# Patient Record
Sex: MALE | Race: WHITE | NOT HISPANIC OR LATINO | Employment: OTHER | URBAN - NONMETROPOLITAN AREA
[De-identification: names, ages, dates, MRNs, and addresses within clinical notes are randomized per-mention and may not be internally consistent; named-entity substitution may affect disease eponyms.]

---

## 2017-01-06 RX ORDER — CLOPIDOGREL BISULFATE 75 MG/1
TABLET ORAL
Qty: 30 TABLET | Refills: 0 | Status: SHIPPED | OUTPATIENT
Start: 2017-01-06 | End: 2017-02-18 | Stop reason: SDUPTHER

## 2017-01-23 RX ORDER — DILTIAZEM HYDROCHLORIDE 300 MG/1
CAPSULE, COATED, EXTENDED RELEASE ORAL
Qty: 30 CAPSULE | Refills: 0 | Status: SHIPPED | OUTPATIENT
Start: 2017-01-23 | End: 2017-02-25 | Stop reason: SDUPTHER

## 2017-01-27 RX ORDER — LOSARTAN POTASSIUM 50 MG/1
TABLET ORAL
Qty: 30 TABLET | Refills: 0 | Status: SHIPPED | OUTPATIENT
Start: 2017-01-27 | End: 2017-02-25 | Stop reason: SDUPTHER

## 2017-02-20 RX ORDER — CLOPIDOGREL BISULFATE 75 MG/1
TABLET ORAL
Qty: 30 TABLET | Refills: 0 | Status: SHIPPED | OUTPATIENT
Start: 2017-02-20 | End: 2017-03-09 | Stop reason: SDUPTHER

## 2017-02-20 RX ORDER — HYDROCHLOROTHIAZIDE 25 MG/1
TABLET ORAL
Qty: 90 TABLET | Refills: 0 | Status: SHIPPED | OUTPATIENT
Start: 2017-02-20 | End: 2017-03-09 | Stop reason: SDUPTHER

## 2017-02-27 RX ORDER — DILTIAZEM HYDROCHLORIDE 300 MG/1
CAPSULE, EXTENDED RELEASE ORAL
Qty: 30 CAPSULE | Refills: 0 | Status: SHIPPED | OUTPATIENT
Start: 2017-02-27 | End: 2017-03-09 | Stop reason: SDUPTHER

## 2017-02-27 RX ORDER — LOSARTAN POTASSIUM 50 MG/1
TABLET ORAL
Qty: 30 TABLET | Refills: 0 | Status: SHIPPED | OUTPATIENT
Start: 2017-02-27 | End: 2017-03-09 | Stop reason: SINTOL

## 2017-03-02 ENCOUNTER — APPOINTMENT (OUTPATIENT)
Dept: LAB | Facility: HOSPITAL | Age: 72
End: 2017-03-02

## 2017-03-02 ENCOUNTER — TRANSCRIBE ORDERS (OUTPATIENT)
Dept: LAB | Facility: HOSPITAL | Age: 72
End: 2017-03-02

## 2017-03-02 DIAGNOSIS — C61 PROSTATE CANCER (HCC): Primary | ICD-10-CM

## 2017-03-02 LAB — PSA SERPL-MCNC: 9.27 NG/ML (ref 0–4)

## 2017-03-02 PROCEDURE — 84153 ASSAY OF PSA TOTAL: CPT | Performed by: UROLOGY

## 2017-03-02 PROCEDURE — 36415 COLL VENOUS BLD VENIPUNCTURE: CPT | Performed by: UROLOGY

## 2017-03-03 ENCOUNTER — TRANSCRIBE ORDERS (OUTPATIENT)
Dept: LAB | Facility: HOSPITAL | Age: 72
End: 2017-03-03

## 2017-03-03 DIAGNOSIS — C61 PROSTATE CANCER (HCC): Primary | ICD-10-CM

## 2017-03-09 ENCOUNTER — TRANSCRIBE ORDERS (OUTPATIENT)
Dept: CARDIOLOGY | Facility: CLINIC | Age: 72
End: 2017-03-09

## 2017-03-09 ENCOUNTER — OFFICE VISIT (OUTPATIENT)
Dept: FAMILY MEDICINE CLINIC | Facility: CLINIC | Age: 72
End: 2017-03-09

## 2017-03-09 ENCOUNTER — APPOINTMENT (OUTPATIENT)
Dept: LAB | Facility: HOSPITAL | Age: 72
End: 2017-03-09

## 2017-03-09 VITALS
BODY MASS INDEX: 24.73 KG/M2 | HEART RATE: 67 BPM | SYSTOLIC BLOOD PRESSURE: 122 MMHG | OXYGEN SATURATION: 96 % | HEIGHT: 69 IN | DIASTOLIC BLOOD PRESSURE: 82 MMHG | WEIGHT: 167 LBS

## 2017-03-09 DIAGNOSIS — E78.5 HYPERLIPIDEMIA, UNSPECIFIED HYPERLIPIDEMIA TYPE: ICD-10-CM

## 2017-03-09 DIAGNOSIS — R05.8 DRY COUGH: ICD-10-CM

## 2017-03-09 DIAGNOSIS — K64.9 UNSPECIFIED HEMORRHOIDS: ICD-10-CM

## 2017-03-09 DIAGNOSIS — R00.2 PALPITATIONS: Primary | ICD-10-CM

## 2017-03-09 DIAGNOSIS — I10 ESSENTIAL HYPERTENSION: Primary | ICD-10-CM

## 2017-03-09 DIAGNOSIS — R00.2 PALPITATIONS: ICD-10-CM

## 2017-03-09 DIAGNOSIS — R00.2 PALPITATION: ICD-10-CM

## 2017-03-09 LAB
ALBUMIN SERPL-MCNC: 4.8 G/DL (ref 3.4–4.8)
ALBUMIN/GLOB SERPL: 1.8 G/DL (ref 1.1–1.8)
ALP SERPL-CCNC: 47 U/L (ref 38–126)
ALT SERPL W P-5'-P-CCNC: 38 U/L (ref 21–72)
ANION GAP SERPL CALCULATED.3IONS-SCNC: 10 MMOL/L (ref 5–15)
ARTICHOKE IGE QN: 151 MG/DL (ref 1–129)
AST SERPL-CCNC: 79 U/L (ref 17–59)
BASOPHILS # BLD AUTO: 0.1 10*3/MM3 (ref 0–0.2)
BASOPHILS NFR BLD AUTO: 1.1 % (ref 0–2)
BILIRUB SERPL-MCNC: 0.6 MG/DL (ref 0.2–1.3)
BUN BLD-MCNC: 30 MG/DL (ref 7–21)
BUN/CREAT SERPL: 21.3 (ref 7–25)
CALCIUM SPEC-SCNC: 9.4 MG/DL (ref 8.4–10.2)
CHLORIDE SERPL-SCNC: 98 MMOL/L (ref 95–110)
CHOLEST SERPL-MCNC: 255 MG/DL (ref 0–199)
CO2 SERPL-SCNC: 30 MMOL/L (ref 22–31)
CREAT BLD-MCNC: 1.41 MG/DL (ref 0.7–1.3)
DEPRECATED RDW RBC AUTO: 42.6 FL (ref 35.1–43.9)
EOSINOPHIL # BLD AUTO: 0.09 10*3/MM3 (ref 0–0.7)
EOSINOPHIL NFR BLD AUTO: 1 % (ref 0–7)
ERYTHROCYTE [DISTWIDTH] IN BLOOD BY AUTOMATED COUNT: 12.7 % (ref 11.5–14.5)
GFR SERPL CREATININE-BSD FRML MDRD: 49 ML/MIN/1.73 (ref 42–98)
GLOBULIN UR ELPH-MCNC: 2.6 GM/DL (ref 2.3–3.5)
GLUCOSE BLD-MCNC: 110 MG/DL (ref 60–100)
HCT VFR BLD AUTO: 46.2 % (ref 39–49)
HDLC SERPL-MCNC: 49 MG/DL (ref 60–200)
HGB BLD-MCNC: 16 G/DL (ref 13.7–17.3)
IMM GRANULOCYTES # BLD: 0.03 10*3/MM3 (ref 0–0.02)
IMM GRANULOCYTES NFR BLD: 0.3 % (ref 0–0.5)
LDLC/HDLC SERPL: 3.37 {RATIO} (ref 0–3.55)
LYMPHOCYTES # BLD AUTO: 2.69 10*3/MM3 (ref 0.6–4.2)
LYMPHOCYTES NFR BLD AUTO: 30.3 % (ref 10–50)
MCH RBC QN AUTO: 31.7 PG (ref 26.5–34)
MCHC RBC AUTO-ENTMCNC: 34.6 G/DL (ref 31.5–36.3)
MCV RBC AUTO: 91.5 FL (ref 80–98)
MONOCYTES # BLD AUTO: 0.83 10*3/MM3 (ref 0–0.9)
MONOCYTES NFR BLD AUTO: 9.3 % (ref 0–12)
NEUTROPHILS # BLD AUTO: 5.14 10*3/MM3 (ref 2–8.6)
NEUTROPHILS NFR BLD AUTO: 58 % (ref 37–80)
PLATELET # BLD AUTO: 369 10*3/MM3 (ref 150–450)
PMV BLD AUTO: 10.9 FL (ref 8–12)
POTASSIUM BLD-SCNC: 4.4 MMOL/L (ref 3.5–5.1)
PROT SERPL-MCNC: 7.4 G/DL (ref 6.3–8.6)
RBC # BLD AUTO: 5.05 10*6/MM3 (ref 4.37–5.74)
SODIUM BLD-SCNC: 138 MMOL/L (ref 137–145)
TRIGL SERPL-MCNC: 204 MG/DL (ref 20–199)
WBC NRBC COR # BLD: 8.88 10*3/MM3 (ref 3.2–9.8)

## 2017-03-09 PROCEDURE — 99214 OFFICE O/P EST MOD 30 MIN: CPT | Performed by: FAMILY MEDICINE

## 2017-03-09 PROCEDURE — 80053 COMPREHEN METABOLIC PANEL: CPT | Performed by: FAMILY MEDICINE

## 2017-03-09 PROCEDURE — 36415 COLL VENOUS BLD VENIPUNCTURE: CPT | Performed by: FAMILY MEDICINE

## 2017-03-09 PROCEDURE — 80061 LIPID PANEL: CPT | Performed by: FAMILY MEDICINE

## 2017-03-09 PROCEDURE — 85025 COMPLETE CBC W/AUTO DIFF WBC: CPT | Performed by: FAMILY MEDICINE

## 2017-03-09 RX ORDER — CLOPIDOGREL BISULFATE 75 MG/1
75 TABLET ORAL DAILY
Qty: 30 TABLET | Refills: 11 | Status: SHIPPED | OUTPATIENT
Start: 2017-03-09 | End: 2018-03-21 | Stop reason: SDUPTHER

## 2017-03-09 RX ORDER — FINASTERIDE 1 MG/1
1 TABLET, FILM COATED ORAL DAILY
Qty: 30 TABLET | Refills: 11 | Status: SHIPPED | OUTPATIENT
Start: 2017-03-09 | End: 2018-04-13 | Stop reason: SDUPTHER

## 2017-03-09 RX ORDER — DILTIAZEM HYDROCHLORIDE 300 MG/1
300 CAPSULE, COATED, EXTENDED RELEASE ORAL DAILY
Qty: 30 CAPSULE | Refills: 11 | Status: SHIPPED | OUTPATIENT
Start: 2017-03-09 | End: 2018-03-21 | Stop reason: SDUPTHER

## 2017-03-09 RX ORDER — DUTASTERIDE 0.5 MG/1
0.5 CAPSULE, LIQUID FILLED ORAL NIGHTLY
COMMUNITY
End: 2018-04-13 | Stop reason: SDUPTHER

## 2017-03-09 RX ORDER — FENOFIBRATE 145 MG/1
145 TABLET, COATED ORAL DAILY
Qty: 30 TABLET | Refills: 11 | Status: SHIPPED | OUTPATIENT
Start: 2017-03-09 | End: 2017-08-01 | Stop reason: ALTCHOICE

## 2017-03-09 RX ORDER — HYDRALAZINE HYDROCHLORIDE 25 MG/1
25 TABLET, FILM COATED ORAL 2 TIMES DAILY
Qty: 60 TABLET | Refills: 3 | Status: SHIPPED | OUTPATIENT
Start: 2017-03-09 | End: 2017-06-12 | Stop reason: SDUPTHER

## 2017-03-09 RX ORDER — OMEPRAZOLE 40 MG/1
40 CAPSULE, DELAYED RELEASE ORAL DAILY
Qty: 30 CAPSULE | Refills: 11 | Status: SHIPPED | OUTPATIENT
Start: 2017-03-09 | End: 2018-03-10 | Stop reason: SDUPTHER

## 2017-03-09 RX ORDER — HYDROCHLOROTHIAZIDE 25 MG/1
25 TABLET ORAL DAILY
Qty: 30 TABLET | Refills: 11 | Status: SHIPPED | OUTPATIENT
Start: 2017-03-09 | End: 2017-07-18 | Stop reason: SDUPTHER

## 2017-03-09 RX ORDER — CETIRIZINE HYDROCHLORIDE 10 MG/1
10 TABLET ORAL DAILY
Qty: 30 TABLET | Refills: 11 | Status: SHIPPED | OUTPATIENT
Start: 2017-03-09 | End: 2017-09-13

## 2017-03-09 NOTE — PROGRESS NOTES
Subjective   Souleymane Stapleton is a 71 y.o. male.     History of Present Illness The patient presents for evaluation of his chronic medical issues.  He is having problems with a hemorrhoid that is tender and would like this evaluated by surgery.  He has been having a dry cough and is thinking that maybe his losartan it has been affecting this.  He would like to try a different agent.He has had some palpitations.    The following portions of the patient's history were reviewed and updated as appropriate: allergies, current medications, past family history, past medical history, past social history, past surgical history and problem list.    Review of Systems   Respiratory: Positive for cough.    Cardiovascular: Negative for chest pain, palpitations and leg swelling.       Objective   Physical Exam   Constitutional: He appears well-developed and well-nourished.   HENT:   Head: Normocephalic and atraumatic.   Right Ear: External ear normal.   Left Ear: External ear normal.   Nose: Nose normal.   Mouth/Throat: Oropharynx is clear and moist.   Eyes: Pupils are equal, round, and reactive to light.   Cardiovascular: Normal rate, regular rhythm and normal heart sounds.  Exam reveals no gallop and no friction rub.    No murmur heard.  Pulmonary/Chest: Effort normal. No respiratory distress. He has no wheezes. He has no rales.   Abdominal: Soft. Bowel sounds are normal. He exhibits no distension. There is no tenderness.   Genitourinary:   Genitourinary Comments: There is a hemorrhoid present that is internal and presents with bearing down.   Neurological: He is alert.   Skin: Skin is warm and dry.   Vitals reviewed.      Assessment/Plan   Souleymane was seen today for hypertension, med refill, anxiety and gi problem.    Diagnoses and all orders for this visit:    Essential hypertension  -     CBC & Differential  -     Comprehensive Metabolic Panel    Dry cough    Unspecified hemorrhoids  -     Ambulatory Referral to General  Surgery    Hyperlipidemia, unspecified hyperlipidemia type  -     Lipid Panel    Palpitations  -     Holter monitor - 48 hour; Future    Other orders  -     hydrALAZINE (APRESOLINE) 25 MG tablet; Take 1 tablet by mouth 2 (Two) Times a Day.  -     diltiaZEM CD (CARTIA XT) 300 MG 24 hr capsule; Take 1 capsule by mouth Daily.  -     cetirizine (zyrTEC) 10 MG tablet; Take 1 tablet by mouth Daily.  -     clopidogrel (PLAVIX) 75 MG tablet; Take 1 tablet by mouth Daily.  -     fenofibrate (TRICOR) 145 MG tablet; Take 1 tablet by mouth Daily.  -     finasteride (PROPECIA) 1 MG tablet; Take 1 tablet by mouth Daily.  -     hydrochlorothiazide (HYDRODIURIL) 25 MG tablet; Take 1 tablet by mouth Daily.  -     omeprazole (priLOSEC) 40 MG capsule; Take 1 capsule by mouth Daily.    Will call with labs.  Referral to Surgery.  Will contact with Holter.

## 2017-03-14 ENCOUNTER — OFFICE VISIT (OUTPATIENT)
Dept: SURGERY | Facility: CLINIC | Age: 72
End: 2017-03-14

## 2017-03-14 VITALS
SYSTOLIC BLOOD PRESSURE: 124 MMHG | WEIGHT: 174 LBS | DIASTOLIC BLOOD PRESSURE: 78 MMHG | HEIGHT: 69 IN | BODY MASS INDEX: 25.77 KG/M2

## 2017-03-14 DIAGNOSIS — K64.2 PROLAPSED INTERNAL HEMORRHOIDS, GRADE 3: Primary | ICD-10-CM

## 2017-03-14 PROCEDURE — 99212 OFFICE O/P EST SF 10 MIN: CPT | Performed by: SURGERY

## 2017-03-15 DIAGNOSIS — K64.2 PROLAPSED INTERNAL HEMORRHOIDS, GRADE 3: Primary | ICD-10-CM

## 2017-03-15 NOTE — PROGRESS NOTES
CHIEF COMPLAINT:    Prolapsing internal hemorrhoid    HISTORY OF PRESENT ILLNESS:    Souleymane Stapleton is a 71 y.o. male who has been seen previously.  He is here today due to concerns over a prolapsing internal hemorrhoid.  He says after each bowel movement he has hemorrhoidal protrusion requiring manual reduction.  This occurs daily with bowel movements.  He notes no bleeding or pain.  He has bowel movements without straining or difficulty.  He takes fiber supplements to aid in bowel movements but this has not changed the frequency of his hemorrhoidal protrusion.  He otherwise is doing well.      Has had occasional SOA and recently wore a Holter monitor.  He does exercise 4-5 days per week without difficulty.    Past Medical History   Diagnosis Date   • Abdominal pain    • Abnormal weight loss    • Acute gastritis    • Anxiety state    • History of cerebrovascular accident    • History of colon polyps    • Hypertension    • Nausea    • Nuclear senile cataract    • Presbyopia    • Tobacco use      Tobacco use and exposure    • Transient cerebral ischemia    • Vitreous detachment of left eye        Past Surgical History   Procedure Laterality Date   • Injection of medication  09/14/2010     B12   • Skin biopsy  08/19/2010   • Other surgical history  08/19/2010     Biopsy, Each Additional Lesion    • Colonoscopy  06/09/2015     Diverticulosis found in the sigmoid colon. Hemorrhoids found in the anus.   • Cryotherapy  08/14/2012     CRYOTHERAPY OF ACNE    • Endoscopy  09/01/2015     EGD w/ biopsy -Multiple polyps, one removed.   • Injection of medication  10/17/2011     Kenalog   • Lumbar laminectomy  09/29/2010         Current Outpatient Prescriptions:   •  ALPRAZolam (XANAX) 0.5 MG tablet, Take 1 tablet by mouth 3 (Three) Times a Day., Disp: 90 tablet, Rfl: 2  •  cetirizine (zyrTEC) 10 MG tablet, Take 1 tablet by mouth Daily., Disp: 30 tablet, Rfl: 11  •  clopidogrel (PLAVIX) 75 MG tablet, Take 1 tablet by mouth  Daily., Disp: 30 tablet, Rfl: 11  •  diltiaZEM CD (CARTIA XT) 300 MG 24 hr capsule, Take 1 capsule by mouth Daily., Disp: 30 capsule, Rfl: 11  •  dutasteride (AVODART) 0.5 MG capsule, Take 0.5 mg by mouth Daily., Disp: , Rfl:   •  fenofibrate (TRICOR) 145 MG tablet, Take 1 tablet by mouth Daily., Disp: 30 tablet, Rfl: 11  •  finasteride (PROPECIA) 1 MG tablet, Take 1 tablet by mouth Daily., Disp: 30 tablet, Rfl: 11  •  hydrALAZINE (APRESOLINE) 25 MG tablet, Take 1 tablet by mouth 2 (Two) Times a Day., Disp: 60 tablet, Rfl: 3  •  hydrochlorothiazide (HYDRODIURIL) 25 MG tablet, Take 1 tablet by mouth Daily., Disp: 30 tablet, Rfl: 11  •  omeprazole (priLOSEC) 40 MG capsule, Take 1 capsule by mouth Daily., Disp: 30 capsule, Rfl: 11    No Known Allergies    Family History   Problem Relation Age of Onset   • Dementia Other    • Hypertension Other    • Stroke Other        Social History     Social History   • Marital status:      Spouse name: N/A   • Number of children: N/A   • Years of education: N/A     Occupational History   • Not on file.     Social History Main Topics   • Smoking status: Former Smoker   • Smokeless tobacco: Never Used   • Alcohol use Yes      Comment: moderate   • Drug use: No   • Sexual activity: Not on file     Other Topics Concern   • Not on file     Social History Narrative       Review of Systems   Constitutional: Negative for activity change, appetite change, chills and fever.   HENT: Negative for hearing loss, nosebleeds and trouble swallowing.    Respiratory: Positive for shortness of breath.    Cardiovascular: Negative for chest pain, palpitations and leg swelling.   Gastrointestinal: Negative for abdominal distention, abdominal pain, anal bleeding, blood in stool, constipation, diarrhea, nausea, rectal pain and vomiting.        Hemorrhoid protrusion   Endocrine: Negative for cold intolerance, heat intolerance, polydipsia and polyuria.   Genitourinary: Negative for decreased urine  "volume, difficulty urinating, dysuria, enuresis, frequency, hematuria and urgency.   Musculoskeletal: Negative for arthralgias, back pain, gait problem, myalgias and neck pain.   Skin: Negative for pallor, rash and wound.   Allergic/Immunologic: Negative for immunocompromised state.   Neurological: Negative for dizziness, seizures, weakness, light-headedness, numbness and headaches.   Psychiatric/Behavioral: Negative for agitation and behavioral problems. The patient is not nervous/anxious.        Objective     Visit Vitals   • /78   • Ht 69\" (175.3 cm)   • Wt 174 lb (78.9 kg)   • BMI 25.7 kg/m2       Physical Exam   Constitutional: He is oriented to person, place, and time. He appears well-developed and well-nourished.   HENT:   Head: Normocephalic and atraumatic.   Nose: Nose normal.   Eyes: Conjunctivae and EOM are normal. Right eye exhibits no discharge. Left eye exhibits no discharge.   Neck: Trachea normal, normal range of motion and phonation normal. Neck supple. No JVD present. No tracheal deviation and no edema present. No thyromegaly present.   Cardiovascular: Normal rate, regular rhythm and normal heart sounds.  Exam reveals no gallop and no friction rub.    No murmur heard.  Pulmonary/Chest: Effort normal and breath sounds normal. No accessory muscle usage. No respiratory distress. He has no decreased breath sounds. He has no wheezes. He has no rales. He exhibits no tenderness.   Abdominal: Soft. He exhibits no distension, no fluid wave, no ascites, no pulsatile midline mass and no mass. There is no tenderness. There is no rebound and no guarding. No hernia.   Genitourinary:         Musculoskeletal: Normal range of motion. He exhibits no edema, tenderness or deformity.   Lymphadenopathy:     He has no cervical adenopathy.        Left: No supraclavicular adenopathy present.   Neurological: He is alert and oriented to person, place, and time. He has normal strength. No cranial nerve deficit.   Skin: " Skin is warm and dry. No rash noted. He is not diaphoretic. No erythema. No pallor.   Psychiatric: He has a normal mood and affect. His speech is normal and behavior is normal. Judgment and thought content normal. Cognition and memory are normal.   Vitals reviewed.      ASSESSMENT:    Grade 3 internal hemorrhoid    PLAN:    Risks and benefits of stapled hemorrhoidopexy were discussed with him. He will call to schedule surgery.  Will continue fiber.          This document has been electronically signed by Juan Diego Ken MD on March 15, 2017 9:26 AM

## 2017-03-16 RX ORDER — SODIUM CHLORIDE 9 MG/ML
100 INJECTION, SOLUTION INTRAVENOUS CONTINUOUS
Status: CANCELLED | OUTPATIENT
Start: 2017-03-16

## 2017-03-16 RX ORDER — SODIUM CHLORIDE 0.9 % (FLUSH) 0.9 %
1-10 SYRINGE (ML) INJECTION AS NEEDED
Status: CANCELLED | OUTPATIENT
Start: 2017-03-16

## 2017-03-17 ENCOUNTER — APPOINTMENT (OUTPATIENT)
Dept: PREADMISSION TESTING | Facility: HOSPITAL | Age: 72
End: 2017-03-17

## 2017-03-17 ENCOUNTER — ANESTHESIA EVENT (OUTPATIENT)
Dept: PERIOP | Facility: HOSPITAL | Age: 72
End: 2017-03-17

## 2017-03-17 VITALS
HEART RATE: 66 BPM | HEIGHT: 69 IN | BODY MASS INDEX: 25.18 KG/M2 | SYSTOLIC BLOOD PRESSURE: 110 MMHG | DIASTOLIC BLOOD PRESSURE: 70 MMHG | RESPIRATION RATE: 18 BRPM | OXYGEN SATURATION: 95 % | WEIGHT: 170 LBS

## 2017-03-17 DIAGNOSIS — K64.2 PROLAPSED INTERNAL HEMORRHOIDS, GRADE 3: ICD-10-CM

## 2017-03-17 LAB
ANION GAP SERPL CALCULATED.3IONS-SCNC: 12 MMOL/L (ref 5–15)
BUN BLD-MCNC: 23 MG/DL (ref 7–21)
BUN/CREAT SERPL: 17 (ref 7–25)
CALCIUM SPEC-SCNC: 9.2 MG/DL (ref 8.4–10.2)
CHLORIDE SERPL-SCNC: 99 MMOL/L (ref 95–110)
CO2 SERPL-SCNC: 26 MMOL/L (ref 22–31)
CREAT BLD-MCNC: 1.35 MG/DL (ref 0.7–1.3)
DEPRECATED RDW RBC AUTO: 41.7 FL (ref 35.1–43.9)
ERYTHROCYTE [DISTWIDTH] IN BLOOD BY AUTOMATED COUNT: 12.6 % (ref 11.5–14.5)
GFR SERPL CREATININE-BSD FRML MDRD: 52 ML/MIN/1.73 (ref 42–98)
GLUCOSE BLD-MCNC: 84 MG/DL (ref 60–100)
HCT VFR BLD AUTO: 43.2 % (ref 39–49)
HGB BLD-MCNC: 15.3 G/DL (ref 13.7–17.3)
MCH RBC QN AUTO: 31.9 PG (ref 26.5–34)
MCHC RBC AUTO-ENTMCNC: 35.4 G/DL (ref 31.5–36.3)
MCV RBC AUTO: 90.2 FL (ref 80–98)
PLATELET # BLD AUTO: 346 10*3/MM3 (ref 150–450)
PMV BLD AUTO: 10.6 FL (ref 8–12)
POTASSIUM BLD-SCNC: 4.4 MMOL/L (ref 3.5–5.1)
RBC # BLD AUTO: 4.79 10*6/MM3 (ref 4.37–5.74)
SODIUM BLD-SCNC: 137 MMOL/L (ref 137–145)
WBC NRBC COR # BLD: 8.5 10*3/MM3 (ref 3.2–9.8)

## 2017-03-17 PROCEDURE — 93010 ELECTROCARDIOGRAM REPORT: CPT | Performed by: INTERNAL MEDICINE

## 2017-03-17 PROCEDURE — 93005 ELECTROCARDIOGRAM TRACING: CPT

## 2017-03-17 PROCEDURE — 85027 COMPLETE CBC AUTOMATED: CPT | Performed by: SURGERY

## 2017-03-17 PROCEDURE — 80048 BASIC METABOLIC PNL TOTAL CA: CPT | Performed by: SURGERY

## 2017-03-17 PROCEDURE — 36415 COLL VENOUS BLD VENIPUNCTURE: CPT

## 2017-03-17 NOTE — DISCHARGE INSTRUCTIONS
[facility]  Pre-op Information and Guidelines    You will be called after 2 p.m. the day before your surgery (Friday for Monday surgery) and notified of your time for arrival and approximate surgery time.  If you have not received a call by 4P.M., please contact Same Day Surgery at [phone] of if outside [facility] call [phone].    Please Follow these Important Safety Guidelines:    • The morning of your procedure, take only the medications listed below with   A sip of water:_____________________________________________       ______________________________________________    • DO NOT eat or drink anything after 12:00 midnight the night before surgery  Specific instructions concerning drinking clear liquids will be discussed during  the pre-surgery instruction call the day before your surgery.    • If you take a blood thinner (ex. Plavix, Coumadin, aspirin), ask your doctor when to stop it before surgery  STOP DATE: _________________    • Only 2 visitors are allowed in patient rooms at a time  Your visitors will be asked to wait in the lobby until the admission process is complete with the exception of a parent with a child and patients in need of special assistance.    • YOU CANNOT DRIVE YOURSELF HOME  You must be accompanied by someone who will be responsible for driving you home after surgery and for your care at home.    • DO NOT chew gum, use breath mints, hard candy, or smoke the day of surgery  • DO NOT drink alcohol for at least 24 hours before your surgery  • DO NOT wear any jewelry and remove all body piercing before coming to the hospital  • DO NOT wear make-up to the hospital  • If you are having surgery on an extremity (arm/leg/foot) remove nail polish/artificial nails on the surgical side  • Clothing, glasses, contacts, dentures, and hairpieces must be removed before surgery  Bathe the night before or the morning of your surgery and do not use powders/lotions on  skin.

## 2017-03-20 ENCOUNTER — HOSPITAL ENCOUNTER (OUTPATIENT)
Facility: HOSPITAL | Age: 72
Setting detail: HOSPITAL OUTPATIENT SURGERY
Discharge: HOME | End: 2017-03-20
Attending: SURGERY | Admitting: SURGERY

## 2017-03-20 ENCOUNTER — ANESTHESIA (OUTPATIENT)
Dept: PERIOP | Facility: HOSPITAL | Age: 72
End: 2017-03-20

## 2017-03-20 VITALS
TEMPERATURE: 97.7 F | DIASTOLIC BLOOD PRESSURE: 78 MMHG | OXYGEN SATURATION: 95 % | HEIGHT: 69 IN | WEIGHT: 166.89 LBS | RESPIRATION RATE: 18 BRPM | BODY MASS INDEX: 24.72 KG/M2 | HEART RATE: 74 BPM | SYSTOLIC BLOOD PRESSURE: 160 MMHG

## 2017-03-20 DIAGNOSIS — K64.2 PROLAPSED INTERNAL HEMORRHOIDS, GRADE 3: ICD-10-CM

## 2017-03-20 PROCEDURE — 46220 EXCISE ANAL EXT TAG/PAPILLA: CPT | Performed by: SURGERY

## 2017-03-20 PROCEDURE — 25010000002 CEFOXITIN: Performed by: SURGERY

## 2017-03-20 PROCEDURE — 25010000002 NEOSTIGMINE PER 0.5 MG: Performed by: NURSE ANESTHETIST, CERTIFIED REGISTERED

## 2017-03-20 PROCEDURE — 25010000002 ONDANSETRON PER 1 MG: Performed by: NURSE ANESTHETIST, CERTIFIED REGISTERED

## 2017-03-20 PROCEDURE — 25010000002 DEXAMETHASONE PER 1 MG: Performed by: NURSE ANESTHETIST, CERTIFIED REGISTERED

## 2017-03-20 PROCEDURE — 88304 TISSUE EXAM BY PATHOLOGIST: CPT | Performed by: PATHOLOGY

## 2017-03-20 PROCEDURE — 88304 TISSUE EXAM BY PATHOLOGIST: CPT | Performed by: SURGERY

## 2017-03-20 PROCEDURE — 25010000002 MIDAZOLAM PER 1 MG: Performed by: NURSE ANESTHETIST, CERTIFIED REGISTERED

## 2017-03-20 PROCEDURE — 25010000002 FENTANYL CITRATE (PF) 100 MCG/2ML SOLUTION: Performed by: NURSE ANESTHETIST, CERTIFIED REGISTERED

## 2017-03-20 PROCEDURE — 25010000002 PROPOFOL 10 MG/ML EMULSION: Performed by: NURSE ANESTHETIST, CERTIFIED REGISTERED

## 2017-03-20 RX ORDER — PROPOFOL 10 MG/ML
VIAL (ML) INTRAVENOUS AS NEEDED
Status: DISCONTINUED | OUTPATIENT
Start: 2017-03-20 | End: 2017-03-20 | Stop reason: SURG

## 2017-03-20 RX ORDER — DIPHENHYDRAMINE HYDROCHLORIDE 50 MG/ML
12.5 INJECTION INTRAMUSCULAR; INTRAVENOUS
Status: DISCONTINUED | OUTPATIENT
Start: 2017-03-20 | End: 2017-03-20 | Stop reason: HOSPADM

## 2017-03-20 RX ORDER — ACETAMINOPHEN 325 MG/1
650 TABLET ORAL ONCE AS NEEDED
Status: DISCONTINUED | OUTPATIENT
Start: 2017-03-20 | End: 2017-03-20 | Stop reason: HOSPADM

## 2017-03-20 RX ORDER — ONDANSETRON 2 MG/ML
INJECTION INTRAMUSCULAR; INTRAVENOUS AS NEEDED
Status: DISCONTINUED | OUTPATIENT
Start: 2017-03-20 | End: 2017-03-20 | Stop reason: SURG

## 2017-03-20 RX ORDER — MIDAZOLAM HYDROCHLORIDE 1 MG/ML
INJECTION INTRAMUSCULAR; INTRAVENOUS AS NEEDED
Status: DISCONTINUED | OUTPATIENT
Start: 2017-03-20 | End: 2017-03-20 | Stop reason: SURG

## 2017-03-20 RX ORDER — LIDOCAINE HYDROCHLORIDE 20 MG/ML
INJECTION, SOLUTION INFILTRATION; PERINEURAL AS NEEDED
Status: DISCONTINUED | OUTPATIENT
Start: 2017-03-20 | End: 2017-03-20 | Stop reason: SURG

## 2017-03-20 RX ORDER — NALOXONE HCL 0.4 MG/ML
0.2 VIAL (ML) INJECTION AS NEEDED
Status: DISCONTINUED | OUTPATIENT
Start: 2017-03-20 | End: 2017-03-20 | Stop reason: HOSPADM

## 2017-03-20 RX ORDER — GLYCOPYRROLATE 0.2 MG/ML
INJECTION INTRAMUSCULAR; INTRAVENOUS AS NEEDED
Status: DISCONTINUED | OUTPATIENT
Start: 2017-03-20 | End: 2017-03-20 | Stop reason: SURG

## 2017-03-20 RX ORDER — HYDROCODONE BITARTRATE AND ACETAMINOPHEN 5; 325 MG/1; MG/1
1-2 TABLET ORAL EVERY 4 HOURS PRN
Qty: 20 TABLET | Refills: 0 | Status: SHIPPED | OUTPATIENT
Start: 2017-03-20 | End: 2017-07-18

## 2017-03-20 RX ORDER — FENTANYL CITRATE 50 UG/ML
INJECTION, SOLUTION INTRAMUSCULAR; INTRAVENOUS AS NEEDED
Status: DISCONTINUED | OUTPATIENT
Start: 2017-03-20 | End: 2017-03-20 | Stop reason: SURG

## 2017-03-20 RX ORDER — DEXAMETHASONE SODIUM PHOSPHATE 4 MG/ML
INJECTION, SOLUTION INTRA-ARTICULAR; INTRALESIONAL; INTRAMUSCULAR; INTRAVENOUS; SOFT TISSUE AS NEEDED
Status: DISCONTINUED | OUTPATIENT
Start: 2017-03-20 | End: 2017-03-20 | Stop reason: SURG

## 2017-03-20 RX ORDER — FLUMAZENIL 0.1 MG/ML
0.2 INJECTION INTRAVENOUS AS NEEDED
Status: DISCONTINUED | OUTPATIENT
Start: 2017-03-20 | End: 2017-03-20 | Stop reason: HOSPADM

## 2017-03-20 RX ORDER — ROCURONIUM BROMIDE 10 MG/ML
INJECTION, SOLUTION INTRAVENOUS AS NEEDED
Status: DISCONTINUED | OUTPATIENT
Start: 2017-03-20 | End: 2017-03-20 | Stop reason: SURG

## 2017-03-20 RX ORDER — SODIUM CHLORIDE 0.9 % (FLUSH) 0.9 %
1-10 SYRINGE (ML) INJECTION AS NEEDED
Status: DISCONTINUED | OUTPATIENT
Start: 2017-03-20 | End: 2017-03-20 | Stop reason: HOSPADM

## 2017-03-20 RX ORDER — SODIUM CHLORIDE 9 MG/ML
100 INJECTION, SOLUTION INTRAVENOUS CONTINUOUS
Status: DISCONTINUED | OUTPATIENT
Start: 2017-03-20 | End: 2017-03-20 | Stop reason: HOSPADM

## 2017-03-20 RX ORDER — ONDANSETRON 2 MG/ML
4 INJECTION INTRAMUSCULAR; INTRAVENOUS ONCE AS NEEDED
Status: DISCONTINUED | OUTPATIENT
Start: 2017-03-20 | End: 2017-03-20 | Stop reason: HOSPADM

## 2017-03-20 RX ORDER — LABETALOL HYDROCHLORIDE 5 MG/ML
5 INJECTION, SOLUTION INTRAVENOUS
Status: DISCONTINUED | OUTPATIENT
Start: 2017-03-20 | End: 2017-03-20 | Stop reason: HOSPADM

## 2017-03-20 RX ORDER — ACETAMINOPHEN 650 MG/1
650 SUPPOSITORY RECTAL ONCE AS NEEDED
Status: DISCONTINUED | OUTPATIENT
Start: 2017-03-20 | End: 2017-03-20 | Stop reason: HOSPADM

## 2017-03-20 RX ADMIN — LIDOCAINE HYDROCHLORIDE 60 MG: 20 INJECTION, SOLUTION INFILTRATION; PERINEURAL at 14:35

## 2017-03-20 RX ADMIN — ROCURONIUM BROMIDE 30 MG: 10 INJECTION INTRAVENOUS at 14:41

## 2017-03-20 RX ADMIN — GLYCOPYRROLATE 0.6 MG: 0.2 INJECTION, SOLUTION INTRAMUSCULAR; INTRAVENOUS at 15:18

## 2017-03-20 RX ADMIN — NEOSTIGMINE METHYLSULFATE 3 MG: 1 INJECTION, SOLUTION INTRAMUSCULAR; INTRAVENOUS; SUBCUTANEOUS at 15:18

## 2017-03-20 RX ADMIN — SODIUM CHLORIDE 100 ML/HR: 9 INJECTION, SOLUTION INTRAVENOUS at 13:00

## 2017-03-20 RX ADMIN — MIDAZOLAM 2 MG: 1 INJECTION INTRAMUSCULAR; INTRAVENOUS at 14:31

## 2017-03-20 RX ADMIN — CEFOXITIN 2 G: 2 INJECTION, POWDER, FOR SOLUTION INTRAVENOUS at 14:46

## 2017-03-20 RX ADMIN — PROPOFOL 170 MG: 10 INJECTION, EMULSION INTRAVENOUS at 14:35

## 2017-03-20 RX ADMIN — DEXAMETHASONE SODIUM PHOSPHATE 4 MG: 4 INJECTION, SOLUTION INTRAMUSCULAR; INTRAVENOUS at 14:40

## 2017-03-20 RX ADMIN — ONDANSETRON 4 MG: 2 INJECTION INTRAMUSCULAR; INTRAVENOUS at 15:05

## 2017-03-20 RX ADMIN — FENTANYL CITRATE 100 MCG: 50 INJECTION, SOLUTION INTRAMUSCULAR; INTRAVENOUS at 14:45

## 2017-03-20 NOTE — ANESTHESIA PREPROCEDURE EVALUATION
Anesthesia Evaluation     Patient summary reviewed and Nursing notes reviewed   NPO Status: > 8 hours   Airway   Mallampati: II  TM distance: >3 FB  Neck ROM: full  possible difficult intubation  Dental - normal exam     Pulmonary - normal exam   (+) a smoker ( quit smoking 20 years ago with a 20 year history.) Former,     ROS comment: He denies any problems breathing  Cardiovascular     (+) hypertension well controlled,     ROS comment: He affirms occasional palpitations which rarely cause any symptoms and he is otherwise free of any heart problems.  EKG on 3/15/17 was normal    Neuro/Psych  (+) TIA ( In 2011 he had a TIA with full resolution of his transient symptoms.  He relates that his carotid artery ultrasound was normal), psychiatric history Anxiety,    GI/Hepatic/Renal/Endo    (+)  GERD ( He took his omeprazole today and has no GERD symptoms today) well controlled,     Musculoskeletal     (+) neck pain ( has some intermittent neck pain with occasional right arm weakness),   Abdominal    Substance History      OB/GYN negative ob/gyn ROS         Other   (+) arthritis   history of cancer ( He has prostate cancer which is apparently non-aggressive and he is under surveilance) active                                Anesthesia Plan    ASA 3     general     intravenous induction   Anesthetic plan and risks discussed with patient.    Plan discussed with medical student.

## 2017-03-20 NOTE — INTERVAL H&P NOTE
H&P reviewed. The patient was examined and there are no changes to the H&P.           This document has been electronically signed by Juan Diego Ken MD on March 20, 2017 1:46 PM

## 2017-03-20 NOTE — DISCHARGE INSTRUCTIONS
Minor Surgery  Outpatient Instructions    General Information  You have had a minor surgical procedure and are not expected to require extensive treatment or a long recovery period.  However, the following information/instructions that are listed serve as guidelines to help you recover at home.    Activity:  Resume normal activity in 24 hours.    Hygiene:  May shower in 24 hours. No tub baths, hot tubs, or pools for 2 weeks.    Dressing/Wound Care:  May remove dressing in 24 hours.    Diet:  Resume regular diet    Important Points:  -Call your doctor to report any of the following:   -unusual or excessive bleeding/drainage   -pain not reduced/controlled by medication   -elevated temperature consistently greater that 100 degrees F   -increased swelling, redness, bruising, or tenderness in surgical area  -Take medications as prescribed by your physician  -If you have any questions, please contact your doctor or the Same Day Surgery Unit at   [phone]  -If a post-operative emergency occurs, report to your nearest ER

## 2017-03-20 NOTE — BRIEF OP NOTE
HEMORRHOIDECTOMY  Procedure Note    Souleymane Stapleton  3/20/2017    Pre-op Diagnosis:   Prolapsed internal hemorrhoids, grade 3 [K64.2]    Post-op Diagnosis:     Hypertrophied Anal Papillae    Procedure/CPT® Codes:      Procedure(s):  Excision of Hypertrophied Anal Papilla    Surgeon(s):  Juan Diego Ken MD    Anesthesia: General    Staff:   Circulator: Raul Manley RN  Scrub Person: Sarah Jo; Isabella Osman  Assistant: Dora Sanchez    Estimated Blood Loss: 5cc  Urine Voided: * No values recorded between 3/20/2017  2:33 PM and 3/20/2017  3:31 PM *    Specimens:                  ID Type Source Tests Collected by Time Destination   A : anal papilla Tissue Anus TISSUE EXAM Juan Diego Ken MD 3/20/2017 1511          Drains:           Findings: One large hypertrophic anal papilla    Complications: none          This document has been electronically signed by Juan Diego Ken MD on March 20, 2017 3:43 PM        Juan Diego Ken MD     Date: 3/20/2017  Time: 3:42 PM

## 2017-03-20 NOTE — PLAN OF CARE
Problem: Patient Care Overview (Adult)  Goal: Plan of Care Review  Outcome: Ongoing (interventions implemented as appropriate)    03/20/17 1600   Coping/Psychosocial Response Interventions   Plan Of Care Reviewed With patient   Patient Care Overview   Progress improving   Outcome Evaluation   Outcome Summary/Follow up Plan PACU DC criteria met. Pt. awake, alert. No complaints noted. Vital signs stable.

## 2017-03-20 NOTE — ANESTHESIA POSTPROCEDURE EVALUATION
Patient: Souleymane Stapleton    Procedure Summary     Date Anesthesia Start Anesthesia Stop Room / Location    03/20/17 1436 1534  [facility] OR 12 / [facility]       Procedure Diagnosis Surgeon Provider    Removal of Anal Papilla (N/A Anus) Prolapsed internal hemorrhoids, grade 3  (Prolapsed internal hemorrhoids, grade 3 [K64.2]) MD Malik Mosley MD          Anesthesia Type: general  Last vitals  BP      Temp      Pulse     Resp      SpO2        Post Anesthesia Care and Evaluation    Patient location during evaluation: bedside  Patient participation: complete - patient cannot participate  Level of consciousness: responsive to verbal stimuli  Pain management: adequate  Airway patency: patent  Anesthetic complications: No anesthetic complications    Cardiovascular status: acceptable  Respiratory status: acceptable  Hydration status: acceptable

## 2017-03-20 NOTE — OP NOTE
HEMORRHOIDECTOMY  Procedure Note    Souleymane Stapleton  3/20/2017    Pre-op Diagnosis:   Prolapsed internal hemorrhoids, grade 3 [K64.2]    Post-op Diagnosis:     Hypertrophic Anal Papilla    Procedure/CPT® Codes:      Procedure(s):  Removal of Anal Papilla    Surgeon(s):  Juan Diego Ken MD    Anesthesia: General    Staff:   Circulator: Raul Manley RN  Scrub Person: Sarah Jo; Isabella Osman  Assistant: Dora Sanchez    Estimated Blood Loss: 5cc  Specimens:                  ID Type Source Tests Collected by Time Destination   A : anal papilla Tissue Anus TISSUE EXAM Juan Diego Ken MD 3/20/2017 1511      Findings: Normal appearing internal and external hemorrhoids with large pedunculated anal papilla    Complications: none    Indication: See H and P    Operative Note:    Patient was seen and consented in the preop area. Following this he was taken to the OR and general anesthetic administered on the stretcher bed.  Orotracheally intubated without issue and rolled into prone position on the OR table.  Once appropriately padded and secured a briefing was performed.    The perianal region was prepped and draped and timeout performed.  Digital rectal exam was performed with one lubricated finger and then two fingers were used to gently dilate the opening.  On ANGELIC a pedunculated anal papilla was able to be palpated and prolapsed through the anal opening.    Lubricated anal retractor was then inserted and confirmed normal appearing internal hemorrhoids with a large pedunculated anal papilla adjacent to a smaller slightly hypertrophic papilla.  The larger papilla was grasped and excised using bovie, the site was then oversewn using chromic suture.  The small papilla was also excised and oversewn.  The are was checked for hemostasis which appeared to be good.  As he did not appear to have any enlargement of his internal hemorrhoids we did not performed a hemorrhoidopexy.  Exparel was injected  circumferentially in the perianal region.  A rolled Gel Foam with Lidocaine ointment was then placed in the anal canal and the procedure was terminated.  Sterile dressings were placed.          This document has been electronically signed by Juan Diego Ken MD on March 20, 2017 4:12 PM        Juan Diego Ken MD     Date: 3/20/2017  Time: 4:06 PM

## 2017-03-20 NOTE — ANESTHESIA PROCEDURE NOTES
Airway  Urgency: elective    Difficult airway    General Information and Staff    Patient location during procedure: OR  CRNA: ELSY FENTON    Indications and Patient Condition  Indications for airway management: airway protection    Preoxygenated: yes  MILS maintained throughout  Mask difficulty assessment: 1 - vent by mask    Final Airway Details  Final airway type: endotracheal airway      Successful airway: ETT  Cuffed: yes   Successful intubation technique: direct laryngoscopy  Facilitating devices/methods: intubating stylet  Endotracheal tube insertion site: oral  Blade: Judd  Blade size: #4  ETT size: 7.5 mm  Cormack-Lehane Classification: grade III - view of epiglottis only  Placement verified by: chest auscultation and capnometry   Measured from: teeth  ETT to teeth (cm): 22  Number of attempts at approach: 3 or more    Additional Comments  Converted to Alejandra #4

## 2017-03-20 NOTE — PLAN OF CARE
Problem: Patient Care Overview (Adult)  Goal: Plan of Care Review  Patient meets discharge criteria

## 2017-03-20 NOTE — PLAN OF CARE
Problem: Patient Care Overview (Adult)  Goal: Adult Individualization and Mutuality    03/20/17 1301   Individualization   Patient Specific Preferences pt goes by Ash

## 2017-03-22 LAB
LAB AP CASE REPORT: NORMAL
Lab: NORMAL
PATH REPORT.FINAL DX SPEC: NORMAL
PATH REPORT.GROSS SPEC: NORMAL

## 2017-03-30 ENCOUNTER — OFFICE VISIT (OUTPATIENT)
Dept: SURGERY | Facility: CLINIC | Age: 72
End: 2017-03-30

## 2017-03-30 VITALS
BODY MASS INDEX: 24.88 KG/M2 | WEIGHT: 168 LBS | DIASTOLIC BLOOD PRESSURE: 76 MMHG | SYSTOLIC BLOOD PRESSURE: 118 MMHG | HEIGHT: 69 IN

## 2017-03-30 DIAGNOSIS — Z09 FOLLOW UP: Primary | ICD-10-CM

## 2017-03-30 PROCEDURE — 99024 POSTOP FOLLOW-UP VISIT: CPT | Performed by: SURGERY

## 2017-03-30 NOTE — PROGRESS NOTES
CHIEF COMPLAINT:    Chief Complaint   Patient presents with   • Post-op     Excision of anal papilla on 3/20/17.       HISTORY OF PRESENT ILLNESS:    Souleymane Stapleton is a 71 y.o. male who underwent excision of hypertrophied anal papillae and hemorrhoidal tissue.  He is having regular bowel movements at home and has only minimal discomfort.  He notes no bleeding.  Pathology showed benign hemorrhoidal tissue.    EXAM:  Vitals:    03/30/17 1007   BP: 118/76         Small external hemorrhoid, internal exam deferred    ASSESSMENT:    S/p anal papillae excision    PLAN:    Overall doing well.  Continue fiber in diet.  May exercise.  See prn.          This document has been electronically signed by Juan Diego Ken MD on March 30, 2017 10:25 AM

## 2017-04-27 RX ORDER — ALPRAZOLAM 0.5 MG/1
TABLET ORAL
Qty: 90 TABLET | Refills: 0 | OUTPATIENT
Start: 2017-04-27

## 2017-05-16 ENCOUNTER — OFFICE VISIT (OUTPATIENT)
Dept: FAMILY MEDICINE CLINIC | Facility: CLINIC | Age: 72
End: 2017-05-16

## 2017-05-16 VITALS
HEART RATE: 86 BPM | HEIGHT: 69 IN | WEIGHT: 162 LBS | DIASTOLIC BLOOD PRESSURE: 80 MMHG | BODY MASS INDEX: 23.99 KG/M2 | OXYGEN SATURATION: 97 % | TEMPERATURE: 100.6 F | SYSTOLIC BLOOD PRESSURE: 130 MMHG

## 2017-05-16 DIAGNOSIS — J06.9 URI, ACUTE: Primary | ICD-10-CM

## 2017-05-16 DIAGNOSIS — M79.10 MYALGIA: ICD-10-CM

## 2017-05-16 DIAGNOSIS — B34.9 VIRAL SYNDROME: ICD-10-CM

## 2017-05-16 LAB
EXPIRATION DATE: NORMAL
FLUAV AG NPH QL: NORMAL
FLUBV AG NPH QL: NORMAL
INTERNAL CONTROL: NORMAL
Lab: NORMAL

## 2017-05-16 PROCEDURE — 87804 INFLUENZA ASSAY W/OPTIC: CPT | Performed by: FAMILY MEDICINE

## 2017-05-16 PROCEDURE — 99213 OFFICE O/P EST LOW 20 MIN: CPT | Performed by: FAMILY MEDICINE

## 2017-06-12 RX ORDER — HYDRALAZINE HYDROCHLORIDE 25 MG/1
TABLET, FILM COATED ORAL
Qty: 60 TABLET | Refills: 0 | Status: SHIPPED | OUTPATIENT
Start: 2017-06-12 | End: 2017-07-15 | Stop reason: SDUPTHER

## 2017-06-14 ENCOUNTER — LAB (OUTPATIENT)
Dept: LAB | Facility: HOSPITAL | Age: 72
End: 2017-06-14

## 2017-06-14 ENCOUNTER — TRANSCRIBE ORDERS (OUTPATIENT)
Dept: LAB | Facility: HOSPITAL | Age: 72
End: 2017-06-14

## 2017-06-14 DIAGNOSIS — C61 CANCER OF PROSTATE (HCC): Primary | ICD-10-CM

## 2017-06-14 DIAGNOSIS — C61 CANCER OF PROSTATE (HCC): ICD-10-CM

## 2017-06-14 LAB — PSA SERPL-MCNC: 8.66 NG/ML (ref 0–4)

## 2017-06-14 PROCEDURE — 84153 ASSAY OF PSA TOTAL: CPT | Performed by: UROLOGY

## 2017-06-14 PROCEDURE — 36415 COLL VENOUS BLD VENIPUNCTURE: CPT

## 2017-07-11 ENCOUNTER — TRANSCRIBE ORDERS (OUTPATIENT)
Dept: LAB | Facility: HOSPITAL | Age: 72
End: 2017-07-11

## 2017-07-11 DIAGNOSIS — C61 PROSTATE CANCER (HCC): Primary | ICD-10-CM

## 2017-07-13 ENCOUNTER — TELEPHONE (OUTPATIENT)
Dept: FAMILY MEDICINE CLINIC | Facility: CLINIC | Age: 72
End: 2017-07-13

## 2017-07-17 RX ORDER — HYDRALAZINE HYDROCHLORIDE 25 MG/1
TABLET, FILM COATED ORAL
Qty: 60 TABLET | Refills: 0 | Status: SHIPPED | OUTPATIENT
Start: 2017-07-17 | End: 2017-07-18 | Stop reason: SDUPTHER

## 2017-07-18 ENCOUNTER — OFFICE VISIT (OUTPATIENT)
Dept: FAMILY MEDICINE CLINIC | Facility: CLINIC | Age: 72
End: 2017-07-18

## 2017-07-18 VITALS
DIASTOLIC BLOOD PRESSURE: 82 MMHG | HEIGHT: 69 IN | OXYGEN SATURATION: 95 % | SYSTOLIC BLOOD PRESSURE: 120 MMHG | HEART RATE: 82 BPM

## 2017-07-18 DIAGNOSIS — G89.29 CHRONIC RIGHT SHOULDER PAIN: Primary | ICD-10-CM

## 2017-07-18 DIAGNOSIS — R20.0 NUMBNESS AND TINGLING OF RIGHT ARM: ICD-10-CM

## 2017-07-18 DIAGNOSIS — M54.2 NECK PAIN: ICD-10-CM

## 2017-07-18 DIAGNOSIS — R20.2 NUMBNESS AND TINGLING OF RIGHT ARM: ICD-10-CM

## 2017-07-18 DIAGNOSIS — J40 BRONCHITIS: ICD-10-CM

## 2017-07-18 DIAGNOSIS — G47.00 INSOMNIA, UNSPECIFIED TYPE: ICD-10-CM

## 2017-07-18 DIAGNOSIS — M25.511 CHRONIC RIGHT SHOULDER PAIN: Primary | ICD-10-CM

## 2017-07-18 PROCEDURE — 99214 OFFICE O/P EST MOD 30 MIN: CPT | Performed by: FAMILY MEDICINE

## 2017-07-18 RX ORDER — ALBUTEROL SULFATE 90 UG/1
AEROSOL, METERED RESPIRATORY (INHALATION)
Qty: 1 INHALER | Refills: 1 | Status: SHIPPED | OUTPATIENT
Start: 2017-07-18 | End: 2017-08-30

## 2017-07-18 RX ORDER — HYDRALAZINE HYDROCHLORIDE 25 MG/1
25 TABLET, FILM COATED ORAL 2 TIMES DAILY
Qty: 60 TABLET | Refills: 5 | Status: SHIPPED | OUTPATIENT
Start: 2017-07-18 | End: 2017-08-30

## 2017-07-18 RX ORDER — HYDROCHLOROTHIAZIDE 25 MG/1
25 TABLET ORAL DAILY
Qty: 30 TABLET | Refills: 5 | Status: SHIPPED | OUTPATIENT
Start: 2017-07-18 | End: 2017-08-30

## 2017-07-18 RX ORDER — AMOXICILLIN 500 MG/1
500 CAPSULE ORAL 3 TIMES DAILY
Qty: 30 CAPSULE | Refills: 0 | Status: SHIPPED | OUTPATIENT
Start: 2017-07-18 | End: 2017-08-30

## 2017-07-18 RX ORDER — TRAZODONE HYDROCHLORIDE 50 MG/1
50 TABLET ORAL NIGHTLY
Qty: 90 TABLET | Refills: 1 | Status: SHIPPED | OUTPATIENT
Start: 2017-07-18 | End: 2017-08-30

## 2017-07-18 NOTE — PROGRESS NOTES
Subjective   Souleymane Stapleton is a 71 y.o. male.     History of Present Illness   Comes in for check of right shoulder pain and neck pain. He denies injury.The neck pain is associated with numbness at time.He also has difficulty sleeping.He is also having cough and congestion. He is also having difficulty sleeping.  The following portions of the patient's history were reviewed and updated as appropriate: allergies, current medications, past family history, past medical history, past social history, past surgical history and problem list.    Review of Systems   Constitutional: Negative for fatigue and fever.   Respiratory: Negative for cough, chest tightness, shortness of breath and wheezing.    Cardiovascular: Negative for chest pain, palpitations and leg swelling.   Musculoskeletal: Positive for arthralgias and neck pain.       Objective   Physical Exam   Constitutional: He appears well-developed and well-nourished.   HENT:   Head: Normocephalic and atraumatic.   Right Ear: External ear normal.   Left Ear: External ear normal.   Nose: Nose normal.   Mouth/Throat: Oropharynx is clear and moist.   Eyes: EOM are normal. Pupils are equal, round, and reactive to light.   Neck: Normal range of motion.   Cardiovascular: Normal rate, regular rhythm and normal heart sounds.  Exam reveals no gallop and no friction rub.    No murmur heard.  Pulmonary/Chest: Effort normal and breath sounds normal. No respiratory distress. He has no wheezes. He has no rales.   Abdominal: Soft. Bowel sounds are normal. He exhibits no distension. There is no tenderness.   Musculoskeletal:   The neck is tender on palpation. The right shoulder is tender. ROM is decreased on the right.   Vitals reviewed.      Assessment/Plan   Souleymane was seen today for hypertension, med refill and shoulder pain.    Diagnoses and all orders for this visit:    Chronic right shoulder pain  -     XR Shoulder 2+ View Right; Future    Neck pain  -     XR spine cervical  complete 4 or 5 vw; Future    Bronchitis    Insomnia, unspecified type    Other orders  -     hydrochlorothiazide (HYDRODIURIL) 25 MG tablet; Take 1 tablet by mouth Daily.  -     hydrALAZINE (APRESOLINE) 25 MG tablet; Take 1 tablet by mouth 2 (Two) Times a Day.  -     traZODone (DESYREL) 50 MG tablet; Take 1 tablet by mouth Every Night.  -     albuterol (PROVENTIL HFA;VENTOLIN HFA) 108 (90 BASE) MCG/ACT inhaler; Morning ,Noon 4 PM and bedtime  -     amoxicillin (AMOXIL) 500 MG capsule; Take 1 capsule by mouth 3 (Three) Times a Day.    His chronic medications are refilled.  Return in 3 months.  I have started trazodone for sleep.  Amoxicillin and Proventil HFA are prescribed for bronchitis.  Will contact with x-ray reports.

## 2017-08-01 DIAGNOSIS — G89.29 CHRONIC RIGHT SHOULDER PAIN: Primary | ICD-10-CM

## 2017-08-01 DIAGNOSIS — M25.511 CHRONIC RIGHT SHOULDER PAIN: Primary | ICD-10-CM

## 2017-08-01 RX ORDER — GEMFIBROZIL 600 MG/1
600 TABLET, FILM COATED ORAL
Qty: 60 TABLET | Refills: 5 | Status: SHIPPED | OUTPATIENT
Start: 2017-08-01 | End: 2017-08-30

## 2017-08-01 RX ORDER — METOCLOPRAMIDE 5 MG/1
5 TABLET ORAL
Qty: 56 TABLET | Refills: 1 | Status: SHIPPED | OUTPATIENT
Start: 2017-08-01 | End: 2018-01-03

## 2017-08-02 DIAGNOSIS — R20.0 NUMBNESS OF RIGHT HAND: ICD-10-CM

## 2017-08-02 DIAGNOSIS — M75.101 TEAR OF RIGHT ROTATOR CUFF, UNSPECIFIED TEAR EXTENT: Primary | ICD-10-CM

## 2017-08-02 DIAGNOSIS — M79.601 PAIN OF RIGHT UPPER EXTREMITY: ICD-10-CM

## 2017-08-14 DIAGNOSIS — R42 VERTIGO: ICD-10-CM

## 2017-08-14 DIAGNOSIS — R10.84 GENERALIZED ABDOMINAL PAIN: Primary | ICD-10-CM

## 2017-08-14 DIAGNOSIS — R07.9 CHEST PAIN, UNSPECIFIED TYPE: ICD-10-CM

## 2017-08-16 ENCOUNTER — DOCUMENTATION (OUTPATIENT)
Dept: FAMILY MEDICINE CLINIC | Facility: CLINIC | Age: 72
End: 2017-08-16

## 2017-08-16 NOTE — PROGRESS NOTES
I spoke with the patient and he has some concerns with his shoulder pain. He worried that this could be something with his cardiovascular system. He requests a referral to his Cardiologist,Dr. Mcconnell.Referral will be made.      This document has been electronically signed by Souleymane Murrieta MD on August 16, 2017 11:37 AM

## 2017-08-30 ENCOUNTER — OFFICE VISIT (OUTPATIENT)
Dept: CARDIOLOGY | Facility: CLINIC | Age: 72
End: 2017-08-30

## 2017-08-30 VITALS
DIASTOLIC BLOOD PRESSURE: 74 MMHG | BODY MASS INDEX: 24.59 KG/M2 | HEIGHT: 69 IN | SYSTOLIC BLOOD PRESSURE: 134 MMHG | WEIGHT: 166 LBS

## 2017-08-30 DIAGNOSIS — C61 PROSTATE CANCER (HCC): ICD-10-CM

## 2017-08-30 DIAGNOSIS — E78.00 PURE HYPERCHOLESTEROLEMIA: ICD-10-CM

## 2017-08-30 DIAGNOSIS — R07.2 PRECORDIAL PAIN: ICD-10-CM

## 2017-08-30 DIAGNOSIS — R06.02 SOB (SHORTNESS OF BREATH): Primary | ICD-10-CM

## 2017-08-30 DIAGNOSIS — I10 ESSENTIAL HYPERTENSION: ICD-10-CM

## 2017-08-30 PROCEDURE — 93000 ELECTROCARDIOGRAM COMPLETE: CPT | Performed by: INTERNAL MEDICINE

## 2017-08-30 PROCEDURE — 99203 OFFICE O/P NEW LOW 30 MIN: CPT | Performed by: INTERNAL MEDICINE

## 2017-08-30 RX ORDER — IRBESARTAN AND HYDROCHLOROTHIAZIDE 300; 12.5 MG/1; MG/1
1 TABLET, FILM COATED ORAL DAILY
Qty: 30 TABLET | Refills: 12 | Status: SHIPPED | OUTPATIENT
Start: 2017-08-30 | End: 2018-03-20 | Stop reason: SDUPTHER

## 2017-08-30 RX ORDER — EZETIMIBE 10 MG/1
10 TABLET ORAL DAILY
Qty: 30 TABLET | Refills: 11 | Status: SHIPPED | OUTPATIENT
Start: 2017-08-30 | End: 2018-04-13 | Stop reason: SDUPTHER

## 2017-08-30 NOTE — PROGRESS NOTES
[facility] Cardiology  OFFICE NOTE    Souleymane Stapleton  71 y.o. male    08/30/2017  1. SOB (shortness of breath)    2. Essential hypertension    3. Pure hypercholesterolemia    4. Prostate cancer    5. 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  He is going to have an MRI to rule out any rotator cuff injury.  He has history of hyperlipidemia could not tolerate statins or fenofibrate.  He takes blood pressure medicines and his blood pressure has been labile and sometimes it's high.  I changed his hydralazine and HCTZ to Avalide in the morning and to continue the Cartia XT in the evening.  His EKG is normal.  He denies any GI symptoms or CNS symptoms.  I started him on Zetia as he could not tolerate statins for his cholesterol              Allergies   Allergen Reactions   • Statins Myalgia   • Tricor [Fenofibrate] Myalgia         Past Medical History:   Diagnosis Date   • Abdominal pain    • Abnormal weight loss    • Acid reflux    • Acute gastritis    • Anxiety state    • Arthritis    • Cancer     prostate   • History of cerebrovascular accident    • History of colon polyps    • Hypertension    • Nausea    • Nuclear senile cataract    • Presbyopia    • Tobacco use     Tobacco use and exposure    • Transient cerebral ischemia    • Vitreous detachment of left eye          Past Surgical History:   Procedure Laterality Date   • COLONOSCOPY  06/09/2015    Diverticulosis found in the sigmoid colon. Hemorrhoids found in the anus.   • CRYOTHERAPY  08/14/2012    CRYOTHERAPY OF ACNE    • ENDOSCOPY  09/01/2015    EGD w/ biopsy -Multiple polyps, one removed.   • HEMORRHOIDECTOMY N/A 3/20/2017    Procedure: Removal of Anal Papilla;   Surgeon: Juan Diego Ken MD;  Location: [facility];  Service:    • INJECTION OF MEDICATION  09/14/2010    B12   • INJECTION OF MEDICATION  10/17/2011    Kenalog   • LUMBAR LAMINECTOMY  09/29/2010   • OTHER SURGICAL HISTORY  08/19/2010    Biopsy, Each Additional Lesion    • SKIN BIOPSY  08/19/2010         Family History   Problem Relation Age of Onset   • Dementia Other    • Hypertension Other    • Stroke Other          Social History     Social History   • Marital status:      Spouse name: N/A   • Number of children: N/A   • Years of education: N/A     Occupational History   • Not on file.     Social History Main Topics   • Smoking status: Former Smoker     Quit date: 1997   • Smokeless tobacco: Never Used   • Alcohol use Yes      Comment: socially   • Drug use: No   • Sexual activity: Not on file     Other Topics Concern   • Not on file     Social History Narrative         Current Outpatient Prescriptions   Medication Sig Dispense Refill   • ALPRAZolam (XANAX) 0.5 MG tablet Take 1 tablet by mouth 3 (Three) Times a Day. (Patient taking differently: Take 0.5 mg by mouth 3 (Three) Times a Day As Needed.) 90 tablet 2   • cetirizine (zyrTEC) 10 MG tablet Take 1 tablet by mouth Daily. (Patient taking differently: Take 10 mg by mouth Daily As Needed.) 30 tablet 11   • clopidogrel (PLAVIX) 75 MG tablet Take 1 tablet by mouth Daily. 30 tablet 11   • diltiaZEM CD (CARTIA XT) 300 MG 24 hr capsule Take 1 capsule by mouth Daily. (Patient taking differently: Take 300 mg by mouth Every Night.) 30 capsule 11   • dutasteride (AVODART) 0.5 MG capsule Take 0.5 mg by mouth Every Night.     • finasteride (PROPECIA) 1 MG tablet Take 1 tablet by mouth Daily. 30 tablet 11   • metoclopramide (REGLAN) 5 MG tablet Take 1 tablet by mouth 4 (Four) Times a Day Before Meals & at Bedtime. 56 tablet 1   • omeprazole (priLOSEC) 40 MG capsule Take 1 capsule by mouth Daily. 30 capsule 11   • ezetimibe (ZETIA) 10 MG tablet Take 1 tablet  "by mouth Daily. 30 tablet 11   • irbesartan-hydrochlorothiazide (AVALIDE) 300-12.5 MG tablet Take 1 tablet by mouth Daily. 30 tablet 12     No current facility-administered medications for this visit.          Review of Systems     Constitution: Denies any fatigue, fever or chills    HENT: Denies any headache, hearing impairment,     Eyes: Denies any blurring of vision, or photophobia     Cardivascular - As per history of present illness     Respiratory system-denies any COPD, shortness of breath,   sleep apnea.     Endocrine:   history of hyperlipidemia       Musculoskeletal:  Questionable rotator cuff injury to the right shoulder    Gastrointestinal: No nausea, vomiting, or melena    Genitourinary: No dysuria or hematuria    Neurological:   No history of seizure disorder, stroke, memory problems    Psychiatric/Behavioral:        No history of depression,  No history of bipolar disorder or schizophrenia     Hematological- no history of easy bruising or any bleeding diathesis            OBJECTIVE    /74  Ht 68.5\" (174 cm)  Wt 166 lb (75.3 kg)  BMI 24.87 kg/m2      Physical Exam     Constitutional: is oriented to person, place, and time.     Skin-warm and dry    Well developed and nourished in no acute distress      Head: Normocephalic and atraumatic.     Eyes: Pupils are equal, round, and reactive to light.     Neck: Neck supple. No bruit in the carotids, no elevation of JVD    Cardiovascular: [place] in the fifth intercostal space   Regular rate, and  Rhythm,    S1 greater than S2, no S3 or S4, no gallop     Pulmonary/Chest:   Air  Entry is equal on both sides  No wheezing or crackles,      Abdominal: Soft.  No hepatosplenomegaly, bowel sounds are present    Musculoskeletal: No kyphoscoliosis    Neurological: is alert and oriented to person, place, and time.    cranial nerve are intact .   No motor or sensory deficit    Extremities-no edema, no radial femoral delay      Psychiatric: He has a normal mood and " affect.                  His behavior is normal.             ECG 12 Lead  Date/Time: 8/30/2017 1:21 PM  Performed by: MARC MCCORMACK  Authorized by: MARC MCCORMACK   Comparison: not compared with previous ECG   Rhythm: sinus rhythm  Clinical impression: normal ECG              Lab Results   Component Value Date    WBC 8.50 03/17/2017    HGB 15.3 03/17/2017    HCT 43.2 03/17/2017    MCV 90.2 03/17/2017     03/17/2017     Lab Results   Component Value Date    GLUCOSE 84 03/17/2017    BUN 23 (H) 03/17/2017    CREATININE 1.35 (H) 03/17/2017    EGFRIFNONA 52 03/17/2017    BCR 17.0 03/17/2017    CO2 26.0 03/17/2017    CALCIUM 9.2 03/17/2017    ALBUMIN 4.80 03/09/2017    LABIL2 1.8 03/09/2017    AST 79 (H) 03/09/2017    ALT 38 03/09/2017     Lab Results   Component Value Date    CHOL 255 (H) 03/09/2017     Lab Results   Component Value Date    TRIG 204 (H) 03/09/2017    TRIG 75 10/07/2016    TRIG 95 12/29/2015     Lab Results   Component Value Date    HDL 49 (L) 03/09/2017    HDL 52 (L) 10/07/2016    HDL 49 (L) 12/29/2015     Lab Results   Component Value Date    LDLCALC 143 (H) 10/07/2016    LDLCALC 158 (H) 12/29/2015    LDLCALC 174 (H) 08/07/2015     No results found for: LDL  No results found for: HDLLDLRATIO  No components found for: CHOLHDL  No results found for: HGBA1C  Lab Results   Component Value Date    TSH 0.78 02/11/2015                  A/P    Right shoulder pain with questionable atypical chest pain and shortness of breath with exertion, EKG is normal will do exercise treadmill to assess functional capacity and rule out ischemia.  Will get an echo to assess his LV function and valvular function.    Hypertension change the hydralazine and HCTZ to Avalide and continue the Cartia XT.    Hyperlipidemia cannot tolerate statins or fenofibrate started on Zetia 10 mg daily.    Follow-up after the treadmill and echo in 3 weeks              This document has been electronically signed by Marc BERRIOS  MD [place] on August 30, 2017 1:19 PM       EMR Dragon/Transcription disclaimer:   Some of this note may be an electronic transcription/translation of spoken language to printed text. The electronic translation of spoken language may permit erroneous, or at times, nonsensical words or phrases to be inadvertently transcribed; Although I have reviewed the note for such errors, some may still exist.

## 2017-09-05 DIAGNOSIS — C61 MALIGNANT NEOPLASM OF PROSTATE (HCC): Primary | ICD-10-CM

## 2017-09-08 ENCOUNTER — HOSPITAL ENCOUNTER (OUTPATIENT)
Dept: MRI IMAGING | Facility: HOSPITAL | Age: 72
Discharge: HOME | End: 2017-09-08
Attending: FAMILY MEDICINE | Admitting: FAMILY MEDICINE

## 2017-09-08 ENCOUNTER — LAB (OUTPATIENT)
Dept: LAB | Facility: HOSPITAL | Age: 72
End: 2017-09-08

## 2017-09-08 ENCOUNTER — HOSPITAL ENCOUNTER (OUTPATIENT)
Dept: MRI IMAGING | Facility: HOSPITAL | Age: 72
Discharge: HOME | End: 2017-09-08
Attending: FAMILY MEDICINE

## 2017-09-08 DIAGNOSIS — R20.0 NUMBNESS AND TINGLING OF RIGHT ARM: ICD-10-CM

## 2017-09-08 DIAGNOSIS — M25.511 CHRONIC RIGHT SHOULDER PAIN: ICD-10-CM

## 2017-09-08 DIAGNOSIS — M54.2 NECK PAIN: ICD-10-CM

## 2017-09-08 DIAGNOSIS — G89.29 CHRONIC RIGHT SHOULDER PAIN: ICD-10-CM

## 2017-09-08 DIAGNOSIS — C61 MALIGNANT NEOPLASM OF PROSTATE (HCC): ICD-10-CM

## 2017-09-08 DIAGNOSIS — R20.2 NUMBNESS AND TINGLING OF RIGHT ARM: ICD-10-CM

## 2017-09-08 LAB — PSA SERPL-MCNC: 9.75 NG/ML (ref 0–4)

## 2017-09-08 PROCEDURE — 72141 MRI NECK SPINE W/O DYE: CPT

## 2017-09-08 PROCEDURE — 36415 COLL VENOUS BLD VENIPUNCTURE: CPT | Performed by: UROLOGY

## 2017-09-08 PROCEDURE — 84153 ASSAY OF PSA TOTAL: CPT | Performed by: UROLOGY

## 2017-09-08 PROCEDURE — 73221 MRI JOINT UPR EXTREM W/O DYE: CPT

## 2017-09-12 ENCOUNTER — OFFICE VISIT (OUTPATIENT)
Dept: ORTHOPEDIC SURGERY | Facility: CLINIC | Age: 72
End: 2017-09-12

## 2017-09-12 VITALS — BODY MASS INDEX: 25.03 KG/M2 | WEIGHT: 169 LBS | HEIGHT: 69 IN

## 2017-09-12 DIAGNOSIS — M25.512 CHRONIC LEFT SHOULDER PAIN: ICD-10-CM

## 2017-09-12 DIAGNOSIS — M25.511 CHRONIC RIGHT SHOULDER PAIN: ICD-10-CM

## 2017-09-12 DIAGNOSIS — G89.29 CHRONIC RIGHT SHOULDER PAIN: ICD-10-CM

## 2017-09-12 DIAGNOSIS — G89.29 CHRONIC LEFT SHOULDER PAIN: ICD-10-CM

## 2017-09-12 DIAGNOSIS — M75.102 ROTATOR CUFF SYNDROME, LEFT: Primary | ICD-10-CM

## 2017-09-12 DIAGNOSIS — M50.320 OTHER CERVICAL DISC DEGENERATION, MID-CERVICAL REGION, UNSPECIFIED LEVEL: ICD-10-CM

## 2017-09-12 PROCEDURE — 99203 OFFICE O/P NEW LOW 30 MIN: CPT | Performed by: ORTHOPAEDIC SURGERY

## 2017-09-12 RX ORDER — MELOXICAM 15 MG/1
15 TABLET ORAL DAILY
Qty: 30 TABLET | Refills: 1 | Status: SHIPPED | OUTPATIENT
Start: 2017-09-12 | End: 2017-10-05 | Stop reason: SDUPTHER

## 2017-09-12 NOTE — PROGRESS NOTES
Souleymane Stapleton is a 71 y.o. male   Primary provider:  Souleymane Murrieta MD       Chief Complaint   Patient presents with   • Right Shoulder - Pain       HISTORY OF PRESENT ILLNESS:    HPI Comments: Patient seen in the  Past for right shoulder pain around 2007. No recent treatment for the pain.     Pain   This is a chronic problem. The current episode started more than 1 year ago. The problem occurs constantly. The problem has been gradually worsening. Associated symptoms include abdominal pain and numbness. Associated symptoms comments: Sharp, dull ache, burning, tingling, grinding, popping. . Exacerbated by: raising arms, sleeping on arm.  He has tried rest and position changes for the symptoms.   Patient injured his right shoulder approximately 8-10 years ago.  He had a steroid injection at time which seemed to help.  He states that he works for his pain even though he was having some limitations.  He has had chronic pain that has been low-grade but has worsened over the past 2 years.  No new injury reported.  Patient also has some pain and difficulty using the left arm at and above shoulder level.  No specific injury reported on that side.  He also reports periodic numbness and tingling in the forearm and hand on the right.  It seems to be improved with positional changes.     CONCURRENT MEDICAL HISTORY:    Past Medical History:   Diagnosis Date   • Abdominal pain    • Abnormal weight loss    • Acid reflux    • Acute gastritis    • Anxiety state    • Arthritis    • Cancer     prostate   • History of cerebrovascular accident    • History of colon polyps    • Hypertension    • Nausea    • Nuclear senile cataract    • Presbyopia    • Tobacco use     Tobacco use and exposure    • Transient cerebral ischemia    • Vitreous detachment of left eye        Allergies   Allergen Reactions   • Statins Myalgia   • Tricor [Fenofibrate] Myalgia         Current Outpatient Prescriptions:   •  ALPRAZolam (XANAX) 0.5 MG tablet,  Take 1 tablet by mouth 3 (Three) Times a Day. (Patient taking differently: Take 0.5 mg by mouth 3 (Three) Times a Day As Needed.), Disp: 90 tablet, Rfl: 2  •  clopidogrel (PLAVIX) 75 MG tablet, Take 1 tablet by mouth Daily., Disp: 30 tablet, Rfl: 11  •  diltiaZEM CD (CARTIA XT) 300 MG 24 hr capsule, Take 1 capsule by mouth Daily. (Patient taking differently: Take 300 mg by mouth Every Night.), Disp: 30 capsule, Rfl: 11  •  dutasteride (AVODART) 0.5 MG capsule, Take 0.5 mg by mouth Every Night., Disp: , Rfl:   •  ezetimibe (ZETIA) 10 MG tablet, Take 1 tablet by mouth Daily., Disp: 30 tablet, Rfl: 11  •  finasteride (PROPECIA) 1 MG tablet, Take 1 tablet by mouth Daily., Disp: 30 tablet, Rfl: 11  •  irbesartan-hydrochlorothiazide (AVALIDE) 300-12.5 MG tablet, Take 1 tablet by mouth Daily., Disp: 30 tablet, Rfl: 12  •  metoclopramide (REGLAN) 5 MG tablet, Take 1 tablet by mouth 4 (Four) Times a Day Before Meals & at Bedtime., Disp: 56 tablet, Rfl: 1  •  omeprazole (priLOSEC) 40 MG capsule, Take 1 capsule by mouth Daily., Disp: 30 capsule, Rfl: 11  •  Loratadine (CLARITIN PO), Take  by mouth., Disp: , Rfl:   •  meloxicam (MOBIC) 15 MG tablet, Take 1 tablet by mouth Daily., Disp: 30 tablet, Rfl: 1    Past Surgical History:   Procedure Laterality Date   • BACK SURGERY     • COLONOSCOPY  06/09/2015    Diverticulosis found in the sigmoid colon. Hemorrhoids found in the anus.   • CRYOTHERAPY  08/14/2012    CRYOTHERAPY OF ACNE    • ENDOSCOPY  09/01/2015    EGD w/ biopsy -Multiple polyps, one removed.   • HEMORRHOIDECTOMY N/A 3/20/2017    Procedure: Removal of Anal Papilla;  Surgeon: Juan Diego Ken MD;  Location: [facility];  Service:    • INJECTION OF MEDICATION  09/14/2010    B12   • INJECTION OF MEDICATION  10/17/2011    Kenalog   • LUMBAR LAMINECTOMY  09/29/2010   • OTHER SURGICAL HISTORY  08/19/2010    Biopsy, Each Additional Lesion    • SKIN BIOPSY  08/19/2010       Family History   Problem Relation Age of Onset  "  • Dementia Other    • Hypertension Other    • Stroke Other    • Hypertension Mother    • Hypertension Father         Social History     Social History   • Marital status:      Spouse name: N/A   • Number of children: N/A   • Years of education: N/A     Occupational History   • Not on file.     Social History Main Topics   • Smoking status: Former Smoker     Quit date: 1997   • Smokeless tobacco: Never Used   • Alcohol use Yes      Comment: socially   • Drug use: No   • Sexual activity: Defer     Other Topics Concern   • Not on file     Social History Narrative        Review of Systems   Respiratory: Positive for shortness of breath.    Gastrointestinal: Positive for abdominal pain.   Musculoskeletal: Positive for back pain.        Stiffness in joints.    Neurological: Positive for numbness.        Tingling.    Psychiatric/Behavioral: Positive for sleep disturbance. The patient is nervous/anxious.    All other systems reviewed and are negative.      PHYSICAL EXAMINATION:       Ht 68.5\" (174 cm)  Wt 169 lb (76.7 kg)  BMI 25.32 kg/m2    Physical Exam   Constitutional: He is oriented to person, place, and time. He appears well-developed and well-nourished.   Neurological: He is alert and oriented to person, place, and time.   Psychiatric: He has a normal mood and affect. His behavior is normal. Judgment and thought content normal.       GAIT:     [x]  Normal  []  Antalgic    Assistive device: [x]  None  []  Walker     []  Crutches  []  Cane     []  Wheelchair  []  Stretcher    Right Shoulder Exam     Tenderness   The patient is experiencing tenderness in the acromioclavicular joint.    Range of Motion   Active Abduction: 120   Forward Flexion: 120     Muscle Strength   Abduction: 4/5   Supraspinatus: 4/5     Tests   Cross Arm: positive  Hawkin's test: positive  Impingement: positive    Other   Erythema: absent  Sensation: normal  Pulse: present      Left Shoulder Exam     Tenderness   The patient is " experiencing tenderness in the acromioclavicular joint.    Range of Motion   Active Abduction: 160   Forward Flexion: 160     Muscle Strength   Abduction: 4/5   Supraspinatus: 4/5     Tests   Cross Arm: positive  Hawkin's test: positive  Impingement: positive    Other   Erythema: absent  Sensation: normal  Pulse: present               Xr Shoulder 2+ View Left    Result Date: 9/12/2017  Narrative: 3 views left shoulder show acceptable position and alignment with no evidence of acute bony abnormality.  No fracture or dislocation is noted.  No significant arthritic changes are noted. 09/12/17 at 10:42 AM by Juan Diego Mendoza MD     Mri Cervical Spine Without Contrast    Result Date: 9/8/2017  Narrative: Neck pain and right shoulder numbness. MR, cervical spine without intravenous contrast. Comparison is made with x-ray dated July 18, 2017. MRI scan 1.5 Supriya. There is normal alignment of the cervical spine. No subluxation is seen. No fracture is identified. There is disc space narrowing at C5-C6, C6-C7 and C7-T1. No bone marrow replacement is identified. The predental space is unremarkable. The craniocervical junction is normal in appearance. At C2-C3 there is facet arthropathy on the right. At C3-C4 there is very minimal disc bulge/osteophyte complex without cord effacement. There are mild bilateral uncovertebral osteophytes and facet arthropathy on the right. There is mild neural foraminal stenosis bilaterally. At C4-C5 there is bilateral facet arthropathy. At C5-C6 there is disc bulge/osteophyte complex which touches the cord. There are bilateral uncovertebral osteophytes and bilateral facet arthropathy producing mild bilateral neural foraminal stenosis. At C6-C7 there is disc bulge/osteophyte complex without cord effacement. There are bilateral uncovertebral osteophytes producing mild bilateral neural foraminal stenosis. At C7-T1 there is disc bulge/osteophyte complex without cord effacement. No abnormal  signal is seen within the spinal cord.     Impression: CONCLUSION: Degenerative changes as described above. Electronically signed by:  Delroy Jones MD  9/8/2017 12:00 PM CDT Workstation: [id number]    Mri Shoulder Right Without Contrast    Result Date: 9/8/2017  Narrative: MRI right shoulder. HISTORY: Right shoulder pain. Prior exam: Right shoulder July 18, 2017. TECHNIQUE: Multiplanar multisequence noncontrast images right shoulder. Complete full-thickness rotator cuff tear involving the entire supraspinatous and the anterior fibers of the infraspinatus tendon. Size of the gap in AP projection is 3.6 cm. The size of the gap in lateral medial projection is 4.35 cm. There is significant muscular tendinous retraction and there is an element of both supraspinatus and infraspinatus muscle atrophy. There is extensive acromioclavicular joint arthrosis. A large fluid collection beneath the distal clavicle and acromion communicates with the AC joint. This indicates focal subacromial bursitis  and therefore indicating disruption of the inferior acromioclavicular ligament. Small glenohumeral joint effusion. Normal-appearing glenoid mika.     Impression: CONCLUSION: Large full-thickness tear involving the entire supraspinatous and a large portion of the intraspinous tendons with muscular tendinous retraction and both supraspinatus and infraspinatus atrophy. Extensively acromioclavicular joint  arthrosis. Fluid within the AC joint communicating with a subacromial bursa beneath the distal clavicle and acromion, bursitis. This therefore indicates disruption of the  inferior acromioclavicular ligament. Electronically signed by:  Ricky Christopher MD  9/8/2017 7:47 PM CDT Workstation: [id number]    Three view right shoulder     HISTORY: Chronic right shoulder pain     AP films with the humerus in internal and external rotation and  scapular Y view were obtained.     COMPARISON: None     Mild hypertrophic change acromioclavicular  joint.  No fracture or dislocation.  No other osseous or articular abnormality.     IMPRESSION:  CONCLUSION:  Mild hypertrophic change acromioclavicular joint.     [id number]     Electronically signed by:  Raudel Champagne MD  7/18/2017 4:50 PM CDT  Workstation: [email or web address]      ASSESSMENT:    Diagnoses and all orders for this visit:    Rotator cuff syndrome, left  -     XR Shoulder 2+ View Left  -     Ambulatory Referral to Physical Therapy Evaluate and treat  -     MRI shoulder left wo contrast; Future    Chronic right shoulder pain  -     Ambulatory Referral to Physical Therapy Evaluate and treat    Chronic left shoulder pain  -     XR Shoulder 2+ View Left  -     Ambulatory Referral to Physical Therapy Evaluate and treat  -     MRI shoulder left wo contrast; Future    Other cervical disc degeneration, mid-cervical region, unspecified level  -     Ambulatory Referral to Physical Therapy Evaluate and treat    Other orders  -     meloxicam (MOBIC) 15 MG tablet; Take 1 tablet by mouth Daily.          PLAN    Right shoulder has a chronic rotator cuff tear with rotator cuff arthropathy.  We discussed that at some point a reverse total shoulder arthroplasty would be warranted.    The left shoulder shows signs of rotator cuff impingement versus partial tear.  He has tried activity modification and has managed pain for approximately 2 years.  He needs an MRI on the left shoulder to assess for the extent of the rotator cuff injury and to direct further treatment options.    In regards to the numbness and tingling he has significant cervical degenerative changes.  We discussed beginning meloxicam and trying physical therapy for cervical traction and general postural exercises.  We'll follow-up with him after the MRI to discuss changes and to assess the left shoulder.    Return in about 6 weeks (around 10/24/2017) for recheck.    Juan Diego Mendoza MD

## 2017-09-13 ENCOUNTER — OFFICE VISIT (OUTPATIENT)
Dept: OTOLARYNGOLOGY | Facility: CLINIC | Age: 72
End: 2017-09-13

## 2017-09-13 ENCOUNTER — CLINICAL SUPPORT (OUTPATIENT)
Dept: AUDIOLOGY | Facility: CLINIC | Age: 72
End: 2017-09-13

## 2017-09-13 VITALS
DIASTOLIC BLOOD PRESSURE: 74 MMHG | SYSTOLIC BLOOD PRESSURE: 120 MMHG | HEIGHT: 69 IN | BODY MASS INDEX: 25.33 KG/M2 | WEIGHT: 171 LBS

## 2017-09-13 DIAGNOSIS — R42 DIZZINESS: Primary | ICD-10-CM

## 2017-09-13 DIAGNOSIS — H90.3 SENSORINEURAL HEARING LOSS OF BOTH EARS: ICD-10-CM

## 2017-09-13 DIAGNOSIS — H90.3 SENSORINEURAL HEARING LOSS, BILATERAL: Primary | ICD-10-CM

## 2017-09-13 PROCEDURE — 92567 TYMPANOMETRY: CPT | Performed by: AUDIOLOGIST

## 2017-09-13 PROCEDURE — 99203 OFFICE O/P NEW LOW 30 MIN: CPT | Performed by: OTOLARYNGOLOGY

## 2017-09-13 PROCEDURE — 92557 COMPREHENSIVE HEARING TEST: CPT | Performed by: AUDIOLOGIST

## 2017-09-13 NOTE — PROGRESS NOTES
STANDARD AUDIOMETRIC EVALUATION      Name:  Souleymane Stapleton  :  1945  Age:  71 y.o.  Date of Evaluation:  2017      HISTORY    Reason for visit:  Souleymane Stapleton is seen today for a hearing evaluation at the request of Dr. Denis Milton.  Patient reports he has a history of hearing loss and he thinks his hearing is getting worse.      EVALUATION    See Audiogram    RESULTS        Otoscopy and Tympanometry 226 Hz :  Right Ear:  Otoscopy:  Testing completed after ears were examined by the ENT physician          Tympanometry:  Middle ear function within normal limits    Left Ear:   Otoscopy:  Testing completed after ears were examined by the ENT physician        Tympanometry:  Middle ear function within normal limits    Test technique:  Standard Audiometry     Pure Tone Audiometry:   Patient responded to pure tones at 5-70 dB for 250-8000 Hz in right ear, and at 5-95 dB for 250-8000 Hz in left ear.       Speech Audiometry:        Right Ear:  Speech Reception Threshold (SRT) was obtained at 30 dBHL                 Speech Discrimination scores were 52% in quiet when words were presented at 70 dBHL       Left Ear:  Speech Reception Threshold (SRT) was obtained at 30 dBHL                 Speech Discrimination scores were 60% in quiet when words were presented at 70 dBHL    Reliability:   good    IMPRESSIONS:  1.  Tympanometry results are consistent with Middle ear function within normal limits in both ears.  2.  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       RECOMMENDATIONS:  Patient is seeing the Ear Nose and Throat physician immediately following this examination.  It was a pleasure seeing Souleymane Stapleton in Audiology today.  We would be happy to do further testing or discuss these test as necessary.          This document has been electronically signed by Olivia Bell MS CCC-VANIA on 2017 2:22 PM        Olivia Bell MS [facility]  Licensed Audiologist

## 2017-09-17 NOTE — PROGRESS NOTES
Subjective   Souleymane Stapleton is a 72 y.o. male.     History of Present Illness     Patient presents for evaluation of dizziness.  I have not not seen him since 2009.  Has a history of slightly asymmetric sensorineural hearing loss with a negative ABR and ECOG.  Reports that lately he's had trouble with dizziness when he extends his neck and looks up or when his nose and handed are congested.  This is typically a lightheaded feeling and not necessarily a spinning sensation.  He thinks his hearing is getting worse.  He is somewhat off balance when he walks.  No otorrhea.  The dizziness is usually brief.    The following portions of the patient's history were reviewed and updated as appropriate: allergies, current medications, past family history, past medical history, past social history, past surgical history and problem list.      Souleymane Stapleton reports that he quit smoking about 20 years ago. He has never used smokeless tobacco. He reports that he drinks alcohol. He reports that he does not use illicit drugs.  Patient is not a tobacco user and has not been counseled for use of tobacco products    Family History   Problem Relation Age of Onset   • Dementia Other    • Hypertension Other    • Stroke Other    • Hypertension Mother    • Hypertension Father        Allergies   Allergen Reactions   • Statins Myalgia   • Tricor [Fenofibrate] Myalgia         Current Outpatient Prescriptions:   •  ALPRAZolam (XANAX) 0.5 MG tablet, Take 1 tablet by mouth 3 (Three) Times a Day. (Patient taking differently: Take 0.5 mg by mouth 3 (Three) Times a Day As Needed.), Disp: 90 tablet, Rfl: 2  •  clopidogrel (PLAVIX) 75 MG tablet, Take 1 tablet by mouth Daily., Disp: 30 tablet, Rfl: 11  •  diltiaZEM CD (CARTIA XT) 300 MG 24 hr capsule, Take 1 capsule by mouth Daily. (Patient taking differently: Take 300 mg by mouth Every Night.), Disp: 30 capsule, Rfl: 11  •  dutasteride (AVODART) 0.5 MG capsule, Take 0.5 mg by mouth Every  Night., Disp: , Rfl:   •  ezetimibe (ZETIA) 10 MG tablet, Take 1 tablet by mouth Daily., Disp: 30 tablet, Rfl: 11  •  finasteride (PROPECIA) 1 MG tablet, Take 1 tablet by mouth Daily., Disp: 30 tablet, Rfl: 11  •  irbesartan-hydrochlorothiazide (AVALIDE) 300-12.5 MG tablet, Take 1 tablet by mouth Daily., Disp: 30 tablet, Rfl: 12  •  Loratadine (CLARITIN PO), Take  by mouth., Disp: , Rfl:   •  meloxicam (MOBIC) 15 MG tablet, Take 1 tablet by mouth Daily., Disp: 30 tablet, Rfl: 1  •  omeprazole (priLOSEC) 40 MG capsule, Take 1 capsule by mouth Daily., Disp: 30 capsule, Rfl: 11  •  metoclopramide (REGLAN) 5 MG tablet, Take 1 tablet by mouth 4 (Four) Times a Day Before Meals & at Bedtime., Disp: 56 tablet, Rfl: 1    Past Medical History:   Diagnosis Date   • Abdominal pain    • Abnormal weight loss    • Acid reflux    • Acute gastritis    • Anxiety state    • Arthritis    • Cancer     prostate   • History of cerebrovascular accident    • History of colon polyps    • Hypertension    • Nausea    • Nuclear senile cataract    • Presbyopia    • Tobacco use     Tobacco use and exposure    • Transient cerebral ischemia    • Vitreous detachment of left eye          Review of Systems   HENT: Positive for hearing loss.    Respiratory: Positive for shortness of breath.    Genitourinary:        Nocturia   Neurological: Positive for weakness and numbness.   All other systems reviewed and are negative.          Objective   Physical Exam  General: Well-developed well-nourished male in no acute distress.  Alert and oriented ×3. Head: Normocephalic. Face: Symmetrical strength and appearance. PERRL. EOMI. Voice:Strong. Speech:Fluent  Ears: External ears no deformity, canals no discharge, tympanic membranes intact clear and mobile bilaterally.  Nose: Nares show no discharge mass polyp or purulence.  Boggy mucosa is present.  No gross external deformity.  Septum: Midline  Oral cavity: Lips and gums without lesions.  Tongue and floor of  mouth without lesions.  Parotid and submandibular ducts unobstructed.  No mucosal lesions on the buccal mucosa or vestibule of the mouth.  Pharynx: No erythema exudate mass or ulcer  Neck: No lymphadenopathy.  No thyromegaly.  Trachea and larynx midline.  No masses in the parotid or submandibular glands.    [place]-Hallpike maneuvers produced no vertigo and no nystagmus.  Does have some mild symptoms upon arising from sitting or supine position.  Audiogram is obtained and now shows symmetrical bilateral sensorineural hearing loss with type A tympanograms and discrimination of 60% on the left and 52% on the right.  This does represent a decrease in hearing particularly on the right compared to his previous hearing test        Assessment/Plan   Souleymane was seen today for dizziness, hearing loss and tinnitus.    Diagnoses and all orders for this visit:    Dizziness    Sensorineural hearing loss of both ears      Plan: Told the patient I suspect his dizziness is circulatory rather than otologic in nature.  I've advised taking care when arising from sitting or supine position and use of a walking stick when ambulating.  He should avoid unnecessary exposure loud noise and use ear protection when unavoidably around loud noise.  He may continue amplification at his discretion.  See me again in 1 year or as needed for further problems

## 2017-09-18 ENCOUNTER — HOSPITAL ENCOUNTER (OUTPATIENT)
Dept: PHYSICAL THERAPY | Facility: HOSPITAL | Age: 72
Setting detail: THERAPIES SERIES
Discharge: HOME | End: 2017-09-18
Attending: ORTHOPAEDIC SURGERY

## 2017-09-18 DIAGNOSIS — M50.320 OTHER CERVICAL DISC DEGENERATION, MID-CERVICAL REGION, UNSPECIFIED LEVEL: Primary | ICD-10-CM

## 2017-09-18 DIAGNOSIS — G89.29 CHRONIC LEFT SHOULDER PAIN: ICD-10-CM

## 2017-09-18 DIAGNOSIS — G89.29 CHRONIC RIGHT SHOULDER PAIN: ICD-10-CM

## 2017-09-18 DIAGNOSIS — M25.511 CHRONIC RIGHT SHOULDER PAIN: ICD-10-CM

## 2017-09-18 DIAGNOSIS — M25.512 CHRONIC LEFT SHOULDER PAIN: ICD-10-CM

## 2017-09-18 DIAGNOSIS — M75.102 ROTATOR CUFF SYNDROME, LEFT: ICD-10-CM

## 2017-09-18 PROCEDURE — G8984 CARRY CURRENT STATUS: HCPCS | Performed by: PHYSICAL THERAPIST

## 2017-09-18 PROCEDURE — 97162 PT EVAL MOD COMPLEX 30 MIN: CPT | Performed by: PHYSICAL THERAPIST

## 2017-09-18 PROCEDURE — G8985 CARRY GOAL STATUS: HCPCS | Performed by: PHYSICAL THERAPIST

## 2017-09-19 NOTE — THERAPY EVALUATION
Outpatient Physical Therapy Ortho Initial Evaluation  [facility]     Patient Name: Souleymane Stapleton  : 1945  MRN: [id number]  Today's Date: 2017      Visit Date: 2017  Attendance:  (Medicare)  Subjective Improvement: n/a  Next MD Appt: 10/5/17  Recert Date: 10/9/17    Therapy Diagnosis: 1) cervical radiculopathy; 2)B RTC syndrome    Patient Active Problem List   Diagnosis   • Astigmatism   • Anxiety state   • History of cerebrovascular accident   • Nuclear senile cataract   • Hypertension   • Unspecified hemorrhoids   • Palpitations   • Pure hypercholesterolemia   • Prostate cancer   • Chronic right shoulder pain        Past Medical History:   Diagnosis Date   • Abdominal pain    • Abnormal weight loss    • Acid reflux    • Acute gastritis    • Anxiety state    • Arthritis    • Cancer     prostate   • History of cerebrovascular accident    • History of colon polyps    • Hypertension    • Nausea    • Nuclear senile cataract    • Presbyopia    • Tobacco use     Tobacco use and exposure    • Transient cerebral ischemia    • Vitreous detachment of left eye         Past Surgical History:   Procedure Laterality Date   • BACK SURGERY     • COLONOSCOPY  2015    Diverticulosis found in the sigmoid colon. Hemorrhoids found in the anus.   • CRYOTHERAPY  2012    CRYOTHERAPY OF ACNE    • ENDOSCOPY  2015    EGD w/ biopsy -Multiple polyps, one removed.   • HEMORRHOIDECTOMY N/A 3/20/2017    Procedure: Removal of Anal Papilla;  Surgeon: Juan Diego Ken MD;  Location: [facility];  Service:    • INJECTION OF MEDICATION  2010    B12   • INJECTION OF MEDICATION  10/17/2011    Kenalog   • LUMBAR LAMINECTOMY  2010   • OTHER SURGICAL HISTORY  2010    Biopsy, Each Additional Lesion    • SKIN BIOPSY  2010       Visit Dx:     ICD-10-CM ICD-9-CM   1. Other cervical disc degeneration, mid-cervical region, unspecified level M50.320 722.4   2. Rotator cuff  "syndrome, left M75.102 726.10   3. Chronic right shoulder pain M25.511 719.41    G89.29 338.29   4. Chronic left shoulder pain M25.512 719.41    G89.29 338.29             Patient History       09/18/17 1300          History    Chief Complaint Pain  -SS      Type of Pain Shoulder pain;Neck pain   bilateral shoulders  -      Date Current Problem(s) Began --   12-15 months  -      Brief Description of Current Complaint Patient has history of R RTC injury 10+ years ago. Difficulty with activities above shoulder height. Has also injured his left shoulder. Dr. Mendoza has ordered an MRI of the left shoulder. He has transient N/T of R UE in forearm and hand that he can relieve by placing his head in the \"11 o'clock\" position. Hand occasionally wakes him up at night. He changes his head position and the symptoms improve. No N/T at present. N/T will start if he has his cervical spine extended for much length of time.  male with children.  -      Patient/Caregiver Goals --   \"I'd like to have a neck that doesn't make my hand light up.  -      Current Tobacco Use no  -SS      Smoking Status former  -      Patient's Rating of General Health Very good  -      Hand Dominance right-handed  -      Occupation/sports/leisure activities Retired. Hobbies: hunt, [employer], read  -      What clinical tests have you had for this problem? X-ray;MRI  -      Results of Clinical Tests see [facility]  -      Pain     Pain Location Neck;Shoulder  -      Pain at Present 0  -      Pain at Best 0  -      Pain at Worst 7;8   left shoulder; worst over past 1 month  -      Pain Frequency Intermittent   several times daily  -      Pain Description --   \"irritating\"  -      What Performance Factors Make the Current Problem(s) WORSE? bilateral shoulders: reach up over shoulder height, lower weight, keeping arm in 1 position for too long; neck - looking up, turn or lean to right  -      What Performance Factors Make " the Current Problem(s) BETTER? changes shoulder position, place head in 11 o'clock position  -SS      Is your sleep disturbed? Yes  -SS      Difficulties at work? n/a  -SS      Difficulties with ADL's? lifting, reaching up over shoulder height,   -SS      Difficulties with recreational activities? lifting, reaching  -SS      Fall Risk Assessment    Any falls in the past year: No  -SS      Does patient have a fear of falling No  -SS      Daily Activities    Primary Language English  -SS      Safety    Are you being hurt, hit, or frightened by anyone at home or in your life? No  -SS      Are you being neglected by a caregiver No  -SS        User Key  (r) = Recorded By, (t) = Taken By, (c) = Cosigned By    Initials Name Provider Type    SS Raudel Bell, PT DPT Physical Therapist                PT Ortho       09/18/17 1300    Subjective Comments    Subjective Comments see Therapy Patient History  -SS    Precautions and Contraindications    Precautions/Limitations no known precautions/limitations  -SS    Precautions complete tear R SS and large portion of IS; cervical neural foraminal encroachment  -SS    Subjective Pain    Able to rate subjective pain? yes  -SS    Pre-Treatment Pain Level 0  -SS    Post-Treatment Pain Level 0  -SS    Posture/Observations    Alignment Options Forward head  -SS    Posture/Observations Comments R shoulder elevated. Right IS and SS atrophy  -SS    Cervical Palpation    Cervical Palpation- Location? Occiput;Suboccipital;AC joint;Cervical facets;Scalenes;Spinous process;Levator scapula;Upper traps  -SS    Occiput --   non-tender  -SS    Suboccipital --   non-tender  -SS    AC Joint --   non-tender  -SS    Cervical Facets --   non-tender  -SS    Scalenes --   non-tender  -SS    Spinous Process --   non-tender  -SS    Levator Scapula --   non-tender  -SS    Upper Traps --   non-tender  -SS    Cervical/Thoracic Special Tests    Spurling's (Foraminal Compression) Positive   right  "posterior quadrant  -SS    Cervical Compression (Foraminal Compression vs. Facet Pain) Negative  -SS    Cervical Distraction (Foraminal Compression vs. Facet Pain) Positive  -SS    Upper Level Neural Tension Tests    Median Neural Tension Test Right:;Positive;Left:;Negative  -SS    Radial Neural Tension Test Bilateral:;Negative  -SS    Ulnar Neural Tension Test Bilateral:;Negative  -SS    Neck    Flexion AROM Deficit 57; pain approx T2  -SS    Extension AROM Deficit 40  -SS    Lt Lat Flexion AROM Deficit 45  -SS    Rt Lateral Flexion AROM Deficit 29   cervical pain; causes radiculopathy R forearm/hand  -SS    Lt Rotation AROM Deficit 70  -SS    Rt Rotation AROM Deficit 68   \"I feel that on the back of my neck.\"  -SS    Left Shoulder    Flexion AROM Deficit 157; pain at end range  -SS    Extension AROM Deficit 60  -SS    ABduction AROM Deficit 155  -SS    External Rotation AROM Deficit 80  -SS    Internal Rotation AROM Deficit 60   uncomfortable lateral L shoulder  -SS    Right Shoulder    Flexion AROM Deficit 140 audible pop with flexion and eccentric lowering; pain during eccentric lowering  -SS    Extension AROM Deficit 60  -SS    ABduction AROM Deficit 113   pain with elevation > lowering  -SS    External Rotation AROM Deficit 77  -SS    Internal Rotation AROM Deficit 47  -SS    Left Shoulder    Flexion Gross Movement (4-/5) good minus  -SS    Extension Gross Movement (5/5) normal  -SS    ABduction Gross Movement (5/5) normal   short lever arm  -SS    Int Rotation Gross Movement (5/5) normal  -SS    Ext Rotation Gross Movement (4+/5) good plus  -SS    Right Shoulder    Flexion Gross Movement (3/5) fair   pain  -SS    Extension Gross Movement (5/5) normal  -SS    ABduction Gross Movement (4+/5) good plus   short lever arm  -SS    Int Rotation Gross Movement (5/5) normal  -SS    Ext Rotation Gross Movement (3+/5) fair plus  -SS    Left Elbow/Forearm    Elbow Flexion Gross Movement (5/5) normal  -SS    Elbow " Extension Gross Movement (5/5) normal  -SS    Forearm Supination Gross Movement (5/5) normal  -SS    Forearm Pronation Gross Movement (5/5) normal  -SS    Right Elbow/Forearm    Elbow Flexion Gross Movement (5/5) normal  -SS    Elbow Extension Gross Movement (4/5) good;(4+/5) good plus  -SS    Forearm Supination Gross Movement (5/5) normal  -SS    Forearm Pronation Gross Movement (5/5) normal  -SS    Left Wrist    Wrist Flexion Gross Movement (5/5) normal  -SS    Wrist Extension Gross Movement (4+/5) good plus  -SS      User Key  (r) = Recorded By, (t) = Taken By, (c) = Cosigned By    Initials Name Provider Type    LADONNA Bell, PT DPT Physical Therapist                            Therapy Education       09/18/17 1300          Therapy Education    Given Other (comment)   proposed therapy POC  -SS      How Provided Verbal  -SS      Provided to Patient  -SS      Level of Understanding Verbalized  -SS        User Key  (r) = Recorded By, (t) = Taken By, (c) = Cosigned By    Initials Name Provider Type     Raudel Bell, PT DPT Physical Therapist                PT OP Goals       09/18/17 1300       PT Short Term Goals    STG Date to Achieve 10/09/17  -SS     STG 1 Note a >/= 25% subjective improvement  -     STG 2 Increase B shoulder flexion MMT by >/= 1/2 grade  -     STG 3 Increase R cervical lateral flexion by >/= 6 degrees  -     Long Term Goals    LTG Date to Achieve 10/30/17  -SS     LTG 1 Independent with HEP  -SS     LTG 2 Note a >/= 50% subjective improvement  -     LTG 3 NDI score to be </= 6/50  -     LTG 4 R elbow and wrist extensor strength 5/5 each  -     LTG 5 Note a >/= 50% improvement in instances of R UE radiculopathy  -     Time Calculation    PT Goal Re-Cert Due Date 10/09/17  -       User Key  (r) = Recorded By, (t) = Taken By, (c) = Cosigned By    Initials Name Provider Type    LADONNA Bell, PT DPT Physical Therapist                PT Assessment/Plan        09/18/17 1300       PT Assessment    Functional Limitations Performance in leisure activities;Limitation in home management  -SS     Impairments Muscle strength;Pain;Other (comment)   cervical radiculopathy  -     Assessment Comments Difficulty reaching and lifting above shoulder height due to B RTC. Degenerative changes in cervical spine with radiculopathy. Occasional radiculopathy in R UE that he can alleviate with head positioning.  -SS     Rehab Potential Good   barrier: B RTC syndrome, severe degen changes c-sp  -SS     Patient/caregiver participated in establishment of treatment plan and goals Yes  -SS     Patient would benefit from skilled therapy intervention Yes  -SS     PT Plan    PT Frequency 2x/week  -SS     Predicted Duration of Therapy Intervention (days/wks) 3-6 weeks  -     Planned CPT's? PT EVAL MOD COMPLEXITY: 37241;PT THER PROC EA 15 MIN: 65100;PT MANUAL THERAPY EA 15 MIN: 67692;PT HOT OR COLD PACK TREAT MCARE;PT ELECTRICAL STIM UNATTEND: ;PT TRACTION CERVICAL: 56109;PT THER SUPP EA 15 MIN  -     Physical Therapy Interventions (Optional Details) home exercise program;joint mobilization;manual therapy techniques;modalities;patient/family education;ROM (Range of Motion);strengthening;stretching   mechanical cervical traction  -     PT Plan Comments mechanical cervical traction, cervical postural muscle stretching and strengthening, PROM B shoulders, shoulder isometrics, scapular stabilization, heat with or without IFC estim as needed for pain  -SS       User Key  (r) = Recorded By, (t) = Taken By, (c) = Cosigned By    Initials Name Provider Type     Raudel Bell, PT DPT Physical Therapist                                    Outcome Measures       09/18/17 1300          Neck Disability Index    Section 1 - Pain Intensity 1  -SS      Section 2 - Personal Care 0  -SS      Section 3 - Lifting 3  -SS      Section 4 - Work 1  -SS      Section 5 - Headaches 0  -SS      Section  6 - Concentration 0  -SS      Section 7 - Sleeping 2  -SS      Section 8 - Driving 1  -SS      Section 9 - Reading 1  -SS      Section 10 - Recreation 2  -SS      Neck Disability Index Score 11  -SS      Functional Assessment    Outcome Measure Options Neck Disability Index (NDI)  -SS        User Key  (r) = Recorded By, (t) = Taken By, (c) = Cosigned By    Initials Name Provider Type    SS Raudel Bell, PT DPT Physical Therapist            Time Calculation:   Start Time: 1300  Stop Time: 1347  Time Calculation (min): 47 min     Therapy Charges for Today     Code Description Service Date Service Provider Modifiers Qty    77099367025 HC PT CARRY MOV HAND OBJ CURRENT 9/18/2017 Raudel Bell PT DPT GP, CJ 1    94867033705 HC PT CARRY MOV HAND OBJ PROJECTED 9/18/2017 Raudel Bell, PT DPT GP, CI 1    [id number] HC PT EVAL MOD COMPLEXITY 3 9/18/2017 Raudel Bell PT DPT GP 1          PT G-Codes  Outcome Measure Options: Neck Disability Index (NDI)  Score: 11/50  Functional Limitation: Carrying, moving and handling objects  Carrying, Moving and Handling Objects Current Status (): At least 20 percent but less than 40 percent impaired, limited or restricted  Carrying, Moving and Handling Objects Goal Status (): At least 1 percent but less than 20 percent impaired, limited or restricted         Raudel Bell, PT, DPT, CHT  9/18/2017

## 2017-09-20 ENCOUNTER — HOSPITAL ENCOUNTER (OUTPATIENT)
Dept: PHYSICAL THERAPY | Facility: HOSPITAL | Age: 72
Setting detail: THERAPIES SERIES
Discharge: HOME | End: 2017-09-20
Attending: ORTHOPAEDIC SURGERY

## 2017-09-20 DIAGNOSIS — M50.320 OTHER CERVICAL DISC DEGENERATION, MID-CERVICAL REGION, UNSPECIFIED LEVEL: Primary | ICD-10-CM

## 2017-09-20 PROCEDURE — 97140 MANUAL THERAPY 1/> REGIONS: CPT | Performed by: PHYSICAL THERAPIST

## 2017-09-20 PROCEDURE — 97110 THERAPEUTIC EXERCISES: CPT | Performed by: PHYSICAL THERAPIST

## 2017-09-20 NOTE — THERAPY TREATMENT NOTE
Outpatient Physical Therapy Ortho Treatment Note  [facility]     Patient Name: Souleymane Stapleton  : 1945  MRN: [id number]  Today's Date: 2017      Visit Date: 2017    Visit 2/2 Medicare  Recert date 10/9/17  MD visit 10/5/17  NA improvement      1) cervical radiculopathy, 2) bilat RTC syndrome          Visit Dx:    ICD-10-CM ICD-9-CM   1. Other cervical disc degeneration, mid-cervical region, unspecified level M50.320 722.4       Patient Active Problem List   Diagnosis   • Astigmatism   • Anxiety state   • History of cerebrovascular accident   • Nuclear senile cataract   • Hypertension   • Unspecified hemorrhoids   • Palpitations   • Pure hypercholesterolemia   • Prostate cancer   • Chronic right shoulder pain        Past Medical History:   Diagnosis Date   • Abdominal pain    • Abnormal weight loss    • Acid reflux    • Acute gastritis    • Anxiety state    • Arthritis    • Cancer     prostate   • History of cerebrovascular accident    • History of colon polyps    • Hypertension    • Nausea    • Nuclear senile cataract    • Presbyopia    • Tobacco use     Tobacco use and exposure    • Transient cerebral ischemia    • Vitreous detachment of left eye         Past Surgical History:   Procedure Laterality Date   • BACK SURGERY     • COLONOSCOPY  2015    Diverticulosis found in the sigmoid colon. Hemorrhoids found in the anus.   • CRYOTHERAPY  2012    CRYOTHERAPY OF ACNE    • ENDOSCOPY  2015    EGD w/ biopsy -Multiple polyps, one removed.   • HEMORRHOIDECTOMY N/A 3/20/2017    Procedure: Removal of Anal Papilla;  Surgeon: Juan Diego Ken MD;  Location: [facility];  Service:    • INJECTION OF MEDICATION  2010    B12   • INJECTION OF MEDICATION  10/17/2011    Kenalog   • LUMBAR LAMINECTOMY  2010   • OTHER SURGICAL HISTORY  2010    Biopsy, Each Additional Lesion    • SKIN BIOPSY  2010             PT Ortho       17 1300    Subjective  "Comments    Subjective Comments see Therapy Patient History  -SS    Precautions and Contraindications    Precautions/Limitations no known precautions/limitations  -SS    Precautions complete tear R SS and large portion of IS; cervical neural foraminal encroachment  -SS    Subjective Pain    Able to rate subjective pain? yes  -SS    Pre-Treatment Pain Level 0  -SS    Post-Treatment Pain Level 0  -SS    Posture/Observations    Alignment Options Forward head  -SS    Posture/Observations Comments R shoulder elevated. Right IS and SS atrophy  -SS    Cervical Palpation    Cervical Palpation- Location? Occiput;Suboccipital;AC joint;Cervical facets;Scalenes;Spinous process;Levator scapula;Upper traps  -SS    Occiput --   non-tender  -SS    Suboccipital --   non-tender  -SS    AC Joint --   non-tender  -SS    Cervical Facets --   non-tender  -SS    Scalenes --   non-tender  -SS    Spinous Process --   non-tender  -SS    Levator Scapula --   non-tender  -SS    Upper Traps --   non-tender  -SS    Cervical/Thoracic Special Tests    Spurlings (Foraminal Compression) Positive   right posterior quadrant  -SS    Cervical Compression (Forarminal Compression vs. Facet Pain) Negative  -SS    Cervical Distraction (Foraminal Compression vs. Facet Pain) Positive  -SS    Upper Level Neural Tension Tests    Median Neural Tension Test Right:;Positive;Left:;Negative  -SS    Radial Neural Tension Test Bilateral:;Negative  -SS    Ulnar Neural Tension Test Bilateral:;Negative  -SS    Neck    Flexion AROM Deficit 57; pain approx T2  -SS    Extension AROM Deficit 40  -SS    Lt Lat Flexion AROM Deficit 45  -SS    Rt Lateral Flexion AROM Deficit 29   cervical pain; causes radiculopathy R forearm/hand  -SS    Lt Rotation AROM Deficit 70  -SS    Rt Rotation AROM Deficit 68   \"I feel that on the back of my neck.\"  -SS    Left Shoulder    Flexion AROM Deficit 157; pain at end range  -SS    Extension AROM Deficit 60  -SS    ABduction AROM Deficit 155  " -SS    External Rotation AROM Deficit 80  -SS    Internal Rotation AROM Deficit 60   uncomfortable lateral L shoulder  -SS    Right Shoulder    Flexion AROM Deficit 140 audible pop with flexion and eccentric lowering; pain during eccentric lowering  -SS    Extension AROM Deficit 60  -SS    ABduction AROM Deficit 113   pain with elevation > lowering  -SS    External Rotation AROM Deficit 77  -SS    Internal Rotation AROM Deficit 47  -SS    Left Shoulder    Flexion Gross Movement (4-/5) good minus  -SS    Extension Gross Movement (5/5) normal  -SS    ABduction Gross Movement (5/5) normal   short lever arm  -SS    Int Rotation Gross Movement (5/5) normal  -SS    Ext Rotation Gross Movement (4+/5) good plus  -SS    Right Shoulder    Flexion Gross Movement (3/5) fair   pain  -SS    Extension Gross Movement (5/5) normal  -SS    ABduction Gross Movement (4+/5) good plus   short lever arm  -SS    Int Rotation Gross Movement (5/5) normal  -SS    Ext Rotation Gross Movement (3+/5) fair plus  -SS    Left Elbow/Forearm    Elbow Flexion Gross Movement (5/5) normal  -SS    Elbow Extension Gross Movement (5/5) normal  -SS    Forearm Supination Gross Movement (5/5) normal  -SS    Forearm Pronation Gross Movement (5/5) normal  -SS    Right Elbow/Forearm    Elbow Flexion Gross Movement (5/5) normal  -SS    Elbow Extension Gross Movement (4/5) good;(4+/5) good plus  -SS    Forearm Supination Gross Movement (5/5) normal  -SS    Forearm Pronation Gross Movement (5/5) normal  -SS    Left Wrist    Wrist Flexion Gross Movement (5/5) normal  -SS    Wrist Extension Gross Movement (4+/5) good plus  -SS      User Key  (r) = Recorded By, (t) = Taken By, (c) = Cosigned By    Initials Name Provider Type    SS Raudel Bell, PT DPT Physical Therapist                            PT Assessment/Plan       09/20/17 3189       PT Assessment    Assessment Comments c- spine with raduculopathy, difficulty reaching and lifting above shoulder height  due to bilat RTC.  -KW     PT Plan    PT Plan Comments Cont POC c- spine and bilat shoulders, c-spine postural muscle strengthening, bilat shoulder isometrics, IFC with heat if needed.  -KW       User Key  (r) = Recorded By, (t) = Taken By, (c) = Cosigned By    Initials Name Provider Type    LAVELLE Loaiza, PURNIMA Physical Therapist                    Exercises       09/20/17 1700          Subjective Comments    Subjective Comments reports no pain today.  -KW      Subjective Pain    Able to rate subjective pain? yes  -KW      Pre-Treatment Pain Level 0  -KW      Post-Treatment Pain Level 0  -KW      Exercise 1    Exercise Name 1 pro II level 1  -KW      Time (Minutes) 1 10   -KW      Additional Comments for / back  -KW      Exercise 2    Exercise Name 2 chin tucks  -KW      Sets 2 3  -KW      Reps 2 10  -KW      Exercise 3    Exercise Name 3 scapular squeezes  -KW      Sets 3 3  -KW      Reps 3 10  -KW        User Key  (r) = Recorded By, (t) = Taken By, (c) = Cosigned By    Initials Name Provider Type    LAVELLE Loaiza PT Physical Therapist                        Manual Rx (last 36 hours)      Manual Treatments       09/20/17 1700          Manual Rx 1    Manual Rx 1 Location prone STM to neck and shoulders  -KW      Manual Rx 1 Duration 10  -KW      Manual Rx 2    Manual Rx 2 Location supine occipital release  -KW      Manual Rx 2 Duration 13  -KW        User Key  (r) = Recorded By, (t) = Taken By, (c) = Cosigned By    Initials Name Provider Type    LAVELLE Loaiza PT Physical Therapist                PT OP Goals       09/20/17 1800       PT Short Term Goals    STG Date to Achieve 10/09/17  -KW     STG 1 Note a >/= 25% subjective improvement  -KW     STG 1 Progress Progressing  -KW     STG 2 Increase B shoulder flexion MMT by >/= 1/2 grade  -KW     STG 2 Progress Progressing  -KW     STG 3 Increase R cervical lateral flexion by >/= 6 degrees  -KW     STG 3 Progress Progressing  -KW     Long Term Goals     LTG Date to Achieve 10/30/17  -KW     LTG 1 Independent with HEP  -KW     LTG 1 Progress Ongoing  -KW     LTG 2 Note a >/= 50% subjective improvement  -KW     LTG 2 Progress Ongoing  -KW     LTG 3 NDI score to be </= 6/50  -KW     LTG 3 Progress Not Met  -KW     LTG 4 R elbow and wrist extensor strength 5/5 each  -KW     LTG 4 Progress Not Met  -KW     LTG 5 Note a >/= 50% improvement in instances of R UE radiculopathy  -KW     LTG 5 Progress Not Met  -KW     Time Calculation    PT Goal Re-Cert Due Date 10/09/17  -KW       User Key  (r) = Recorded By, (t) = Taken By, (c) = Cosigned By    Initials Name Provider Type    LAVELLE Loaiza, PT Physical Therapist                    Outcome Measures       09/18/17 1300          Neck Disability Index    Section 1 - Pain Intensity 1  -SS      Section 2 - Personal Care 0  -SS      Section 3 - Lifting 3  -SS      Section 4 - Work 1  -SS      Section 5 - Headaches 0  -SS      Section 6 - Concentration 0  -SS      Section 7 - Sleeping 2  -SS      Section 8 - Driving 1  -SS      Section 9 - Reading 1  -SS      Section 10 - Recreation 2  -SS      Neck Disability Index Score 11  -SS      Functional Assessment    Outcome Measure Options Neck Disability Index (NDI)  -SS        User Key  (r) = Recorded By, (t) = Taken By, (c) = Cosigned By    Initials Name Provider Type     Raudel Bell, PT DPT Physical Therapist            Time Calculation:   Start Time: 1600  Stop Time: 1646  Time Calculation (min): 46 min  Total Timed Code Minutes- PT: 46 minute(s)    Therapy Charges for Today     Code Description Service Date Service Provider Modifiers Qty    34355525348 HC PT THER PROC EA 15 MIN 9/20/2017 Tiffanie Loaiza, PT GP 1    84840281831 HC PT MANUAL THERAPY EA 15 MIN 9/20/2017 Tiffanie Loaiza, PT GP 2                    Tiffanie Loaiza, PT  9/20/2017

## 2017-09-22 LAB
BH CV ECHO MEAS - ACS: 2 CM
BH CV ECHO MEAS - AI DEC SLOPE: 150 CM/SEC^2
BH CV ECHO MEAS - AI MAX PG: 40.2 MMHG
BH CV ECHO MEAS - AI MAX VEL: 317 CM/SEC
BH CV ECHO MEAS - AI P1/2T: 619 MSEC
BH CV ECHO MEAS - AO MAX PG (FULL): 1.4 MMHG
BH CV ECHO MEAS - AO MAX PG: 5.4 MMHG
BH CV ECHO MEAS - AO MEAN PG (FULL): 1 MMHG
BH CV ECHO MEAS - AO MEAN PG: 3 MMHG
BH CV ECHO MEAS - AO ROOT AREA (BSA CORRECTED): 1.8
BH CV ECHO MEAS - AO ROOT AREA: 9.1 CM^2
BH CV ECHO MEAS - AO ROOT DIAM: 3.4 CM
BH CV ECHO MEAS - AO V2 MAX: 116 CM/SEC
BH CV ECHO MEAS - AO V2 MEAN: 80.4 CM/SEC
BH CV ECHO MEAS - AO V2 VTI: 26 CM
BH CV ECHO MEAS - BSA(HAYCOCK): 1.9 M^2
BH CV ECHO MEAS - BSA: 1.9 M^2
BH CV ECHO MEAS - BZI_BMI: 26 KILOGRAMS/M^2
BH CV ECHO MEAS - BZI_METRIC_HEIGHT: 172.7 CM
BH CV ECHO MEAS - BZI_METRIC_WEIGHT: 77.6 KG
BH CV ECHO MEAS - EDV(CUBED): 262.1 ML
BH CV ECHO MEAS - EDV(TEICH): 208.5 ML
BH CV ECHO MEAS - EF(CUBED): 62.9 %
BH CV ECHO MEAS - EF(TEICH): 53.3 %
BH CV ECHO MEAS - EPSS: 0.3 CM
BH CV ECHO MEAS - ESV(CUBED): 97.3 ML
BH CV ECHO MEAS - ESV(TEICH): 97.3 ML
BH CV ECHO MEAS - FS: 28.1 %
BH CV ECHO MEAS - IVS/LVPW: 0.71
BH CV ECHO MEAS - IVSD: 1 CM
BH CV ECHO MEAS - LA DIMENSION: 4.7 CM
BH CV ECHO MEAS - LA/AO: 1.4
BH CV ECHO MEAS - LV MASS(C)D: 349.5 GRAMS
BH CV ECHO MEAS - LV MASS(C)DI: 182.8 GRAMS/M^2
BH CV ECHO MEAS - LV MAX PG: 4 MMHG
BH CV ECHO MEAS - LV MEAN PG: 2 MMHG
BH CV ECHO MEAS - LV V1 MAX: 100 CM/SEC
BH CV ECHO MEAS - LV V1 MEAN: 60.1 CM/SEC
BH CV ECHO MEAS - LV V1 VTI: 23 CM
BH CV ECHO MEAS - LVIDD: 6.4 CM
BH CV ECHO MEAS - LVIDS: 4.6 CM
BH CV ECHO MEAS - LVPWD: 1.4 CM
BH CV ECHO MEAS - MR MAX PG: 81.7 MMHG
BH CV ECHO MEAS - MR MAX VEL: 451.5 CM/SEC
BH CV ECHO MEAS - MV A MAX VEL: 78.5 CM/SEC
BH CV ECHO MEAS - MV E MAX VEL: 74 CM/SEC
BH CV ECHO MEAS - MV E/A: 0.94
BH CV ECHO MEAS - PA MAX PG: 4.2 MMHG
BH CV ECHO MEAS - PA MEAN PG: 3 MMHG
BH CV ECHO MEAS - PA V2 MAX: 102 CM/SEC
BH CV ECHO MEAS - PA V2 MEAN: 76.1 CM/SEC
BH CV ECHO MEAS - PA V2 VTI: 24.8 CM
BH CV ECHO MEAS - PI END-D VEL: 122 CM/SEC
BH CV ECHO MEAS - RAP SYSTOLE: 10 MMHG
BH CV ECHO MEAS - RVDD: 1.8 CM
BH CV ECHO MEAS - RVSP: 34.6 MMHG
BH CV ECHO MEAS - SI(AO): 123.4 ML/M^2
BH CV ECHO MEAS - SI(CUBED): 86.2 ML/M^2
BH CV ECHO MEAS - SI(TEICH): 58.1 ML/M^2
BH CV ECHO MEAS - SV(AO): 236.1 ML
BH CV ECHO MEAS - SV(CUBED): 164.8 ML
BH CV ECHO MEAS - SV(TEICH): 111.2 ML
BH CV ECHO MEAS - TR MAX VEL: 248 CM/SEC
LV EF 2D ECHO EST: 65 %
MAXIMAL PREDICTED HEART RATE: 146 BPM
STRESS POST ESTIMATED WORKLOAD: 13.4 METS
STRESS POST EXERCISE DUR MIN: 10 MIN
STRESS POST EXERCISE DUR SEC: 30 SEC
STRESS TARGET HR: 124 BPM

## 2017-09-25 ENCOUNTER — DOCUMENTATION (OUTPATIENT)
Dept: CARDIOLOGY | Facility: CLINIC | Age: 72
End: 2017-09-25

## 2017-09-26 ENCOUNTER — HOSPITAL ENCOUNTER (OUTPATIENT)
Dept: MRI IMAGING | Facility: HOSPITAL | Age: 72
Discharge: HOME | End: 2017-09-26
Admitting: ORTHOPAEDIC SURGERY

## 2017-09-26 ENCOUNTER — HOSPITAL ENCOUNTER (OUTPATIENT)
Dept: PHYSICAL THERAPY | Facility: HOSPITAL | Age: 72
Setting detail: THERAPIES SERIES
Discharge: HOME | End: 2017-09-26
Attending: ORTHOPAEDIC SURGERY

## 2017-09-26 DIAGNOSIS — M25.512 CHRONIC LEFT SHOULDER PAIN: ICD-10-CM

## 2017-09-26 DIAGNOSIS — M75.102 ROTATOR CUFF SYNDROME, LEFT: ICD-10-CM

## 2017-09-26 DIAGNOSIS — G89.29 CHRONIC LEFT SHOULDER PAIN: ICD-10-CM

## 2017-09-26 DIAGNOSIS — M50.320 OTHER CERVICAL DISC DEGENERATION, MID-CERVICAL REGION, UNSPECIFIED LEVEL: Primary | ICD-10-CM

## 2017-09-26 PROCEDURE — 73221 MRI JOINT UPR EXTREM W/O DYE: CPT

## 2017-09-26 PROCEDURE — 97110 THERAPEUTIC EXERCISES: CPT

## 2017-09-26 PROCEDURE — G0283 ELEC STIM OTHER THAN WOUND: HCPCS

## 2017-09-26 PROCEDURE — 97012 MECHANICAL TRACTION THERAPY: CPT

## 2017-09-26 NOTE — THERAPY TREATMENT NOTE
Outpatient Physical Therapy Ortho Treatment Note  [facility]     Patient Name: Souleymane Stapleton  : 1945  MRN: [id number]  Today's Date: 2017      Visit Date: 2017    Subjective Improvement 0  Visits 3/3  RTMD 10-  Recert Date 10-    Cervical radiculopathey, bilateral RTC syndrome    Visit Dx:    ICD-10-CM ICD-9-CM   1. Other cervical disc degeneration, mid-cervical region, unspecified level M50.320 722.4       Patient Active Problem List   Diagnosis   • Astigmatism   • Anxiety state   • History of cerebrovascular accident   • Nuclear senile cataract   • Hypertension   • Unspecified hemorrhoids   • Palpitations   • Pure hypercholesterolemia   • Prostate cancer   • Chronic right shoulder pain        Past Medical History:   Diagnosis Date   • Abdominal pain    • Abnormal weight loss    • Acid reflux    • Acute gastritis    • Anxiety state    • Arthritis    • Cancer     prostate   • History of cerebrovascular accident    • History of colon polyps    • Hypertension    • Nausea    • Nuclear senile cataract    • Presbyopia    • Tobacco use     Tobacco use and exposure    • Transient cerebral ischemia    • Vitreous detachment of left eye         Past Surgical History:   Procedure Laterality Date   • BACK SURGERY     • COLONOSCOPY  2015    Diverticulosis found in the sigmoid colon. Hemorrhoids found in the anus.   • CRYOTHERAPY  2012    CRYOTHERAPY OF ACNE    • ENDOSCOPY  2015    EGD w/ biopsy -Multiple polyps, one removed.   • HEMORRHOIDECTOMY N/A 3/20/2017    Procedure: Removal of Anal Papilla;  Surgeon: Juan Diego Ken MD;  Location: [facility];  Service:    • INJECTION OF MEDICATION  2010    B12   • INJECTION OF MEDICATION  10/17/2011    Kenalog   • LUMBAR LAMINECTOMY  2010   • OTHER SURGICAL HISTORY  2010    Biopsy, Each Additional Lesion    • SKIN BIOPSY  2010                             PT Assessment/Plan       17 1549  09/26/17 0128    PT Assessment    Assessment Comments Patient tolerated RX well without increase pain  -CP     PT Plan    PT Frequency 2x/week  -CP     Predicted Duration of Therapy Intervention (days/wks) 4 weeks  -CP     PT Plan Comments cont with POC. monitor response to traction.  Check on MRI results  -CP monitor response to mechanical cervical traction,  shoulder isometrics next  -CP      User Key  (r) = Recorded By, (t) = Taken By, (c) = Cosigned By    Initials Name Provider Type    CP Nila Sharif PTA Physical Therapy Assistant                Modalities       09/26/17 1400          Ice    Ice Applied Yes  -CP      Location cspine  -CP      Rx Minutes --   20 minutes  -CP      Ice S/P Rx Yes  -CP      ELECTRICAL STIMULATION    Attended/Unattended Unattended  -CP      Stimulation Type IFC  -CP      Location/Electrode Placement/Other cpsine paraspinals  -CP      Rx Minutes 20 mins  -CP      Traction 31195    Traction Type Cervical  -CP      Rx Minutes 15  -CP      Duration Intermittent  -CP      Position Supine  -CP      Weight --   14/9  -CP      Hold 60  -CP      Relax 20  -CP        User Key  (r) = Recorded By, (t) = Taken By, (c) = Cosigned By    Initials Name Provider Type    CP Nila Sharif PTA Physical Therapy Assistant                Exercises       09/26/17 1400          Subjective Comments    Subjective Comments States that his nexk may be a little better.  He will have his MRI on Right shoulder today  -CP      Subjective Pain    Able to rate subjective pain? yes  -CP      Pre-Treatment Pain Level 0  -CP      Post-Treatment Pain Level 0  -CP      Exercise 1    Exercise Name 1 Pro II level 2  -CP      Time (Minutes) 1 10     -CP      Additional Comments FW/BW  -CP      Exercise 2    Exercise Name 2 Pulleys fl and ab  -CP      Reps 2 30  -CP      Exercise 3    Exercise Name 3 Scap rows  -CP      Sets 3 3  -CP      Reps 3 10  -CP      Time (Minutes) 3 blue  -CP      Time (Seconds) 3 mid and  high  -CP      Exercise 4    Exercise Name 4 Shoudler ext  -CP      Sets 4 3  -CP      Reps 4 10  -CP      Time (Minutes) 4 blue theraband  -CP        User Key  (r) = Recorded By, (t) = Taken By, (c) = Cosigned By    Initials Name Provider Type    CP Nila Sharif PTA Physical Therapy Assistant                               PT OP Goals       09/26/17 1500       PT Short Term Goals    STG Date to Achieve 10/09/17  -CP     STG 1 Note a >/= 25% subjective improvement  -CP     STG 1 Progress Progressing  -CP     STG 2 Increase B shoulder flexion MMT by >/= 1/2 grade  -CP     STG 2 Progress Progressing  -CP     STG 3 Increase R cervical lateral flexion by >/= 6 degrees  -CP     STG 3 Progress Progressing  -CP     Long Term Goals    LTG Date to Achieve 10/30/17  -CP     LTG 1 Independent with HEP  -CP     LTG 1 Progress Ongoing  -CP     LTG 2 Note a >/= 50% subjective improvement  -CP     LTG 2 Progress Ongoing  -CP     LTG 3 NDI score to be </= 6/50  -CP     LTG 3 Progress Not Met  -CP     LTG 4 R elbow and wrist extensor strength 5/5 each  -CP     LTG 4 Progress Not Met  -CP     LTG 5 Note a >/= 50% improvement in instances of R UE radiculopathy  -CP     LTG 5 Progress Not Met  -CP     Time Calculation    PT Goal Re-Cert Due Date 10/09/17  -CP       User Key  (r) = Recorded By, (t) = Taken By, (c) = Cosigned By    Initials Name Provider Type    CP Nila Sharif PTA Physical Therapy Assistant                Therapy Education       09/26/17 1547          Therapy Education    Education Details Scap rows mid and high, shoulder ext with theraband  -CP      Given HEP  -CP      Program New  -CP      How Provided Verbal;Demonstration;Written  -CP      Provided to Patient  -CP      Level of Understanding Verbalized;Demonstrated  -CP        User Key  (r) = Recorded By, (t) = Taken By, (c) = Cosigned By    Initials Name Provider Type    CP Nila Sharif PTA Physical Therapy Assistant                Time Calculation:    Start Time: 1435  Stop Time: 1548  Time Calculation (min): 73 min  Total Timed Code Minutes- PT: 67 minute(s)    Therapy Charges for Today     Code Description Service Date Service Provider Modifiers Qty    88948115908 HC PT THER PROC EA 15 MIN 9/26/2017 Nila Sharif PTA GP 2    06391822356 HC PT TRACTION CERVICAL 9/26/2017 Nila Sharif PTA GP 1    00348852139 HC PT ELECTRICAL STIM UNATTENDED 9/26/2017 Nila Sharif PTA  1    33165840111 HC PT THER SUPP EA 15 MIN 9/26/2017 Nila Sharif PTA GP 1                    Nila Sharif PTA  9/26/2017

## 2017-09-27 ENCOUNTER — HOSPITAL ENCOUNTER (OUTPATIENT)
Dept: PHYSICAL THERAPY | Facility: HOSPITAL | Age: 72
Setting detail: THERAPIES SERIES
Discharge: HOME | End: 2017-09-27
Attending: ORTHOPAEDIC SURGERY

## 2017-09-27 DIAGNOSIS — G89.29 CHRONIC RIGHT SHOULDER PAIN: ICD-10-CM

## 2017-09-27 DIAGNOSIS — G89.29 CHRONIC LEFT SHOULDER PAIN: ICD-10-CM

## 2017-09-27 DIAGNOSIS — M25.512 CHRONIC LEFT SHOULDER PAIN: ICD-10-CM

## 2017-09-27 DIAGNOSIS — M50.320 OTHER CERVICAL DISC DEGENERATION, MID-CERVICAL REGION, UNSPECIFIED LEVEL: Primary | ICD-10-CM

## 2017-09-27 DIAGNOSIS — M25.511 CHRONIC RIGHT SHOULDER PAIN: ICD-10-CM

## 2017-09-27 DIAGNOSIS — M75.102 ROTATOR CUFF SYNDROME, LEFT: ICD-10-CM

## 2017-09-27 PROCEDURE — 97110 THERAPEUTIC EXERCISES: CPT

## 2017-09-27 PROCEDURE — G0283 ELEC STIM OTHER THAN WOUND: HCPCS

## 2017-09-27 PROCEDURE — 97012 MECHANICAL TRACTION THERAPY: CPT

## 2017-09-27 NOTE — THERAPY TREATMENT NOTE
"    Outpatient Physical Therapy Ortho Treatment Note  [facility]     Patient Name: Souleymane Stapleton  : 1945  MRN: [id number]  Today's Date: 2017      Visit Date: 2017    Subjective Improvement \"numbness and tingling is better.\"  Visits 4/4  Visits approved medicare cap  RTMD 10-  recert Date 10-9-2017    Cervical radiculopathy, bilateral RTC syndrome    Visit Dx:    ICD-10-CM ICD-9-CM   1. Other cervical disc degeneration, mid-cervical region, unspecified level M50.320 722.4   2. Rotator cuff syndrome, left M75.102 726.10   3. Chronic right shoulder pain M25.511 719.41    G89.29 338.29   4. Chronic left shoulder pain M25.512 719.41    G89.29 338.29       Patient Active Problem List   Diagnosis   • Astigmatism   • Anxiety state   • History of cerebrovascular accident   • Nuclear senile cataract   • Hypertension   • Unspecified hemorrhoids   • Palpitations   • Pure hypercholesterolemia   • Prostate cancer   • Chronic right shoulder pain        Past Medical History:   Diagnosis Date   • Abdominal pain    • Abnormal weight loss    • Acid reflux    • Acute gastritis    • Anxiety state    • Arthritis    • Cancer     prostate   • History of cerebrovascular accident    • History of colon polyps    • Hypertension    • Nausea    • Nuclear senile cataract    • Presbyopia    • Tobacco use     Tobacco use and exposure    • Transient cerebral ischemia    • Vitreous detachment of left eye         Past Surgical History:   Procedure Laterality Date   • BACK SURGERY     • COLONOSCOPY  2015    Diverticulosis found in the sigmoid colon. Hemorrhoids found in the anus.   • CRYOTHERAPY  2012    CRYOTHERAPY OF ACNE    • ENDOSCOPY  2015    EGD w/ biopsy -Multiple polyps, one removed.   • HEMORRHOIDECTOMY N/A 3/20/2017    Procedure: Removal of Anal Papilla;  Surgeon: Juan Diego Ken MD;  Location: [facility] OR;  Service:    • INJECTION OF MEDICATION  2010    B12   • INJECTION OF " MEDICATION  10/17/2011    Kenalog   • LUMBAR LAMINECTOMY  09/29/2010   • OTHER SURGICAL HISTORY  08/19/2010    Biopsy, Each Additional Lesion    • SKIN BIOPSY  08/19/2010                             PT Assessment/Plan       09/26/17 1549       PT Assessment    Assessment Comments Patient tolerated RX well without increase pain  -CP     PT Plan    PT Frequency 2x/week  -CP     Predicted Duration of Therapy Intervention (days/wks) 4 weeks  -CP     PT Plan Comments cont with POC. monitor response to traction.  Check on MRI results  -CP       User Key  (r) = Recorded By, (t) = Taken By, (c) = Cosigned By    Initials Name Provider Type    CP Nila Sharif PTA Physical Therapy Assistant                Modalities       09/27/17 1300          Ice    Ice Applied Yes  -CP      Location cspine  -CP      Rx Minutes --   20 minutes with ifc  -CP      Ice S/P Rx Yes  -CP      ELECTRICAL STIMULATION    Attended/Unattended Unattended  -CP      Stimulation Type IFC  -CP      Location/Electrode Placement/Other scpine   -CP      Traction 47366    Traction Type Cervical  -CP      Rx Minutes 15  -CP      Duration Intermittent  -CP      Position Supine  -CP      Weight --   15/9  -CP      Hold 60  -CP      Relax 20  -CP        User Key  (r) = Recorded By, (t) = Taken By, (c) = Cosigned By    Initials Name Provider Type    CP Nila Sharif PTA Physical Therapy Assistant                Exercises       09/27/17 1300          Subjective Comments    Subjective Comments Starts that the numbnes in right hand maybe a little less.  Felt that the traaction seemed to help  -CP      Subjective Pain    Able to rate subjective pain? yes  -CP      Pre-Treatment Pain Level 7  -CP      Post-Treatment Pain Level 5  -CP      Exercise 1    Exercise Name 1 Pro II level 2  -CP      Time (Minutes) 1 10  -CP      Additional Comments FW/BW  -CP      Exercise 2    Exercise Name 2 Prom Bilateral shoulders  -CP      Time (Minutes) 2 8  -CP      Exercise 3     Exercise Name 3 Isometrics bilateral shoulder  -CP      Reps 3 5  -CP      Time (Seconds) 3 5  -CP      Exercise 4    Exercise Name 4 Scap rows on cc  -CP      Sets 4 2  -CP      Reps 4 10  -CP      Time (Minutes) 4 5 plates  -CP        User Key  (r) = Recorded By, (t) = Taken By, (c) = Cosigned By    Initials Name Provider Type    CP Nila Sharif, PTA Physical Therapy Assistant                               PT OP Goals       09/27/17 1300 09/26/17 1500    PT Short Term Goals    STG Date to Achieve 10/09/17  -CP 10/09/17  -CP    STG 1 Note a >/= 25% subjective improvement  -CP Note a >/= 25% subjective improvement  -CP    STG 1 Progress Progressing  -CP Progressing  -CP    STG 2 Increase B shoulder flexion MMT by >/= 1/2 grade  -CP Increase B shoulder flexion MMT by >/= 1/2 grade  -CP    STG 2 Progress Progressing  -CP Progressing  -CP    STG 3 Increase R cervical lateral flexion by >/= 6 degrees  -CP Increase R cervical lateral flexion by >/= 6 degrees  -CP    STG 3 Progress Progressing  -CP Progressing  -CP    Long Term Goals    LTG Date to Achieve 10/30/17  -CP 10/30/17  -CP    LTG 1 Independent with HEP  -CP Independent with HEP  -CP    LTG 1 Progress Ongoing  -CP Ongoing  -CP    LTG 2 Note a >/= 50% subjective improvement  -CP Note a >/= 50% subjective improvement  -CP    LTG 2 Progress Ongoing  -CP Ongoing  -CP    LTG 3 NDI score to be </= 6/50  -CP NDI score to be </= 6/50  -CP    LTG 3 Progress Not Met  -CP Not Met  -CP    LTG 4 R elbow and wrist extensor strength 5/5 each  -CP R elbow and wrist extensor strength 5/5 each  -CP    LTG 4 Progress Not Met  -CP Not Met  -CP    LTG 5 Note a >/= 50% improvement in instances of R UE radiculopathy  -CP Note a >/= 50% improvement in instances of R UE radiculopathy  -CP    LTG 5 Progress Not Met  -CP Not Met  -CP    Time Calculation    PT Goal Re-Cert Due Date 10/09/17  -CP 10/09/17  -CP      User Key  (r) = Recorded By, (t) = Taken By, (c) = Cosigned By     Initials Name Provider Type    CP Nila Sharif PTA Physical Therapy Assistant                Therapy Education       09/27/17 1327 09/26/17 1547       Therapy Education    Education Details Isometrics bilateral shoulders  -CP Scap rows mid and high, shoulder ext with theraband  -CP     Given HEP  -CP HEP  -CP     Program New  -CP New  -CP     How Provided Verbal;Demonstration;Written  -CP Verbal;Demonstration;Written  -CP     Provided to Patient  -CP Patient  -CP     Level of Understanding Verbalized;Demonstrated  -CP Verbalized;Demonstrated  -CP       User Key  (r) = Recorded By, (t) = Taken By, (c) = Cosigned By    Initials Name Provider Type    CP Nila Sharif PTA Physical Therapy Assistant                Time Calculation:   Start Time: 1303  Stop Time: 1415  Time Calculation (min): 72 min  Total Timed Code Minutes- PT: 70 minute(s)    Therapy Charges for Today     Code Description Service Date Service Provider Modifiers Qty    84237646200 HC PT THER PROC EA 15 MIN 9/27/2017 Nila Sharif PTA GP 3    62737117952 HC PT TRACTION CERVICAL 9/27/2017 Nila Sharif PTA GP 1    41706429076 HC PT ELECTRICAL STIM UNATTENDED 9/27/2017 Nila Sharif PTA  1    49820639439 HC PT THER SUPP EA 15 MIN 9/27/2017 Nila Sharif PTA GP 1                    Nila Sharif PTA  9/27/2017

## 2017-10-02 ENCOUNTER — HOSPITAL ENCOUNTER (OUTPATIENT)
Dept: PHYSICAL THERAPY | Facility: HOSPITAL | Age: 72
Setting detail: THERAPIES SERIES
Discharge: HOME | End: 2017-10-02
Attending: ORTHOPAEDIC SURGERY

## 2017-10-02 DIAGNOSIS — M50.320 OTHER CERVICAL DISC DEGENERATION, MID-CERVICAL REGION, UNSPECIFIED LEVEL: Primary | ICD-10-CM

## 2017-10-02 DIAGNOSIS — M75.102 ROTATOR CUFF SYNDROME, LEFT: ICD-10-CM

## 2017-10-02 DIAGNOSIS — M25.511 CHRONIC RIGHT SHOULDER PAIN: ICD-10-CM

## 2017-10-02 DIAGNOSIS — G89.29 CHRONIC LEFT SHOULDER PAIN: ICD-10-CM

## 2017-10-02 DIAGNOSIS — G89.29 CHRONIC RIGHT SHOULDER PAIN: ICD-10-CM

## 2017-10-02 DIAGNOSIS — M25.512 CHRONIC LEFT SHOULDER PAIN: ICD-10-CM

## 2017-10-02 PROCEDURE — G0283 ELEC STIM OTHER THAN WOUND: HCPCS

## 2017-10-02 PROCEDURE — 97110 THERAPEUTIC EXERCISES: CPT

## 2017-10-02 PROCEDURE — 97012 MECHANICAL TRACTION THERAPY: CPT

## 2017-10-02 NOTE — THERAPY TREATMENT NOTE
"    Outpatient Physical Therapy Ortho Treatment Note  [facility]     Patient Name: Souleymane Stapleton  : 1945  MRN: [id number]  Today's Date: 10/2/2017      Visit Date: 10/02/2017     Subjective Improvement \"not sure'  Visits 5/5  Visits approved medicare cap  RTMD  10-  Recert Date 10-    Cervical Radiculopathy, bilateral RTC syndrome    Visit Dx:    ICD-10-CM ICD-9-CM   1. Other cervical disc degeneration, mid-cervical region, unspecified level M50.320 722.4   2. Rotator cuff syndrome, left M75.102 726.10   3. Chronic right shoulder pain M25.511 719.41    G89.29 338.29   4. Chronic left shoulder pain M25.512 719.41    G89.29 338.29       Patient Active Problem List   Diagnosis   • Astigmatism   • Anxiety state   • History of cerebrovascular accident   • Nuclear senile cataract   • Hypertension   • Unspecified hemorrhoids   • Palpitations   • Pure hypercholesterolemia   • Prostate cancer   • Chronic right shoulder pain        Past Medical History:   Diagnosis Date   • Abdominal pain    • Abnormal weight loss    • Acid reflux    • Acute gastritis    • Anxiety state    • Arthritis    • Cancer     prostate   • History of cerebrovascular accident    • History of colon polyps    • Hypertension    • Nausea    • Nuclear senile cataract    • Presbyopia    • Tobacco use     Tobacco use and exposure    • Transient cerebral ischemia    • Vitreous detachment of left eye         Past Surgical History:   Procedure Laterality Date   • BACK SURGERY     • COLONOSCOPY  2015    Diverticulosis found in the sigmoid colon. Hemorrhoids found in the anus.   • CRYOTHERAPY  2012    CRYOTHERAPY OF ACNE    • ENDOSCOPY  2015    EGD w/ biopsy -Multiple polyps, one removed.   • HEMORRHOIDECTOMY N/A 3/20/2017    Procedure: Removal of Anal Papilla;  Surgeon: Juan Diego Ken MD;  Location: [facility];  Service:    • INJECTION OF MEDICATION  2010    B12   • INJECTION OF MEDICATION  " 10/17/2011    Kenalog   • LUMBAR LAMINECTOMY  09/29/2010   • OTHER SURGICAL HISTORY  08/19/2010    Biopsy, Each Additional Lesion    • SKIN BIOPSY  08/19/2010                             PT Assessment/Plan       10/02/17 1629       PT Assessment    Assessment Comments Patient tolerated Traction well.    -CP     PT Plan    PT Frequency 2x/week  -CP     Predicted Duration of Therapy Intervention (days/wks) 4 weeks  -CP     PT Plan Comments Chin tucks - supine  -CP       User Key  (r) = Recorded By, (t) = Taken By, (c) = Cosigned By    Initials Name Provider Type    CP Nila Sharif PTA Physical Therapy Assistant                Modalities       10/02/17 1400          Ice    Ice Applied Yes  -CP      Location cspine  -CP      Rx Minutes --   20 with iFC  -CP      Ice S/P Rx Yes  -CP      ELECTRICAL STIMULATION    Attended/Unattended Unattended  -CP      Stimulation Type IFC  -CP      Location/Electrode Placement/Other cspine  -CP      Rx Minutes 20 mins  -CP      Traction 34921    Traction Type Cervical  -CP      Rx Minutes 15  -CP      Duration Intermittent  -CP      Position Supine  -CP      Weight --   15/10  -CP      Hold 60  -CP      Relax 20  -CP        User Key  (r) = Recorded By, (t) = Taken By, (c) = Cosigned By    Initials Name Provider Type    CP Nila Sharif PTA Physical Therapy Assistant                Exercises       10/02/17 1400          Subjective Comments    Subjective Comments Patient states that the ex are helping his shoulder, however, does report pain in neck  -CP      Subjective Pain    Able to rate subjective pain? yes  -CP      Exercise 1    Exercise Name 1 Pro II leel 2  -CP      Time (Minutes) 1 10  -CP      Additional Comments FW/BW  -CP      Exercise 2    Exercise Name 2 Doorway Stretch  -CP      Sets 2 3  -CP      Time (Seconds) 2 30  -CP      Exercise 3    Exercise Name 3 UT stretch  -CP      Sets 3 3  -CP      Time (Seconds) 3 30  -CP      Additional Comments bilateral  -CP       Exercise 4    Exercise Name 4 Levator stretch  -CP      Sets 4 3  -CP      Time (Seconds) 4 30  -CP      Additional Comments bilateral  -CP        User Key  (r) = Recorded By, (t) = Taken By, (c) = Cosigned By    Initials Name Provider Type    CP Nila Sharif PTA Physical Therapy Assistant                                   Therapy Education       10/02/17 1518          Therapy Education    Education Details UT and Levator stretch  -CP      Given HEP  -CP      Program New  -CP      How Provided Verbal;Demonstration;Written  -CP      Provided to Patient  -CP      Level of Understanding Verbalized;Demonstrated  -CP        User Key  (r) = Recorded By, (t) = Taken By, (c) = Cosigned By    Initials Name Provider Type    CP Nila Sharif PTA Physical Therapy Assistant                Time Calculation:   Start Time: 1430  Stop Time: 1535  Time Calculation (min): 65 min  Total Timed Code Minutes- PT: 65 minute(s)    Therapy Charges for Today     Code Description Service Date Service Provider Modifiers Qty    53327229582 HC PT THER PROC EA 15 MIN 10/2/2017 Nila Sharif PTA GP 2    88426184199 HC PT TRACTION CERVICAL 10/2/2017 Nila Sharif PTA GP 1    73661044091 HC PT ELECTRICAL STIM UNATTENDED 10/2/2017 Nila Sharif PTA  1    16426174571 HC PT THER SUPP EA 15 MIN 10/2/2017 Nila Sharif PTA GP 1                    Nila Sharif PTA  10/2/2017

## 2017-10-04 ENCOUNTER — HOSPITAL ENCOUNTER (OUTPATIENT)
Dept: PHYSICAL THERAPY | Facility: HOSPITAL | Age: 72
Setting detail: THERAPIES SERIES
Discharge: HOME | End: 2017-10-04
Attending: ORTHOPAEDIC SURGERY

## 2017-10-04 DIAGNOSIS — M50.320 OTHER CERVICAL DISC DEGENERATION, MID-CERVICAL REGION, UNSPECIFIED LEVEL: Primary | ICD-10-CM

## 2017-10-04 DIAGNOSIS — M75.102 ROTATOR CUFF SYNDROME, LEFT: ICD-10-CM

## 2017-10-04 DIAGNOSIS — G89.29 CHRONIC LEFT SHOULDER PAIN: ICD-10-CM

## 2017-10-04 DIAGNOSIS — M25.511 CHRONIC RIGHT SHOULDER PAIN: ICD-10-CM

## 2017-10-04 DIAGNOSIS — G89.29 CHRONIC RIGHT SHOULDER PAIN: ICD-10-CM

## 2017-10-04 DIAGNOSIS — M25.512 CHRONIC LEFT SHOULDER PAIN: ICD-10-CM

## 2017-10-04 PROCEDURE — 97012 MECHANICAL TRACTION THERAPY: CPT | Performed by: PHYSICAL THERAPIST

## 2017-10-04 PROCEDURE — 97110 THERAPEUTIC EXERCISES: CPT | Performed by: PHYSICAL THERAPIST

## 2017-10-04 NOTE — THERAPY TREATMENT NOTE
Outpatient Physical Therapy Ortho Treatment Note  [facility]     Patient Name: Souleymane Stapleton  : 1945  MRN: [id number]  Today's Date: 10/4/2017      Visit Date: 10/04/2017  Attendance:  (Medicare)  Subjective Improvement: 50%  Next MD Appt: 10/5/17  Recert Date: 10/9/17    Therapy Diagnosis: 1) cervical radiculopathy; 2) B RTC syndrome    Visit Dx:    ICD-10-CM ICD-9-CM   1. Other cervical disc degeneration, mid-cervical region, unspecified level M50.320 722.4   2. Rotator cuff syndrome, left M75.102 726.10   3. Chronic right shoulder pain M25.511 719.41    G89.29 338.29   4. Chronic left shoulder pain M25.512 719.41    G89.29 338.29       Patient Active Problem List   Diagnosis   • Astigmatism   • Anxiety state   • History of cerebrovascular accident   • Nuclear senile cataract   • Hypertension   • Unspecified hemorrhoids   • Palpitations   • Pure hypercholesterolemia   • Prostate cancer   • Chronic right shoulder pain        Past Medical History:   Diagnosis Date   • Abdominal pain    • Abnormal weight loss    • Acid reflux    • Acute gastritis    • Anxiety state    • Arthritis    • Cancer     prostate   • History of cerebrovascular accident    • History of colon polyps    • Hypertension    • Nausea    • Nuclear senile cataract    • Presbyopia    • Tobacco use     Tobacco use and exposure    • Transient cerebral ischemia    • Vitreous detachment of left eye         Past Surgical History:   Procedure Laterality Date   • BACK SURGERY     • COLONOSCOPY  2015    Diverticulosis found in the sigmoid colon. Hemorrhoids found in the anus.   • CRYOTHERAPY  2012    CRYOTHERAPY OF ACNE    • ENDOSCOPY  2015    EGD w/ biopsy -Multiple polyps, one removed.   • HEMORRHOIDECTOMY N/A 3/20/2017    Procedure: Removal of Anal Papilla;  Surgeon: Juan Diego Ken MD;  Location: [facility] OR;  Service:    • INJECTION OF MEDICATION  2010    B12   • INJECTION OF MEDICATION  10/17/2011     Kenalog   • LUMBAR LAMINECTOMY  09/29/2010   • OTHER SURGICAL HISTORY  08/19/2010    Biopsy, Each Additional Lesion    • SKIN BIOPSY  08/19/2010             PT Ortho       10/04/17 1700    Posture/Observations    Alignment Options Forward head  -SS    Posture/Observations Comments R shoulder elevated  -      User Key  (r) = Recorded By, (t) = Taken By, (c) = Cosigned By    Initials Name Provider Type     Raudel Bell, PT DPT Physical Therapist                            PT Assessment/Plan       10/04/17 1400       PT Assessment    Functional Limitations Performance in leisure activities;Limitation in home management  -     Impairments Muscle strength;Pain;Other (comment)   cervical radiculopathy  -     Assessment Comments Tolerated traction well.  No complaints with isometrics or chin tucks. No change to HEP.  -     Rehab Potential Good   barrier: severe arthritic changes c-sp, B RTC syndrome  -     PT Plan    PT Frequency 2x/week  -     Predicted Duration of Therapy Intervention (days/wks) 4 weeks  -     PT Plan Comments Continue cervical muscle strengthening. Continue traction. Will need recheck next week.  -       User Key  (r) = Recorded By, (t) = Taken By, (c) = Cosigned By    Initials Name Provider Type     Raudel Bell, PT DPT Physical Therapist                Modalities       10/04/17 1400          Subjective Pain    Post-Treatment Pain Level 0  -SS      Moist Heat    MH Applied Yes   concurrently with cervical traction  -      Location cervical spine  -SS      Rx Minutes 15 mins  -SS      Traction 40178    Traction Type Cervical   with concurrent MHP  -      Rx Minutes 15  -SS      Position Supine  -SS      Weight --   19/10  -SS      Hold 60  -SS      Relax 20  -SS        User Key  (r) = Recorded By, (t) = Taken By, (c) = Cosigned By    Initials Name Provider Type     Raudel Bell, PT DPT Physical Therapist                Exercises       10/04/17 1400           Subjective Comments    Subjective Comments Intermittent numbness in the right arm and hand with certain neck positions. Occurs at least 12x/day. Shoulders are doing a bit better. Uncomfortable, but not a sharp pain, with normal activity. Pain with heavier activity. Traction helps neck feel better for a few hours. 50% subjective improvement in shoulders. 0% subjective improvement in cervical spine.  -SS      Subjective Pain    Able to rate subjective pain? yes  -SS      Pre-Treatment Pain Level 0  -SS      Post-Treatment Pain Level 0  -SS      Exercise 1    Exercise Name 1 Pro2 Seat 9, UE/LE, F/R  -SS      Time (Minutes) 1 10 mins  -SS      Additional Comments Level 4  -SS      Exercise 2    Exercise Name 2 Supine chin tucks  -SS      Cueing 2 Verbal  -SS      Sets 2 1  -SS      Reps 2 10  -SS      Time (Seconds) 2 5 sec hold  -SS      Exercise 3    Exercise Name 3 Supine isometric cervical lateral flexion and rotation - B  -SS      Cueing 3 Tactile  -SS      Sets 3 1  -SS      Reps 3 10  -SS      Time (Seconds) 3 5 sec hold  -SS        User Key  (r) = Recorded By, (t) = Taken By, (c) = Cosigned By    Initials Name Provider Type    SS Raudel Bell, PT DPT Physical Therapist                               PT OP Goals       10/04/17 1400       PT Short Term Goals    STG Date to Achieve 10/09/17  -SS     STG 1 Note a >/= 25% subjective improvement  -SS     STG 1 Progress Met  -SS     STG 2 Increase B shoulder flexion MMT by >/= 1/2 grade  -     STG 2 Progress Progressing  -SS     STG 3 Increase R cervical lateral flexion by >/= 6 degrees  -     STG 3 Progress Progressing  -     Long Term Goals    LTG Date to Achieve 10/30/17  -SS     LTG 1 Independent with HEP  -SS     LTG 1 Progress Ongoing  -SS     LTG 2 Note a >/= 50% subjective improvement  -SS     LTG 2 Progress Ongoing  -SS     LTG 3 NDI score to be </= 6/50  -SS     LTG 3 Progress Not Met  -SS     LTG 4 R elbow and wrist extensor strength  5/5 each  -     LTG 4 Progress Not Met  -     LTG 5 Note a >/= 50% improvement in instances of R UE radiculopathy  -     LTG 5 Progress Not Met  -     Time Calculation    PT Goal Re-Cert Due Date 10/09/17  -       User Key  (r) = Recorded By, (t) = Taken By, (c) = Cosigned By    Initials Name Provider Type     Raudel Bell, PT DPT Physical Therapist                    Time Calculation:   Start Time: 1438  Stop Time: 1522  Time Calculation (min): 44 min  Total Timed Code Minutes- PT: 25 minute(s)    Therapy Charges for Today     Code Description Service Date Service Provider Modifiers Qty    57885356229 HC PT THER PROC EA 15 MIN 10/4/2017 Raudel Bell, PT DPT GP 2    03239167564 HC PT TRACTION CERVICAL 10/4/2017 Raudel Bell, PT DPT GP 1    73314778020 HC PT THER SUPP EA 15 MIN 10/4/2017 Raudel Bell, PT DPT GP 1                    Raudel Bell, PT, DPT, CHT  10/4/2017

## 2017-10-05 ENCOUNTER — OFFICE VISIT (OUTPATIENT)
Dept: ORTHOPEDIC SURGERY | Facility: CLINIC | Age: 72
End: 2017-10-05

## 2017-10-05 VITALS — HEIGHT: 69 IN | WEIGHT: 165 LBS | BODY MASS INDEX: 24.44 KG/M2

## 2017-10-05 DIAGNOSIS — M12.811 ROTATOR CUFF ARTHROPATHY, RIGHT: ICD-10-CM

## 2017-10-05 DIAGNOSIS — G89.29 CHRONIC LEFT SHOULDER PAIN: Primary | ICD-10-CM

## 2017-10-05 DIAGNOSIS — M25.512 CHRONIC LEFT SHOULDER PAIN: Primary | ICD-10-CM

## 2017-10-05 DIAGNOSIS — M75.42 IMPINGEMENT SYNDROME, SHOULDER, LEFT: ICD-10-CM

## 2017-10-05 DIAGNOSIS — M75.102 ROTATOR CUFF SYNDROME, LEFT: ICD-10-CM

## 2017-10-05 DIAGNOSIS — M75.112 PARTIAL TEAR OF LEFT ROTATOR CUFF: ICD-10-CM

## 2017-10-05 PROCEDURE — 99214 OFFICE O/P EST MOD 30 MIN: CPT | Performed by: ORTHOPAEDIC SURGERY

## 2017-10-05 RX ORDER — MELOXICAM 15 MG/1
15 TABLET ORAL DAILY
Qty: 30 TABLET | Refills: 3 | Status: SHIPPED | OUTPATIENT
Start: 2017-10-05 | End: 2018-03-22 | Stop reason: SDUPTHER

## 2017-10-05 NOTE — PROGRESS NOTES
Souleymane Stapleton is a 72 y.o. male returns for     Chief Complaint   Patient presents with   • Left Shoulder - Follow-up   • Results     MRI       HISTORY OF PRESENT ILLNESS: Patient here for mri results  Physical therapy has helped.  He still has very limited use of his right side but is doing better in regards to his left side.     CONCURRENT MEDICAL HISTORY:    Past Medical History:   Diagnosis Date   • Abdominal pain    • Abnormal weight loss    • Acid reflux    • Acute gastritis    • Anxiety state    • Arthritis    • Cancer     prostate   • History of cerebrovascular accident    • History of colon polyps    • Hypertension    • Nausea    • Nuclear senile cataract    • Presbyopia    • Tobacco use     Tobacco use and exposure    • Transient cerebral ischemia    • Vitreous detachment of left eye        Allergies   Allergen Reactions   • Statins Myalgia   • Tricor [Fenofibrate] Myalgia         Current Outpatient Prescriptions:   •  ALPRAZolam (XANAX) 0.5 MG tablet, Take 1 tablet by mouth 3 (Three) Times a Day. (Patient taking differently: Take 0.5 mg by mouth 3 (Three) Times a Day As Needed.), Disp: 90 tablet, Rfl: 2  •  clopidogrel (PLAVIX) 75 MG tablet, Take 1 tablet by mouth Daily., Disp: 30 tablet, Rfl: 11  •  diltiaZEM CD (CARTIA XT) 300 MG 24 hr capsule, Take 1 capsule by mouth Daily. (Patient taking differently: Take 300 mg by mouth Every Night.), Disp: 30 capsule, Rfl: 11  •  dutasteride (AVODART) 0.5 MG capsule, Take 0.5 mg by mouth Every Night., Disp: , Rfl:   •  ezetimibe (ZETIA) 10 MG tablet, Take 1 tablet by mouth Daily., Disp: 30 tablet, Rfl: 11  •  finasteride (PROPECIA) 1 MG tablet, Take 1 tablet by mouth Daily., Disp: 30 tablet, Rfl: 11  •  irbesartan-hydrochlorothiazide (AVALIDE) 300-12.5 MG tablet, Take 1 tablet by mouth Daily., Disp: 30 tablet, Rfl: 12  •  Loratadine (CLARITIN PO), Take  by mouth., Disp: , Rfl:   •  meloxicam (MOBIC) 15 MG tablet, Take 1 tablet by mouth Daily., Disp: 30  "tablet, Rfl: 3  •  metoclopramide (REGLAN) 5 MG tablet, Take 1 tablet by mouth 4 (Four) Times a Day Before Meals & at Bedtime., Disp: 56 tablet, Rfl: 1  •  omeprazole (priLOSEC) 40 MG capsule, Take 1 capsule by mouth Daily., Disp: 30 capsule, Rfl: 11    Past Surgical History:   Procedure Laterality Date   • BACK SURGERY     • COLONOSCOPY  06/09/2015    Diverticulosis found in the sigmoid colon. Hemorrhoids found in the anus.   • CRYOTHERAPY  08/14/2012    CRYOTHERAPY OF ACNE    • ENDOSCOPY  09/01/2015    EGD w/ biopsy -Multiple polyps, one removed.   • HEMORRHOIDECTOMY N/A 3/20/2017    Procedure: Removal of Anal Papilla;  Surgeon: Juan Diego Ken MD;  Location: [facility];  Service:    • INJECTION OF MEDICATION  09/14/2010    B12   • INJECTION OF MEDICATION  10/17/2011    Kenalog   • LUMBAR LAMINECTOMY  09/29/2010   • OTHER SURGICAL HISTORY  08/19/2010    Biopsy, Each Additional Lesion    • SKIN BIOPSY  08/19/2010       ROS  No fevers or chills.  No chest pain or shortness of air.  No GI or  disturbances.    PHYSICAL EXAMINATION:       Ht 69\" (175.3 cm)  Wt 165 lb (74.8 kg)  BMI 24.37 kg/m2    Physical Exam   Constitutional: He is oriented to person, place, and time. He appears well-developed and well-nourished.   Neurological: He is alert and oriented to person, place, and time.   Psychiatric: He has a normal mood and affect. His behavior is normal. Judgment and thought content normal.           Right Shoulder Exam     Tenderness   The patient is experiencing tenderness in the acromioclavicular joint.    Range of Motion   Active Abduction: 120   Forward Flexion: 120     Muscle Strength   Abduction: 4/5   Supraspinatus: 4/5     Tests   Cross Arm: positive  Hawkin's test: positive  Impingement: positive    Other   Erythema: absent  Sensation: normal  Pulse: present      Left Shoulder Exam     Tenderness   The patient is experiencing tenderness in the acromioclavicular joint.    Range of Motion   Active " Abduction: 160   Forward Flexion: 160     Muscle Strength   Abduction: 4/5   Supraspinatus: 4/5     Tests   Cross Arm: positive  Hawkin's test: positive  Impingement: positive    Other   Erythema: absent  Sensation: normal  Pulse: present               Xr Shoulder 2+ View Left    Result Date: 9/12/2017  Narrative: 3 views left shoulder show acceptable position and alignment with no evidence of acute bony abnormality.  No fracture or dislocation is noted.  No significant arthritic changes are noted. 09/12/17 at 10:42 AM by Juan Diego Mendoza MD     Mri Shoulder Left Wo Contrast    Result Date: 9/26/2017  Narrative: MRI left shoulder without contrast. HISTORY: Chronic left shoulder pain. Prior exam: Left shoulder September 12, 2017. (Unavailable on PACS). TECHNIQUE: Multiplanar multisequence noncontrast images left shoulder. FINDINGS: There is acromioclavicular joint arthrosis. There is capsular hypertrophy projecting both superiorly and inferiorly. Inferior acromial spur. These findings cause moderate underlying impingement upon the bursal surface of the supraspinatous tendon. There is increased signal intensity within the AC joint suggesting an active inflammatory, arthritic process. Full-thickness tear distal and anterior aspect of the supraspinatous tendon. The size of the gap in lateral medial projection 1.1 cm. The size of the gap in AP projection 1.28 cm. Series 7 image seven, and series 4 image five. Signal heterogeneity posterior aspect of the supraspinatous tendon and infraspinatus tendon suggesting tendinosis. No muscular tendinous retraction. No supraspinatus or infraspinatus atrophy. Isolated fluid collection inferior to the coracoid process, subcoracoid bursitis/ganglion, most apparent on series 7 image 13. (2.32 cm craniocaudal diameter, 2.83 cm lateral medial diameter, 1.5 cm AP diameter.) No appreciable glenohumeral joint effusion. Normal-appearing glenoid mika.     Impression: CONCLUSION:  Acromioclavicular joint arthrosis causing moderate underlying impingement. Full-thickness rotator cuff tear distal and anterior aspect of the supraspinatous tendon. No evidence of any musculotendinous retraction. No supraspinatus or infraspinatus muscle atrophy. Isolated fluid collection inferior to the coracoid process compatible with subcoracoid bursitis/ganglion. Electronically signed by:  Ricky Christopher MD  9/26/2017 8:04 PM CDT Workstation: [id number]            ASSESSMENT:    Diagnoses and all orders for this visit:    Chronic left shoulder pain    Rotator cuff syndrome, left    Rotator cuff arthropathy, right    Partial tear of left rotator cuff    Impingement syndrome, shoulder, left    Other orders  -     meloxicam (MOBIC) 15 MG tablet; Take 1 tablet by mouth Daily.          PLAN    We discussed continued home exercise program.  We will refill her meloxicam and continue with anti-inflammatory medications.  We discussed the possibility of an injection in his left shoulder if it is not improving.  We also discussed possibility of arthroscopy with decompression and evaluation for rotator cuff repair.  Activity modification.    Return in about 4 weeks (around 11/2/2017) for recheck.    Juan Diego Mendoza MD

## 2017-10-11 ENCOUNTER — HOSPITAL ENCOUNTER (OUTPATIENT)
Dept: PHYSICAL THERAPY | Facility: HOSPITAL | Age: 72
Setting detail: THERAPIES SERIES
Discharge: HOME | End: 2017-10-11
Attending: ORTHOPAEDIC SURGERY

## 2017-10-11 DIAGNOSIS — M50.320 OTHER CERVICAL DISC DEGENERATION, MID-CERVICAL REGION, UNSPECIFIED LEVEL: Primary | ICD-10-CM

## 2017-10-11 PROCEDURE — 97012 MECHANICAL TRACTION THERAPY: CPT

## 2017-10-11 PROCEDURE — G0283 ELEC STIM OTHER THAN WOUND: HCPCS

## 2017-10-11 PROCEDURE — 97110 THERAPEUTIC EXERCISES: CPT

## 2017-10-11 NOTE — THERAPY PROGRESS REPORT/RE-CERT
Outpatient Physical Therapy Ortho Progress Note  [facility]     Patient Name: Souleymane Stapleton  : 1945  MRN: [id number]  Today's Date: 10/11/2017      Visit Date: 10/11/2017  Subjective Improvement: 50%  Visit Number:   Insurance approval: Medicare MD Visit: PRN  Recert Date: 2017      Therapy Diagnosis: 1) cervical radiculopathy; 2) B RTC syndrome  Visit Dx:    ICD-10-CM ICD-9-CM   1. Other cervical disc degeneration, mid-cervical region, unspecified level M50.320 722.4       Patient Active Problem List   Diagnosis   • Astigmatism   • Anxiety state   • History of cerebrovascular accident   • Nuclear senile cataract   • Hypertension   • Unspecified hemorrhoids   • Palpitations   • Pure hypercholesterolemia   • Prostate cancer   • Chronic right shoulder pain   • Rotator cuff syndrome, left   • Chronic left shoulder pain        Past Medical History:   Diagnosis Date   • Abdominal pain    • Abnormal weight loss    • Acid reflux    • Acute gastritis    • Anxiety state    • Arthritis    • Cancer     prostate   • History of cerebrovascular accident    • History of colon polyps    • Hypertension    • Nausea    • Nuclear senile cataract    • Presbyopia    • Tobacco use     Tobacco use and exposure    • Transient cerebral ischemia    • Vitreous detachment of left eye         Past Surgical History:   Procedure Laterality Date   • BACK SURGERY     • COLONOSCOPY  2015    Diverticulosis found in the sigmoid colon. Hemorrhoids found in the anus.   • CRYOTHERAPY  2012    CRYOTHERAPY OF ACNE    • ENDOSCOPY  2015    EGD w/ biopsy -Multiple polyps, one removed.   • HEMORRHOIDECTOMY N/A 3/20/2017    Procedure: Removal of Anal Papilla;  Surgeon: Juan Diego Ken MD;  Location: [facility];  Service:    • INJECTION OF MEDICATION  2010    B12   • INJECTION OF MEDICATION  10/17/2011    Kenalog   • LUMBAR LAMINECTOMY  2010   • OTHER SURGICAL HISTORY  2010    Biopsy, Each  Additional Lesion    • SKIN BIOPSY  08/19/2010             PT Ortho       10/11/17 0800    Subjective Pain    Pre-Treatment Pain Level 0  -AK    Post-Treatment Pain Level 0  -AK    General Head/Neck/Trunk Assessment    ROM Detail Cervical: Flexion=50 degrees, Extension=40 degrees, L Sidebending=20 degrees, R Sidebending-20 degrees, L Rotation=55 degrees, R Rotation=63 degrees.  -AK    MMT (Manual Muscle Testing)    General MMT Assessment Detail L Shoulder: Flexion=4/5, Extensin=5/5, Abduction=3+/5, IR=5/5, ER=4-/5. R Shoulder: Flexion=4-/5, Extension=5/5, Abduction=3+/5, IR=5/5, ER=4/5.  -AK      User Key  (r) = Recorded By, (t) = Taken By, (c) = Cosigned By    Initials Name Provider Type    OLIVIA Moreira, PT Physical Therapist                            PT Assessment/Plan       10/11/17 0925 10/11/17 0912    PT Assessment    Assessment Comments Pt presents with improved shoulder flexion strength bilaterally however his other measures have plateued in improvment; pt will benefit from PT focusing on strengthening of Lattisimus Dorsi, Rhomboides and other scapular depressors and retractors in order to correct posture and allow for greater increased pain free shoulder and cervical ROM.  -AK Tolerates traction well  review of HEP and will be out of town next week will resume upon return   -SP    PT Plan    PT Frequency 2x/week  -AK 2x/week  -SP    Predicted Duration of Therapy Intervention (days/wks) 4 weeks  -AK     PT Plan Comments strengthen Scapular retractors and Depressors , stretch scapular Protractors to put pt's Cervical and Thoracic spine in greater retraction therebye allowing pt increased pain free ROM.  -AK Continue with POC will monitor  -SP      User Key  (r) = Recorded By, (t) = Taken By, (c) = Cosigned By    Initials Name Provider Type    ZACHARY Oneil PTA Physical Therapy Assistant    OLIVIA Moreira, PT Physical Therapist                Modalities       10/11/17 0900 10/11/17 0800        Moist Heat    MH Applied  Yes  -SP     Location  cervical spine  -SP     Rx Minutes  15 mins  -SP     Ice    Ice Applied Yes  -SP      Location csspine  -SP      Rx Minutes --   20 min with IFC  -SP      Ice S/P Rx Yes  -SP      ELECTRICAL STIMULATION    Attended/Unattended  Unattended  -SP     Stimulation Type  IFC  -SP     Location/Electrode Placement/Other  c spine  -SP     Rx Minutes  20 mins  -SP     Traction 24719    Traction Type  Cervical  -SP     Rx Minutes  15  -SP     Duration  Intermittent  -SP     Position  Supine  -SP     Weight  --   19/10  -SP     Hold  60  -SP     Relax  20  -SP       User Key  (r) = Recorded By, (t) = Taken By, (c) = Cosigned By    Initials Name Provider Type    SP Estefani Oneil PTA Physical Therapy Assistant                Exercises       10/11/17 0800          Subjective Comments    Subjective Comments Notes he still has issues with B shlds and neck will be out of town next week then anticipates having to make appt with neurosurgeon consult and possibly Left shld as well.  Neuro for neck and Dr Mendoza for Left shld  -SP      Subjective Pain    Able to rate subjective pain? yes  -SP      Pre-Treatment Pain Level 0  -AK      Post-Treatment Pain Level 0  -AK      Exercise 1    Exercise Name 1 Pro 2 seat 9 Level 3  -SP      Time (Minutes) 1 10 min  -SP      Exercise 2    Exercise Name 2 pulleys flexion and abd before traction and after  -SP      Reps 2 30  -SP        User Key  (r) = Recorded By, (t) = Taken By, (c) = Cosigned By    Initials Name Provider Type    SP Estefani Oneil PTA Physical Therapy Assistant    OLIVIA Moreira, PT Physical Therapist                               PT OP Goals       10/11/17 0915 10/11/17 0800    PT Short Term Goals    STG Date to Achieve  10/09/17  -SP    STG 1  Note a >/= 25% subjective improvement  -SP    STG 1 Progress  Met  -AK    STG 2  Increase B shoulder flexion MMT by >/= 1/2 grade  -SP    STG 2 Progress  Progressing  -AK    STG 3   Increase R cervical lateral flexion by >/= 6 degrees  -SP    STG 3 Progress  Progressing  -AK    Long Term Goals    LTG Date to Achieve  10/30/17  -SP    LTG 1  Independent with HEP  -SP    LTG 1 Progress  --  -SP    LTG 2  Note a >/= 50% subjective improvement  -SP    LTG 2 Progress  --  -SP    LTG 3  NDI score to be </= 6/50  -SP    LTG 3 Progress  --  -SP    LTG 4  R elbow and wrist extensor strength 5/5 each  -SP    LTG 4 Progress  --  -SP    LTG 5  Note a >/= 50% improvement in instances of R UE radiculopathy  -SP    LTG 5 Progress  --  -SP    Time Calculation    PT Goal Re-Cert Due Date 11/01/17  -SP --  -SP      User Key  (r) = Recorded By, (t) = Taken By, (c) = Cosigned By    Initials Name Provider Type    ZACHARY Oneil, PTA Physical Therapy Assistant    OLIVIA Moreira, PT Physical Therapist                    Time Calculation:   Start Time: 0802  Stop Time: 0920  Time Calculation (min): 78 min  PT Non-Billable Time (min): 0 min  Total Timed Code Minutes- PT: 15 minute(s)                  Jasbir Moreira, PT  10/11/2017

## 2017-10-12 ENCOUNTER — OFFICE VISIT (OUTPATIENT)
Dept: ORTHOPEDIC SURGERY | Facility: CLINIC | Age: 72
End: 2017-10-12

## 2017-10-12 VITALS — BODY MASS INDEX: 25.18 KG/M2 | HEIGHT: 69 IN | WEIGHT: 170 LBS

## 2017-10-12 DIAGNOSIS — M75.42 IMPINGEMENT SYNDROME, SHOULDER, LEFT: ICD-10-CM

## 2017-10-12 DIAGNOSIS — G89.29 CHRONIC LEFT SHOULDER PAIN: Primary | ICD-10-CM

## 2017-10-12 DIAGNOSIS — M75.102 ROTATOR CUFF SYNDROME, LEFT: ICD-10-CM

## 2017-10-12 DIAGNOSIS — M25.512 CHRONIC LEFT SHOULDER PAIN: Primary | ICD-10-CM

## 2017-10-12 PROCEDURE — 20610 DRAIN/INJ JOINT/BURSA W/O US: CPT | Performed by: ORTHOPAEDIC SURGERY

## 2017-10-12 RX ADMIN — LIDOCAINE HYDROCHLORIDE 2 ML: 10 INJECTION, SOLUTION INFILTRATION; PERINEURAL at 14:12

## 2017-10-12 RX ADMIN — TRIAMCINOLONE ACETONIDE 40 MG: 40 INJECTION, SUSPENSION INTRA-ARTICULAR; INTRAMUSCULAR at 14:12

## 2017-10-12 NOTE — PROGRESS NOTES
Souleymane Stapleton is a 72 y.o. male returns for     Chief Complaint   Patient presents with   • Left Shoulder - Follow-up       HISTORY OF PRESENT ILLNESS:   patient is requesting evaluation for steroid injection.   He decided to go on exam back and have an injection because he has a trip planned and wanted be in less pain.       CONCURRENT MEDICAL HISTORY:    Past Medical History:   Diagnosis Date   • Abdominal pain    • Abnormal weight loss    • Acid reflux    • Acute gastritis    • Anxiety state    • Arthritis    • Cancer     prostate   • History of cerebrovascular accident    • History of colon polyps    • Hypertension    • Nausea    • Nuclear senile cataract    • Presbyopia    • Tobacco use     Tobacco use and exposure    • Transient cerebral ischemia    • Vitreous detachment of left eye        Allergies   Allergen Reactions   • Statins Myalgia   • Tricor [Fenofibrate] Myalgia         Current Outpatient Prescriptions:   •  ALPRAZolam (XANAX) 0.5 MG tablet, Take 1 tablet by mouth 3 (Three) Times a Day. (Patient taking differently: Take 0.5 mg by mouth 3 (Three) Times a Day As Needed.), Disp: 90 tablet, Rfl: 2  •  clopidogrel (PLAVIX) 75 MG tablet, Take 1 tablet by mouth Daily., Disp: 30 tablet, Rfl: 11  •  diltiaZEM CD (CARTIA XT) 300 MG 24 hr capsule, Take 1 capsule by mouth Daily. (Patient taking differently: Take 300 mg by mouth Every Night.), Disp: 30 capsule, Rfl: 11  •  dutasteride (AVODART) 0.5 MG capsule, Take 0.5 mg by mouth Every Night., Disp: , Rfl:   •  ezetimibe (ZETIA) 10 MG tablet, Take 1 tablet by mouth Daily., Disp: 30 tablet, Rfl: 11  •  finasteride (PROPECIA) 1 MG tablet, Take 1 tablet by mouth Daily., Disp: 30 tablet, Rfl: 11  •  irbesartan-hydrochlorothiazide (AVALIDE) 300-12.5 MG tablet, Take 1 tablet by mouth Daily., Disp: 30 tablet, Rfl: 12  •  Loratadine (CLARITIN PO), Take  by mouth., Disp: , Rfl:   •  meloxicam (MOBIC) 15 MG tablet, Take 1 tablet by mouth Daily., Disp: 30 tablet,  "Rfl: 3  •  metoclopramide (REGLAN) 5 MG tablet, Take 1 tablet by mouth 4 (Four) Times a Day Before Meals & at Bedtime., Disp: 56 tablet, Rfl: 1  •  omeprazole (priLOSEC) 40 MG capsule, Take 1 capsule by mouth Daily., Disp: 30 capsule, Rfl: 11    Past Surgical History:   Procedure Laterality Date   • BACK SURGERY     • COLONOSCOPY  06/09/2015    Diverticulosis found in the sigmoid colon. Hemorrhoids found in the anus.   • CRYOTHERAPY  08/14/2012    CRYOTHERAPY OF ACNE    • ENDOSCOPY  09/01/2015    EGD w/ biopsy -Multiple polyps, one removed.   • HEMORRHOIDECTOMY N/A 3/20/2017    Procedure: Removal of Anal Papilla;  Surgeon: Juan Diego Ken MD;  Location: [facility];  Service:    • INJECTION OF MEDICATION  09/14/2010    B12   • INJECTION OF MEDICATION  10/17/2011    Kenalog   • LUMBAR LAMINECTOMY  09/29/2010   • OTHER SURGICAL HISTORY  08/19/2010    Biopsy, Each Additional Lesion    • SKIN BIOPSY  08/19/2010       ROS  No fevers or chills.  No chest pain or shortness of air.  No GI or  disturbances.    PHYSICAL EXAMINATION:       Ht 69\" (175.3 cm)  Wt 170 lb (77.1 kg)  BMI 25.1 kg/m2    Physical Exam      Ortho Exam  Large Joint Arthrocentesis  Date/Time: 10/12/2017 2:12 PM  Consent given by: patient  Site marked: site marked  Timeout: Immediately prior to procedure a time out was called to verify the correct patient, procedure, equipment, support staff and site/side marked as required   Supporting Documentation  Indications: pain   Procedure Details  Location: shoulder - L subacromial bursa  Preparation: Patient was prepped and draped in the usual sterile fashion  Needle size: 22 G  Approach: posterior  Medications administered: 40 mg triamcinolone acetonide 40 MG/ML; 2 mL lidocaine 1 %  Patient tolerance: patient tolerated the procedure well with no immediate complications            Mri Shoulder Left Wo Contrast    Result Date: 9/26/2017  Narrative: MRI left shoulder without contrast. HISTORY: Chronic " left shoulder pain. Prior exam: Left shoulder September 12, 2017. (Unavailable on PACS). TECHNIQUE: Multiplanar multisequence noncontrast images left shoulder. FINDINGS: There is acromioclavicular joint arthrosis. There is capsular hypertrophy projecting both superiorly and inferiorly. Inferior acromial spur. These findings cause moderate underlying impingement upon the bursal surface of the supraspinatous tendon. There is increased signal intensity within the AC joint suggesting an active inflammatory, arthritic process. Full-thickness tear distal and anterior aspect of the supraspinatous tendon. The size of the gap in lateral medial projection 1.1 cm. The size of the gap in AP projection 1.28 cm. Series 7 image seven, and series 4 image five. Signal heterogeneity posterior aspect of the supraspinatous tendon and infraspinatus tendon suggesting tendinosis. No muscular tendinous retraction. No supraspinatus or infraspinatus atrophy. Isolated fluid collection inferior to the coracoid process, subcoracoid bursitis/ganglion, most apparent on series 7 image 13. (2.32 cm craniocaudal diameter, 2.83 cm lateral medial diameter, 1.5 cm AP diameter.) No appreciable glenohumeral joint effusion. Normal-appearing glenoid mika.     Impression: CONCLUSION: Acromioclavicular joint arthrosis causing moderate underlying impingement. Full-thickness rotator cuff tear distal and anterior aspect of the supraspinatous tendon. No evidence of any musculotendinous retraction. No supraspinatus or infraspinatus muscle atrophy. Isolated fluid collection inferior to the coracoid process compatible with subcoracoid bursitis/ganglion. Electronically signed by:  Ricky Christopher MD  9/26/2017 8:04 PM CDT Workstation: [id number]            ASSESSMENT:    Diagnoses and all orders for this visit:    Chronic left shoulder pain  -     Large Joint Arthrocentesis    Rotator cuff syndrome, left  -     Large Joint Arthrocentesis    Impingement syndrome,  shoulder, left  -     Large Joint Arthrocentesis          PLAN    Left shoulder was injected.  He will continue activity as tolerated.  Follow-up on an as-needed basis.  We again discussed the probability of arthroscopy of the left shoulder.    Return if symptoms worsen or fail to improve, for recheck.    Juan Diego Mendoza MD

## 2017-10-15 RX ORDER — LIDOCAINE HYDROCHLORIDE 10 MG/ML
2 INJECTION, SOLUTION INFILTRATION; PERINEURAL
Status: COMPLETED | OUTPATIENT
Start: 2017-10-12 | End: 2017-10-12

## 2017-10-15 RX ORDER — TRIAMCINOLONE ACETONIDE 40 MG/ML
40 INJECTION, SUSPENSION INTRA-ARTICULAR; INTRAMUSCULAR
Status: COMPLETED | OUTPATIENT
Start: 2017-10-12 | End: 2017-10-12

## 2017-10-23 ENCOUNTER — APPOINTMENT (OUTPATIENT)
Dept: PHYSICAL THERAPY | Facility: HOSPITAL | Age: 72
End: 2017-10-23
Attending: ORTHOPAEDIC SURGERY

## 2017-10-25 ENCOUNTER — HOSPITAL ENCOUNTER (OUTPATIENT)
Dept: PHYSICAL THERAPY | Facility: HOSPITAL | Age: 72
Setting detail: THERAPIES SERIES
Discharge: HOME | End: 2017-10-25
Attending: ORTHOPAEDIC SURGERY

## 2017-10-25 DIAGNOSIS — M50.320 OTHER CERVICAL DISC DEGENERATION, MID-CERVICAL REGION, UNSPECIFIED LEVEL: Primary | ICD-10-CM

## 2017-10-25 PROCEDURE — 97110 THERAPEUTIC EXERCISES: CPT

## 2017-10-25 PROCEDURE — 97012 MECHANICAL TRACTION THERAPY: CPT

## 2017-10-25 NOTE — THERAPY TREATMENT NOTE
Outpatient Physical Therapy Ortho Treatment Note  [facility]     Patient Name: Souleymane Stapleton  : 1945  MRN: [id number]  Today's Date: 10/25/2017      Visit Date: 10/25/2017  Pt has attended 7/7 visits  MD PRN   Recert 17  Visit Dx:    ICD-10-CM ICD-9-CM   1. Other cervical disc degeneration, mid-cervical region, unspecified level M50.320 722.4       Patient Active Problem List   Diagnosis   • Astigmatism   • Anxiety state   • History of cerebrovascular accident   • Nuclear senile cataract   • Hypertension   • Unspecified hemorrhoids   • Palpitations   • Pure hypercholesterolemia   • Prostate cancer   • Chronic right shoulder pain   • Rotator cuff syndrome, left   • Chronic left shoulder pain   • Impingement syndrome, shoulder, left        Past Medical History:   Diagnosis Date   • Abdominal pain    • Abnormal weight loss    • Acid reflux    • Acute gastritis    • Anxiety state    • Arthritis    • Cancer     prostate   • History of cerebrovascular accident    • History of colon polyps    • Hypertension    • Nausea    • Nuclear senile cataract    • Presbyopia    • Tobacco use     Tobacco use and exposure    • Transient cerebral ischemia    • Vitreous detachment of left eye         Past Surgical History:   Procedure Laterality Date   • BACK SURGERY     • COLONOSCOPY  2015    Diverticulosis found in the sigmoid colon. Hemorrhoids found in the anus.   • CRYOTHERAPY  2012    CRYOTHERAPY OF ACNE    • ENDOSCOPY  2015    EGD w/ biopsy -Multiple polyps, one removed.   • HEMORRHOIDECTOMY N/A 3/20/2017    Procedure: Removal of Anal Papilla;  Surgeon: Juan Diego Ken MD;  Location: [facility];  Service:    • INJECTION OF MEDICATION  2010    B12   • INJECTION OF MEDICATION  10/17/2011    Kenalog   • LUMBAR LAMINECTOMY  2010   • OTHER SURGICAL HISTORY  2010    Biopsy, Each Additional Lesion    • SKIN BIOPSY  2010                             PT  Assessment/Plan       10/25/17 1443       PT Assessment    Assessment Comments Tolerates Rx well  No difficulty or c/o pain with strength work  -SP     PT Plan    PT Frequency 2x/week  -SP     PT Plan Comments continue with UE strength work and traction  -SP       User Key  (r) = Recorded By, (t) = Taken By, (c) = Cosigned By    Initials Name Provider Type    SP Estefani Oneil PTA Physical Therapy Assistant                Modalities       10/25/17 1400          Traction 50299    Traction Type Cervical  -SP      Rx Minutes 15  -SP      Duration Intermittent  -SP      Position Supine  -SP      Weight --   19/10  -SP      Hold --   60  -SP      Relax 20  -SP        User Key  (r) = Recorded By, (t) = Taken By, (c) = Cosigned By    Initials Name Provider Type    SP Estefani Oneil PTA Physical Therapy Assistant                Exercises       10/25/17 1400          Subjective Comments    Subjective Comments Notes he has been in [place] and had very little pain and seems much better  -SP      Subjective Pain    Able to rate subjective pain? yes  -SP      Pre-Treatment Pain Level 0  -SP      Subjective Pain Comment Left shld injected much better R painful only with weight lateral and up  -SP      Exercise 1    Exercise Name 1 Pro 2 seat 9 Level 4  -SP      Time (Minutes) 1 5 min  -SP      Exercise 2    Exercise Name 2 pulleys flexion  -SP      Reps 2 30  -SP      Exercise 3    Exercise Name 3 prone over tball lateral raises with 2 pounds  -SP      Sets 3 2  -SP      Reps 3 10  -SP      Exercise 4    Exercise Name 4 seated press downs  -SP      Sets 4 2  -SP      Reps 4 10  -SP      Exercise 5    Exercise Name 5 Prone over tball rows with 2 pounds  -SP      Sets 5 2  -SP      Reps 5 10  -SP      Exercise 6    Exercise Name 6 tball on wall lateral/fron raises  -SP      Sets 6 2  -SP      Reps 6 10  -SP      Exercise 7    Exercise Name 7 Prone er  -SP      Sets 7 2  -SP      Reps 7 10  -SP      Exercise 8    Exercise Name 8  serratus punches with two pounds  -SP      Sets 8 2  -SP      Reps 8 10  -SP        User Key  (r) = Recorded By, (t) = Taken By, (c) = Cosigned By    Initials Name Provider Type    SP Estefani Oneil PTA Physical Therapy Assistant                               PT OP Goals       10/25/17 1400       PT Short Term Goals    STG Date to Achieve 10/09/17  -SP     STG 1 Note a >/= 25% subjective improvement  -SP     STG 1 Progress Met  -SP     STG 2 Increase B shoulder flexion MMT by >/= 1/2 grade  -SP     STG 2 Progress Progressing  -SP     STG 3 Increase R cervical lateral flexion by >/= 6 degrees  -SP     STG 3 Progress Progressing  -SP     Long Term Goals    LTG Date to Achieve 10/30/17  -SP     LTG 1 Independent with HEP  -SP     LTG 1 Progress Ongoing  -SP     LTG 2 Note a >/= 50% subjective improvement  -SP     LTG 2 Progress Met  -SP     LTG 3 NDI score to be </= 6/50  -SP     LTG 3 Progress Not Met  -SP     LTG 4 R elbow and wrist extensor strength 5/5 each  -SP     LTG 4 Progress Ongoing  -SP     LTG 5 Note a >/= 50% improvement in instances of R UE radiculopathy  -SP     LTG 5 Progress Met  -SP     Time Calculation    PT Goal Re-Cert Due Date 11/01/17  -SP       User Key  (r) = Recorded By, (t) = Taken By, (c) = Cosigned By    Initials Name Provider Type    ZACHARY Oneil PTA Physical Therapy Assistant                    Time Calculation:        Therapy Charges for Today     Code Description Service Date Service Provider Modifiers Qty    18014710561 HC PT TRACTION CERVICAL 10/25/2017 Estefani Oneil PTA GP 1    33549999031 HC PT THER PROC EA 15 MIN 10/25/2017 Estefani Oneil PTA GP 2                    Estefani Oneil PTA  10/25/2017

## 2017-10-25 NOTE — THERAPY TREATMENT NOTE
Outpatient Physical Therapy Ortho Treatment Note  [facility]     Patient Name: Souleymane Stapleton  : 1945  MRN: [id number]  Today's Date: 10/25/2017      Visit Date: 10/25/2017  Pt. Has attended 8/8 visits  Subjective improvement 50 percent  MD PRN  REcert 17  Visit Dx:    ICD-10-CM ICD-9-CM   1. Other cervical disc degeneration, mid-cervical region, unspecified level M50.320 722.4       Patient Active Problem List   Diagnosis   • Astigmatism   • Anxiety state   • History of cerebrovascular accident   • Nuclear senile cataract   • Hypertension   • Unspecified hemorrhoids   • Palpitations   • Pure hypercholesterolemia   • Prostate cancer   • Chronic right shoulder pain   • Rotator cuff syndrome, left   • Chronic left shoulder pain   • Impingement syndrome, shoulder, left        Past Medical History:   Diagnosis Date   • Abdominal pain    • Abnormal weight loss    • Acid reflux    • Acute gastritis    • Anxiety state    • Arthritis    • Cancer     prostate   • History of cerebrovascular accident    • History of colon polyps    • Hypertension    • Nausea    • Nuclear senile cataract    • Presbyopia    • Tobacco use     Tobacco use and exposure    • Transient cerebral ischemia    • Vitreous detachment of left eye         Past Surgical History:   Procedure Laterality Date   • BACK SURGERY     • COLONOSCOPY  2015    Diverticulosis found in the sigmoid colon. Hemorrhoids found in the anus.   • CRYOTHERAPY  2012    CRYOTHERAPY OF ACNE    • ENDOSCOPY  2015    EGD w/ biopsy -Multiple polyps, one removed.   • HEMORRHOIDECTOMY N/A 3/20/2017    Procedure: Removal of Anal Papilla;  Surgeon: Juan Diego Ken MD;  Location: [facility];  Service:    • INJECTION OF MEDICATION  2010    B12   • INJECTION OF MEDICATION  10/17/2011    Kenalog   • LUMBAR LAMINECTOMY  2010   • OTHER SURGICAL HISTORY  2010    Biopsy, Each Additional Lesion    • SKIN BIOPSY  2010                              PT Assessment/Plan       10/25/17 1443       PT Assessment    Assessment Comments Tolerates Rx well  No difficulty or c/o pain with strength work  -SP     PT Plan    PT Frequency 2x/week  -SP     PT Plan Comments continue with UE strength work and traction  -SP       User Key  (r) = Recorded By, (t) = Taken By, (c) = Cosigned By    Initials Name Provider Type    SP Estefani Oneil PTA Physical Therapy Assistant                Modalities       10/25/17 1400          Traction 17932    Traction Type Cervical  -SP      Rx Minutes 15  -SP      Duration Intermittent  -SP      Position Supine  -SP      Weight --   19/10  -SP      Hold --   60  -SP      Relax 20  -SP        User Key  (r) = Recorded By, (t) = Taken By, (c) = Cosigned By    Initials Name Provider Type    SP Estefani Oneil PTA Physical Therapy Assistant                Exercises       10/25/17 1400          Subjective Comments    Subjective Comments Notes he has been in [place] and had very little pain and seems much better  -SP      Subjective Pain    Able to rate subjective pain? yes  -SP      Pre-Treatment Pain Level 0  -SP      Subjective Pain Comment Left shld injected much better R painful only with weight lateral and up  -SP      Exercise 1    Exercise Name 1 Pro 2 seat 9 Level 4  -SP      Time (Minutes) 1 5 min  -SP      Exercise 2    Exercise Name 2 pulleys flexion  -SP      Reps 2 30  -SP      Exercise 3    Exercise Name 3 prone over tball lateral raises with 2 pounds  -SP      Sets 3 2  -SP      Reps 3 10  -SP      Exercise 4    Exercise Name 4 seated press downs  -SP      Sets 4 2  -SP      Reps 4 10  -SP      Exercise 5    Exercise Name 5 Prone over tball rows with 2 pounds  -SP      Sets 5 2  -SP      Reps 5 10  -SP      Exercise 6    Exercise Name 6 tball on wall lateral/fron raises  -SP      Sets 6 2  -SP      Reps 6 10  -SP      Exercise 7    Exercise Name 7 Prone er  -SP      Sets 7 2  -SP      Reps 7 10  -SP       Exercise 8    Exercise Name 8 serratus punches with two pounds  -SP      Sets 8 2  -SP      Reps 8 10  -SP        User Key  (r) = Recorded By, (t) = Taken By, (c) = Cosigned By    Initials Name Provider Type    SP Estefani Oneil PTA Physical Therapy Assistant                               PT OP Goals       10/25/17 1400       PT Short Term Goals    STG Date to Achieve 10/09/17  -SP     STG 1 Note a >/= 25% subjective improvement  -SP     STG 1 Progress Met  -SP     STG 2 Increase B shoulder flexion MMT by >/= 1/2 grade  -SP     STG 2 Progress Progressing  -SP     STG 3 Increase R cervical lateral flexion by >/= 6 degrees  -SP     STG 3 Progress Progressing  -SP     Long Term Goals    LTG Date to Achieve 10/30/17  -SP     LTG 1 Independent with HEP  -SP     LTG 1 Progress Ongoing  -SP     LTG 2 Note a >/= 50% subjective improvement  -SP     LTG 2 Progress Met  -SP     LTG 3 NDI score to be </= 6/50  -SP     LTG 3 Progress Not Met  -SP     LTG 4 R elbow and wrist extensor strength 5/5 each  -SP     LTG 4 Progress Ongoing  -SP     LTG 5 Note a >/= 50% improvement in instances of R UE radiculopathy  -SP     LTG 5 Progress Met  -SP     Time Calculation    PT Goal Re-Cert Due Date 11/01/17  -SP       User Key  (r) = Recorded By, (t) = Taken By, (c) = Cosigned By    Initials Name Provider Type    ZACHARY Oneil PTA Physical Therapy Assistant                    Time Calculation:        Therapy Charges for Today     Code Description Service Date Service Provider Modifiers Qty    26361504597 HC PT TRACTION CERVICAL 10/25/2017 Estefani Oneil, PTA GP 1    20652510552 HC PT THER PROC EA 15 MIN 10/25/2017 Estefani Oneil PTA GP 2                    Estefani Oneil PTA  10/25/2017

## 2017-10-30 RX ORDER — ALPRAZOLAM 0.5 MG/1
TABLET ORAL
Qty: 90 TABLET | Refills: 2 | OUTPATIENT
Start: 2017-10-30

## 2017-10-31 RX ORDER — ALPRAZOLAM 0.5 MG/1
0.5 TABLET ORAL 3 TIMES DAILY
Qty: 90 TABLET | Refills: 2 | Status: SHIPPED | OUTPATIENT
Start: 2017-10-31 | End: 2017-10-31 | Stop reason: SDUPTHER

## 2017-10-31 RX ORDER — ALPRAZOLAM 0.5 MG/1
TABLET ORAL
Qty: 90 TABLET | Refills: 2 | Status: CANCELLED | OUTPATIENT
Start: 2017-10-31

## 2017-10-31 RX ORDER — ALPRAZOLAM 0.5 MG/1
0.5 TABLET ORAL 3 TIMES DAILY
Qty: 90 TABLET | Refills: 2 | Status: SHIPPED | OUTPATIENT
Start: 2017-10-31 | End: 2018-01-03 | Stop reason: SDUPTHER

## 2017-11-02 ENCOUNTER — HOSPITAL ENCOUNTER (OUTPATIENT)
Dept: PHYSICAL THERAPY | Facility: HOSPITAL | Age: 72
Setting detail: THERAPIES SERIES
Discharge: HOME | End: 2017-11-02
Attending: ORTHOPAEDIC SURGERY

## 2017-11-02 DIAGNOSIS — M50.320 OTHER CERVICAL DISC DEGENERATION, MID-CERVICAL REGION, UNSPECIFIED LEVEL: Primary | ICD-10-CM

## 2017-11-02 PROCEDURE — 97110 THERAPEUTIC EXERCISES: CPT

## 2017-11-02 PROCEDURE — 97012 MECHANICAL TRACTION THERAPY: CPT

## 2017-11-02 NOTE — THERAPY TREATMENT NOTE
Outpatient Physical Therapy Ortho Treatment Note  [facility]     Patient Name: Souleymane Stapleton  : 1945  MRN: [id number]  Today's Date: 2017      Visit Date: 2017  Subjective Improvement: 70%  Visit Number:     Recert Date:   17  MD Visit:   PRN  Total Approved Visits:  Medicare    Therapy Diagnosis: 1) cervical radiculopathy; 2)B RTC syndrome  Visit Dx:    ICD-10-CM ICD-9-CM   1. Other cervical disc degeneration, mid-cervical region, unspecified level M50.320 722.4       Patient Active Problem List   Diagnosis   • Astigmatism   • Anxiety state   • History of cerebrovascular accident   • Nuclear senile cataract   • Hypertension   • Unspecified hemorrhoids   • Palpitations   • Pure hypercholesterolemia   • Prostate cancer   • Chronic right shoulder pain   • Rotator cuff syndrome, left   • Chronic left shoulder pain   • Impingement syndrome, shoulder, left        Past Medical History:   Diagnosis Date   • Abdominal pain    • Abnormal weight loss    • Acid reflux    • Acute gastritis    • Anxiety state    • Arthritis    • Cancer     prostate   • History of cerebrovascular accident    • History of colon polyps    • Hypertension    • Nausea    • Nuclear senile cataract    • Presbyopia    • Tobacco use     Tobacco use and exposure    • Transient cerebral ischemia    • Vitreous detachment of left eye         Past Surgical History:   Procedure Laterality Date   • BACK SURGERY     • COLONOSCOPY  2015    Diverticulosis found in the sigmoid colon. Hemorrhoids found in the anus.   • CRYOTHERAPY  2012    CRYOTHERAPY OF ACNE    • ENDOSCOPY  2015    EGD w/ biopsy -Multiple polyps, one removed.   • HEMORRHOIDECTOMY N/A 3/20/2017    Procedure: Removal of Anal Papilla;  Surgeon: Juan Diego Ken MD;  Location: [facility];  Service:    • INJECTION OF MEDICATION  2010    B12   • INJECTION OF MEDICATION  10/17/2011    Kenalog   • LUMBAR LAMINECTOMY  2010   •  OTHER SURGICAL HISTORY  08/19/2010    Biopsy, Each Additional Lesion    • SKIN BIOPSY  08/19/2010                             PT Assessment/Plan       11/02/17 1600       PT Assessment    Assessment Comments Good tolerance to all performed this date.  No symptoms reported.  Improving postural awareness.   -BB     PT Plan    PT Frequency 2x/week  -BB     Predicted Duration of Therapy Intervention (days/wks) x 4 weeks  -BB     PT Plan Comments Ok per rechecking PT to continue traction as needed. Recheck  with SKS, PT next week.   -BB       User Key  (r) = Recorded By, (t) = Taken By, (c) = Cosigned By    Initials Name Provider Type    VALERIA Munguia PTA Physical Therapy Assistant                Modalities       11/02/17 1600          Traction 27042    Traction Type Cervical  -BB      Rx Minutes 15  -BB      Duration Intermittent  -BB      Position Supine  -BB      Weight --   19/10  -BB      Hold --   60  -BB      Relax --   20  -BB      Progression 2  -BB      Regression 2  -BB        User Key  (r) = Recorded By, (t) = Taken By, (c) = Cosigned By    Initials Name Provider Type    VALERIA Munguia PTA Physical Therapy Assistant                Exercises       11/02/17 1600          Subjective Comments    Subjective Comments Pt feels that sometimes traction is beneficial and sometimes it isn't.    -BB      Subjective Pain    Able to rate subjective pain? yes  -BB      Pre-Treatment Pain Level 0  -BB      Post-Treatment Pain Level 0  -BB      Exercise 1    Exercise Name 1 Pro II level 4 UE and LE seat 9  -BB      Time (Minutes) 1 10  -BB      Exercise 2    Exercise Name 2 doorway stretch low  -BB      Reps 2 3  -BB      Time (Seconds) 2 30  -BB      Exercise 3    Exercise Name 3 CC high and mid row  -BB      Sets 3 2  -BB      Reps 3 10  -BB      Additional Comments 5 pl/6 pl  -BB      Exercise 4    Exercise Name 4 wand bicep stretch  -BB      Reps 4 3  -BB      Time (Seconds) 4 30  -BB      Exercise 5     Exercise Name 5 Iso low row at tall mat  -BB      Sets 5 2  -BB      Reps 5 10  -BB      Time (Seconds) 5 5  -BB      Exercise 6    Exercise Name 6 seated press down  -BB      Sets 6 2  -BB      Reps 6 10  -BB        User Key  (r) = Recorded By, (t) = Taken By, (c) = Cosigned By    Initials Name Provider Type    VALERIA Munguia PTA Physical Therapy Assistant                               PT OP Goals       11/02/17 1600       PT Short Term Goals    STG Date to Achieve 10/09/17  -BB     STG 1 Note a >/= 25% subjective improvement  -BB     STG 1 Progress Met  -BB     STG 2 Increase B shoulder flexion MMT by >/= 1/2 grade  -BB     STG 2 Progress Progressing  -BB     STG 3 Increase R cervical lateral flexion by >/= 6 degrees  -BB     STG 3 Progress Progressing  -BB     Long Term Goals    LTG Date to Achieve 10/30/17  -BB     LTG 1 Independent with HEP  -BB     LTG 1 Progress Ongoing  -BB     LTG 2 Note a >/= 50% subjective improvement  -BB     LTG 2 Progress Met  -BB     LTG 3 NDI score to be </= 6/50  -BB     LTG 3 Progress Not Met  -BB     LTG 4 R elbow and wrist extensor strength 5/5 each  -BB     LTG 4 Progress Ongoing  -BB     LTG 5 Note a >/= 50% improvement in instances of R UE radiculopathy  -BB     LTG 5 Progress Met  -BB     Time Calculation    PT Goal Re-Cert Due Date 11/01/17  -BB       User Key  (r) = Recorded By, (t) = Taken By, (c) = Cosigned By    Initials Name Provider Type    VALERIA Munguia PTA Physical Therapy Assistant                    Time Calculation:   Start Time: 1605  Stop Time: 1655  Time Calculation (min): 50 min  Total Timed Code Minutes- PT: 50 minute(s)    Therapy Charges for Today     Code Description Service Date Service Provider Modifiers Qty    83482056940 HC PT TRACTION CERVICAL 11/2/2017 Megan Munguia, PTA GP 1    17944427932 HC PT THER PROC EA 15 MIN 11/2/2017 Megan Munguia PTA GP 2                    Megan Munguia PTA  11/2/2017

## 2017-11-03 ENCOUNTER — APPOINTMENT (OUTPATIENT)
Dept: PHYSICAL THERAPY | Facility: HOSPITAL | Age: 72
End: 2017-11-03
Attending: ORTHOPAEDIC SURGERY

## 2017-11-06 ENCOUNTER — HOSPITAL ENCOUNTER (OUTPATIENT)
Dept: PHYSICAL THERAPY | Facility: HOSPITAL | Age: 72
Setting detail: THERAPIES SERIES
Discharge: HOME | End: 2017-11-06
Attending: ORTHOPAEDIC SURGERY

## 2017-11-06 DIAGNOSIS — M50.320 OTHER CERVICAL DISC DEGENERATION, MID-CERVICAL REGION, UNSPECIFIED LEVEL: Primary | ICD-10-CM

## 2017-11-06 PROCEDURE — 97012 MECHANICAL TRACTION THERAPY: CPT

## 2017-11-06 PROCEDURE — 97535 SELF CARE MNGMENT TRAINING: CPT | Performed by: PHYSICAL THERAPIST

## 2017-11-06 PROCEDURE — G8986 CARRY D/C STATUS: HCPCS | Performed by: PHYSICAL THERAPIST

## 2017-11-06 PROCEDURE — G8985 CARRY GOAL STATUS: HCPCS | Performed by: PHYSICAL THERAPIST

## 2017-11-07 NOTE — THERAPY DISCHARGE NOTE
Outpatient Physical Therapy Ortho Progress Note/Discharge Summary  [facility]     Patient Name: Souleymane Stapleton  : 1945  MRN: [id number]  Today's Date: 2017      Visit Date: 2017  Attendance:  (Medicare)  Subjective Improvement: 85%  Next MD Appt: PRN     Therapy Diagnosis: 1) cervical radiculopathy; 2) B RTC syndrome     Visit Dx:    ICD-10-CM ICD-9-CM   1. Other cervical disc degeneration, mid-cervical region, unspecified level M50.320 722.4       Patient Active Problem List   Diagnosis   • Astigmatism   • Anxiety state   • History of cerebrovascular accident   • Nuclear senile cataract   • Hypertension   • Unspecified hemorrhoids   • Palpitations   • Pure hypercholesterolemia   • Prostate cancer   • Chronic right shoulder pain   • Rotator cuff syndrome, left   • Chronic left shoulder pain   • Impingement syndrome, shoulder, left        Past Medical History:   Diagnosis Date   • Abdominal pain    • Abnormal weight loss    • Acid reflux    • Acute gastritis    • Anxiety state    • Arthritis    • Cancer     prostate   • History of cerebrovascular accident    • History of colon polyps    • Hypertension    • Nausea    • Nuclear senile cataract    • Presbyopia    • Tobacco use     Tobacco use and exposure    • Transient cerebral ischemia    • Vitreous detachment of left eye         Past Surgical History:   Procedure Laterality Date   • BACK SURGERY     • COLONOSCOPY  2015    Diverticulosis found in the sigmoid colon. Hemorrhoids found in the anus.   • CRYOTHERAPY  2012    CRYOTHERAPY OF ACNE    • ENDOSCOPY  2015    EGD w/ biopsy -Multiple polyps, one removed.   • HEMORRHOIDECTOMY N/A 3/20/2017    Procedure: Removal of Anal Papilla;  Surgeon: Juan Diego Ken MD;  Location: [facility];  Service:    • INJECTION OF MEDICATION  2010    B12   • INJECTION OF MEDICATION  10/17/2011    Kenalog   • LUMBAR LAMINECTOMY  2010   • OTHER SURGICAL HISTORY   "08/19/2010    Biopsy, Each Additional Lesion    • SKIN BIOPSY  08/19/2010   Changes in Medications: none noted  Changes in MD Orders: none noted  Number of Work Days Lost: n/a            PT Ortho       11/06/17 1332    Subjective Comments    Subjective Comments \"My neck bothers me 10% of the time.\" Overall, his posture is better which is helping his neck feel better. Shoulders aches occasionally, but mostly at night when he is lying down. Occasional numbness in the hands which is relieved by either moving his neck around that leads to a crack/pop or changing his shoulder position. The numbness in the hand(s) is short-lived. He continues to work on his HEP. He thinks that he will be able to self-manage. No medication changes. \"At least\" 85% subjective improvement.   -SS    Left Elbow/Forearm    Elbow Flexion Gross Movement (5/5) normal  -SS    Elbow Extension Gross Movement (5/5) normal  -SS    Forearm Supination Gross Movement (5/5) normal  -SS    Forearm Pronation Gross Movement (5/5) normal  -SS    Right Elbow/Forearm    Elbow Flexion Gross Movement (5/5) normal  -SS    Elbow Extension Gross Movement (5/5) normal  -SS    Forearm Supination Gross Movement (5/5) normal  -SS    Forearm Pronation Gross Movement (5/5) normal  -SS    Left Wrist    Wrist Flexion Gross Movement (5/5) normal  -SS    Wrist Extension Gross Movement (5/5) normal  -SS    Right Wrist    Wrist Flexion Gross Movement (5/5) normal  -SS    Wrist Extension Gross Movement (5/5) normal  -SS      11/06/17 1300    Precautions and Contraindications    Precautions/Limitations no known precautions/limitations  -SS    Precautions complete tear R SS and large portion of IS; cervical neural foraminal encroachment  -SS    Contraindications none  -SS    Subjective Pain    Post-Treatment Pain Level 0  -SS    Posture/Observations    Alignment Options Forward head  -SS    Forward Head Mild  -SS    Posture/Observations Comments R shoulder depressed and tipped.  -SS "    Cervical Palpation    Occiput --   non-tender  -SS    Suboccipital --   non-tender  -SS    AC Joint --   non-tender  -SS    Cervical Facets --   non-tender  -SS    Scalenes --   non-tender  -SS    Spinous Process --   non-tender  -SS    Levator Scapula --   non-tender  -SS    Upper Traps --   non-tender  -SS    Neck    Flexion AROM Deficit WFLs  -SS    Extension AROM Deficit WFLs  -SS    Lt Lat Flexion AROM Deficit WFLs  -SS    Rt Lateral Flexion AROM Deficit WFLs  -SS    Lt Rotation AROM Deficit WFLs  -SS    Rt Rotation AROM Deficit WFLs  -SS      User Key  (r) = Recorded By, (t) = Taken By, (c) = Cosigned By    Initials Name Provider Type     Raudel Bell, PT DPT Physical Therapist                            PT Assessment/Plan       11/06/17 1300       PT Assessment    Functional Limitations Performance in leisure activities;Limitation in home management  -     Impairments Pain;Other (comment)   cervical radiculopathy  -     Assessment Comments Significant improvement in symptoms. Patient is independent with HEP. We discussed his HEP, and I provided an alternative way to perform one of his exercises.   -     Rehab Potential Good   barrier: severe arthritic changes c-sp, B RTC syndrome  -     Patient/caregiver participated in establishment of treatment plan and goals Yes  -SS     Patient would benefit from skilled therapy intervention No  -SS     PT Plan    PT Frequency Other (comment)   D/C P.T.  -     PT Plan Comments D/C P.T. as of this date  -       User Key  (r) = Recorded By, (t) = Taken By, (c) = Cosigned By    Initials Name Provider Type     Raudel Bell, PT DPT Physical Therapist                Modalities       11/06/17 1300          Traction 79317    Traction Type Cervical  -SP      Rx Minutes --   17 min  -SP      Duration Intermittent  -SP      Position Supine  -SP      Weight --   19/10  -SP      Hold --   60  -SP      Relax 20  -SP      Progression 2  -SP       "Regression 2  -SP        User Key  (r) = Recorded By, (t) = Taken By, (c) = Cosigned By    Initials Name Provider Type    ZACHARY Oneil PTA Physical Therapy Assistant                Exercises       11/06/17 1332 11/06/17 1300       Subjective Comments    Subjective Comments \"My neck bothers me 10% of the time.\" Overall, his posture is better which is helping his neck feel better. Shoulders aches occasionally, but mostly at night when he is lying down. Occasional numbness in the hands which is relieved by either moving his neck around that leads to a crack/pop or changing his shoulder position. The numbness in the hand(s) is short-lived. He continues to work on his HEP. He thinks that he will be able to self-manage. No medication changes. \"At least\" 85% subjective improvement.   -SS Notes his shld is better does stretches   neck still problematic  -SP     Subjective Pain    Able to rate subjective pain?  yes  -SP     Pre-Treatment Pain Level  0  -SP     Post-Treatment Pain Level  0  -SS       User Key  (r) = Recorded By, (t) = Taken By, (c) = Cosigned By    Initials Name Provider Type     Raudel Bell, PT DPT Physical Therapist    SP Estefani Oneil PTA Physical Therapy Assistant                               PT OP Goals       11/06/17 1332    Long Term Goals    LTG Date to Achieve 10/30/17  -SS    LTG 1 Independent with HEP  -SS    LTG 1 Progress Met  -SS    LTG 2 Note a >/= 50% subjective improvement  -SS    LTG 2 Progress Met  -SS    LTG 3 NDI score to be </= 6/50  -SS    LTG 3 Progress Met  -SS    LTG 4 R elbow and wrist extensor strength 5/5 each  -SS    LTG 4 Progress Met  -SS    LTG 5 Note a >/= 50% improvement in instances of R UE radiculopathy  -SS    LTG 5 Progress Met  -SS    Time Calculation    PT Goal Re-Cert Due Date       User Key  (r) = Recorded By, (t) = Taken By, (c) = Cosigned By    Initials Name Provider Type    SS Raudel Bell, PT DPT Physical Therapist              "   Therapy Education       11/06/17 2200          Therapy Education    Given HEP  -SS      Program Reinforced  -SS      How Provided Verbal  -SS      Provided to Patient  -SS      Level of Understanding Verbalized  -SS        User Key  (r) = Recorded By, (t) = Taken By, (c) = Cosigned By    Initials Name Provider Type    LADONNA Bell PT DPT Physical Therapist                Outcome Measures       11/06/17 1300          Neck Disability Index    Section 1 - Pain Intensity 0  -SS      Section 2 - Personal Care 0  -SS      Section 3 - Lifting 0  -SS      Section 4 - Work 0  -SS      Section 5 - Headaches 0  -SS      Section 6 - Concentration 1  -SS      Section 7 - Sleeping 0  -SS      Section 8 - Driving 0  -SS      Section 9 - Reading 2  -SS      Section 10 - Recreation 1  -SS      Neck Disability Index Score 4  -SS      Functional Assessment    Outcome Measure Options Neck Disability Index (NDI)  -SS        User Key  (r) = Recorded By, (t) = Taken By, (c) = Cosigned By    Initials Name Provider Type     Raudel Bell, PURNIMA DPT Physical Therapist            Time Calculation:   Start Time: 1300  Stop Time: 1345  Time Calculation (min): 45 min  Total Timed Code Minutes- PT: 45 minute(s)    Therapy Charges for Today     Code Description Service Date Service Provider Modifiers Qty    93273902773 HC PT SELF CARE/MGMT/TRAIN EA 15 MIN 11/6/2017 Raudel Bell, PT DPT GP 1    14367448031 HC PT THER SUPP EA 15 MIN 11/6/2017 Raudel Bell PT DPT GP 1    52454790415 HC PT CARRY MOV HAND OBJ PROJECTED 11/6/2017 Raudel Bell, PT DPT GP, CI 1    18827884505 HC PT CARRY MOV HAND OBJ DISCHARGE 11/6/2017 Raudel Bell, PT DPT GP, CI 1          PT G-Codes  Outcome Measure Options: Neck Disability Index (NDI)  Score: 4/50  Functional Limitation: Carrying, moving and handling objects  Carrying, Moving and Handling Objects Goal Status (): At least 1 percent but less than 20 percent  impaired, limited or restricted  Carrying, Moving and Handling Objects Discharge Status (): At least 1 percent but less than 20 percent impaired, limited or restricted     OP PT Discharge Summary  Date of Discharge: 11/06/17  Reason for Discharge: All goals achieved  Outcomes Achieved: Able to achieve all goals within established timeline  Discharge Destination: Home with home program  Discharge Instructions: Patient to contact P.T. clinic if questions or problems arise.      Raudel Bell, PT, DPT, CHT  11/6/2017

## 2017-11-08 ENCOUNTER — APPOINTMENT (OUTPATIENT)
Dept: PHYSICAL THERAPY | Facility: HOSPITAL | Age: 72
End: 2017-11-08
Attending: ORTHOPAEDIC SURGERY

## 2017-11-27 ENCOUNTER — OFFICE VISIT (OUTPATIENT)
Dept: CARDIOLOGY | Facility: CLINIC | Age: 72
End: 2017-11-27

## 2017-11-27 VITALS
HEART RATE: 72 BPM | HEIGHT: 69 IN | WEIGHT: 164 LBS | BODY MASS INDEX: 24.29 KG/M2 | DIASTOLIC BLOOD PRESSURE: 78 MMHG | SYSTOLIC BLOOD PRESSURE: 122 MMHG

## 2017-11-27 DIAGNOSIS — E78.00 PURE HYPERCHOLESTEROLEMIA: ICD-10-CM

## 2017-11-27 DIAGNOSIS — I10 ESSENTIAL HYPERTENSION: Primary | ICD-10-CM

## 2017-11-27 DIAGNOSIS — R00.2 PALPITATIONS: ICD-10-CM

## 2017-11-27 PROCEDURE — 99213 OFFICE O/P EST LOW 20 MIN: CPT | Performed by: INTERNAL MEDICINE

## 2017-11-27 NOTE — PROGRESS NOTES
[facility]  OFFICE NOTE    Souleymane Stapleton  72 y.o. male    11/27/2017  1. Essential hypertension    2. Pure hypercholesterolemia    3. 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  It happens only once every 6-8 weeks.  He had a treadmill, echo and Holter monitoring that did not show anything abnormal.  He does not have any kind of angina and is tolerating the Zetia for his cholesterol and I'm going to check a lipid panel and CMP today.  He does not want to do any further investigation at this point unless he has more problems.            Allergies   Allergen Reactions   • Statins Myalgia   • Tricor [Fenofibrate] Myalgia         Past Medical History:   Diagnosis Date   • Abdominal pain    • Abnormal weight loss    • Acid reflux    • Acute gastritis    • Anxiety state    • Arthritis    • Cancer     prostate   • History of cerebrovascular accident    • History of colon polyps    • Hypertension    • Nausea    • Nuclear senile cataract    • Presbyopia    • Tobacco use     Tobacco use and exposure    • Transient cerebral ischemia    • Vitreous detachment of left eye          Past Surgical History:   Procedure Laterality Date   • BACK SURGERY     • COLONOSCOPY  06/09/2015    Diverticulosis found in the sigmoid colon. Hemorrhoids found in the anus.   • CRYOTHERAPY  08/14/2012    CRYOTHERAPY OF ACNE    • ENDOSCOPY  09/01/2015    EGD w/ biopsy -Multiple polyps, one removed.   • HEMORRHOIDECTOMY N/A 3/20/2017    Procedure: Removal of Anal Papilla;  Surgeon: Juan Diego Ken MD;  Location: [facility];  Service:    • INJECTION OF MEDICATION  09/14/2010    B12   • INJECTION OF MEDICATION  10/17/2011     Kenalog   • LUMBAR LAMINECTOMY  09/29/2010   • OTHER SURGICAL HISTORY  08/19/2010    Biopsy, Each Additional Lesion    • SKIN BIOPSY  08/19/2010         Family History   Problem Relation Age of Onset   • Dementia Other    • Hypertension Other    • Stroke Other    • Hypertension Mother    • Hypertension Father          Social History     Social History   • Marital status:      Spouse name: N/A   • Number of children: N/A   • Years of education: N/A     Occupational History   • Not on file.     Social History Main Topics   • Smoking status: Former Smoker     Quit date: 1997   • Smokeless tobacco: Never Used   • Alcohol use Yes      Comment: socially   • Drug use: No   • Sexual activity: Defer     Other Topics Concern   • Not on file     Social History Narrative         Current Outpatient Prescriptions   Medication Sig Dispense Refill   • ALPRAZolam (XANAX) 0.5 MG tablet Take 1 tablet by mouth 3 (Three) Times a Day. 90 tablet 2   • clopidogrel (PLAVIX) 75 MG tablet Take 1 tablet by mouth Daily. 30 tablet 11   • diltiaZEM CD (CARTIA XT) 300 MG 24 hr capsule Take 1 capsule by mouth Daily. (Patient taking differently: Take 300 mg by mouth Every Night.) 30 capsule 11   • dutasteride (AVODART) 0.5 MG capsule Take 0.5 mg by mouth Every Night.     • ezetimibe (ZETIA) 10 MG tablet Take 1 tablet by mouth Daily. 30 tablet 11   • finasteride (PROPECIA) 1 MG tablet Take 1 tablet by mouth Daily. 30 tablet 11   • irbesartan-hydrochlorothiazide (AVALIDE) 300-12.5 MG tablet Take 1 tablet by mouth Daily. 30 tablet 12   • Loratadine (CLARITIN PO) Take  by mouth.     • meloxicam (MOBIC) 15 MG tablet Take 1 tablet by mouth Daily. 30 tablet 3   • metoclopramide (REGLAN) 5 MG tablet Take 1 tablet by mouth 4 (Four) Times a Day Before Meals & at Bedtime. 56 tablet 1   • omeprazole (priLOSEC) 40 MG capsule Take 1 capsule by mouth Daily. 30 capsule 11     No current facility-administered medications for this visit.          Review of  "Systems     Constitution: Denies any fatigue, fever or chills    HENT: Denies any headache, hearing impairment,     Eyes: Denies any blurring of vision, or photophobia     Cardivascular - As per history of present illness     Respiratory system-denies  shortness of breath- NYHA class I        Endocrine:   history of hyperlipidemia       Musculoskeletal:   rotator cuff injury to the right shoulder    Gastrointestinal: No nausea, vomiting, or melena    Genitourinary: History of prostate cancer    Neurological:   No history of seizure disorder, stroke, memory problems    Psychiatric/Behavioral:        No history of depression,    Hematological- no history of easy bruising             OBJECTIVE    /78  Pulse 72  Ht 69\" (175.3 cm)  Wt 164 lb (74.4 kg)  BMI 24.22 kg/m2      Physical Exam     Constitutional: is oriented to person, place, and time.     Skin-warm and dry    Well developed and nourished in no acute distress      Head: Normocephalic and atraumatic.     Eyes: Pupils are equal, round, and reactive to light.     Neck: Neck supple. No bruit in the carotids    Cardiovascular: [place] in the fifth intercostal space   Regular rate, and  Rhythm,    S1 greater than S2, no S3 or S4, no gallop     Pulmonary/Chest:   Air  Entry is equal on both sides  No wheezing or crackles,      Abdominal: Soft.  No hepatosplenomegaly, bowel sounds are present    Musculoskeletal: No kyphoscoliosis    Neurological: is alert and oriented to person, place, and time.    cranial nerve are intact .   No motor or sensory deficit    Extremities-no edema, no radial femoral delay      Psychiatric: He has a normal mood and affect.                  His behavior is normal.           Procedures      Lab Results   Component Value Date    WBC 8.50 03/17/2017    HGB 15.3 03/17/2017    HCT 43.2 03/17/2017    MCV 90.2 03/17/2017     03/17/2017     Lab Results   Component Value Date    GLUCOSE 84 03/17/2017    BUN 23 (H) 03/17/2017    " CREATININE 1.35 (H) 03/17/2017    EGFRIFNONA 52 03/17/2017    BCR 17.0 03/17/2017    CO2 26.0 03/17/2017    CALCIUM 9.2 03/17/2017    ALBUMIN 4.80 03/09/2017    LABIL2 1.8 03/09/2017    AST 79 (H) 03/09/2017    ALT 38 03/09/2017     Lab Results   Component Value Date    CHOL 255 (H) 03/09/2017     Lab Results   Component Value Date    TRIG 204 (H) 03/09/2017    TRIG 75 10/07/2016    TRIG 95 12/29/2015     Lab Results   Component Value Date    HDL 49 (L) 03/09/2017    HDL 52 (L) 10/07/2016    HDL 49 (L) 12/29/2015     Lab Results   Component Value Date    LDLCALC 143 (H) 10/07/2016    LDLCALC 158 (H) 12/29/2015    LDLCALC 174 (H) 08/07/2015     No results found for: LDL  No results found for: HDLLDLRATIO  No components found for: CHOLHDL  No results found for: HGBA1C  Lab Results   Component Value Date    TSH 0.78 02/11/2015                  A/P    Shortness of breath NYHA class I-did well on the treadmill walk 10 minutes, echo was unremarkable we will continue to observe unless he has more symptoms then we'll consider CT coronary angiogram.    Hypertension -well controlled with Avalide and Cardizem CD    Hyperlipidemia -could not tolerate statins and is tolerating the Zetia will check his lipids sometime next week    Follow-up in 6 months              This document has been electronically signed by Delroy Mcconnell MD on November 27, 2017 10:25 AM       EMR Dragon/Transcription disclaimer:   Some of this note may be an electronic transcription/translation of spoken language to printed text. The electronic translation of spoken language may permit erroneous, or at times, nonsensical words or phrases to be inadvertently transcribed; Although I have reviewed the note for such errors, some may still exist.

## 2017-11-28 ENCOUNTER — APPOINTMENT (OUTPATIENT)
Dept: LAB | Facility: HOSPITAL | Age: 72
End: 2017-11-28

## 2017-11-28 LAB
ALBUMIN SERPL-MCNC: 4.1 G/DL (ref 3.4–4.8)
ALBUMIN/GLOB SERPL: 1.6 G/DL (ref 1.1–1.8)
ALP SERPL-CCNC: 48 U/L (ref 38–126)
ALT SERPL W P-5'-P-CCNC: 37 U/L (ref 21–72)
ANION GAP SERPL CALCULATED.3IONS-SCNC: 11 MMOL/L (ref 5–15)
ARTICHOKE IGE QN: 153 MG/DL (ref 1–129)
AST SERPL-CCNC: 21 U/L (ref 17–59)
BILIRUB SERPL-MCNC: 0.6 MG/DL (ref 0.2–1.3)
BUN BLD-MCNC: 31 MG/DL (ref 7–21)
BUN/CREAT SERPL: 25.4 (ref 7–25)
CALCIUM SPEC-SCNC: 9.6 MG/DL (ref 8.4–10.2)
CHLORIDE SERPL-SCNC: 100 MMOL/L (ref 95–110)
CHOLEST SERPL-MCNC: 223 MG/DL (ref 0–199)
CO2 SERPL-SCNC: 25 MMOL/L (ref 22–31)
CREAT BLD-MCNC: 1.22 MG/DL (ref 0.7–1.3)
GFR SERPL CREATININE-BSD FRML MDRD: 58 ML/MIN/1.73 (ref 42–98)
GLOBULIN UR ELPH-MCNC: 2.6 GM/DL (ref 2.3–3.5)
GLUCOSE BLD-MCNC: 90 MG/DL (ref 60–100)
HDLC SERPL-MCNC: 50 MG/DL (ref 60–200)
LDLC/HDLC SERPL: 2.9 {RATIO} (ref 0–3.55)
POTASSIUM BLD-SCNC: 4.4 MMOL/L (ref 3.5–5.1)
PROT SERPL-MCNC: 6.7 G/DL (ref 6.3–8.6)
SODIUM BLD-SCNC: 136 MMOL/L (ref 137–145)
TRIGL SERPL-MCNC: 140 MG/DL (ref 20–199)

## 2017-11-28 PROCEDURE — 80053 COMPREHEN METABOLIC PANEL: CPT | Performed by: INTERNAL MEDICINE

## 2017-11-28 PROCEDURE — 80061 LIPID PANEL: CPT | Performed by: INTERNAL MEDICINE

## 2017-12-01 ENCOUNTER — DOCUMENTATION (OUTPATIENT)
Dept: CARDIOLOGY | Facility: CLINIC | Age: 72
End: 2017-12-01

## 2017-12-01 NOTE — PROGRESS NOTES
Labs to pt, will try red yeast rice to try and lower cholesterol. Pt is taking Zetia daily and exercising daily

## 2017-12-14 ENCOUNTER — TRANSCRIBE ORDERS (OUTPATIENT)
Dept: LAB | Facility: HOSPITAL | Age: 72
End: 2017-12-14

## 2017-12-14 DIAGNOSIS — C61 PROSTATE CA (HCC): Primary | ICD-10-CM

## 2017-12-20 ENCOUNTER — APPOINTMENT (OUTPATIENT)
Dept: CT IMAGING | Facility: HOSPITAL | Age: 72
End: 2017-12-20

## 2017-12-20 ENCOUNTER — HOSPITAL ENCOUNTER (EMERGENCY)
Facility: HOSPITAL | Age: 72
Discharge: ANOTHER HOSPITAL | End: 2017-12-20
Attending: EMERGENCY MEDICINE | Admitting: EMERGENCY MEDICINE

## 2017-12-20 ENCOUNTER — APPOINTMENT (OUTPATIENT)
Dept: GENERAL RADIOLOGY | Facility: HOSPITAL | Age: 72
End: 2017-12-20

## 2017-12-20 VITALS
WEIGHT: 162 LBS | HEART RATE: 66 BPM | BODY MASS INDEX: 23.99 KG/M2 | RESPIRATION RATE: 20 BRPM | DIASTOLIC BLOOD PRESSURE: 67 MMHG | HEIGHT: 69 IN | TEMPERATURE: 97.6 F | OXYGEN SATURATION: 92 % | SYSTOLIC BLOOD PRESSURE: 116 MMHG

## 2017-12-20 DIAGNOSIS — G45.9 TRANSIENT CEREBRAL ISCHEMIA, UNSPECIFIED TYPE: Primary | ICD-10-CM

## 2017-12-20 LAB
ABO GROUP BLD: NORMAL
ALBUMIN SERPL-MCNC: 4.1 G/DL (ref 3.4–4.8)
ALBUMIN/GLOB SERPL: 1.4 G/DL (ref 1.1–1.8)
ALP SERPL-CCNC: 45 U/L (ref 38–126)
ALT SERPL W P-5'-P-CCNC: 30 U/L (ref 21–72)
ANION GAP SERPL CALCULATED.3IONS-SCNC: 11 MMOL/L (ref 5–15)
APTT PPP: 28.6 SECONDS (ref 20–40.3)
AST SERPL-CCNC: 24 U/L (ref 17–59)
BASOPHILS # BLD AUTO: 0.08 10*3/MM3 (ref 0–0.2)
BASOPHILS NFR BLD AUTO: 0.8 % (ref 0–2)
BILIRUB SERPL-MCNC: 0.6 MG/DL (ref 0.2–1.3)
BLD GP AB SCN SERPL QL: NEGATIVE
BUN BLD-MCNC: 25 MG/DL (ref 7–21)
BUN/CREAT SERPL: 19.5 (ref 7–25)
CALCIUM SPEC-SCNC: 8.8 MG/DL (ref 8.4–10.2)
CHLORIDE SERPL-SCNC: 99 MMOL/L (ref 95–110)
CO2 SERPL-SCNC: 23 MMOL/L (ref 22–31)
CREAT BLD-MCNC: 1.28 MG/DL (ref 0.7–1.3)
DEPRECATED RDW RBC AUTO: 42.8 FL (ref 35.1–43.9)
EOSINOPHIL # BLD AUTO: 0.03 10*3/MM3 (ref 0–0.7)
EOSINOPHIL NFR BLD AUTO: 0.3 % (ref 0–7)
ERYTHROCYTE [DISTWIDTH] IN BLOOD BY AUTOMATED COUNT: 13 % (ref 11.5–14.5)
GFR SERPL CREATININE-BSD FRML MDRD: 55 ML/MIN/1.73 (ref 60–98)
GLOBULIN UR ELPH-MCNC: 3 GM/DL (ref 2.3–3.5)
GLUCOSE BLD-MCNC: 96 MG/DL (ref 60–100)
HCT VFR BLD AUTO: 42 % (ref 39–49)
HGB BLD-MCNC: 14.8 G/DL (ref 13.7–17.3)
HOLD SPECIMEN: NORMAL
HOLD SPECIMEN: NORMAL
IMM GRANULOCYTES # BLD: 0.04 10*3/MM3 (ref 0–0.02)
IMM GRANULOCYTES NFR BLD: 0.4 % (ref 0–0.5)
INR PPP: 0.95 (ref 0.8–1.2)
LYMPHOCYTES # BLD AUTO: 1.8 10*3/MM3 (ref 0.6–4.2)
LYMPHOCYTES NFR BLD AUTO: 19.1 % (ref 10–50)
Lab: NORMAL
MCH RBC QN AUTO: 31.3 PG (ref 26.5–34)
MCHC RBC AUTO-ENTMCNC: 35.2 G/DL (ref 31.5–36.3)
MCV RBC AUTO: 88.8 FL (ref 80–98)
MONOCYTES # BLD AUTO: 0.73 10*3/MM3 (ref 0–0.9)
MONOCYTES NFR BLD AUTO: 7.7 % (ref 0–12)
NEUTROPHILS # BLD AUTO: 6.75 10*3/MM3 (ref 2–8.6)
NEUTROPHILS NFR BLD AUTO: 71.7 % (ref 37–80)
PLATELET # BLD AUTO: 303 10*3/MM3 (ref 150–450)
PMV BLD AUTO: 10.3 FL (ref 8–12)
POTASSIUM BLD-SCNC: 3.9 MMOL/L (ref 3.5–5.1)
PROT SERPL-MCNC: 7.1 G/DL (ref 6.3–8.6)
PROTHROMBIN TIME: 12.6 SECONDS (ref 11.1–15.3)
RBC # BLD AUTO: 4.73 10*6/MM3 (ref 4.37–5.74)
RH BLD: NEGATIVE
SODIUM BLD-SCNC: 133 MMOL/L (ref 137–145)
TROPONIN I SERPL-MCNC: <0.012 NG/ML
WBC NRBC COR # BLD: 9.43 10*3/MM3 (ref 3.2–9.8)
WHOLE BLOOD HOLD SPECIMEN: NORMAL
WHOLE BLOOD HOLD SPECIMEN: NORMAL

## 2017-12-20 PROCEDURE — 86901 BLOOD TYPING SEROLOGIC RH(D): CPT | Performed by: EMERGENCY MEDICINE

## 2017-12-20 PROCEDURE — 80053 COMPREHEN METABOLIC PANEL: CPT | Performed by: EMERGENCY MEDICINE

## 2017-12-20 PROCEDURE — 71010 HC CHEST PA OR AP: CPT

## 2017-12-20 PROCEDURE — 85610 PROTHROMBIN TIME: CPT | Performed by: EMERGENCY MEDICINE

## 2017-12-20 PROCEDURE — 93005 ELECTROCARDIOGRAM TRACING: CPT | Performed by: EMERGENCY MEDICINE

## 2017-12-20 PROCEDURE — 86900 BLOOD TYPING SEROLOGIC ABO: CPT | Performed by: EMERGENCY MEDICINE

## 2017-12-20 PROCEDURE — 85730 THROMBOPLASTIN TIME PARTIAL: CPT | Performed by: EMERGENCY MEDICINE

## 2017-12-20 PROCEDURE — 84484 ASSAY OF TROPONIN QUANT: CPT | Performed by: EMERGENCY MEDICINE

## 2017-12-20 PROCEDURE — 70450 CT HEAD/BRAIN W/O DYE: CPT

## 2017-12-20 PROCEDURE — 99284 EMERGENCY DEPT VISIT MOD MDM: CPT

## 2017-12-20 PROCEDURE — 93010 ELECTROCARDIOGRAM REPORT: CPT | Performed by: INTERNAL MEDICINE

## 2017-12-20 PROCEDURE — 85025 COMPLETE CBC W/AUTO DIFF WBC: CPT | Performed by: EMERGENCY MEDICINE

## 2017-12-20 PROCEDURE — 86850 RBC ANTIBODY SCREEN: CPT | Performed by: EMERGENCY MEDICINE

## 2017-12-20 RX ORDER — SODIUM CHLORIDE 0.9 % (FLUSH) 0.9 %
10 SYRINGE (ML) INJECTION AS NEEDED
Status: DISCONTINUED | OUTPATIENT
Start: 2017-12-20 | End: 2017-12-21 | Stop reason: HOSPADM

## 2017-12-20 RX ORDER — MV-MIN/FOLIC/VIT K/LYCOP/COQ10 200-100MCG
CAPSULE ORAL
COMMUNITY
End: 2018-08-02

## 2017-12-20 RX ORDER — ASPIRIN 325 MG
325 TABLET ORAL ONCE
Status: COMPLETED | OUTPATIENT
Start: 2017-12-20 | End: 2017-12-20

## 2017-12-20 RX ADMIN — ASPIRIN 325 MG: 325 TABLET, COATED ORAL at 14:58

## 2017-12-20 NOTE — ED NOTES
Transferring to [facility], Dr Nice accepted.  Chart copied, radiology placed on a disk, ambulance for transportation.

## 2017-12-20 NOTE — ED PROVIDER NOTES
Subjective   HPI Comments: 72 years old male with history of hypertension, hyperlipidemia, anxiety previous TIAs, GERD is sent in ER from urgent care for evaluation of TIA symptoms.  Patient was doing fine until 12:00 when he started to have confusion and stuporous episode lasted for 5 minute.  He did not know what he was doing and where he was.  He had no focal weakness numbness or tingling during the episode or after that.  He had a generalized shaking in upper and lower extremities.  No slurring of speech.  No facial droop.  No focal weakness.  No blurry vision.  No chest pain palpitations or shortness of breath before or after the episode.  No dizziness or lightheadedness.    Patient is a 72 y.o. male presenting with strokes.   History provided by:  Patient  Stroke   Presenting symptoms: confusion    Presenting symptoms: no headaches    Onset quality:  Sudden  Last known well:  1200  Duration: lasted for 5 minutes.  Progression:  Resolved  Similar to previous episodes: no    Associated symptoms: no chest pain, no difficulty swallowing, no dizziness, no facial pain, no fall, no fever, no hearing loss, no bladder incontinence, no nausea, no neck pain, no paresthesias, no seizures, no tinnitus, no vertigo and no vomiting        Review of Systems   Constitutional: Negative for activity change, chills and fever.   HENT: Negative for congestion, facial swelling, hearing loss, rhinorrhea, sinus pressure, tinnitus and trouble swallowing.    Eyes: Negative for photophobia and visual disturbance.   Respiratory: Negative for cough, chest tightness, shortness of breath and wheezing.    Cardiovascular: Negative for chest pain.   Gastrointestinal: Negative for abdominal distention, abdominal pain, diarrhea, nausea and vomiting.   Endocrine: Negative for polyuria.   Genitourinary: Negative for bladder incontinence and flank pain.   Musculoskeletal: Negative for back pain, joint swelling and neck pain.   Skin: Negative for  rash.   Neurological: Positive for tremors. Negative for dizziness, vertigo, seizures, numbness, headaches and paresthesias.   Hematological: Negative for adenopathy.   Psychiatric/Behavioral: Positive for confusion. Negative for agitation.       Past Medical History:   Diagnosis Date   • Abdominal pain    • Abnormal weight loss    • Acid reflux    • Acute gastritis    • Anxiety state    • Arthritis    • Cancer     prostate   • History of cerebrovascular accident    • History of colon polyps    • Hypertension    • Nausea    • Nuclear senile cataract    • Presbyopia    • Tobacco use     Tobacco use and exposure    • Transient cerebral ischemia    • Vitreous detachment of left eye        Allergies   Allergen Reactions   • Statins Myalgia   • Tricor [Fenofibrate] Myalgia       Past Surgical History:   Procedure Laterality Date   • BACK SURGERY     • COLONOSCOPY  06/09/2015    Diverticulosis found in the sigmoid colon. Hemorrhoids found in the anus.   • CRYOTHERAPY  08/14/2012    CRYOTHERAPY OF ACNE    • ENDOSCOPY  09/01/2015    EGD w/ biopsy -Multiple polyps, one removed.   • HEMORRHOIDECTOMY N/A 3/20/2017    Procedure: Removal of Anal Papilla;  Surgeon: Juan Diego Ken MD;  Location: [facility];  Service:    • INJECTION OF MEDICATION  09/14/2010    B12   • INJECTION OF MEDICATION  10/17/2011    Kenalog   • LUMBAR LAMINECTOMY  09/29/2010   • OTHER SURGICAL HISTORY  08/19/2010    Biopsy, Each Additional Lesion    • SKIN BIOPSY  08/19/2010       Family History   Problem Relation Age of Onset   • Dementia Other    • Hypertension Other    • Stroke Other    • Hypertension Mother    • Hypertension Father        Social History     Social History   • Marital status:      Spouse name: N/A   • Number of children: N/A   • Years of education: N/A     Social History Main Topics   • Smoking status: Former Smoker     Quit date: 1997   • Smokeless tobacco: Never Used   • Alcohol use Yes      Comment: socially   • Drug  use: No   • Sexual activity: Defer     Other Topics Concern   • None     Social History Narrative           Objective   Physical Exam   Constitutional: He is oriented to person, place, and time. He appears well-developed and well-nourished.   HENT:   Head: Normocephalic and atraumatic.   Nose: Nose normal.   Mouth/Throat: Oropharynx is clear and moist.   Eyes: Conjunctivae and EOM are normal. Pupils are equal, round, and reactive to light.   Neck: Normal range of motion. Neck supple.   Cardiovascular: Normal rate, regular rhythm and normal heart sounds.    Pulmonary/Chest: Effort normal and breath sounds normal. No respiratory distress. He has no wheezes. He has no rales.   Abdominal: Soft. Bowel sounds are normal. He exhibits no distension. There is no tenderness.   Musculoskeletal: Normal range of motion.   Neurological: He is alert and oriented to person, place, and time. He has normal reflexes. He displays tremor. He displays normal reflexes. No cranial nerve deficit or sensory deficit. He exhibits normal muscle tone. Coordination normal. GCS eye subscore is 4. GCS verbal subscore is 5. GCS motor subscore is 6. He displays no Babinski's sign on the right side. He displays no Babinski's sign on the left side.   Reflex Scores:       Bicep reflexes are 2+ on the right side and 2+ on the left side.       Patellar reflexes are 2+ on the right side and 2+ on the left side.  Skin: Skin is warm.   Psychiatric: His mood appears anxious.   Nursing note and vitals reviewed.      ECG 12 Lead    Date/Time: 12/20/2017 2:20 PM  Performed by: JUANA RAMOS  Authorized by: JUANA RAMOS   Interpreted by physician  Rhythm: sinus rhythm  Rate: normal  BPM: 67  QRS axis: normal  Conduction: conduction normal  ST Segments: ST segments normal  T Waves: T waves normal  Clinical impression: normal ECG               ED Course  ED Course   Comment By Time   72 years old is made stroke activated.  CT head is done which did not show any  intracranial bleed, midline shift/mass effect or any other acute pathology.  He is given aspirin.  EKG showed normal sinus rhythm with no acute ischemic changes.  Negative troponins.  Grossly unremarkable chemistry profile.  He has nonfocal neuro exam throughout his stay in the ER.  His symptoms earlier was suggestive of TIA and since lack of neurology services overhear I have called [facility]. Sb Guerrero MD 12/20 6186   D/w Dr. Camacho , accepted for transfer.  I have discussed plan with the patient in detail understands and agrees with it. Sb Guerrero MD 12/20 8663          Labs Reviewed   COMPREHENSIVE METABOLIC PANEL - Abnormal; Notable for the following:        Result Value    BUN 25 (*)     Sodium 133 (*)     eGFR Non  Amer 55 (*)     All other components within normal limits    Narrative:     The MDRD GFR formula is only valid for adults with stable renal function between ages 18 and 70.   CBC WITH AUTO DIFFERENTIAL - Abnormal; Notable for the following:     Immature Grans, Absolute 0.04 (*)     All other components within normal limits   PROTIME-INR - Normal    Narrative:     Therapeutic range for most indications is 2.0-3.0 INR,  or 2.5-3.5 for mechanical heart valves.   APTT - Normal    Narrative:     The recommended Heparin therapeutic range is 68-97 seconds.   TROPONIN (IN-HOUSE) - Normal   RAINBOW DRAW    Narrative:     The following orders were created for panel order [place] Draw.  Procedure                               Abnormality         Status                     ---------                               -----------         ------                     Light Blue Top[[id number]]                                   Final result               Green Top (Gel)[[id number]]                                  Final result               Lavender Top[[id number]]                                     Final result               Gold Top - SST[[id number]]                                   Final  result                 Please view results for these tests on the individual orders.   POCT GLUCOSE FINGERSTICK   TYPE AND SCREEN   PREVIOUS HISTORY   CBC AND DIFFERENTIAL    Narrative:     The following orders were created for panel order CBC & Differential.  Procedure                               Abnormality         Status                     ---------                               -----------         ------                     CBC Auto Differential[[id number]]        Abnormal            Final result                 Please view results for these tests on the individual orders.   LIGHT BLUE TOP   GREEN TOP   LAVENDER TOP   GOLD TOP - SST       Xr Chest 1 View    Result Date: 12/20/2017  Narrative: [facility] Patient Name: MR. SIMON CARROLL ORDERING: JUANA RAMOS ATTENDING: JUANA RAMOS REFERRING: JUANA RAMOS ----------------------- PROCEDURE: Chest Single View TECHNIQUE: Single AP view of the chest COMPARISON: 7/30/2014 HISTORY: Acute Stroke Protocol (onset < 12 hrs) FINDINGS:  Life-support devices: None Lungs/pleura: Decreased inspiratory effort contributes to mild lung marking accentuation within the bases. There is some minimal linear basilar scarring or atelectasis. No consolidation, effusion, or pneumothorax. Heart, hilar and mediastinal structures: Heart size and mediastinal contours within limits of normal. The trachea is midline. Skeletal Structures: No acute findings. No free air beneath the diaphragm.     Impression: Minimal linear scarring versus atelectasis within the bases. No parenchymal consolidation or acute pleural finding. Electronically signed by:  Uri Fenton MD  12/20/2017 2:13 PM CST Workstation: [id number]    Ct Head Without Contrast Stroke Protocol    Result Date: 12/20/2017  Narrative: [facility] Patient Name: MR. SIMON CARROLL ORDERING: JUANA RAMOS ATTENDING: REFERRING: JUANA RAMOS ----------------------- EXAMINATION:  CT SCAN OF THE  HEAD WITHOUT INTRAVENOUS CONTRAST CLINICAL INFORMATION:  stroke protocol DOSE LENGTH PRODUCT: 892.5 This exam was performed using radiation doses that are as low as reasonably achievable (ALARA). This exam was performed according to our departmental dose optimization program, which includes automated exposure control, adjustment of the mA and/or KV according to patient size and/or use of iterative reconstruction technique. COMPARISON: None available. TECHNIQUE:  Axial images from skull base to vertex.  FINDINGS: There is mild diffuse brain parenchymal volume loss. There is no evidence of intracranial hemorrhage, parenchymal mass, midline shift, or focal mass effect. There is no hydrocephalus or effacement of the basilar cisterns. There is no extra-axial hemorrhage or collection identified. The mastoid air cells and visualized paranasal sinuses appear clear.       Impression: No evidence of intracranial hemorrhage, mass effect or large acute infarct. Electronically signed by:  Densi Smiley MD  12/20/2017 1:56 PM CST Workstation: [id number]              MDM    Final diagnoses:   Transient cerebral ischemia, unspecified type            Sb Guerrero MD  12/20/17 [id number]

## 2017-12-26 RX ORDER — FINASTERIDE 1 MG/1
TABLET, FILM COATED ORAL
Qty: 30 TABLET | Refills: 11 | Status: SHIPPED | OUTPATIENT
Start: 2017-12-26 | End: 2018-01-03 | Stop reason: SDUPTHER

## 2018-01-03 ENCOUNTER — OFFICE VISIT (OUTPATIENT)
Dept: FAMILY MEDICINE CLINIC | Facility: CLINIC | Age: 73
End: 2018-01-03

## 2018-01-03 VITALS
SYSTOLIC BLOOD PRESSURE: 102 MMHG | WEIGHT: 165 LBS | OXYGEN SATURATION: 96 % | BODY MASS INDEX: 24.44 KG/M2 | HEIGHT: 69 IN | DIASTOLIC BLOOD PRESSURE: 68 MMHG | HEART RATE: 77 BPM

## 2018-01-03 DIAGNOSIS — S15.099A: ICD-10-CM

## 2018-01-03 DIAGNOSIS — G45.9 TRANSIENT CEREBRAL ISCHEMIA, UNSPECIFIED TYPE: Primary | ICD-10-CM

## 2018-01-03 DIAGNOSIS — F41.1 ANXIETY STATE: ICD-10-CM

## 2018-01-03 PROCEDURE — 99214 OFFICE O/P EST MOD 30 MIN: CPT | Performed by: FAMILY MEDICINE

## 2018-01-03 RX ORDER — ALPRAZOLAM 0.5 MG/1
0.5 TABLET ORAL 3 TIMES DAILY
Qty: 90 TABLET | Refills: 2 | Status: SHIPPED | OUTPATIENT
Start: 2018-01-03 | End: 2018-03-01 | Stop reason: SDUPTHER

## 2018-01-03 RX ORDER — ASPIRIN 81 MG/1
81 TABLET, CHEWABLE ORAL DAILY
COMMUNITY
End: 2018-05-29

## 2018-01-03 NOTE — PROGRESS NOTES
Subjective   Souleymane Stapleton is a 72 y.o. male.     History of Present Illness  The patient comes in for hospital follow-up.  He has recently been hospitalized for a TIA type experience secondary to an intimal tear of one of the carotid arteries.He has no neurological deficits.    The following portions of the patient's history were reviewed and updated as appropriate: allergies, current medications, past family history, past medical history, past social history, past surgical history and problem list.    Review of Systems    Objective   Physical Exam   Constitutional: He appears well-developed and well-nourished.   HENT:   Head: Normocephalic and atraumatic.   Right Ear: External ear normal.   Left Ear: External ear normal.   Nose: Nose normal.   Mouth/Throat: Oropharynx is clear and moist.   Eyes: EOM are normal. Pupils are equal, round, and reactive to light.   Neck: Normal range of motion.   Cardiovascular: Normal rate, regular rhythm and normal heart sounds.  Exam reveals no gallop and no friction rub.    No murmur heard.  Pulmonary/Chest: Effort normal and breath sounds normal. No respiratory distress. He has no wheezes. He has no rales.   Abdominal: Soft. Bowel sounds are normal. He exhibits no distension. There is no tenderness.   Skin: Skin is warm.   Vitals reviewed.      Assessment/Plan   Souleymane was seen today for follow-up and med refill.    Diagnoses and all orders for this visit:    Transient cerebral ischemia, unspecified type    Tear of wall of carotid artery    Anxiety state    Other orders  -     ALPRAZolam (XANAX) 0.5 MG tablet; Take 1 tablet by mouth 3 (Three) Times a Day.    Continue with current treatments.  Will refill Xanax as needed.  Return in 3 months.

## 2018-01-05 ENCOUNTER — LAB (OUTPATIENT)
Dept: LAB | Facility: HOSPITAL | Age: 73
End: 2018-01-05

## 2018-01-05 DIAGNOSIS — C61 PROSTATE CA (HCC): ICD-10-CM

## 2018-01-05 LAB — PSA SERPL-MCNC: 10.7 NG/ML (ref 0–4)

## 2018-01-05 PROCEDURE — 84153 ASSAY OF PSA TOTAL: CPT

## 2018-01-05 PROCEDURE — 36415 COLL VENOUS BLD VENIPUNCTURE: CPT

## 2018-01-11 ENCOUNTER — TRANSCRIBE ORDERS (OUTPATIENT)
Dept: LAB | Facility: HOSPITAL | Age: 73
End: 2018-01-11

## 2018-01-11 DIAGNOSIS — C61 PROSTATE CA (HCC): Primary | ICD-10-CM

## 2018-03-01 RX ORDER — ALPRAZOLAM 0.5 MG/1
0.5 TABLET ORAL 3 TIMES DAILY
Qty: 90 TABLET | Refills: 2 | Status: SHIPPED | OUTPATIENT
Start: 2018-03-01 | End: 2018-03-21 | Stop reason: SDUPTHER

## 2018-03-02 DIAGNOSIS — C61 MALIGNANT NEOPLASM OF PROSTATE (HCC): Primary | ICD-10-CM

## 2018-03-05 RX ORDER — MELOXICAM 15 MG/1
TABLET ORAL
Qty: 30 TABLET | Refills: 1 | Status: SHIPPED | OUTPATIENT
Start: 2018-03-05 | End: 2018-03-22 | Stop reason: ALTCHOICE

## 2018-03-12 RX ORDER — OMEPRAZOLE 40 MG/1
CAPSULE, DELAYED RELEASE ORAL
Qty: 30 CAPSULE | Refills: 11 | Status: SHIPPED | OUTPATIENT
Start: 2018-03-12 | End: 2018-03-21 | Stop reason: SDUPTHER

## 2018-03-14 ENCOUNTER — LAB (OUTPATIENT)
Dept: LAB | Facility: HOSPITAL | Age: 73
End: 2018-03-14

## 2018-03-14 DIAGNOSIS — C61 MALIGNANT NEOPLASM OF PROSTATE (HCC): ICD-10-CM

## 2018-03-14 LAB
ANION GAP SERPL CALCULATED.3IONS-SCNC: 12 MMOL/L (ref 5–15)
BUN BLD-MCNC: 31 MG/DL (ref 7–21)
BUN/CREAT SERPL: 24.2 (ref 7–25)
CALCIUM SPEC-SCNC: 9.3 MG/DL (ref 8.4–10.2)
CHLORIDE SERPL-SCNC: 98 MMOL/L (ref 95–110)
CO2 SERPL-SCNC: 26 MMOL/L (ref 22–31)
CREAT BLD-MCNC: 1.28 MG/DL (ref 0.7–1.3)
GFR SERPL CREATININE-BSD FRML MDRD: 55 ML/MIN/1.73 (ref 60–98)
GLUCOSE BLD-MCNC: 101 MG/DL (ref 60–100)
POTASSIUM BLD-SCNC: 4.3 MMOL/L (ref 3.5–5.1)
SODIUM BLD-SCNC: 136 MMOL/L (ref 137–145)

## 2018-03-14 PROCEDURE — 80048 BASIC METABOLIC PNL TOTAL CA: CPT

## 2018-03-14 PROCEDURE — 36415 COLL VENOUS BLD VENIPUNCTURE: CPT

## 2018-03-16 ENCOUNTER — HOSPITAL ENCOUNTER (OUTPATIENT)
Dept: CT IMAGING | Facility: HOSPITAL | Age: 73
Discharge: HOME | End: 2018-03-16
Admitting: UROLOGY

## 2018-03-16 DIAGNOSIS — C61 MALIGNANT NEOPLASM OF PROSTATE (HCC): ICD-10-CM

## 2018-03-16 PROCEDURE — 0 IOPAMIDOL 61 % SOLUTION: Performed by: UROLOGY

## 2018-03-16 PROCEDURE — 74178 CT ABD&PLV WO CNTR FLWD CNTR: CPT

## 2018-03-16 RX ADMIN — IOPAMIDOL 80 ML: 612 INJECTION, SOLUTION INTRAVENOUS at 08:37

## 2018-03-20 RX ORDER — IRBESARTAN AND HYDROCHLOROTHIAZIDE 300; 12.5 MG/1; MG/1
1 TABLET, FILM COATED ORAL DAILY
Qty: 30 TABLET | Refills: 6 | Status: SHIPPED | OUTPATIENT
Start: 2018-03-20 | End: 2018-03-29 | Stop reason: SDUPTHER

## 2018-03-21 ENCOUNTER — OFFICE VISIT (OUTPATIENT)
Dept: FAMILY MEDICINE CLINIC | Facility: CLINIC | Age: 73
End: 2018-03-21

## 2018-03-21 VITALS
DIASTOLIC BLOOD PRESSURE: 78 MMHG | HEART RATE: 84 BPM | WEIGHT: 168 LBS | HEIGHT: 69 IN | BODY MASS INDEX: 24.88 KG/M2 | SYSTOLIC BLOOD PRESSURE: 122 MMHG | OXYGEN SATURATION: 92 %

## 2018-03-21 DIAGNOSIS — R26.81 UNSTEADINESS ON FEET: ICD-10-CM

## 2018-03-21 DIAGNOSIS — I10 ESSENTIAL HYPERTENSION: ICD-10-CM

## 2018-03-21 DIAGNOSIS — R29.898 ARM WEAKNESS: ICD-10-CM

## 2018-03-21 DIAGNOSIS — E78.00 HYPERCHOLESTEROLEMIA: Primary | ICD-10-CM

## 2018-03-21 DIAGNOSIS — F41.1 ANXIETY STATE: ICD-10-CM

## 2018-03-21 PROCEDURE — 99214 OFFICE O/P EST MOD 30 MIN: CPT | Performed by: FAMILY MEDICINE

## 2018-03-21 RX ORDER — DILTIAZEM HYDROCHLORIDE 300 MG/1
300 CAPSULE, COATED, EXTENDED RELEASE ORAL DAILY
Qty: 90 CAPSULE | Refills: 3 | Status: SHIPPED | OUTPATIENT
Start: 2018-03-21 | End: 2018-04-13 | Stop reason: SDUPTHER

## 2018-03-21 RX ORDER — OMEPRAZOLE 40 MG/1
40 CAPSULE, DELAYED RELEASE ORAL DAILY
Qty: 90 CAPSULE | Refills: 3 | Status: SHIPPED | OUTPATIENT
Start: 2018-03-21 | End: 2018-04-13 | Stop reason: SDUPTHER

## 2018-03-21 RX ORDER — FLUTICASONE PROPIONATE 50 MCG
2 SPRAY, SUSPENSION (ML) NASAL DAILY
Qty: 16 G | Refills: 3 | Status: SHIPPED | OUTPATIENT
Start: 2018-03-21 | End: 2019-05-23 | Stop reason: SDUPTHER

## 2018-03-21 RX ORDER — ALPRAZOLAM 0.5 MG/1
0.5 TABLET ORAL 3 TIMES DAILY
Qty: 270 TABLET | Refills: 1 | Status: SHIPPED | OUTPATIENT
Start: 2018-03-21 | End: 2018-04-13 | Stop reason: SDUPTHER

## 2018-03-21 RX ORDER — CLOPIDOGREL BISULFATE 75 MG/1
75 TABLET ORAL DAILY
Qty: 90 TABLET | Refills: 3 | Status: SHIPPED | OUTPATIENT
Start: 2018-03-21 | End: 2018-04-13 | Stop reason: SDUPTHER

## 2018-03-21 NOTE — PROGRESS NOTES
Subjective   Souleymane Stapleton is a 72 y.o. male.     History of Present Illness The patient comes in with c/o episodes of neck and arm pain. He also has a history of right sided carotid intimal tear. He also has been having some episodes of anxiousness.     The following portions of the patient's history were reviewed and updated as appropriate: allergies, current medications, past family history, past medical history, past social history, past surgical history and problem list.    Review of Systems   Constitutional: Negative for fatigue and fever.   Respiratory: Negative for cough, chest tightness, shortness of breath and stridor.    Cardiovascular: Negative for chest pain, palpitations and leg swelling.   Musculoskeletal: Positive for back pain, joint swelling, neck pain and neck stiffness.       Objective   Physical Exam   Constitutional: He appears well-developed and well-nourished.   HENT:   Head: Normocephalic and atraumatic.   Right Ear: External ear normal.   Left Ear: External ear normal.   Nose: Nose normal.   Mouth/Throat: Oropharynx is clear and moist.   Eyes: Pupils are equal, round, and reactive to light.   Cardiovascular: Normal rate, regular rhythm and normal heart sounds.  Exam reveals no gallop and no friction rub.    No murmur heard.  Pulmonary/Chest: Effort normal and breath sounds normal. No respiratory distress. He has no wheezes. He has no rales.   Abdominal: Soft. Bowel sounds are normal. He exhibits no distension. There is no tenderness.   Musculoskeletal:   Some tenderness of the neck and shoulders. He has good strength in his arms. Neck with minimal pain. Back has some tightness on exam.   Skin: Skin is warm and dry.   Vitals reviewed.        Assessment/Plan   Souleymane was seen today for back pain, hypertension, med refill and anxiety.    Diagnoses and all orders for this visit:    Hypercholesterolemia  -     Lipid panel; Future  -     Comprehensive metabolic panel; Future  -     CBC w  AUTO Differential; Future    Anxiety state    Essential hypertension    Arm weakness  -     Duplex Carotid Ultrasound CAR; Future    Unsteadiness on feet   -     Duplex Carotid Ultrasound CAR; Future    Other orders  -     fluticasone (FLONASE) 50 MCG/ACT nasal spray; 2 sprays into each nostril Daily.  -     ALPRAZolam (XANAX) 0.5 MG tablet; Take 1 tablet by mouth 3 (Three) Times a Day.        Will contact with labs.  Will set up a carotid ultrasound.  Medications as ordered.

## 2018-03-22 RX ORDER — MELOXICAM 15 MG/1
15 TABLET ORAL DAILY
Qty: 90 TABLET | Refills: 3 | Status: SHIPPED | OUTPATIENT
Start: 2018-03-22 | End: 2018-04-13 | Stop reason: SDUPTHER

## 2018-03-29 RX ORDER — IRBESARTAN AND HYDROCHLOROTHIAZIDE 300; 12.5 MG/1; MG/1
1 TABLET, FILM COATED ORAL DAILY
Qty: 90 TABLET | Refills: 2 | Status: SHIPPED | OUTPATIENT
Start: 2018-03-29 | End: 2018-04-13 | Stop reason: SDUPTHER

## 2018-03-30 ENCOUNTER — TELEPHONE (OUTPATIENT)
Dept: FAMILY MEDICINE CLINIC | Facility: CLINIC | Age: 73
End: 2018-03-30

## 2018-04-05 ENCOUNTER — LAB (OUTPATIENT)
Dept: LAB | Facility: HOSPITAL | Age: 73
End: 2018-04-05

## 2018-04-05 DIAGNOSIS — E78.00 HYPERCHOLESTEROLEMIA: ICD-10-CM

## 2018-04-05 DIAGNOSIS — C61 PROSTATE CA (HCC): ICD-10-CM

## 2018-04-05 LAB
ALBUMIN SERPL-MCNC: 4.1 G/DL (ref 3.4–4.8)
ALBUMIN/GLOB SERPL: 1.6 G/DL (ref 1.1–1.8)
ALP SERPL-CCNC: 45 U/L (ref 38–126)
ALT SERPL W P-5'-P-CCNC: 32 U/L (ref 21–72)
ANION GAP SERPL CALCULATED.3IONS-SCNC: 9 MMOL/L (ref 5–15)
ARTICHOKE IGE QN: 125 MG/DL (ref 1–129)
AST SERPL-CCNC: 105 U/L (ref 17–59)
BASOPHILS # BLD AUTO: 0.13 10*3/MM3 (ref 0–0.2)
BASOPHILS NFR BLD AUTO: 1.7 % (ref 0–2)
BILIRUB SERPL-MCNC: 0.5 MG/DL (ref 0.2–1.3)
BUN BLD-MCNC: 26 MG/DL (ref 7–21)
BUN/CREAT SERPL: 20.6 (ref 7–25)
CALCIUM SPEC-SCNC: 8.7 MG/DL (ref 8.4–10.2)
CHLORIDE SERPL-SCNC: 101 MMOL/L (ref 95–110)
CHOLEST SERPL-MCNC: 213 MG/DL (ref 0–199)
CO2 SERPL-SCNC: 30 MMOL/L (ref 22–31)
CREAT BLD-MCNC: 1.26 MG/DL (ref 0.7–1.3)
DEPRECATED RDW RBC AUTO: 41.6 FL (ref 35.1–43.9)
EOSINOPHIL # BLD AUTO: 0.14 10*3/MM3 (ref 0–0.7)
EOSINOPHIL NFR BLD AUTO: 1.8 % (ref 0–7)
ERYTHROCYTE [DISTWIDTH] IN BLOOD BY AUTOMATED COUNT: 12.8 % (ref 11.5–14.5)
GFR SERPL CREATININE-BSD FRML MDRD: 56 ML/MIN/1.73 (ref 42–98)
GLOBULIN UR ELPH-MCNC: 2.6 GM/DL (ref 2.3–3.5)
GLUCOSE BLD-MCNC: 98 MG/DL (ref 60–100)
HCT VFR BLD AUTO: 44.2 % (ref 39–49)
HDLC SERPL-MCNC: 35 MG/DL (ref 60–200)
HGB BLD-MCNC: 15.4 G/DL (ref 13.7–17.3)
IMM GRANULOCYTES # BLD: 0.01 10*3/MM3 (ref 0–0.02)
IMM GRANULOCYTES NFR BLD: 0.1 % (ref 0–0.5)
LDLC/HDLC SERPL: 4.43 {RATIO} (ref 0–3.55)
LYMPHOCYTES # BLD AUTO: 2.55 10*3/MM3 (ref 0.6–4.2)
LYMPHOCYTES NFR BLD AUTO: 33.6 % (ref 10–50)
MCH RBC QN AUTO: 31.2 PG (ref 26.5–34)
MCHC RBC AUTO-ENTMCNC: 34.8 G/DL (ref 31.5–36.3)
MCV RBC AUTO: 89.5 FL (ref 80–98)
MONOCYTES # BLD AUTO: 0.66 10*3/MM3 (ref 0–0.9)
MONOCYTES NFR BLD AUTO: 8.7 % (ref 0–12)
NEUTROPHILS # BLD AUTO: 4.1 10*3/MM3 (ref 2–8.6)
NEUTROPHILS NFR BLD AUTO: 54.1 % (ref 37–80)
PLATELET # BLD AUTO: 307 10*3/MM3 (ref 150–450)
PMV BLD AUTO: 10.8 FL (ref 8–12)
POTASSIUM BLD-SCNC: 4.3 MMOL/L (ref 3.5–5.1)
PROT SERPL-MCNC: 6.7 G/DL (ref 6.3–8.6)
RBC # BLD AUTO: 4.94 10*6/MM3 (ref 4.37–5.74)
SODIUM BLD-SCNC: 140 MMOL/L (ref 137–145)
TRIGL SERPL-MCNC: 114 MG/DL (ref 20–199)
WBC NRBC COR # BLD: 7.59 10*3/MM3 (ref 3.2–9.8)

## 2018-04-05 PROCEDURE — 84153 ASSAY OF PSA TOTAL: CPT

## 2018-04-05 PROCEDURE — 84154 ASSAY OF PSA FREE: CPT

## 2018-04-05 PROCEDURE — 36415 COLL VENOUS BLD VENIPUNCTURE: CPT

## 2018-04-05 PROCEDURE — 80061 LIPID PANEL: CPT

## 2018-04-05 PROCEDURE — 80053 COMPREHEN METABOLIC PANEL: CPT

## 2018-04-05 PROCEDURE — 85025 COMPLETE CBC W/AUTO DIFF WBC: CPT

## 2018-04-06 LAB
PSA FREE MFR SERPL: 8.7 %
PSA FREE SERPL-MCNC: 0.97 NG/ML
PSA SERPL-MCNC: 11.2 NG/ML (ref 0–4)

## 2018-04-12 ENCOUNTER — TELEPHONE (OUTPATIENT)
Dept: FAMILY MEDICINE CLINIC | Facility: CLINIC | Age: 73
End: 2018-04-12

## 2018-04-12 NOTE — TELEPHONE ENCOUNTER
LEFT MESSAGE WITH PATIENT TO CALL US BACK TO SCHEDULE AN APPT WITH DR. JEFFERS FOR SURGERY CLEARANCE.

## 2018-04-13 RX ORDER — EZETIMIBE 10 MG/1
10 TABLET ORAL DAILY
Qty: 3 TABLET | Refills: 0 | Status: SHIPPED | OUTPATIENT
Start: 2018-04-13 | End: 2019-05-23 | Stop reason: SDUPTHER

## 2018-04-13 RX ORDER — FINASTERIDE 1 MG/1
1 TABLET, FILM COATED ORAL DAILY
Qty: 3 TABLET | Refills: 0 | Status: SHIPPED | OUTPATIENT
Start: 2018-04-13 | End: 2019-08-02 | Stop reason: SDUPTHER

## 2018-04-13 RX ORDER — DILTIAZEM HYDROCHLORIDE 300 MG/1
300 CAPSULE, COATED, EXTENDED RELEASE ORAL DAILY
Qty: 3 CAPSULE | Refills: 0 | Status: SHIPPED | OUTPATIENT
Start: 2018-04-13 | End: 2018-06-26 | Stop reason: HOSPADM

## 2018-04-13 RX ORDER — ALPRAZOLAM 0.5 MG/1
0.5 TABLET ORAL 3 TIMES DAILY
Qty: 3 TABLET | Refills: 0 | Status: SHIPPED | OUTPATIENT
Start: 2018-04-13 | End: 2018-10-09 | Stop reason: SDUPTHER

## 2018-04-13 RX ORDER — OMEPRAZOLE 40 MG/1
40 CAPSULE, DELAYED RELEASE ORAL DAILY
Qty: 3 CAPSULE | Refills: 0 | Status: SHIPPED | OUTPATIENT
Start: 2018-04-13 | End: 2018-08-02 | Stop reason: ALTCHOICE

## 2018-04-13 RX ORDER — MELOXICAM 15 MG/1
15 TABLET ORAL DAILY
Qty: 3 TABLET | Refills: 0 | Status: ON HOLD | OUTPATIENT
Start: 2018-04-13 | End: 2018-06-22

## 2018-04-13 RX ORDER — IRBESARTAN AND HYDROCHLOROTHIAZIDE 300; 12.5 MG/1; MG/1
1 TABLET, FILM COATED ORAL DAILY
Qty: 3 TABLET | Refills: 0 | Status: SHIPPED | OUTPATIENT
Start: 2018-04-13 | End: 2018-06-26 | Stop reason: HOSPADM

## 2018-04-13 RX ORDER — DUTASTERIDE 0.5 MG/1
0.5 CAPSULE, LIQUID FILLED ORAL NIGHTLY
Qty: 3 CAPSULE | Refills: 0 | Status: SHIPPED | OUTPATIENT
Start: 2018-04-13 | End: 2022-10-10

## 2018-04-13 RX ORDER — CLOPIDOGREL BISULFATE 75 MG/1
75 TABLET ORAL DAILY
Qty: 3 TABLET | Refills: 0 | Status: SHIPPED | OUTPATIENT
Start: 2018-04-13 | End: 2019-06-06 | Stop reason: SDUPTHER

## 2018-05-29 ENCOUNTER — OFFICE VISIT (OUTPATIENT)
Dept: CARDIOLOGY | Facility: CLINIC | Age: 73
End: 2018-05-29

## 2018-05-29 ENCOUNTER — DOCUMENTATION (OUTPATIENT)
Dept: CARDIOLOGY | Facility: CLINIC | Age: 73
End: 2018-05-29

## 2018-05-29 VITALS
WEIGHT: 170 LBS | SYSTOLIC BLOOD PRESSURE: 128 MMHG | DIASTOLIC BLOOD PRESSURE: 70 MMHG | BODY MASS INDEX: 25.18 KG/M2 | HEIGHT: 69 IN | HEART RATE: 88 BPM

## 2018-05-29 DIAGNOSIS — I10 ESSENTIAL HYPERTENSION: ICD-10-CM

## 2018-05-29 DIAGNOSIS — I20.8 ANGINA EFFORT: ICD-10-CM

## 2018-05-29 DIAGNOSIS — Z86.73 HISTORY OF CEREBROVASCULAR ACCIDENT: ICD-10-CM

## 2018-05-29 DIAGNOSIS — I10 HYPERTENSION, UNSPECIFIED TYPE: Primary | ICD-10-CM

## 2018-05-29 DIAGNOSIS — R06.02 SOB (SHORTNESS OF BREATH): ICD-10-CM

## 2018-05-29 DIAGNOSIS — E78.00 PURE HYPERCHOLESTEROLEMIA: Primary | ICD-10-CM

## 2018-05-29 PROCEDURE — 99214 OFFICE O/P EST MOD 30 MIN: CPT | Performed by: INTERNAL MEDICINE

## 2018-05-29 RX ORDER — METOPROLOL SUCCINATE 50 MG/1
50 TABLET, EXTENDED RELEASE ORAL DAILY
Qty: 2 TABLET | Refills: 0 | Status: ON HOLD | OUTPATIENT
Start: 2018-05-29 | End: 2018-06-21

## 2018-05-29 NOTE — PROGRESS NOTES
[facility] Cardiology  OFFICE NOTE    Souleymane Stapleton  72 y.o. male    05/29/2018  1. Pure hypercholesterolemia    2. Essential hypertension    3. History of cerebrovascular accident    4. SOB (shortness of breath)    5. 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  He exercised on the treadmill in September 2017 for 10 minutes.  His echo was also was unremarkable.  He recently had a TIA secondary to possible dissection in the carotid or plaque rupture and has been managed conservatively.  He is seeing a neurologist in [place].  He also has prostate cancer but in situ and is follows with Dr. Douglas.  He has history of hypertension on Avalide and Cardizem CD.  We will schedule him for a CT coronary angiogram as he has moderate to high risk for CAD with a recent TIA and is having symptoms of exertional dyspnea which could be angina equavalent.        Allergies   Allergen Reactions   • Statins Myalgia   • Tricor [Fenofibrate] Myalgia         Past Medical History:   Diagnosis Date   • Abdominal pain    • Abnormal weight loss    • Acid reflux    • Acute gastritis    • Anxiety state    • Arthritis    • Cancer     prostate   • History of cerebrovascular accident    • History of colon polyps    • Hypertension    • Nausea    • Nuclear senile cataract    • Presbyopia    • Tobacco use     Tobacco use and exposure    • Transient cerebral ischemia    • Vitreous detachment of left eye          Past Surgical History:   Procedure Laterality Date   • BACK SURGERY     • COLONOSCOPY  06/09/2015    Diverticulosis found in the sigmoid colon. Hemorrhoids found in the anus.   • CRYOTHERAPY  08/14/2012    CRYOTHERAPY OF ACNE    • ENDOSCOPY  09/01/2015    EGD w/  biopsy -Multiple polyps, one removed.   • HEMORRHOIDECTOMY N/A 3/20/2017    Procedure: Removal of Anal Papilla;  Surgeon: Juan Diego Ken MD;  Location: [facility];  Service:    • INJECTION OF MEDICATION  09/14/2010    B12   • INJECTION OF MEDICATION  10/17/2011    Kenalog   • LUMBAR LAMINECTOMY  09/29/2010   • OTHER SURGICAL HISTORY  08/19/2010    Biopsy, Each Additional Lesion    • SKIN BIOPSY  08/19/2010         Family History   Problem Relation Age of Onset   • Dementia Other    • Hypertension Other    • Stroke Other    • Hypertension Mother    • Hypertension Father          Social History     Social History   • Marital status:      Spouse name: N/A   • Number of children: N/A   • Years of education: N/A     Occupational History   • Not on file.     Social History Main Topics   • Smoking status: Former Smoker     Quit date: 1997   • Smokeless tobacco: Never Used   • Alcohol use Yes      Comment: socially   • Drug use: No   • Sexual activity: Defer     Other Topics Concern   • Not on file     Social History Narrative   • No narrative on file         Current Outpatient Prescriptions   Medication Sig Dispense Refill   • ALPRAZolam (XANAX) 0.5 MG tablet Take 1 tablet by mouth 3 (Three) Times a Day. 3 tablet 0   • clopidogrel (PLAVIX) 75 MG tablet Take 1 tablet by mouth Daily. 3 tablet 0   • diltiaZEM CD (CARTIA XT) 300 MG 24 hr capsule Take 1 capsule by mouth Daily. 3 capsule 0   • dutasteride (AVODART) 0.5 MG capsule Take 1 capsule by mouth Every Night. 3 capsule 0   • ezetimibe (ZETIA) 10 MG tablet Take 1 tablet by mouth Daily. 3 tablet 0   • finasteride (PROPECIA) 1 MG tablet Take 1 tablet by mouth Daily. 3 tablet 0   • fluticasone (FLONASE) 50 MCG/ACT nasal spray 2 sprays into each nostril Daily. 16 g 3   • irbesartan-hydrochlorothiazide (AVALIDE) 300-12.5 MG tablet Take 1 tablet by mouth Daily. 3 tablet 0   • Loratadine (CLARITIN PO) Take  by mouth.     • meloxicam (MOBIC) 15 MG tablet Take 1  "tablet by mouth Daily. 3 tablet 0   • omeprazole (priLOSEC) 40 MG capsule Take 1 capsule by mouth Daily. 3 capsule 0   • Red Yeast Rice Extract 300 MG capsule Take  by mouth.       No current facility-administered medications for this visit.          Review of Systems     Constitution: Denies any fatigue, fever or chills    HENT: Denies any headache, hearing impairment,     Eyes: Denies any blurring of vision, or photophobia     Cardivascular - As per history of present illness     Respiratory system-denies  shortness of breath- NYHA class I        Endocrine:   history of hyperlipidemia       Musculoskeletal:   rotator cuff injury to the right shoulder    Gastrointestinal: No nausea, vomiting, or melena    Genitourinary: History of prostate cancer    Neurological:   No history of seizure disorder, stroke, memory problems    Psychiatric/Behavioral:        No history of depression,    Hematological- no history of easy bruising             OBJECTIVE    /70   Pulse 88   Ht 175.3 cm (69.02\")   Wt 77.1 kg (170 lb)   BMI 25.09 kg/m²       Physical Exam     Constitutional: is oriented to person, place, and time.     Skin-warm and dry    Well developed and nourished in no acute distress      Head: Normocephalic and atraumatic.     Eyes: Pupils are equal, round, and reactive to light.     Neck: Neck supple. No bruit in the carotids    Cardiovascular: [place] in the fifth intercostal space   Regular rate, and  Rhythm,    S1 greater than S2, no S3 or S4, no gallop     Pulmonary/Chest:   Air  Entry is equal on both sides  No wheezing or crackles,      Abdominal: Soft.  No hepatosplenomegaly, bowel sounds are present    Musculoskeletal: No kyphoscoliosis    Neurological: is alert and oriented to person, place, and time.    cranial nerve are intact .   No motor or sensory deficit    Extremities-no edema, no radial femoral delay      Psychiatric: He has a normal mood and affect.                  His behavior is normal. "           Procedures      Lab Results   Component Value Date    WBC 7.59 04/05/2018    HGB 15.4 04/05/2018    HCT 44.2 04/05/2018    MCV 89.5 04/05/2018     04/05/2018     Lab Results   Component Value Date    GLUCOSE 98 04/05/2018    BUN 26 (H) 04/05/2018    CREATININE 1.26 04/05/2018    EGFRIFNONA 56 04/05/2018    BCR 20.6 04/05/2018    CO2 30.0 04/05/2018    CALCIUM 8.7 04/05/2018    ALBUMIN 4.10 04/05/2018    LABIL2 1.6 04/05/2018     (H) 04/05/2018    ALT 32 04/05/2018     Lab Results   Component Value Date    CHOL 213 (H) 04/05/2018    CHOL 223 (H) 11/28/2017    CHOL 255 (H) 03/09/2017     Lab Results   Component Value Date    TRIG 114 04/05/2018    TRIG 140 11/28/2017    TRIG 204 (H) 03/09/2017     Lab Results   Component Value Date    HDL 35 (L) 04/05/2018    HDL 50 (L) 11/28/2017    HDL 49 (L) 03/09/2017     No components found for: LDLCALC  Lab Results   Component Value Date     04/05/2018     (H) 11/28/2017     (H) 03/09/2017     No results found for: HDLLDLRATIO  No components found for: CHOLHDL  No results found for: HGBA1C  Lab Results   Component Value Date    TSH 0.78 02/11/2015                  A/P    Shortness of breath NYHA class II-did well on the treadmill walk 10 minutes, echo was unremarkable in September 2017 still having symptoms, will consider CT coronary angiogram as he has risk factors of hypertension and recent TIA secondary to possible plaque rupture or dissection in the right internal carotid artery.    Hypertension -well controlled with Avalide and Cardizem CD    Hyperlipidemia -could not tolerate statins and is tolerating the Zetia, lipids are okay with an LDL of 125    Follow-up in 6 months              This document has been electronically signed by Delroy Mcconnell MD on May 29, 2018 2:05 PM       EMR Dragon/Transcription disclaimer:   Some of this note may be an electronic transcription/translation of spoken language to printed text. The  electronic translation of spoken language may permit erroneous, or at times, nonsensical words or phrases to be inadvertently transcribed; Although I have reviewed the note for such errors, some may still exist.

## 2018-06-06 ENCOUNTER — OFFICE VISIT (OUTPATIENT)
Dept: FAMILY MEDICINE CLINIC | Facility: CLINIC | Age: 73
End: 2018-06-06

## 2018-06-06 VITALS
BODY MASS INDEX: 24.14 KG/M2 | DIASTOLIC BLOOD PRESSURE: 80 MMHG | HEIGHT: 69 IN | SYSTOLIC BLOOD PRESSURE: 120 MMHG | HEART RATE: 80 BPM | WEIGHT: 163 LBS | OXYGEN SATURATION: 93 %

## 2018-06-06 DIAGNOSIS — F32.1 MODERATE SINGLE CURRENT EPISODE OF MAJOR DEPRESSIVE DISORDER (HCC): ICD-10-CM

## 2018-06-06 DIAGNOSIS — I10 ESSENTIAL HYPERTENSION: Primary | ICD-10-CM

## 2018-06-06 DIAGNOSIS — K21.9 GASTROESOPHAGEAL REFLUX DISEASE WITHOUT ESOPHAGITIS: ICD-10-CM

## 2018-06-06 DIAGNOSIS — E78.00 PURE HYPERCHOLESTEROLEMIA: ICD-10-CM

## 2018-06-06 PROCEDURE — 99213 OFFICE O/P EST LOW 20 MIN: CPT | Performed by: FAMILY MEDICINE

## 2018-06-06 RX ORDER — TRAZODONE HYDROCHLORIDE 50 MG/1
50 TABLET ORAL NIGHTLY
Qty: 30 TABLET | Refills: 5 | Status: SHIPPED | OUTPATIENT
Start: 2018-06-06 | End: 2018-06-08 | Stop reason: SDUPTHER

## 2018-06-06 RX ORDER — SUCRALFATE 1 G/1
1 TABLET ORAL 4 TIMES DAILY
Qty: 30 TABLET | Refills: 5 | Status: SHIPPED | OUTPATIENT
Start: 2018-06-06 | End: 2018-06-08 | Stop reason: SDUPTHER

## 2018-06-06 NOTE — PROGRESS NOTES
Subjective   Souleymane Stapleton is a 72 y.o. male.     History of Present Illness The patient comes in with dyspnea. He gets worse with mild activity. He also is having some issues with Depression. HE is feeling down.    The following portions of the patient's history were reviewed and updated as appropriate: allergies, current medications, past family history, past medical history, past social history, past surgical history and problem list.    Review of Systems    Objective   Physical Exam   Constitutional: He appears well-developed and well-nourished.   HENT:   Head: Normocephalic and atraumatic.   Right Ear: External ear normal.   Left Ear: External ear normal.   Nose: Nose normal.   Mouth/Throat: Oropharynx is clear and moist.   Eyes: Pupils are equal, round, and reactive to light.   Cardiovascular: Normal rate, regular rhythm and normal heart sounds.  Exam reveals no gallop and no friction rub.    No murmur heard.  Pulmonary/Chest: Effort normal and breath sounds normal. No respiratory distress. He has no wheezes. He has no rales.   Abdominal: Soft. Bowel sounds are normal. He exhibits no distension and no mass. There is no tenderness. There is no rebound and no guarding.   Skin: Skin is warm and dry.   Vitals reviewed.        Assessment/Plan   Souleymane was seen today for gi problem and shortness of breath.    Diagnoses and all orders for this visit:    Essential hypertension    Pure hypercholesterolemia    Gastroesophageal reflux disease without esophagitis    Other orders  -     traZODone (DESYREL) 50 MG tablet; Take 1 tablet by mouth Every Night.  -     sucralfate (CARAFATE) 1 g tablet; Take 1 tablet by mouth 4 (Four) Times a Day.    He is to get his MRA  as ordered by Dr. Mcconnell.  Medications as ordered.

## 2018-06-07 ENCOUNTER — LAB (OUTPATIENT)
Dept: LAB | Facility: HOSPITAL | Age: 73
End: 2018-06-07
Attending: INTERNAL MEDICINE

## 2018-06-07 ENCOUNTER — HOSPITAL ENCOUNTER (OUTPATIENT)
Dept: CT IMAGING | Facility: HOSPITAL | Age: 73
Discharge: HOME | End: 2018-06-07
Admitting: INTERNAL MEDICINE

## 2018-06-07 DIAGNOSIS — I10 HYPERTENSION, UNSPECIFIED TYPE: ICD-10-CM

## 2018-06-07 LAB
ALBUMIN SERPL-MCNC: 4.4 G/DL (ref 3.4–4.8)
ALBUMIN/GLOB SERPL: 1.6 G/DL (ref 1.1–1.8)
ALP SERPL-CCNC: 58 U/L (ref 38–126)
ALT SERPL W P-5'-P-CCNC: 46 U/L (ref 21–72)
ANION GAP SERPL CALCULATED.3IONS-SCNC: 12 MMOL/L (ref 5–15)
AST SERPL-CCNC: 37 U/L (ref 17–59)
BILIRUB SERPL-MCNC: 0.7 MG/DL (ref 0.2–1.3)
BUN BLD-MCNC: 29 MG/DL (ref 7–21)
BUN/CREAT SERPL: 22.7 (ref 7–25)
CALCIUM SPEC-SCNC: 9.1 MG/DL (ref 8.4–10.2)
CHLORIDE SERPL-SCNC: 100 MMOL/L (ref 95–110)
CO2 SERPL-SCNC: 27 MMOL/L (ref 22–31)
CREAT BLD-MCNC: 1.28 MG/DL (ref 0.7–1.3)
GFR SERPL CREATININE-BSD FRML MDRD: 55 ML/MIN/1.73 (ref 42–98)
GLOBULIN UR ELPH-MCNC: 2.8 GM/DL (ref 2.3–3.5)
GLUCOSE BLD-MCNC: 106 MG/DL (ref 60–100)
POTASSIUM BLD-SCNC: 4.2 MMOL/L (ref 3.5–5.1)
PROT SERPL-MCNC: 7.2 G/DL (ref 6.3–8.6)
SODIUM BLD-SCNC: 139 MMOL/L (ref 137–145)

## 2018-06-07 PROCEDURE — 0 IOPAMIDOL PER 1 ML: Performed by: INTERNAL MEDICINE

## 2018-06-07 PROCEDURE — 80053 COMPREHEN METABOLIC PANEL: CPT

## 2018-06-07 PROCEDURE — 36415 COLL VENOUS BLD VENIPUNCTURE: CPT

## 2018-06-07 PROCEDURE — 75574 CT ANGIO HRT W/3D IMAGE: CPT

## 2018-06-07 RX ADMIN — IOPAMIDOL 70 ML: 755 INJECTION, SOLUTION INTRAVENOUS at 15:15

## 2018-06-08 ENCOUNTER — DOCUMENTATION (OUTPATIENT)
Dept: CARDIOLOGY | Facility: CLINIC | Age: 73
End: 2018-06-08

## 2018-06-08 ENCOUNTER — PREP FOR SURGERY (OUTPATIENT)
Dept: OTHER | Facility: HOSPITAL | Age: 73
End: 2018-06-08

## 2018-06-08 DIAGNOSIS — I20.8 ANGINA OF EFFORT (HCC): Primary | ICD-10-CM

## 2018-06-08 PROBLEM — I20.89 ANGINA OF EFFORT: Status: ACTIVE | Noted: 2018-06-08

## 2018-06-08 RX ORDER — SODIUM CHLORIDE 9 MG/ML
80 INJECTION, SOLUTION INTRAVENOUS CONTINUOUS
Status: CANCELLED | OUTPATIENT
Start: 2018-06-19

## 2018-06-08 RX ORDER — SODIUM CHLORIDE 0.9 % (FLUSH) 0.9 %
1-10 SYRINGE (ML) INJECTION AS NEEDED
Status: CANCELLED | OUTPATIENT
Start: 2018-06-19

## 2018-06-19 ENCOUNTER — HOSPITAL ENCOUNTER (OUTPATIENT)
Facility: HOSPITAL | Age: 73
Setting detail: HOSPITAL OUTPATIENT SURGERY
Discharge: HOME | End: 2018-06-19
Attending: INTERNAL MEDICINE | Admitting: INTERNAL MEDICINE

## 2018-06-19 VITALS
HEIGHT: 69 IN | OXYGEN SATURATION: 95 % | SYSTOLIC BLOOD PRESSURE: 123 MMHG | TEMPERATURE: 96.8 F | RESPIRATION RATE: 18 BRPM | DIASTOLIC BLOOD PRESSURE: 74 MMHG | HEART RATE: 73 BPM | BODY MASS INDEX: 24.39 KG/M2 | WEIGHT: 164.68 LBS

## 2018-06-19 DIAGNOSIS — I20.8 ANGINA OF EFFORT (HCC): ICD-10-CM

## 2018-06-19 LAB
ALBUMIN SERPL-MCNC: 4.4 G/DL (ref 3.4–4.8)
ALBUMIN/GLOB SERPL: 1.5 G/DL (ref 1.1–1.8)
ALP SERPL-CCNC: 53 U/L (ref 38–126)
ALT SERPL W P-5'-P-CCNC: 35 U/L (ref 21–72)
ANION GAP SERPL CALCULATED.3IONS-SCNC: 12 MMOL/L (ref 5–15)
AST SERPL-CCNC: 28 U/L (ref 17–59)
BILIRUB SERPL-MCNC: 0.6 MG/DL (ref 0.2–1.3)
BUN BLD-MCNC: 20 MG/DL (ref 7–21)
BUN/CREAT SERPL: 16.9 (ref 7–25)
CALCIUM SPEC-SCNC: 8.9 MG/DL (ref 8.4–10.2)
CHLORIDE SERPL-SCNC: 106 MMOL/L (ref 95–110)
CO2 SERPL-SCNC: 25 MMOL/L (ref 22–31)
CREAT BLD-MCNC: 1.18 MG/DL (ref 0.7–1.3)
DEPRECATED RDW RBC AUTO: 42.8 FL (ref 35.1–43.9)
ERYTHROCYTE [DISTWIDTH] IN BLOOD BY AUTOMATED COUNT: 13.3 % (ref 11.5–14.5)
GFR SERPL CREATININE-BSD FRML MDRD: 60 ML/MIN/1.73 (ref 42–98)
GLOBULIN UR ELPH-MCNC: 3 GM/DL (ref 2.3–3.5)
GLUCOSE BLD-MCNC: 97 MG/DL (ref 60–100)
GLUCOSE BLDC GLUCOMTR-MCNC: 135 MG/DL (ref 70–130)
HCT VFR BLD AUTO: 44.4 % (ref 39–49)
HGB BLD-MCNC: 15.5 G/DL (ref 13.7–17.3)
INR PPP: 1.04 (ref 0.8–1.2)
MCH RBC QN AUTO: 30.8 PG (ref 26.5–34)
MCHC RBC AUTO-ENTMCNC: 34.9 G/DL (ref 31.5–36.3)
MCV RBC AUTO: 88.1 FL (ref 80–98)
PLATELET # BLD AUTO: 277 10*3/MM3 (ref 150–450)
PMV BLD AUTO: 10.7 FL (ref 8–12)
POTASSIUM BLD-SCNC: 3.4 MMOL/L (ref 3.5–5.1)
PROT SERPL-MCNC: 7.4 G/DL (ref 6.3–8.6)
PROTHROMBIN TIME: 13.4 SECONDS (ref 11.1–15.3)
RBC # BLD AUTO: 5.04 10*6/MM3 (ref 4.37–5.74)
SODIUM BLD-SCNC: 143 MMOL/L (ref 137–145)
WBC NRBC COR # BLD: 7.03 10*3/MM3 (ref 3.2–9.8)

## 2018-06-19 PROCEDURE — C1769 GUIDE WIRE: HCPCS | Performed by: INTERNAL MEDICINE

## 2018-06-19 PROCEDURE — 80053 COMPREHEN METABOLIC PANEL: CPT | Performed by: INTERNAL MEDICINE

## 2018-06-19 PROCEDURE — 0 IOPAMIDOL PER 1 ML: Performed by: INTERNAL MEDICINE

## 2018-06-19 PROCEDURE — C1894 INTRO/SHEATH, NON-LASER: HCPCS | Performed by: INTERNAL MEDICINE

## 2018-06-19 PROCEDURE — 82962 GLUCOSE BLOOD TEST: CPT

## 2018-06-19 PROCEDURE — 93458 L HRT ARTERY/VENTRICLE ANGIO: CPT | Performed by: INTERNAL MEDICINE

## 2018-06-19 PROCEDURE — 85610 PROTHROMBIN TIME: CPT | Performed by: INTERNAL MEDICINE

## 2018-06-19 PROCEDURE — G0278 ILIAC ART ANGIO,CARDIAC CATH: HCPCS | Performed by: INTERNAL MEDICINE

## 2018-06-19 PROCEDURE — 85027 COMPLETE CBC AUTOMATED: CPT | Performed by: INTERNAL MEDICINE

## 2018-06-19 PROCEDURE — 25010000002 MIDAZOLAM PER 1 MG: Performed by: INTERNAL MEDICINE

## 2018-06-19 PROCEDURE — 25010000002 FENTANYL CITRATE (PF) 100 MCG/2ML SOLUTION: Performed by: INTERNAL MEDICINE

## 2018-06-19 RX ORDER — MIDAZOLAM HYDROCHLORIDE 1 MG/ML
INJECTION INTRAMUSCULAR; INTRAVENOUS AS NEEDED
Status: DISCONTINUED | OUTPATIENT
Start: 2018-06-19 | End: 2018-06-19 | Stop reason: HOSPADM

## 2018-06-19 RX ORDER — LIDOCAINE HYDROCHLORIDE 20 MG/ML
INJECTION, SOLUTION INFILTRATION; PERINEURAL AS NEEDED
Status: DISCONTINUED | OUTPATIENT
Start: 2018-06-19 | End: 2018-06-19 | Stop reason: HOSPADM

## 2018-06-19 RX ORDER — FENTANYL CITRATE 50 UG/ML
INJECTION, SOLUTION INTRAMUSCULAR; INTRAVENOUS AS NEEDED
Status: DISCONTINUED | OUTPATIENT
Start: 2018-06-19 | End: 2018-06-19 | Stop reason: HOSPADM

## 2018-06-19 RX ORDER — SODIUM CHLORIDE 9 MG/ML
80 INJECTION, SOLUTION INTRAVENOUS CONTINUOUS
Status: DISCONTINUED | OUTPATIENT
Start: 2018-06-19 | End: 2018-06-19

## 2018-06-19 RX ORDER — SODIUM CHLORIDE 9 MG/ML
250 INJECTION, SOLUTION INTRAVENOUS ONCE AS NEEDED
Status: DISCONTINUED | OUTPATIENT
Start: 2018-06-19 | End: 2018-06-19 | Stop reason: HOSPADM

## 2018-06-19 RX ORDER — SODIUM CHLORIDE 9 MG/ML
100 INJECTION, SOLUTION INTRAVENOUS CONTINUOUS
Status: DISCONTINUED | OUTPATIENT
Start: 2018-06-19 | End: 2018-06-19 | Stop reason: HOSPADM

## 2018-06-19 RX ORDER — SODIUM CHLORIDE 0.9 % (FLUSH) 0.9 %
1-10 SYRINGE (ML) INJECTION AS NEEDED
Status: DISCONTINUED | OUTPATIENT
Start: 2018-06-19 | End: 2018-06-19 | Stop reason: HOSPADM

## 2018-06-19 RX ADMIN — SODIUM CHLORIDE 80 ML/HR: 9 INJECTION, SOLUTION INTRAVENOUS at 06:30

## 2018-06-19 NOTE — INTERVAL H&P NOTE
H&P updated. The patient was examined and the following changes are noted:  Patient had abnormal CTA now being scheduled for cardiac catheterization for definitive diagnosis, explained all the risks and benefits not limited to bleeding, stroke, heart attack and even death patient consented.  Patient is ASA class II, Mallamaptti level II.  Patient consented for conscious sedation.       DATE OF ADMISSION:  11/11/2017    DATE OF DISCHARGE:  11/13/2017    ATTENDING PHYSICIAN:  Shruti Calvo MD    CONSULTING PHYSICIANS:  Chay Brennan MD, neurosurgery.    DISCHARGE DIAGNOSES:  1.  Trauma sustained from a motor vehicle crash.  2.  C2 cervical fracture.  3.  Pain of left humerus.  4.  Closed compression fracture of first lumbar vertebrae.  5.  History of hypertension.  6.  History of coronary artery disease.    PROCEDURES:  None.    HISTORY OF PRESENT ILLNESS:  The patient is a very nice 77-year-old gentleman   who reportedly was involved in a motor vehicle crash on the day of admission.   Review of the records indicate that he was driving his truck near [place], [place]   when he rolled and he was wearing a seatbelt.  He was transported to [facility] in [place], [place] for definitive trauma care.  He was triaged as a trauma   green in accordance with established prehospital protocols.    HOSPITAL COURSE:  On arrival, the patient underwent extensive radiographic and   laboratory studies and was admitted to the critical care team under the   direction and supervision of Dr. Shruti Calvo.  He sustained the above   injuries and incurred the above diagnoses during his stay.    He transferred from the emergency department to the trauma intensive care unit   for ongoing resuscitation and evaluation.  Dr. Chay Brennan, neurosurgery was   consulted for a C2 fracture as well as a compression fracture of the 1st   lumbar vertebrae.  CT imaging of the cervical spine demonstrated an acute   nondisplaced fracture of the right lateral mass of C2.  There was no dens   fracture present.  The fracture did involve the anterior and posterior margins   of the right C2 foramina transversarium.  CTA demonstrated no evidence of   dissection.  Ultimately, this injury was managed nonoperatively and with a   cervical collar.  CT imaging of the lumbar spine demonstrated a superior   endplate fracture of the  TOMAS HAIRSTON / FADI    DD:  11/13/2017 07:33:23  DT:  11/13/2017 12:43:48    D#:  [id number]  Job#:  [id number]    cc: AMIRA REMY MD, primary care provider

## 2018-06-19 NOTE — H&P (VIEW-ONLY)
[facility] Cardiology  OFFICE NOTE    Souleymane Stapleton  72 y.o. male    05/29/2018  1. Pure hypercholesterolemia    2. Essential hypertension    3. History of cerebrovascular accident    4. SOB (shortness of breath)    5. 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  He exercised on the treadmill in September 2017 for 10 minutes.  His echo was also was unremarkable.  He recently had a TIA secondary to possible dissection in the carotid or plaque rupture and has been managed conservatively.  He is seeing a neurologist in [place].  He also has prostate cancer but in situ and is follows with Dr. Douglas.  He has history of hypertension on Avalide and Cardizem CD.  We will schedule him for a CT coronary angiogram as he has moderate to high risk for CAD with a recent TIA and is having symptoms of exertional dyspnea which could be angina equavalent.        Allergies   Allergen Reactions   • Statins Myalgia   • Tricor [Fenofibrate] Myalgia         Past Medical History:   Diagnosis Date   • Abdominal pain    • Abnormal weight loss    • Acid reflux    • Acute gastritis    • Anxiety state    • Arthritis    • Cancer     prostate   • History of cerebrovascular accident    • History of colon polyps    • Hypertension    • Nausea    • Nuclear senile cataract    • Presbyopia    • Tobacco use     Tobacco use and exposure    • Transient cerebral ischemia    • Vitreous detachment of left eye          Past Surgical History:   Procedure Laterality Date   • BACK SURGERY     • COLONOSCOPY  06/09/2015    Diverticulosis found in the sigmoid colon. Hemorrhoids found in the anus.   • CRYOTHERAPY  08/14/2012    CRYOTHERAPY OF ACNE    • ENDOSCOPY  09/01/2015    EGD w/  biopsy -Multiple polyps, one removed.   • HEMORRHOIDECTOMY N/A 3/20/2017    Procedure: Removal of Anal Papilla;  Surgeon: Juan Diego Ken MD;  Location: [facility];  Service:    • INJECTION OF MEDICATION  09/14/2010    B12   • INJECTION OF MEDICATION  10/17/2011    Kenalog   • LUMBAR LAMINECTOMY  09/29/2010   • OTHER SURGICAL HISTORY  08/19/2010    Biopsy, Each Additional Lesion    • SKIN BIOPSY  08/19/2010         Family History   Problem Relation Age of Onset   • Dementia Other    • Hypertension Other    • Stroke Other    • Hypertension Mother    • Hypertension Father          Social History     Social History   • Marital status:      Spouse name: N/A   • Number of children: N/A   • Years of education: N/A     Occupational History   • Not on file.     Social History Main Topics   • Smoking status: Former Smoker     Quit date: 1997   • Smokeless tobacco: Never Used   • Alcohol use Yes      Comment: socially   • Drug use: No   • Sexual activity: Defer     Other Topics Concern   • Not on file     Social History Narrative   • No narrative on file         Current Outpatient Prescriptions   Medication Sig Dispense Refill   • ALPRAZolam (XANAX) 0.5 MG tablet Take 1 tablet by mouth 3 (Three) Times a Day. 3 tablet 0   • clopidogrel (PLAVIX) 75 MG tablet Take 1 tablet by mouth Daily. 3 tablet 0   • diltiaZEM CD (CARTIA XT) 300 MG 24 hr capsule Take 1 capsule by mouth Daily. 3 capsule 0   • dutasteride (AVODART) 0.5 MG capsule Take 1 capsule by mouth Every Night. 3 capsule 0   • ezetimibe (ZETIA) 10 MG tablet Take 1 tablet by mouth Daily. 3 tablet 0   • finasteride (PROPECIA) 1 MG tablet Take 1 tablet by mouth Daily. 3 tablet 0   • fluticasone (FLONASE) 50 MCG/ACT nasal spray 2 sprays into each nostril Daily. 16 g 3   • irbesartan-hydrochlorothiazide (AVALIDE) 300-12.5 MG tablet Take 1 tablet by mouth Daily. 3 tablet 0   • Loratadine (CLARITIN PO) Take  by mouth.     • meloxicam (MOBIC) 15 MG tablet Take 1  "tablet by mouth Daily. 3 tablet 0   • omeprazole (priLOSEC) 40 MG capsule Take 1 capsule by mouth Daily. 3 capsule 0   • Red Yeast Rice Extract 300 MG capsule Take  by mouth.       No current facility-administered medications for this visit.          Review of Systems     Constitution: Denies any fatigue, fever or chills    HENT: Denies any headache, hearing impairment,     Eyes: Denies any blurring of vision, or photophobia     Cardivascular - As per history of present illness     Respiratory system-denies  shortness of breath- NYHA class I        Endocrine:   history of hyperlipidemia       Musculoskeletal:   rotator cuff injury to the right shoulder    Gastrointestinal: No nausea, vomiting, or melena    Genitourinary: History of prostate cancer    Neurological:   No history of seizure disorder, stroke, memory problems    Psychiatric/Behavioral:        No history of depression,    Hematological- no history of easy bruising             OBJECTIVE    /70   Pulse 88   Ht 175.3 cm (69.02\")   Wt 77.1 kg (170 lb)   BMI 25.09 kg/m²       Physical Exam     Constitutional: is oriented to person, place, and time.     Skin-warm and dry    Well developed and nourished in no acute distress      Head: Normocephalic and atraumatic.     Eyes: Pupils are equal, round, and reactive to light.     Neck: Neck supple. No bruit in the carotids    Cardiovascular: [place] in the fifth intercostal space   Regular rate, and  Rhythm,    S1 greater than S2, no S3 or S4, no gallop     Pulmonary/Chest:   Air  Entry is equal on both sides  No wheezing or crackles,      Abdominal: Soft.  No hepatosplenomegaly, bowel sounds are present    Musculoskeletal: No kyphoscoliosis    Neurological: is alert and oriented to person, place, and time.    cranial nerve are intact .   No motor or sensory deficit    Extremities-no edema, no radial femoral delay      Psychiatric: He has a normal mood and affect.                  His behavior is normal.       "           Procedures      Lab Results   Component Value Date    WBC 7.59 04/05/2018    HGB 15.4 04/05/2018    HCT 44.2 04/05/2018    MCV 89.5 04/05/2018     04/05/2018     Lab Results   Component Value Date    GLUCOSE 98 04/05/2018    BUN 26 (H) 04/05/2018    CREATININE 1.26 04/05/2018    EGFRIFNONA 56 04/05/2018    BCR 20.6 04/05/2018    CO2 30.0 04/05/2018    CALCIUM 8.7 04/05/2018    ALBUMIN 4.10 04/05/2018    LABIL2 1.6 04/05/2018     (H) 04/05/2018    ALT 32 04/05/2018     Lab Results   Component Value Date    CHOL 213 (H) 04/05/2018    CHOL 223 (H) 11/28/2017    CHOL 255 (H) 03/09/2017     Lab Results   Component Value Date    TRIG 114 04/05/2018    TRIG 140 11/28/2017    TRIG 204 (H) 03/09/2017     Lab Results   Component Value Date    HDL 35 (L) 04/05/2018    HDL 50 (L) 11/28/2017    HDL 49 (L) 03/09/2017     No components found for: LDLCALC  Lab Results   Component Value Date     04/05/2018     (H) 11/28/2017     (H) 03/09/2017     No results found for: HDLLDLRATIO  No components found for: CHOLHDL  No results found for: HGBA1C  Lab Results   Component Value Date    TSH 0.78 02/11/2015                  A/P    Shortness of breath NYHA class II-did well on the treadmill walk 10 minutes, echo was unremarkable in September 2017 still having symptoms, will consider CT coronary angiogram as he has risk factors of hypertension and recent TIA secondary to possible plaque rupture or dissection in the right internal carotid artery.    Hypertension -well controlled with Avalide and Cardizem CD    Hyperlipidemia -could not tolerate statins and is tolerating the Zetia, lipids are okay with an LDL of 125    Follow-up in 6 months              This document has been electronically signed by Delroy Mcconnell MD on May 29, 2018 2:05 PM       EMR Dragon/Transcription disclaimer:   Some of this note may be an electronic transcription/translation of spoken language to printed text. The  electronic translation of spoken language may permit erroneous, or at times, nonsensical words or phrases to be inadvertently transcribed; Although I have reviewed the note for such errors, some may still exist.

## 2018-06-21 ENCOUNTER — APPOINTMENT (OUTPATIENT)
Dept: GENERAL RADIOLOGY | Facility: HOSPITAL | Age: 73
End: 2018-06-21
Attending: THORACIC SURGERY (CARDIOTHORACIC VASCULAR SURGERY)

## 2018-06-21 ENCOUNTER — APPOINTMENT (OUTPATIENT)
Dept: CARDIOLOGY | Facility: HOSPITAL | Age: 73
End: 2018-06-21
Attending: THORACIC SURGERY (CARDIOTHORACIC VASCULAR SURGERY)

## 2018-06-21 ENCOUNTER — HOSPITAL ENCOUNTER (INPATIENT)
Facility: HOSPITAL | Age: 73
LOS: 5 days | Discharge: HOME HEALTH | End: 2018-06-26
Attending: THORACIC SURGERY (CARDIOTHORACIC VASCULAR SURGERY) | Admitting: THORACIC SURGERY (CARDIOTHORACIC VASCULAR SURGERY)

## 2018-06-21 ENCOUNTER — APPOINTMENT (OUTPATIENT)
Dept: RESPIRATORY THERAPY | Facility: HOSPITAL | Age: 73
End: 2018-06-21
Attending: THORACIC SURGERY (CARDIOTHORACIC VASCULAR SURGERY)

## 2018-06-21 ENCOUNTER — ANESTHESIA EVENT (OUTPATIENT)
Dept: PERIOP | Facility: HOSPITAL | Age: 73
End: 2018-06-21

## 2018-06-21 DIAGNOSIS — Z95.1 S/P CABG X 5: ICD-10-CM

## 2018-06-21 DIAGNOSIS — I10 ESSENTIAL HYPERTENSION: ICD-10-CM

## 2018-06-21 DIAGNOSIS — R53.1 GENERALIZED WEAKNESS: ICD-10-CM

## 2018-06-21 DIAGNOSIS — I20.8 ANGINA OF EFFORT (HCC): Primary | ICD-10-CM

## 2018-06-21 DIAGNOSIS — I25.10 CORONARY ARTERY DISEASE INVOLVING NATIVE HEART, ANGINA PRESENCE UNSPECIFIED, UNSPECIFIED VESSEL OR LESION TYPE: ICD-10-CM

## 2018-06-21 LAB
ABO GROUP BLD: NORMAL
ALBUMIN SERPL-MCNC: 4.4 G/DL (ref 3.5–5.2)
ALBUMIN/GLOB SERPL: 1.6 G/DL
ALP SERPL-CCNC: 58 U/L (ref 39–117)
ALT SERPL W P-5'-P-CCNC: 23 U/L (ref 1–41)
ANION GAP SERPL CALCULATED.3IONS-SCNC: 14.9 MMOL/L
APTT PPP: 31.8 SECONDS (ref 22.7–35.4)
ARTERIAL PATENCY WRIST A: POSITIVE
AST SERPL-CCNC: 19 U/L (ref 1–40)
ATMOSPHERIC PRESS: 743.7 MMHG
BASE EXCESS BLDA CALC-SCNC: 0.6 MMOL/L (ref 0–2)
BASOPHILS # BLD AUTO: 0.09 10*3/MM3 (ref 0–0.2)
BASOPHILS NFR BLD AUTO: 1.3 % (ref 0–1.5)
BDY SITE: ABNORMAL
BH CV XLRA MEAS - DIST GSV CALF DIST LEFT: 0.17 CM
BH CV XLRA MEAS - DIST GSV CALF DIST RIGHT: 0.22 CM
BH CV XLRA MEAS - DIST GSV THIGH DIST LEFT: 0.2 CM
BH CV XLRA MEAS - DIST GSV THIGH DIST RIGHT: 0.17 CM
BH CV XLRA MEAS - DIST LSV CALF DIST LEFT: 0.19 CM
BH CV XLRA MEAS - DIST LSV CALF DIST RIGHT: 0.19 CM
BH CV XLRA MEAS - GSV ANKLE DIST LEFT: 0.19 CM
BH CV XLRA MEAS - GSV ANKLE DIST RIGHT: 0.23 CM
BH CV XLRA MEAS - GSV KNEE DIST LEFT: 0.21 CM
BH CV XLRA MEAS - GSV KNEE DIST RIGHT: 0.14 CM
BH CV XLRA MEAS - GSV ORIGIN DIST LEFT: 0.37 CM
BH CV XLRA MEAS - GSV ORIGIN DIST RIGHT: 0.33 CM
BH CV XLRA MEAS - MID GSV CALF LEFT: 0.08 CM
BH CV XLRA MEAS - MID GSV CALF RIGHT: 0.09 CM
BH CV XLRA MEAS - MID GSV THIGH  LEFT: 0.24 CM
BH CV XLRA MEAS - MID GSV THIGH  RIGHT: 0.2 CM
BH CV XLRA MEAS - MID LSV CALF DIST LEFT: 0.17 CM
BH CV XLRA MEAS - MID LSV CALF DIST RIGHT: 0.2 CM
BH CV XLRA MEAS - PROX GSV CALF DIST LEFT: 0.14 CM
BH CV XLRA MEAS - PROX GSV CALF DIST RIGHT: 0.13 CM
BH CV XLRA MEAS - PROX GSV THIGH  LEFT: 0.23 CM
BH CV XLRA MEAS - PROX GSV THIGH  RIGHT: 0.18 CM
BH CV XLRA MEAS - PROX LSV CALF DIST LEFT: 0.16 CM
BH CV XLRA MEAS - PROX LSV CALF DIST RIGHT: 0.17 CM
BILIRUB SERPL-MCNC: 0.6 MG/DL (ref 0.1–1.2)
BILIRUB UR QL STRIP: NEGATIVE
BLD GP AB SCN SERPL QL: NEGATIVE
BUN BLD-MCNC: 22 MG/DL (ref 8–23)
BUN/CREAT SERPL: 19.1 (ref 7–25)
CALCIUM SPEC-SCNC: 9.5 MG/DL (ref 8.6–10.5)
CHLORIDE SERPL-SCNC: 100 MMOL/L (ref 98–107)
CLARITY UR: CLEAR
CLOSE TME COLL+ADP + EPINEP PNL BLD: 44 % (ref 86–100)
CO2 SERPL-SCNC: 25.1 MMOL/L (ref 22–29)
COLOR UR: YELLOW
CREAT BLD-MCNC: 1.15 MG/DL (ref 0.76–1.27)
DEPRECATED RDW RBC AUTO: 43.9 FL (ref 37–54)
EOSINOPHIL # BLD AUTO: 0.03 10*3/MM3 (ref 0–0.7)
EOSINOPHIL NFR BLD AUTO: 0.4 % (ref 0.3–6.2)
ERYTHROCYTE [DISTWIDTH] IN BLOOD BY AUTOMATED COUNT: 13.4 % (ref 11.5–14.5)
GFR SERPL CREATININE-BSD FRML MDRD: 62 ML/MIN/1.73
GLOBULIN UR ELPH-MCNC: 2.7 GM/DL
GLUCOSE BLD-MCNC: 107 MG/DL (ref 65–99)
GLUCOSE UR STRIP-MCNC: NEGATIVE MG/DL
HCO3 BLDA-SCNC: 25 MMOL/L (ref 22–28)
HCT VFR BLD AUTO: 45.8 % (ref 40.4–52.2)
HGB BLD-MCNC: 15.7 G/DL (ref 13.7–17.6)
HGB UR QL STRIP.AUTO: NEGATIVE
IMM GRANULOCYTES # BLD: 0.02 10*3/MM3 (ref 0–0.03)
IMM GRANULOCYTES NFR BLD: 0.3 % (ref 0–0.5)
INR PPP: 1.03 (ref 0.9–1.1)
KETONES UR QL STRIP: NEGATIVE
LEUKOCYTE ESTERASE UR QL STRIP.AUTO: NEGATIVE
LYMPHOCYTES # BLD AUTO: 1.74 10*3/MM3 (ref 0.9–4.8)
LYMPHOCYTES NFR BLD AUTO: 25 % (ref 19.6–45.3)
MCH RBC QN AUTO: 31.2 PG (ref 27–32.7)
MCHC RBC AUTO-ENTMCNC: 34.3 G/DL (ref 32.6–36.4)
MCV RBC AUTO: 91.1 FL (ref 79.8–96.2)
MODALITY: ABNORMAL
MONOCYTES # BLD AUTO: 0.47 10*3/MM3 (ref 0.2–1.2)
MONOCYTES NFR BLD AUTO: 6.8 % (ref 5–12)
NEUTROPHILS # BLD AUTO: 4.62 10*3/MM3 (ref 1.9–8.1)
NEUTROPHILS NFR BLD AUTO: 66.5 % (ref 42.7–76)
NITRITE UR QL STRIP: NEGATIVE
PCO2 BLDA: 38.8 MM HG (ref 35–45)
PH BLDA: 7.42 PH UNITS (ref 7.35–7.45)
PH UR STRIP.AUTO: <=5 [PH] (ref 5–8)
PLATELET # BLD AUTO: 306 10*3/MM3 (ref 140–500)
PMV BLD AUTO: 10.9 FL (ref 6–12)
PO2 BLDA: 71.7 MM HG (ref 80–100)
POTASSIUM BLD-SCNC: 4.2 MMOL/L (ref 3.5–5.2)
PROT SERPL-MCNC: 7.1 G/DL (ref 6–8.5)
PROT UR QL STRIP: NEGATIVE
PROTHROMBIN TIME: 13.3 SECONDS (ref 11.7–14.2)
RBC # BLD AUTO: 5.03 10*6/MM3 (ref 4.6–6)
RH BLD: NEGATIVE
SAO2 % BLDCOA: 94.5 % (ref 92–99)
SET MECH RESP RATE: 18
SODIUM BLD-SCNC: 140 MMOL/L (ref 136–145)
SP GR UR STRIP: 1.01 (ref 1–1.03)
T&S EXPIRATION DATE: NORMAL
UROBILINOGEN UR QL STRIP: NORMAL
WBC NRBC COR # BLD: 6.95 10*3/MM3 (ref 4.5–10.7)

## 2018-06-21 PROCEDURE — 86850 RBC ANTIBODY SCREEN: CPT | Performed by: THORACIC SURGERY (CARDIOTHORACIC VASCULAR SURGERY)

## 2018-06-21 PROCEDURE — 36600 WITHDRAWAL OF ARTERIAL BLOOD: CPT

## 2018-06-21 PROCEDURE — 94799 UNLISTED PULMONARY SVC/PX: CPT

## 2018-06-21 PROCEDURE — 93005 ELECTROCARDIOGRAM TRACING: CPT | Performed by: THORACIC SURGERY (CARDIOTHORACIC VASCULAR SURGERY)

## 2018-06-21 PROCEDURE — 81003 URINALYSIS AUTO W/O SCOPE: CPT | Performed by: THORACIC SURGERY (CARDIOTHORACIC VASCULAR SURGERY)

## 2018-06-21 PROCEDURE — 94010 BREATHING CAPACITY TEST: CPT

## 2018-06-21 PROCEDURE — 71046 X-RAY EXAM CHEST 2 VIEWS: CPT

## 2018-06-21 PROCEDURE — 85025 COMPLETE CBC W/AUTO DIFF WBC: CPT | Performed by: THORACIC SURGERY (CARDIOTHORACIC VASCULAR SURGERY)

## 2018-06-21 PROCEDURE — 85610 PROTHROMBIN TIME: CPT | Performed by: THORACIC SURGERY (CARDIOTHORACIC VASCULAR SURGERY)

## 2018-06-21 PROCEDURE — 82803 BLOOD GASES ANY COMBINATION: CPT

## 2018-06-21 PROCEDURE — 86900 BLOOD TYPING SEROLOGIC ABO: CPT | Performed by: THORACIC SURGERY (CARDIOTHORACIC VASCULAR SURGERY)

## 2018-06-21 PROCEDURE — 86920 COMPATIBILITY TEST SPIN: CPT

## 2018-06-21 PROCEDURE — 93010 ELECTROCARDIOGRAM REPORT: CPT | Performed by: INTERNAL MEDICINE

## 2018-06-21 PROCEDURE — 93970 EXTREMITY STUDY: CPT

## 2018-06-21 PROCEDURE — 99223 1ST HOSP IP/OBS HIGH 75: CPT | Performed by: THORACIC SURGERY (CARDIOTHORACIC VASCULAR SURGERY)

## 2018-06-21 PROCEDURE — 86901 BLOOD TYPING SEROLOGIC RH(D): CPT | Performed by: THORACIC SURGERY (CARDIOTHORACIC VASCULAR SURGERY)

## 2018-06-21 PROCEDURE — 85576 BLOOD PLATELET AGGREGATION: CPT | Performed by: THORACIC SURGERY (CARDIOTHORACIC VASCULAR SURGERY)

## 2018-06-21 PROCEDURE — 85730 THROMBOPLASTIN TIME PARTIAL: CPT | Performed by: THORACIC SURGERY (CARDIOTHORACIC VASCULAR SURGERY)

## 2018-06-21 PROCEDURE — 80053 COMPREHEN METABOLIC PANEL: CPT | Performed by: THORACIC SURGERY (CARDIOTHORACIC VASCULAR SURGERY)

## 2018-06-21 RX ORDER — ACETAMINOPHEN 325 MG/1
650 TABLET ORAL EVERY 4 HOURS PRN
Status: DISCONTINUED | OUTPATIENT
Start: 2018-06-21 | End: 2018-06-22 | Stop reason: SDUPTHER

## 2018-06-21 RX ORDER — SUCRALFATE 1 G/1
1 TABLET ORAL 4 TIMES DAILY
Status: DISCONTINUED | OUTPATIENT
Start: 2018-06-21 | End: 2018-06-22

## 2018-06-21 RX ORDER — POTASSIUM CHLORIDE 750 MG/1
40 CAPSULE, EXTENDED RELEASE ORAL AS NEEDED
Status: DISCONTINUED | OUTPATIENT
Start: 2018-06-21 | End: 2018-06-22 | Stop reason: SDUPTHER

## 2018-06-21 RX ORDER — PANTOPRAZOLE SODIUM 40 MG/1
40 TABLET, DELAYED RELEASE ORAL EVERY MORNING
Status: DISCONTINUED | OUTPATIENT
Start: 2018-06-22 | End: 2018-06-22 | Stop reason: SDUPTHER

## 2018-06-21 RX ORDER — AMIODARONE HYDROCHLORIDE 200 MG/1
400 TABLET ORAL EVERY 8 HOURS
Status: COMPLETED | OUTPATIENT
Start: 2018-06-21 | End: 2018-06-22

## 2018-06-21 RX ORDER — METOPROLOL SUCCINATE 50 MG/1
50 TABLET, EXTENDED RELEASE ORAL DAILY
Status: DISCONTINUED | OUTPATIENT
Start: 2018-06-21 | End: 2018-06-21

## 2018-06-21 RX ORDER — DILTIAZEM HYDROCHLORIDE 300 MG/1
300 CAPSULE, COATED, EXTENDED RELEASE ORAL DAILY
Status: DISCONTINUED | OUTPATIENT
Start: 2018-06-21 | End: 2018-06-22

## 2018-06-21 RX ORDER — POTASSIUM CHLORIDE 7.45 MG/ML
10 INJECTION INTRAVENOUS
Status: DISCONTINUED | OUTPATIENT
Start: 2018-06-21 | End: 2018-06-22 | Stop reason: SDUPTHER

## 2018-06-21 RX ORDER — TRAZODONE HYDROCHLORIDE 50 MG/1
50 TABLET ORAL NIGHTLY
Status: DISCONTINUED | OUTPATIENT
Start: 2018-06-21 | End: 2018-06-22

## 2018-06-21 RX ORDER — NITROGLYCERIN 0.4 MG/1
0.4 TABLET SUBLINGUAL
Status: DISCONTINUED | OUTPATIENT
Start: 2018-06-21 | End: 2018-06-22

## 2018-06-21 RX ORDER — TEMAZEPAM 7.5 MG/1
7.5 CAPSULE ORAL NIGHTLY PRN
Status: DISCONTINUED | OUTPATIENT
Start: 2018-06-21 | End: 2018-06-22

## 2018-06-21 RX ORDER — FLUTICASONE PROPIONATE 50 MCG
2 SPRAY, SUSPENSION (ML) NASAL DAILY
Status: DISCONTINUED | OUTPATIENT
Start: 2018-06-21 | End: 2018-06-22

## 2018-06-21 RX ORDER — CEFAZOLIN SODIUM 2 G/100ML
2 INJECTION, SOLUTION INTRAVENOUS
Status: COMPLETED | OUTPATIENT
Start: 2018-06-22 | End: 2018-06-22

## 2018-06-21 RX ORDER — CETIRIZINE HYDROCHLORIDE 10 MG/1
10 TABLET ORAL DAILY
Status: DISCONTINUED | OUTPATIENT
Start: 2018-06-21 | End: 2018-06-22

## 2018-06-21 RX ORDER — POTASSIUM CHLORIDE 1.5 G/1.77G
40 POWDER, FOR SOLUTION ORAL AS NEEDED
Status: DISCONTINUED | OUTPATIENT
Start: 2018-06-21 | End: 2018-06-22 | Stop reason: SDUPTHER

## 2018-06-21 RX ORDER — ALPRAZOLAM 0.5 MG/1
0.5 TABLET ORAL 3 TIMES DAILY
Status: DISCONTINUED | OUTPATIENT
Start: 2018-06-21 | End: 2018-06-22

## 2018-06-21 RX ORDER — FINASTERIDE 5 MG/1
5 TABLET, FILM COATED ORAL DAILY
Status: DISCONTINUED | OUTPATIENT
Start: 2018-06-21 | End: 2018-06-22

## 2018-06-21 RX ORDER — CHLORHEXIDINE GLUCONATE 500 MG/1
1 CLOTH TOPICAL EVERY 12 HOURS
Status: COMPLETED | OUTPATIENT
Start: 2018-06-21 | End: 2018-06-22

## 2018-06-21 RX ORDER — FINASTERIDE 1 MG/1
1 TABLET, FILM COATED ORAL DAILY
Status: DISCONTINUED | OUTPATIENT
Start: 2018-06-21 | End: 2018-06-21

## 2018-06-21 RX ORDER — CHLORHEXIDINE GLUCONATE 0.12 MG/ML
15 RINSE ORAL EVERY 12 HOURS SCHEDULED
Status: COMPLETED | OUTPATIENT
Start: 2018-06-21 | End: 2018-06-22

## 2018-06-21 RX ADMIN — CHLORHEXIDINE GLUCONATE 15 ML: 1.2 RINSE ORAL at 18:06

## 2018-06-21 RX ADMIN — AMIODARONE HYDROCHLORIDE 400 MG: 200 TABLET ORAL at 18:05

## 2018-06-21 RX ADMIN — CHLORHEXIDINE GLUCONATE 1 APPLICATION: 500 CLOTH TOPICAL at 21:18

## 2018-06-21 RX ADMIN — MUPIROCIN 1 APPLICATION: 20 OINTMENT TOPICAL at 18:07

## 2018-06-21 RX ADMIN — ALPRAZOLAM 0.5 MG: 0.5 TABLET ORAL at 15:31

## 2018-06-21 RX ADMIN — ALPRAZOLAM 0.5 MG: 0.5 TABLET ORAL at 21:15

## 2018-06-21 RX ADMIN — AMIODARONE HYDROCHLORIDE 400 MG: 200 TABLET ORAL at 21:14

## 2018-06-21 RX ADMIN — SUCRALFATE 1 G: 1 TABLET ORAL at 18:07

## 2018-06-21 RX ADMIN — TRAZODONE HYDROCHLORIDE 50 MG: 50 TABLET ORAL at 21:14

## 2018-06-21 RX ADMIN — DILTIAZEM HYDROCHLORIDE 300 MG: 300 CAPSULE, COATED, EXTENDED RELEASE ORAL at 18:06

## 2018-06-21 RX ADMIN — SUCRALFATE 1 G: 1 TABLET ORAL at 21:14

## 2018-06-21 NOTE — H&P
CARDIOTHORACIC SURGERY HISTORY AND PHYSICAL      Patient Care Team:  Souleymane Murrieta MD as PCP - General    Chief complaint: Redness of air and fatigue.  Positive cardiac catheterization.        History of Present Illness    This is a retired  from [place] who is had increasing shortness of air and fatigue from multivessel coronary disease.  About a month ago he had neck surgery and did well with it.  His stress test was negative but a CTA was performed and it was positive.  This resulted in a cardiac catheterization.  There is some history of a left carotid dissection years ago seen at [facility] in [place] but there is been no further evidence.  He is had 2 TIAs in the past.  This is been followed carefully by vascular surgery and neurosurgery.  He had a carotid Doppler [place] that is satisfactory.  Cardiac Cath: That I personally reviewed multivessel coronary disease with a 90% LAD and a 90% diagonal branch.  They're to marginals with 90% lesions.  The right coronary artery has minimal irregularities.  The ejection fraction is 60%    ECHO  That I personally reviewed: Trace to mild aortic incompetence with normal LV function and no mitral valve disease.    His Plavix was stopped on Tuesday and he is transferred for urgent surgery.  Review of Systems   Constitutional: Positive for activity change and fatigue.   HENT: Negative.    Eyes: Negative.    Respiratory: Positive for shortness of breath.    Cardiovascular: Negative.    Gastrointestinal: Negative.    Endocrine: Negative.    Genitourinary: Negative.    Musculoskeletal: Negative.    Skin: Negative.    Allergic/Immunologic: Negative.    Neurological: Negative.    Hematological: Negative.    Psychiatric/Behavioral: Negative.         Past Medical History:   Diagnosis Date   • Abdominal pain    • Abnormal weight loss    • Acid reflux    • Acute gastritis    • Anxiety state    • Arthritis    • Cancer     Prostate   • History of  cerebrovascular accident    • History of colon polyps    • Hypertension    • Nausea    • Nuclear senile cataract    • Presbyopia    • Tobacco use    • Transient cerebral ischemia    • Vitreous detachment of left eye      Past Surgical History:   Procedure Laterality Date   • BACK SURGERY     • CARDIAC CATHETERIZATION N/A 6/19/2018    Procedure: Coronary angiography;  Surgeon: Delroy Mcconnell MD;  Location: [facility] INVASIVE LOCATION;  Service: Cardiovascular   • COLONOSCOPY  06/09/2015    Diverticulosis found in the sigmoid colon. Hemorrhoids found in the anus.   • CRYOTHERAPY  08/14/2012    Of Acne   • ENDOSCOPY  09/01/2015    EGD w/ biopsy -Multiple polyps, one removed.   • HEMORRHOIDECTOMY N/A 3/20/2017    Procedure: Removal of Anal Papilla;  Surgeon: Juan Diego Ken MD;  Location: [facility] OR;  Service:    • INJECTION OF MEDICATION  09/14/2010    B12   • INJECTION OF MEDICATION  10/17/2011    Kenalog   • LUMBAR LAMINECTOMY  09/29/2010   • OTHER SURGICAL HISTORY  08/19/2010    Biopsy, Each Additional Lesion    • SKIN BIOPSY  08/19/2010     Family History   Problem Relation Age of Onset   • Dementia Other    • Hypertension Other    • Stroke Other    • Hypertension Mother    • Hypertension Father      Social History   Substance Use Topics   • Smoking status: Former Smoker     Types: Cigarettes     Quit date: 1997   • Smokeless tobacco: Never Used   • Alcohol use Yes      Comment: Social     Prescriptions Prior to Admission   Medication Sig Dispense Refill Last Dose   • ALPRAZolam (XANAX) 0.5 MG tablet Take 1 tablet by mouth 3 (Three) Times a Day. 3 tablet 0 6/19/2018 at 0530   • clopidogrel (PLAVIX) 75 MG tablet Take 1 tablet by mouth Daily. 3 tablet 0 6/19/2018 at 0530   • diltiaZEM CD (CARTIA XT) 300 MG 24 hr capsule Take 1 capsule by mouth Daily. 3 capsule 0 6/18/2018 at 2200   • dutasteride (AVODART) 0.5 MG capsule Take 1 capsule by mouth Every Night. 3 capsule 0 6/18/2018 at 2200   • ezetimibe  (ZETIA) 10 MG tablet Take 1 tablet by mouth Daily. 3 tablet 0 6/19/2018 at 0530   • finasteride (PROPECIA) 1 MG tablet Take 1 tablet by mouth Daily. 3 tablet 0 6/19/2018 at 0530   • fluticasone (FLONASE) 50 MCG/ACT nasal spray 2 sprays into each nostril Daily. 16 g 3 6/19/2018 at 0530   • irbesartan-hydrochlorothiazide (AVALIDE) 300-12.5 MG tablet Take 1 tablet by mouth Daily. 3 tablet 0 6/19/2018 at 0530   • Loratadine (CLARITIN PO) Take  by mouth.   Taking   • meloxicam (MOBIC) 15 MG tablet Take 1 tablet by mouth Daily. 3 tablet 0 6/19/2018 at 0530   • metoprolol succinate XL (TOPROL-XL) 50 MG 24 hr tablet Take 1 tablet by mouth Daily. 2 tablet 0 6/19/2018 at Unknown time   • omeprazole (priLOSEC) 40 MG capsule Take 1 capsule by mouth Daily. 3 capsule 0 6/19/2018 at 0530   • Red Yeast Rice Extract 300 MG capsule Take  by mouth.   Taking   • sucralfate (CARAFATE) 1 g tablet Take 1 tablet by mouth 4 (Four) Times a Day. 30 tablet 5 6/18/2018 at 2200   • traZODone (DESYREL) 50 MG tablet Take 1 tablet by mouth Every Night. 30 tablet 5 6/18/2018 at 2200       ALPRAZolam 0.5 mg Oral TID   amiodarone 400 mg Oral Q8H   [START ON 6/22/2018] ceFAZolin 2 g Intravenous On Call to OR   cetirizine 10 mg Oral Daily   chlorhexidine 15 mL Mouth/Throat Q12H   Chlorhexidine Gluconate Cloth 1 application Topical Q12H   diltiaZEM  mg Oral Daily   finasteride 5 mg Oral Daily   fluticasone 2 spray Nasal Daily   metoprolol succinate XL 50 mg Oral Daily   [START ON 6/22/2018] metoprolol tartrate 25 mg Oral On Call to OR   mupirocin 1 application Each Nare Q12H   [START ON 6/22/2018] pantoprazole 40 mg Oral QAM   sucralfate 1 g Oral 4x Daily   traZODone 50 mg Oral Nightly     Allergies:  Statins and Tricor [fenofibrate]    Objective      Vital Signs  Temp:  [97.7 °F (36.5 °C)] 97.7 °F (36.5 °C)  Heart Rate:  [69] 69  Resp:  [16] 16  BP: (147)/(73) 147/73    Flowsheet Rows      First Filed Value   Admission Height  --   Admission  Weight  74.2 kg (163 lb 9.6 oz) Documented at 06/21/2018 1258             Physical Exam   Constitutional: He is oriented to person, place, and time. He appears well-developed and well-nourished. No distress.   HENT:   Head: Normocephalic and atraumatic.   Right Ear: External ear normal.   Left Ear: External ear normal.   Eyes: Conjunctivae and EOM are normal. Pupils are equal, round, and reactive to light. Right eye exhibits no discharge. Left eye exhibits no discharge. No scleral icterus.       Neck: Normal range of motion. Neck supple. No JVD present. No tracheal deviation present. No thyromegaly present.   Cardiovascular: Normal rate, regular rhythm, S1 normal, S2 normal, normal heart sounds, intact distal pulses and normal pulses.  Exam reveals no gallop, no S3, no S4, no distant heart sounds and no friction rub.    No murmur heard.  Pulses:       Carotid pulses are 2+ on the right side, and 2+ on the left side.  Pulmonary/Chest: Effort normal and breath sounds normal. No stridor. No respiratory distress. He has no wheezes. He has no rales. He exhibits no tenderness.   Abdominal: Soft. Bowel sounds are normal. He exhibits no distension and no mass. There is no tenderness. There is no rebound and no guarding.   Musculoskeletal: Normal range of motion. He exhibits no edema, tenderness or deformity.   Lymphadenopathy:     He has no cervical adenopathy.   Neurological: He is alert and oriented to person, place, and time. He displays normal reflexes. No cranial nerve deficit or sensory deficit. He exhibits normal muscle tone. Coordination normal.   Skin: Skin is warm and dry. Capillary refill takes less than 2 seconds. No rash noted. He is not diaphoretic. No erythema. No pallor.   Psychiatric: He has a normal mood and affect. His behavior is normal. Judgment and thought content normal.       Results Review:   Lab Results (last 24 hours)     ** No results found for the last 24 hours. **              Assessment/Plan      Active Problems:    * No active hospital problems. *      Assessment:  (Multivessel coronary disease involving the LAD and circumflex not amenable to stenting.  I agree that CABG is advisable.).     Plan:   (Plan CABG in the morning.  Will check a platelet aggregation but it should be fine.  I discussed all this with the patient and family with a risk of around 1-2% per STS.).           Edgar Pérez MD  06/21/18  1:35 PM

## 2018-06-22 ENCOUNTER — ANCILLARY PROCEDURE (OUTPATIENT)
Dept: PERIOP | Facility: HOSPITAL | Age: 73
End: 2018-06-22
Attending: ANESTHESIOLOGY

## 2018-06-22 ENCOUNTER — APPOINTMENT (OUTPATIENT)
Dept: GENERAL RADIOLOGY | Facility: HOSPITAL | Age: 73
End: 2018-06-22

## 2018-06-22 ENCOUNTER — ANESTHESIA (OUTPATIENT)
Dept: PERIOP | Facility: HOSPITAL | Age: 73
End: 2018-06-22

## 2018-06-22 LAB
ACT BLD: 114 SECONDS (ref 82–152)
ACT BLD: 131 SECONDS (ref 82–152)
ACT BLD: 384 SECONDS (ref 82–152)
ACT BLD: 439 SECONDS (ref 82–152)
ACT BLD: 676 SECONDS (ref 82–152)
ALBUMIN SERPL-MCNC: 3.8 G/DL (ref 3.5–5.2)
ALBUMIN SERPL-MCNC: 4.6 G/DL (ref 3.5–5.2)
ANION GAP SERPL CALCULATED.3IONS-SCNC: 12.9 MMOL/L
ANION GAP SERPL CALCULATED.3IONS-SCNC: 15.7 MMOL/L
ANION GAP SERPL CALCULATED.3IONS-SCNC: 17.5 MMOL/L
APTT PPP: 30.1 SECONDS (ref 22.7–35.4)
ARTERIAL PATENCY WRIST A: ABNORMAL
ARTERIAL PATENCY WRIST A: ABNORMAL
ARTERIAL PATENCY WRIST A: NORMAL
ATMOSPHERIC PRESS: 743.6 MMHG
ATMOSPHERIC PRESS: 743.8 MMHG
ATMOSPHERIC PRESS: 745 MMHG
BASE EXCESS BLDA CALC-SCNC: -0.1 MMOL/L (ref 0–2)
BASE EXCESS BLDA CALC-SCNC: -2.2 MMOL/L (ref 0–2)
BASE EXCESS BLDA CALC-SCNC: 0 MMOL/L (ref -5–5)
BASE EXCESS BLDA CALC-SCNC: 0.2 MMOL/L (ref 0–2)
BASE EXCESS BLDA CALC-SCNC: 2 MMOL/L (ref -5–5)
BASE EXCESS BLDA CALC-SCNC: 3 MMOL/L (ref -5–5)
BASOPHILS # BLD AUTO: 0.06 10*3/MM3 (ref 0–0.2)
BASOPHILS NFR BLD AUTO: 0.3 % (ref 0–1.5)
BDY SITE: ABNORMAL
BDY SITE: ABNORMAL
BDY SITE: NORMAL
BUN BLD-MCNC: 17 MG/DL (ref 8–23)
BUN BLD-MCNC: 18 MG/DL (ref 8–23)
BUN BLD-MCNC: 21 MG/DL (ref 8–23)
BUN/CREAT SERPL: 12.4 (ref 7–25)
BUN/CREAT SERPL: 12.7 (ref 7–25)
BUN/CREAT SERPL: 18.9 (ref 7–25)
CA-I BLD-MCNC: 4.8 MG/DL (ref 4.6–5.4)
CA-I SERPL ISE-MCNC: 1.19 MMOL/L (ref 1.15–1.35)
CALCIUM SPEC-SCNC: 8.1 MG/DL (ref 8.6–10.5)
CALCIUM SPEC-SCNC: 8.1 MG/DL (ref 8.6–10.5)
CALCIUM SPEC-SCNC: 8.8 MG/DL (ref 8.6–10.5)
CHLORIDE SERPL-SCNC: 102 MMOL/L (ref 98–107)
CHLORIDE SERPL-SCNC: 103 MMOL/L (ref 98–107)
CHLORIDE SERPL-SCNC: 106 MMOL/L (ref 98–107)
CO2 BLDA-SCNC: 26 MMOL/L (ref 24–29)
CO2 BLDA-SCNC: 26 MMOL/L (ref 24–29)
CO2 BLDA-SCNC: 27 MMOL/L (ref 24–29)
CO2 BLDA-SCNC: 29 MMOL/L (ref 24–29)
CO2 BLDA-SCNC: 30 MMOL/L (ref 24–29)
CO2 SERPL-SCNC: 20.3 MMOL/L (ref 22–29)
CO2 SERPL-SCNC: 22.5 MMOL/L (ref 22–29)
CO2 SERPL-SCNC: 26.1 MMOL/L (ref 22–29)
CREAT BLD-MCNC: 1.11 MG/DL (ref 0.76–1.27)
CREAT BLD-MCNC: 1.37 MG/DL (ref 0.76–1.27)
CREAT BLD-MCNC: 1.42 MG/DL (ref 0.76–1.27)
DEPRECATED RDW RBC AUTO: 45.4 FL (ref 37–54)
DEPRECATED RDW RBC AUTO: 45.6 FL (ref 37–54)
EOSINOPHIL # BLD AUTO: 0.13 10*3/MM3 (ref 0–0.7)
EOSINOPHIL NFR BLD AUTO: 0.6 % (ref 0.3–6.2)
ERYTHROCYTE [DISTWIDTH] IN BLOOD BY AUTOMATED COUNT: 13.5 % (ref 11.5–14.5)
ERYTHROCYTE [DISTWIDTH] IN BLOOD BY AUTOMATED COUNT: 13.7 % (ref 11.5–14.5)
FIBRINOGEN PPP-MCNC: 173 MG/DL (ref 219–464)
GFR SERPL CREATININE-BSD FRML MDRD: 49 ML/MIN/1.73
GFR SERPL CREATININE-BSD FRML MDRD: 51 ML/MIN/1.73
GFR SERPL CREATININE-BSD FRML MDRD: 65 ML/MIN/1.73
GLUCOSE BLD-MCNC: 101 MG/DL (ref 65–99)
GLUCOSE BLD-MCNC: 123 MG/DL (ref 65–99)
GLUCOSE BLD-MCNC: 148 MG/DL (ref 65–99)
GLUCOSE BLDC GLUCOMTR-MCNC: 115 MG/DL (ref 70–130)
GLUCOSE BLDC GLUCOMTR-MCNC: 117 MG/DL (ref 70–130)
GLUCOSE BLDC GLUCOMTR-MCNC: 121 MG/DL (ref 70–130)
GLUCOSE BLDC GLUCOMTR-MCNC: 122 MG/DL (ref 70–130)
GLUCOSE BLDC GLUCOMTR-MCNC: 123 MG/DL (ref 70–130)
GLUCOSE BLDC GLUCOMTR-MCNC: 123 MG/DL (ref 70–130)
GLUCOSE BLDC GLUCOMTR-MCNC: 128 MG/DL (ref 70–130)
GLUCOSE BLDC GLUCOMTR-MCNC: 131 MG/DL (ref 70–130)
GLUCOSE BLDC GLUCOMTR-MCNC: 140 MG/DL (ref 70–130)
GLUCOSE BLDC GLUCOMTR-MCNC: 155 MG/DL (ref 70–130)
GLUCOSE BLDC GLUCOMTR-MCNC: 160 MG/DL (ref 70–130)
GLUCOSE BLDC GLUCOMTR-MCNC: 162 MG/DL (ref 70–130)
GLUCOSE BLDC GLUCOMTR-MCNC: 180 MG/DL (ref 70–130)
HCO3 BLDA-SCNC: 23.1 MMOL/L (ref 22–28)
HCO3 BLDA-SCNC: 24.4 MMOL/L (ref 22–28)
HCO3 BLDA-SCNC: 24.5 MMOL/L (ref 22–26)
HCO3 BLDA-SCNC: 25.1 MMOL/L (ref 22–26)
HCO3 BLDA-SCNC: 25.1 MMOL/L (ref 22–28)
HCO3 BLDA-SCNC: 25.7 MMOL/L (ref 22–26)
HCO3 BLDA-SCNC: 27.2 MMOL/L (ref 22–26)
HCO3 BLDA-SCNC: 28.6 MMOL/L (ref 22–26)
HCT VFR BLD AUTO: 28.2 % (ref 40.4–52.2)
HCT VFR BLD AUTO: 35.8 % (ref 40.4–52.2)
HCT VFR BLDA CALC: 28 % (ref 38–51)
HCT VFR BLDA CALC: 29 % (ref 38–51)
HCT VFR BLDA CALC: 30 % (ref 38–51)
HCT VFR BLDA CALC: 32 % (ref 38–51)
HCT VFR BLDA CALC: 40 % (ref 38–51)
HGB BLD-MCNC: 11.9 G/DL (ref 13.7–17.6)
HGB BLD-MCNC: 9.6 G/DL (ref 13.7–17.6)
HGB BLDA-MCNC: 10.2 G/DL (ref 12–17)
HGB BLDA-MCNC: 10.9 G/DL (ref 12–17)
HGB BLDA-MCNC: 13.6 G/DL (ref 12–17)
HGB BLDA-MCNC: 9.5 G/DL (ref 12–17)
HGB BLDA-MCNC: 9.9 G/DL (ref 12–17)
HOROWITZ INDEX BLD+IHG-RTO: 100 %
HOROWITZ INDEX BLD+IHG-RTO: 40 %
HOROWITZ INDEX BLD+IHG-RTO: 40 %
IMM GRANULOCYTES # BLD: 0.09 10*3/MM3 (ref 0–0.03)
IMM GRANULOCYTES NFR BLD: 0.4 % (ref 0–0.5)
INR PPP: 1.5 (ref 0.9–1.1)
LYMPHOCYTES # BLD AUTO: 2.56 10*3/MM3 (ref 0.9–4.8)
LYMPHOCYTES NFR BLD AUTO: 12.8 % (ref 19.6–45.3)
MAGNESIUM SERPL-MCNC: 2.6 MG/DL (ref 1.6–2.4)
MAGNESIUM SERPL-MCNC: 2.7 MG/DL (ref 1.6–2.4)
MCH RBC QN AUTO: 30.7 PG (ref 27–32.7)
MCH RBC QN AUTO: 31.3 PG (ref 27–32.7)
MCHC RBC AUTO-ENTMCNC: 33.2 G/DL (ref 32.6–36.4)
MCHC RBC AUTO-ENTMCNC: 34 G/DL (ref 32.6–36.4)
MCV RBC AUTO: 91.9 FL (ref 79.8–96.2)
MCV RBC AUTO: 92.5 FL (ref 79.8–96.2)
MODALITY: ABNORMAL
MODALITY: ABNORMAL
MODALITY: NORMAL
MONOCYTES # BLD AUTO: 1.05 10*3/MM3 (ref 0.2–1.2)
MONOCYTES NFR BLD AUTO: 5.2 % (ref 5–12)
NEUTROPHILS # BLD AUTO: 16.23 10*3/MM3 (ref 1.9–8.1)
NEUTROPHILS NFR BLD AUTO: 81.1 % (ref 42.7–76)
O2 A-A PPRESDIFF RESPIRATORY: 0.3 MMHG
O2 A-A PPRESDIFF RESPIRATORY: 0.4 MMHG
O2 A-A PPRESDIFF RESPIRATORY: 0.5 MMHG
PCO2 BLDA: 36.7 MM HG (ref 35–45)
PCO2 BLDA: 40.4 MM HG (ref 35–45)
PCO2 BLDA: 40.7 MM HG (ref 35–45)
PCO2 BLDA: 42.7 MM HG (ref 35–45)
PCO2 BLDA: 42.7 MM HG (ref 35–45)
PCO2 BLDA: 47.2 MM HG (ref 35–45)
PCO2 BLDA: 50.2 MM HG (ref 35–45)
PCO2 BLDA: 50.3 MM HG (ref 35–45)
PEEP RESPIRATORY: 5 CM[H2O]
PH BLDA: 7.34 PH UNITS (ref 7.35–7.6)
PH BLDA: 7.34 PH UNITS (ref 7.35–7.6)
PH BLDA: 7.36 PH UNITS (ref 7.35–7.45)
PH BLDA: 7.36 PH UNITS (ref 7.35–7.6)
PH BLDA: 7.38 PH UNITS (ref 7.35–7.45)
PH BLDA: 7.38 PH UNITS (ref 7.35–7.6)
PH BLDA: 7.39 PH UNITS (ref 7.35–7.6)
PH BLDA: 7.43 PH UNITS (ref 7.35–7.45)
PHOSPHATE SERPL-MCNC: 2.4 MG/DL (ref 2.5–4.5)
PHOSPHATE SERPL-MCNC: 2.6 MG/DL (ref 2.5–4.5)
PLATELET # BLD AUTO: 172 10*3/MM3 (ref 140–500)
PLATELET # BLD AUTO: 219 10*3/MM3 (ref 140–500)
PMV BLD AUTO: 10.7 FL (ref 6–12)
PMV BLD AUTO: 10.9 FL (ref 6–12)
PO2 BLDA: 285 MMHG (ref 80–105)
PO2 BLDA: 328 MM HG (ref 80–100)
PO2 BLDA: 418 MMHG (ref 80–105)
PO2 BLDA: 419 MMHG (ref 80–105)
PO2 BLDA: 54 MMHG (ref 80–105)
PO2 BLDA: 84.6 MM HG (ref 80–100)
PO2 BLDA: 86 MMHG (ref 80–105)
PO2 BLDA: 95.7 MM HG (ref 80–100)
POTASSIUM BLD-SCNC: 3.5 MMOL/L (ref 3.5–5.2)
POTASSIUM BLD-SCNC: 3.8 MMOL/L (ref 3.5–5.2)
POTASSIUM BLD-SCNC: 4.2 MMOL/L (ref 3.5–5.2)
POTASSIUM BLD-SCNC: 4.4 MMOL/L (ref 3.5–5.2)
POTASSIUM BLDA-SCNC: 3.4 MMOL/L (ref 3.5–4.9)
POTASSIUM BLDA-SCNC: 4.4 MMOL/L (ref 3.5–4.9)
POTASSIUM BLDA-SCNC: 4.8 MMOL/L (ref 3.5–4.9)
POTASSIUM BLDA-SCNC: 4.9 MMOL/L (ref 3.5–4.9)
POTASSIUM BLDA-SCNC: 5 MMOL/L (ref 3.5–4.9)
PROTHROMBIN TIME: 17.9 SECONDS (ref 11.7–14.2)
PSV: 8 CMH2O
RBC # BLD AUTO: 3.07 10*6/MM3 (ref 4.6–6)
RBC # BLD AUTO: 3.87 10*6/MM3 (ref 4.6–6)
SAO2 % BLDA: 100 % (ref 95–98)
SAO2 % BLDA: 86 % (ref 95–98)
SAO2 % BLDA: 96 % (ref 95–98)
SAO2 % BLDCOA: 96.1 % (ref 92–99)
SAO2 % BLDCOA: 97.7 % (ref 92–99)
SAO2 % BLDCOA: 99.9 % (ref 92–99)
SET MECH RESP RATE: 14
SET MECH RESP RATE: 14
SODIUM BLD-SCNC: 139 MMOL/L (ref 136–145)
SODIUM BLD-SCNC: 141 MMOL/L (ref 136–145)
SODIUM BLD-SCNC: 146 MMOL/L (ref 136–145)
TOTAL RATE: 14 BREATHS/MINUTE
TOTAL RATE: 16 BREATHS/MINUTE
TOTAL RATE: 21 BREATHS/MINUTE
VENTILATOR MODE: ABNORMAL
VENTILATOR MODE: AC
VENTILATOR MODE: AC
VT ON VENT VENT: 464 ML
VT ON VENT VENT: 600 ML
VT ON VENT VENT: 600 ML
WBC NRBC COR # BLD: 12.74 10*3/MM3 (ref 4.5–10.7)
WBC NRBC COR # BLD: 20.03 10*3/MM3 (ref 4.5–10.7)

## 2018-06-22 PROCEDURE — 85347 COAGULATION TIME ACTIVATED: CPT

## 2018-06-22 PROCEDURE — 85027 COMPLETE CBC AUTOMATED: CPT | Performed by: THORACIC SURGERY (CARDIOTHORACIC VASCULAR SURGERY)

## 2018-06-22 PROCEDURE — 94799 UNLISTED PULMONARY SVC/PX: CPT

## 2018-06-22 PROCEDURE — 25010000002 ALBUMIN HUMAN 5% PER 50 ML: Performed by: THORACIC SURGERY (CARDIOTHORACIC VASCULAR SURGERY)

## 2018-06-22 PROCEDURE — 25010000002 MIDAZOLAM PER 1 MG: Performed by: ANESTHESIOLOGY

## 2018-06-22 PROCEDURE — 25010000002 ONDANSETRON PER 1 MG: Performed by: ANESTHESIOLOGY

## 2018-06-22 PROCEDURE — 33508 ENDOSCOPIC VEIN HARVEST: CPT | Performed by: THORACIC SURGERY (CARDIOTHORACIC VASCULAR SURGERY)

## 2018-06-22 PROCEDURE — 25010000002 MAGNESIUM SULFATE PER 500 MG OF MAGNESIUM: Performed by: ANESTHESIOLOGY

## 2018-06-22 PROCEDURE — 02100Z8 BYPASS CORONARY ARTERY, ONE ARTERY FROM RIGHT INTERNAL MAMMARY, OPEN APPROACH: ICD-10-PCS | Performed by: THORACIC SURGERY (CARDIOTHORACIC VASCULAR SURGERY)

## 2018-06-22 PROCEDURE — P9041 ALBUMIN (HUMAN),5%, 50ML: HCPCS | Performed by: THORACIC SURGERY (CARDIOTHORACIC VASCULAR SURGERY)

## 2018-06-22 PROCEDURE — 94002 VENT MGMT INPAT INIT DAY: CPT

## 2018-06-22 PROCEDURE — 33535 CABG ARTERIAL THREE: CPT | Performed by: THORACIC SURGERY (CARDIOTHORACIC VASCULAR SURGERY)

## 2018-06-22 PROCEDURE — 021109W BYPASS CORONARY ARTERY, TWO ARTERIES FROM AORTA WITH AUTOLOGOUS VENOUS TISSUE, OPEN APPROACH: ICD-10-PCS | Performed by: THORACIC SURGERY (CARDIOTHORACIC VASCULAR SURGERY)

## 2018-06-22 PROCEDURE — 93318 ECHO TRANSESOPHAGEAL INTRAOP: CPT

## 2018-06-22 PROCEDURE — 80048 BASIC METABOLIC PNL TOTAL CA: CPT | Performed by: THORACIC SURGERY (CARDIOTHORACIC VASCULAR SURGERY)

## 2018-06-22 PROCEDURE — P9041 ALBUMIN (HUMAN),5%, 50ML: HCPCS

## 2018-06-22 PROCEDURE — 25010000003 CEFAZOLIN IN DEXTROSE 2-4 GM/100ML-% SOLUTION: Performed by: THORACIC SURGERY (CARDIOTHORACIC VASCULAR SURGERY)

## 2018-06-22 PROCEDURE — 80069 RENAL FUNCTION PANEL: CPT | Performed by: THORACIC SURGERY (CARDIOTHORACIC VASCULAR SURGERY)

## 2018-06-22 PROCEDURE — 25010000002 PAPAVERINE PER 60 MG: Performed by: THORACIC SURGERY (CARDIOTHORACIC VASCULAR SURGERY)

## 2018-06-22 PROCEDURE — P9046 ALBUMIN (HUMAN), 25%, 20 ML: HCPCS

## 2018-06-22 PROCEDURE — 25010000002 FUROSEMIDE PER 20 MG

## 2018-06-22 PROCEDURE — C1729 CATH, DRAINAGE: HCPCS | Performed by: THORACIC SURGERY (CARDIOTHORACIC VASCULAR SURGERY)

## 2018-06-22 PROCEDURE — C1751 CATH, INF, PER/CENT/MIDLINE: HCPCS | Performed by: ANESTHESIOLOGY

## 2018-06-22 PROCEDURE — 25010000002 FENTANYL CITRATE (PF) 100 MCG/2ML SOLUTION: Performed by: ANESTHESIOLOGY

## 2018-06-22 PROCEDURE — 85730 THROMBOPLASTIN TIME PARTIAL: CPT | Performed by: THORACIC SURGERY (CARDIOTHORACIC VASCULAR SURGERY)

## 2018-06-22 PROCEDURE — C1713 ANCHOR/SCREW BN/BN,TIS/BN: HCPCS | Performed by: THORACIC SURGERY (CARDIOTHORACIC VASCULAR SURGERY)

## 2018-06-22 PROCEDURE — 25010000002 HEPARIN (PORCINE) PER 1000 UNITS: Performed by: THORACIC SURGERY (CARDIOTHORACIC VASCULAR SURGERY)

## 2018-06-22 PROCEDURE — 25010000002 DESMOPRESSIN PER 1 MCG: Performed by: THORACIC SURGERY (CARDIOTHORACIC VASCULAR SURGERY)

## 2018-06-22 PROCEDURE — 25010000002 VANCOMYCIN PER 500 MG

## 2018-06-22 PROCEDURE — 82962 GLUCOSE BLOOD TEST: CPT

## 2018-06-22 PROCEDURE — 86900 BLOOD TYPING SEROLOGIC ABO: CPT

## 2018-06-22 PROCEDURE — 33518 CABG ARTERY-VEIN TWO: CPT | Performed by: THORACIC SURGERY (CARDIOTHORACIC VASCULAR SURGERY)

## 2018-06-22 PROCEDURE — 3E080GC INTRODUCTION OF OTHER THERAPEUTIC SUBSTANCE INTO HEART, OPEN APPROACH: ICD-10-PCS | Performed by: THORACIC SURGERY (CARDIOTHORACIC VASCULAR SURGERY)

## 2018-06-22 PROCEDURE — 71045 X-RAY EXAM CHEST 1 VIEW: CPT

## 2018-06-22 PROCEDURE — 25010000002 PROPOFOL 10 MG/ML EMULSION: Performed by: ANESTHESIOLOGY

## 2018-06-22 PROCEDURE — 25010000003 PROTAMINE SULFATE PER 10 MG: Performed by: THORACIC SURGERY (CARDIOTHORACIC VASCULAR SURGERY)

## 2018-06-22 PROCEDURE — 93005 ELECTROCARDIOGRAM TRACING: CPT | Performed by: THORACIC SURGERY (CARDIOTHORACIC VASCULAR SURGERY)

## 2018-06-22 PROCEDURE — 86901 BLOOD TYPING SEROLOGIC RH(D): CPT

## 2018-06-22 PROCEDURE — 06BQ4ZZ EXCISION OF LEFT SAPHENOUS VEIN, PERCUTANEOUS ENDOSCOPIC APPROACH: ICD-10-PCS | Performed by: THORACIC SURGERY (CARDIOTHORACIC VASCULAR SURGERY)

## 2018-06-22 PROCEDURE — 85018 HEMOGLOBIN: CPT

## 2018-06-22 PROCEDURE — 93010 ELECTROCARDIOGRAM REPORT: CPT | Performed by: INTERNAL MEDICINE

## 2018-06-22 PROCEDURE — 85384 FIBRINOGEN ACTIVITY: CPT | Performed by: THORACIC SURGERY (CARDIOTHORACIC VASCULAR SURGERY)

## 2018-06-22 PROCEDURE — 84132 ASSAY OF SERUM POTASSIUM: CPT | Performed by: THORACIC SURGERY (CARDIOTHORACIC VASCULAR SURGERY)

## 2018-06-22 PROCEDURE — 85610 PROTHROMBIN TIME: CPT | Performed by: THORACIC SURGERY (CARDIOTHORACIC VASCULAR SURGERY)

## 2018-06-22 PROCEDURE — 25010000002 PROPOFOL 1000 MG/ML EMULSION: Performed by: THORACIC SURGERY (CARDIOTHORACIC VASCULAR SURGERY)

## 2018-06-22 PROCEDURE — A4648 IMPLANTABLE TISSUE MARKER: HCPCS | Performed by: THORACIC SURGERY (CARDIOTHORACIC VASCULAR SURGERY)

## 2018-06-22 PROCEDURE — 82803 BLOOD GASES ANY COMBINATION: CPT

## 2018-06-22 PROCEDURE — 25010000002 MORPHINE PER 10 MG: Performed by: THORACIC SURGERY (CARDIOTHORACIC VASCULAR SURGERY)

## 2018-06-22 PROCEDURE — 25010000002 PHENYLEPHRINE PER 1 ML: Performed by: ANESTHESIOLOGY

## 2018-06-22 PROCEDURE — 25010000003 POTASSIUM CHLORIDE PER 2 MEQ: Performed by: THORACIC SURGERY (CARDIOTHORACIC VASCULAR SURGERY)

## 2018-06-22 PROCEDURE — 25010000002 ALBUMIN HUMAN 25% PER 50 ML

## 2018-06-22 PROCEDURE — 85025 COMPLETE CBC W/AUTO DIFF WBC: CPT | Performed by: THORACIC SURGERY (CARDIOTHORACIC VASCULAR SURGERY)

## 2018-06-22 PROCEDURE — 82330 ASSAY OF CALCIUM: CPT | Performed by: THORACIC SURGERY (CARDIOTHORACIC VASCULAR SURGERY)

## 2018-06-22 PROCEDURE — 25010000002 HEPARIN (PORCINE) PER 1000 UNITS: Performed by: ANESTHESIOLOGY

## 2018-06-22 PROCEDURE — 85014 HEMATOCRIT: CPT

## 2018-06-22 PROCEDURE — 02110Z9 BYPASS CORONARY ARTERY, TWO ARTERIES FROM LEFT INTERNAL MAMMARY, OPEN APPROACH: ICD-10-PCS | Performed by: THORACIC SURGERY (CARDIOTHORACIC VASCULAR SURGERY)

## 2018-06-22 PROCEDURE — 25010000002 HEPARIN (PORCINE) PER 1000 UNITS

## 2018-06-22 PROCEDURE — 25010000002 ALBUMIN HUMAN 5% PER 50 ML

## 2018-06-22 PROCEDURE — 5A1221Z PERFORMANCE OF CARDIAC OUTPUT, CONTINUOUS: ICD-10-PCS | Performed by: THORACIC SURGERY (CARDIOTHORACIC VASCULAR SURGERY)

## 2018-06-22 PROCEDURE — 83735 ASSAY OF MAGNESIUM: CPT | Performed by: THORACIC SURGERY (CARDIOTHORACIC VASCULAR SURGERY)

## 2018-06-22 PROCEDURE — 82947 ASSAY GLUCOSE BLOOD QUANT: CPT

## 2018-06-22 RX ORDER — BISACODYL 10 MG
10 SUPPOSITORY, RECTAL RECTAL DAILY PRN
Status: DISCONTINUED | OUTPATIENT
Start: 2018-06-23 | End: 2018-06-26 | Stop reason: HOSPADM

## 2018-06-22 RX ORDER — NALOXONE HCL 0.4 MG/ML
0.4 VIAL (ML) INJECTION
Status: DISCONTINUED | OUTPATIENT
Start: 2018-06-22 | End: 2018-06-23

## 2018-06-22 RX ORDER — ONDANSETRON 2 MG/ML
INJECTION INTRAMUSCULAR; INTRAVENOUS AS NEEDED
Status: DISCONTINUED | OUTPATIENT
Start: 2018-06-22 | End: 2018-06-22 | Stop reason: SURG

## 2018-06-22 RX ORDER — OXYCODONE HYDROCHLORIDE 5 MG/1
10 TABLET ORAL EVERY 4 HOURS PRN
Status: DISCONTINUED | OUTPATIENT
Start: 2018-06-22 | End: 2018-06-23

## 2018-06-22 RX ORDER — PANTOPRAZOLE SODIUM 40 MG/1
40 TABLET, DELAYED RELEASE ORAL DAILY
Status: DISCONTINUED | OUTPATIENT
Start: 2018-06-22 | End: 2018-06-22

## 2018-06-22 RX ORDER — SODIUM CHLORIDE 0.9 % (FLUSH) 0.9 %
30 SYRINGE (ML) INJECTION ONCE AS NEEDED
Status: DISCONTINUED | OUTPATIENT
Start: 2018-06-22 | End: 2018-06-23

## 2018-06-22 RX ORDER — METOCLOPRAMIDE HYDROCHLORIDE 5 MG/ML
10 INJECTION INTRAMUSCULAR; INTRAVENOUS EVERY 6 HOURS
Status: DISCONTINUED | OUTPATIENT
Start: 2018-06-22 | End: 2018-06-23

## 2018-06-22 RX ORDER — SENNA AND DOCUSATE SODIUM 50; 8.6 MG/1; MG/1
2 TABLET, FILM COATED ORAL NIGHTLY
Status: DISCONTINUED | OUTPATIENT
Start: 2018-06-23 | End: 2018-06-26 | Stop reason: HOSPADM

## 2018-06-22 RX ORDER — BISACODYL 5 MG/1
10 TABLET, DELAYED RELEASE ORAL DAILY PRN
Status: DISCONTINUED | OUTPATIENT
Start: 2018-06-22 | End: 2018-06-26 | Stop reason: HOSPADM

## 2018-06-22 RX ORDER — POTASSIUM CHLORIDE 29.8 MG/ML
20 INJECTION INTRAVENOUS
Status: DISCONTINUED | OUTPATIENT
Start: 2018-06-22 | End: 2018-06-23

## 2018-06-22 RX ORDER — MAGNESIUM SULFATE HEPTAHYDRATE 500 MG/ML
INJECTION, SOLUTION INTRAMUSCULAR; INTRAVENOUS AS NEEDED
Status: DISCONTINUED | OUTPATIENT
Start: 2018-06-22 | End: 2018-06-22 | Stop reason: SURG

## 2018-06-22 RX ORDER — MAGNESIUM SULFATE 1 G/100ML
1 INJECTION INTRAVENOUS AS NEEDED
Status: DISCONTINUED | OUTPATIENT
Start: 2018-06-22 | End: 2018-06-26 | Stop reason: HOSPADM

## 2018-06-22 RX ORDER — VECURONIUM BROMIDE 1 MG/ML
INJECTION, POWDER, LYOPHILIZED, FOR SOLUTION INTRAVENOUS AS NEEDED
Status: DISCONTINUED | OUTPATIENT
Start: 2018-06-22 | End: 2018-06-22 | Stop reason: SURG

## 2018-06-22 RX ORDER — MIDAZOLAM HYDROCHLORIDE 1 MG/ML
2 INJECTION INTRAMUSCULAR; INTRAVENOUS
Status: DISCONTINUED | OUTPATIENT
Start: 2018-06-22 | End: 2018-06-23

## 2018-06-22 RX ORDER — ALBUMIN, HUMAN INJ 5% 5 %
SOLUTION INTRAVENOUS
Status: COMPLETED
Start: 2018-06-22 | End: 2018-06-22

## 2018-06-22 RX ORDER — HEPARIN SODIUM 5000 [USP'U]/ML
INJECTION, SOLUTION INTRAVENOUS; SUBCUTANEOUS AS NEEDED
Status: DISCONTINUED | OUTPATIENT
Start: 2018-06-22 | End: 2018-06-22 | Stop reason: HOSPADM

## 2018-06-22 RX ORDER — DOPAMINE HYDROCHLORIDE 160 MG/100ML
2-20 INJECTION, SOLUTION INTRAVENOUS CONTINUOUS PRN
Status: DISCONTINUED | OUTPATIENT
Start: 2018-06-22 | End: 2018-06-23

## 2018-06-22 RX ORDER — MORPHINE SULFATE 2 MG/ML
1 INJECTION, SOLUTION INTRAMUSCULAR; INTRAVENOUS EVERY 4 HOURS PRN
Status: DISCONTINUED | OUTPATIENT
Start: 2018-06-22 | End: 2018-06-23

## 2018-06-22 RX ORDER — DEXMEDETOMIDINE HYDROCHLORIDE 4 UG/ML
.2-1.5 INJECTION, SOLUTION INTRAVENOUS
Status: DISCONTINUED | OUTPATIENT
Start: 2018-06-22 | End: 2018-06-23

## 2018-06-22 RX ORDER — MEPERIDINE HYDROCHLORIDE 25 MG/ML
25 INJECTION INTRAMUSCULAR; INTRAVENOUS; SUBCUTANEOUS EVERY 4 HOURS PRN
Status: ACTIVE | OUTPATIENT
Start: 2018-06-22 | End: 2018-06-22

## 2018-06-22 RX ORDER — CYCLOBENZAPRINE HCL 10 MG
10 TABLET ORAL EVERY 8 HOURS PRN
Status: DISCONTINUED | OUTPATIENT
Start: 2018-06-23 | End: 2018-06-23

## 2018-06-22 RX ORDER — MAGNESIUM SULFATE 1 G/100ML
1 INJECTION INTRAVENOUS EVERY 8 HOURS
Status: DISCONTINUED | OUTPATIENT
Start: 2018-06-22 | End: 2018-06-23

## 2018-06-22 RX ORDER — PROPOFOL 10 MG/ML
VIAL (ML) INTRAVENOUS AS NEEDED
Status: DISCONTINUED | OUTPATIENT
Start: 2018-06-22 | End: 2018-06-22 | Stop reason: SURG

## 2018-06-22 RX ORDER — PAPAVERINE HYDROCHLORIDE 30 MG/ML
INJECTION INTRAMUSCULAR; INTRAVENOUS AS NEEDED
Status: DISCONTINUED | OUTPATIENT
Start: 2018-06-22 | End: 2018-06-22 | Stop reason: HOSPADM

## 2018-06-22 RX ORDER — POTASSIUM CHLORIDE 750 MG/1
40 CAPSULE, EXTENDED RELEASE ORAL AS NEEDED
Status: DISCONTINUED | OUTPATIENT
Start: 2018-06-22 | End: 2018-06-26 | Stop reason: HOSPADM

## 2018-06-22 RX ORDER — CEFAZOLIN SODIUM 2 G/100ML
2 INJECTION, SOLUTION INTRAVENOUS EVERY 8 HOURS
Status: COMPLETED | OUTPATIENT
Start: 2018-06-22 | End: 2018-06-24

## 2018-06-22 RX ORDER — MAGNESIUM SULFATE HEPTAHYDRATE 40 MG/ML
2 INJECTION, SOLUTION INTRAVENOUS AS NEEDED
Status: DISCONTINUED | OUTPATIENT
Start: 2018-06-22 | End: 2018-06-26 | Stop reason: HOSPADM

## 2018-06-22 RX ORDER — PROMETHAZINE HYDROCHLORIDE 25 MG/ML
12.5 INJECTION, SOLUTION INTRAMUSCULAR; INTRAVENOUS EVERY 6 HOURS PRN
Status: DISCONTINUED | OUTPATIENT
Start: 2018-06-22 | End: 2018-06-23

## 2018-06-22 RX ORDER — PHENYLEPHRINE HCL IN 0.9% NACL 0.5 MG/5ML
.2-3 SYRINGE (ML) INTRAVENOUS CONTINUOUS PRN
Status: DISCONTINUED | OUTPATIENT
Start: 2018-06-22 | End: 2018-06-23

## 2018-06-22 RX ORDER — MORPHINE SULFATE 2 MG/ML
4 INJECTION, SOLUTION INTRAMUSCULAR; INTRAVENOUS
Status: DISCONTINUED | OUTPATIENT
Start: 2018-06-22 | End: 2018-06-23

## 2018-06-22 RX ORDER — SUFENTANIL CITRATE 50 UG/ML
INJECTION EPIDURAL; INTRAVENOUS AS NEEDED
Status: DISCONTINUED | OUTPATIENT
Start: 2018-06-22 | End: 2018-06-22 | Stop reason: SURG

## 2018-06-22 RX ORDER — POTASSIUM CHLORIDE 7.45 MG/ML
10 INJECTION INTRAVENOUS
Status: DISCONTINUED | OUTPATIENT
Start: 2018-06-22 | End: 2018-06-23

## 2018-06-22 RX ORDER — ASPIRIN 81 MG/1
81 TABLET ORAL DAILY
Status: DISCONTINUED | OUTPATIENT
Start: 2018-06-23 | End: 2018-06-26 | Stop reason: HOSPADM

## 2018-06-22 RX ORDER — PROTAMINE SULFATE 10 MG/ML
INJECTION, SOLUTION INTRAVENOUS AS NEEDED
Status: DISCONTINUED | OUTPATIENT
Start: 2018-06-22 | End: 2018-06-22 | Stop reason: HOSPADM

## 2018-06-22 RX ORDER — FAMOTIDINE 10 MG/ML
20 INJECTION, SOLUTION INTRAVENOUS
Status: DISCONTINUED | OUTPATIENT
Start: 2018-06-22 | End: 2018-06-22 | Stop reason: HOSPADM

## 2018-06-22 RX ORDER — NITROGLYCERIN 20 MG/100ML
5-200 INJECTION INTRAVENOUS
Status: DISCONTINUED | OUTPATIENT
Start: 2018-06-22 | End: 2018-06-23

## 2018-06-22 RX ORDER — ACETAMINOPHEN 325 MG/1
650 TABLET ORAL EVERY 4 HOURS PRN
Status: DISCONTINUED | OUTPATIENT
Start: 2018-06-22 | End: 2018-06-26 | Stop reason: HOSPADM

## 2018-06-22 RX ORDER — PANTOPRAZOLE SODIUM 40 MG/1
40 TABLET, DELAYED RELEASE ORAL
Status: DISCONTINUED | OUTPATIENT
Start: 2018-06-23 | End: 2018-06-26 | Stop reason: HOSPADM

## 2018-06-22 RX ORDER — FUROSEMIDE 10 MG/ML
40 INJECTION INTRAMUSCULAR; INTRAVENOUS EVERY 6 HOURS PRN
Status: DISCONTINUED | OUTPATIENT
Start: 2018-06-22 | End: 2018-06-23

## 2018-06-22 RX ORDER — TRANEXAMIC ACID 100 MG/ML
INJECTION, SOLUTION INTRAVENOUS AS NEEDED
Status: DISCONTINUED | OUTPATIENT
Start: 2018-06-22 | End: 2018-06-22 | Stop reason: SURG

## 2018-06-22 RX ORDER — ONDANSETRON 2 MG/ML
4 INJECTION INTRAMUSCULAR; INTRAVENOUS EVERY 6 HOURS PRN
Status: DISCONTINUED | OUTPATIENT
Start: 2018-06-22 | End: 2018-06-23

## 2018-06-22 RX ORDER — SODIUM CHLORIDE 9 MG/ML
30 INJECTION, SOLUTION INTRAVENOUS CONTINUOUS PRN
Status: DISCONTINUED | OUTPATIENT
Start: 2018-06-22 | End: 2018-06-23

## 2018-06-22 RX ORDER — ATORVASTATIN CALCIUM 20 MG/1
40 TABLET, FILM COATED ORAL NIGHTLY
Status: DISCONTINUED | OUTPATIENT
Start: 2018-06-22 | End: 2018-06-23

## 2018-06-22 RX ORDER — AMIODARONE HYDROCHLORIDE 200 MG/1
400 TABLET ORAL EVERY 12 HOURS SCHEDULED
Status: DISCONTINUED | OUTPATIENT
Start: 2018-06-22 | End: 2018-06-26 | Stop reason: HOSPADM

## 2018-06-22 RX ORDER — ALPRAZOLAM 0.25 MG/1
0.25 TABLET ORAL EVERY 8 HOURS PRN
Status: DISCONTINUED | OUTPATIENT
Start: 2018-06-22 | End: 2018-06-23

## 2018-06-22 RX ORDER — FENTANYL CITRATE 50 UG/ML
INJECTION, SOLUTION INTRAMUSCULAR; INTRAVENOUS AS NEEDED
Status: DISCONTINUED | OUTPATIENT
Start: 2018-06-22 | End: 2018-06-22 | Stop reason: SURG

## 2018-06-22 RX ORDER — LIDOCAINE HYDROCHLORIDE 40 MG/ML
SOLUTION TOPICAL AS NEEDED
Status: DISCONTINUED | OUTPATIENT
Start: 2018-06-22 | End: 2018-06-22 | Stop reason: SURG

## 2018-06-22 RX ORDER — MIDAZOLAM HYDROCHLORIDE 1 MG/ML
2 INJECTION INTRAMUSCULAR; INTRAVENOUS
Status: COMPLETED | OUTPATIENT
Start: 2018-06-22 | End: 2018-06-22

## 2018-06-22 RX ORDER — ALBUMIN, HUMAN INJ 5% 5 %
1500 SOLUTION INTRAVENOUS AS NEEDED
Status: DISCONTINUED | OUTPATIENT
Start: 2018-06-22 | End: 2018-06-23

## 2018-06-22 RX ORDER — CHLORHEXIDINE GLUCONATE 0.12 MG/ML
15 RINSE ORAL EVERY 12 HOURS
Status: DISCONTINUED | OUTPATIENT
Start: 2018-06-22 | End: 2018-06-26

## 2018-06-22 RX ORDER — SODIUM CHLORIDE 9 MG/ML
INJECTION, SOLUTION INTRAVENOUS CONTINUOUS PRN
Status: DISCONTINUED | OUTPATIENT
Start: 2018-06-22 | End: 2018-06-22 | Stop reason: SURG

## 2018-06-22 RX ORDER — EPHEDRINE SULFATE 50 MG/ML
INJECTION, SOLUTION INTRAVENOUS AS NEEDED
Status: DISCONTINUED | OUTPATIENT
Start: 2018-06-22 | End: 2018-06-22 | Stop reason: SURG

## 2018-06-22 RX ORDER — POTASSIUM CHLORIDE 1.5 G/1.77G
40 POWDER, FOR SOLUTION ORAL AS NEEDED
Status: DISCONTINUED | OUTPATIENT
Start: 2018-06-22 | End: 2018-06-26 | Stop reason: HOSPADM

## 2018-06-22 RX ORDER — ACETAMINOPHEN 650 MG/1
650 SUPPOSITORY RECTAL EVERY 4 HOURS PRN
Status: DISCONTINUED | OUTPATIENT
Start: 2018-06-22 | End: 2018-06-23

## 2018-06-22 RX ORDER — HYDROCODONE BITARTRATE AND ACETAMINOPHEN 5; 325 MG/1; MG/1
2 TABLET ORAL EVERY 4 HOURS PRN
Status: DISCONTINUED | OUTPATIENT
Start: 2018-06-22 | End: 2018-06-26 | Stop reason: HOSPADM

## 2018-06-22 RX ORDER — POTASSIUM CHLORIDE 29.8 MG/ML
20 INJECTION INTRAVENOUS
Status: COMPLETED | OUTPATIENT
Start: 2018-06-22 | End: 2018-06-22

## 2018-06-22 RX ORDER — FAMOTIDINE 10 MG/ML
20 INJECTION, SOLUTION INTRAVENOUS ONCE
Status: COMPLETED | OUTPATIENT
Start: 2018-06-22 | End: 2018-06-22

## 2018-06-22 RX ORDER — SODIUM CHLORIDE 9 MG/ML
30 INJECTION, SOLUTION INTRAVENOUS CONTINUOUS
Status: DISCONTINUED | OUTPATIENT
Start: 2018-06-22 | End: 2018-06-23

## 2018-06-22 RX ORDER — MILRINONE LACTATE 0.2 MG/ML
.25-.75 INJECTION, SOLUTION INTRAVENOUS CONTINUOUS PRN
Status: DISCONTINUED | OUTPATIENT
Start: 2018-06-22 | End: 2018-06-23

## 2018-06-22 RX ORDER — NOREPINEPHRINE BITARTRATE 1 MG/ML
INJECTION, SOLUTION INTRAVENOUS CONTINUOUS PRN
Status: DISCONTINUED | OUTPATIENT
Start: 2018-06-22 | End: 2018-06-22 | Stop reason: SURG

## 2018-06-22 RX ORDER — HEPARIN SODIUM 1000 [USP'U]/ML
INJECTION, SOLUTION INTRAVENOUS; SUBCUTANEOUS AS NEEDED
Status: DISCONTINUED | OUTPATIENT
Start: 2018-06-22 | End: 2018-06-22

## 2018-06-22 RX ORDER — PROPOFOL 10 MG/ML
VIAL (ML) INTRAVENOUS CONTINUOUS PRN
Status: DISCONTINUED | OUTPATIENT
Start: 2018-06-22 | End: 2018-06-22 | Stop reason: SURG

## 2018-06-22 RX ORDER — PROMETHAZINE HYDROCHLORIDE 25 MG/1
12.5 TABLET ORAL EVERY 6 HOURS PRN
Status: DISCONTINUED | OUTPATIENT
Start: 2018-06-22 | End: 2018-06-23

## 2018-06-22 RX ORDER — HEPARIN SODIUM 1000 [USP'U]/ML
INJECTION, SOLUTION INTRAVENOUS; SUBCUTANEOUS AS NEEDED
Status: DISCONTINUED | OUTPATIENT
Start: 2018-06-22 | End: 2018-06-22 | Stop reason: SURG

## 2018-06-22 RX ORDER — ROCURONIUM BROMIDE 10 MG/ML
INJECTION, SOLUTION INTRAVENOUS AS NEEDED
Status: DISCONTINUED | OUTPATIENT
Start: 2018-06-22 | End: 2018-06-22 | Stop reason: SURG

## 2018-06-22 RX ADMIN — SUFENTANIL CITRATE 50 MCG: 50 INJECTION, SOLUTION EPIDURAL; INTRAVENOUS at 08:19

## 2018-06-22 RX ADMIN — HEPARIN SODIUM 22000 UNITS: 1000 INJECTION, SOLUTION INTRAVENOUS; SUBCUTANEOUS at 08:29

## 2018-06-22 RX ADMIN — EPHEDRINE SULFATE 20 MG: 50 INJECTION INTRAMUSCULAR; INTRAVENOUS; SUBCUTANEOUS at 07:33

## 2018-06-22 RX ADMIN — TRANEXAMIC ACID 750 MG: 100 INJECTION, SOLUTION INTRAVENOUS at 07:45

## 2018-06-22 RX ADMIN — VECURONIUM BROMIDE 5 MG: 1 INJECTION, POWDER, LYOPHILIZED, FOR SOLUTION INTRAVENOUS at 07:55

## 2018-06-22 RX ADMIN — PHENYLEPHRINE HYDROCHLORIDE 100 MCG: 10 INJECTION INTRAVENOUS at 10:25

## 2018-06-22 RX ADMIN — PHENYLEPHRINE HYDROCHLORIDE 100 MCG: 10 INJECTION INTRAVENOUS at 10:26

## 2018-06-22 RX ADMIN — VECURONIUM BROMIDE 2 MG: 1 INJECTION, POWDER, LYOPHILIZED, FOR SOLUTION INTRAVENOUS at 07:53

## 2018-06-22 RX ADMIN — PHENYLEPHRINE HYDROCHLORIDE 100 MCG: 10 INJECTION INTRAVENOUS at 10:34

## 2018-06-22 RX ADMIN — PHENYLEPHRINE HYDROCHLORIDE 100 MCG: 10 INJECTION INTRAVENOUS at 10:17

## 2018-06-22 RX ADMIN — CHLORHEXIDINE GLUCONATE 15 ML: 1.2 RINSE ORAL at 23:33

## 2018-06-22 RX ADMIN — PHENYLEPHRINE HYDROCHLORIDE 100 MCG: 10 INJECTION INTRAVENOUS at 07:12

## 2018-06-22 RX ADMIN — PROPOFOL 50 MCG/KG/MIN: 10 INJECTION, EMULSION INTRAVENOUS at 08:29

## 2018-06-22 RX ADMIN — MUPIROCIN: 20 OINTMENT TOPICAL at 13:28

## 2018-06-22 RX ADMIN — PHENYLEPHRINE HYDROCHLORIDE 100 MCG: 10 INJECTION INTRAVENOUS at 07:33

## 2018-06-22 RX ADMIN — EPHEDRINE SULFATE 10 MG: 50 INJECTION INTRAMUSCULAR; INTRAVENOUS; SUBCUTANEOUS at 07:11

## 2018-06-22 RX ADMIN — Medication 1 MG: at 06:45

## 2018-06-22 RX ADMIN — PHENYLEPHRINE HYDROCHLORIDE 100 MCG: 10 INJECTION INTRAVENOUS at 10:24

## 2018-06-22 RX ADMIN — METOPROLOL TARTRATE 25 MG: 25 TABLET ORAL at 06:24

## 2018-06-22 RX ADMIN — CHLORHEXIDINE GLUCONATE 1 APPLICATION: 500 CLOTH TOPICAL at 06:00

## 2018-06-22 RX ADMIN — PROPOFOL 50 MG: 10 INJECTION, EMULSION INTRAVENOUS at 07:53

## 2018-06-22 RX ADMIN — TRANEXAMIC ACID 220 MG: 100 INJECTION, SOLUTION INTRAVENOUS at 08:45

## 2018-06-22 RX ADMIN — FAMOTIDINE 20 MG: 10 INJECTION, SOLUTION INTRAVENOUS at 13:10

## 2018-06-22 RX ADMIN — PHENYLEPHRINE HYDROCHLORIDE 100 MCG: 10 INJECTION INTRAVENOUS at 07:27

## 2018-06-22 RX ADMIN — SODIUM CHLORIDE 2 UNITS/HR: 9 INJECTION, SOLUTION INTRAVENOUS at 20:05

## 2018-06-22 RX ADMIN — MUPIROCIN 1 APPLICATION: 20 OINTMENT TOPICAL at 06:31

## 2018-06-22 RX ADMIN — CHLORHEXIDINE GLUCONATE 15 ML: 1.2 RINSE ORAL at 06:31

## 2018-06-22 RX ADMIN — PHENYLEPHRINE HYDROCHLORIDE 100 MCG: 10 INJECTION INTRAVENOUS at 10:31

## 2018-06-22 RX ADMIN — ROCURONIUM BROMIDE 50 MG: 10 INJECTION INTRAVENOUS at 10:16

## 2018-06-22 RX ADMIN — HYDROCODONE BITARTRATE AND ACETAMINOPHEN 2 TABLET: 5; 325 TABLET ORAL at 23:36

## 2018-06-22 RX ADMIN — TRANEXAMIC ACID 220 MG: 100 INJECTION, SOLUTION INTRAVENOUS at 10:45

## 2018-06-22 RX ADMIN — EPHEDRINE SULFATE 20 MG: 50 INJECTION INTRAMUSCULAR; INTRAVENOUS; SUBCUTANEOUS at 07:09

## 2018-06-22 RX ADMIN — PHENYLEPHRINE HYDROCHLORIDE 100 MCG: 10 INJECTION INTRAVENOUS at 07:09

## 2018-06-22 RX ADMIN — FENTANYL CITRATE 50 MCG: 50 INJECTION INTRAMUSCULAR; INTRAVENOUS at 06:47

## 2018-06-22 RX ADMIN — Medication 5 MG: at 09:40

## 2018-06-22 RX ADMIN — PHENYLEPHRINE HYDROCHLORIDE 100 MCG: 10 INJECTION INTRAVENOUS at 10:29

## 2018-06-22 RX ADMIN — SUFENTANIL CITRATE 50 MCG: 50 INJECTION, SOLUTION EPIDURAL; INTRAVENOUS at 09:32

## 2018-06-22 RX ADMIN — MORPHINE SULFATE 1 MG: 2 INJECTION, SOLUTION INTRAMUSCULAR; INTRAVENOUS at 16:59

## 2018-06-22 RX ADMIN — ALBUMIN HUMAN 250 ML: 0.05 INJECTION, SOLUTION INTRAVENOUS at 14:56

## 2018-06-22 RX ADMIN — CYCLOBENZAPRINE 10 MG: 10 TABLET, FILM COATED ORAL at 23:36

## 2018-06-22 RX ADMIN — POTASSIUM CHLORIDE 20 MEQ: 400 INJECTION, SOLUTION INTRAVENOUS at 18:21

## 2018-06-22 RX ADMIN — FENTANYL CITRATE 50 MCG: 50 INJECTION INTRAMUSCULAR; INTRAVENOUS at 06:52

## 2018-06-22 RX ADMIN — ALBUMIN HUMAN 250 ML: 0.05 INJECTION, SOLUTION INTRAVENOUS at 12:30

## 2018-06-22 RX ADMIN — AMIODARONE HYDROCHLORIDE 400 MG: 200 TABLET ORAL at 20:00

## 2018-06-22 RX ADMIN — EPHEDRINE SULFATE 20 MG: 50 INJECTION INTRAMUSCULAR; INTRAVENOUS; SUBCUTANEOUS at 07:27

## 2018-06-22 RX ADMIN — VECURONIUM BROMIDE 8 MG: 1 INJECTION, POWDER, LYOPHILIZED, FOR SOLUTION INTRAVENOUS at 07:08

## 2018-06-22 RX ADMIN — HYDROCODONE BITARTRATE AND ACETAMINOPHEN 2 TABLET: 5; 325 TABLET ORAL at 19:36

## 2018-06-22 RX ADMIN — MAGNESIUM SULFATE HEPTAHYDRATE 2 G: 500 INJECTION, SOLUTION INTRAMUSCULAR; INTRAVENOUS at 09:40

## 2018-06-22 RX ADMIN — ALBUMIN HUMAN 250 ML: 0.05 INJECTION, SOLUTION INTRAVENOUS at 13:00

## 2018-06-22 RX ADMIN — Medication 1 MG: at 06:47

## 2018-06-22 RX ADMIN — DESMOPRESSIN ACETATE 22.24 MCG: 4 SOLUTION INTRAVENOUS at 10:13

## 2018-06-22 RX ADMIN — ONDANSETRON 4 MG: 2 INJECTION INTRAMUSCULAR; INTRAVENOUS at 09:59

## 2018-06-22 RX ADMIN — CEFAZOLIN SODIUM 2 G: 2 INJECTION, SOLUTION INTRAVENOUS at 09:59

## 2018-06-22 RX ADMIN — VECURONIUM BROMIDE 5 MG: 1 INJECTION, POWDER, LYOPHILIZED, FOR SOLUTION INTRAVENOUS at 09:40

## 2018-06-22 RX ADMIN — SODIUM CHLORIDE 30 ML/HR: 9 INJECTION, SOLUTION INTRAVENOUS at 11:30

## 2018-06-22 RX ADMIN — EPHEDRINE SULFATE 20 MG: 50 INJECTION INTRAMUSCULAR; INTRAVENOUS; SUBCUTANEOUS at 07:58

## 2018-06-22 RX ADMIN — PHENYLEPHRINE HYDROCHLORIDE 100 MCG: 10 INJECTION INTRAVENOUS at 10:54

## 2018-06-22 RX ADMIN — SUFENTANIL CITRATE 50 MCG: 50 INJECTION, SOLUTION EPIDURAL; INTRAVENOUS at 07:53

## 2018-06-22 RX ADMIN — ALBUMIN HUMAN 12.5 G: 0.05 INJECTION, SOLUTION INTRAVENOUS at 12:03

## 2018-06-22 RX ADMIN — PHENYLEPHRINE HYDROCHLORIDE 100 MCG: 10 INJECTION INTRAVENOUS at 07:59

## 2018-06-22 RX ADMIN — NOREPINEPHRINE BITARTRATE 0.02 MCG/KG/MIN: 1 INJECTION, SOLUTION, CONCENTRATE INTRAVENOUS at 10:31

## 2018-06-22 RX ADMIN — CEFAZOLIN SODIUM 2 G: 2 INJECTION, SOLUTION INTRAVENOUS at 07:26

## 2018-06-22 RX ADMIN — Medication 2 MG: at 07:08

## 2018-06-22 RX ADMIN — EPHEDRINE SULFATE 10 MG: 50 INJECTION INTRAMUSCULAR; INTRAVENOUS; SUBCUTANEOUS at 07:46

## 2018-06-22 RX ADMIN — Medication 1 MG: at 06:52

## 2018-06-22 RX ADMIN — PROPOFOL 50 MCG/KG/MIN: 10 INJECTION, EMULSION INTRAVENOUS at 11:58

## 2018-06-22 RX ADMIN — AMIODARONE HYDROCHLORIDE 400 MG: 200 TABLET ORAL at 06:31

## 2018-06-22 RX ADMIN — CHLORHEXIDINE GLUCONATE 15 ML: 1.2 RINSE ORAL at 12:23

## 2018-06-22 RX ADMIN — PHENYLEPHRINE HYDROCHLORIDE 100 MCG: 10 INJECTION INTRAVENOUS at 10:07

## 2018-06-22 RX ADMIN — CEFAZOLIN SODIUM 2 G: 2 INJECTION, SOLUTION INTRAVENOUS at 17:48

## 2018-06-22 RX ADMIN — LIDOCAINE HYDROCHLORIDE 1 EACH: 40 SOLUTION TOPICAL at 07:16

## 2018-06-22 RX ADMIN — PROPOFOL 100 MG: 10 INJECTION, EMULSION INTRAVENOUS at 07:08

## 2018-06-22 RX ADMIN — TRANEXAMIC ACID 220 MG: 100 INJECTION, SOLUTION INTRAVENOUS at 09:46

## 2018-06-22 RX ADMIN — MUPIROCIN: 20 OINTMENT TOPICAL at 20:01

## 2018-06-22 RX ADMIN — PHENYLEPHRINE HYDROCHLORIDE 0.5 MCG/KG/MIN: 10 INJECTION, SOLUTION INTRAMUSCULAR; INTRAVENOUS; SUBCUTANEOUS at 07:59

## 2018-06-22 RX ADMIN — PHENYLEPHRINE HYDROCHLORIDE 100 MCG: 10 INJECTION INTRAVENOUS at 10:19

## 2018-06-22 RX ADMIN — SODIUM CHLORIDE: 9 INJECTION, SOLUTION INTRAVENOUS at 06:36

## 2018-06-22 RX ADMIN — DEXMEDETOMIDINE HYDROCHLORIDE 0.5 MCG/KG/HR: 4 INJECTION, SOLUTION INTRAVENOUS at 13:51

## 2018-06-22 NOTE — PLAN OF CARE
Problem: Patient Care Overview  Goal: Plan of Care Review   06/22/18 0602   Coping/Psychosocial   Plan of Care Reviewed With patient   Plan of Care Review   Progress improving   OTHER   Outcome Summary VSS, NSR Pre op testing completed. 1st case this morning.

## 2018-06-22 NOTE — ANESTHESIA PROCEDURE NOTES
Airway  Urgency: elective    Airway not difficult    General Information and Staff    Patient location during procedure: OR  Anesthesiologist: JOVANNI WORRELL    Indications and Patient Condition  Indications for airway management: airway protection    Preoxygenated: yes  MILS maintained throughout  Mask difficulty assessment: 1 - vent by mask    Final Airway Details  Final airway type: endotracheal airway      Successful airway: ETT  Cuffed: yes   Successful intubation technique: direct laryngoscopy  Facilitating devices/methods: cricoid pressure and intubating stylet  Endotracheal tube insertion site: oral  Blade: Judd  Blade size: #3  ETT size: 8.5 mm  Cormack-Lehane Classification: grade III - view of epiglottis only  Placement verified by: chest auscultation and capnometry   Measured from: lips  ETT to lips (cm): 22  Number of attempts at approach: 3 or more    Additional Comments  Eschmann stylet needed, passed under epiglottis

## 2018-06-22 NOTE — ANESTHESIA PROCEDURE NOTES
Procedure Performed: Emergent/Open-Heart Anesthesia PARAS     Start Time:        End Time:        General Procedure Information  PARAS Placed for monitoring purposes only -- This is not a diagnostic PARAS

## 2018-06-22 NOTE — ANESTHESIA POSTPROCEDURE EVALUATION
Patient: Souleymane Stapleton    Procedure Summary     Date:  06/22/18 Room / Location:  [facility] OR  / [facility] MAIN OR    Anesthesia Start:  0636 Anesthesia Stop:  1116    Procedure:  PARAS STERNOTOMY CORONARY ARTERY BYPASS GRAFT TIMES 5 USING RIGHT AND LEFT INTERNAL MAMMARY ARTERIES AND LEFT GREATER SAPHENOUS VEIN GRAFT PER ENDOSCOPIC VEIN HARVESTING AND PRP (N/A Chest) Diagnosis:       Angina of effort      (Angina of effort [I20.8])    Surgeon:  Edgar Pérez MD Provider:  Trevor Pulliam MD    Anesthesia Type:  general ASA Status:  3          Anesthesia Type: general  Last vitals  BP   90/57 (06/22/18 1305)   Temp   36.4 °C (97.6 °F) (06/22/18 0300)   Pulse   80 (06/22/18 1400)   Resp   16 (06/22/18 0300)     SpO2   100 % (06/22/18 1400)     Post Anesthesia Care and Evaluation    Patient location during evaluation: PACU  Patient participation: complete - patient cannot participate  Level of consciousness: obtunded/minimal responses  Pain management: adequate  Airway patency: patent  Anesthetic complications: No anesthetic complications  PONV Status: NA  Cardiovascular status: acceptable  Respiratory status: acceptable, ETT and intubated  Hydration status: acceptable

## 2018-06-22 NOTE — OP NOTE
[facility] CARDIAC SURGERY OP NOTE    Preop Diagnosis: Severe coronary artery disease.  History of left carotid spontaneous dissection.  History of TIAs.  Poor lower extremity saphenous veins.  Mild aortic incompetence.  Plavix therapy    Postop Diagnosis: Same    Indications: This patient has multivessel coronary disease.  Operation was advisable prolong life and relieve symptoms.  There is a history of a spontaneous left carotid dissection a year ago.  Recent exam tended to show there was no dissection.  The STS Risk was discussed with the patient. I have discussed the Heater [employer]er Infection situation with the patient and family. They understand and wish to proceed.    Procedure: CABG ×5.  Bilateral internal mammary artery grafts skeletonized.  LIMA to D2 and LAD.  BRYAN to OM1.  Vein graft to OM 2.  Vein Graft to D1.  Temporary cardiopulmonary bypass.  Antegrade cold blood cardioplegia with retrograde cold blood cardioplegia and warm reperfusion.  Neurologic monitoring.  Transesophageal echo by anesthesia.  Endoscopic vein harvest of the left greater saphenous vein.    Surgeon: Edgar Pérez MD    Assisistant: Ginette Pepper CSA.    Anesthesia: GET    Findings : There was one piece of usable vein in the left thigh.  The rest the veins were too small.  Both MONI's were 2 mm and had excellent blood flow.  All of his tissues were little bit fragile.  The right coronary artery had no significant disease.  OM1 was 1.5 mm.  OM 2 was 2.5 mm.  D1 was 1.5 mm.  D2 was 1.5 mm and the LAD was 2 mm.  All these vessels had a little bit of yellowish discoloration and were little bit sclerotic.  There was mild aortic incompetence and for this reason we used retrograde cardioplegia in addition to antegrade.  The mitral valve showed trace mitral incompetence LV function was 60%.    Operative Procedure: A primary median sternotomy was made while the left greater saphenous vein was harvested with  the endoscope from the thigh.  Both MONI's were dissected off chest wall with harmonic scalpel and they were skeletonized.  Cardio pulmonary bypass was established for 77 minutes drifting 34°C at appropriate flow rates.  The aorta was crossclamped for 54 minutes and we gave some antegrade in about 800 cc but then switched to retrograde cold blood cardioplegia because of the aortic incompetence.  The affect was good.  We repeated doses every 10-15 minutes good effect.  2 veins were anastomosed the ascending aorta with 6-0 Prolene and marked with washers.  The vein was sewn to the larger marginal branch with 7-0 Prolene.  The BRYAN was brought through the transverse sinus behind the aorta and sewn to the OM1 with 7-0 Prolene.  The second vein was sewn to D1 with 7-0 Prolene.  The LIMA was then sewn side to side to the diagonal branch and then to the LAD with 7-0 Prolene.  A warm dose of retrograde cardioplegia was given and there was strong suction on the aortic needle vent the cross-clamp was released.    He regained spontaneous sinus rhythm and complete de-airing was performed.  With good cardiac action cardio pulmonary bypass was weaned and discontinued.  Decannulation was affected usual devices were placed and hemostasis was obtained.  I did give a dose of DDAVP at 0.3 mcg/kg.  4 chest tubes were inserted and we applied vancomycin enriched platelet rich plasma.  Sternum was closed with stainless steel wire and the skin and soft tissues as usual.  All the grafts lay nicely and all the anastomoses were hemostatic and he went the open-heart recovery room in good condition.  The sponge needle and instrument counts were noted be correct.    Complications: None    Tubes: 4    Epicardial Wires: 3    Blood Loss: Minimal    Aortic X time: 54 min    CPB time: 77 min     Specimen: None    Condition: Good to open heart recovery      Patient Care Team:  Souleymane Murrieta MD as PCP - General        Edgar Pérez,  MD  6/22/2018  10:59 AM

## 2018-06-22 NOTE — ANESTHESIA PREPROCEDURE EVALUATION
Anesthesia Evaluation     Patient summary reviewed and Nursing notes reviewed   NPO Solid Status: > 8 hours  NPO Liquid Status: > 8 hours           Airway   Mallampati: II  TM distance: >3 FB  Neck ROM: full  no difficulty expected  Dental - normal exam     Pulmonary - normal exam   (+) a smoker Former, shortness of breath,   Cardiovascular - normal exam    (+) hypertension, CAD, angina with exertion, hyperlipidemia,       Neuro/Psych  (+) TIA, psychiatric history Anxiety,       ROS Comment: Hx of carotid dissection  GI/Hepatic/Renal/Endo    (+)  GERD,      Musculoskeletal     Abdominal  - normal exam   Substance History      OB/GYN          Other   (+) arthritis   history of cancer                  Anesthesia Plan    ASA 3     general   (Art line/SGC/PARAS)  Anesthetic plan and risks discussed with patient.

## 2018-06-22 NOTE — ANESTHESIA PROCEDURE NOTES
Arterial Line    Patient location during procedure: OR  Start time: 6/22/2018 6:49 AM  Stop Time:6/22/2018 7:01 AM       Line placed for hemodynamic monitoring.  Performed By   Anesthesiologist: JOVANNI WORRELL  Preanesthetic Checklist  Completed: patient identified, site marked, surgical consent, pre-op evaluation, timeout performed, IV checked, risks and benefits discussed and monitors and equipment checked  Arterial Line Prep   Sterile Tech: cap, gloves and mask  Prep: ChloraPrep  Patient monitoring: blood pressure monitoring, continuous pulse oximetry and EKG  Arterial Line Procedure   Laterality:right  Location:  radial artery  Catheter size: 20 G   Guidance: ultrasound guided and palpation technique  Number of attempts: 2  Successful placement: yes          Post Assessment   Dressing Type: biopatch applied, occlusive dressing applied, secured with tape and wrist guard applied.   Complications no  Circ/Move/Sens Assessment: normal and unchanged.   Patient Tolerance: patient tolerated the procedure well with no apparent complications  Additional Notes  Multiple attempts bilaterally, using US until successful on right

## 2018-06-22 NOTE — ANESTHESIA PROCEDURE NOTES
Central Line    Patient location during procedure: OR  Start time: 6/22/2018 7:20 AM  Stop Time:6/22/2018 7:31 AM  Indications: vascular access  Staff  Anesthesiologist: JOVANNI WORRELL  Preanesthetic Checklist  Completed: patient identified, site marked, surgical consent, pre-op evaluation, timeout performed, IV checked, risks and benefits discussed and monitors and equipment checked  Central Line Prep  Sterile Tech:cap, gloves, gown, mask and sterile barriers  Prep: chloraprep  Patient monitoring: blood pressure monitoring, continuous pulse oximetry and EKG  Central Line Procedure  Laterality:right  Location:internal jugular  Catheter Type:Angier-Tracy, Cordis and single lumen  Catheter Size:8.5 Fr  Guidance:landmark technique  Assessment  Post procedure:biopatch applied, line sutured and occlusive dressing applied  Assessement:blood return through all ports, free fluid flow and Haja Test  Complications:no  Patient Tolerance:patient tolerated the procedure well with no apparent complications

## 2018-06-23 ENCOUNTER — APPOINTMENT (OUTPATIENT)
Dept: GENERAL RADIOLOGY | Facility: HOSPITAL | Age: 73
End: 2018-06-23

## 2018-06-23 LAB
ALBUMIN SERPL-MCNC: 4.3 G/DL (ref 3.5–5.2)
ANION GAP SERPL CALCULATED.3IONS-SCNC: 15.1 MMOL/L
BASOPHILS # BLD AUTO: 0.02 10*3/MM3 (ref 0–0.2)
BASOPHILS NFR BLD AUTO: 0.2 % (ref 0–1.5)
BUN BLD-MCNC: 18 MG/DL (ref 8–23)
BUN/CREAT SERPL: 13.5 (ref 7–25)
CALCIUM SPEC-SCNC: 8 MG/DL (ref 8.6–10.5)
CHLORIDE SERPL-SCNC: 109 MMOL/L (ref 98–107)
CO2 SERPL-SCNC: 21.9 MMOL/L (ref 22–29)
CREAT BLD-MCNC: 1.33 MG/DL (ref 0.76–1.27)
DEPRECATED RDW RBC AUTO: 47.2 FL (ref 37–54)
EOSINOPHIL # BLD AUTO: 0 10*3/MM3 (ref 0–0.7)
EOSINOPHIL NFR BLD AUTO: 0 % (ref 0.3–6.2)
ERYTHROCYTE [DISTWIDTH] IN BLOOD BY AUTOMATED COUNT: 13.6 % (ref 11.5–14.5)
GFR SERPL CREATININE-BSD FRML MDRD: 53 ML/MIN/1.73
GLUCOSE BLD-MCNC: 120 MG/DL (ref 65–99)
GLUCOSE BLDC GLUCOMTR-MCNC: 100 MG/DL (ref 70–130)
GLUCOSE BLDC GLUCOMTR-MCNC: 103 MG/DL (ref 70–130)
GLUCOSE BLDC GLUCOMTR-MCNC: 103 MG/DL (ref 70–130)
GLUCOSE BLDC GLUCOMTR-MCNC: 105 MG/DL (ref 70–130)
GLUCOSE BLDC GLUCOMTR-MCNC: 114 MG/DL (ref 70–130)
GLUCOSE BLDC GLUCOMTR-MCNC: 123 MG/DL (ref 70–130)
GLUCOSE BLDC GLUCOMTR-MCNC: 129 MG/DL (ref 70–130)
GLUCOSE BLDC GLUCOMTR-MCNC: 98 MG/DL (ref 70–130)
HCT VFR BLD AUTO: 26.4 % (ref 40.4–52.2)
HGB BLD-MCNC: 8.6 G/DL (ref 13.7–17.6)
IMM GRANULOCYTES # BLD: 0.02 10*3/MM3 (ref 0–0.03)
IMM GRANULOCYTES NFR BLD: 0.2 % (ref 0–0.5)
INR PPP: 1.41 (ref 0.9–1.1)
LYMPHOCYTES # BLD AUTO: 1.04 10*3/MM3 (ref 0.9–4.8)
LYMPHOCYTES NFR BLD AUTO: 11.2 % (ref 19.6–45.3)
MAGNESIUM SERPL-MCNC: 2.5 MG/DL (ref 1.6–2.4)
MCH RBC QN AUTO: 30.8 PG (ref 27–32.7)
MCHC RBC AUTO-ENTMCNC: 32.6 G/DL (ref 32.6–36.4)
MCV RBC AUTO: 94.6 FL (ref 79.8–96.2)
MONOCYTES # BLD AUTO: 0.65 10*3/MM3 (ref 0.2–1.2)
MONOCYTES NFR BLD AUTO: 7 % (ref 5–12)
NEUTROPHILS # BLD AUTO: 7.58 10*3/MM3 (ref 1.9–8.1)
NEUTROPHILS NFR BLD AUTO: 81.4 % (ref 42.7–76)
PHOSPHATE SERPL-MCNC: 2.8 MG/DL (ref 2.5–4.5)
PLATELET # BLD AUTO: 149 10*3/MM3 (ref 140–500)
PMV BLD AUTO: 10.3 FL (ref 6–12)
POTASSIUM BLD-SCNC: 4.1 MMOL/L (ref 3.5–5.2)
PROTHROMBIN TIME: 17 SECONDS (ref 11.7–14.2)
RBC # BLD AUTO: 2.79 10*6/MM3 (ref 4.6–6)
SODIUM BLD-SCNC: 146 MMOL/L (ref 136–145)
WBC NRBC COR # BLD: 9.31 10*3/MM3 (ref 4.5–10.7)

## 2018-06-23 PROCEDURE — 82962 GLUCOSE BLOOD TEST: CPT

## 2018-06-23 PROCEDURE — 85610 PROTHROMBIN TIME: CPT | Performed by: THORACIC SURGERY (CARDIOTHORACIC VASCULAR SURGERY)

## 2018-06-23 PROCEDURE — 94799 UNLISTED PULMONARY SVC/PX: CPT

## 2018-06-23 PROCEDURE — 93010 ELECTROCARDIOGRAM REPORT: CPT | Performed by: INTERNAL MEDICINE

## 2018-06-23 PROCEDURE — 25010000002 ENOXAPARIN PER 10 MG: Performed by: THORACIC SURGERY (CARDIOTHORACIC VASCULAR SURGERY)

## 2018-06-23 PROCEDURE — 85025 COMPLETE CBC W/AUTO DIFF WBC: CPT | Performed by: THORACIC SURGERY (CARDIOTHORACIC VASCULAR SURGERY)

## 2018-06-23 PROCEDURE — 99024 POSTOP FOLLOW-UP VISIT: CPT | Performed by: THORACIC SURGERY (CARDIOTHORACIC VASCULAR SURGERY)

## 2018-06-23 PROCEDURE — 71045 X-RAY EXAM CHEST 1 VIEW: CPT

## 2018-06-23 PROCEDURE — 80069 RENAL FUNCTION PANEL: CPT | Performed by: THORACIC SURGERY (CARDIOTHORACIC VASCULAR SURGERY)

## 2018-06-23 PROCEDURE — 93005 ELECTROCARDIOGRAM TRACING: CPT | Performed by: THORACIC SURGERY (CARDIOTHORACIC VASCULAR SURGERY)

## 2018-06-23 PROCEDURE — 83735 ASSAY OF MAGNESIUM: CPT | Performed by: THORACIC SURGERY (CARDIOTHORACIC VASCULAR SURGERY)

## 2018-06-23 PROCEDURE — 97161 PT EVAL LOW COMPLEX 20 MIN: CPT

## 2018-06-23 PROCEDURE — 25010000003 CEFAZOLIN IN DEXTROSE 2-4 GM/100ML-% SOLUTION: Performed by: THORACIC SURGERY (CARDIOTHORACIC VASCULAR SURGERY)

## 2018-06-23 RX ORDER — ALPRAZOLAM 0.25 MG/1
0.25 TABLET ORAL EVERY 8 HOURS PRN
Status: DISCONTINUED | OUTPATIENT
Start: 2018-06-23 | End: 2018-06-26 | Stop reason: HOSPADM

## 2018-06-23 RX ORDER — NICOTINE POLACRILEX 4 MG
15 LOZENGE BUCCAL
Status: DISCONTINUED | OUTPATIENT
Start: 2018-06-23 | End: 2018-06-25

## 2018-06-23 RX ORDER — DEXTROSE MONOHYDRATE 25 G/50ML
25 INJECTION, SOLUTION INTRAVENOUS
Status: DISCONTINUED | OUTPATIENT
Start: 2018-06-23 | End: 2018-06-25

## 2018-06-23 RX ADMIN — AMIODARONE HYDROCHLORIDE 400 MG: 200 TABLET ORAL at 08:25

## 2018-06-23 RX ADMIN — SODIUM CHLORIDE 30 ML/HR: 9 INJECTION, SOLUTION INTRAVENOUS at 08:27

## 2018-06-23 RX ADMIN — MUPIROCIN: 20 OINTMENT TOPICAL at 21:20

## 2018-06-23 RX ADMIN — ASPIRIN 81 MG: 81 TABLET, DELAYED RELEASE ORAL at 08:25

## 2018-06-23 RX ADMIN — CHLORHEXIDINE GLUCONATE 15 ML: 1.2 RINSE ORAL at 17:30

## 2018-06-23 RX ADMIN — PANTOPRAZOLE SODIUM 40 MG: 40 TABLET, DELAYED RELEASE ORAL at 08:24

## 2018-06-23 RX ADMIN — CEFAZOLIN SODIUM 2 G: 2 INJECTION, SOLUTION INTRAVENOUS at 09:07

## 2018-06-23 RX ADMIN — HYDROCODONE BITARTRATE AND ACETAMINOPHEN 2 TABLET: 5; 325 TABLET ORAL at 23:17

## 2018-06-23 RX ADMIN — DOCUSATE SODIUM -SENNOSIDES 2 TABLET: 50; 8.6 TABLET, COATED ORAL at 21:20

## 2018-06-23 RX ADMIN — HYDROCODONE BITARTRATE AND ACETAMINOPHEN 2 TABLET: 5; 325 TABLET ORAL at 19:04

## 2018-06-23 RX ADMIN — MUPIROCIN: 20 OINTMENT TOPICAL at 08:22

## 2018-06-23 RX ADMIN — HYDROCODONE BITARTRATE AND ACETAMINOPHEN 2 TABLET: 5; 325 TABLET ORAL at 04:31

## 2018-06-23 RX ADMIN — CEFAZOLIN SODIUM 2 G: 2 INJECTION, SOLUTION INTRAVENOUS at 02:00

## 2018-06-23 RX ADMIN — HYDROCODONE BITARTRATE AND ACETAMINOPHEN 2 TABLET: 5; 325 TABLET ORAL at 12:44

## 2018-06-23 RX ADMIN — ALPRAZOLAM 0.25 MG: 0.25 TABLET ORAL at 17:19

## 2018-06-23 RX ADMIN — ALPRAZOLAM 0.25 MG: 0.25 TABLET ORAL at 08:32

## 2018-06-23 RX ADMIN — CHLORHEXIDINE GLUCONATE 15 ML: 1.2 RINSE ORAL at 23:49

## 2018-06-23 RX ADMIN — METOPROLOL TARTRATE 12.5 MG: 25 TABLET ORAL at 21:20

## 2018-06-23 RX ADMIN — HYDROCODONE BITARTRATE AND ACETAMINOPHEN 2 TABLET: 5; 325 TABLET ORAL at 08:24

## 2018-06-23 RX ADMIN — ENOXAPARIN SODIUM 40 MG: 40 INJECTION, SOLUTION INTRAVENOUS; SUBCUTANEOUS at 17:29

## 2018-06-23 RX ADMIN — METOPROLOL TARTRATE 12.5 MG: 25 TABLET ORAL at 08:23

## 2018-06-23 RX ADMIN — AMIODARONE HYDROCHLORIDE 400 MG: 200 TABLET ORAL at 21:19

## 2018-06-23 RX ADMIN — CEFAZOLIN SODIUM 2 G: 2 INJECTION, SOLUTION INTRAVENOUS at 17:29

## 2018-06-23 NOTE — PLAN OF CARE
Problem: Patient Care Overview  Goal: Plan of Care Review  Outcome: Ongoing (interventions implemented as appropriate)   06/23/18 1747   Coping/Psychosocial   Plan of Care Reviewed With patient   Plan of Care Review   Progress improving   OTHER   Outcome Summary VSS. Patient remains in NSR. On 2L of O2. Pain controlled. Adequate urine output.Mediastinal chest tubes out. Will continue to follow.       Problem: Cardiac Surgery (Adult)  Goal: Signs and Symptoms of Listed Potential Problems Will be Absent, Minimized or Managed (Cardiac Surgery)  Outcome: Ongoing (interventions implemented as appropriate)   06/23/18 1747   Goal/Outcome Evaluation   Problems Assessed (Cardiac Surgery) all   Problems Present (Cardiac Surgery) pain

## 2018-06-23 NOTE — PLAN OF CARE
Problem: Patient Care Overview  Goal: Plan of Care Review   06/23/18 1028   Coping/Psychosocial   Plan of Care Reviewed With patient   OTHER   Outcome Summary Pt is a 73 y/o M with Dx of severe CAD. CABG x5 on 6/22/18. Pt required Shanice x2 for functional mobility today. No acute distress noted during session. Pt may continue to benefit from skilled PT interventions to address functional mobility deficits.

## 2018-06-23 NOTE — PROGRESS NOTES
POD # 1 CABG x 5  VSS,   I/Os -990 cc  Labs noted, creat 1.3 , Hgb 9.6  CXR mild congestion  Chest clear, cor reg, dressings intact  Normal course, to CVU

## 2018-06-23 NOTE — THERAPY EVALUATION
Acute Care - Physical Therapy Initial Evaluation  [facility]     Patient Name: Souleymane Stapleton  : 1945  MRN: [id number]  Today's Date: 2018   Onset of Illness/Injury or Date of Surgery: 18  Date of Referral to PT: 18  Referring Physician: Francheska Belcher      Admit Date: 2018    Visit Dx:     ICD-10-CM ICD-9-CM   1. Angina of effort I20.8 413.9   2. Coronary artery disease involving native heart, angina presence unspecified, unspecified vessel or lesion type I25.10 414.01   3. Generalized weakness R53.1 780.79     Patient Active Problem List   Diagnosis   • Astigmatism   • Anxiety state   • History of cerebrovascular accident   • Nuclear senile cataract   • Hypertension   • Unspecified hemorrhoids   • Palpitations   • Pure hypercholesterolemia   • Prostate cancer   • Chronic right shoulder pain   • Rotator cuff syndrome, left   • Chronic left shoulder pain   • Impingement syndrome, shoulder, left   • SOB (shortness of breath)   • Angina of effort     Past Medical History:   Diagnosis Date   • Abdominal pain    • Abnormal weight loss    • Acid reflux    • Acute gastritis    • Anxiety state    • Arthritis    • Cancer     Prostate   • History of cerebrovascular accident    • History of colon polyps    • Hypertension    • Nausea    • Nuclear senile cataract    • Presbyopia    • Tobacco use    • Transient cerebral ischemia    • Vitreous detachment of left eye      Past Surgical History:   Procedure Laterality Date   • BACK SURGERY     • CARDIAC CATHETERIZATION N/A 2018    Procedure: Coronary angiography;  Surgeon: Delroy Mcconnell MD;  Location: [facility] INVASIVE LOCATION;  Service: Cardiovascular   • COLONOSCOPY  2015    Diverticulosis found in the sigmoid colon. Hemorrhoids found in the anus.   • CRYOTHERAPY  2012    Of Acne   • ENDOSCOPY  2015    EGD w/ biopsy -Multiple polyps, one removed.   • HEMORRHOIDECTOMY N/A 3/20/2017    Procedure: Removal of Anal  Gio;  Surgeon: Juan Diego Ken MD;  Location: [facility] OR;  Service:    • INJECTION OF MEDICATION  09/14/2010    B12   • INJECTION OF MEDICATION  10/17/2011    Kenalog   • LUMBAR LAMINECTOMY  09/29/2010   • OTHER SURGICAL HISTORY  08/19/2010    Biopsy, Each Additional Lesion    • SKIN BIOPSY  08/19/2010        PT ASSESSMENT (last 12 hours)      Physical Therapy Evaluation     Row Name 06/23/18 0859          PT Evaluation Time/Intention    Subjective Information complains of;dizziness;pain  -SA     Document Type evaluation  -     Patient Effort good  -     Row Name 06/23/18 0859          General Information    Patient Profile Reviewed? yes  -SA     Onset of Illness/Injury or Date of Surgery 06/21/18  -     Referring Physician Francheska Belcher  -     Patient Observations alert;cooperative;agree to therapy  -     Patient/Family Observations Pt awake, alert, sitting in chair, no acute distress, RN in room, chest tube/michelle/IV/O2 NC  -     Prior Level of Function independent:  -SA     Pertinent History of Current Functional Problem Pt POD 1 CABG x5. Dx of severe CAD. PMH includes arthritis, cancer, HTN  -     Existing Precautions/Restrictions cardiac;fall;sternal  -SA     Risks Reviewed patient:;increased discomfort  -SA     Benefits Reviewed patient:;improve function;increase independence;increase strength;increase balance;decrease pain;decrease risk of DVT  -SA     Row Name 06/23/18 0859          Relationship/Environment    Lives With alone  -     Row Name 06/23/18 0859          Resource/Environmental Concerns    Current Living Arrangements home/apartment/condo  -     Row Name 06/23/18 0859          Home Main Entrance    Number of Stairs, Main Entrance two  -SA     Stair Railings, Main Entrance none  -SA     Row Name 06/23/18 0859          Cognitive Assessment/Interventions    Additional Documentation Cognitive Assessment/Intervention (Group)  -     Row Name 06/23/18 0859          Cognitive  Assessment/Intervention- PT/OT    Orientation Status (Cognition) oriented x 3  -SA     Follows Commands (Cognition) [facility]     Personal Safety Interventions fall prevention program maintained;gait belt;nonskid shoes/slippers when out of bed;supervised activity  -[facility] Name 06/23/18 0859          Safety Issues, Functional Mobility    Safety Issues Affecting Function (Mobility) safety precaution awareness  -SA     Row Name 06/23/18 0859          Bed Mobility Assessment/Treatment    Comment (Bed Mobility) up in chair  -SA     Row Name 06/23/18 0859          Transfer Assessment/Treatment    Transfer Assessment/Treatment sit-stand transfer;stand-sit transfer  -     Sit-Stand [place] (Transfers) minimum assist (75% patient effort);2 person assist  -     Stand-Sit [place] (Transfers) minimum assist (75% patient effort)  -[facility] Name 06/23/18 0859          Sit-Stand Transfer    Assistive Device (Sit-Stand Transfers) --   HHA  -SA     Row Name 06/23/18 0859          Gait/Stairs Assessment/Training    [place] Level (Gait) minimum assist (75% patient effort);2 person assist  -     Assistive Device (Gait) --   A  -     Distance in Feet (Gait) 45  -SA     Pattern (Gait) step-through  -     Deviations/Abnormal Patterns (Gait) base of support, narrow;brissa decreased  -[facility] Name 06/23/18 0859          General ROM    GENERAL ROM COMMENTS BUE limited by pain/discomfort, BLE L  -[facility] Name 06/23/18 0859          General Assessment (Manual Muscle Testing)    Comment, General Manual Muscle Testing (MMT) Assessment BUE/BLE grossly 3/5 noted through patient movement  -[facility] Name 06/23/18 0859          Sensory Assessment/Intervention    Sensory General Assessment no sensation deficits identified   per pt  -SA     Row Name 06/23/18 0859          Pain Assessment    Additional Documentation Pain Scale: Numbers Pre/Post-Treatment (Group)  -[facility] Name 06/23/18 0859          Pain  Scale: Numbers Pre/Post-Treatment    Pain Scale: Numbers, Pretreatment 3/10  -SA     Pain Scale: Numbers, Post-Treatment 3/10  -SA     Pain Location --   incisional  -SA     Pain Intervention(s) Medication (See MAR);Repositioned;Ambulation/increased activity;Rest  -SA     Row Name             Wound 06/22/18 0930 chest incision    Wound - Properties Group Date first assessed: 06/22/18  -SR Time first assessed: 0930  -SR Location: chest  -SR Type: incision  -SR    Row Name             Wound 06/22/18 0930 Left leg incision    Wound - Properties Group Date first assessed: 06/22/18  - Time first assessed: 0930  -SR Side: Left  -SR Location: leg  -SR Type: incision  -SR    Row Name 06/23/18 0859          Plan of Care Review    Plan of Care Reviewed With patient  -SA     Row Name 06/23/18 0859          Physical Therapy Clinical Impression    Date of Referral to PT 06/22/18  -     PT Diagnosis (PT Clinical Impression) generalized weakness  -     Patient/Family Goals Statement (PT Clinical Impression) Pt stated he wanted to return home  -     Criteria for Skilled Interventions Met (PT Clinical Impression) yes  -SA     Impairments Found (describe specific impairments) gait, locomotion, and balance  -     Rehab Potential (PT Clinical Summary) good, to achieve stated therapy goals  -     Row Name 06/23/18 0871          Physical Therapy Goals    Bed Mobility Goal Selection (PT) bed mobility, PT goal 1  -SA     Transfer Goal Selection (PT) transfer, PT goal 1  -SA     Gait Training Goal Selection (PT) gait training, PT goal 1  -SA     Stairs Goal Selection (PT) stairs, PT goal 1  -SA     Additional Documentation Stairs Goal Selection (PT) (Row)  -     Row Name 06/23/18 0842          Bed Mobility Goal 1 (PT)    Activity/Assistive Device (Bed Mobility Goal 1, PT) bed mobility activities, all  -SA     [place] Level/Cues Needed (Bed Mobility Goal 1, PT) supervision required  -     Time Frame (Bed Mobility Goal 1,  PT) 1 week  -SA     Row Name 06/23/18 0859          Transfer Goal 1 (PT)    Activity/Assistive Device (Transfer Goal 1, PT) transfers, all  -SA     [place] Level/Cues Needed (Transfer Goal 1, PT) supervision required  -SA     Time Frame (Transfer Goal 1, PT) 1 week  -SA     Row Name 06/23/18 0859          Gait Training Goal 1 (PT)    Activity/Assistive Device (Gait Training Goal 1, PT) gait (walking locomotion)  -SA     [place] Level (Gait Training Goal 1, PT) supervision required  -SA     Distance (Gait Goal 1, PT) 200  -SA     Time Frame (Gait Training Goal 1, PT) 1 week  -SA     Row Name 06/23/18 0859          Stairs Goal 1 (PT)    Activity/Assistive Device (Stairs Goal 1, PT) stairs, all skills  -SA     [place] Level/Cues Needed (Stairs Goal 1, PT) supervision required  -SA     Number of Stairs (Stairs Goal 1, PT) 2   no HR  -SA     Time Frame (Stairs Goal 1, PT) 1 week  -SA     Row Name 06/23/18 0859          Patient Education Goal (PT)    Activity (Patient Education Goal, PT) Pt will demonstrate and verbalize understanding of precautions and HEP  -SA     Time Frame (Patient Education Goal, PT) 1 week  -SA     Row Name 06/23/18 0859          Positioning and Restraints    Pre-Treatment Position sitting in chair/recliner  -SA     Post Treatment Position chair  -SA     In Chair notified nsg;sitting;call light within reach;encouraged to call for assist;exit alarm on;with nsg   RN in room  -SA     Row Name 06/23/18 0859          Living Environment    Home Accessibility stairs to enter home  -SA       User Key  (r) = Recorded By, (t) = Taken By, (c) = Cosigned By    Initials Name Provider Type    SR Christine Lee RN Registered Nurse    SA Fani Mosqueda PT Physical Therapist          Physical Therapy Education     Title: PT OT SLP Therapies (Done)     Topic: Physical Therapy (Done)     Point: Mobility training (Done)    Learning Progress Summary     Learner Status Readiness Method Response Comment  Documented by    Patient Done Acceptance E,TB VU,NR   06/23/18 1028          Point: Home exercise program (Done)    Learning Progress Summary     Learner Status Readiness Method Response Comment Documented by    Patient Done Acceptance E,TB VU,NR   06/23/18 1028          Point: Body mechanics (Done)    Learning Progress Summary     Learner Status Readiness Method Response Comment Documented by    Patient Done Acceptance E,TB VU,NR   06/23/18 1028          Point: Precautions (Done)    Learning Progress Summary     Learner Status Readiness Method Response Comment Documented by    Patient Done Acceptance E,TB VU,NR   06/23/18 1028                      User Key     Initials Effective Dates Name Provider Type Discipline     04/03/18 -  Fani Mosqueda, PT Physical Therapist PT                PT Recommendation and Plan  Anticipated Discharge Disposition (PT): home with assist, home with home health, home with OP services, inpatient rehabilitation facility, skilled nursing facility (pending pt progress)  Planned Therapy Interventions (PT Eval): balance training, bed mobility training, gait training, home exercise program, patient/family education, stair training, strengthening, transfer training  Therapy Frequency (PT Clinical Impression): daily  Outcome Summary/Treatment Plan (PT)  Anticipated Discharge Disposition (PT): home with assist, home with home health, home with OP services, inpatient rehabilitation facility, skilled nursing facility (pending pt progress)  Plan of Care Reviewed With: patient  Outcome Summary: Pt is a 73 y/o M with Dx of severe CAD. CABG x5 on 6/22/18. Pt required [place] x2 for functional mobility today. No acute distress noted during session. Pt may continue to benefit from skilled PT interventions to address functional mobility deficits.           Outcome Measures     Row Name 06/23/18 1000             How much help from another person do you currently need...    Turning from your back to  your side while in flat bed without using bedrails? 3  -SA      Moving from lying on back to sitting on the side of a flat bed without bedrails? 3  -SA      Moving to and from a bed to a chair (including a wheelchair)? 3  -SA      Standing up from a chair using your arms (e.g., wheelchair, bedside chair)? 3  -SA      Climbing 3-5 steps with a railing? 2  -SA      To walk in hospital room? 3  -SA      AM-PAC 6 Clicks Score 17  -SA         Functional Assessment    Outcome Measure Options AM-PAC 6 Clicks Basic Mobility (PT)  -        User Key  (r) = Recorded By, (t) = Taken By, (c) = Cosigned By    Initials Name Provider Type    SA Fani Mosqueda PT Physical Therapist           Time Calculation:         PT Charges     Row Name 06/23/18 1030             Time Calculation    Start Time 0859  -      Stop Time 0915  -      Time Calculation (min) 16 min  -      PT Received On 06/23/18  -SA      PT - Next Appointment 06/24/18  -SA      PT Goal Re-Cert Due Date 06/30/18  -SA        User Key  (r) = Recorded By, (t) = Taken By, (c) = Cosigned By    Initials Name Provider Type    SA Fani Mosqueda PT Physical Therapist        Therapy Suggested Charges     Code   Minutes Charges    None           Therapy Charges for Today     Code Description Service Date Service Provider Modifiers Qty    39984477136 HC PT EVAL LOW COMPLEXITY 2 6/23/2018 Fani Mosqueda PT GP 1    27994138627 HC PT THER SUPP EA 15 MIN 6/23/2018 Fani Mosqueda PT GP 1          PT G-Codes  Outcome Measure Options: AM-PAC 6 Clicks Basic Mobility (PT)      Fani Mosqueda PT  6/23/2018

## 2018-06-24 ENCOUNTER — APPOINTMENT (OUTPATIENT)
Dept: GENERAL RADIOLOGY | Facility: HOSPITAL | Age: 73
End: 2018-06-24

## 2018-06-24 LAB
ANION GAP SERPL CALCULATED.3IONS-SCNC: 14.3 MMOL/L
BUN BLD-MCNC: 14 MG/DL (ref 8–23)
BUN/CREAT SERPL: 13.3 (ref 7–25)
CALCIUM SPEC-SCNC: 8 MG/DL (ref 8.6–10.5)
CHLORIDE SERPL-SCNC: 101 MMOL/L (ref 98–107)
CO2 SERPL-SCNC: 19.7 MMOL/L (ref 22–29)
CREAT BLD-MCNC: 1.05 MG/DL (ref 0.76–1.27)
DEPRECATED RDW RBC AUTO: 47.6 FL (ref 37–54)
ERYTHROCYTE [DISTWIDTH] IN BLOOD BY AUTOMATED COUNT: 13.8 % (ref 11.5–14.5)
GFR SERPL CREATININE-BSD FRML MDRD: 69 ML/MIN/1.73
GLUCOSE BLD-MCNC: 109 MG/DL (ref 65–99)
GLUCOSE BLDC GLUCOMTR-MCNC: 103 MG/DL (ref 70–130)
GLUCOSE BLDC GLUCOMTR-MCNC: 108 MG/DL (ref 70–130)
GLUCOSE BLDC GLUCOMTR-MCNC: 114 MG/DL (ref 70–130)
GLUCOSE BLDC GLUCOMTR-MCNC: 99 MG/DL (ref 70–130)
HCT VFR BLD AUTO: 28.7 % (ref 40.4–52.2)
HGB BLD-MCNC: 9.7 G/DL (ref 13.7–17.6)
MCH RBC QN AUTO: 31.9 PG (ref 27–32.7)
MCHC RBC AUTO-ENTMCNC: 33.8 G/DL (ref 32.6–36.4)
MCV RBC AUTO: 94.4 FL (ref 79.8–96.2)
PLATELET # BLD AUTO: 186 10*3/MM3 (ref 140–500)
PMV BLD AUTO: 10.5 FL (ref 6–12)
POTASSIUM BLD-SCNC: 4 MMOL/L (ref 3.5–5.2)
RBC # BLD AUTO: 3.04 10*6/MM3 (ref 4.6–6)
SODIUM BLD-SCNC: 135 MMOL/L (ref 136–145)
WBC NRBC COR # BLD: 12.65 10*3/MM3 (ref 4.5–10.7)

## 2018-06-24 PROCEDURE — 93005 ELECTROCARDIOGRAM TRACING: CPT | Performed by: THORACIC SURGERY (CARDIOTHORACIC VASCULAR SURGERY)

## 2018-06-24 PROCEDURE — 71045 X-RAY EXAM CHEST 1 VIEW: CPT

## 2018-06-24 PROCEDURE — 82962 GLUCOSE BLOOD TEST: CPT

## 2018-06-24 PROCEDURE — 93010 ELECTROCARDIOGRAM REPORT: CPT | Performed by: INTERNAL MEDICINE

## 2018-06-24 PROCEDURE — 97110 THERAPEUTIC EXERCISES: CPT

## 2018-06-24 PROCEDURE — 99024 POSTOP FOLLOW-UP VISIT: CPT | Performed by: THORACIC SURGERY (CARDIOTHORACIC VASCULAR SURGERY)

## 2018-06-24 PROCEDURE — 80048 BASIC METABOLIC PNL TOTAL CA: CPT | Performed by: THORACIC SURGERY (CARDIOTHORACIC VASCULAR SURGERY)

## 2018-06-24 PROCEDURE — 85027 COMPLETE CBC AUTOMATED: CPT | Performed by: THORACIC SURGERY (CARDIOTHORACIC VASCULAR SURGERY)

## 2018-06-24 PROCEDURE — 25010000002 ENOXAPARIN PER 10 MG: Performed by: THORACIC SURGERY (CARDIOTHORACIC VASCULAR SURGERY)

## 2018-06-24 PROCEDURE — 94799 UNLISTED PULMONARY SVC/PX: CPT

## 2018-06-24 PROCEDURE — 25010000003 CEFAZOLIN IN DEXTROSE 2-4 GM/100ML-% SOLUTION: Performed by: THORACIC SURGERY (CARDIOTHORACIC VASCULAR SURGERY)

## 2018-06-24 RX ORDER — FUROSEMIDE 20 MG/1
20 TABLET ORAL DAILY
Status: DISCONTINUED | OUTPATIENT
Start: 2018-06-24 | End: 2018-06-25

## 2018-06-24 RX ADMIN — ALPRAZOLAM 0.25 MG: 0.25 TABLET ORAL at 01:22

## 2018-06-24 RX ADMIN — HYDROCODONE BITARTRATE AND ACETAMINOPHEN 2 TABLET: 5; 325 TABLET ORAL at 09:32

## 2018-06-24 RX ADMIN — AMIODARONE HYDROCHLORIDE 400 MG: 200 TABLET ORAL at 20:42

## 2018-06-24 RX ADMIN — ENOXAPARIN SODIUM 40 MG: 40 INJECTION, SOLUTION INTRAVENOUS; SUBCUTANEOUS at 18:28

## 2018-06-24 RX ADMIN — AMIODARONE HYDROCHLORIDE 400 MG: 200 TABLET ORAL at 09:15

## 2018-06-24 RX ADMIN — MUPIROCIN 1 APPLICATION: 20 OINTMENT TOPICAL at 09:15

## 2018-06-24 RX ADMIN — MUPIROCIN 1 APPLICATION: 20 OINTMENT TOPICAL at 20:43

## 2018-06-24 RX ADMIN — ASPIRIN 81 MG: 81 TABLET, DELAYED RELEASE ORAL at 09:15

## 2018-06-24 RX ADMIN — FUROSEMIDE 20 MG: 20 TABLET ORAL at 09:15

## 2018-06-24 RX ADMIN — HYDROCODONE BITARTRATE AND ACETAMINOPHEN 2 TABLET: 5; 325 TABLET ORAL at 13:30

## 2018-06-24 RX ADMIN — PANTOPRAZOLE SODIUM 40 MG: 40 TABLET, DELAYED RELEASE ORAL at 06:36

## 2018-06-24 RX ADMIN — DOCUSATE SODIUM -SENNOSIDES 2 TABLET: 50; 8.6 TABLET, COATED ORAL at 20:43

## 2018-06-24 RX ADMIN — ALPRAZOLAM 0.25 MG: 0.25 TABLET ORAL at 09:32

## 2018-06-24 RX ADMIN — ACETAMINOPHEN 650 MG: 325 TABLET, FILM COATED ORAL at 18:31

## 2018-06-24 RX ADMIN — CHLORHEXIDINE GLUCONATE 15 ML: 1.2 RINSE ORAL at 20:44

## 2018-06-24 RX ADMIN — ALPRAZOLAM 0.25 MG: 0.25 TABLET ORAL at 15:02

## 2018-06-24 RX ADMIN — CHLORHEXIDINE GLUCONATE 15 ML: 1.2 RINSE ORAL at 13:25

## 2018-06-24 RX ADMIN — CEFAZOLIN SODIUM 2 G: 2 INJECTION, SOLUTION INTRAVENOUS at 01:22

## 2018-06-24 RX ADMIN — METOPROLOL TARTRATE 12.5 MG: 25 TABLET ORAL at 20:42

## 2018-06-24 RX ADMIN — HYDROCODONE BITARTRATE AND ACETAMINOPHEN 2 TABLET: 5; 325 TABLET ORAL at 03:37

## 2018-06-24 RX ADMIN — METOPROLOL TARTRATE 12.5 MG: 25 TABLET ORAL at 09:15

## 2018-06-24 NOTE — THERAPY TREATMENT NOTE
Acute Care - Physical Therapy Treatment Note  [facility]     Patient Name: Souleymane Stapleton  : 1945  MRN: [id number]  Today's Date: 2018  Onset of Illness/Injury or Date of Surgery: 18  Date of Referral to PT: 18  Referring Physician: Francheska Belcher    Admit Date: 2018    Visit Dx:    ICD-10-CM ICD-9-CM   1. Angina of effort I20.8 413.9   2. Coronary artery disease involving native heart, angina presence unspecified, unspecified vessel or lesion type I25.10 414.01   3. Generalized weakness R53.1 780.79     Patient Active Problem List   Diagnosis   • Astigmatism   • Anxiety state   • History of cerebrovascular accident   • Nuclear senile cataract   • Hypertension   • Unspecified hemorrhoids   • Palpitations   • Pure hypercholesterolemia   • Prostate cancer   • Chronic right shoulder pain   • Rotator cuff syndrome, left   • Chronic left shoulder pain   • Impingement syndrome, shoulder, left   • SOB (shortness of breath)   • Angina of effort       Therapy Treatment          Rehabilitation Treatment Summary     Row Name 18             Treatment Time/Intention    Discipline physical therapist  -SA      Document Type therapy note (daily note)  -SA      Subjective Information complains of;pain  -SA      Patient/Family Observations Pt awake, alert, sitting in chair, no acute distress, O2 NC/chest tube/michelle in place.   -SA      Patient Effort good  -SA      Existing Precautions/Restrictions cardiac;fall;sternal  -SA      Patient Response to Treatment tolerated  -SA      Recorded by [SA] Fani Mosqueda, PT 18 0942      Row Name 18 0903             Cognitive Assessment/Intervention- PT/OT    Orientation Status (Cognition) oriented x 3  -SA      Follows Commands (Cognition) WFL  -SA      Personal Safety Interventions fall prevention program maintained;gait belt;nonskid shoes/slippers when out of bed;supervised activity  -SA      Recorded by [SA] Fani Mosqueda, PT 18  0942      Row Name 06/24/18 0903             Bed Mobility Assessment/Treatment    Comment (Bed Mobility) up in chair  -SA      Recorded by [SA] Fani Mosqueda, PT 06/24/18 0942      Row Name 06/24/18 0903             Transfer Assessment/Treatment    Sit-Stand [place] (Transfers) minimum assist (75% patient effort);1 person to manage equipment  -SA      Stand-Sit [place] (Transfers) minimum assist (75% patient effort);1 person to manage equipment  -SA      Recorded by [SA] Fani Mosqueda, PT 06/24/18 0942      Row Name 06/24/18 0903             Sit-Stand Transfer    Assistive Device (Sit-Stand Transfers) --   HHA  -SA      Recorded by [SA] Fani Mosqueda, PT 06/24/18 0942      Row Name 06/24/18 0903             Gait/Stairs Assessment/Training    [place] Level (Gait) contact guard;1 person to manage equipment  -SA      Distance in Feet (Gait) 80  -SA      Deviations/Abnormal Patterns (Gait) base of support, narrow;brissa decreased  -SA      Recorded by [SA] Fani Mosqueda, PT 06/24/18 0942      Row Name 06/24/18 0903             Motor Skills Assessment/Interventions    Additional Documentation Therapeutic Exercise (Group);Therapeutic Exercise Interventions (Group)  -SA      Recorded by [SA] Fani Mosqueda, PT 06/24/18 0942      Row Name 06/24/18 0903             Therapeutic Exercise    Comment (Therapeutic Exercise) cardiac protocol, 5 reps  -SA      Recorded by [SA] Fani Mosqueda, PT 06/24/18 0942      Row Name 06/24/18 0903             Positioning and Restraints    Pre-Treatment Position sitting in chair/recliner  -SA      Post Treatment Position chair  -SA      In Chair notified nsg;reclined;call light within reach;encouraged to call for assist;exit alarm on  -SA      Recorded by [SA] Fani Mosqueda, PT 06/24/18 0942      Row Name 06/24/18 0903             Pain Scale: Numbers Pre/Post-Treatment    Pain Scale: Numbers, Pretreatment 3/10  -SA      Pain Scale: Numbers, Post-Treatment 3/10  -SA      Pain Location --    incision  -SA      Pain Intervention(s) Medication (See MAR);Ambulation/increased activity;Repositioned;Rest  -SA      Recorded by [SA] Fani Mosqueda PT 06/24/18 0942      Row Name                Wound 06/22/18 0930 chest incision    Wound - Properties Group Date first assessed: 06/22/18 [SR] Time first assessed: 0930 [SR] Location: chest [SR] Type: incision [SR] Recorded by:  [SR] Christine Lee RN 06/22/18 0930    Row Name                Wound 06/22/18 0930 Left leg incision    Wound - Properties Group Date first assessed: 06/22/18 [SR] Time first assessed: 0930 [SR] Side: Left [SR] Location: leg [SR] Type: incision [SR] Recorded by:  [SR] Christine Lee RN 06/22/18 0930    Row Name 06/24/18 0903             Outcome Summary/Treatment Plan (PT)    Daily Summary of Progress (PT) progress toward functional goals is good  -SA      Anticipated Discharge Disposition (PT) home with assist;home with home health;home with OP services;inpatient rehabilitation facility;skilled nursing facility  -SA      Recorded by [SA] Fani Mosqueda, PURNIMA 06/24/18 0942        User Key  (r) = Recorded By, (t) = Taken By, (c) = Cosigned By    Initials Name Effective Dates Discipline    SR Christine Lee RN 06/16/16 -  Nurse    SA Fani Mosqueda, PT 04/03/18 -  PT          Wound 06/22/18 0930 chest incision (Active)   Dressing Appearance dry;intact 6/24/2018  4:00 AM   Closure ZHENG 6/24/2018  4:00 AM   Base dressing in place, unable to visualize 6/24/2018  4:00 AM       Wound 06/22/18 0930 Left leg incision (Active)   Dressing Appearance dry;intact 6/24/2018  4:00 AM   Closure Liquid skin adhesive 6/24/2018  4:00 AM             Physical Therapy Education     Title: PT OT SLP Therapies (Done)     Topic: Physical Therapy (Done)     Point: Mobility training (Done)    Learning Progress Summary     Learner Status Readiness Method Response Comment Documented by    Patient Done Acceptance EOMAR NR SA 06/24/18 0942     Done Acceptance OMAR DENNIS NR SA 06/23/18  1028          Point: Home exercise program (Done)    Learning Progress Summary     Learner Status Readiness Method Response Comment Documented by    Patient Done Acceptance E,TB VU,NR   06/24/18 0942     Done Acceptance E,TB VU,NR   06/23/18 1028          Point: Body mechanics (Done)    Learning Progress Summary     Learner Status Readiness Method Response Comment Documented by    Patient Done Acceptance E,TB VU,NR   06/24/18 0942     Done Acceptance E,TB VU,NR   06/23/18 1028          Point: Precautions (Done)    Learning Progress Summary     Learner Status Readiness Method Response Comment Documented by    Patient Done Acceptance E,TB VU,NR   06/24/18 0942     Done Acceptance E,TB VU,NR   06/23/18 1028                      User Key     Initials Effective Dates Name Provider Type Discipline     04/03/18 -  Fani Mosqueda, PT Physical Therapist PT                    PT Recommendation and Plan  Anticipated Discharge Disposition (PT): home with assist, home with home health, home with OP services, inpatient rehabilitation facility, skilled nursing facility  Planned Therapy Interventions (PT Eval): balance training, bed mobility training, gait training, home exercise program, patient/family education, stair training, strengthening, transfer training  Therapy Frequency (PT Clinical Impression): daily  Outcome Summary/Treatment Plan (PT)  Daily Summary of Progress (PT): progress toward functional goals is good  Anticipated Discharge Disposition (PT): home with assist, home with home health, home with OP services, inpatient rehabilitation facility, skilled nursing facility  Plan of Care Reviewed With: patient  Progress: improving  Outcome Summary: Pt tolerated todays session without signs of acute distress. Pt able to increase ambulation distance today. Pt may continue to benefit from skilled PT interventions.           Outcome Measures     Row Name 06/24/18 0900 06/23/18 1000          How much help from  another person do you currently need...    Turning from your back to your side while in flat bed without using bedrails? 3  -SA 3  -SA     Moving from lying on back to sitting on the side of a flat bed without bedrails? 3  -SA 3  -SA     Moving to and from a bed to a chair (including a wheelchair)? 3  -SA 3  -SA     Standing up from a chair using your arms (e.g., wheelchair, bedside chair)? 3  -SA 3  -SA     Climbing 3-5 steps with a railing? 2  -SA 2  -SA     To walk in hospital room? 3  -SA 3  -SA     AM-PAC 6 Clicks Score 17  -SA 17  -SA        Functional Assessment    Outcome Measure Options AM-PAC 6 Clicks Basic Mobility (PT)  -SA AM-PAC 6 Clicks Basic Mobility (PT)  -SA       User Key  (r) = Recorded By, (t) = Taken By, (c) = Cosigned By    Initials Name Provider Type    SA Fani Mosqueda PT Physical Therapist           Time Calculation:         PT Charges     Row Name 06/24/18 0944             Time Calculation    Start Time 0851  -SA      Stop Time 0903  -      Time Calculation (min) 12 min  -SA      PT Received On 06/24/18  -SA      PT - Next Appointment 06/25/18  -SA        User Key  (r) = Recorded By, (t) = Taken By, (c) = Cosigned By    Initials Name Provider Type    SA Fani Mosqueda PT Physical Therapist        Therapy Suggested Charges     Code   Minutes Charges    None           Therapy Charges for Today     Code Description Service Date Service Provider Modifiers Qty    73813964308 HC PT EVAL LOW COMPLEXITY 2 6/23/2018 Fani Mosqueda PT GP 1    30216962322 HC PT THER SUPP EA 15 MIN 6/23/2018 Fani Mosqueda PT GP 1    57275248583 HC PT THER PROC EA 15 MIN 6/24/2018 Fani Mosqueda PT GP 1    73309623668 HC PT THER SUPP EA 15 MIN 6/24/2018 Fani Mosqueda PT GP 1          PT G-Codes  Outcome Measure Options: AM-PAC 6 Clicks Basic Mobility (PT)    Fani Mosqueda PT  6/24/2018

## 2018-06-24 NOTE — PROGRESS NOTES
POD # 2 CABG  VSS, SR  I/Os -550 cc  CTs decreasing  Labs noted  Chest clear, cor reg, dressings intact  Normal course, see orders

## 2018-06-24 NOTE — PLAN OF CARE
Problem: Patient Care Overview  Goal: Plan of Care Review  Outcome: Ongoing (interventions implemented as appropriate)    Goal: Individualization and Mutuality  Outcome: Ongoing (interventions implemented as appropriate)    Goal: Discharge Needs Assessment  Outcome: Ongoing (interventions implemented as appropriate)    Goal: Interprofessional Rounds/Family Conf  Outcome: Ongoing (interventions implemented as appropriate)      Problem: Cardiac Surgery (Adult)  Goal: Anesthesia/Sedation Recovery  Outcome: Ongoing (interventions implemented as appropriate)      Problem: Skin Injury Risk (Adult)  Goal: Identify Related Risk Factors and Signs and Symptoms  Outcome: Ongoing (interventions implemented as appropriate)    Goal: Skin Health and Integrity  Outcome: Ongoing (interventions implemented as appropriate)      Problem: Fall Risk (Adult)  Goal: Identify Related Risk Factors and Signs and Symptoms  Outcome: Ongoing (interventions implemented as appropriate)    Goal: Absence of Fall  Outcome: Ongoing (interventions implemented as appropriate)

## 2018-06-24 NOTE — PLAN OF CARE
Problem: Patient Care Overview  Goal: Plan of Care Review   06/24/18 1603   Coping/Psychosocial   Plan of Care Reviewed With patient   Plan of Care Review   Progress improving   OTHER   Outcome Summary Chest tubes, IJ, and f/c removed today. pt has voided. he is still requiring 2L O2 to keep sats >90%. ambulates well with standby assist

## 2018-06-24 NOTE — PLAN OF CARE
Problem: Patient Care Overview  Goal: Plan of Care Review   06/24/18 0943   Coping/Psychosocial   Plan of Care Reviewed With patient   Plan of Care Review   Progress improving   OTHER   Outcome Summary Pt tolerated todays session without signs of acute distress. Pt able to increase ambulation distance today. Pt may continue to benefit from skilled PT interventions.

## 2018-06-25 ENCOUNTER — DOCUMENTATION (OUTPATIENT)
Dept: CARDIAC REHAB | Facility: HOSPITAL | Age: 73
End: 2018-06-25

## 2018-06-25 ENCOUNTER — APPOINTMENT (OUTPATIENT)
Dept: GENERAL RADIOLOGY | Facility: HOSPITAL | Age: 73
End: 2018-06-25
Attending: THORACIC SURGERY (CARDIOTHORACIC VASCULAR SURGERY)

## 2018-06-25 LAB
ABO + RH BLD: NORMAL
ABO + RH BLD: NORMAL
ANION GAP SERPL CALCULATED.3IONS-SCNC: 11.8 MMOL/L
BH BB BLOOD EXPIRATION DATE: NORMAL
BH BB BLOOD EXPIRATION DATE: NORMAL
BH BB BLOOD TYPE BARCODE: 9500
BH BB BLOOD TYPE BARCODE: 9500
BH BB DISPENSE STATUS: NORMAL
BH BB DISPENSE STATUS: NORMAL
BH BB PRODUCT CODE: NORMAL
BH BB PRODUCT CODE: NORMAL
BH BB UNIT NUMBER: NORMAL
BH BB UNIT NUMBER: NORMAL
BUN BLD-MCNC: 14 MG/DL (ref 8–23)
BUN/CREAT SERPL: 13.5 (ref 7–25)
CALCIUM SPEC-SCNC: 8.5 MG/DL (ref 8.6–10.5)
CHLORIDE SERPL-SCNC: 101 MMOL/L (ref 98–107)
CO2 SERPL-SCNC: 26.2 MMOL/L (ref 22–29)
CREAT BLD-MCNC: 1.04 MG/DL (ref 0.76–1.27)
CROSSMATCH INTERPRETATION: NORMAL
CROSSMATCH INTERPRETATION: NORMAL
DEPRECATED RDW RBC AUTO: 46.1 FL (ref 37–54)
ERYTHROCYTE [DISTWIDTH] IN BLOOD BY AUTOMATED COUNT: 13.3 % (ref 11.5–14.5)
GFR SERPL CREATININE-BSD FRML MDRD: 70 ML/MIN/1.73
GLUCOSE BLD-MCNC: 119 MG/DL (ref 65–99)
GLUCOSE BLDC GLUCOMTR-MCNC: 100 MG/DL (ref 70–130)
GLUCOSE BLDC GLUCOMTR-MCNC: 120 MG/DL (ref 70–130)
GLUCOSE BLDC GLUCOMTR-MCNC: 86 MG/DL (ref 70–130)
HCT VFR BLD AUTO: 29.1 % (ref 40.4–52.2)
HGB BLD-MCNC: 9.5 G/DL (ref 13.7–17.6)
MCH RBC QN AUTO: 31 PG (ref 27–32.7)
MCHC RBC AUTO-ENTMCNC: 32.6 G/DL (ref 32.6–36.4)
MCV RBC AUTO: 95.1 FL (ref 79.8–96.2)
PLATELET # BLD AUTO: 202 10*3/MM3 (ref 140–500)
PMV BLD AUTO: 10.7 FL (ref 6–12)
POTASSIUM BLD-SCNC: 4.2 MMOL/L (ref 3.5–5.2)
RBC # BLD AUTO: 3.06 10*6/MM3 (ref 4.6–6)
SODIUM BLD-SCNC: 139 MMOL/L (ref 136–145)
UNIT  ABO: NORMAL
UNIT  ABO: NORMAL
UNIT  RH: NORMAL
UNIT  RH: NORMAL
WBC NRBC COR # BLD: 10.43 10*3/MM3 (ref 4.5–10.7)

## 2018-06-25 PROCEDURE — 82962 GLUCOSE BLOOD TEST: CPT

## 2018-06-25 PROCEDURE — 25010000002 ENOXAPARIN PER 10 MG: Performed by: THORACIC SURGERY (CARDIOTHORACIC VASCULAR SURGERY)

## 2018-06-25 PROCEDURE — 71046 X-RAY EXAM CHEST 2 VIEWS: CPT

## 2018-06-25 PROCEDURE — 99024 POSTOP FOLLOW-UP VISIT: CPT | Performed by: NURSE PRACTITIONER

## 2018-06-25 PROCEDURE — 80048 BASIC METABOLIC PNL TOTAL CA: CPT | Performed by: THORACIC SURGERY (CARDIOTHORACIC VASCULAR SURGERY)

## 2018-06-25 PROCEDURE — 97110 THERAPEUTIC EXERCISES: CPT

## 2018-06-25 PROCEDURE — 85027 COMPLETE CBC AUTOMATED: CPT | Performed by: THORACIC SURGERY (CARDIOTHORACIC VASCULAR SURGERY)

## 2018-06-25 RX ORDER — CLOPIDOGREL BISULFATE 75 MG/1
75 TABLET ORAL DAILY
Status: DISCONTINUED | OUTPATIENT
Start: 2018-06-26 | End: 2018-06-26 | Stop reason: HOSPADM

## 2018-06-25 RX ORDER — FUROSEMIDE 40 MG/1
40 TABLET ORAL DAILY
Status: DISCONTINUED | OUTPATIENT
Start: 2018-06-25 | End: 2018-06-26 | Stop reason: HOSPADM

## 2018-06-25 RX ORDER — BISACODYL 10 MG
10 SUPPOSITORY, RECTAL RECTAL ONCE
Status: COMPLETED | OUTPATIENT
Start: 2018-06-25 | End: 2018-06-26

## 2018-06-25 RX ORDER — POTASSIUM CHLORIDE 750 MG/1
20 CAPSULE, EXTENDED RELEASE ORAL DAILY
Status: DISCONTINUED | OUTPATIENT
Start: 2018-06-25 | End: 2018-06-26 | Stop reason: HOSPADM

## 2018-06-25 RX ADMIN — DOCUSATE SODIUM -SENNOSIDES 2 TABLET: 50; 8.6 TABLET, COATED ORAL at 20:04

## 2018-06-25 RX ADMIN — AMIODARONE HYDROCHLORIDE 400 MG: 200 TABLET ORAL at 10:01

## 2018-06-25 RX ADMIN — ASPIRIN 81 MG: 81 TABLET, DELAYED RELEASE ORAL at 10:01

## 2018-06-25 RX ADMIN — MUPIROCIN: 20 OINTMENT TOPICAL at 20:04

## 2018-06-25 RX ADMIN — CHLORHEXIDINE GLUCONATE 15 ML: 1.2 RINSE ORAL at 20:05

## 2018-06-25 RX ADMIN — ENOXAPARIN SODIUM 40 MG: 40 INJECTION, SOLUTION INTRAVENOUS; SUBCUTANEOUS at 17:29

## 2018-06-25 RX ADMIN — METOPROLOL TARTRATE 25 MG: 25 TABLET ORAL at 10:01

## 2018-06-25 RX ADMIN — METOPROLOL TARTRATE 25 MG: 25 TABLET ORAL at 20:04

## 2018-06-25 RX ADMIN — ACETAMINOPHEN 650 MG: 325 TABLET, FILM COATED ORAL at 10:07

## 2018-06-25 RX ADMIN — AMIODARONE HYDROCHLORIDE 400 MG: 200 TABLET ORAL at 20:04

## 2018-06-25 RX ADMIN — MUPIROCIN: 20 OINTMENT TOPICAL at 10:07

## 2018-06-25 RX ADMIN — POTASSIUM CHLORIDE 20 MEQ: 750 CAPSULE, EXTENDED RELEASE ORAL at 10:02

## 2018-06-25 RX ADMIN — FUROSEMIDE 40 MG: 40 TABLET ORAL at 10:01

## 2018-06-25 RX ADMIN — PANTOPRAZOLE SODIUM 40 MG: 40 TABLET, DELAYED RELEASE ORAL at 06:05

## 2018-06-25 RX ADMIN — ACETAMINOPHEN 650 MG: 325 TABLET, FILM COATED ORAL at 21:27

## 2018-06-25 NOTE — PROGRESS NOTES
Discharge Planning Assessment  [facility]     Patient Name: Souleymane Stapleton  MRN: [id number]  Today's Date: 6/25/2018    Admit Date: 6/21/2018          Discharge Needs Assessment     Row Name 06/25/18 1557       Discharge Needs Assessment    Equipment Needed After Discharge none    Discharge Facility/Level of Care Needs home with home health    Offered/Gave Vendor List yes    Patient's Choice of Community Agency(s) Pt chose [facility] to follow.            Discharge Plan     Row Name 06/25/18 1558       Plan    Plan Home with [facility]     Plan Comments Spoke with Pt at bedside.  CCP introduced self and role.  Pt confirmed information on face sheet.  Pt stated he is [employer], retired & drives.  Pt lives alone in a house with three entrance steps.  Pt denies past home health, subacute rehab and DME.  Pt states prior to surgery he is was active walking 45 minutes a day and working in his flower garden.  Pt confirms pharmacy as [facility] [place], [place].  Pt denies issues with affording his medications.  Pt plans to return home at discharge with 24-hour assistance of his ex-wife Yaima.  Pt son is Anthony Stapleton ([phone]) is his emergency contact and will provide assistance.  Pt has chosen [facility] to follow him at home upon d/c.  CCP emailed referral to Melissa Sparks, liaison at 12:10 PM.  CCP will continue to follow…TACHO ARREOLA/CCP        Destination     No service coordination in this encounter.      Durable Medical Equipment     No service coordination in this encounter.      Dialysis/Infusion     No service coordination in this encounter.      Home Medical Care     Service Request Status Selected Specialties Address Phone Number Fax Number    [facility] Selected Home Health Services [address], [facility] [phone] -- --      Social Care     No service coordination in this encounter.                Demographic Summary    No documentation.           Functional Status     No documentation.           Psychosocial    No documentation.           Abuse/Neglect    No documentation.           Legal    No documentation.           Substance Abuse    No documentation.           Patient Forms    No documentation.         Celine Lange RN

## 2018-06-25 NOTE — PROGRESS NOTES
" LOS: 4 days   Patient Care Team:  Souleymane Murrieta MD as PCP - General    Chief Complaint: post op fu    Subjective:  Symptoms:  No shortness of breath or chest pain.    Diet:  No nausea or vomiting.    Activity level: Returning to normal.    Pain:  He reports no pain.      Denies shortness of breath    Vital Signs  Temp:  [97.4 °F (36.3 °C)-99 °F (37.2 °C)] 98 °F (36.7 °C)  Heart Rate:  [84-96] 96  Resp:  [16-18] 18  BP: (109-132)/(64-77) 132/68  Body mass index is 24.75 kg/m².    Intake/Output Summary (Last 24 hours) at 06/25/18 0830  Last data filed at 06/25/18 0400   Gross per 24 hour   Intake              600 ml   Output              975 ml   Net             -375 ml     No intake/output data recorded.      1    06/24/18  0500 06/24/18  1155 06/25/18  0606   Weight: 74.8 kg (165 lb) 74.8 kg (164 lb 14.5 oz) 74.9 kg (165 lb 3.2 oz)         Objective:  General Appearance:  In no acute distress.    Vital signs: (most recent): Blood pressure 132/68, pulse 96, temperature 98 °F (36.7 °C), temperature source Oral, resp. rate 18, height 174 cm (68.5\"), weight 74.9 kg (165 lb 3.2 oz), SpO2 97 %.    Output: Producing urine and no stool output.    Lungs:  Normal effort and normal respiratory rate.  There are rales (right lower lobe) and decreased breath sounds (left lower lobe).  No wheezes or rhonchi.  (O2 at 2L)  Heart: Tachycardia.  Regular rhythm.  S1 normal and S2 normal.  No murmur, gallop or friction rub. (NSR, rate 109)  Abdomen: Abdomen is soft and non-distended.  Bowel sounds are normal.     Extremities: There is no dependent edema.    Pulses: Distal pulses are intact.    Neurological: Patient is alert and oriented to person, place and time.    Skin:  Warm and dry.  (Sternal dressing dry and intact)            Results Review:        WBC WBC   Date Value Ref Range Status   06/25/2018 10.43 4.50 - 10.70 10*3/mm3 Final   06/24/2018 12.65 (H) 4.50 - 10.70 10*3/mm3 Final   06/23/2018 9.31 4.50 - 10.70 10*3/mm3 " Final   06/22/2018 12.74 (H) 4.50 - 10.70 10*3/mm3 Final   06/22/2018 20.03 (H) 4.50 - 10.70 10*3/mm3 Final      HGB Hemoglobin   Date Value Ref Range Status   06/25/2018 9.5 (L) 13.7 - 17.6 g/dL Final   06/24/2018 9.7 (L) 13.7 - 17.6 g/dL Final   06/23/2018 8.6 (L) 13.7 - 17.6 g/dL Final   06/22/2018 9.6 (L) 13.7 - 17.6 g/dL Final   06/22/2018 11.9 (L) 13.7 - 17.6 g/dL Final   06/22/2018 9.9 (L) 12.0 - 17.0 g/dL Final   06/22/2018 9.5 (L) 12.0 - 17.0 g/dL Final   06/22/2018 10.9 (L) 12.0 - 17.0 g/dL Final   06/22/2018 10.2 (L) 12.0 - 17.0 g/dL Final      HCT Hematocrit   Date Value Ref Range Status   06/25/2018 29.1 (L) 40.4 - 52.2 % Final   06/24/2018 28.7 (L) 40.4 - 52.2 % Final   06/23/2018 26.4 (L) 40.4 - 52.2 % Final   06/22/2018 28.2 (L) 40.4 - 52.2 % Final   06/22/2018 35.8 (L) 40.4 - 52.2 % Final   06/22/2018 29 (L) 38 - 51 % Final   06/22/2018 28 (L) 38 - 51 % Final   06/22/2018 32 (L) 38 - 51 % Final   06/22/2018 30 (L) 38 - 51 % Final      Platelets Platelets   Date Value Ref Range Status   06/25/2018 202 140 - 500 10*3/mm3 Final   06/24/2018 186 140 - 500 10*3/mm3 Final   06/23/2018 149 140 - 500 10*3/mm3 Final   06/22/2018 172 140 - 500 10*3/mm3 Final   06/22/2018 219 140 - 500 10*3/mm3 Final        PT/INR:    Protime   Date Value Ref Range Status   06/23/2018 17.0 (H) 11.7 - 14.2 Seconds Final   06/22/2018 17.9 (H) 11.7 - 14.2 Seconds Final   /  INR   Date Value Ref Range Status   06/23/2018 1.41 (H) 0.90 - 1.10 Final   06/22/2018 1.50 (H) 0.90 - 1.10 Final       Sodium Sodium   Date Value Ref Range Status   06/25/2018 139 136 - 145 mmol/L Final   06/24/2018 135 (L) 136 - 145 mmol/L Final   06/23/2018 146 (H) 136 - 145 mmol/L Final   06/22/2018 146 (H) 136 - 145 mmol/L Final   06/22/2018 139 136 - 145 mmol/L Final      Potassium Potassium   Date Value Ref Range Status   06/25/2018 4.2 3.5 - 5.2 mmol/L Final   06/24/2018 4.0 3.5 - 5.2 mmol/L Final   06/23/2018 4.1 3.5 - 5.2 mmol/L Final   06/22/2018  4.2 3.5 - 5.2 mmol/L Final   06/22/2018 3.8 3.5 - 5.2 mmol/L Final   06/22/2018 4.4 3.5 - 5.2 mmol/L Final      Chloride Chloride   Date Value Ref Range Status   06/25/2018 101 98 - 107 mmol/L Final   06/24/2018 101 98 - 107 mmol/L Final   06/23/2018 109 (H) 98 - 107 mmol/L Final   06/22/2018 106 98 - 107 mmol/L Final   06/22/2018 103 98 - 107 mmol/L Final      Bicarbonate CO2   Date Value Ref Range Status   06/25/2018 26.2 22.0 - 29.0 mmol/L Final   06/24/2018 19.7 (L) 22.0 - 29.0 mmol/L Final   06/23/2018 21.9 (L) 22.0 - 29.0 mmol/L Final   06/22/2018 22.5 22.0 - 29.0 mmol/L Final   06/22/2018 20.3 (L) 22.0 - 29.0 mmol/L Final      BUN BUN   Date Value Ref Range Status   06/25/2018 14 8 - 23 mg/dL Final   06/24/2018 14 8 - 23 mg/dL Final   06/23/2018 18 8 - 23 mg/dL Final   06/22/2018 17 8 - 23 mg/dL Final   06/22/2018 18 8 - 23 mg/dL Final      Creatinine Creatinine   Date Value Ref Range Status   06/25/2018 1.04 0.76 - 1.27 mg/dL Final   06/24/2018 1.05 0.76 - 1.27 mg/dL Final   06/23/2018 1.33 (H) 0.76 - 1.27 mg/dL Final   06/22/2018 1.37 (H) 0.76 - 1.27 mg/dL Final   06/22/2018 1.42 (H) 0.76 - 1.27 mg/dL Final      Calcium Calcium   Date Value Ref Range Status   06/25/2018 8.5 (L) 8.6 - 10.5 mg/dL Final   06/24/2018 8.0 (L) 8.6 - 10.5 mg/dL Final   06/23/2018 8.0 (L) 8.6 - 10.5 mg/dL Final   06/22/2018 8.1 (L) 8.6 - 10.5 mg/dL Final   06/22/2018 8.1 (L) 8.6 - 10.5 mg/dL Final      Magnesium Magnesium   Date Value Ref Range Status   06/23/2018 2.5 (H) 1.6 - 2.4 mg/dL Final   06/22/2018 2.6 (H) 1.6 - 2.4 mg/dL Final   06/22/2018 2.7 (H) 1.6 - 2.4 mg/dL Final            amiodarone 400 mg Oral Q12H   aspirin 81 mg Oral Daily   chlorhexidine 15 mL Mouth/Throat Q12H   enoxaparin 40 mg Subcutaneous Daily   furosemide 20 mg Oral Daily   insulin aspart 0-9 Units Subcutaneous 4x Daily With Meals & Nightly   metoprolol tartrate 12.5 mg Oral Q12H   mupirocin  Each Nare BID   pantoprazole 40 mg Oral Q AM    sennosides-docusate sodium 2 tablet Oral Nightly       insulin regular infusion 1 unit/mL (CCU use) 0-50 Units/hr Last Rate: Stopped (06/23/18 0500)           Patient Active Problem List   Diagnosis Code   • Astigmatism H52.209   • Anxiety state F41.1   • History of cerebrovascular accident Z86.73   • Nuclear senile cataract H25.10   • Hypertension I10   • Unspecified hemorrhoids K64.9   • Palpitations R00.2   • Pure hypercholesterolemia E78.00   • Prostate cancer C61   • Chronic right shoulder pain M25.511, G89.29   • Rotator cuff syndrome, left M75.102   • Chronic left shoulder pain M25.512, G89.29   • Impingement syndrome, shoulder, left M75.42   • SOB (shortness of breath) R06.02   • Angina of effort I20.8       Assessment & Plan    -CAD s/p CABG x5 with bilateral MONI-----POD #3 ([place])  -HTN----mild increase this am  -HL---statin allergy  -recent cervical surgery  -hx TIA, left carotid spontaneous dissection repair----resume Plavix   -Prostate enlargement---resume Avodart as b/p allows    Increase Lopressor, d/c wires.  Resume Plavix after wires out.  Cxr pending-left lower sounds reduced, increase IS frequency and mobility.  Increase lasix, wean O2 as tolerated.  Bisacodyl suppository today.     CARLOS Montgomery  06/25/18  8:30 AM     Making good progress; I anticipate discharge within 24-48 hours.

## 2018-06-25 NOTE — CONSULTS
"Met with patient, discussed benefits of cardiac rehab. Provided phase II information packet, which includes; general information about cardiac rehab, [facility]iac [facility] handout and [facility] letter article entitled “Cardiac Rehab is often the Best Medicine for Recovery\", stresses the importance of cardiac rehab after a heart event.   I provided the contact information for cardiac rehab here at [facility] and encouraged him to call when discharged.   Explained if receiving home health would not be able to attend cardiac rehab until finished with home health. Patient verbalized understanding  and expressed interest in attending the program.      "

## 2018-06-25 NOTE — THERAPY TREATMENT NOTE
Acute Care - Physical Therapy Treatment Note  [facility]     Patient Name: Souleymane Stapleton  : 1945  MRN: [id number]  Today's Date: 2018  Onset of Illness/Injury or Date of Surgery: 18  Date of Referral to PT: 18  Referring Physician: Francheska Belcher    Admit Date: 2018    Visit Dx:    ICD-10-CM ICD-9-CM   1. Angina of effort I20.8 413.9   2. Coronary artery disease involving native heart, angina presence unspecified, unspecified vessel or lesion type I25.10 414.01   3. Generalized weakness R53.1 780.79   4. Essential hypertension I10 401.9     Patient Active Problem List   Diagnosis   • Astigmatism   • Anxiety state   • History of cerebrovascular accident   • Nuclear senile cataract   • Hypertension   • Unspecified hemorrhoids   • Palpitations   • Pure hypercholesterolemia   • Prostate cancer   • Chronic right shoulder pain   • Rotator cuff syndrome, left   • Chronic left shoulder pain   • Impingement syndrome, shoulder, left   • SOB (shortness of breath)   • Angina of effort       Therapy Treatment          Rehabilitation Treatment Summary     Row Name 18 0952             Treatment Time/Intention    Discipline (P)  physical therapist  -      Document Type (P)  therapy note (daily note)  -      Subjective Information (P)  complains of;pain  -      Mode of Treatment (P)  physical therapy  -      Patient/Family Observations (P)  Pt sitting in chair; no acute signs of distress; vss   -      Patient Effort (P)  good  -      Existing Precautions/Restrictions (P)  fall;cardiac;sternal  -      Recorded by [] YASH Dumas, PT Student 18 0953      Row Name 18 0952             Cognitive Assessment/Intervention- PT/OT    Orientation Status (Cognition) (P)  oriented x 3  -JH      Follows Commands (Cognition) (P)  WFL  -      Recorded by [] YASH Dumas, PT Student 18 0953      Row Name 18 0952             Bed Mobility Assessment/Treatment     Comment (Bed Mobility) (P)  not assessed; pt began/ended treatment in bed  -      Recorded by [] YASH Dumas, PT Student 06/25/18 0953      Row Name 06/25/18 0952             Transfer Assessment/Treatment    Transfer Assessment/Treatment (P)  sit-stand transfer;stand-sit transfer  -      Sit-Stand Cavalier (Transfers) (P)  minimum assist (75% patient effort);1 person assist;verbal cues  -      Stand-Sit [place]alier (Transfers) (P)  contact guard;1 person assist;verbal cues  -JH      Recorded by [] YASH Dumas, PT Student 06/25/18 0953      Row Name 06/25/18 0952             Sit-Stand Transfer    Assistive Device (Sit-Stand Transfers) (P)  other (see comments)   HHA  -      Recorded by [] YASH Dumas, PT Student 06/25/18 0953      Row Name 06/25/18 0952             Gait/Stairs Assessment/Training    Cavalier Level (Gait) (P)  supervision  -JH      Distance in Feet (Gait) (P)  300  -JH      Pattern (Gait) (P)  step-through  -      Deviations/Abnormal Patterns (Gait) (P)  stride length decreased;gait speed decreased;brissa decreased  -      Recorded by [] YASH Dumas, PT Student 06/25/18 0953      Row Name 06/25/18 0952             Pain Assessment    Additional Documentation (P)  Pain Scale: Numbers Pre/Post-Treatment (Group)  -      Recorded by [] YASH Dumas PT Student 06/25/18 0953      Row Name 06/25/18 0952             Pain Scale: Numbers Pre/Post-Treatment    Pain Scale: Numbers, Pretreatment (P)  6/10  -      Pain Scale: Numbers, Post-Treatment (P)  6/10  -      Pain Location - Orientation (P)  generalized  -      Pain Intervention(s) (P)  Repositioned;Ambulation/increased activity  -      Recorded by [] YASH Dumas, PT Student 06/25/18 0954      Row Name                Wound 06/22/18 0930 chest incision    Wound - Properties Group Date first assessed: 06/22/18 [SR] Time first assessed: 0930 [SR] Location: chest [SR] Type: incision [SR]  Recorded by:  [SR] Christine Lee RN 06/22/18 0930    Row Name                Wound 06/22/18 0930 Left leg incision    Wound - Properties Group Date first assessed: 06/22/18 [SR] Time first assessed: 0930 [SR] Side: Left [SR] Location: leg [SR] Type: incision [SR] Recorded by:  [SR] Christine Lee RN 06/22/18 0930    Row Name 06/25/18 0952             Coping    Observed Emotional State (P)  calm;cooperative  -JH      Verbalized Emotional State (P)  acceptance  -JH      Recorded by [JH] YASH Dumas, PT Student 06/25/18 0954      Row Name 06/25/18 0952             Plan of Care Review    Plan of Care Reviewed With (P)  patient  -JH      Recorded by [] YASH Dumas, PT Student 06/25/18 0954        User Key  (r) = Recorded By, (t) = Taken By, (c) = Cosigned By    Initials Name Effective Dates Discipline    SR Christine Lee RN 06/16/16 -  Nurse    ASHLY Dumas, PT Student 05/15/18 -  PT          Wound 06/22/18 0930 chest incision (Active)   Dressing Appearance dry;intact 6/25/2018  4:17 AM   Closure ZHENG 6/25/2018  4:17 AM   Base dressing in place, unable to visualize 6/25/2018  4:17 AM   Drainage Amount none 6/25/2018  4:17 AM       Wound 06/22/18 0930 Left leg incision (Active)   Dressing Appearance dry;intact 6/25/2018  4:17 AM   Closure Liquid skin adhesive 6/25/2018  4:17 AM   Drainage Amount none 6/25/2018  4:17 AM             Physical Therapy Education     Title: PT OT SLP Therapies (Done)     Topic: Physical Therapy (Done)     Point: Mobility training (Done)    Learning Progress Summary     Learner Status Readiness Method Response Comment Documented by    Patient Done Acceptance JEANNIE SHAVER   06/25/18 0955     Done Acceptance OMAR DENNIS NR   06/24/18 0942     Done Acceptance EOMAR NR   06/23/18 1028          Point: Home exercise program (Done)    Learning Progress Summary     Learner Status Readiness Method Response Comment Documented by    Patient Done Acceptance EOMARNR   06/24/18 0942     Done  Acceptance E,TB VU,NR   06/23/18 1028          Point: Body mechanics (Done)    Learning Progress Summary     Learner Status Readiness Method Response Comment Documented by    Patient Done Acceptance E,TB VU,NR   06/24/18 0942     Done Acceptance E,TB VU,NR   06/23/18 1028          Point: Precautions (Done)    Learning Progress Summary     Learner Status Readiness Method Response Comment Documented by    Patient Done Acceptance E VU   06/25/18 0955     Done Acceptance E,TB VU,NR   06/24/18 0942     Done Acceptance E,TB VU,NR   06/23/18 1028                      User Key     Initials Effective Dates Name Provider Type Discipline     04/03/18 -  Fani Mosqueda, PT Physical Therapist PT     05/15/18 -  YASH Dumas, PT Student PT Student PT                    PT Recommendation and Plan     Plan of Care Reviewed With: (P) patient  Outcome Summary: (P) Pt vss prior to treatment, pt tolerated physical therapy very well this am. Pt able to significantly increase ambulation distance and demonstrate improved independence with ambulation.           Outcome Measures     Row Name 06/25/18 0900 06/24/18 0900 06/23/18 1000       How much help from another person do you currently need...    Turning from your back to your side while in flat bed without using bedrails? (P)  3  - 3  -SA 3  -SA    Moving from lying on back to sitting on the side of a flat bed without bedrails? (P)  3  - 3  -SA 3  -SA    Moving to and from a bed to a chair (including a wheelchair)? (P)  3  - 3  -SA 3  -SA    Standing up from a chair using your arms (e.g., wheelchair, bedside chair)? (P)  3  - 3  -SA 3  -SA    Climbing 3-5 steps with a railing? (P)  3  - 2  -SA 2  -SA    To walk in hospital room? (P)  4  - 3  -SA 3  -SA    AM-PAC 6 Clicks Score (P)  19  - 17  -SA 17  -SA       Functional Assessment    Outcome Measure Options  -- AM-PAC 6 Clicks Basic Mobility (PT)  -SA AM-PAC 6 Clicks Basic Mobility (PT)  -SA      User Key   (r) = Recorded By, (t) = Taken By, (c) = Cosigned By    Initials Name Provider Type    SA Fani Mosqueda, PT Physical Therapist     YASH Dumas, PT Student PT Student           Time Calculation:         PT Charges     Row Name 06/25/18 0951             Time Calculation    Start Time (P)  0935  -      Stop Time (P)  0950  -      Time Calculation (min) (P)  15 min  -      PT Received On (P)  06/25/18  -      PT - Next Appointment (P)  06/26/18  -         Time Calculation- PT    Total Timed Code Minutes- PT (P)  13 minute(s)  -        User Key  (r) = Recorded By, (t) = Taken By, (c) = Cosigned By    Initials Name Provider Type     YASH Dumas, PT Student PT Student        Therapy Suggested Charges     Code   Minutes Charges    None           Therapy Charges for Today     Code Description Service Date Service Provider Modifiers Qty    [id number] HC PT THER PROC EA 15 MIN 6/25/2018 YASH Dumas, PT Student GP 1          PT G-Codes  Outcome Measure Options: AM-PAC 6 Clicks Basic Mobility (PT)    YASH Dumas, PT Student  6/25/2018

## 2018-06-25 NOTE — PLAN OF CARE
Problem: Patient Care Overview  Goal: Plan of Care Review   06/25/18 0954   Coping/Psychosocial   Plan of Care Reviewed With patient   OTHER   Outcome Summary Pt vss prior to treatment, pt tolerated physical therapy very well this am. Pt able to significantly increase ambulation distance and demonstrate improved independence with ambulation.

## 2018-06-26 VITALS
TEMPERATURE: 98.2 F | SYSTOLIC BLOOD PRESSURE: 122 MMHG | OXYGEN SATURATION: 95 % | HEART RATE: 84 BPM | DIASTOLIC BLOOD PRESSURE: 71 MMHG | WEIGHT: 161 LBS | BODY MASS INDEX: 23.85 KG/M2 | RESPIRATION RATE: 16 BRPM | HEIGHT: 69 IN

## 2018-06-26 LAB
ANION GAP SERPL CALCULATED.3IONS-SCNC: 13 MMOL/L
BUN BLD-MCNC: 20 MG/DL (ref 8–23)
BUN/CREAT SERPL: 17.5 (ref 7–25)
CALCIUM SPEC-SCNC: 8.6 MG/DL (ref 8.6–10.5)
CHLORIDE SERPL-SCNC: 100 MMOL/L (ref 98–107)
CO2 SERPL-SCNC: 25 MMOL/L (ref 22–29)
CREAT BLD-MCNC: 1.14 MG/DL (ref 0.76–1.27)
DEPRECATED RDW RBC AUTO: 45.4 FL (ref 37–54)
ERYTHROCYTE [DISTWIDTH] IN BLOOD BY AUTOMATED COUNT: 13.1 % (ref 11.5–14.5)
GFR SERPL CREATININE-BSD FRML MDRD: 63 ML/MIN/1.73
GLUCOSE BLD-MCNC: 115 MG/DL (ref 65–99)
HCT VFR BLD AUTO: 32.4 % (ref 40.4–52.2)
HGB BLD-MCNC: 10.4 G/DL (ref 13.7–17.6)
MCH RBC QN AUTO: 30.4 PG (ref 27–32.7)
MCHC RBC AUTO-ENTMCNC: 32.1 G/DL (ref 32.6–36.4)
MCV RBC AUTO: 94.7 FL (ref 79.8–96.2)
PLATELET # BLD AUTO: 264 10*3/MM3 (ref 140–500)
PMV BLD AUTO: 10.3 FL (ref 6–12)
POTASSIUM BLD-SCNC: 3.4 MMOL/L (ref 3.5–5.2)
RBC # BLD AUTO: 3.42 10*6/MM3 (ref 4.6–6)
SODIUM BLD-SCNC: 138 MMOL/L (ref 136–145)
WBC NRBC COR # BLD: 10.13 10*3/MM3 (ref 4.5–10.7)

## 2018-06-26 PROCEDURE — 85027 COMPLETE CBC AUTOMATED: CPT | Performed by: THORACIC SURGERY (CARDIOTHORACIC VASCULAR SURGERY)

## 2018-06-26 PROCEDURE — 97110 THERAPEUTIC EXERCISES: CPT

## 2018-06-26 PROCEDURE — 99239 HOSP IP/OBS DSCHRG MGMT >30: CPT | Performed by: THORACIC SURGERY (CARDIOTHORACIC VASCULAR SURGERY)

## 2018-06-26 PROCEDURE — 80048 BASIC METABOLIC PNL TOTAL CA: CPT | Performed by: THORACIC SURGERY (CARDIOTHORACIC VASCULAR SURGERY)

## 2018-06-26 PROCEDURE — 94799 UNLISTED PULMONARY SVC/PX: CPT | Performed by: NURSE PRACTITIONER

## 2018-06-26 RX ORDER — POTASSIUM CHLORIDE 750 MG/1
20 CAPSULE, EXTENDED RELEASE ORAL DAILY
Qty: 60 CAPSULE | Refills: 0 | Status: SHIPPED | OUTPATIENT
Start: 2018-06-27 | End: 2018-07-24 | Stop reason: SDUPTHER

## 2018-06-26 RX ORDER — METOPROLOL TARTRATE 50 MG/1
50 TABLET, FILM COATED ORAL EVERY 12 HOURS SCHEDULED
Status: DISCONTINUED | OUTPATIENT
Start: 2018-06-26 | End: 2018-06-26

## 2018-06-26 RX ORDER — FUROSEMIDE 40 MG/1
40 TABLET ORAL DAILY
Qty: 30 TABLET | Refills: 0 | Status: SHIPPED | OUTPATIENT
Start: 2018-06-27 | End: 2018-07-24 | Stop reason: SDUPTHER

## 2018-06-26 RX ORDER — HYDROCODONE BITARTRATE AND ACETAMINOPHEN 5; 325 MG/1; MG/1
1 TABLET ORAL EVERY 4 HOURS PRN
Qty: 60 TABLET | Refills: 0 | Status: SHIPPED | OUTPATIENT
Start: 2018-06-26 | End: 2018-08-02

## 2018-06-26 RX ORDER — METOPROLOL TARTRATE 37.5 MG/1
37.5 TABLET, FILM COATED ORAL EVERY 12 HOURS SCHEDULED
Qty: 60 TABLET | Refills: 3 | Status: SHIPPED | OUTPATIENT
Start: 2018-06-26 | End: 2018-08-02

## 2018-06-26 RX ORDER — ASPIRIN 81 MG/1
81 TABLET ORAL DAILY
Qty: 30 TABLET | Refills: 3 | Status: SHIPPED | OUTPATIENT
Start: 2018-06-27 | End: 2018-11-08

## 2018-06-26 RX ADMIN — ACETAMINOPHEN 650 MG: 325 TABLET, FILM COATED ORAL at 12:11

## 2018-06-26 RX ADMIN — FUROSEMIDE 40 MG: 40 TABLET ORAL at 08:46

## 2018-06-26 RX ADMIN — CLOPIDOGREL 75 MG: 75 TABLET, FILM COATED ORAL at 08:46

## 2018-06-26 RX ADMIN — POTASSIUM CHLORIDE 40 MEQ: 750 CAPSULE, EXTENDED RELEASE ORAL at 05:55

## 2018-06-26 RX ADMIN — ALPRAZOLAM 0.25 MG: 0.25 TABLET ORAL at 07:32

## 2018-06-26 RX ADMIN — AMIODARONE HYDROCHLORIDE 400 MG: 200 TABLET ORAL at 08:46

## 2018-06-26 RX ADMIN — POTASSIUM CHLORIDE 20 MEQ: 750 CAPSULE, EXTENDED RELEASE ORAL at 08:46

## 2018-06-26 RX ADMIN — METOPROLOL TARTRATE 25 MG: 25 TABLET ORAL at 09:12

## 2018-06-26 RX ADMIN — METOPROLOL TARTRATE 25 MG: 25 TABLET ORAL at 08:46

## 2018-06-26 RX ADMIN — PANTOPRAZOLE SODIUM 40 MG: 40 TABLET, DELAYED RELEASE ORAL at 05:55

## 2018-06-26 RX ADMIN — BISACODYL 10 MG: 10 SUPPOSITORY RECTAL at 03:53

## 2018-06-26 RX ADMIN — ASPIRIN 81 MG: 81 TABLET, DELAYED RELEASE ORAL at 08:46

## 2018-06-26 NOTE — DISCHARGE SUMMARY
Date of Admission:   Date of Discharge:  6/26/2018    Discharge Diagnosis:   1. CAD s/p CABGx5 bilateral MONI  2. HTN  3. Hyperlipidemia  4. Statin allergy  5. Hx TIA  6. Enlarged prostate  7. Hx of left carotid spontaneous dissection  8. Poor lower extremity saphenous vein    Presenting Problem/History of Present Illness  Angina of effort [I20.8]  Angina of effort [I20.8]  Coronary artery disease involving native coronary artery of native heart [I25.10]     Hospital Course  Patient is a 72 y.o. male presented with severe multivessel coronary artery disease, he was referred for cardiac surgery evaluation by Dr. Mcconnell.  On 6/22 he underwent CABGx5, bilateral internal mammary artery grafts were used due to poor conduit of his saphenous vein, some vein was harvested and used from Left thigh.  Post operatively he progressed well. Due to his statin allergy statin therapy was not initiated.  Tolerated medication therapy with aspirin and beta blockade.  He was on plavix due to his history of TIA which has been resumed. He met PT goals.  On post op day number 4 he was deemed ready for discharge home with home health.  Sternal precautions reviewed, signs and symptoms of sternal instability and infection reviewed.  Follow up appointment with Dr. Pérez was made.  He was instructed to follow up with his PCP, Dr. Murrieta in 1 week and his cardiologist, Dr. Mcconnell in 2-4 weeks.    Procedures Performed  Procedure(s):  PARAS STERNOTOMY CORONARY ARTERY BYPASS GRAFT TIMES 5 USING RIGHT AND LEFT INTERNAL MAMMARY ARTERIES AND LEFT GREATER SAPHENOUS VEIN GRAFT PER ENDOSCOPIC VEIN HARVESTING AND PRP       Consults:   Consults     No orders found from 5/23/2018 to 6/22/2018.          Pertinent Test Results:    Lab Results   Component Value Date    WBC 10.13 06/26/2018    HGB 10.4 (L) 06/26/2018    HCT 32.4 (L) 06/26/2018    MCV 94.7 06/26/2018     06/26/2018      Lab Results   Component Value Date    GLUCOSE 115 (H) 06/26/2018     CALCIUM 8.6 06/26/2018     06/26/2018    K 3.4 (L) 06/26/2018    CO2 25.0 06/26/2018     06/26/2018    BUN 20 06/26/2018    CREATININE 1.14 06/26/2018    EGFRIFNONA 63 06/26/2018    BCR 17.5 06/26/2018    ANIONGAP 13.0 06/26/2018     Lab Results   Component Value Date    INR 1.41 (H) 06/23/2018    PROTIME 17.0 (H) 06/23/2018         Condition on Discharge:  stable    Vital Signs  Temp:  [97.9 °F (36.6 °C)-99.2 °F (37.3 °C)] 97.9 °F (36.6 °C)  Heart Rate:  [83-96] 92  Resp:  [14-18] 14  BP: (114-141)/(69-79) 132/71      Discharge Disposition  [place] [facility] [place]    Discharge Medications     Discharge Medications      New Medications      Instructions Start Date   aspirin 81 MG EC tablet   81 mg, Oral, Daily   Start Date:  6/27/2018     furosemide 40 MG tablet  Commonly known as:  LASIX   40 mg, Oral, Daily   Start Date:  6/27/2018     HYDROcodone-acetaminophen 5-325 MG per tablet  Commonly known as:  NORCO   1 tablet, Oral, Every 4 Hours PRN      Metoprolol Tartrate 37.5 MG tablet   37.5 mg, Oral, Every 12 Hours Scheduled      potassium chloride 10 MEQ CR capsule  Commonly known as:  MICRO-K   20 mEq, Oral, Daily   Start Date:  6/27/2018        Continue These Medications      Instructions Start Date   ALPRAZolam 0.5 MG tablet  Commonly known as:  XANAX   0.5 mg, Oral, 3 Times Daily      CLARITIN PO   Oral      clopidogrel 75 MG tablet  Commonly known as:  PLAVIX   75 mg, Oral, Daily      dutasteride 0.5 MG capsule  Commonly known as:  AVODART   0.5 mg, Oral, Nightly      finasteride 1 MG tablet  Commonly known as:  PROPECIA   1 mg, Oral, Daily      fluticasone 50 MCG/ACT nasal spray  Commonly known as:  FLONASE   2 sprays, Nasal, Daily      omeprazole 40 MG capsule  Commonly known as:  priLOSEC   40 mg, Oral, Daily      Red Yeast Rice Extract 300 MG capsule   Oral      sucralfate 1 g tablet  Commonly known as:  CARAFATE   1 g, Oral, 4 Times Daily      traZODone 50 MG tablet  Commonly known as:   DESYREL   50 mg, Oral, Nightly         Stop These Medications    diltiaZEM  MG 24 hr capsule  Commonly known as:  CARTIA XT     ezetimibe 10 MG tablet  Commonly known as:  ZETIA     irbesartan-hydrochlorothiazide 300-12.5 MG tablet  Commonly known as:  AVALIDE            Discharge Diet:  heart healthy    Activity at Discharge:   Activity Instructions     Discharge Activity Restrictions       1) No driving for 2 weeks and no longer taking narcotics, ride in the back seat for 2 weeks.   2) May shower, no tub bathing, no immersion in pools or hot tubs   3) Do not lift / push / pull more then 10 lbs.  4) Walk at least 10 minutes at least 3 times daily          Follow-up Appointments  Future Appointments  Date Time Provider Department Center   7/30/2018 11:00 AM Edgar Pérez MD [facility] None   12/4/2018 1:45 PM Delroy Mcconnell MD [facility] None     Additional Instructions for the Follow-ups that You Need to Schedule     Call MD With Problems / Concerns    As directed      Instructions:  Call office at [phone] for any drainage, increased redness, or fever over 100.5    Order Comments:  Instructions:  Call office at [phone] for any drainage, increased redness, or fever over 100.5          Discharge Follow-up with PCP    As directed      Currently Documented PCP:  Souleymane Murrieta MD  PCP Phone Number:  [phone]    Follow Up Details:  in 1 week         Discharge Follow-up with Specialty: Cardiologist APRN/PA; 1 Week    As directed      Specialty:  Cardiologist APRN/PA    Follow Up:  1 Week    Follow Up Details:  bring all prescription bottles to appointment, call for appointment         Discharge Follow-up with Specified Provider: Cardiologist; 1 Month    As directed      To:  Cardiologist    Follow Up:  1 Month    Follow Up Details:  call for appointment, bring all medication bottles to appointment         Discharge Follow-up with Specified Provider:     As directed      To:       Follow Up Details:  4-6 weeks, bring all current medications to appointment         Referral to Home Health    As directed      Face to Face Visit Date:  6/26/2018    Follow-up Provider for Plan of Care?:  I will be treating the patient on an ongoing basis.  Please send me the Plan of Care for signature.    Follow-up Provider:  ANILA CALDERON [[id number]]    Reason/Clinical Findings:  POST OP CABG    Describe mobility limitations that make leaving home difficult:  weakness    Nursing/Therapeutic Services Requested:  Skilled Nursing    Skilled nursing orders:  Post CABG care    Frequency:  1 Week 1               Test Results Pending at Discharge       CARLOS Koroma  06/26/18  9:37 AM    OK for discharge.

## 2018-06-26 NOTE — PLAN OF CARE
Problem: Patient Care Overview  Goal: Plan of Care Review  Outcome: Ongoing (interventions implemented as appropriate)   06/26/18 0406   Coping/Psychosocial   Plan of Care Reviewed With patient   Plan of Care Review   Progress improving   OTHER   Outcome Summary VSS. On RA. NSR. PRN tylenol given for discomfort with good relief. PRN suppository given. Plan to dc today.      Goal: Individualization and Mutuality  Outcome: Ongoing (interventions implemented as appropriate)    Goal: Discharge Needs Assessment  Outcome: Ongoing (interventions implemented as appropriate)    Goal: Interprofessional Rounds/Family Conf  Outcome: Ongoing (interventions implemented as appropriate)      Problem: Cardiac Surgery (Adult)  Goal: Signs and Symptoms of Listed Potential Problems Will be Absent, Minimized or Managed (Cardiac Surgery)  Outcome: Ongoing (interventions implemented as appropriate)    Goal: Anesthesia/Sedation Recovery  Outcome: Ongoing (interventions implemented as appropriate)      Problem: Skin Injury Risk (Adult)  Goal: Identify Related Risk Factors and Signs and Symptoms  Outcome: Ongoing (interventions implemented as appropriate)    Goal: Skin Health and Integrity  Outcome: Ongoing (interventions implemented as appropriate)      Problem: Fall Risk (Adult)  Goal: Identify Related Risk Factors and Signs and Symptoms  Outcome: Ongoing (interventions implemented as appropriate)    Goal: Absence of Fall  Outcome: Ongoing (interventions implemented as appropriate)

## 2018-06-26 NOTE — PROGRESS NOTES
Case Management Discharge Note    Final Note: Pt d/c home with [facility] scheduled to follow Pt at home.      Destination     No service has been selected for the patient.      Durable Medical Equipment     No service has been selected for the patient.      Dialysis/Infusion     No service has been selected for the patient.      Home Medical Care     Service Request Status Selected Specialties Address Phone Number Fax Number    [facility] Selected Home Health Services [address] [phone] -- --      Social Care     No service has been selected for the patient.        Other:  (private auto)    Final Discharge Disposition Code: 06 - home with home health care

## 2018-06-26 NOTE — PLAN OF CARE
Problem: Patient Care Overview  Goal: Plan of Care Review   06/26/18 0830   Coping/Psychosocial   Plan of Care Reviewed With patient   OTHER   Outcome Summary Pt vss, pt tolerated physical therapy very well this am, able to ascend/descend 16 steps. Pt supervision with all activities and has reached cardiac lvl 5, pt no longer requires skilled intervention from PT, PT will sign-off on pt.

## 2018-06-26 NOTE — THERAPY DISCHARGE NOTE
Acute Care - Physical Therapy Treatment Note/Discharge  [facility]     Patient Name: Souleymane Stapleton  : 1945  MRN: [id number]  Today's Date: 2018  Onset of Illness/Injury or Date of Surgery: 18  Date of Referral to PT: 18  Referring Physician: Francheska Belcher    Admit Date: 2018    Visit Dx:    ICD-10-CM ICD-9-CM   1. Angina of effort I20.8 413.9   2. Coronary artery disease involving native heart, angina presence unspecified, unspecified vessel or lesion type I25.10 414.01   3. Generalized weakness R53.1 780.79   4. Essential hypertension I10 401.9     Patient Active Problem List   Diagnosis   • Astigmatism   • Anxiety state   • History of cerebrovascular accident   • Nuclear senile cataract   • Hypertension   • Unspecified hemorrhoids   • Palpitations   • Pure hypercholesterolemia   • Prostate cancer   • Chronic right shoulder pain   • Rotator cuff syndrome, left   • Chronic left shoulder pain   • Impingement syndrome, shoulder, left   • SOB (shortness of breath)   • Angina of effort       Physical Therapy Education     Title: PT OT SLP Therapies (Resolved)     Topic: Physical Therapy (Resolved)     Point: Mobility training (Resolved)    Learning Progress Summary     Learner Status Readiness Method Response Comment Documented by    Patient Done Acceptance E VU   18 0955     Done Acceptance E,TB VU,NR   18 0942     Done Acceptance E,TB VU,NR   18 1028          Point: Home exercise program (Resolved)    Learning Progress Summary     Learner Status Readiness Method Response Comment Documented by    Patient Done Acceptance E,TB VU,NR   18 0942     Done Acceptance E,TB VU,NR   18 1028          Point: Body mechanics (Resolved)    Learning Progress Summary     Learner Status Readiness Method Response Comment Documented by    Patient Done Acceptance E,TB VU,NR   18 0942     Done Acceptance E,TB VU,NR   18 1028          Point:  Precautions (Resolved)    Learning Progress Summary     Learner Status Readiness Method Response Comment Documented by    Patient Done Acceptance E VU   06/25/18 0955     Done Acceptance E,TB VU,NR   06/24/18 0942     Done Acceptance E,TB VU,NR   06/23/18 1028                      User Key     Initials Effective Dates Name Provider Type Discipline     04/03/18 -  Fani Mosqueda, PT Physical Therapist PT     05/15/18 -  YASH Dumas, PT Student PT Student PT                    PT Rehab Goals     Row Name 06/26/18 0800             Bed Mobility Goal 1 (PT)    Progress/Outcomes (Bed Mobility Goal 1, PT) (P)  goal not met  -JH         Transfer Goal 1 (PT)    Progress/Outcome (Transfer Goal 1, PT) (P)  goal met  -JH         Gait Training Goal 1 (PT)    Progress/Outcome (Gait Training Goal 1, PT) (P)  goal met  -JH         Stairs Goal 1 (PT)    Progress/Outcome (Stairs Goal 1, PT) (P)  goal met  -JH         Patient Education Goal (PT)    Progress/Outcome (Patient Education Goal, PT) (P)  goal met  -        User Key  (r) = Recorded By, (t) = Taken By, (c) = Cosigned By    Initials Name Provider Type Discipline     YASH Dumas, PT Student PT Student PT        Therapy Treatment        Rehabilitation Treatment Summary     Row Name 06/26/18 0828             Treatment Time/Intention    Discipline (P)  physical therapist  -      Document Type (P)  discharge treatment  -      Subjective Information (P)  complains of;pain  -      Mode of Treatment (P)  physical therapy  -      Patient/Family Observations (P)  Pt sitting in chair; no acute signs of distress; vss   -      Patient Effort (P)  good  -      Existing Precautions/Restrictions (P)  fall;sternal;cardiac  -JH      Recorded by [] YASH Dumas, PT Student 06/26/18 0830      Row Name 06/26/18 0828             Cognitive Assessment/Intervention- PT/OT    Orientation Status (Cognition) (P)  oriented x 3  -JH      Follows Commands (Cognition) (P)   WFL  -JH      Recorded by [] YASH Dumas, PT Student 06/26/18 0830      Row Name 06/26/18 0828             Bed Mobility Assessment/Treatment    Comment (Bed Mobility) (P)  not assessed; pt began/ended treatment in chair   -JH      Recorded by [JH] YASH Dumas, PT Student 06/26/18 0830      Row Name 06/26/18 0828             Transfer Assessment/Treatment    Transfer Assessment/Treatment (P)  sit-stand transfer;stand-sit transfer  -JH      Sit-Stand Bradley (Transfers) (P)  supervision  -JH      Stand-Sit Bradley (Transfers) (P)  supervision  -JH      Recorded by [] YASH Dumas, PT Student 06/26/18 0830      Row Name 06/26/18 0828             Gait/Stairs Assessment/Training    Bradley Level (Gait) (P)  supervision  -JH      Distance in Feet (Gait) (P)  200  -JH      Pattern (Gait) (P)  step-through  -JH      Deviations/Abnormal Patterns (Gait) (P)  stride length decreased;gait speed decreased  -JH      Bradley Level (Stairs) (P)  supervision  -JH      Assistive Device (Stairs) (P)  other (see comments)   handrail assist   -JH      Handrail Location (Stairs) (P)  left side (ascending)  -JH      Number of Steps (Stairs) (P)  16  -JH      Ascending Technique (Stairs) (P)  step-over-step  -JH      Descending Technique (Stairs) (P)  step-over-step  -JH      Recorded by [JH] YASH Dumas, PT Student 06/26/18 0830      Row Name 06/26/18 0828             Therapeutic Exercise    Comment (Therapeutic Exercise) (P)  Pt performed ther ex per cardiac protocol.   -JH      Recorded by [JH] YASH Dumas, PT Student 06/26/18 0830      Row Name 06/26/18 0828             Positioning and Restraints    Pre-Treatment Position (P)  sitting in chair/recliner  -JH      Post Treatment Position (P)  chair  -JH      In Chair (P)  sitting;reclined;call light within reach;encouraged to call for assist  -JH      Recorded by [JH] YASH Dumas, PT Student 06/26/18 0830      Row Name 06/26/18 0828              Pain Scale: Numbers Pre/Post-Treatment    Pain Scale: Numbers, Pretreatment (P)  2/10  -JH      Pain Scale: Numbers, Post-Treatment (P)  2/10  -      Pain Location (P)  chest  -      Pain Intervention(s) (P)  Repositioned;Ambulation/increased activity  -      Recorded by [] YASH Dumas, PT Student 06/26/18 0830      Row Name                Wound 06/22/18 0930 chest incision    Wound - Properties Group Date first assessed: 06/22/18 [SR] Time first assessed: 0930 [SR] Location: chest [SR] Type: incision [SR] Recorded by:  [SR] Christine Lee RN 06/22/18 0930    Row Name                Wound 06/22/18 0930 Left leg incision    Wound - Properties Group Date first assessed: 06/22/18 [SR] Time first assessed: 0930 [SR] Side: Left [SR] Location: leg [SR] Type: incision [SR] Recorded by:  [SR] Christine Lee RN 06/22/18 0930    Row Name 06/26/18 0828             Coping    Observed Emotional State (P)  calm;cooperative  -      Verbalized Emotional State (P)  acceptance  -      Recorded by [JH] YASH Dumas, PT Student 06/26/18 0830      Row Name 06/26/18 0828             Plan of Care Review    Plan of Care Reviewed With (P)  patient  -      Recorded by [] YASH Dumas, PT Student 06/26/18 0830        User Key  (r) = Recorded By, (t) = Taken By, (c) = Cosigned By    Initials Name Effective Dates Discipline     Christine Lee RN 06/16/16 -  Nurse    ASHLY Dumas, PT Student 05/15/18 -  PT        Wound 06/22/18 0930 chest incision (Active)   Dressing Appearance open to air 6/26/2018  8:04 AM   Closure Open to air;Approximated 6/26/2018  8:04 AM   Drainage Amount none 6/26/2018  8:04 AM   Dressing Care, Wound open to air 6/26/2018  4:03 AM       Wound 06/22/18 0930 Left leg incision (Active)   Dressing Appearance dry;intact 6/26/2018  8:04 AM   Closure Liquid skin adhesive;Open to air 6/26/2018  8:04 AM   Drainage Amount none 6/26/2018  8:04 AM   Dressing Care, Wound open to air 6/26/2018  4:03 AM        PT Recommendation and Plan     Plan of Care Reviewed With: (P) patient  Outcome Summary: (P) Pt vss, pt tolerated physical therapy very well this am, able to ascend/descend 16 steps. Pt supervision with all activities, pt no longer requires skilled intervention from PT, PT will sign-off on pt.           Outcome Measures     Row Name 06/26/18 0800 06/25/18 0900 06/24/18 0900       How much help from another person do you currently need...    Turning from your back to your side while in flat bed without using bedrails? (P)  4  -JH 3  -MD (r) ASHLY (t) MD (fartun) 3  -SA    Moving from lying on back to sitting on the side of a flat bed without bedrails? (P)  4  -JH 3  -MD (r) ASHLY (t) MD (fartun) 3  -SA    Moving to and from a bed to a chair (including a wheelchair)? (P)  4  -JH 3  -MD (r) ASHLY (t) MD (fartun) 3  -SA    Standing up from a chair using your arms (e.g., wheelchair, bedside chair)? (P)  4  -JH 3  -MD (r) ASHLY (t) MD (fartun) 3  -SA    Climbing 3-5 steps with a railing? (P)  4  -JH 3  -MD (r) ASHLY (kuldeep) MD (fartun) 2  -SA    To walk in hospital room? (P)  4  -JH 4  -MD (r) ASHLY (t) MD (fartun) 3  -SA    AM-PAC 6 Clicks Score (P)  24  -JH 19  -MD (r) ASHLY (t) 17  -SA       Functional Assessment    Outcome Measure Options (P)  AM-PAC 6 Clicks Basic Mobility (PT)  -  -- AM-PAC 6 Clicks Basic Mobility (PT)  -SA    Row Name 06/23/18 1000             How much help from another person do you currently need...    Turning from your back to your side while in flat bed without using bedrails? 3  -SA      Moving from lying on back to sitting on the side of a flat bed without bedrails? 3  -SA      Moving to and from a bed to a chair (including a wheelchair)? 3  -SA      Standing up from a chair using your arms (e.g., wheelchair, bedside chair)? 3  -SA      Climbing 3-5 steps with a railing? 2  -SA      To walk in hospital room? 3  -SA      AM-PAC 6 Clicks Score 17  -SA         Functional Assessment    Outcome Measure Options AM-PAC 6 Clicks Basic Mobility  (PT)  -        User Key  (r) = Recorded By, (t) = Taken By, (c) = Cosigned By    Initials Name Provider Type    MD Talisha Macdonald, PT Physical Therapist    SA Fani Mosqueda, PT Physical Therapist     YASH Dumas, PT Student PT Student           Time Calculation:         PT Charges     Row Name 06/26/18 0827             Time Calculation    Start Time (P)  0815  -      Stop Time (P)  0827  -      Time Calculation (min) (P)  12 min  -      PT Received On (P)  06/26/18  -         Time Calculation- PT    Total Timed Code Minutes- PT (P)  10 minute(s)  -        User Key  (r) = Recorded By, (t) = Taken By, (c) = Cosigned By    Initials Name Provider Type    ASHLY Dumas, PT Student PT Student        Therapy Suggested Charges     Code   Minutes Charges    None             Therapy Charges for Today     Code Description Service Date Service Provider Modifiers Qty    57034941872 HC PT THER PROC EA 15 MIN 6/25/2018 YASH Dumas, PT Student GP 1    12388361657 HC PT THER PROC EA 15 MIN 6/26/2018 YASH Dumas, PT Student GP 1          PT G-Codes  Outcome Measure Options: (P) AM-PAC 6 Clicks Basic Mobility (PT)    PT Discharge Summary  Reason for Discharge: (P) Maximum functional potential achieved (Pt supervision with all activities, no skilled intervention from PT required.)  Outcomes Achieved: (P) Other (Pt supervision with all activities, no skilled intervention from PT required.)  Discharge Destination: (P) Home with assist, Home with home health    YASH Dumas, PT Student  6/26/2018

## 2018-07-02 ENCOUNTER — TRANSCRIBE ORDERS (OUTPATIENT)
Dept: CARDIOLOGY | Facility: CLINIC | Age: 73
End: 2018-07-02

## 2018-07-02 DIAGNOSIS — Z95.1 S/P CABG (CORONARY ARTERY BYPASS GRAFT): Primary | ICD-10-CM

## 2018-07-03 ENCOUNTER — OFFICE VISIT (OUTPATIENT)
Dept: FAMILY MEDICINE CLINIC | Facility: CLINIC | Age: 73
End: 2018-07-03

## 2018-07-03 VITALS
DIASTOLIC BLOOD PRESSURE: 76 MMHG | HEIGHT: 69 IN | HEART RATE: 92 BPM | BODY MASS INDEX: 23.76 KG/M2 | WEIGHT: 160.44 LBS | SYSTOLIC BLOOD PRESSURE: 134 MMHG | OXYGEN SATURATION: 89 %

## 2018-07-03 DIAGNOSIS — I10 ESSENTIAL HYPERTENSION: ICD-10-CM

## 2018-07-03 DIAGNOSIS — R05.9 COUGH: ICD-10-CM

## 2018-07-03 DIAGNOSIS — E78.00 PURE HYPERCHOLESTEROLEMIA: ICD-10-CM

## 2018-07-03 DIAGNOSIS — I25.10 CORONARY ARTERY DISEASE INVOLVING NATIVE CORONARY ARTERY OF NATIVE HEART WITHOUT ANGINA PECTORIS: Primary | ICD-10-CM

## 2018-07-03 PROCEDURE — 99214 OFFICE O/P EST MOD 30 MIN: CPT | Performed by: FAMILY MEDICINE

## 2018-07-03 RX ORDER — BENZONATATE 100 MG/1
200 CAPSULE ORAL 3 TIMES DAILY PRN
Qty: 42 CAPSULE | Refills: 1 | Status: SHIPPED | OUTPATIENT
Start: 2018-07-03 | End: 2018-10-24

## 2018-07-03 RX ORDER — DILTIAZEM HYDROCHLORIDE 300 MG/1
300 CAPSULE, EXTENDED RELEASE ORAL DAILY
COMMUNITY
Start: 2018-05-22 | End: 2018-08-02

## 2018-07-03 NOTE — PROGRESS NOTES
Subjective   Souleymane Stapleton is a 72 y.o. male.     History of Present Illness The patient comes in for check of their chronic medical issues which include,CAD,Esential Hypertension . He has been in the hospital recently and undergone CABG . He is having some coughing and it hurts to do that..      The following portions of the patient's history were reviewed and updated as appropriate: allergies, current medications, past family history, past medical history, past social history, past surgical history and problem list.    Review of Systems   Constitutional: Negative for fatigue and fever.   Respiratory: Negative for cough, chest tightness and stridor.    Cardiovascular: Negative for chest pain, palpitations and leg swelling.       Objective   Physical Exam   Constitutional: He appears well-developed and well-nourished.   HENT:   Head: Normocephalic and atraumatic.   Right Ear: External ear normal.   Left Ear: External ear normal.   Nose: Nose normal.   Mouth/Throat: Oropharynx is clear and moist.   Eyes: Pupils are equal, round, and reactive to light.   Neck: Normal range of motion.   Cardiovascular: Normal rate, regular rhythm and normal heart sounds.  Exam reveals no friction rub.    No murmur heard.  Pulmonary/Chest: Effort normal and breath sounds normal. No respiratory distress. He has no wheezes. He has no rales.   Abdominal: Soft. Bowel sounds are normal. He exhibits no distension. There is no tenderness. There is no guarding.   Skin: Skin is warm and dry.   Vitals reviewed.        Assessment/Plan   Souleymane was seen today for follow-up and hypertension.    Diagnoses and all orders for this visit:    Coronary artery disease involving native coronary artery of native heart without angina pectoris    Essential hypertension    Pure hypercholesterolemia    Cough    Other orders  -     benzonatate (TESSALON PERLES) 100 MG capsule; Take 2 capsules by mouth 3 (Three) Times a Day As Needed for Cough.    Return to  clinic in 3 months.  Will contact with results as needed.

## 2018-07-10 ENCOUNTER — TELEPHONE (OUTPATIENT)
Dept: CARDIAC SURGERY | Facility: CLINIC | Age: 73
End: 2018-07-10

## 2018-07-10 NOTE — TELEPHONE ENCOUNTER
Tonya solomon  nurse called to report patient is complaining of excessive gas and bloating and asks if he can take over the counter medications like Gas-X. I let her know this was fine and if he did not get any relief he could try Zantac or call us back and we will try to find another solution

## 2018-07-13 ENCOUNTER — HOSPITAL ENCOUNTER (OUTPATIENT)
Dept: CARDIAC REHAB | Facility: HOSPITAL | Age: 73
Setting detail: THERAPIES SERIES
End: 2018-07-13

## 2018-07-18 DIAGNOSIS — I10 ESSENTIAL HYPERTENSION: ICD-10-CM

## 2018-07-18 DIAGNOSIS — I25.10 CORONARY ARTERY DISEASE INVOLVING NATIVE HEART, ANGINA PRESENCE UNSPECIFIED, UNSPECIFIED VESSEL OR LESION TYPE: ICD-10-CM

## 2018-07-20 ENCOUNTER — HOSPITAL ENCOUNTER (OUTPATIENT)
Dept: CARDIAC REHAB | Facility: HOSPITAL | Age: 73
Setting detail: THERAPIES SERIES
Discharge: HOME | End: 2018-07-20

## 2018-07-20 VITALS — DIASTOLIC BLOOD PRESSURE: 88 MMHG | HEART RATE: 77 BPM | SYSTOLIC BLOOD PRESSURE: 144 MMHG

## 2018-07-20 DIAGNOSIS — Z95.1 STATUS POST CORONARY ARTERY BYPASS GRAFT: Primary | ICD-10-CM

## 2018-07-20 PROCEDURE — 93798 PHYS/QHP OP CAR RHAB W/ECG: CPT

## 2018-07-20 RX ORDER — POTASSIUM CHLORIDE 750 MG/1
CAPSULE, EXTENDED RELEASE ORAL
Qty: 60 CAPSULE | Refills: 0 | OUTPATIENT
Start: 2018-07-20

## 2018-07-20 RX ORDER — FUROSEMIDE 40 MG/1
TABLET ORAL
Qty: 30 TABLET | Refills: 0 | OUTPATIENT
Start: 2018-07-20

## 2018-07-20 NOTE — PROGRESS NOTES
Cardiac Rehab Initial Assessment      Name: Souleymane Stapleton  :1945 Allergies:Statins and Tricor [fenofibrate]   MRN: [id number] 72 y.o. Physician: Souleymane Murrieta MD   Primary Diagnosis:    Diagnosis Plan   1. Status post coronary artery bypass graft      Event Date: 1918 Specialist: Antonio Mcconnell    Risk Stratification:Low Risk Note Author: Gabby Vogel RN     Cardiovascular History: none     EXERCISE AT HOME  yes  Walked 45 minutes daily prior to surgery and is increasing as tolerated to get back to that  EF: 60%      Source: echo          Ambulatory Status:Independent  Ambulatory Fall Risk Assessed on Initial Visit: yes 6 Minute Walk Pre- Cardiac Rehab:  Distance:1160ft      RPE:6  Max. HR: 88       SPO2:98    MET: 3  MPH: 2.2               Resting BP: 144/88 LA, 120/74 RA    Peak BP: 141/74  Recovery BP: 158/78        NUTRITION  Lipids:yes If yes, labs as follows;  Total: No components found for: CHOLESTEROL  HDL:   HDL Cholesterol   Date Value Ref Range Status   2018 35 (L) 60 - 200 mg/dL Final    Lipids continued:  LDL:  LDL Cholesterol    Date Value Ref Range Status   2018 125 1 - 129 mg/dL Final     Triglyceride: No components found for: TRIGLYCERIDE   Weight Management:                 Weight: 160  Height: 68.5                                   BMI: There is no height or weight on file to calculate BMI.  Waist Circumference: 32  inches   Alcohol Use: none Diabetes:No    Last HGBA1C with date if applicable:No components found for: A1C         SOCIAL HISTORY  Social History     Social History   • Marital status:      Social History Main Topics   • Smoking status: Former Smoker     Types: Cigarettes     Quit date:    • Smokeless tobacco: Never Used   • Alcohol use Yes      Comment: Social   • Drug use: No   • Sexual activity: Defer     Other Topics Concern   • Not on file       Educational Level (choose one that applies) college graduate Learning  Barriers:Ready to Learn    Family Support:yes    Living Arrangement: lives alone         Tobacco Adjunct: N/A       PSYCHOSOCIAL  Clinical Depression: no    Stress: no     Assess presence or absence of depression using a valid screening tool: yes         No angina incisional or any other pain today. Diagnosed with Hypertension:yes    Heart Sounds: wnl     Lung Sounds: normal air entry, lungs clear to auscultation         Assessment: wnl Orthopedic Problems: recent neck surgery. Lower back and shoulder pain    Are you being hurt, hit, or frightened by anyone at home or in your life? no    Are you being neglected by a caregiver? N/A        Assessment: wnl    Family attends IA: no Time of arrival: 1000  Time of departure: 1120     Patient Goals: increase strength and stamina          7/20/2018  12:52 PM  Gabby Vogel RN

## 2018-07-23 ENCOUNTER — HOSPITAL ENCOUNTER (OUTPATIENT)
Dept: CARDIAC REHAB | Facility: HOSPITAL | Age: 73
Setting detail: THERAPIES SERIES
Discharge: HOME | End: 2018-07-23

## 2018-07-23 VITALS
DIASTOLIC BLOOD PRESSURE: 79 MMHG | WEIGHT: 159 LBS | BODY MASS INDEX: 23.55 KG/M2 | HEART RATE: 82 BPM | SYSTOLIC BLOOD PRESSURE: 144 MMHG | HEIGHT: 69 IN

## 2018-07-23 DIAGNOSIS — Z95.1 STATUS POST CORONARY ARTERY BYPASS GRAFT: Primary | ICD-10-CM

## 2018-07-23 PROCEDURE — 93798 PHYS/QHP OP CAR RHAB W/ECG: CPT

## 2018-07-24 DIAGNOSIS — I10 ESSENTIAL HYPERTENSION: ICD-10-CM

## 2018-07-24 DIAGNOSIS — I25.10 CORONARY ARTERY DISEASE INVOLVING NATIVE HEART, ANGINA PRESENCE UNSPECIFIED, UNSPECIFIED VESSEL OR LESION TYPE: ICD-10-CM

## 2018-07-24 RX ORDER — FUROSEMIDE 40 MG/1
40 TABLET ORAL DAILY
Qty: 30 TABLET | Refills: 0 | Status: SHIPPED | OUTPATIENT
Start: 2018-07-24 | End: 2018-08-01

## 2018-07-24 RX ORDER — POTASSIUM CHLORIDE 750 MG/1
20 CAPSULE, EXTENDED RELEASE ORAL DAILY
Qty: 60 CAPSULE | Refills: 0 | Status: SHIPPED | OUTPATIENT
Start: 2018-07-24 | End: 2018-08-01

## 2018-07-25 ENCOUNTER — HOSPITAL ENCOUNTER (OUTPATIENT)
Dept: CARDIAC REHAB | Facility: HOSPITAL | Age: 73
Setting detail: THERAPIES SERIES
Discharge: HOME | End: 2018-07-25

## 2018-07-25 VITALS — DIASTOLIC BLOOD PRESSURE: 72 MMHG | SYSTOLIC BLOOD PRESSURE: 129 MMHG | HEART RATE: 80 BPM

## 2018-07-25 DIAGNOSIS — Z95.1 STATUS POST CORONARY ARTERY BYPASS GRAFT: Primary | ICD-10-CM

## 2018-07-25 PROCEDURE — 93798 PHYS/QHP OP CAR RHAB W/ECG: CPT

## 2018-07-26 ENCOUNTER — TELEPHONE (OUTPATIENT)
Dept: FAMILY MEDICINE CLINIC | Facility: CLINIC | Age: 73
End: 2018-07-26

## 2018-07-27 ENCOUNTER — HOSPITAL ENCOUNTER (OUTPATIENT)
Dept: CARDIAC REHAB | Facility: HOSPITAL | Age: 73
Setting detail: THERAPIES SERIES
Discharge: HOME | End: 2018-07-27

## 2018-07-27 VITALS — SYSTOLIC BLOOD PRESSURE: 126 MMHG | DIASTOLIC BLOOD PRESSURE: 73 MMHG | HEART RATE: 80 BPM

## 2018-07-27 DIAGNOSIS — Z95.1 STATUS POST CORONARY ARTERY BYPASS GRAFT: Primary | ICD-10-CM

## 2018-07-27 PROCEDURE — 93798 PHYS/QHP OP CAR RHAB W/ECG: CPT

## 2018-07-27 RX ORDER — SIMETHICONE 80 MG
80 TABLET,CHEWABLE ORAL EVERY 6 HOURS PRN
Qty: 56 TABLET | Refills: 1 | Status: SHIPPED | OUTPATIENT
Start: 2018-07-27

## 2018-07-30 ENCOUNTER — HOSPITAL ENCOUNTER (OUTPATIENT)
Dept: CARDIAC REHAB | Facility: HOSPITAL | Age: 73
Setting detail: THERAPIES SERIES
Discharge: HOME | End: 2018-07-30

## 2018-07-30 VITALS
HEART RATE: 77 BPM | SYSTOLIC BLOOD PRESSURE: 128 MMHG | DIASTOLIC BLOOD PRESSURE: 72 MMHG | BODY MASS INDEX: 23.67 KG/M2 | WEIGHT: 158 LBS

## 2018-07-30 DIAGNOSIS — Z95.1 STATUS POST CORONARY ARTERY BYPASS GRAFT: Primary | ICD-10-CM

## 2018-07-30 PROCEDURE — 93798 PHYS/QHP OP CAR RHAB W/ECG: CPT

## 2018-08-01 ENCOUNTER — OFFICE VISIT (OUTPATIENT)
Dept: CARDIAC SURGERY | Facility: CLINIC | Age: 73
End: 2018-08-01

## 2018-08-01 ENCOUNTER — APPOINTMENT (OUTPATIENT)
Dept: CARDIAC REHAB | Facility: HOSPITAL | Age: 73
End: 2018-08-01

## 2018-08-01 ENCOUNTER — TELEPHONE (OUTPATIENT)
Dept: FAMILY MEDICINE CLINIC | Facility: CLINIC | Age: 73
End: 2018-08-01

## 2018-08-01 VITALS
OXYGEN SATURATION: 95 % | HEIGHT: 69 IN | RESPIRATION RATE: 20 BRPM | WEIGHT: 159 LBS | DIASTOLIC BLOOD PRESSURE: 77 MMHG | TEMPERATURE: 97.6 F | SYSTOLIC BLOOD PRESSURE: 160 MMHG | HEART RATE: 65 BPM | BODY MASS INDEX: 23.55 KG/M2

## 2018-08-01 DIAGNOSIS — Z95.1 S/P CABG X 5: Primary | ICD-10-CM

## 2018-08-01 PROCEDURE — 99024 POSTOP FOLLOW-UP VISIT: CPT | Performed by: THORACIC SURGERY (CARDIOTHORACIC VASCULAR SURGERY)

## 2018-08-01 NOTE — PROGRESS NOTES
"CARDIOVASCULAR SURGERY FOLLOW-UP PROGRESS NOTE  Chief Complaint: Here for follow-up 5 weeks after CABG        HPI:   Dear Dr. Souleymane Murrieta MD and colleagues:    It was nice to see Souleymane Stapleton in follow up of weeks  after surgery.  As you know, he is a 72 y.o. male with coronary artery disease who underwent CABG ×5 on 6/22/18. He did well postoperatively and continues to do well. He comes in today complaining of nothing.  His activity level has been good.       Physical Exam:         /77 (BP Location: Right arm, Patient Position: Sitting, Cuff Size: Adult)   Pulse 65   Temp 97.6 °F (36.4 °C) (Oral)   Resp 20   Ht 175.3 cm (69\")   Wt 72.1 kg (159 lb)   SpO2 95%   BMI 23.48 kg/m²   Heart:  regular rate and rhythm, S1, S2 normal, no murmur, click, rub or gallop  Lungs:  clear to auscultation bilaterally  Extremities:  no edema  Incision(s):  mid chest healing well, left leg healing well, sternum stable    Assessment/Plan:     S/P CABG. Overall, he is doing well.    No significant post-op complications    Keep incisions clean and dry  OK to drive if not taking narcotic pain medicine  OK to begin cardiac rehab  Follow-up as scheduled with cardiology  Follow-up as scheduled with PCP  Follow-up with CT surgery prn    No restrictions of activity.      Thank you for allowing me to participate in the care of your   patient.  Regards,  Edgar Pérez MD    "

## 2018-08-02 ENCOUNTER — OFFICE VISIT (OUTPATIENT)
Dept: CARDIOLOGY | Facility: CLINIC | Age: 73
End: 2018-08-02

## 2018-08-02 VITALS
SYSTOLIC BLOOD PRESSURE: 128 MMHG | OXYGEN SATURATION: 95 % | HEART RATE: 77 BPM | DIASTOLIC BLOOD PRESSURE: 70 MMHG | WEIGHT: 162.6 LBS | HEIGHT: 69 IN | BODY MASS INDEX: 24.08 KG/M2

## 2018-08-02 DIAGNOSIS — I25.810 CORONARY ARTERY DISEASE INVOLVING CORONARY BYPASS GRAFT OF NATIVE HEART WITHOUT ANGINA PECTORIS: ICD-10-CM

## 2018-08-02 DIAGNOSIS — E78.00 PURE HYPERCHOLESTEROLEMIA: ICD-10-CM

## 2018-08-02 DIAGNOSIS — I10 ESSENTIAL HYPERTENSION: Primary | ICD-10-CM

## 2018-08-02 PROCEDURE — 99214 OFFICE O/P EST MOD 30 MIN: CPT | Performed by: INTERNAL MEDICINE

## 2018-08-02 RX ORDER — METOPROLOL SUCCINATE 50 MG/1
50 TABLET, EXTENDED RELEASE ORAL DAILY
Qty: 90 TABLET | Refills: 3 | Status: SHIPPED | OUTPATIENT
Start: 2018-08-02 | End: 2018-08-22 | Stop reason: SDUPTHER

## 2018-08-02 RX ORDER — RANITIDINE HCL 75 MG
75 TABLET ORAL 2 TIMES DAILY
COMMUNITY
End: 2018-10-31 | Stop reason: CLARIF

## 2018-08-02 NOTE — PROGRESS NOTES
[facility]  OFFICE NOTE    Souleymane Stapleton  72 y.o. male    08/02/2018  1. Essential hypertension    2. Coronary artery disease involving coronary bypass graft of native heart without angina pectoris    3. Pure hypercholesterolemia        Chief complaint -Follow-up after CABG      History of present Illness- 72-year-old gentleman who had quadruple bypass 6 weeks ago after he was found to have multivessel disease and is doing better.  He does have some soreness on the chest from the sternotomy.  He is finishing cardiac rehabilitation and going to start regular exercise.  He could not tolerate statins or fenofibrate and currently is on Zetia for cholesterol.  He is on Toprol-XL 50 mg daily.  We'll check his lipids again in 4-6 months and if it is still high then may need to add PCSK9 Inhibitors.  Denies any CNS symptoms and he takes ranitidine for GERD.      Allergies   Allergen Reactions   • Statins Myalgia     Muscle soreness    • Tricor [Fenofibrate] Myalgia     Muscle soreness          Past Medical History:   Diagnosis Date   • Abdominal pain    • Abnormal weight loss    • Acid reflux    • Acute gastritis    • Anxiety state    • Arthritis    • Cancer (CMS/HCC)     Prostate   • History of cerebrovascular accident    • History of colon polyps    • Hypertension    • Nausea    • Nuclear senile cataract    • Presbyopia    • Tobacco use    • Transient cerebral ischemia    • Vitreous detachment of left eye          Past Surgical History:   Procedure Laterality Date   • BACK SURGERY     • CARDIAC CATHETERIZATION N/A 6/19/2018    Procedure: Coronary angiography;  Surgeon: Delroy Mcconnell MD;  Location: [facility] INVASIVE LOCATION;  Service: Cardiovascular   • COLONOSCOPY  06/09/2015    Diverticulosis found in the sigmoid colon. Hemorrhoids found in the anus.   • CORONARY ARTERY BYPASS GRAFT N/A 6/22/2018    Procedure: PARAS STERNOTOMY CORONARY ARTERY BYPASS GRAFT TIMES 5 USING RIGHT  AND LEFT INTERNAL MAMMARY ARTERIES AND LEFT GREATER SAPHENOUS VEIN GRAFT PER ENDOSCOPIC VEIN HARVESTING AND PRP;  Surgeon: Edgar Pérez MD;  Location: [facility] OR;  Service: Cardiothoracic   • CRYOTHERAPY  08/14/2012    Of Acne   • ENDOSCOPY  09/01/2015    EGD w/ biopsy -Multiple polyps, one removed.   • HEMORRHOIDECTOMY N/A 3/20/2017    Procedure: Removal of Anal Papilla;  Surgeon: Juan Diego Ken MD;  Location: [facility] OR;  Service:    • INJECTION OF MEDICATION  09/14/2010    B12   • INJECTION OF MEDICATION  10/17/2011    Kenalog   • LUMBAR LAMINECTOMY  09/29/2010   • OTHER SURGICAL HISTORY  08/19/2010    Biopsy, Each Additional Lesion    • SKIN BIOPSY  08/19/2010         Family History   Problem Relation Age of Onset   • Dementia Other    • Hypertension Other    • Stroke Other    • Hypertension Mother    • Hypertension Father          Social History     Social History   • Marital status:      Spouse name: N/A   • Number of children: N/A   • Years of education: N/A     Occupational History   • Not on file.     Social History Main Topics   • Smoking status: Former Smoker     Types: Cigarettes     Quit date: 1997   • Smokeless tobacco: Never Used   • Alcohol use Yes      Comment: Social   • Drug use: No   • Sexual activity: Defer     Other Topics Concern   • Not on file     Social History Narrative   • No narrative on file         Current Outpatient Prescriptions   Medication Sig Dispense Refill   • ALPRAZolam (XANAX) 0.5 MG tablet Take 1 tablet by mouth 3 (Three) Times a Day. 3 tablet 0   • aspirin 81 MG EC tablet Take 1 tablet by mouth Daily. 30 tablet 3   • benzonatate (TESSALON PERLES) 100 MG capsule Take 2 capsules by mouth 3 (Three) Times a Day As Needed for Cough. 42 capsule 1   • clopidogrel (PLAVIX) 75 MG tablet Take 1 tablet by mouth Daily. 3 tablet 0   • dutasteride (AVODART) 0.5 MG capsule Take 1 capsule by mouth Every Night. 3 capsule 0   • ezetimibe (ZETIA) 10 MG tablet Take 1  "tablet by mouth Daily. 3 tablet 0   • finasteride (PROPECIA) 1 MG tablet Take 1 tablet by mouth Daily. 3 tablet 0   • fluticasone (FLONASE) 50 MCG/ACT nasal spray 2 sprays into each nostril Daily. 16 g 3   • raNITIdine (ZANTAC) 75 MG tablet Take 75 mg by mouth 2 (Two) Times a Day.     • simethicone (GAS-X) 80 MG chewable tablet Chew 1 tablet Every 6 (Six) Hours As Needed for Flatulence. 56 tablet 1   • traZODone (DESYREL) 50 MG tablet Take 1 tablet by mouth Every Night. 30 tablet 5   • metoprolol succinate XL (TOPROL-XL) 50 MG 24 hr tablet Take 1 tablet by mouth Daily. 90 tablet 3     No current facility-administered medications for this visit.          Review of Systems     Constitution: Denies any fatigue, fever or chills    HENT: Denies any headache, hearing impairment,     Eyes: Denies any blurring of vision, or photophobia     Cardivascular - As per history of present illness     Respiratory system-denies  shortness of breath- NYHA class I        Endocrine:   history of hyperlipidemia       Musculoskeletal:   rotator cuff injury to the right shoulder    Gastrointestinal: No nausea, vomiting, or melena    Genitourinary: History of prostate cancer    Neurological:   No history of seizure disorder, stroke, memory problems    Psychiatric/Behavioral:        No history of depression,    Hematological- no history of easy bruising             OBJECTIVE    /70   Pulse 77   Ht 175.3 cm (69\")   Wt 73.8 kg (162 lb 9.6 oz)   SpO2 95%   BMI 24.01 kg/m²       Physical Exam     Constitutional: is oriented to person, place, and time.     Skin-warm and dry    Well developed and nourished in no acute distress      Head: Normocephalic and atraumatic.     Eyes: Pupils are equal, round, and reactive to light.     Neck: Neck supple. No bruit in the carotids    Cardiovascular: [place] in the fifth intercostal space   Regular rate, and  Rhythm,    S1 greater than S2, no S3 or S4, no gallop     Pulmonary/Chest:   Air  Entry is " equal on both sides  No wheezing or crackles,      Abdominal: Soft.  No hepatosplenomegaly, bowel sounds are present    Musculoskeletal: No kyphoscoliosis    Neurological: is alert and oriented to person, place, and time.    cranial nerve are intact .   No motor or sensory deficit    Extremities-no edema, no radial femoral delay      Psychiatric: He has a normal mood and affect.                  His behavior is normal.           Procedures      Lab Results   Component Value Date    WBC 10.13 06/26/2018    HGB 10.4 (L) 06/26/2018    HCT 32.4 (L) 06/26/2018    MCV 94.7 06/26/2018     06/26/2018     Lab Results   Component Value Date    GLUCOSE 115 (H) 06/26/2018    BUN 20 06/26/2018    CREATININE 1.14 06/26/2018    EGFRIFNONA 63 06/26/2018    BCR 17.5 06/26/2018    CO2 25.0 06/26/2018    CALCIUM 8.6 06/26/2018    ALBUMIN 4.30 06/23/2018    AST 19 06/21/2018    ALT 23 06/21/2018     Lab Results   Component Value Date    CHOL 213 (H) 04/05/2018    CHOL 223 (H) 11/28/2017    CHOL 255 (H) 03/09/2017     Lab Results   Component Value Date    TRIG 114 04/05/2018    TRIG 140 11/28/2017    TRIG 204 (H) 03/09/2017     Lab Results   Component Value Date    HDL 35 (L) 04/05/2018    HDL 50 (L) 11/28/2017    HDL 49 (L) 03/09/2017     No components found for: LDLCALC  Lab Results   Component Value Date     04/05/2018     (H) 11/28/2017     (H) 03/09/2017     No results found for: HDLLDLRATIO  No components found for: CHOLHDL  No results found for: HGBA1C  Lab Results   Component Value Date    TSH 0.78 02/11/2015                  A/P  CAD status post CABG in June 2018, doing well no chest pain has sensed chest soreness from his sternotomy site on antiplatelet therapy with aspirin and Plavix    Hypertension -well controlled with Toprol-XL 50 mill grams daily    Hyperlipidemia -could not tolerate statins and is tolerating the Zetia, will check his lipids again if it is still high then we'll add PCSK9  Inhibitors    Follow-up in 6 months              This document has been electronically signed by Delroy Mcconnell MD on August 2, 2018 9:11 AM       EMR Dragon/Transcription disclaimer:   Some of this note may be an electronic transcription/translation of spoken language to printed text. The electronic translation of spoken language may permit erroneous, or at times, nonsensical words or phrases to be inadvertently transcribed; Although I have reviewed the note for such errors, some may still exist.

## 2018-08-03 ENCOUNTER — HOSPITAL ENCOUNTER (OUTPATIENT)
Dept: CARDIAC REHAB | Facility: HOSPITAL | Age: 73
Setting detail: THERAPIES SERIES
Discharge: HOME | End: 2018-08-03

## 2018-08-03 VITALS — DIASTOLIC BLOOD PRESSURE: 83 MMHG | SYSTOLIC BLOOD PRESSURE: 132 MMHG | HEART RATE: 91 BPM

## 2018-08-03 DIAGNOSIS — Z95.1 STATUS POST CORONARY ARTERY BYPASS GRAFT: Primary | ICD-10-CM

## 2018-08-03 PROCEDURE — 93798 PHYS/QHP OP CAR RHAB W/ECG: CPT

## 2018-08-06 ENCOUNTER — APPOINTMENT (OUTPATIENT)
Dept: CARDIAC REHAB | Facility: HOSPITAL | Age: 73
End: 2018-08-06

## 2018-08-06 ENCOUNTER — OFFICE VISIT (OUTPATIENT)
Dept: FAMILY MEDICINE CLINIC | Facility: CLINIC | Age: 73
End: 2018-08-06

## 2018-08-06 VITALS
SYSTOLIC BLOOD PRESSURE: 128 MMHG | BODY MASS INDEX: 24.15 KG/M2 | DIASTOLIC BLOOD PRESSURE: 60 MMHG | WEIGHT: 163.06 LBS | HEIGHT: 69 IN | HEART RATE: 77 BPM | OXYGEN SATURATION: 94 %

## 2018-08-06 DIAGNOSIS — Z00.00 WELL ADULT EXAM: Primary | ICD-10-CM

## 2018-08-06 PROCEDURE — G0402 INITIAL PREVENTIVE EXAM: HCPCS | Performed by: FAMILY MEDICINE

## 2018-08-06 NOTE — PROGRESS NOTES
QUICK REFERENCE INFORMATION:  The ABCs of the Annual Wellness Visit    Initial Medicare Wellness Visit    HEALTH RISK ASSESSMENT    1945    Recent Hospitalizations:  Recently treated at the following:  Other: [facility] where he underwent bypass surgery..        Current Medical Providers:  Patient Care Team:  Souleymane Murrieta MD as PCP - General        Smoking Status:  History   Smoking Status   • Former Smoker   • Types: Cigarettes   • Quit date: 1997   Smokeless Tobacco   • Never Used       Alcohol Consumption:  History   Alcohol Use   • Yes     Comment: Social       Depression Screen:   PHQ-2/PHQ-9 Depression Screening 8/6/2018   Little interest or pleasure in doing things 1   Feeling down, depressed, or hopeless 1   Trouble falling or staying asleep, or sleeping too much 0   Feeling tired or having little energy 1   Poor appetite or overeating 2   Feeling bad about yourself - or that you are a failure or have let yourself or your family down 0   Trouble concentrating on things, such as reading the newspaper or watching television 0   Moving or speaking so slowly that other people could have noticed. Or the opposite - being so fidgety or restless that you have been moving around a lot more than usual 0   Thoughts that you would be better off dead, or of hurting yourself in some way 0   Total Score 5   If you checked off any problems, how difficult have these problems made it for you to do your work, take care of things at home, or get along with other people? Not difficult at all       Health Habits and Functional and Cognitive Screening:  Functional & Cognitive Status 8/6/2018   Do you have difficulty preparing food and eating? No   Do you have difficulty bathing yourself, getting dressed or grooming yourself? No   Do you have difficulty using the toilet? No   Do you have difficulty moving around from place to place? No   Do you have trouble with steps or getting out of a bed or a chair? No    In the past year have you fallen or experienced a near fall? No   Current Diet Low Fat Diet   Dental Exam Up to date   Eye Exam Up to date   Exercise (times per week) 5 times per week   Current Exercise Activities Include Cardiovasular Workout on Exercise Equipment   Do you need help using the phone?  No   Are you deaf or do you have serious difficulty hearing?  Yes   Do you need help with transportation? No   Do you need help shopping? No   Do you need help preparing meals?  No   Do you need help with housework?  No   Do you need help with laundry? No   Do you need help taking your medications? No   Do you need help managing money? No   Do you ever drive or ride in a car without wearing a seat belt? No   Have you felt unusual stress, anger or loneliness in the last month? No   Who do you live with? Alone   If you need help, do you have trouble finding someone available to you? No   Have you been bothered in the last four weeks by sexual problems? No   Do you have difficulty concentrating, remembering or making decisions? No           Does the patient have evidence of cognitive impairment? Yes    Asiprin use counseling: Start ASA 81 mg daily       Recent Lab Results:    Visual Acuity:  No exam data present    Age-appropriate Screening Schedule:  Refer to the list below for future screening recommendations based on patient's age, sex and/or medical conditions. Orders for these recommended tests are listed in the plan section. The patient has been provided with a written plan.    Health Maintenance   Topic Date Due   • TDAP/TD VACCINES (1 - Tdap) 09/17/1962   • ZOSTER VACCINE (1 of 2) 09/17/1993   • PNEUMOCOCCAL VACCINES (65+ LOW/MEDIUM RISK) (1 of 2 - PCV13) 09/17/2008   • COLONOSCOPY  10/06/2016   • INFLUENZA VACCINE  08/01/2018   • LIPID PANEL  04/05/2019        Subjective   History of Present Illness    Souleymane Stapleton is a 72 y.o. male who presents for an Annual Wellness Visit.    The following portions of  the patient's history were reviewed and updated as appropriate: allergies, current medications, past family history, past medical history, past social history, past surgical history and problem list.    Outpatient Medications Prior to Visit   Medication Sig Dispense Refill   • ALPRAZolam (XANAX) 0.5 MG tablet Take 1 tablet by mouth 3 (Three) Times a Day. 3 tablet 0   • aspirin 81 MG EC tablet Take 1 tablet by mouth Daily. 30 tablet 3   • benzonatate (TESSALON PERLES) 100 MG capsule Take 2 capsules by mouth 3 (Three) Times a Day As Needed for Cough. 42 capsule 1   • clopidogrel (PLAVIX) 75 MG tablet Take 1 tablet by mouth Daily. 3 tablet 0   • dutasteride (AVODART) 0.5 MG capsule Take 1 capsule by mouth Every Night. 3 capsule 0   • ezetimibe (ZETIA) 10 MG tablet Take 1 tablet by mouth Daily. 3 tablet 0   • finasteride (PROPECIA) 1 MG tablet Take 1 tablet by mouth Daily. 3 tablet 0   • fluticasone (FLONASE) 50 MCG/ACT nasal spray 2 sprays into each nostril Daily. 16 g 3   • metoprolol succinate XL (TOPROL-XL) 50 MG 24 hr tablet Take 1 tablet by mouth Daily. 90 tablet 3   • raNITIdine (ZANTAC) 75 MG tablet Take 75 mg by mouth 2 (Two) Times a Day.     • simethicone (GAS-X) 80 MG chewable tablet Chew 1 tablet Every 6 (Six) Hours As Needed for Flatulence. 56 tablet 1   • traZODone (DESYREL) 50 MG tablet Take 1 tablet by mouth Every Night. 30 tablet 5     No facility-administered medications prior to visit.        Patient Active Problem List   Diagnosis   • Astigmatism   • Anxiety state   • History of cerebrovascular accident   • Nuclear senile cataract   • Hypertension   • Unspecified hemorrhoids   • Palpitations   • Pure hypercholesterolemia   • Prostate cancer (CMS/HCC)   • Chronic right shoulder pain   • Rotator cuff syndrome, left   • Chronic left shoulder pain   • Impingement syndrome, shoulder, left   • SOB (shortness of breath)   • Angina of effort (CMS/HCC)   • Coronary artery disease involving coronary bypass  "graft of native heart without angina pectoris       Advance Care Planning:  power of  for healthcare on file, has an advance directive - a copy has been provided and is in file    Identification of Risk Factors:  Risk factors include: inactivity and depression.    Review of Systems    Compared to one year ago, the patient feels his physical health is the same.  Compared to one year ago, the patient feels his mental health is the same.    Objective     Physical Exam    Vitals:    08/06/18 0855   BP: 128/60   Pulse: 77   SpO2: 94%   Weight: 74 kg (163 lb 1 oz)   Height: 174 cm (68.5\")       Patient's Body mass index is 24.43 kg/m². BMI is within normal parameters. No follow-up required.      Assessment/Plan   Patient Self-Management and Personalized Health Advice  The patient has been provided with information about: He has multiple meds and has been doing well on them,so did not go into this with him. and preventive services including:   · weight ,hearing and activity.    Visit Diagnoses:  No diagnosis found.    No orders of the defined types were placed in this encounter.      Outpatient Encounter Prescriptions as of 8/6/2018   Medication Sig Dispense Refill   • ALPRAZolam (XANAX) 0.5 MG tablet Take 1 tablet by mouth 3 (Three) Times a Day. 3 tablet 0   • aspirin 81 MG EC tablet Take 1 tablet by mouth Daily. 30 tablet 3   • benzonatate (TESSALON PERLES) 100 MG capsule Take 2 capsules by mouth 3 (Three) Times a Day As Needed for Cough. 42 capsule 1   • clopidogrel (PLAVIX) 75 MG tablet Take 1 tablet by mouth Daily. 3 tablet 0   • dutasteride (AVODART) 0.5 MG capsule Take 1 capsule by mouth Every Night. 3 capsule 0   • ezetimibe (ZETIA) 10 MG tablet Take 1 tablet by mouth Daily. 3 tablet 0   • finasteride (PROPECIA) 1 MG tablet Take 1 tablet by mouth Daily. 3 tablet 0   • fluticasone (FLONASE) 50 MCG/ACT nasal spray 2 sprays into each nostril Daily. 16 g 3   • metoprolol succinate XL (TOPROL-XL) 50 MG 24 hr " tablet Take 1 tablet by mouth Daily. 90 tablet 3   • raNITIdine (ZANTAC) 75 MG tablet Take 75 mg by mouth 2 (Two) Times a Day.     • simethicone (GAS-X) 80 MG chewable tablet Chew 1 tablet Every 6 (Six) Hours As Needed for Flatulence. 56 tablet 1   • traZODone (DESYREL) 50 MG tablet Take 1 tablet by mouth Every Night. 30 tablet 5     No facility-administered encounter medications on file as of 8/6/2018.        Reviewed use of high risk medication in the elderly: not applicable  Reviewed for potential of harmful drug interactions in the elderly: not applicable    Follow Up:  No Follow-up on file.     The patient is familiar with the topics that we discussed and will return to the office at his leisure for any Health Issues that arise.

## 2018-08-08 ENCOUNTER — APPOINTMENT (OUTPATIENT)
Dept: CARDIAC REHAB | Facility: HOSPITAL | Age: 73
End: 2018-08-08

## 2018-08-10 ENCOUNTER — APPOINTMENT (OUTPATIENT)
Dept: CARDIAC REHAB | Facility: HOSPITAL | Age: 73
End: 2018-08-10

## 2018-08-13 ENCOUNTER — APPOINTMENT (OUTPATIENT)
Dept: CARDIAC REHAB | Facility: HOSPITAL | Age: 73
End: 2018-08-13

## 2018-08-14 DIAGNOSIS — R14.1 GAS PAIN: ICD-10-CM

## 2018-08-14 DIAGNOSIS — R14.0 ABDOMINAL BLOATING: Primary | ICD-10-CM

## 2018-08-14 DIAGNOSIS — K21.9 GASTROESOPHAGEAL REFLUX DISEASE, ESOPHAGITIS PRESENCE NOT SPECIFIED: ICD-10-CM

## 2018-08-15 ENCOUNTER — APPOINTMENT (OUTPATIENT)
Dept: CARDIAC REHAB | Facility: HOSPITAL | Age: 73
End: 2018-08-15

## 2018-08-17 ENCOUNTER — APPOINTMENT (OUTPATIENT)
Dept: CARDIAC REHAB | Facility: HOSPITAL | Age: 73
End: 2018-08-17

## 2018-08-20 ENCOUNTER — APPOINTMENT (OUTPATIENT)
Dept: CARDIAC REHAB | Facility: HOSPITAL | Age: 73
End: 2018-08-20

## 2018-08-22 ENCOUNTER — OFFICE VISIT (OUTPATIENT)
Dept: CARDIOLOGY | Facility: CLINIC | Age: 73
End: 2018-08-22

## 2018-08-22 ENCOUNTER — APPOINTMENT (OUTPATIENT)
Dept: CARDIAC REHAB | Facility: HOSPITAL | Age: 73
End: 2018-08-22

## 2018-08-22 VITALS
HEIGHT: 69 IN | BODY MASS INDEX: 24.14 KG/M2 | WEIGHT: 163 LBS | DIASTOLIC BLOOD PRESSURE: 90 MMHG | SYSTOLIC BLOOD PRESSURE: 160 MMHG | HEART RATE: 72 BPM

## 2018-08-22 DIAGNOSIS — E78.00 PURE HYPERCHOLESTEROLEMIA: ICD-10-CM

## 2018-08-22 DIAGNOSIS — I25.810 CORONARY ARTERY DISEASE INVOLVING CORONARY BYPASS GRAFT OF NATIVE HEART WITHOUT ANGINA PECTORIS: Primary | ICD-10-CM

## 2018-08-22 DIAGNOSIS — I10 ESSENTIAL HYPERTENSION: ICD-10-CM

## 2018-08-22 PROCEDURE — 99213 OFFICE O/P EST LOW 20 MIN: CPT | Performed by: INTERNAL MEDICINE

## 2018-08-22 RX ORDER — IRBESARTAN AND HYDROCHLOROTHIAZIDE 300; 12.5 MG/1; MG/1
1 TABLET, FILM COATED ORAL DAILY
Qty: 90 TABLET | Refills: 4 | Status: SHIPPED | OUTPATIENT
Start: 2018-08-22 | End: 2018-11-08

## 2018-08-22 RX ORDER — IRBESARTAN AND HYDROCHLOROTHIAZIDE 300; 12.5 MG/1; MG/1
1 TABLET, FILM COATED ORAL DAILY
Qty: 30 TABLET | Refills: 12 | Status: SHIPPED | OUTPATIENT
Start: 2018-08-22 | End: 2018-08-22 | Stop reason: SDUPTHER

## 2018-08-22 RX ORDER — METOPROLOL SUCCINATE 50 MG/1
50 TABLET, EXTENDED RELEASE ORAL
Qty: 90 TABLET | Refills: 3 | Status: SHIPPED | OUTPATIENT
Start: 2018-08-22 | End: 2018-12-19 | Stop reason: SDUPTHER

## 2018-08-22 NOTE — PROGRESS NOTES
[facility] Cardiology  OFFICE NOTE    Souleymane Stapleton  72 y.o. male    08/022/2018  1. Coronary artery disease involving coronary bypass graft of native heart without angina pectoris    2. Essential hypertension    3. Pure hypercholesterolemia        Chief complaint -elevated blood pressure      History of present Illness- 72-year-old gentleman who had quadruple bypass 10 weeks ago after he was found to have multivessel disease and is doing better.  He does have some soreness on the chest from the sternotomy.  He is finishing cardiac rehabilitation and going to start regular exercise.  He could not tolerate statins or fenofibrate and currently is on Zetia for cholesterol.  He is on Toprol-XL 50 mg daily.  He has been noticing his blood pressure has been high and used to be on Avalide and Cardizem CD before the surgery.  I put him back on Avalide as his blood pressure is running around 180/90.      Allergies   Allergen Reactions   • Statins Myalgia     Muscle soreness    • Tricor [Fenofibrate] Myalgia     Muscle soreness          Past Medical History:   Diagnosis Date   • Abdominal pain    • Abnormal weight loss    • Acid reflux    • Acute gastritis    • Anxiety state    • Arthritis    • Cancer (CMS/HCC)     Prostate   • History of cerebrovascular accident    • History of colon polyps    • Hypertension    • Nausea    • Nuclear senile cataract    • Presbyopia    • Tobacco use    • Transient cerebral ischemia    • Vitreous detachment of left eye          Past Surgical History:   Procedure Laterality Date   • BACK SURGERY     • CARDIAC CATHETERIZATION N/A 6/19/2018    Procedure: Coronary angiography;  Surgeon: Delroy Mcconnell MD;  Location: [facility] INVASIVE LOCATION;  Service: Cardiovascular   • COLONOSCOPY  06/09/2015    Diverticulosis found in the sigmoid colon. Hemorrhoids found in the anus.   • CORONARY ARTERY BYPASS GRAFT N/A 6/22/2018    Procedure: PARAS STERNOTOMY CORONARY ARTERY  BYPASS GRAFT TIMES 5 USING RIGHT AND LEFT INTERNAL MAMMARY ARTERIES AND LEFT GREATER SAPHENOUS VEIN GRAFT PER ENDOSCOPIC VEIN HARVESTING AND PRP;  Surgeon: Edgar Pérez MD;  Location: [facility] OR;  Service: Cardiothoracic   • CRYOTHERAPY  08/14/2012    Of Acne   • ENDOSCOPY  09/01/2015    EGD w/ biopsy -Multiple polyps, one removed.   • HEMORRHOIDECTOMY N/A 3/20/2017    Procedure: Removal of Anal Papilla;  Surgeon: Juan Diego Ken MD;  Location: [facility] OR;  Service:    • INJECTION OF MEDICATION  09/14/2010    B12   • INJECTION OF MEDICATION  10/17/2011    Kenalog   • LUMBAR LAMINECTOMY  09/29/2010   • OTHER SURGICAL HISTORY  08/19/2010    Biopsy, Each Additional Lesion    • SKIN BIOPSY  08/19/2010         Family History   Problem Relation Age of Onset   • Dementia Other    • Hypertension Other    • Stroke Other    • Hypertension Mother    • Hypertension Father          Social History     Social History   • Marital status:      Spouse name: N/A   • Number of children: N/A   • Years of education: N/A     Occupational History   • Not on file.     Social History Main Topics   • Smoking status: Former Smoker     Types: Cigarettes     Quit date: 1997   • Smokeless tobacco: Never Used   • Alcohol use Yes      Comment: Social   • Drug use: No   • Sexual activity: Defer     Other Topics Concern   • Not on file     Social History Narrative   • No narrative on file         Current Outpatient Prescriptions   Medication Sig Dispense Refill   • ALPRAZolam (XANAX) 0.5 MG tablet Take 1 tablet by mouth 3 (Three) Times a Day. 3 tablet 0   • aspirin 81 MG EC tablet Take 1 tablet by mouth Daily. 30 tablet 3   • benzonatate (TESSALON PERLES) 100 MG capsule Take 2 capsules by mouth 3 (Three) Times a Day As Needed for Cough. 42 capsule 1   • clopidogrel (PLAVIX) 75 MG tablet Take 1 tablet by mouth Daily. 3 tablet 0   • dutasteride (AVODART) 0.5 MG capsule Take 1 capsule by mouth Every Night. 3 capsule 0   •  "ezetimibe (ZETIA) 10 MG tablet Take 1 tablet by mouth Daily. 3 tablet 0   • finasteride (PROPECIA) 1 MG tablet Take 1 tablet by mouth Daily. 3 tablet 0   • fluticasone (FLONASE) 50 MCG/ACT nasal spray 2 sprays into each nostril Daily. 16 g 3   • metoprolol succinate XL (TOPROL-XL) 50 MG 24 hr tablet Take 1 tablet by mouth Daily With Dinner. 90 tablet 3   • raNITIdine (ZANTAC) 75 MG tablet Take 75 mg by mouth 2 (Two) Times a Day.     • simethicone (GAS-X) 80 MG chewable tablet Chew 1 tablet Every 6 (Six) Hours As Needed for Flatulence. 56 tablet 1   • traZODone (DESYREL) 50 MG tablet Take 1 tablet by mouth Every Night. 30 tablet 5   • irbesartan-hydrochlorothiazide (AVALIDE) 300-12.5 MG tablet Take 1 tablet by mouth Daily. 90 tablet 4     No current facility-administered medications for this visit.          Review of Systems     Constitution: Denies any fatigue, fever or chills    HENT: Denies any headache, hearing impairment,     Eyes: Denies any blurring of vision, or photophobia     Cardivascular - As per history of present illness     Respiratory system-denies  shortness of breath- NYHA class I        Endocrine:   history of hyperlipidemia       Musculoskeletal:   rotator cuff injury to the right shoulder    Gastrointestinal: No nausea, vomiting, or melena    Genitourinary: History of prostate cancer    Neurological:   No history of seizure disorder, stroke, memory problems    Psychiatric/Behavioral:        No history of depression,    Hematological- no history of easy bruising             OBJECTIVE    /90   Pulse 72   Ht 174 cm (68.5\")   Wt 73.9 kg (163 lb)   BMI 24.42 kg/m²       Physical Exam     Constitutional: is oriented to person, place, and time.     Skin-warm and dry    Well developed and nourished in no acute distress      Head: Normocephalic and atraumatic.     Eyes: Pupils are equal, round, and reactive to light.     Neck: Neck supple. No bruit in the carotids    Cardiovascular: [place] in the " fifth intercostal space   Regular rate, and  Rhythm,    S1 greater than S2, no S3 or S4, no gallop     Pulmonary/Chest:   Air  Entry is equal on both sides  No wheezing or crackles,      Abdominal: Soft.  No hepatosplenomegaly, bowel sounds are present    Musculoskeletal: No kyphoscoliosis    Neurological: is alert and oriented to person, place, and time.    cranial nerve are intact .   No motor or sensory deficit    Extremities-no edema, no radial femoral delay      Psychiatric: He has a normal mood and affect.                  His behavior is normal.           Procedures      Lab Results   Component Value Date    WBC 10.13 06/26/2018    HGB 10.4 (L) 06/26/2018    HCT 32.4 (L) 06/26/2018    MCV 94.7 06/26/2018     06/26/2018     Lab Results   Component Value Date    GLUCOSE 115 (H) 06/26/2018    BUN 20 06/26/2018    CREATININE 1.14 06/26/2018    EGFRIFNONA 63 06/26/2018    BCR 17.5 06/26/2018    CO2 25.0 06/26/2018    CALCIUM 8.6 06/26/2018    ALBUMIN 4.30 06/23/2018    AST 19 06/21/2018    ALT 23 06/21/2018     Lab Results   Component Value Date    CHOL 213 (H) 04/05/2018    CHOL 223 (H) 11/28/2017    CHOL 255 (H) 03/09/2017     Lab Results   Component Value Date    TRIG 114 04/05/2018    TRIG 140 11/28/2017    TRIG 204 (H) 03/09/2017     Lab Results   Component Value Date    HDL 35 (L) 04/05/2018    HDL 50 (L) 11/28/2017    HDL 49 (L) 03/09/2017     No components found for: LDLCALC  Lab Results   Component Value Date     04/05/2018     (H) 11/28/2017     (H) 03/09/2017     No results found for: HDLLDLRATIO  No components found for: CHOLHDL  No results found for: HGBA1C  Lab Results   Component Value Date    TSH 0.78 02/11/2015                  A/P  CAD status post CABG in June 2018, doing well no chest pain has sensed chest soreness from his sternotomy site on antiplatelet therapy with aspirin and Plavix    Hypertension -not very well controlled, added Avalide to Toprol-XL which he was  on before.  He is going to monitor his blood pressure.  If the blood pressure comes okay then he'll follow with me as scheduled    Hyperlipidemia -could not tolerate statins and is tolerating the Zetia, will check his lipids again if it is still high then we'll add PCSK9 Inhibitors                  This document has been electronically signed by Delroy Mcconnell MD on August 22, 2018 2:08 PM       EMR Dragon/Transcription disclaimer:   Some of this note may be an electronic transcription/translation of spoken language to printed text. The electronic translation of spoken language may permit erroneous, or at times, nonsensical words or phrases to be inadvertently transcribed; Although I have reviewed the note for such errors, some may still exist.

## 2018-08-24 ENCOUNTER — APPOINTMENT (OUTPATIENT)
Dept: CARDIAC REHAB | Facility: HOSPITAL | Age: 73
End: 2018-08-24

## 2018-08-27 ENCOUNTER — TRANSCRIBE ORDERS (OUTPATIENT)
Dept: LAB | Facility: HOSPITAL | Age: 73
End: 2018-08-27

## 2018-08-27 ENCOUNTER — APPOINTMENT (OUTPATIENT)
Dept: CARDIAC REHAB | Facility: HOSPITAL | Age: 73
End: 2018-08-27

## 2018-08-27 DIAGNOSIS — C61 PROSTATE CANCER (HCC): Primary | ICD-10-CM

## 2018-08-29 ENCOUNTER — APPOINTMENT (OUTPATIENT)
Dept: CARDIAC REHAB | Facility: HOSPITAL | Age: 73
End: 2018-08-29

## 2018-08-30 ENCOUNTER — LAB (OUTPATIENT)
Dept: LAB | Facility: HOSPITAL | Age: 73
End: 2018-08-30

## 2018-08-30 DIAGNOSIS — C61 PROSTATE CANCER (HCC): ICD-10-CM

## 2018-08-30 LAB — PSA SERPL-MCNC: 8.81 NG/ML (ref 0–4)

## 2018-08-30 PROCEDURE — 36415 COLL VENOUS BLD VENIPUNCTURE: CPT

## 2018-08-30 PROCEDURE — 84153 ASSAY OF PSA TOTAL: CPT

## 2018-08-30 RX ORDER — EZETIMIBE 10 MG/1
TABLET ORAL
Qty: 30 TABLET | Refills: 6 | Status: SHIPPED | OUTPATIENT
Start: 2018-08-30 | End: 2018-10-24 | Stop reason: SDUPTHER

## 2018-08-31 ENCOUNTER — APPOINTMENT (OUTPATIENT)
Dept: CARDIAC REHAB | Facility: HOSPITAL | Age: 73
End: 2018-08-31

## 2018-09-05 ENCOUNTER — APPOINTMENT (OUTPATIENT)
Dept: CARDIAC REHAB | Facility: HOSPITAL | Age: 73
End: 2018-09-05

## 2018-09-07 ENCOUNTER — APPOINTMENT (OUTPATIENT)
Dept: CARDIAC REHAB | Facility: HOSPITAL | Age: 73
End: 2018-09-07

## 2018-09-10 ENCOUNTER — APPOINTMENT (OUTPATIENT)
Dept: CARDIAC REHAB | Facility: HOSPITAL | Age: 73
End: 2018-09-10

## 2018-09-12 ENCOUNTER — APPOINTMENT (OUTPATIENT)
Dept: CARDIAC REHAB | Facility: HOSPITAL | Age: 73
End: 2018-09-12

## 2018-09-14 ENCOUNTER — APPOINTMENT (OUTPATIENT)
Dept: CARDIAC REHAB | Facility: HOSPITAL | Age: 73
End: 2018-09-14

## 2018-09-17 ENCOUNTER — APPOINTMENT (OUTPATIENT)
Dept: CARDIAC REHAB | Facility: HOSPITAL | Age: 73
End: 2018-09-17

## 2018-09-19 ENCOUNTER — APPOINTMENT (OUTPATIENT)
Dept: CARDIAC REHAB | Facility: HOSPITAL | Age: 73
End: 2018-09-19

## 2018-09-21 ENCOUNTER — APPOINTMENT (OUTPATIENT)
Dept: CARDIAC REHAB | Facility: HOSPITAL | Age: 73
End: 2018-09-21

## 2018-09-24 ENCOUNTER — APPOINTMENT (OUTPATIENT)
Dept: CARDIAC REHAB | Facility: HOSPITAL | Age: 73
End: 2018-09-24

## 2018-09-26 ENCOUNTER — APPOINTMENT (OUTPATIENT)
Dept: CARDIAC REHAB | Facility: HOSPITAL | Age: 73
End: 2018-09-26

## 2018-09-28 ENCOUNTER — APPOINTMENT (OUTPATIENT)
Dept: CARDIAC REHAB | Facility: HOSPITAL | Age: 73
End: 2018-09-28

## 2018-10-01 ENCOUNTER — APPOINTMENT (OUTPATIENT)
Dept: CARDIAC REHAB | Facility: HOSPITAL | Age: 73
End: 2018-10-01

## 2018-10-03 ENCOUNTER — APPOINTMENT (OUTPATIENT)
Dept: CARDIAC REHAB | Facility: HOSPITAL | Age: 73
End: 2018-10-03

## 2018-10-05 ENCOUNTER — APPOINTMENT (OUTPATIENT)
Dept: CARDIAC REHAB | Facility: HOSPITAL | Age: 73
End: 2018-10-05

## 2018-10-08 ENCOUNTER — APPOINTMENT (OUTPATIENT)
Dept: CARDIAC REHAB | Facility: HOSPITAL | Age: 73
End: 2018-10-08

## 2018-10-10 ENCOUNTER — APPOINTMENT (OUTPATIENT)
Dept: CARDIAC REHAB | Facility: HOSPITAL | Age: 73
End: 2018-10-10

## 2018-10-10 NOTE — TELEPHONE ENCOUNTER
Dr. Murrieta's Patient    Request refill on Xanax 0.5  Take 1 TID. To [facility]    Last OV 8/6/18    Next Matias OV No Future Appt at this time    Last Script Written 3/21/18 #270 with 1 refill to [facility]     4/13/18 #3 Tabs with No Refill  To Local Pharmacy, I assume to cover til Mail Order comes in    Last RICK  6/21/18    Please Advise    Thank you

## 2018-10-10 NOTE — TELEPHONE ENCOUNTER
Dr. Murrieta,    I called Mr. Stapleton to let him know Dr. Morton would cover a week's worth of his Xanax to a local pharmacy and he said just to send it to you to send to his mail order pharmacy.  He did not want to get it locally.  He states he should have enough to last if it gets sent to his Mail Order on Monday 10/15/18 when you return.    No Script was called in today (10/10/18) from Dr. Morton.    Thank you

## 2018-10-12 ENCOUNTER — APPOINTMENT (OUTPATIENT)
Dept: CARDIAC REHAB | Facility: HOSPITAL | Age: 73
End: 2018-10-12

## 2018-10-15 RX ORDER — ALPRAZOLAM 0.5 MG/1
TABLET ORAL
Qty: 270 TABLET | Refills: 1 | Status: SHIPPED | OUTPATIENT
Start: 2018-10-15 | End: 2019-05-30 | Stop reason: SDUPTHER

## 2018-10-24 ENCOUNTER — LAB (OUTPATIENT)
Dept: LAB | Facility: HOSPITAL | Age: 73
End: 2018-10-24

## 2018-10-24 ENCOUNTER — OFFICE VISIT (OUTPATIENT)
Dept: FAMILY MEDICINE CLINIC | Facility: CLINIC | Age: 73
End: 2018-10-24

## 2018-10-24 VITALS
OXYGEN SATURATION: 94 % | SYSTOLIC BLOOD PRESSURE: 128 MMHG | HEART RATE: 82 BPM | HEIGHT: 69 IN | DIASTOLIC BLOOD PRESSURE: 78 MMHG | BODY MASS INDEX: 24.3 KG/M2 | WEIGHT: 164.06 LBS

## 2018-10-24 DIAGNOSIS — I25.810 CORONARY ARTERY DISEASE INVOLVING CORONARY BYPASS GRAFT OF NATIVE HEART WITHOUT ANGINA PECTORIS: ICD-10-CM

## 2018-10-24 DIAGNOSIS — E78.00 PURE HYPERCHOLESTEROLEMIA: ICD-10-CM

## 2018-10-24 DIAGNOSIS — R42 DIZZINESS: ICD-10-CM

## 2018-10-24 DIAGNOSIS — I10 ESSENTIAL HYPERTENSION: ICD-10-CM

## 2018-10-24 DIAGNOSIS — G89.18 CHEST WALL PAIN FOLLOWING SURGERY: Primary | ICD-10-CM

## 2018-10-24 DIAGNOSIS — R07.89 CHEST WALL PAIN FOLLOWING SURGERY: Primary | ICD-10-CM

## 2018-10-24 LAB
ALBUMIN SERPL-MCNC: 4.3 G/DL (ref 3.4–4.8)
ALBUMIN/GLOB SERPL: 1.3 G/DL (ref 1.1–1.8)
ALP SERPL-CCNC: 61 U/L (ref 38–126)
ALT SERPL W P-5'-P-CCNC: 34 U/L (ref 21–72)
ANION GAP SERPL CALCULATED.3IONS-SCNC: 11 MMOL/L (ref 5–15)
ARTICHOKE IGE QN: 142 MG/DL (ref 1–129)
AST SERPL-CCNC: 68 U/L (ref 17–59)
BASOPHILS # BLD AUTO: 0.1 10*3/MM3 (ref 0–0.2)
BASOPHILS NFR BLD AUTO: 1.2 % (ref 0–2)
BILIRUB SERPL-MCNC: 0.6 MG/DL (ref 0.2–1.3)
BUN BLD-MCNC: 21 MG/DL (ref 7–21)
BUN/CREAT SERPL: 16.8 (ref 7–25)
CALCIUM SPEC-SCNC: 9.1 MG/DL (ref 8.4–10.2)
CHLORIDE SERPL-SCNC: 97 MMOL/L (ref 95–110)
CHOLEST SERPL-MCNC: 229 MG/DL (ref 0–199)
CO2 SERPL-SCNC: 27 MMOL/L (ref 22–31)
CREAT BLD-MCNC: 1.25 MG/DL (ref 0.7–1.3)
DEPRECATED RDW RBC AUTO: 44.4 FL (ref 35.1–43.9)
EOSINOPHIL # BLD AUTO: 0.11 10*3/MM3 (ref 0–0.7)
EOSINOPHIL NFR BLD AUTO: 1.4 % (ref 0–7)
ERYTHROCYTE [DISTWIDTH] IN BLOOD BY AUTOMATED COUNT: 14.6 % (ref 11.5–14.5)
GFR SERPL CREATININE-BSD FRML MDRD: 56 ML/MIN/1.73 (ref 42–98)
GLOBULIN UR ELPH-MCNC: 3.2 GM/DL (ref 2.3–3.5)
GLUCOSE BLD-MCNC: 96 MG/DL (ref 60–100)
HCT VFR BLD AUTO: 43.3 % (ref 39–49)
HDLC SERPL-MCNC: 38 MG/DL (ref 60–200)
HGB BLD-MCNC: 14.8 G/DL (ref 13.7–17.3)
IMM GRANULOCYTES # BLD: 0.02 10*3/MM3 (ref 0–0.02)
IMM GRANULOCYTES NFR BLD: 0.2 % (ref 0–0.5)
LDLC/HDLC SERPL: 3.84 {RATIO} (ref 0–3.55)
LYMPHOCYTES # BLD AUTO: 2.7 10*3/MM3 (ref 0.6–4.2)
LYMPHOCYTES NFR BLD AUTO: 33.3 % (ref 10–50)
MCH RBC QN AUTO: 28.3 PG (ref 26.5–34)
MCHC RBC AUTO-ENTMCNC: 34.2 G/DL (ref 31.5–36.3)
MCV RBC AUTO: 82.8 FL (ref 80–98)
MONOCYTES # BLD AUTO: 0.75 10*3/MM3 (ref 0–0.9)
MONOCYTES NFR BLD AUTO: 9.3 % (ref 0–12)
NEUTROPHILS # BLD AUTO: 4.42 10*3/MM3 (ref 2–8.6)
NEUTROPHILS NFR BLD AUTO: 54.6 % (ref 37–80)
PLATELET # BLD AUTO: 339 10*3/MM3 (ref 150–450)
PMV BLD AUTO: 10.5 FL (ref 8–12)
POTASSIUM BLD-SCNC: 4.2 MMOL/L (ref 3.5–5.1)
PROT SERPL-MCNC: 7.5 G/DL (ref 6.3–8.6)
RBC # BLD AUTO: 5.23 10*6/MM3 (ref 4.37–5.74)
SODIUM BLD-SCNC: 135 MMOL/L (ref 137–145)
TRIGL SERPL-MCNC: 225 MG/DL (ref 20–199)
WBC NRBC COR # BLD: 8.1 10*3/MM3 (ref 3.2–9.8)

## 2018-10-24 PROCEDURE — 36415 COLL VENOUS BLD VENIPUNCTURE: CPT

## 2018-10-24 PROCEDURE — 80053 COMPREHEN METABOLIC PANEL: CPT

## 2018-10-24 PROCEDURE — 99214 OFFICE O/P EST MOD 30 MIN: CPT | Performed by: FAMILY MEDICINE

## 2018-10-24 PROCEDURE — 80061 LIPID PANEL: CPT

## 2018-10-24 PROCEDURE — 85025 COMPLETE CBC W/AUTO DIFF WBC: CPT

## 2018-10-24 RX ORDER — CHOLECALCIFEROL (VITAMIN D3) 125 MCG
500 CAPSULE ORAL DAILY
COMMUNITY

## 2018-10-24 NOTE — PROGRESS NOTES
Subjective   Souleymane Stapleton is a 73 y.o. male.   Chief Complaint   Patient presents with   • Sore     on abdomen    cc: follow up  History of Present Illness The patient comes in for check of their chronic medical issues which include tenderness along the right lower sternal border. It is tender along where the lower ribs insert. There is also some puffiness that is there. It will flare up at times.He also is feeling light headed at times. This occurs on standing . He also says that he feels this way when he is up walking.       The following portions of the patient's history were reviewed and updated as appropriate: allergies, current medications, past family history, past medical history, past social history, past surgical history and problem list.    Review of Systems   Constitutional: Negative for fatigue and fever.   Respiratory: Negative for chest tightness and stridor.    Neurological: Positive for dizziness and light-headedness.       Objective   Physical Exam   Constitutional: He appears well-developed and well-nourished.   HENT:   Head: Normocephalic and atraumatic.   Right Ear: External ear normal.   Left Ear: External ear normal.   Nose: Nose normal.   Mouth/Throat: Oropharynx is clear and moist.   Eyes: Pupils are equal, round, and reactive to light.   Neck: Normal range of motion.   Cardiovascular: Normal rate, regular rhythm and normal heart sounds.  Exam reveals no gallop and no friction rub.    No murmur heard.  Pulmonary/Chest: Effort normal and breath sounds normal. No respiratory distress. He has no wheezes. He has no rales.   There is tenderness over the right anterior chest near where the ribs insert into the right lower sternal border.   Abdominal: Soft. Bowel sounds are normal. He exhibits no distension. There is no tenderness.   Skin: Skin is warm and dry.   Vitals reviewed.        Assessment/Plan   Souleymane was seen today for sore.    Diagnoses and all orders for this visit:    Chest  wall pain following surgery  -     XR Chest PA & Lateral; Future    Coronary artery disease involving coronary bypass graft of native heart without angina pectoris    Essential hypertension  -     Comprehensive metabolic panel; Future  -     Lipid panel; Future    Pure hypercholesterolemia  -     CBC w AUTO Differential; Future    Dizziness      Return to the clinic in 3 month/s.  Will contact with results as needed.  Heat to the area of tenderness.  Discussed doing some work to help with the balance .  He is to wear his Compression hose  To see if that doesn't help with the lightheadedness .

## 2018-10-31 DIAGNOSIS — R12 CHRONIC HEARTBURN: ICD-10-CM

## 2018-10-31 DIAGNOSIS — G47.00 INSOMNIA, UNSPECIFIED TYPE: ICD-10-CM

## 2018-10-31 DIAGNOSIS — K21.9 GASTROESOPHAGEAL REFLUX DISEASE, ESOPHAGITIS PRESENCE NOT SPECIFIED: Primary | ICD-10-CM

## 2018-10-31 RX ORDER — OMEPRAZOLE 40 MG/1
40 CAPSULE, DELAYED RELEASE ORAL DAILY
Qty: 90 CAPSULE | Refills: 2 | Status: SHIPPED | OUTPATIENT
Start: 2018-10-31 | End: 2019-08-02 | Stop reason: SDUPTHER

## 2018-10-31 RX ORDER — TRAZODONE HYDROCHLORIDE 50 MG/1
50 TABLET ORAL NIGHTLY
Qty: 90 TABLET | Refills: 3 | Status: SHIPPED | OUTPATIENT
Start: 2018-10-31 | End: 2020-01-07

## 2018-11-04 ENCOUNTER — HOSPITAL ENCOUNTER (EMERGENCY)
Facility: HOSPITAL | Age: 73
Discharge: HOME | End: 2018-11-04
Attending: EMERGENCY MEDICINE | Admitting: EMERGENCY MEDICINE

## 2018-11-04 VITALS
DIASTOLIC BLOOD PRESSURE: 80 MMHG | HEART RATE: 62 BPM | WEIGHT: 160 LBS | OXYGEN SATURATION: 95 % | TEMPERATURE: 97.6 F | RESPIRATION RATE: 18 BRPM | HEIGHT: 68 IN | SYSTOLIC BLOOD PRESSURE: 141 MMHG | BODY MASS INDEX: 24.25 KG/M2

## 2018-11-04 DIAGNOSIS — R04.0 ACUTE ANTERIOR EPISTAXIS: Primary | ICD-10-CM

## 2018-11-04 LAB
APTT PPP: 30.5 SECONDS (ref 20–40.3)
BASOPHILS # BLD AUTO: 0.1 10*3/MM3 (ref 0–0.2)
BASOPHILS NFR BLD AUTO: 1.7 % (ref 0–2)
DEPRECATED RDW RBC AUTO: 46.3 FL (ref 35.1–43.9)
EOSINOPHIL # BLD AUTO: 0.07 10*3/MM3 (ref 0–0.7)
EOSINOPHIL NFR BLD AUTO: 1.2 % (ref 0–7)
ERYTHROCYTE [DISTWIDTH] IN BLOOD BY AUTOMATED COUNT: 15.3 % (ref 11.5–14.5)
HCT VFR BLD AUTO: 41.4 % (ref 39–49)
HGB BLD-MCNC: 14.3 G/DL (ref 13.7–17.3)
HOLD SPECIMEN: NORMAL
HOLD SPECIMEN: NORMAL
IMM GRANULOCYTES # BLD: 0.01 10*3/MM3 (ref 0–0.02)
IMM GRANULOCYTES NFR BLD: 0.2 % (ref 0–0.5)
INR PPP: 1.05 (ref 0.8–1.2)
LYMPHOCYTES # BLD AUTO: 1.64 10*3/MM3 (ref 0.6–4.2)
LYMPHOCYTES NFR BLD AUTO: 27.7 % (ref 10–50)
MCH RBC QN AUTO: 28.7 PG (ref 26.5–34)
MCHC RBC AUTO-ENTMCNC: 34.5 G/DL (ref 31.5–36.3)
MCV RBC AUTO: 83.1 FL (ref 80–98)
MONOCYTES # BLD AUTO: 0.54 10*3/MM3 (ref 0–0.9)
MONOCYTES NFR BLD AUTO: 9.1 % (ref 0–12)
NEUTROPHILS # BLD AUTO: 3.57 10*3/MM3 (ref 2–8.6)
NEUTROPHILS NFR BLD AUTO: 60.1 % (ref 37–80)
PLATELET # BLD AUTO: 311 10*3/MM3 (ref 150–450)
PMV BLD AUTO: 10 FL (ref 8–12)
PROTHROMBIN TIME: 13.5 SECONDS (ref 11.1–15.3)
RBC # BLD AUTO: 4.98 10*6/MM3 (ref 4.37–5.74)
WBC NRBC COR # BLD: 5.93 10*3/MM3 (ref 3.2–9.8)
WHOLE BLOOD HOLD SPECIMEN: NORMAL

## 2018-11-04 PROCEDURE — 85610 PROTHROMBIN TIME: CPT | Performed by: EMERGENCY MEDICINE

## 2018-11-04 PROCEDURE — 99284 EMERGENCY DEPT VISIT MOD MDM: CPT

## 2018-11-04 PROCEDURE — 85730 THROMBOPLASTIN TIME PARTIAL: CPT | Performed by: EMERGENCY MEDICINE

## 2018-11-04 PROCEDURE — 85025 COMPLETE CBC W/AUTO DIFF WBC: CPT | Performed by: EMERGENCY MEDICINE

## 2018-11-04 RX ORDER — ECHINACEA PURPUREA EXTRACT 125 MG
2 TABLET ORAL AS NEEDED
Qty: 1 EACH | Refills: 0 | Status: SHIPPED | OUTPATIENT
Start: 2018-11-04 | End: 2020-05-22

## 2018-11-04 RX ORDER — SODIUM CHLORIDE 0.9 % (FLUSH) 0.9 %
10 SYRINGE (ML) INJECTION AS NEEDED
Status: DISCONTINUED | OUTPATIENT
Start: 2018-11-04 | End: 2018-11-04 | Stop reason: HOSPADM

## 2018-11-04 RX ORDER — OXYMETAZOLINE HYDROCHLORIDE 0.05 G/100ML
1 SPRAY NASAL ONCE
Status: COMPLETED | OUTPATIENT
Start: 2018-11-04 | End: 2018-11-04

## 2018-11-04 RX ORDER — AMOXICILLIN AND CLAVULANATE POTASSIUM 875; 125 MG/1; MG/1
1 TABLET, FILM COATED ORAL 2 TIMES DAILY
Qty: 10 TABLET | Refills: 0 | Status: SHIPPED | OUTPATIENT
Start: 2018-11-04 | End: 2018-11-09

## 2018-11-04 RX ORDER — CIPROFLOXACIN 500 MG/1
500 TABLET, FILM COATED ORAL 2 TIMES DAILY
COMMUNITY
Start: 2018-10-28 | End: 2018-11-08

## 2018-11-04 RX ADMIN — Medication 10 ML: at 10:04

## 2018-11-04 RX ADMIN — SILVER NITRATE APPLICATORS 1 APPLICATION: 25; 75 STICK TOPICAL at 11:41

## 2018-11-04 RX ADMIN — OXYMETAZOLINE HYDROCHLORIDE 1 SPRAY: 5 SPRAY NASAL at 10:00

## 2018-11-04 NOTE — ED PROVIDER NOTES
Subjective   74yo male [facility] significant htn/hyperlipidemia/cad/tia presents ED c/o acute onset nontraumatic right nares epistaxis approximately 1hr prior to arrival.  ROS otherwise noncontributory.        History provided by:  Patient  Nose Bleed   Location:  R nare  Severity:  Moderate  Duration:  1 hour  Timing:  Constant  Chronicity:  New  Context: aspirin use        Review of Systems   Constitutional: Negative.    HENT: Positive for nosebleeds.    Respiratory: Negative.    Cardiovascular: Negative.    Gastrointestinal: Negative.    Genitourinary: Negative.    Musculoskeletal: Negative.    Skin: Negative.    All other systems reviewed and are negative.      Past Medical History:   Diagnosis Date   • Abdominal pain    • Abnormal weight loss    • Acid reflux    • Acute gastritis    • Anxiety state    • Arthritis    • Cancer (CMS/HCC)     Prostate   • History of cerebrovascular accident    • History of colon polyps    • Hypertension    • Nausea    • Nuclear senile cataract    • Presbyopia    • Tobacco use    • Transient cerebral ischemia    • Vitreous detachment of left eye        Allergies   Allergen Reactions   • Statins Myalgia     Muscle soreness    • Tricor [Fenofibrate] Myalgia     Muscle soreness        Past Surgical History:   Procedure Laterality Date   • BACK SURGERY     • CARDIAC CATHETERIZATION N/A 6/19/2018    Procedure: Coronary angiography;  Surgeon: Delroy Mcconnell MD;  Location: [facility] INVASIVE LOCATION;  Service: Cardiovascular   • COLONOSCOPY  06/09/2015    Diverticulosis found in the sigmoid colon. Hemorrhoids found in the anus.   • CORONARY ARTERY BYPASS GRAFT N/A 6/22/2018    Procedure: PARAS STERNOTOMY CORONARY ARTERY BYPASS GRAFT TIMES 5 USING RIGHT AND LEFT INTERNAL MAMMARY ARTERIES AND LEFT GREATER SAPHENOUS VEIN GRAFT PER ENDOSCOPIC VEIN HARVESTING AND PRP;  Surgeon: Edgar Pérez MD;  Location: [facility] OR;  Service: Cardiothoracic   • CRYOTHERAPY  08/14/2012    Of Acne   •  ENDOSCOPY  09/01/2015    EGD w/ biopsy -Multiple polyps, one removed.   • HEMORRHOIDECTOMY N/A 3/20/2017    Procedure: Removal of Anal Papilla;  Surgeon: Juan Diego Ken MD;  Location: [facility];  Service:    • INJECTION OF MEDICATION  09/14/2010    B12   • INJECTION OF MEDICATION  10/17/2011    Kenalog   • LUMBAR LAMINECTOMY  09/29/2010   • OTHER SURGICAL HISTORY  08/19/2010    Biopsy, Each Additional Lesion    • SKIN BIOPSY  08/19/2010       Family History   Problem Relation Age of Onset   • Dementia Other    • Hypertension Other    • Stroke Other    • Hypertension Mother    • Hypertension Father        Social History     Social History   • Marital status:      Social History Main Topics   • Smoking status: Former Smoker     Types: Cigarettes     Quit date: 1997   • Smokeless tobacco: Never Used   • Alcohol use Yes      Comment: Social   • Drug use: No   • Sexual activity: Defer     Other Topics Concern   • Not on file           Objective   Physical Exam   Constitutional: He appears well-developed and well-nourished.   HENT:   Head: Normocephalic and atraumatic.   Nose: Epistaxis is observed.       Mouth/Throat: Oropharynx is clear and moist.   Eyes: Pupils are equal, round, and reactive to light.   Neck: Normal range of motion. Neck supple. No JVD present. No tracheal deviation present.   Cardiovascular: Normal rate, regular rhythm, normal heart sounds and intact distal pulses.  Exam reveals no gallop and no friction rub.    No murmur heard.  Pulmonary/Chest: Effort normal and breath sounds normal. He has no wheezes. He has no rales.   Abdominal: Soft. Bowel sounds are normal. There is no tenderness. There is no guarding.   Lymphadenopathy:     He has no cervical adenopathy.   Skin: Skin is warm and dry.   Nursing note and vitals reviewed.      Epistaxis Management  Date/Time: 11/4/2018 11:01 AM  Performed by: JOSE LOVELL  Authorized by: JOSE LOVELL     Consent:     Consent obtained:   Verbal  Anesthesia (see MAR for exact dosages):     Anesthesia method:  None  Procedure details:     Treatment site:  R anterior    Treatment method:  Silver nitrate  Post-procedure details:     Assessment:  Bleeding stopped    Patient tolerance of procedure:  Tolerated well, no immediate complications  Comments:      Topical afrin/direct pressure initially applied right nares. Mild recurent epistaxis noted right nares. Subsequent topical silver nitrate applied right nasal septum.               ED Course  ED Course as of Nov 04 1226   Sun Nov 04, 2018   1122 Re-eval: no evidence active hemorrhage. Pt stable discharge.  [SD]   1223 At time of discharge, patient with recurrent right nares epistaxis. Rhino rocket placed right nares. Re-examination revealed no further bleeding. Pt stable discharge.  [SD]   1226 Pt instructed if cannot followup ENT within 48hrs, return to ED for Rhino Rocket removal.  [SD]      ED Course User Index  [SD] Justin Murry MD      Labs Reviewed   CBC WITH AUTO DIFFERENTIAL - Abnormal; Notable for the following:        Result Value    RDW 15.3 (*)     RDW-SD 46.3 (*)     All other components within normal limits   PROTIME-INR - Normal    Narrative:     Therapeutic range for most indications is 2.0-3.0 INR,  or 2.5-3.5 for mechanical heart valves.   APTT - Normal    Narrative:     The recommended Heparin therapeutic range is 68-97 seconds.   CBC AND DIFFERENTIAL    Narrative:     The following orders were created for panel order CBC & Differential.  Procedure                               Abnormality         Status                     ---------                               -----------         ------                     CBC Auto Differential[[id number]]        Abnormal            Final result                 Please view results for these tests on the individual orders.   EXTRA TUBES    Narrative:     The following orders were created for panel order Extra Tubes.  Procedure                                Abnormality         Status                     ---------                               -----------         ------                     Lavender Top[[id number]]                                     Final result               Gold Top - SST[[id number]]                                   Final result               Green Top (Gel)[[id number]]                                  Final result                 Please view results for these tests on the individual orders.   LAVENDER TOP   GOLD TOP - SST   GREEN TOP                 [facility]      Final diagnoses:   Acute anterior epistaxis            Justin Murry MD  11/04/18 1102       Justin Murry MD  11/04/18 1123       Justin Murry MD  11/04/18 1225       Justin Murry MD  11/04/18 1226

## 2018-11-07 ENCOUNTER — OFFICE VISIT (OUTPATIENT)
Dept: OTOLARYNGOLOGY | Facility: CLINIC | Age: 73
End: 2018-11-07

## 2018-11-07 VITALS
DIASTOLIC BLOOD PRESSURE: 70 MMHG | HEIGHT: 68 IN | BODY MASS INDEX: 24.95 KG/M2 | WEIGHT: 164.6 LBS | SYSTOLIC BLOOD PRESSURE: 110 MMHG

## 2018-11-07 DIAGNOSIS — R04.0 NASAL BLEEDING: Primary | ICD-10-CM

## 2018-11-07 PROCEDURE — 99213 OFFICE O/P EST LOW 20 MIN: CPT | Performed by: OTOLARYNGOLOGY

## 2018-11-08 ENCOUNTER — DOCUMENTATION (OUTPATIENT)
Dept: CARDIOLOGY | Facility: CLINIC | Age: 73
End: 2018-11-08

## 2018-11-08 ENCOUNTER — OFFICE VISIT (OUTPATIENT)
Dept: CARDIOLOGY | Facility: CLINIC | Age: 73
End: 2018-11-08

## 2018-11-08 VITALS
DIASTOLIC BLOOD PRESSURE: 70 MMHG | HEIGHT: 68 IN | HEART RATE: 80 BPM | BODY MASS INDEX: 24.86 KG/M2 | WEIGHT: 164 LBS | SYSTOLIC BLOOD PRESSURE: 122 MMHG

## 2018-11-08 DIAGNOSIS — E78.00 PURE HYPERCHOLESTEROLEMIA: ICD-10-CM

## 2018-11-08 DIAGNOSIS — I10 ESSENTIAL HYPERTENSION: ICD-10-CM

## 2018-11-08 DIAGNOSIS — I25.810 CORONARY ARTERY DISEASE INVOLVING CORONARY BYPASS GRAFT OF NATIVE HEART WITHOUT ANGINA PECTORIS: Primary | ICD-10-CM

## 2018-11-08 DIAGNOSIS — R06.02 SOB (SHORTNESS OF BREATH): ICD-10-CM

## 2018-11-08 PROCEDURE — 99214 OFFICE O/P EST MOD 30 MIN: CPT | Performed by: INTERNAL MEDICINE

## 2018-11-08 RX ORDER — IRBESARTAN AND HYDROCHLOROTHIAZIDE 150; 12.5 MG/1; MG/1
1 TABLET, FILM COATED ORAL DAILY
Qty: 90 TABLET | Refills: 5 | Status: SHIPPED | OUTPATIENT
Start: 2018-11-08 | End: 2019-05-23

## 2018-11-11 NOTE — PROGRESS NOTES
Subjective   Souleymane Stapleton is a 73 y.o. male.       History of Present Illness   Patient that I have seen with other issues returns today for evaluation of nasal bleeding.  He has been anticoagulated on Plavix and experienced and nose bleed that began 3 days ago.  He could not get this stopped and went to the emergency department.  Successive conservative measures were unsuccessful so a right sided nasal tampon was placed.  He's had no further bleeding.  He is continuing to take his Plavix.      The following portions of the patient's history were reviewed and updated as appropriate: allergies, current medications, past family history, past medical history, past social history, past surgical history and problem list.     reports that he quit smoking about 21 years ago. His smoking use included cigarettes. he has never used smokeless tobacco. He reports that he drinks alcohol. He reports that he does not use drugs.   Patient is not a tobacco user and has not been counseled for use of tobacco products      Review of Systems   Constitutional: Negative for fever.   HENT: Positive for nosebleeds.            Objective   Physical Exam  General: Well-developed well-nourished male in no acute distress.  Alert and oriented ×3.  Voice:Strong. Speech:Fluent  Ears: External ears no deformity, canals no discharge, tympanic membranes intact clear and mobile bilaterally.  Nose: Nares show Merocel nasal tampon in the right naris.  No active bleeding seen in either naris.  The pack is removed.  There is no evidence of fresh blood, prominent vessel or obvious bleeding site in the right naris.  Oral cavity: Lips and gums without lesions.  Tongue and floor of mouth without lesions.  Parotid and submandibular ducts unobstructed.  No mucosal lesions on the buccal mucosa or vestibule of the mouth.  Pharynx: No erythema exudate mass or ulcer.  Fresh or old blood.  Neck: No lymphadenopathy.  No thyromegaly.  Trachea and larynx midline.   No masses in the parotid or submandibular glands.      Assessment/Plan   Souleymane was seen today for nose bleed.    Diagnoses and all orders for this visit:    Nasal bleeding        Plan: Nasal tampon removed as described above.  Advised frequent use of nasal saline spray.  No strenuous activity for 48 hours.  Call for recurrence.

## 2018-12-04 ENCOUNTER — OFFICE VISIT (OUTPATIENT)
Dept: CARDIOLOGY | Facility: CLINIC | Age: 73
End: 2018-12-04

## 2018-12-04 ENCOUNTER — DOCUMENTATION (OUTPATIENT)
Dept: CARDIOLOGY | Facility: CLINIC | Age: 73
End: 2018-12-04

## 2018-12-04 VITALS
OXYGEN SATURATION: 97 % | SYSTOLIC BLOOD PRESSURE: 126 MMHG | HEIGHT: 68 IN | BODY MASS INDEX: 24.86 KG/M2 | HEART RATE: 89 BPM | WEIGHT: 164 LBS | DIASTOLIC BLOOD PRESSURE: 70 MMHG

## 2018-12-04 DIAGNOSIS — I10 ESSENTIAL HYPERTENSION: ICD-10-CM

## 2018-12-04 DIAGNOSIS — E78.00 PURE HYPERCHOLESTEROLEMIA: ICD-10-CM

## 2018-12-04 DIAGNOSIS — I25.810 CORONARY ARTERY DISEASE INVOLVING CORONARY BYPASS GRAFT OF NATIVE HEART WITHOUT ANGINA PECTORIS: Primary | ICD-10-CM

## 2018-12-04 PROCEDURE — 99213 OFFICE O/P EST LOW 20 MIN: CPT | Performed by: INTERNAL MEDICINE

## 2018-12-04 NOTE — PROGRESS NOTES
Psychiatric Cardiology  OFFICE NOTE    Souleymane Stapleton  73 y.o. male    12/04/2018  1. Coronary artery disease involving coronary bypass graft of native heart without angina pectoris    2. Pure hypercholesterolemia    3. Essential hypertension        Chief complaint -follow-up      History of present Illness- 73-year-old gentleman who had quadruple bypass in June 2018 after he was found to have multivessel disease was on dual antiplatelet therapy with aspirin and Plavix.  He had a nosebleed and was in the emergency room and had to be cauterized and packed. He does have some soreness on the chest from the sternotomy.  He could not tolerate statins or fenofibrate and currently is on Zetia for cholesterol.  He is on Toprol-XL 50 mg daily.  He has been noticing his blood pressure is getting low in the morning and feeling dizzy and lightheaded with a systolic of 90.  I dropped the dose on Avalide to 150/12.5 mg daily and see how he does.  Stopped the aspirin and continue the Plavix.  He had no more nosebleed since I stopped the aspirin and has not felt any more dizziness since I decreased the dose of the blood pressure medicine        Allergies   Allergen Reactions   • Statins Myalgia     Muscle soreness    • Tricor [Fenofibrate] Myalgia     Muscle soreness          Past Medical History:   Diagnosis Date   • Abdominal pain    • Abnormal weight loss    • Acid reflux    • Acute gastritis    • Anxiety state    • Arthritis    • Cancer (CMS/HCC)     Prostate   • History of cerebrovascular accident    • History of colon polyps    • Hypertension    • Nausea    • Nuclear senile cataract    • Presbyopia    • Tobacco use    • Transient cerebral ischemia    • Vitreous detachment of left eye          Past Surgical History:   Procedure Laterality Date   • BACK SURGERY     • COLONOSCOPY  06/09/2015    Diverticulosis found in the sigmoid colon. Hemorrhoids found in the anus.   • CRYOTHERAPY  08/14/2012    Of Acne    • ENDOSCOPY  2015    EGD w/ biopsy -Multiple polyps, one removed.   • INJECTION OF MEDICATION  2010    B12   • INJECTION OF MEDICATION  10/17/2011    Kenalog   • LUMBAR LAMINECTOMY  2010   • OTHER SURGICAL HISTORY  2010    Biopsy, Each Additional Lesion    • SKIN BIOPSY  2010         Family History   Problem Relation Age of Onset   • Dementia Other    • Hypertension Other    • Stroke Other    • Hypertension Mother    • Hypertension Father          Social History     Socioeconomic History   • Marital status:      Spouse name: Not on file   • Number of children: Not on file   • Years of education: Not on file   • Highest education level: Not on file   Social Needs   • Financial resource strain: Not on file   • Food insecurity - worry: Not on file   • Food insecurity - inability: Not on file   • Transportation needs - medical: Not on file   • Transportation needs - non-medical: Not on file   Occupational History   • Not on file   Tobacco Use   • Smoking status: Former Smoker     Types: Cigarettes     Last attempt to quit: 1997     Years since quittin.9   • Smokeless tobacco: Never Used   Substance and Sexual Activity   • Alcohol use: Yes     Comment: Social   • Drug use: No   • Sexual activity: Defer   Other Topics Concern   • Not on file   Social History Narrative   • Not on file         Current Outpatient Medications   Medication Sig Dispense Refill   • ALPRAZolam (XANAX) 0.5 MG tablet TAKE 1 TABLET THREE TIMES DAILY 270 tablet 1   • clopidogrel (PLAVIX) 75 MG tablet Take 1 tablet by mouth Daily. 3 tablet 0   • dutasteride (AVODART) 0.5 MG capsule Take 1 capsule by mouth Every Night. 3 capsule 0   • ezetimibe (ZETIA) 10 MG tablet Take 1 tablet by mouth Daily. 3 tablet 0   • finasteride (PROPECIA) 1 MG tablet Take 1 tablet by mouth Daily. 3 tablet 0   • fluticasone (FLONASE) 50 MCG/ACT nasal spray 2 sprays into each nostril Daily. 16 g 3   • irbesartan-hydrochlorothiazide  "(AVALIDE) 150-12.5 MG tablet Take 1 tablet by mouth Daily. 90 tablet 5   • metoprolol succinate XL (TOPROL-XL) 50 MG 24 hr tablet Take 1 tablet by mouth Daily With Dinner. 90 tablet 3   • omeprazole (PRILOSEC) 40 MG capsule Take 1 capsule by mouth Daily. 90 capsule 2   • simethicone (GAS-X) 80 MG chewable tablet Chew 1 tablet Every 6 (Six) Hours As Needed for Flatulence. 56 tablet 1   • sodium chloride (OCEAN NASAL SPRAY) 0.65 % nasal spray 2 sprays into the nostril(s) as directed by provider As Needed for Congestion. 1 each 0   • traZODone (DESYREL) 50 MG tablet Take 1 tablet by mouth Every Night. 90 tablet 3   • vitamin B-12 (CYANOCOBALAMIN) 500 MCG tablet Take 500 mcg by mouth Daily.       No current facility-administered medications for this visit.          Review of Systems     Constitution: Denies any fatigue, fever or chills    HENT: Denies any headache, hearing impairment,     Eyes: Denies any blurring of vision, or photophobia     Cardivascular - As per history of present illness     Respiratory system-denies  shortness of breath- NYHA class I        Endocrine:   history of hyperlipidemia       Musculoskeletal:   rotator cuff injury to the right shoulder    Gastrointestinal: No nausea, vomiting, or melena    Genitourinary: History of prostate cancer    Neurological:   No history of seizure disorder, stroke, memory problems    Psychiatric/Behavioral:        No history of depression,    Hematological- no history of easy bruising             OBJECTIVE    /70   Pulse 89   Ht 172.7 cm (67.99\")   Wt 74.4 kg (164 lb)   SpO2 97%   BMI 24.94 kg/m²       Physical Exam     Constitutional: is oriented to person, place, and time.     Skin-warm and dry    Well developed and nourished in no acute distress      Head: Normocephalic and atraumatic.     Eyes: Pupils are equal, round, and reactive to light.     Neck: Neck supple. No bruit in the carotids    Cardiovascular: [place] in the fifth intercostal space   " Regular rate, and  Rhythm,    S1 greater than S2, no S3 or S4, no gallop     Pulmonary/Chest:   Air  Entry is equal on both sides  No wheezing or crackles,      Abdominal: Soft.  No hepatosplenomegaly, bowel sounds are present    Musculoskeletal: No kyphoscoliosis    Neurological: is alert and oriented to person, place, and time.    cranial nerve are intact .   No motor or sensory deficit    Extremities-no edema, no radial femoral delay      Psychiatric: He has a normal mood and affect.                  His behavior is normal.           Procedures      Lab Results   Component Value Date    WBC 5.93 11/04/2018    HGB 14.3 11/04/2018    HCT 41.4 11/04/2018    MCV 83.1 11/04/2018     11/04/2018     Lab Results   Component Value Date    GLUCOSE 96 10/24/2018    BUN 21 10/24/2018    CREATININE 1.25 10/24/2018    EGFRIFNONA 56 10/24/2018    BCR 16.8 10/24/2018    CO2 27.0 10/24/2018    CALCIUM 9.1 10/24/2018    ALBUMIN 4.30 10/24/2018    AST 68 (H) 10/24/2018    ALT 34 10/24/2018     Lab Results   Component Value Date    CHOL 229 (H) 10/24/2018    CHOL 213 (H) 04/05/2018    CHOL 223 (H) 11/28/2017     Lab Results   Component Value Date    TRIG 225 (H) 10/24/2018    TRIG 114 04/05/2018    TRIG 140 11/28/2017     Lab Results   Component Value Date    HDL 38 (L) 10/24/2018    HDL 35 (L) 04/05/2018    HDL 50 (L) 11/28/2017     No components found for: LDLCALC  Lab Results   Component Value Date     (H) 10/24/2018     04/05/2018     (H) 11/28/2017     No results found for: HDLLDLRATIO  No components found for: CHOLHDL  No results found for: HGBA1C  Lab Results   Component Value Date    TSH 0.78 02/11/2015                  A/P  CAD status post CABG in June 2018, doing well no chest pain has  chest soreness from his sternotomy site on antiplatelet therapy with  Plavix, no more nosebleeds since he is off of aspirin    Hypertension -blood pressure is doing better and no more dizziness since he has been  on Avalide 150/12.5 mg daily continue the Toprol-XL 50 mg in the evening .    Hyperlipidemia -could not tolerate statins and is tolerating the Zetia, LDL is still high started him on repatha and will get it approved through the insurance.    BPH on Avodart and finasteride.    Follow-up in 6 months                This document has been electronically signed by Delroy Mcconnell MD on December 4, 2018 2:33 PM       EMR Dragon/Transcription disclaimer:   Some of this note may be an electronic transcription/translation of spoken language to printed text. The electronic translation of spoken language may permit erroneous, or at times, nonsensical words or phrases to be inadvertently transcribed; Although I have reviewed the note for such errors, some may still exist.

## 2018-12-19 ENCOUNTER — OFFICE VISIT (OUTPATIENT)
Dept: CARDIOLOGY | Facility: CLINIC | Age: 73
End: 2018-12-19

## 2018-12-19 VITALS
WEIGHT: 164 LBS | SYSTOLIC BLOOD PRESSURE: 128 MMHG | BODY MASS INDEX: 24.86 KG/M2 | HEART RATE: 75 BPM | HEIGHT: 68 IN | DIASTOLIC BLOOD PRESSURE: 80 MMHG

## 2018-12-19 DIAGNOSIS — R06.02 SOB (SHORTNESS OF BREATH): Primary | ICD-10-CM

## 2018-12-19 DIAGNOSIS — E78.00 PURE HYPERCHOLESTEROLEMIA: ICD-10-CM

## 2018-12-19 DIAGNOSIS — I25.810 CORONARY ARTERY DISEASE INVOLVING CORONARY BYPASS GRAFT OF NATIVE HEART WITHOUT ANGINA PECTORIS: ICD-10-CM

## 2018-12-19 PROCEDURE — 99213 OFFICE O/P EST LOW 20 MIN: CPT | Performed by: INTERNAL MEDICINE

## 2018-12-19 RX ORDER — METOPROLOL SUCCINATE 25 MG/1
25 TABLET, EXTENDED RELEASE ORAL
Qty: 90 TABLET | Refills: 5 | Status: SHIPPED | OUTPATIENT
Start: 2018-12-19 | End: 2019-07-29 | Stop reason: SDUPTHER

## 2018-12-19 NOTE — PROGRESS NOTES
[facility]  OFFICE NOTE    Souleymane Stapleton  73 y.o. male    12/19/2018  1. SOB (shortness of breath)    2. Coronary artery disease involving coronary bypass graft of native heart without angina pectoris    3. Pure hypercholesterolemia        Chief complaint -shortness of breath      History of present Illness- 73-year-old gentleman who had quadruple bypass in June 2018 after he was found to have multivessel disease was on dual antiplatelet therapy with aspirin and Plavix.  He noticed shortness of breath more than usual before and he has history of allergies and is a been on Toprol-XL.  I decrease the dose of Toprol-XL to 25 mg daily and also check an echo to assess LV function and valvular function.  He is tolerating the Zetia and the repatha for his cholesterol.  I asked him to continue the exercise.      Allergies   Allergen Reactions   • Statins Myalgia     Muscle soreness    • Tricor [Fenofibrate] Myalgia     Muscle soreness          Past Medical History:   Diagnosis Date   • Abdominal pain    • Abnormal weight loss    • Acid reflux    • Acute gastritis    • Anxiety state    • Arthritis    • Cancer (CMS/HCC)     Prostate   • History of cerebrovascular accident    • History of colon polyps    • Hypertension    • Nausea    • Nuclear senile cataract    • Presbyopia    • Tobacco use    • Transient cerebral ischemia    • Vitreous detachment of left eye          Past Surgical History:   Procedure Laterality Date   • BACK SURGERY     • CARDIAC CATHETERIZATION N/A 6/19/2018    Procedure: Coronary angiography;  Surgeon: Delroy Mcconnell MD;  Location: [facility] INVASIVE LOCATION;  Service: Cardiovascular   • COLONOSCOPY  06/09/2015    Diverticulosis found in the sigmoid colon. Hemorrhoids found in the anus.   • CORONARY ARTERY BYPASS GRAFT N/A 6/22/2018    Procedure: PARAS STERNOTOMY CORONARY ARTERY BYPASS GRAFT TIMES 5 USING RIGHT AND LEFT INTERNAL MAMMARY ARTERIES AND LEFT GREATER  SAPHENOUS VEIN GRAFT PER ENDOSCOPIC VEIN HARVESTING AND PRP;  Surgeon: Edgar Pérez MD;  Location: [facility] OR;  Service: Cardiothoracic   • CRYOTHERAPY  2012    Of Acne   • ENDOSCOPY  2015    EGD w/ biopsy -Multiple polyps, one removed.   • HEMORRHOIDECTOMY N/A 3/20/2017    Procedure: Removal of Anal Papilla;  Surgeon: Juan Diego eKn MD;  Location: [facility] OR;  Service:    • INJECTION OF MEDICATION  2010    B12   • INJECTION OF MEDICATION  10/17/2011    Kenalog   • LUMBAR LAMINECTOMY  2010   • OTHER SURGICAL HISTORY  2010    Biopsy, Each Additional Lesion    • SKIN BIOPSY  2010         Family History   Problem Relation Age of Onset   • Dementia Other    • Hypertension Other    • Stroke Other    • Hypertension Mother    • Hypertension Father          Social History     Socioeconomic History   • Marital status:      Spouse name: Not on file   • Number of children: Not on file   • Years of education: Not on file   • Highest education level: Not on file   Social Needs   • Financial resource strain: Not on file   • Food insecurity - worry: Not on file   • Food insecurity - inability: Not on file   • Transportation needs - medical: Not on file   • Transportation needs - non-medical: Not on file   Occupational History   • Not on file   Tobacco Use   • Smoking status: Former Smoker     Types: Cigarettes     Last attempt to quit: 1997     Years since quittin.9   • Smokeless tobacco: Never Used   Substance and Sexual Activity   • Alcohol use: Yes     Comment: Social   • Drug use: No   • Sexual activity: Defer   Other Topics Concern   • Not on file   Social History Narrative   • Not on file         Current Outpatient Medications   Medication Sig Dispense Refill   • ALPRAZolam (XANAX) 0.5 MG tablet TAKE 1 TABLET THREE TIMES DAILY 270 tablet 1   • clopidogrel (PLAVIX) 75 MG tablet Take 1 tablet by mouth Daily. 3 tablet 0   • dutasteride (AVODART) 0.5 MG capsule  Take 1 capsule by mouth Every Night. 3 capsule 0   • Evolocumab (REPATHA SURECLICK) 140 MG/ML solution auto-injector Inject 1 each under the skin into the appropriate area as directed Every 14 (Fourteen) Days.     • ezetimibe (ZETIA) 10 MG tablet Take 1 tablet by mouth Daily. 3 tablet 0   • finasteride (PROPECIA) 1 MG tablet Take 1 tablet by mouth Daily. 3 tablet 0   • fluticasone (FLONASE) 50 MCG/ACT nasal spray 2 sprays into each nostril Daily. 16 g 3   • irbesartan-hydrochlorothiazide (AVALIDE) 150-12.5 MG tablet Take 1 tablet by mouth Daily. 90 tablet 5   • metoprolol succinate XL (TOPROL-XL) 25 MG 24 hr tablet Take 1 tablet by mouth Daily With Dinner. 90 tablet 5   • omeprazole (PRILOSEC) 40 MG capsule Take 1 capsule by mouth Daily. 90 capsule 2   • simethicone (GAS-X) 80 MG chewable tablet Chew 1 tablet Every 6 (Six) Hours As Needed for Flatulence. 56 tablet 1   • sodium chloride (OCEAN NASAL SPRAY) 0.65 % nasal spray 2 sprays into the nostril(s) as directed by provider As Needed for Congestion. 1 each 0   • traZODone (DESYREL) 50 MG tablet Take 1 tablet by mouth Every Night. 90 tablet 3   • vitamin B-12 (CYANOCOBALAMIN) 500 MCG tablet Take 500 mcg by mouth Daily.       No current facility-administered medications for this visit.          Review of Systems     Constitution: Denies any fatigue, fever or chills    HENT: Denies any headache, hearing impairment,     Eyes: Denies any blurring of vision, or photophobia     Cardivascular - As per history of present illness     Respiratory system-denies  shortness of breath- NYHA class I        Endocrine:   history of hyperlipidemia       Musculoskeletal:   rotator cuff injury to the right shoulder    Gastrointestinal: No nausea, vomiting, or melena    Genitourinary: History of prostate cancer    Neurological:   No history of seizure disorder, stroke, memory problems    Psychiatric/Behavioral:        No history of depression,    Hematological- no history of easy  "bruising             OBJECTIVE    /80   Pulse 75   Ht 172.7 cm (67.99\")   Wt 74.4 kg (164 lb)   BMI 24.94 kg/m²       Physical Exam     Constitutional: is oriented to person, place, and time.     Skin-warm and dry    Well developed and nourished in no acute distress      Head: Normocephalic and atraumatic.     Eyes: Pupils are equal, round, and reactive to light.     Neck: Neck supple. No bruit in the carotids    Cardiovascular: [place] in the fifth intercostal space   Regular rate, and  Rhythm,    S1 greater than S2, no S3 or S4, no gallop     Pulmonary/Chest:   Air  Entry is equal on both sides  No wheezing or crackles,      Abdominal: Soft.  No hepatosplenomegaly, bowel sounds are present    Musculoskeletal: No kyphoscoliosis    Neurological: is alert and oriented to person, place, and time.    cranial nerve are intact .   No motor or sensory deficit    Extremities-no edema, no radial femoral delay      Psychiatric: He has a normal mood and affect.                  His behavior is normal.           Procedures      Lab Results   Component Value Date    WBC 5.93 11/04/2018    HGB 14.3 11/04/2018    HCT 41.4 11/04/2018    MCV 83.1 11/04/2018     11/04/2018     Lab Results   Component Value Date    GLUCOSE 96 10/24/2018    BUN 21 10/24/2018    CREATININE 1.25 10/24/2018    EGFRIFNONA 56 10/24/2018    BCR 16.8 10/24/2018    CO2 27.0 10/24/2018    CALCIUM 9.1 10/24/2018    ALBUMIN 4.30 10/24/2018    AST 68 (H) 10/24/2018    ALT 34 10/24/2018     Lab Results   Component Value Date    CHOL 229 (H) 10/24/2018    CHOL 213 (H) 04/05/2018    CHOL 223 (H) 11/28/2017     Lab Results   Component Value Date    TRIG 225 (H) 10/24/2018    TRIG 114 04/05/2018    TRIG 140 11/28/2017     Lab Results   Component Value Date    HDL 38 (L) 10/24/2018    HDL 35 (L) 04/05/2018    HDL 50 (L) 11/28/2017     No components found for: LDLCALC  Lab Results   Component Value Date     (H) 10/24/2018     04/05/2018    "  (H) 11/28/2017     No results found for: HDLLDLRATIO  No components found for: CHOLHDL  No results found for: HGBA1C  Lab Results   Component Value Date    TSH 0.78 02/11/2015                  A/P  Shortness of breath possibly related to reactive airway disease decrease the dose of Toprol-XL to 25 mg daily and will check an echo    CAD status post CABG in June 2018, doing well no chest pain has  chest soreness from his sternotomy site on antiplatelet therapy with  Plavix, no more nosebleeds since he is off of aspirin    Hypertension -blood pressure is doing better and no more dizziness since he has been on Avalide 150/12.5 mg daily continue the Toprol-XL 25 mg in the evening .    Hyperlipidemia -could not tolerate statins and is tolerating the Zetia, LDL is still high started him on repatha and will get it approved through the insurance.    BPH on Avodart and finasteride.    Follow-up in 6 months                This document has been electronically signed by Delroy Mcconnell MD on December 19, 2018 10:19 AM       EMR Dragon/Transcription disclaimer:   Some of this note may be an electronic transcription/translation of spoken language to printed text. The electronic translation of spoken language may permit erroneous, or at times, nonsensical words or phrases to be inadvertently transcribed; Although I have reviewed the note for such errors, some may still exist.

## 2018-12-31 RX ORDER — FINASTERIDE 1 MG/1
TABLET, FILM COATED ORAL
Qty: 30 TABLET | Refills: 11 | Status: SHIPPED | OUTPATIENT
Start: 2018-12-31 | End: 2019-01-08 | Stop reason: SDUPTHER

## 2019-01-07 ENCOUNTER — TRANSCRIBE ORDERS (OUTPATIENT)
Dept: LAB | Facility: HOSPITAL | Age: 74
End: 2019-01-07

## 2019-01-07 ENCOUNTER — LAB (OUTPATIENT)
Dept: LAB | Facility: HOSPITAL | Age: 74
End: 2019-01-07

## 2019-01-07 DIAGNOSIS — C61 PROSTATE CANCER (HCC): Primary | ICD-10-CM

## 2019-01-07 DIAGNOSIS — C61 PROSTATE CANCER (HCC): ICD-10-CM

## 2019-01-07 LAB
BH CV ECHO MEAS - ACS: 2.1 CM
BH CV ECHO MEAS - AI DEC SLOPE: 201 CM/SEC^2
BH CV ECHO MEAS - AI MAX PG: 44.4 MMHG
BH CV ECHO MEAS - AI MAX VEL: 333 CM/SEC
BH CV ECHO MEAS - AI P1/2T: 485.2 MSEC
BH CV ECHO MEAS - AO MAX PG (FULL): 3.2 MMHG
BH CV ECHO MEAS - AO MAX PG: 7.7 MMHG
BH CV ECHO MEAS - AO MEAN PG (FULL): 2 MMHG
BH CV ECHO MEAS - AO MEAN PG: 4 MMHG
BH CV ECHO MEAS - AO ROOT AREA (BSA CORRECTED): 2.1
BH CV ECHO MEAS - AO ROOT AREA: 11.9 CM^2
BH CV ECHO MEAS - AO ROOT DIAM: 3.9 CM
BH CV ECHO MEAS - AO V2 MAX: 139 CM/SEC
BH CV ECHO MEAS - AO V2 MEAN: 95.7 CM/SEC
BH CV ECHO MEAS - AO V2 VTI: 30 CM
BH CV ECHO MEAS - ASC AORTA: 3.9 CM
BH CV ECHO MEAS - AVA(I,A): 3.1 CM^2
BH CV ECHO MEAS - AVA(I,D): 3.1 CM^2
BH CV ECHO MEAS - AVA(V,A): 3.2 CM^2
BH CV ECHO MEAS - AVA(V,D): 3.2 CM^2
BH CV ECHO MEAS - BSA(HAYCOCK): 1.9 M^2
BH CV ECHO MEAS - BSA: 1.9 M^2
BH CV ECHO MEAS - BZI_BMI: 24.9 KILOGRAMS/M^2
BH CV ECHO MEAS - BZI_METRIC_HEIGHT: 172.7 CM
BH CV ECHO MEAS - BZI_METRIC_WEIGHT: 74.4 KG
BH CV ECHO MEAS - EDV(CUBED): 74.1 ML
BH CV ECHO MEAS - EDV(TEICH): 78.6 ML
BH CV ECHO MEAS - EF(CUBED): 73.4 %
BH CV ECHO MEAS - EF(TEICH): 65.6 %
BH CV ECHO MEAS - EPSS: 0.2 CM
BH CV ECHO MEAS - ESV(CUBED): 19.7 ML
BH CV ECHO MEAS - ESV(TEICH): 27 ML
BH CV ECHO MEAS - FS: 35.7 %
BH CV ECHO MEAS - IVS/LVPW: 0.82
BH CV ECHO MEAS - IVSD: 0.9 CM
BH CV ECHO MEAS - LA DIMENSION: 4.1 CM
BH CV ECHO MEAS - LA/AO: 1.1
BH CV ECHO MEAS - LV MASS(C)D: 137.2 GRAMS
BH CV ECHO MEAS - LV MASS(C)DI: 73.1 GRAMS/M^2
BH CV ECHO MEAS - LV MAX PG: 4.5 MMHG
BH CV ECHO MEAS - LV MEAN PG: 2 MMHG
BH CV ECHO MEAS - LV V1 MAX: 106 CM/SEC
BH CV ECHO MEAS - LV V1 MEAN: 65.9 CM/SEC
BH CV ECHO MEAS - LV V1 VTI: 22.7 CM
BH CV ECHO MEAS - LVIDD: 4.2 CM
BH CV ECHO MEAS - LVIDS: 2.7 CM
BH CV ECHO MEAS - LVOT AREA (M): 4.2 CM^2
BH CV ECHO MEAS - LVOT AREA: 4.2 CM^2
BH CV ECHO MEAS - LVOT DIAM: 2.3 CM
BH CV ECHO MEAS - LVPWD: 1.1 CM
BH CV ECHO MEAS - MR MAX PG: 71.6 MMHG
BH CV ECHO MEAS - MR MAX VEL: 423 CM/SEC
BH CV ECHO MEAS - MV A MAX VEL: 101 CM/SEC
BH CV ECHO MEAS - MV E MAX VEL: 91.3 CM/SEC
BH CV ECHO MEAS - MV E/A: 0.9
BH CV ECHO MEAS - PA MAX PG: 8.8 MMHG
BH CV ECHO MEAS - PA MEAN PG: 4 MMHG
BH CV ECHO MEAS - PA V2 MAX: 148 CM/SEC
BH CV ECHO MEAS - PA V2 MEAN: 91.2 CM/SEC
BH CV ECHO MEAS - PA V2 VTI: 27.9 CM
BH CV ECHO MEAS - PI END-D VEL: 138 CM/SEC
BH CV ECHO MEAS - RAP SYSTOLE: 5 MMHG
BH CV ECHO MEAS - RVSP: 38.6 MMHG
BH CV ECHO MEAS - SI(AO): 190.8 ML/M^2
BH CV ECHO MEAS - SI(CUBED): 29 ML/M^2
BH CV ECHO MEAS - SI(LVOT): 50.2 ML/M^2
BH CV ECHO MEAS - SI(TEICH): 27.4 ML/M^2
BH CV ECHO MEAS - SV(AO): 358.4 ML
BH CV ECHO MEAS - SV(CUBED): 54.4 ML
BH CV ECHO MEAS - SV(LVOT): 94.3 ML
BH CV ECHO MEAS - SV(TEICH): 51.6 ML
BH CV ECHO MEAS - TR MAX VEL: 290 CM/SEC
LV EF 2D ECHO EST: 58 %
PSA SERPL-MCNC: 13.4 NG/ML (ref 0–4)

## 2019-01-07 PROCEDURE — 36415 COLL VENOUS BLD VENIPUNCTURE: CPT

## 2019-01-07 PROCEDURE — 84153 ASSAY OF PSA TOTAL: CPT

## 2019-01-08 ENCOUNTER — OFFICE VISIT (OUTPATIENT)
Dept: FAMILY MEDICINE CLINIC | Facility: CLINIC | Age: 74
End: 2019-01-08

## 2019-01-08 VITALS
SYSTOLIC BLOOD PRESSURE: 118 MMHG | HEIGHT: 68 IN | WEIGHT: 162 LBS | DIASTOLIC BLOOD PRESSURE: 80 MMHG | BODY MASS INDEX: 24.55 KG/M2

## 2019-01-08 DIAGNOSIS — N52.9 ERECTILE DYSFUNCTION, UNSPECIFIED ERECTILE DYSFUNCTION TYPE: ICD-10-CM

## 2019-01-08 DIAGNOSIS — R53.83 OTHER FATIGUE: ICD-10-CM

## 2019-01-08 DIAGNOSIS — I25.810 CORONARY ARTERY DISEASE INVOLVING CORONARY BYPASS GRAFT OF NATIVE HEART WITHOUT ANGINA PECTORIS: Primary | ICD-10-CM

## 2019-01-08 DIAGNOSIS — K21.9 GASTROESOPHAGEAL REFLUX DISEASE, ESOPHAGITIS PRESENCE NOT SPECIFIED: ICD-10-CM

## 2019-01-08 DIAGNOSIS — R07.89 CHEST WALL TENDERNESS: ICD-10-CM

## 2019-01-08 PROCEDURE — 99214 OFFICE O/P EST MOD 30 MIN: CPT | Performed by: FAMILY MEDICINE

## 2019-01-08 RX ORDER — SILDENAFIL 100 MG/1
50 TABLET, FILM COATED ORAL DAILY PRN
Qty: 10 TABLET | Refills: 2 | Status: SHIPPED | OUTPATIENT
Start: 2019-01-08 | End: 2019-04-29 | Stop reason: SDUPTHER

## 2019-01-08 NOTE — PROGRESS NOTES
Subjective   Souleymane Stapleton is a 73 y.o. male.    cc:follow up  History of Present Illness The patient comes in for check of their chronic medical issues which include CAD,GERD and PVD. He has been tired and has noticed thinning og his eyebrows. He also has still had some tenderness over the right chest wall. It is better but still present. He also has been having some issues with Erectile Dysfunction..       The following portions of the patient's history were reviewed and updated as appropriate: allergies, current medications, past family history, past medical history, past social history, past surgical history and problem list.    Review of Systems   Constitutional: Negative for fever.   Respiratory: Negative for cough, chest tightness and stridor.    Cardiovascular: Negative for chest pain, palpitations and leg swelling.       Objective   Physical Exam   Constitutional: He appears well-developed and well-nourished.   HENT:   Head: Normocephalic and atraumatic.   Right Ear: External ear normal.   Left Ear: External ear normal.   Nose: Nose normal.   Mouth/Throat: Oropharynx is clear and moist.   Eyes: Pupils are equal, round, and reactive to light.   Neck: Normal range of motion.   Cardiovascular: Normal rate, regular rhythm and normal heart sounds. Exam reveals no gallop and no friction rub.   No murmur heard.  Pulmonary/Chest: Effort normal and breath sounds normal. No stridor. No respiratory distress. He has no wheezes. He has no rales.   Abdominal: Soft. Bowel sounds are normal. He exhibits no distension. There is no tenderness. There is no guarding.   Skin: Skin is warm and dry.   Vitals reviewed.        Assessment/Plan   Souleymane was seen today for follow-up.    Diagnoses and all orders for this visit:    Coronary artery disease involving coronary bypass graft of native heart without angina pectoris  -     C-reactive protein; Future  -     Comprehensive metabolic panel; Future  -     Lipid panel;  Future  -     Sedimentation rate, automated; Future    Gastroesophageal reflux disease, esophagitis presence not specified    Chest wall tenderness    Other fatigue  -     T4, free; Future  -     TSH; Future    Erectile dysfunction, unspecified erectile dysfunction type    Other orders  -     sildenafil (VIAGRA) 100 MG tablet; Take 0.5 tablets by mouth Daily As Needed for erectile dysfunction.    Return to the clinic in 3 month/s.  Will contact with results as needed.

## 2019-01-09 DIAGNOSIS — C61 MALIGNANT NEOPLASM OF PROSTATE (HCC): Primary | ICD-10-CM

## 2019-01-11 ENCOUNTER — DOCUMENTATION (OUTPATIENT)
Dept: CARDIOLOGY | Facility: CLINIC | Age: 74
End: 2019-01-11

## 2019-01-11 ENCOUNTER — LAB (OUTPATIENT)
Dept: LAB | Facility: HOSPITAL | Age: 74
End: 2019-01-11

## 2019-01-11 DIAGNOSIS — R53.83 OTHER FATIGUE: ICD-10-CM

## 2019-01-11 DIAGNOSIS — I25.810 CORONARY ARTERY DISEASE INVOLVING CORONARY BYPASS GRAFT OF NATIVE HEART WITHOUT ANGINA PECTORIS: ICD-10-CM

## 2019-01-11 LAB
ALBUMIN SERPL-MCNC: 4.2 G/DL (ref 3.4–4.8)
ALBUMIN/GLOB SERPL: 1.6 G/DL (ref 1.1–1.8)
ALP SERPL-CCNC: 55 U/L (ref 38–126)
ALT SERPL W P-5'-P-CCNC: 23 U/L (ref 21–72)
ANION GAP SERPL CALCULATED.3IONS-SCNC: 9 MMOL/L (ref 5–15)
ARTICHOKE IGE QN: 103 MG/DL (ref 1–129)
AST SERPL-CCNC: 56 U/L (ref 17–59)
BILIRUB SERPL-MCNC: 0.4 MG/DL (ref 0.2–1.3)
BUN BLD-MCNC: 20 MG/DL (ref 7–21)
BUN/CREAT SERPL: 15.3 (ref 7–25)
CALCIUM SPEC-SCNC: 9 MG/DL (ref 8.4–10.2)
CHLORIDE SERPL-SCNC: 100 MMOL/L (ref 95–110)
CHOLEST SERPL-MCNC: 176 MG/DL (ref 0–199)
CO2 SERPL-SCNC: 28 MMOL/L (ref 22–31)
CREAT BLD-MCNC: 1.31 MG/DL (ref 0.7–1.3)
CRP SERPL-MCNC: <0.5 MG/DL (ref 0–1)
ERYTHROCYTE [SEDIMENTATION RATE] IN BLOOD: 0 MM/HR (ref 0–15)
GFR SERPL CREATININE-BSD FRML MDRD: 53 ML/MIN/1.73 (ref 42–98)
GLOBULIN UR ELPH-MCNC: 2.6 GM/DL (ref 2.3–3.5)
GLUCOSE BLD-MCNC: 101 MG/DL (ref 60–100)
HDLC SERPL-MCNC: 40 MG/DL (ref 60–200)
LDLC/HDLC SERPL: 2.43 {RATIO} (ref 0–3.55)
POTASSIUM BLD-SCNC: 3.9 MMOL/L (ref 3.5–5.1)
PROT SERPL-MCNC: 6.8 G/DL (ref 6.3–8.6)
SODIUM BLD-SCNC: 137 MMOL/L (ref 137–145)
T4 FREE SERPL-MCNC: 1.04 NG/DL (ref 0.78–2.19)
TRIGL SERPL-MCNC: 195 MG/DL (ref 20–199)
TSH SERPL DL<=0.05 MIU/L-ACNC: 1.47 MIU/ML (ref 0.46–4.68)

## 2019-01-11 PROCEDURE — 86140 C-REACTIVE PROTEIN: CPT

## 2019-01-11 PROCEDURE — 85651 RBC SED RATE NONAUTOMATED: CPT

## 2019-01-11 PROCEDURE — 80061 LIPID PANEL: CPT

## 2019-01-11 PROCEDURE — 80053 COMPREHEN METABOLIC PANEL: CPT

## 2019-01-11 PROCEDURE — 84443 ASSAY THYROID STIM HORMONE: CPT

## 2019-01-11 PROCEDURE — 84439 ASSAY OF FREE THYROXINE: CPT

## 2019-01-11 PROCEDURE — 36415 COLL VENOUS BLD VENIPUNCTURE: CPT

## 2019-01-18 ENCOUNTER — HOSPITAL ENCOUNTER (OUTPATIENT)
Dept: CT IMAGING | Facility: HOSPITAL | Age: 74
Discharge: HOME | End: 2019-01-18
Admitting: UROLOGY

## 2019-01-18 DIAGNOSIS — C61 MALIGNANT NEOPLASM OF PROSTATE (HCC): ICD-10-CM

## 2019-01-18 PROCEDURE — 25010000002 IOPAMIDOL 61 % SOLUTION: Performed by: UROLOGY

## 2019-01-18 PROCEDURE — 74178 CT ABD&PLV WO CNTR FLWD CNTR: CPT

## 2019-01-18 RX ADMIN — IOPAMIDOL 85 ML: 612 INJECTION, SOLUTION INTRAVENOUS at 10:25

## 2019-02-28 ENCOUNTER — DOCUMENTATION (OUTPATIENT)
Dept: CARDIOLOGY | Facility: CLINIC | Age: 74
End: 2019-02-28

## 2019-02-28 NOTE — PROGRESS NOTES
Patient has been approved for Repatha, NO copay, and will delivered to patients home. Lvm for patient to inform him this has been completed.

## 2019-03-12 RX ORDER — SILDENAFIL 100 MG/1
100 TABLET, FILM COATED ORAL DAILY PRN
Qty: 10 TABLET | Refills: 2 | Status: SHIPPED | OUTPATIENT
Start: 2019-03-12 | End: 2019-10-01 | Stop reason: SDUPTHER

## 2019-04-19 DIAGNOSIS — C61 MALIGNANT NEOPLASM OF PROSTATE (HCC): Primary | ICD-10-CM

## 2019-04-22 ENCOUNTER — LAB (OUTPATIENT)
Dept: LAB | Facility: HOSPITAL | Age: 74
End: 2019-04-22

## 2019-04-22 DIAGNOSIS — C61 MALIGNANT NEOPLASM OF PROSTATE (HCC): ICD-10-CM

## 2019-04-22 LAB — PSA SERPL-MCNC: 14.4 NG/ML (ref 0–4)

## 2019-04-22 PROCEDURE — 84153 ASSAY OF PSA TOTAL: CPT

## 2019-04-22 PROCEDURE — 36415 COLL VENOUS BLD VENIPUNCTURE: CPT

## 2019-04-29 ENCOUNTER — OFFICE VISIT (OUTPATIENT)
Dept: FAMILY MEDICINE CLINIC | Facility: CLINIC | Age: 74
End: 2019-04-29

## 2019-04-29 ENCOUNTER — LAB (OUTPATIENT)
Dept: LAB | Facility: HOSPITAL | Age: 74
End: 2019-04-29

## 2019-04-29 VITALS
HEIGHT: 67 IN | HEART RATE: 64 BPM | DIASTOLIC BLOOD PRESSURE: 78 MMHG | BODY MASS INDEX: 26.1 KG/M2 | OXYGEN SATURATION: 98 % | SYSTOLIC BLOOD PRESSURE: 140 MMHG | WEIGHT: 166.31 LBS

## 2019-04-29 DIAGNOSIS — E78.00 PURE HYPERCHOLESTEROLEMIA: ICD-10-CM

## 2019-04-29 DIAGNOSIS — F41.1 ANXIETY STATE: Primary | ICD-10-CM

## 2019-04-29 DIAGNOSIS — T50.905D ADVERSE EFFECT OF DRUG, SUBSEQUENT ENCOUNTER: ICD-10-CM

## 2019-04-29 DIAGNOSIS — R41.0 CONFUSION: ICD-10-CM

## 2019-04-29 DIAGNOSIS — M54.2 NECK PAIN: ICD-10-CM

## 2019-04-29 DIAGNOSIS — I10 ESSENTIAL HYPERTENSION: ICD-10-CM

## 2019-04-29 DIAGNOSIS — R10.9 ABDOMINAL DISCOMFORT: ICD-10-CM

## 2019-04-29 LAB
ALBUMIN SERPL-MCNC: 4.3 G/DL (ref 3.5–5.2)
ALBUMIN/GLOB SERPL: 1.4 G/DL
ALP SERPL-CCNC: 58 U/L (ref 39–117)
ALT SERPL W P-5'-P-CCNC: 25 U/L (ref 1–41)
ANION GAP SERPL CALCULATED.3IONS-SCNC: 8.7 MMOL/L
ARTICHOKE IGE QN: 92 MG/DL (ref 0–100)
AST SERPL-CCNC: 19 U/L (ref 1–40)
BASOPHILS # BLD AUTO: 0.12 10*3/MM3 (ref 0–0.2)
BASOPHILS NFR BLD AUTO: 1.6 % (ref 0–1.5)
BILIRUB SERPL-MCNC: 0.4 MG/DL (ref 0.2–1.2)
BUN BLD-MCNC: 14 MG/DL (ref 8–23)
BUN/CREAT SERPL: 11.7 (ref 7–25)
CALCIUM SPEC-SCNC: 9.1 MG/DL (ref 8.6–10.5)
CHLORIDE SERPL-SCNC: 102 MMOL/L (ref 98–107)
CO2 SERPL-SCNC: 27.3 MMOL/L (ref 22–29)
CREAT BLD-MCNC: 1.2 MG/DL (ref 0.76–1.27)
DEPRECATED RDW RBC AUTO: 51.8 FL (ref 37–54)
EOSINOPHIL # BLD AUTO: 0.11 10*3/MM3 (ref 0–0.4)
EOSINOPHIL NFR BLD AUTO: 1.5 % (ref 0.3–6.2)
ERYTHROCYTE [DISTWIDTH] IN BLOOD BY AUTOMATED COUNT: 17.1 % (ref 12.3–15.4)
GFR SERPL CREATININE-BSD FRML MDRD: 59 ML/MIN/1.73
GLOBULIN UR ELPH-MCNC: 3 GM/DL
GLUCOSE BLD-MCNC: 101 MG/DL (ref 65–99)
HCT VFR BLD AUTO: 43.2 % (ref 37.5–51)
HGB BLD-MCNC: 13.8 G/DL (ref 13–17.7)
IMM GRANULOCYTES # BLD AUTO: 0.02 10*3/MM3 (ref 0–0.05)
IMM GRANULOCYTES NFR BLD AUTO: 0.3 % (ref 0–0.5)
LYMPHOCYTES # BLD AUTO: 2.29 10*3/MM3 (ref 0.7–3.1)
LYMPHOCYTES NFR BLD AUTO: 30.8 % (ref 19.6–45.3)
MCH RBC QN AUTO: 26.6 PG (ref 26.6–33)
MCHC RBC AUTO-ENTMCNC: 31.9 G/DL (ref 31.5–35.7)
MCV RBC AUTO: 83.2 FL (ref 79–97)
MONOCYTES # BLD AUTO: 0.69 10*3/MM3 (ref 0.1–0.9)
MONOCYTES NFR BLD AUTO: 9.3 % (ref 5–12)
NEUTROPHILS # BLD AUTO: 4.2 10*3/MM3 (ref 1.7–7)
NEUTROPHILS NFR BLD AUTO: 56.5 % (ref 42.7–76)
NRBC BLD AUTO-RTO: 0 /100 WBC (ref 0–0.2)
PLATELET # BLD AUTO: 345 10*3/MM3 (ref 140–450)
PMV BLD AUTO: 11.1 FL (ref 6–12)
POTASSIUM BLD-SCNC: 4.5 MMOL/L (ref 3.5–5.2)
PROT SERPL-MCNC: 7.3 G/DL (ref 6–8.5)
RBC # BLD AUTO: 5.19 10*6/MM3 (ref 4.14–5.8)
SODIUM BLD-SCNC: 138 MMOL/L (ref 136–145)
WBC NRBC COR # BLD: 7.43 10*3/MM3 (ref 3.4–10.8)

## 2019-04-29 PROCEDURE — 85025 COMPLETE CBC W/AUTO DIFF WBC: CPT

## 2019-04-29 PROCEDURE — 83721 ASSAY OF BLOOD LIPOPROTEIN: CPT

## 2019-04-29 PROCEDURE — 99214 OFFICE O/P EST MOD 30 MIN: CPT | Performed by: FAMILY MEDICINE

## 2019-04-29 PROCEDURE — 80053 COMPREHEN METABOLIC PANEL: CPT

## 2019-04-29 PROCEDURE — 36415 COLL VENOUS BLD VENIPUNCTURE: CPT

## 2019-04-29 RX ORDER — BUSPIRONE HYDROCHLORIDE 5 MG/1
5 TABLET ORAL 3 TIMES DAILY
Qty: 90 TABLET | Refills: 2 | Status: SHIPPED | OUTPATIENT
Start: 2019-04-29 | End: 2019-07-26 | Stop reason: SDUPTHER

## 2019-04-29 NOTE — PROGRESS NOTES
Subjective   Souleymane Stapleton is a 73 y.o. male.    cc:follow up  History of Present Illness The patient comes in for check of their chronic medical issues which include Hypercholesterolemia,Hypertension and Anxiety.He has started Repatha and since doing that he has had feelings of confusion, light headedness and is having trouble staying awake. His anxiety is heightened due to these episodes. He also is having a pain and nausea in his abdomen at times. He can eat and it will help, then it starts back. His back and neck are also bothering him. .     The following portions of the patient's history were reviewed and updated as appropriate: allergies, current medications, past family history, past medical history, past social history, past surgical history and problem list.    Review of Systems    Objective   Physical Exam   Constitutional: He appears well-developed and well-nourished.   HENT:   Head: Normocephalic and atraumatic.   Right Ear: External ear normal.   Left Ear: External ear normal.   Nose: Nose normal.   Mouth/Throat: Oropharynx is clear and moist.   Eyes: Conjunctivae are normal.   Neck: Normal range of motion.   Cardiovascular: Normal rate, regular rhythm and normal heart sounds. Exam reveals no gallop and no friction rub.   No murmur heard.  Pulmonary/Chest: Effort normal and breath sounds normal. No respiratory distress. He has no wheezes. He has no rales.   Abdominal: Soft.   There is mild tenderness if the abdomen all over . He has normal bowel sounds and the abdomen is sot on palpation.   Musculoskeletal:   Minimal tenderness in the low back and also on palpation of the neck.   Neurological: He is alert.   Skin: Skin is warm.   Vitals reviewed.        Assessment/Plan   Souleymane was seen today for gi problem, nausea, fatigue and dizziness.    Diagnoses and all orders for this visit:    Anxiety state    Neck pain    Confusion    Adverse effect of drug, subsequent encounter    Pure  hypercholesterolemia  -     LDL cholesterol, direct; Future    Essential hypertension  -     Comprehensive metabolic panel; Future  -     CBC w AUTO Differential; Future    Abdominal discomfort  -     Ambulatory Referral to Gastroenterology    Other orders  -     busPIRone (BUSPAR) 5 MG tablet; Take 1 tablet by mouth 3 (Three) Times a Day.      Return to the clinic in 6 week/s.  Will contact with results as needed.  He is to stop the Repatha and remain off of it as the side effect profile mirrors some of his issues.

## 2019-04-30 RX ORDER — EZETIMIBE 10 MG/1
TABLET ORAL
Qty: 30 TABLET | Refills: 6 | Status: SHIPPED | OUTPATIENT
Start: 2019-04-30 | End: 2019-11-29 | Stop reason: SDUPTHER

## 2019-05-07 ENCOUNTER — TELEPHONE (OUTPATIENT)
Dept: FAMILY MEDICINE CLINIC | Facility: CLINIC | Age: 74
End: 2019-05-07

## 2019-05-07 ENCOUNTER — TELEPHONE (OUTPATIENT)
Dept: CARDIOLOGY | Facility: CLINIC | Age: 74
End: 2019-05-07

## 2019-05-07 NOTE — TELEPHONE ENCOUNTER
Pt called to inform us the his PCP dr marcos wants him to stop repatha for 6 weeks due to him having headaches and dizziness

## 2019-05-08 DIAGNOSIS — H91.93 BILATERAL HEARING LOSS, UNSPECIFIED HEARING LOSS TYPE: Primary | ICD-10-CM

## 2019-05-23 ENCOUNTER — OFFICE VISIT (OUTPATIENT)
Dept: FAMILY MEDICINE CLINIC | Facility: CLINIC | Age: 74
End: 2019-05-23

## 2019-05-23 VITALS
WEIGHT: 165.25 LBS | HEART RATE: 74 BPM | HEIGHT: 67 IN | OXYGEN SATURATION: 97 % | SYSTOLIC BLOOD PRESSURE: 100 MMHG | BODY MASS INDEX: 25.94 KG/M2 | DIASTOLIC BLOOD PRESSURE: 60 MMHG

## 2019-05-23 DIAGNOSIS — R09.81 NASAL CONGESTION: Primary | ICD-10-CM

## 2019-05-23 DIAGNOSIS — R10.9 CHRONIC ABDOMINAL PAIN: ICD-10-CM

## 2019-05-23 DIAGNOSIS — G89.29 CHRONIC RIGHT SHOULDER PAIN: ICD-10-CM

## 2019-05-23 DIAGNOSIS — R42 LIGHT HEADEDNESS: ICD-10-CM

## 2019-05-23 DIAGNOSIS — G89.29 CHRONIC ABDOMINAL PAIN: ICD-10-CM

## 2019-05-23 DIAGNOSIS — M25.511 CHRONIC RIGHT SHOULDER PAIN: ICD-10-CM

## 2019-05-23 PROCEDURE — 96372 THER/PROPH/DIAG INJ SC/IM: CPT | Performed by: FAMILY MEDICINE

## 2019-05-23 PROCEDURE — 99214 OFFICE O/P EST MOD 30 MIN: CPT | Performed by: FAMILY MEDICINE

## 2019-05-23 RX ORDER — TRIAMCINOLONE ACETONIDE 40 MG/ML
80 INJECTION, SUSPENSION INTRA-ARTICULAR; INTRAMUSCULAR ONCE
Status: COMPLETED | OUTPATIENT
Start: 2019-05-23 | End: 2019-05-23

## 2019-05-23 RX ORDER — FLUTICASONE PROPIONATE 50 MCG
2 SPRAY, SUSPENSION (ML) NASAL DAILY
Qty: 16 G | Refills: 3 | Status: SHIPPED | OUTPATIENT
Start: 2019-05-23 | End: 2019-08-02 | Stop reason: SDUPTHER

## 2019-05-23 RX ORDER — IRBESARTAN AND HYDROCHLOROTHIAZIDE 150; 12.5 MG/1; MG/1
TABLET, FILM COATED ORAL
Qty: 90 TABLET | Refills: 1 | Status: SHIPPED | OUTPATIENT
Start: 2019-05-23 | End: 2019-12-06 | Stop reason: SDUPTHER

## 2019-05-23 RX ORDER — CIPROFLOXACIN 250 MG/1
250 TABLET, FILM COATED ORAL 2 TIMES DAILY
Qty: 10 TABLET | Refills: 1 | Status: SHIPPED | OUTPATIENT
Start: 2019-05-23 | End: 2019-06-15

## 2019-05-23 RX ADMIN — TRIAMCINOLONE ACETONIDE 80 MG: 40 INJECTION, SUSPENSION INTRA-ARTICULAR; INTRAMUSCULAR at 10:14

## 2019-05-23 NOTE — PROGRESS NOTES
Subjective   Souleymane Stapleton is a 73 y.o. male.    cc: follow up  History of Present Illness The patient comes in for follow up. He is still having chronic nasal congestion. It is a gray color. His right shoulder has continued to hurt. He also has has some chronic abdominal pain that his GI Physician has treated with Cipro for floral overgrowth. He would like a refill of that.He has continued to have light headedness. It is usually when he is standing. He is currently taking 1/2 of his Avalide daily.  The following portions of the patient's history were reviewed and updated as appropriate: allergies, current medications, past family history, past medical history, past social history, past surgical history and problem list.    Review of Systems   Constitutional: Negative for fatigue and fever.   Respiratory: Negative for cough, chest tightness and stridor.    Cardiovascular: Negative for chest pain, palpitations and leg swelling.   Neurological: Positive for dizziness. Negative for confusion.       Objective   Physical Exam   Constitutional: He appears well-developed and well-nourished.   HENT:   Head: Normocephalic and atraumatic.   Right Ear: External ear normal.   Left Ear: External ear normal.   Nose: Nose normal.   Mouth/Throat: Oropharynx is clear and moist.   Nose is congested.   Eyes: Conjunctivae are normal.   Neck: Normal range of motion.   Cardiovascular: Normal rate, regular rhythm and normal heart sounds. Exam reveals no gallop and no friction rub.   No murmur heard.  Pulmonary/Chest: Effort normal and breath sounds normal. No stridor. No respiratory distress. He has no wheezes. He has no rales.   Abdominal: Soft. Bowel sounds are normal. He exhibits no distension. There is no tenderness. There is no guarding.   Musculoskeletal:   The right shoulder is tender on palpation.   Neurological: He is alert.   Skin: Skin is warm and dry.   Vitals reviewed.        Assessment/Plan   Souleymane was seen today for  dizziness and nasal congestion.    Diagnoses and all orders for this visit:    Nasal congestion    Chronic right shoulder pain  -     triamcinolone acetonide (KENALOG-40) injection 80 mg    Chronic abdominal pain    Light headedness    Other orders  -     fluticasone (FLONASE) 50 MCG/ACT nasal spray; 2 sprays into the nostril(s) as directed by provider Daily.  -     irbesartan-hydrochlorothiazide (AVALIDE) 150-12.5 MG tablet; Take one every other day.  -     ciprofloxacin (CIPRO) 250 MG tablet; Take 1 tablet by mouth 2 (Two) Times a Day.        He is to go to one every other day.He will check with his Cardiologist.  Return to the clinic in 6 month/s.  Will contact with results as needed.

## 2019-05-24 ENCOUNTER — OFFICE VISIT (OUTPATIENT)
Dept: CARDIOLOGY | Facility: CLINIC | Age: 74
End: 2019-05-24

## 2019-05-24 VITALS
OXYGEN SATURATION: 98 % | BODY MASS INDEX: 25.9 KG/M2 | SYSTOLIC BLOOD PRESSURE: 132 MMHG | HEART RATE: 73 BPM | WEIGHT: 165 LBS | DIASTOLIC BLOOD PRESSURE: 84 MMHG | HEIGHT: 67 IN

## 2019-05-24 DIAGNOSIS — I25.810 CORONARY ARTERY DISEASE INVOLVING CORONARY BYPASS GRAFT OF NATIVE HEART WITHOUT ANGINA PECTORIS: Primary | ICD-10-CM

## 2019-05-24 DIAGNOSIS — R42 DIZZINESS: ICD-10-CM

## 2019-05-24 DIAGNOSIS — R06.02 SOB (SHORTNESS OF BREATH): ICD-10-CM

## 2019-05-24 DIAGNOSIS — I10 ESSENTIAL HYPERTENSION: ICD-10-CM

## 2019-05-24 PROCEDURE — 99213 OFFICE O/P EST LOW 20 MIN: CPT | Performed by: INTERNAL MEDICINE

## 2019-05-24 NOTE — PROGRESS NOTES
[facility]  OFFICE NOTE    Souleymane Stapleton  73 y.o. male    05/24/2019  1. Coronary artery disease involving coronary bypass graft of native heart without angina pectoris    2. Essential hypertension    3. SOB (shortness of breath)    4. Dizziness        Chief complaint -shortness of breath      History of present Illness- 73-year-old gentleman who had quadruple bypass in June 2018 after he was found to have multivessel disease was on dual antiplatelet therapy with aspirin and Plavix.  He noticed shortness of breath more than usual before and he has history of allergies and is a been on Toprol-XL.  He still complaining of occasional dizziness and shortness of breath but he regularly exercises in the park.  Some of his dizziness could be related to alpha-blocker for his prostate and the trazodone which he uses for sleep.  We will do a tilt table test.  We will do exercise treadmill to assess his functional capacity      Allergies   Allergen Reactions   • Statins Myalgia     Muscle soreness    • Tricor [Fenofibrate] Myalgia     Muscle soreness          Past Medical History:   Diagnosis Date   • Abdominal pain    • Abnormal weight loss    • Acid reflux    • Acute gastritis    • Anxiety state    • Arthritis    • Cancer (CMS/HCC)     Prostate   • History of cerebrovascular accident    • History of colon polyps    • Hypertension    • Nausea    • Nuclear senile cataract    • Presbyopia    • Tobacco use    • Transient cerebral ischemia    • Vitreous detachment of left eye          Past Surgical History:   Procedure Laterality Date   • BACK SURGERY     • CARDIAC CATHETERIZATION N/A 6/19/2018    Procedure: Coronary angiography;  Surgeon: Delroy Mcconnell MD;  Location: [facility] INVASIVE LOCATION;  Service: Cardiovascular   • COLONOSCOPY  06/09/2015    Diverticulosis found in the sigmoid colon. Hemorrhoids found in the anus.   • CORONARY ARTERY BYPASS GRAFT N/A 6/22/2018    Procedure:  PARAS STERNOTOMY CORONARY ARTERY BYPASS GRAFT TIMES 5 USING RIGHT AND LEFT INTERNAL MAMMARY ARTERIES AND LEFT GREATER SAPHENOUS VEIN GRAFT PER ENDOSCOPIC VEIN HARVESTING AND PRP;  Surgeon: Edgar Pérez MD;  Location[facility];  Service: Cardiothoracic   • CRYOTHERAPY  2012    Of Acne   • ENDOSCOPY  2015    EGD w/ biopsy -Multiple polyps, one removed.   • HEMORRHOIDECTOMY N/A 3/20/2017    Procedure: Removal of Anal Papilla;  Surgeon: Juan Diego Ken MD;  Location: [facility] OR;  Service:    • INJECTION OF MEDICATION  2010    B12   • INJECTION OF MEDICATION  10/17/2011    Kenalog   • LUMBAR LAMINECTOMY  2010   • OTHER SURGICAL HISTORY  2010    Biopsy, Each Additional Lesion    • SKIN BIOPSY  2010         Family History   Problem Relation Age of Onset   • Dementia Other    • Hypertension Other    • Stroke Other    • Hypertension Mother    • Hypertension Father          Social History     Socioeconomic History   • Marital status:      Spouse name: Not on file   • Number of children: Not on file   • Years of education: Not on file   • Highest education level: Not on file   Tobacco Use   • Smoking status: Former Smoker     Types: Cigarettes     Last attempt to quit:      Years since quittin.4   • Smokeless tobacco: Never Used   Substance and Sexual Activity   • Alcohol use: Yes     Comment: Social   • Drug use: No   • Sexual activity: Defer         Current Outpatient Medications   Medication Sig Dispense Refill   • ALPRAZolam (XANAX) 0.5 MG tablet TAKE 1 TABLET THREE TIMES DAILY 270 tablet 1   • busPIRone (BUSPAR) 5 MG tablet Take 1 tablet by mouth 3 (Three) Times a Day. 90 tablet 2   • Cetirizine HCl 10 MG capsule Take 10 mg by mouth Daily.     • ciprofloxacin (CIPRO) 250 MG tablet Take 1 tablet by mouth 2 (Two) Times a Day. 10 tablet 1   • clopidogrel (PLAVIX) 75 MG tablet Take 1 tablet by mouth Daily. 3 tablet 0   • dutasteride (AVODART) 0.5 MG capsule  Take 1 capsule by mouth Every Night. 3 capsule 0   • ezetimibe (ZETIA) 10 MG tablet TAKE 1 TABLET BY MOUTH ONCE DAILY 30 tablet 6   • finasteride (PROPECIA) 1 MG tablet Take 1 tablet by mouth Daily. 3 tablet 0   • fluticasone (FLONASE) 50 MCG/ACT nasal spray 2 sprays into the nostril(s) as directed by provider Daily. 16 g 3   • irbesartan-hydrochlorothiazide (AVALIDE) 150-12.5 MG tablet Take one every other day. 90 tablet 1   • metoprolol succinate XL (TOPROL-XL) 25 MG 24 hr tablet Take 1 tablet by mouth Daily With Dinner. 90 tablet 5   • omeprazole (PRILOSEC) 40 MG capsule Take 1 capsule by mouth Daily. 90 capsule 2   • sildenafil (VIAGRA) 100 MG tablet Take 1 tablet by mouth Daily As Needed for erectile dysfunction. 10 tablet 2   • simethicone (GAS-X) 80 MG chewable tablet Chew 1 tablet Every 6 (Six) Hours As Needed for Flatulence. 56 tablet 1   • sodium chloride (OCEAN NASAL SPRAY) 0.65 % nasal spray 2 sprays into the nostril(s) as directed by provider As Needed for Congestion. 1 each 0   • traZODone (DESYREL) 50 MG tablet Take 1 tablet by mouth Every Night. 90 tablet 3   • vitamin B-12 (CYANOCOBALAMIN) 500 MCG tablet Take 500 mcg by mouth Daily.     • Alirocumab 150 MG/ML solution pen-injector Inject 150 mg under the skin into the appropriate area as directed Every 14 (Fourteen) Days. 2 pen 3     No current facility-administered medications for this visit.          Review of Systems     Constitution: Denies any fatigue, fever or chills    HENT: Denies any headache, hearing impairment,     Eyes: Denies any blurring of vision, or photophobia     Cardivascular - As per history of present illness     Respiratory system-denies  shortness of breath- NYHA class I        Endocrine:   history of hyperlipidemia       Musculoskeletal:   rotator cuff injury to the right shoulder    Gastrointestinal: No nausea, vomiting, or melena    Genitourinary: History of prostate cancer    Neurological:   No history of seizure disorder,  "stroke, memory problems    Psychiatric/Behavioral:        No history of depression,    Hematological- no history of easy bruising             OBJECTIVE    /84   Pulse 73   Ht 170.2 cm (67.01\")   Wt 74.8 kg (165 lb)   SpO2 98%   BMI 25.84 kg/m²       Physical Exam     Constitutional: is oriented to person, place, and time.     Skin-warm and dry    Well developed and nourished in no acute distress      Head: Normocephalic and atraumatic.     Eyes: Pupils are equal, round, and reactive to light.     Neck: Neck supple. No bruit in the carotids    Cardiovascular: [place] in the fifth intercostal space   Regular rate, and  Rhythm,    S1 greater than S2, no S3 or S4, no gallop     Pulmonary/Chest:   Air  Entry is equal on both sides  No wheezing or crackles,      Abdominal: Soft.  No hepatosplenomegaly, bowel sounds are present    Musculoskeletal: No kyphoscoliosis    Neurological: is alert and oriented to person, place, and time.    cranial nerve are intact .   No motor or sensory deficit    Extremities-no edema, no radial femoral delay      Psychiatric: He has a normal mood and affect.                  His behavior is normal.           Procedures      Lab Results   Component Value Date    WBC 7.43 04/29/2019    HGB 13.8 04/29/2019    HCT 43.2 04/29/2019    MCV 83.2 04/29/2019     04/29/2019     Lab Results   Component Value Date    GLUCOSE 101 (H) 04/29/2019    BUN 14 04/29/2019    CREATININE 1.20 04/29/2019    EGFRIFNONA 59 (L) 04/29/2019    EGFRIFAFRI 60 05/02/2018    BCR 11.7 04/29/2019    CO2 27.3 04/29/2019    CALCIUM 9.1 04/29/2019    ALBUMIN 4.30 04/29/2019    AST 19 04/29/2019    ALT 25 04/29/2019     Lab Results   Component Value Date    CHOL 176 01/11/2019    CHOL 229 (H) 10/24/2018    CHOL 213 (H) 04/05/2018     Lab Results   Component Value Date    TRIG 195 01/11/2019    TRIG 225 (H) 10/24/2018    TRIG 114 04/05/2018     Lab Results   Component Value Date    HDL 40 (L) 01/11/2019    HDL 38 (L) " 10/24/2018    HDL 35 (L) 04/05/2018     No components found for: LDLCALC  Lab Results   Component Value Date    LDL 92 04/29/2019     01/11/2019     (H) 10/24/2018     No results found for: HDLLDLRATIO  No components found for: CHOLHDL  No results found for: HGBA1C  Lab Results   Component Value Date    TSH 1.470 01/11/2019                  A/P  Shortness of breath -CAD status post CABG we will do exercise treadmill to assess his functional capacity echo was unremarkable    Dizziness most likely related to his blood pressure, trazodone and Avodart but we will do a tilt table test to rule out orthostasis.  I asked him to discuss with Dr. Murrieta about getting an MRI /MRA to rule out any lacunar stroke or vertebrobasilar insufficiency    CAD status post CABG in June 2018, doing well no chest pain has  chest soreness from his sternotomy site on antiplatelet therapy with  Plavix, no more nosebleeds since he is off of aspirin    Hypertension -blood pressure is doing better and no more dizziness since he has been on Avalide 150/12.5 mg daily continue the Toprol-XL 25 mg in the evening .    Hyperlipidemia -on PCSK9 Inhibitors    BPH on Avodart and finasteride.                    This document has been electronically signed by Delroy Mcconnell MD on May 24, 2019 9:28 AM       EMR Dragon/Transcription disclaimer:   Some of this note may be an electronic transcription/translation of spoken language to printed text. The electronic translation of spoken language may permit erroneous, or at times, nonsensical words or phrases to be inadvertently transcribed; Although I have reviewed the note for such errors, some may still exist.

## 2019-05-31 RX ORDER — ALPRAZOLAM 0.5 MG/1
TABLET ORAL
Qty: 270 TABLET | Refills: 1 | Status: SHIPPED | OUTPATIENT
Start: 2019-05-31 | End: 2019-12-02 | Stop reason: SDUPTHER

## 2019-05-31 NOTE — TELEPHONE ENCOUNTER
DR. Murrieta,    . Souleymane Stapleton is requesting a refill on his Xanax 0.5 mg #270 Take 1 TID.    Last OV 05/23/19    Next Matias OV 07/08/19    Last Script Written 10/15/18  #270 with 1 refill    Last RICK  10/14/18      Please Advise      Thank you

## 2019-06-06 RX ORDER — CLOPIDOGREL BISULFATE 75 MG/1
TABLET ORAL
Qty: 90 TABLET | Refills: 3 | Status: SHIPPED | OUTPATIENT
Start: 2019-06-06 | End: 2019-10-17 | Stop reason: SDUPTHER

## 2019-06-12 ENCOUNTER — HOSPITAL ENCOUNTER (OUTPATIENT)
Dept: CARDIOLOGY | Facility: HOSPITAL | Age: 74
Discharge: HOME | End: 2019-06-12
Admitting: INTERNAL MEDICINE

## 2019-06-12 LAB
MAXIMAL PREDICTED HEART RATE: 145 BPM
STRESS TARGET HR: 123 BPM

## 2019-06-12 PROCEDURE — 93660 TILT TABLE EVALUATION: CPT | Performed by: INTERNAL MEDICINE

## 2019-06-12 PROCEDURE — 93660 TILT TABLE EVALUATION: CPT

## 2019-06-13 LAB
BH CV STRESS BP STAGE 1: NORMAL
BH CV STRESS BP STAGE 2: NORMAL
BH CV STRESS BP STAGE 3: NORMAL
BH CV STRESS DURATION MIN STAGE 1: 3
BH CV STRESS DURATION MIN STAGE 2: 3
BH CV STRESS DURATION MIN STAGE 3: 2
BH CV STRESS DURATION SEC STAGE 1: 0
BH CV STRESS DURATION SEC STAGE 2: 0
BH CV STRESS DURATION SEC STAGE 3: 3
BH CV STRESS GRADE STAGE 1: 10
BH CV STRESS GRADE STAGE 2: 12
BH CV STRESS GRADE STAGE 3: 14
BH CV STRESS HR STAGE 1: 127
BH CV STRESS HR STAGE 2: 142
BH CV STRESS HR STAGE 3: 155
BH CV STRESS METS STAGE 1: 5
BH CV STRESS METS STAGE 2: 7.5
BH CV STRESS METS STAGE 3: 10
BH CV STRESS PROTOCOL 1: NORMAL
BH CV STRESS RECOVERY BP: NORMAL MMHG
BH CV STRESS RECOVERY HR: 123 BPM
BH CV STRESS SPEED STAGE 1: 1.7
BH CV STRESS SPEED STAGE 2: 2.5
BH CV STRESS SPEED STAGE 3: 3.4
BH CV STRESS STAGE 1: 1
BH CV STRESS STAGE 2: 2
BH CV STRESS STAGE 3: 3
MAXIMAL PREDICTED HEART RATE: 145 BPM
PERCENT MAX PREDICTED HR: 106.9 %
STRESS BASELINE BP: NORMAL MMHG
STRESS BASELINE HR: 113 BPM
STRESS PERCENT HR: 126 %
STRESS POST ESTIMATED WORKLOAD: 10 METS
STRESS POST EXERCISE DUR MIN: 8 MIN
STRESS POST EXERCISE DUR SEC: 3 SEC
STRESS POST PEAK BP: NORMAL MMHG
STRESS POST PEAK HR: 155 BPM
STRESS TARGET HR: 123 BPM

## 2019-06-18 ENCOUNTER — OFFICE VISIT (OUTPATIENT)
Dept: FAMILY MEDICINE CLINIC | Facility: CLINIC | Age: 74
End: 2019-06-18

## 2019-06-18 VITALS
SYSTOLIC BLOOD PRESSURE: 148 MMHG | BODY MASS INDEX: 25.46 KG/M2 | HEART RATE: 77 BPM | HEIGHT: 67 IN | OXYGEN SATURATION: 98 % | WEIGHT: 162.19 LBS | DIASTOLIC BLOOD PRESSURE: 82 MMHG

## 2019-06-18 DIAGNOSIS — R42 LIGHT HEADEDNESS: ICD-10-CM

## 2019-06-18 DIAGNOSIS — F41.1 ANXIETY STATE: ICD-10-CM

## 2019-06-18 DIAGNOSIS — I10 ESSENTIAL HYPERTENSION: Primary | ICD-10-CM

## 2019-06-18 DIAGNOSIS — J34.89 SINUS DRAINAGE: ICD-10-CM

## 2019-06-18 PROCEDURE — 99214 OFFICE O/P EST MOD 30 MIN: CPT | Performed by: FAMILY MEDICINE

## 2019-06-18 RX ORDER — CEFUROXIME AXETIL 500 MG/1
500 TABLET ORAL 2 TIMES DAILY
Qty: 20 TABLET | Refills: 0 | Status: SHIPPED | OUTPATIENT
Start: 2019-06-18 | End: 2019-09-19

## 2019-06-18 NOTE — PROGRESS NOTES
Subjective   Souleymane Stapleton is a 73 y.o. male.    cc: follow up  History of Present Illness The patient comes in with c/o dizziness. It has continued on for several weeks. He has also had some thick drainage that goes down his throat and it will choke him at times.     The following portions of the patient's history were reviewed and updated as appropriate: allergies, current medications, past family history, past medical history, past social history, past surgical history and problem list.    Review of Systems   Constitutional: Positive for fatigue. Negative for fever.   Respiratory: Negative for cough, chest tightness and stridor.    Cardiovascular: Negative for chest pain, palpitations and leg swelling.   Neurological: Positive for dizziness and light-headedness.       Objective   Physical Exam   Constitutional: He is oriented to person, place, and time. He appears well-developed and well-nourished.   HENT:   Head: Normocephalic and atraumatic.   Right Ear: External ear normal.   Left Ear: External ear normal.   Nose: Nose normal.   Mouth/Throat: Oropharynx is clear and moist.   Eyes: Conjunctivae are normal.   Neck: Normal range of motion.   Cardiovascular: Normal rate, regular rhythm and normal heart sounds. Exam reveals no gallop and no friction rub.   No murmur heard.  Pulmonary/Chest: Effort normal and breath sounds normal. No stridor. No respiratory distress. He has no wheezes. He has no rales.   Abdominal: Soft. Bowel sounds are normal. He exhibits no distension. There is no tenderness. There is no guarding.   Musculoskeletal: Normal range of motion.   Neurological: He is alert and oriented to person, place, and time. No cranial nerve deficit.   Romberg is negative.   Skin: Skin is warm and dry.   Vitals reviewed.        Assessment/Plan   Souleymane was seen today for dizziness and balance issues.    Diagnoses and all orders for this visit:    Essential hypertension    Anxiety state    Light headedness  -      MRI Brain With & Without Contrast; Future  -     MRI neck soft tissue w contrast; Future    Sinus drainage    Other orders  -     cefuroxime (CEFTIN) 500 MG tablet; Take 1 tablet by mouth 2 (Two) Times a Day.      Return to the clinic in 3 month/s.  Will contact with results as needed.

## 2019-06-28 ENCOUNTER — CLINICAL SUPPORT (OUTPATIENT)
Dept: AUDIOLOGY | Facility: CLINIC | Age: 74
End: 2019-06-28

## 2019-06-28 DIAGNOSIS — H90.3 SENSORINEURAL HEARING LOSS, BILATERAL: Primary | ICD-10-CM

## 2019-06-28 NOTE — PROGRESS NOTES
STANDARD AUDIOMETRIC EVALUATION      Name:  Souleymane Stapleton  :  1945  Age:  73 y.o.  Date of Evaluation:  2019      HISTORY    Reason for visit:  Souleymane Stapleton is seen today for a hearing test at the request of Dr. Souleymane Murrieta.  Patient reports he thinks his hearing is getting worse.  He states he has to turn up his hearing aids more.  He states he has problems with his eustachian tubes, and he takes allergy medicine, but it doesn't seem to help much.  He states he wears binaural [place] OSBALDO hearing aids that he received through the mail.        EVALUATION    See Audiogram    RESULTS        Otoscopy and Tympanometry 226 Hz :  Right Ear:  Otoscopy:  Clear ear canal          Tympanometry:  Middle ear function within normal limits    Left Ear:   Otoscopy:  Clear ear canal        Tympanometry:  Middle ear function within normal limits    Test technique:  Standard Audiometry     Pure Tone Audiometry:   Patient responded to pure tones at 10-65 dB for 250-8000 Hz in right ear, and at 25-90 dB for 250-8000 Hz in left ear.       Speech Audiometry:        Right Ear:  Speech Reception Threshold (SRT) was obtained at 35 dBHL                 Speech Discrimination scores were 96% in quiet when words were presented at 75 dBHL       Left Ear:  Speech Reception Threshold (SRT) was obtained at 40 dBHL                 Speech Discrimination scores were 68% in quiet when words were presented at 80 dBHL    Reliability:   good    IMPRESSIONS:  1.  Tympanometry results are consistent with Middle ear function within normal limits in both ears.  2.  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       RECOMMENDATIONS:  Test results were reviewed with patient, and all questions were answered at this time.  It is noted that he has had some decrease in his hearing since his  last test 5 years ago.  It is recommended he return to where he got the hearing aids from to have his hearing aids reprogrammed to today's thresholds.  It was a pleasure seeing Souleymane Stapleton in Audiology today.  We would be happy to do further testing or discuss these test as necessary.          This document has been electronically signed by Olivia Bell MS CCC-A on June 28, 2019 4:46 PM       Olivia Bell MS CCC-A  Licensed Audiologist

## 2019-07-09 ENCOUNTER — HOSPITAL ENCOUNTER (OUTPATIENT)
Dept: MRI IMAGING | Facility: HOSPITAL | Age: 74
Discharge: HOME | End: 2019-07-09
Admitting: FAMILY MEDICINE

## 2019-07-09 DIAGNOSIS — R42 LIGHT HEADEDNESS: ICD-10-CM

## 2019-07-09 PROCEDURE — A9576 INJ PROHANCE MULTIPACK: HCPCS | Performed by: FAMILY MEDICINE

## 2019-07-09 PROCEDURE — 70553 MRI BRAIN STEM W/O & W/DYE: CPT

## 2019-07-09 PROCEDURE — 25010000002 GADOTERIDOL PER 1 ML: Performed by: FAMILY MEDICINE

## 2019-07-09 RX ADMIN — GADOTERIDOL 15 ML: 279.3 INJECTION, SOLUTION INTRAVENOUS at 13:23

## 2019-07-15 ENCOUNTER — HOSPITAL ENCOUNTER (OUTPATIENT)
Dept: ULTRASOUND IMAGING | Facility: HOSPITAL | Age: 74
Discharge: HOME | End: 2019-07-15
Admitting: INTERNAL MEDICINE

## 2019-07-15 DIAGNOSIS — K30 MILD DIETARY INDIGESTION: ICD-10-CM

## 2019-07-15 PROCEDURE — 76705 ECHO EXAM OF ABDOMEN: CPT

## 2019-07-18 ENCOUNTER — HOSPITAL ENCOUNTER (OUTPATIENT)
Dept: NUCLEAR MEDICINE | Facility: HOSPITAL | Age: 74
Discharge: HOME | End: 2019-07-18

## 2019-07-18 DIAGNOSIS — K30 MILD DIETARY INDIGESTION: ICD-10-CM

## 2019-07-18 PROCEDURE — 0 TECHNETIUM SULFUR COLLOID: Performed by: INTERNAL MEDICINE

## 2019-07-18 PROCEDURE — A9541 TC99M SULFUR COLLOID: HCPCS | Performed by: INTERNAL MEDICINE

## 2019-07-18 PROCEDURE — 78264 GASTRIC EMPTYING IMG STUDY: CPT

## 2019-07-18 RX ADMIN — TECHNETIUM TC 99M SULFUR COLLOID 1 DOSE: KIT at 07:25

## 2019-07-19 ENCOUNTER — TELEPHONE (OUTPATIENT)
Dept: FAMILY MEDICINE CLINIC | Facility: CLINIC | Age: 74
End: 2019-07-19

## 2019-07-22 ENCOUNTER — TRANSCRIBE ORDERS (OUTPATIENT)
Dept: LAB | Facility: HOSPITAL | Age: 74
End: 2019-07-22

## 2019-07-22 DIAGNOSIS — C61 MALIGNANT NEOPLASM OF PROSTATE (HCC): Primary | ICD-10-CM

## 2019-07-22 DIAGNOSIS — C61 PROSTATE CANCER (HCC): ICD-10-CM

## 2019-07-23 ENCOUNTER — LAB (OUTPATIENT)
Dept: LAB | Facility: HOSPITAL | Age: 74
End: 2019-07-23

## 2019-07-23 DIAGNOSIS — C61 PROSTATE CANCER (HCC): ICD-10-CM

## 2019-07-23 LAB — PSA SERPL-MCNC: 14.5 NG/ML (ref 0–4)

## 2019-07-23 PROCEDURE — 36415 COLL VENOUS BLD VENIPUNCTURE: CPT

## 2019-07-23 PROCEDURE — 84153 ASSAY OF PSA TOTAL: CPT

## 2019-07-23 NOTE — TELEPHONE ENCOUNTER
Spoke with patient about results, he stated he has not received to letter that was mailed out over a week ago

## 2019-07-26 RX ORDER — BUSPIRONE HYDROCHLORIDE 5 MG/1
TABLET ORAL
Qty: 90 TABLET | Refills: 2 | Status: SHIPPED | OUTPATIENT
Start: 2019-07-26 | End: 2019-08-02 | Stop reason: SDUPTHER

## 2019-07-29 RX ORDER — METOPROLOL SUCCINATE 25 MG/1
25 TABLET, EXTENDED RELEASE ORAL
Qty: 90 TABLET | Refills: 5 | Status: SHIPPED | OUTPATIENT
Start: 2019-07-29 | End: 2020-10-14 | Stop reason: SDUPTHER

## 2019-08-02 DIAGNOSIS — K21.9 GASTROESOPHAGEAL REFLUX DISEASE, ESOPHAGITIS PRESENCE NOT SPECIFIED: ICD-10-CM

## 2019-08-02 DIAGNOSIS — R12 CHRONIC HEARTBURN: ICD-10-CM

## 2019-08-02 RX ORDER — OMEPRAZOLE 40 MG/1
40 CAPSULE, DELAYED RELEASE ORAL DAILY
Qty: 14 CAPSULE | Refills: 0 | Status: SHIPPED | OUTPATIENT
Start: 2019-08-02 | End: 2019-08-02 | Stop reason: SDUPTHER

## 2019-08-02 RX ORDER — OMEPRAZOLE 40 MG/1
CAPSULE, DELAYED RELEASE ORAL
Qty: 90 CAPSULE | Refills: 2 | Status: SHIPPED | OUTPATIENT
Start: 2019-08-02 | End: 2020-02-12

## 2019-08-02 RX ORDER — FINASTERIDE 1 MG/1
1 TABLET, FILM COATED ORAL DAILY
Qty: 90 TABLET | Refills: 3 | Status: SHIPPED | OUTPATIENT
Start: 2019-08-02 | End: 2019-09-06 | Stop reason: SDUPTHER

## 2019-08-02 RX ORDER — BUSPIRONE HYDROCHLORIDE 5 MG/1
5 TABLET ORAL 3 TIMES DAILY
Qty: 270 TABLET | Refills: 3 | Status: SHIPPED | OUTPATIENT
Start: 2019-08-02 | End: 2020-10-21

## 2019-08-02 RX ORDER — FLUTICASONE PROPIONATE 50 MCG
2 SPRAY, SUSPENSION (ML) NASAL DAILY
Qty: 3 BOTTLE | Refills: 3 | Status: SHIPPED | OUTPATIENT
Start: 2019-08-02 | End: 2020-09-25

## 2019-08-22 ENCOUNTER — HOSPITAL ENCOUNTER (OUTPATIENT)
Facility: HOSPITAL | Age: 74
Setting detail: HOSPITAL OUTPATIENT SURGERY
Discharge: HOME | End: 2019-08-22
Attending: INTERNAL MEDICINE | Admitting: INTERNAL MEDICINE

## 2019-08-22 ENCOUNTER — ANESTHESIA (OUTPATIENT)
Dept: GASTROENTEROLOGY | Facility: HOSPITAL | Age: 74
End: 2019-08-22

## 2019-08-22 ENCOUNTER — ANESTHESIA EVENT (OUTPATIENT)
Dept: GASTROENTEROLOGY | Facility: HOSPITAL | Age: 74
End: 2019-08-22

## 2019-08-22 VITALS
HEART RATE: 66 BPM | TEMPERATURE: 96.7 F | HEIGHT: 69 IN | OXYGEN SATURATION: 93 % | BODY MASS INDEX: 24.29 KG/M2 | WEIGHT: 164 LBS | RESPIRATION RATE: 16 BRPM | DIASTOLIC BLOOD PRESSURE: 68 MMHG | SYSTOLIC BLOOD PRESSURE: 114 MMHG

## 2019-08-22 DIAGNOSIS — K30 STOMACH UPSET: ICD-10-CM

## 2019-08-22 PROCEDURE — 25010000002 PROPOFOL 10 MG/ML EMULSION: Performed by: NURSE ANESTHETIST, CERTIFIED REGISTERED

## 2019-08-22 PROCEDURE — 88305 TISSUE EXAM BY PATHOLOGIST: CPT | Performed by: INTERNAL MEDICINE

## 2019-08-22 PROCEDURE — 87071 CULTURE AEROBIC QUANT OTHER: CPT | Performed by: INTERNAL MEDICINE

## 2019-08-22 PROCEDURE — 88305 TISSUE EXAM BY PATHOLOGIST: CPT | Performed by: PATHOLOGY

## 2019-08-22 RX ORDER — DEXTROSE AND SODIUM CHLORIDE 5; .45 G/100ML; G/100ML
30 INJECTION, SOLUTION INTRAVENOUS CONTINUOUS PRN
Status: DISCONTINUED | OUTPATIENT
Start: 2019-08-22 | End: 2019-08-22 | Stop reason: HOSPADM

## 2019-08-22 RX ORDER — PROPOFOL 10 MG/ML
VIAL (ML) INTRAVENOUS AS NEEDED
Status: DISCONTINUED | OUTPATIENT
Start: 2019-08-22 | End: 2019-08-22 | Stop reason: SURG

## 2019-08-22 RX ORDER — LIDOCAINE HYDROCHLORIDE 20 MG/ML
INJECTION, SOLUTION EPIDURAL; INFILTRATION; INTRACAUDAL; PERINEURAL AS NEEDED
Status: DISCONTINUED | OUTPATIENT
Start: 2019-08-22 | End: 2019-08-22 | Stop reason: SURG

## 2019-08-22 RX ADMIN — DEXTROSE AND SODIUM CHLORIDE 30 ML/HR: 5; 450 INJECTION, SOLUTION INTRAVENOUS at 07:46

## 2019-08-22 RX ADMIN — PROPOFOL 100 MG: 10 INJECTION, EMULSION INTRAVENOUS at 08:20

## 2019-08-22 RX ADMIN — LIDOCAINE HYDROCHLORIDE 50 MG: 20 INJECTION, SOLUTION EPIDURAL; INFILTRATION; INTRACAUDAL; PERINEURAL at 08:20

## 2019-08-22 NOTE — H&P
Chuck Guo DO,[facility]  Gastroenterology  Hepatology  Endoscopy  Board Certified in Internal Medicine and gastroenterology  [address], [address]  [place]. [address]  - [phone]   F - [phone]     GASTROENTEROLOGY HISTORY AND PHYSICAL  NOTE   CHUCK GUO DO.         SUBJECTIVE:   8/22/2019    Name: Souleymane Stapleton  DOD: 1945        Chief Complaint:       Subjective : Dyspepsia, small bowel bacterial overgrowth.  Normal ultrasound of the gallbladder and rapid emptying of the stomach on gastric emptying study    Patient is 73 y.o. male presents with desire for elective EGD with biopsy and culture.      ROS/HISTORY/ CURRENT MEDICATIONS/OBJECTIVE/VS/PE:   Review of Systems:  All systems unremarkable unless specified below.  Constitutional   HENT  Eyes   Respiratory    Cardiovascular  Gastrointestinal   Endocrine  Genitourinary    Musculoskeletal   Skin  Allergic/Immunologic    Neurological    Hematological  Psychiatric/Behavioral    History:     Past Medical History:   Diagnosis Date   • Abdominal pain    • Abnormal weight loss    • Acid reflux    • Acute gastritis    • Anxiety state    • Arthritis    • Cancer (CMS/HCC)     Prostate   • History of cerebrovascular accident    • History of colon polyps    • Hypertension    • Nausea    • Nuclear senile cataract    • Presbyopia    • Tobacco use    • Transient cerebral ischemia    • Vitreous detachment of left eye      Past Surgical History:   Procedure Laterality Date   • BACK SURGERY     • CARDIAC CATHETERIZATION N/A 6/19/2018    Procedure: Coronary angiography;  Surgeon: Delroy Mcconnell MD;  Location: [facility] INVASIVE LOCATION;  Service: Cardiovascular   • COLONOSCOPY  06/09/2015    Diverticulosis found in the sigmoid colon. Hemorrhoids found in the anus.   • CORONARY ARTERY BYPASS GRAFT N/A 6/22/2018    Procedure: PARAS STERNOTOMY CORONARY ARTERY BYPASS GRAFT TIMES 5 USING RIGHT AND LEFT INTERNAL MAMMARY ARTERIES AND LEFT  GREATER SAPHENOUS VEIN GRAFT PER ENDOSCOPIC VEIN HARVESTING AND PRP;  Surgeon: Edgar Pérez MD;  Location: [facility] OR;  Service: Cardiothoracic   • CRYOTHERAPY  2012    Of Acne   • ENDOSCOPY  2015    EGD w/ biopsy -Multiple polyps, one removed.   • HEMORRHOIDECTOMY N/A 3/20/2017    Procedure: Removal of Anal Papilla;  Surgeon: Juan Diego Ken MD;  Location: [facility] OR;  Service:    • INJECTION OF MEDICATION  2010    B12   • INJECTION OF MEDICATION  10/17/2011    Kenalog   • LUMBAR LAMINECTOMY  2010   • OTHER SURGICAL HISTORY  2010    Biopsy, Each Additional Lesion    • SKIN BIOPSY  2010     Family History   Problem Relation Age of Onset   • Dementia Other    • Hypertension Other    • Stroke Other    • Hypertension Mother    • Hypertension Father      Social History     Tobacco Use   • Smoking status: Former Smoker     Types: Cigarettes     Last attempt to quit:      Years since quittin.6   • Smokeless tobacco: Never Used   Substance Use Topics   • Alcohol use: Yes     Alcohol/week: 0.6 oz     Types: 1 Glasses of wine per week     Comment: Social   • Drug use: No     Medications Prior to Admission   Medication Sig Dispense Refill Last Dose   • ALPRAZolam (XANAX) 0.5 MG tablet TAKE 1 TABLET THREE TIMES DAILY 270 tablet 1 2019 at Unknown time   • busPIRone (BUSPAR) 5 MG tablet Take 1 tablet by mouth 3 (Three) Times a Day. 270 tablet 3 2019 at Unknown time   • dutasteride (AVODART) 0.5 MG capsule Take 1 capsule by mouth Every Night. 3 capsule 0 2019 at Unknown time   • ezetimibe (ZETIA) 10 MG tablet TAKE 1 TABLET BY MOUTH ONCE DAILY 30 tablet 6 2019 at Unknown time   • finasteride (PROPECIA) 1 MG tablet Take 1 tablet by mouth Daily. 90 tablet 3 2019 at Unknown time   • fluticasone (FLONASE) 50 MCG/ACT nasal spray 2 sprays into the nostril(s) as directed by provider Daily. 3 bottle 3 Past Week at Unknown time   •  irbesartan-hydrochlorothiazide (AVALIDE) 150-12.5 MG tablet Take one every other day. 90 tablet 1 8/21/2019 at Unknown time   • metoprolol succinate XL (TOPROL-XL) 25 MG 24 hr tablet Take 1 tablet by mouth Daily With Dinner. 90 tablet 5 8/21/2019 at Unknown time   • omeprazole (priLOSEC) 40 MG capsule TAKE 1 CAPSULE EVERY DAY 90 capsule 2 8/21/2019 at Unknown time   • sildenafil (VIAGRA) 100 MG tablet Take 1 tablet by mouth Daily As Needed for erectile dysfunction. 10 tablet 2 8/21/2019 at Unknown time   • simethicone (GAS-X) 80 MG chewable tablet Chew 1 tablet Every 6 (Six) Hours As Needed for Flatulence. 56 tablet 1 Past Month at Unknown time   • sodium chloride (OCEAN NASAL SPRAY) 0.65 % nasal spray 2 sprays into the nostril(s) as directed by provider As Needed for Congestion. 1 each 0 8/21/2019 at Unknown time   • traZODone (DESYREL) 50 MG tablet Take 1 tablet by mouth Every Night. 90 tablet 3 8/21/2019 at Unknown time   • vitamin B-12 (CYANOCOBALAMIN) 500 MCG tablet Take 500 mcg by mouth Daily.   8/21/2019 at Unknown time   • cefuroxime (CEFTIN) 500 MG tablet Take 1 tablet by mouth 2 (Two) Times a Day. 20 tablet 0    • clopidogrel (PLAVIX) 75 MG tablet TAKE 1 TABLET EVERY DAY 90 tablet 3 8/18/2019     Allergies:  Statins and Tricor [fenofibrate]    I have reviewed the patients medical history, surgical history and family history in the available medical record system.     Current Medications:     Current Facility-Administered Medications   Medication Dose Route Frequency Provider Last Rate Last Dose   • dextrose 5 % and sodium chloride 0.45 % infusion  30 mL/hr Intravenous Continuous PRN Chuck Rich DO 30 mL/hr at 08/22/19 0746 30 mL/hr at 08/22/19 0746       Objective     Physical Exam:   Temp:  [97.2 °F (36.2 °C)] 97.2 °F (36.2 °C)  Heart Rate:  [66] 66  Resp:  [18] 18  BP: (157)/(75) 157/75    Physical Exam:  General Appearance:    Alert, cooperative, in no acute distress   Head:    Normocephalic,  without obvious abnormality, atraumatic   Eyes:            Lids and lashes normal, conjunctivae and sclerae normal, no   icterus, no pallor, corneas clear, PERRLA   Ears:    Ears appear intact with no abnormalities noted   Throat:   No oral lesions, no thrush, oral mucosa moist   Neck:   No adenopathy, supple, trachea midline, no thyromegaly, no     carotid bruit, no JVD   Back:     No kyphosis present, no scoliosis present, no skin lesions,       erythema or scars, no tenderness to percussion or                   palpation,   range of motion normal   Lungs:     Clear to auscultation,respirations regular, even and                   unlabored    Heart:    Regular rhythm and normal rate, normal S1 and S2, no            murmur, no gallop, no rub, no click   Breast Exam:    Deferred   Abdomen:     Normal bowel sounds, no masses, no organomegaly, soft        non-tender, non-distended, no guarding, no rebound                 tenderness   Genitalia:    Deferred   Extremities:   Moves all extremities well, no edema, no cyanosis, no              redness   Pulses:   Pulses palpable and equal bilaterally   Skin:   No bleeding, bruising or rash   Lymph nodes:   No palpable adenopathy   Neurologic:   Cranial nerves 2 - 12 grossly intact, sensation intact, DTR        present and equal bilaterally      Results Review:     Lab Results   Component Value Date    WBC 7.43 04/29/2019    WBC 5.93 11/04/2018    WBC 8.10 10/24/2018    HGB 13.8 04/29/2019    HGB 14.3 11/04/2018    HGB 14.8 10/24/2018    HCT 43.2 04/29/2019    HCT 41.4 11/04/2018    HCT 43.3 10/24/2018     04/29/2019     11/04/2018     10/24/2018             No results found for: LIPASE  Lab Results   Component Value Date    INR 1.05 11/04/2018    INR 1.41 (H) 06/23/2018    INR 1.50 (H) 06/22/2018          Radiology Review:  Imaging Results (last 72 hours)     ** No results found for the last 72 hours. **           I reviewed the patient's new clinical  results.  I reviewed the patient's new imaging results and agree with the interpretation.     ASSESSMENT/PLAN:   ASSESSMENT:  1.  Dyspepsia  2.  Small bowel bacterial overgrowth    PLAN:  1.  Esophagogastroduodenoscopy with biopsies and cultures    Risk and benefits associated with the procedure are reviewed with the patient.  The patient wished to proceed     Chuck Rich DO  08/22/19  8:20 AM

## 2019-08-22 NOTE — ANESTHESIA POSTPROCEDURE EVALUATION
Patient: Souleymane Stapleton    Procedure Summary     Date:  08/22/19 Room / Location:  [facility] ENDOSCOPY 2 / [facility] ENDOSCOPY    Anesthesia Start:  0803 Anesthesia Stop:  0836    Procedure:  ESOPHAGOGASTRODUODENOSCOPY (N/A ) Diagnosis:       Stomach upset      (Stomach upset [K30])    Surgeon:  Chuck Rich DO Provider:  Yandel Branch CRNA    Anesthesia Type:  MAC ASA Status:  3          Anesthesia Type: MAC  Last vitals  BP   157/75 (08/22/19 0732)   Temp   97.2 °F (36.2 °C) (08/22/19 0732)   Pulse   66 (08/22/19 0732)   Resp   18 (08/22/19 0732)     SpO2   98 % (08/22/19 0732)     Post Anesthesia Care and Evaluation    Patient location during evaluation: bedside  Patient participation: complete - patient participated  Level of consciousness: awake  Pain score: 0  Pain management: adequate  Airway patency: patent  Anesthetic complications: No anesthetic complications  PONV Status: none  Cardiovascular status: acceptable and stable  Respiratory status: acceptable and spontaneous ventilation  Hydration status: acceptable

## 2019-08-22 NOTE — ANESTHESIA PREPROCEDURE EVALUATION
Anesthesia Evaluation     NPO Solid Status: > 8 hours  NPO Liquid Status: > 2 hours           Airway   Mallampati: III  TM distance: <3 FB  Neck ROM: limited  Possible difficult intubation  Dental - normal exam     Pulmonary     breath sounds clear to auscultation  Cardiovascular   Exercise tolerance: good (4-7 METS)    Rhythm: regular  Rate: normal    (+) hypertension well controlled 2 medications or greater, CABG >6 Months, hyperlipidemia,       Neuro/Psych  (+) TIA, psychiatric history Anxiety,     GI/Hepatic/Renal/Endo    (+)  GERD,      Musculoskeletal     Abdominal    Substance History      OB/GYN          Other   (+) arthritis   history of cancer                    Anesthesia Plan    ASA 3     MAC     Anesthetic plan, all risks, benefits, and alternatives have been provided, discussed and informed consent has been obtained with: patient.    Plan discussed with CRNA.

## 2019-08-23 LAB
LAB AP CASE REPORT: NORMAL
PATH REPORT.FINAL DX SPEC: NORMAL
PATH REPORT.GROSS SPEC: NORMAL

## 2019-08-24 LAB
BACTERIA SPEC AEROBE CULT: ABNORMAL
BACTERIA SPEC AEROBE CULT: ABNORMAL

## 2019-09-06 RX ORDER — FINASTERIDE 1 MG/1
1 TABLET, FILM COATED ORAL DAILY
Qty: 90 TABLET | Refills: 3 | Status: SHIPPED | OUTPATIENT
Start: 2019-09-06 | End: 2019-09-09 | Stop reason: SDUPTHER

## 2019-09-09 ENCOUNTER — TELEPHONE (OUTPATIENT)
Dept: FAMILY MEDICINE CLINIC | Facility: CLINIC | Age: 74
End: 2019-09-09

## 2019-09-09 RX ORDER — FINASTERIDE 1 MG/1
1 TABLET, FILM COATED ORAL DAILY
Qty: 14 TABLET | Refills: 0 | Status: SHIPPED | OUTPATIENT
Start: 2019-09-09 | End: 2019-09-19 | Stop reason: SDUPTHER

## 2019-09-09 RX ORDER — FINASTERIDE 1 MG/1
1 TABLET, FILM COATED ORAL DAILY
Qty: 90 TABLET | Refills: 3 | Status: SHIPPED | OUTPATIENT
Start: 2019-09-09 | End: 2019-09-09 | Stop reason: SDUPTHER

## 2019-09-09 RX ORDER — FINASTERIDE 1 MG/1
1 TABLET, FILM COATED ORAL DAILY
Qty: 90 TABLET | Refills: 3 | Status: SHIPPED | OUTPATIENT
Start: 2019-09-09 | End: 2019-12-06 | Stop reason: SDUPTHER

## 2019-09-18 NOTE — PROGRESS NOTES
[facility] Cardiology  OFFICE NOTE    Souleymane Stapleton  73 y.o. male    08/022/2018  1. Coronary artery disease involving coronary bypass graft of native heart without angina pectoris    2. SOB (shortness of breath)    3. Pure hypercholesterolemia    4. Essential hypertension        Chief complaint -nosebleeds      History of present Illness- 73-year-old gentleman who had quadruple bypass in June 2018 after he was found to have multivessel disease was on dual antiplatelet therapy with aspirin and Plavix.  He had a nosebleed and was in the emergency room and had to be cauterized and packed. He does have some soreness on the chest from the sternotomy.  He could not tolerate statins or fenofibrate and currently is on Zetia for cholesterol.  He is on Toprol-XL 50 mg daily.  He has been noticing his blood pressure is getting low in the morning and feeling dizzy and lightheaded with a systolic of 90.  I dropped the dose on Avalide to 150/12.5 mg daily and see how he does.  Stopped the aspirin and continue the Plavix    Allergies   Allergen Reactions   • Statins Myalgia     Muscle soreness    • Tricor [Fenofibrate] Myalgia     Muscle soreness          Past Medical History:   Diagnosis Date   • Abdominal pain    • Abnormal weight loss    • Acid reflux    • Acute gastritis    • Anxiety state    • Arthritis    • Cancer (CMS/HCC)     Prostate   • History of cerebrovascular accident    • History of colon polyps    • Hypertension    • Nausea    • Nuclear senile cataract    • Presbyopia    • Tobacco use    • Transient cerebral ischemia    • Vitreous detachment of left eye          Past Surgical History:   Procedure Laterality Date   • BACK SURGERY     • CARDIAC CATHETERIZATION N/A 6/19/2018    Procedure: Coronary angiography;  Surgeon: Delroy Mcconnell MD;  Location: [facility] INVASIVE LOCATION;  Service: Cardiovascular   • COLONOSCOPY  06/09/2015    Diverticulosis found in the sigmoid colon.  Hemorrhoids found in the anus.   • CORONARY ARTERY BYPASS GRAFT N/A 6/22/2018    Procedure: PARAS STERNOTOMY CORONARY ARTERY BYPASS GRAFT TIMES 5 USING RIGHT AND LEFT INTERNAL MAMMARY ARTERIES AND LEFT GREATER SAPHENOUS VEIN GRAFT PER ENDOSCOPIC VEIN HARVESTING AND PRP;  Surgeon: Edgar Pérez MD;  Location: [facility] OR;  Service: Cardiothoracic   • CRYOTHERAPY  08/14/2012    Of Acne   • ENDOSCOPY  09/01/2015    EGD w/ biopsy -Multiple polyps, one removed.   • HEMORRHOIDECTOMY N/A 3/20/2017    Procedure: Removal of Anal Papilla;  Surgeon: Juan Diego Ken MD;  Location: [facility] OR;  Service:    • INJECTION OF MEDICATION  09/14/2010    B12   • INJECTION OF MEDICATION  10/17/2011    Kenalog   • LUMBAR LAMINECTOMY  09/29/2010   • OTHER SURGICAL HISTORY  08/19/2010    Biopsy, Each Additional Lesion    • SKIN BIOPSY  08/19/2010         Family History   Problem Relation Age of Onset   • Dementia Other    • Hypertension Other    • Stroke Other    • Hypertension Mother    • Hypertension Father          Social History     Social History   • Marital status:      Spouse name: N/A   • Number of children: N/A   • Years of education: N/A     Occupational History   • Not on file.     Social History Main Topics   • Smoking status: Former Smoker     Types: Cigarettes     Quit date: 1997   • Smokeless tobacco: Never Used   • Alcohol use Yes      Comment: Social   • Drug use: No   • Sexual activity: Defer     Other Topics Concern   • Not on file     Social History Narrative   • No narrative on file         Current Outpatient Prescriptions   Medication Sig Dispense Refill   • ALPRAZolam (XANAX) 0.5 MG tablet TAKE 1 TABLET THREE TIMES DAILY 270 tablet 1   • amoxicillin-clavulanate (AUGMENTIN) 875-125 MG per tablet Take 1 tablet by mouth 2 (Two) Times a Day for 5 days. 10 tablet 0   • clopidogrel (PLAVIX) 75 MG tablet Take 1 tablet by mouth Daily. 3 tablet 0   • dutasteride (AVODART) 0.5 MG capsule Take 1 capsule by  "mouth Every Night. 3 capsule 0   • ezetimibe (ZETIA) 10 MG tablet Take 1 tablet by mouth Daily. 3 tablet 0   • finasteride (PROPECIA) 1 MG tablet Take 1 tablet by mouth Daily. 3 tablet 0   • fluticasone (FLONASE) 50 MCG/ACT nasal spray 2 sprays into each nostril Daily. 16 g 3   • metoprolol succinate XL (TOPROL-XL) 50 MG 24 hr tablet Take 1 tablet by mouth Daily With Dinner. 90 tablet 3   • omeprazole (PRILOSEC) 40 MG capsule Take 1 capsule by mouth Daily. 90 capsule 2   • simethicone (GAS-X) 80 MG chewable tablet Chew 1 tablet Every 6 (Six) Hours As Needed for Flatulence. 56 tablet 1   • sodium chloride (OCEAN NASAL SPRAY) 0.65 % nasal spray 2 sprays into the nostril(s) as directed by provider As Needed for Congestion. 1 each 0   • traZODone (DESYREL) 50 MG tablet Take 1 tablet by mouth Every Night. 90 tablet 3   • vitamin B-12 (CYANOCOBALAMIN) 500 MCG tablet Take 500 mcg by mouth Daily.     • irbesartan-hydrochlorothiazide (AVALIDE) 150-12.5 MG tablet Take 1 tablet by mouth Daily. 90 tablet 5     No current facility-administered medications for this visit.          Review of Systems     Constitution: Denies any fatigue, fever or chills    HENT: Denies any headache, hearing impairment,     Eyes: Denies any blurring of vision, or photophobia     Cardivascular - As per history of present illness     Respiratory system-denies  shortness of breath- NYHA class I        Endocrine:   history of hyperlipidemia       Musculoskeletal:   rotator cuff injury to the right shoulder    Gastrointestinal: No nausea, vomiting, or melena    Genitourinary: History of prostate cancer    Neurological:   No history of seizure disorder, stroke, memory problems    Psychiatric/Behavioral:        No history of depression,    Hematological- no history of easy bruising             OBJECTIVE    /70   Pulse 80   Ht 172.7 cm (67.99\")   Wt 74.4 kg (164 lb)   BMI 24.94 kg/m²       Physical Exam     Constitutional: is oriented to person, " place, and time.     Skin-warm and dry    Well developed and nourished in no acute distress      Head: Normocephalic and atraumatic.     Eyes: Pupils are equal, round, and reactive to light.     Neck: Neck supple. No bruit in the carotids    Cardiovascular: [place] in the fifth intercostal space   Regular rate, and  Rhythm,    S1 greater than S2, no S3 or S4, no gallop     Pulmonary/Chest:   Air  Entry is equal on both sides  No wheezing or crackles,      Abdominal: Soft.  No hepatosplenomegaly, bowel sounds are present    Musculoskeletal: No kyphoscoliosis    Neurological: is alert and oriented to person, place, and time.    cranial nerve are intact .   No motor or sensory deficit    Extremities-no edema, no radial femoral delay      Psychiatric: He has a normal mood and affect.                  His behavior is normal.           Procedures      Lab Results   Component Value Date    WBC 5.93 11/04/2018    HGB 14.3 11/04/2018    HCT 41.4 11/04/2018    MCV 83.1 11/04/2018     11/04/2018     Lab Results   Component Value Date    GLUCOSE 96 10/24/2018    BUN 21 10/24/2018    CREATININE 1.25 10/24/2018    EGFRIFNONA 56 10/24/2018    BCR 16.8 10/24/2018    CO2 27.0 10/24/2018    CALCIUM 9.1 10/24/2018    ALBUMIN 4.30 10/24/2018    AST 68 (H) 10/24/2018    ALT 34 10/24/2018     Lab Results   Component Value Date    CHOL 229 (H) 10/24/2018    CHOL 213 (H) 04/05/2018    CHOL 223 (H) 11/28/2017     Lab Results   Component Value Date    TRIG 225 (H) 10/24/2018    TRIG 114 04/05/2018    TRIG 140 11/28/2017     Lab Results   Component Value Date    HDL 38 (L) 10/24/2018    HDL 35 (L) 04/05/2018    HDL 50 (L) 11/28/2017     No components found for: LDLCALC  Lab Results   Component Value Date     (H) 10/24/2018     04/05/2018     (H) 11/28/2017     No results found for: HDLLDLRATIO  No components found for: CHOLHDL  No results found for: HGBA1C  Lab Results   Component Value Date    TSH 0.78 02/11/2015                   A/P  CAD status post CABG in June 2018, doing well no chest pain has sensed chest soreness from his sternotomy site on antiplatelet therapy with  Plavix, stop the aspirin due to nosebleeds    Hypertension -blood pressure getting low in the morning, asked him to continue the Toprol-XL 50 mg in the evening and decrease the dose of Avalide to 150/12.5 mg daily    Hyperlipidemia -could not tolerate statins and is tolerating the Zetia, LDL is still high started him on repatha and will get it approved through the insurance.    BPH on Avodart and finasteride.    Follow-up as scheduled                This document has been electronically signed by Delroy Mcconnell MD on November 8, 2018 10:22 AM       EMR Dragon/Transcription disclaimer:   Some of this note may be an electronic transcription/translation of spoken language to printed text. The electronic translation of spoken language may permit erroneous, or at times, nonsensical words or phrases to be inadvertently transcribed; Although I have reviewed the note for such errors, some may still exist.    18-Sep-2019 12:35

## 2019-09-19 ENCOUNTER — OFFICE VISIT (OUTPATIENT)
Dept: FAMILY MEDICINE CLINIC | Facility: CLINIC | Age: 74
End: 2019-09-19

## 2019-09-19 VITALS
DIASTOLIC BLOOD PRESSURE: 68 MMHG | HEART RATE: 80 BPM | HEIGHT: 69 IN | WEIGHT: 166.5 LBS | SYSTOLIC BLOOD PRESSURE: 118 MMHG | BODY MASS INDEX: 24.66 KG/M2 | OXYGEN SATURATION: 98 %

## 2019-09-19 DIAGNOSIS — E78.00 PURE HYPERCHOLESTEROLEMIA: ICD-10-CM

## 2019-09-19 DIAGNOSIS — I10 ESSENTIAL HYPERTENSION: Primary | ICD-10-CM

## 2019-09-19 DIAGNOSIS — Z86.73 HISTORY OF TIA (TRANSIENT ISCHEMIC ATTACK): ICD-10-CM

## 2019-09-19 DIAGNOSIS — D22.9 NEVUS: ICD-10-CM

## 2019-09-19 PROBLEM — M48.02 CERVICAL SPINAL STENOSIS: Status: ACTIVE | Noted: 2018-05-15

## 2019-09-19 PROBLEM — Z09 FOLLOW-UP EXAMINATION AFTER NEUROLOGICAL SURGERY: Status: ACTIVE | Noted: 2018-06-05

## 2019-09-19 PROBLEM — I77.71 INTERNAL CAROTID ARTERY DISSECTION: Status: ACTIVE | Noted: 2018-01-19

## 2019-09-19 PROBLEM — M50.20 DISPLACEMENT OF CERVICAL INTERVERTEBRAL DISC: Status: ACTIVE | Noted: 2018-01-23

## 2019-09-19 PROBLEM — G45.9 TIA (TRANSIENT ISCHEMIC ATTACK): Status: ACTIVE | Noted: 2017-12-20

## 2019-09-19 PROCEDURE — 99213 OFFICE O/P EST LOW 20 MIN: CPT | Performed by: FAMILY MEDICINE

## 2019-09-19 PROCEDURE — 17110 DESTRUCTION B9 LES UP TO 14: CPT | Performed by: FAMILY MEDICINE

## 2019-09-19 NOTE — PROGRESS NOTES
Subjective   Souleymane Stapleton is a 74 y.o. male.    cc: follow up of chronic medical issues.  History of Present Illness The patient comes in for check of their chronic medical issues which include History of TIAs ,Hypertension and Hyperlipidemia.He is at his base line.   .       The following portions of the patient's history were reviewed and updated as appropriate: allergies, current medications, past family history, past medical history, past social history, past surgical history and problem list.    Review of Systems   Constitutional: Negative for fatigue and fever.   Respiratory: Negative for cough, chest tightness and stridor.    Cardiovascular: Negative for chest pain, palpitations and leg swelling.   Neurological: Positive for dizziness.       Objective   Physical Exam   Constitutional: He is oriented to person, place, and time. He appears well-developed and well-nourished.   HENT:   Head: Normocephalic and atraumatic.   Right Ear: External ear normal.   Left Ear: External ear normal.   Nose: Nose normal.   Mouth/Throat: Oropharynx is clear and moist.   Eyes: Conjunctivae are normal.   Neck: Normal range of motion.   Cardiovascular: Normal rate, regular rhythm and normal heart sounds. Exam reveals no gallop and no friction rub.   No murmur heard.  Pulmonary/Chest: Effort normal and breath sounds normal. No stridor. No respiratory distress. He has no wheezes. He has no rales.   Abdominal: Soft. Bowel sounds are normal. He exhibits no distension and no mass. There is no tenderness. There is no guarding.   Neurological: He is alert and oriented to person, place, and time.   Skin: Skin is warm and dry.   He has two lesions on his back. They are 1/4 of an inch in diameter.They are flat and smooth.   Vitals reviewed.        Assessment/Plan   Souleymane was seen today for follow-up.    Diagnoses and all orders for this visit:    Essential hypertension  -     Comprehensive metabolic panel; Future  -     CBC w AUTO  Differential; Future    Pure hypercholesterolemia  -     Lipid panel; Future    History of TIA (transient ischemic attack)      Return to the clinic in 3 month/s.  Will contact with results as needed.

## 2019-09-20 ENCOUNTER — LAB (OUTPATIENT)
Dept: LAB | Facility: HOSPITAL | Age: 74
End: 2019-09-20

## 2019-09-20 DIAGNOSIS — E78.00 PURE HYPERCHOLESTEROLEMIA: ICD-10-CM

## 2019-09-20 DIAGNOSIS — I10 ESSENTIAL HYPERTENSION: ICD-10-CM

## 2019-09-20 LAB
ALBUMIN SERPL-MCNC: 4 G/DL (ref 3.5–5.2)
ALBUMIN/GLOB SERPL: 1.5 G/DL
ALP SERPL-CCNC: 47 U/L (ref 39–117)
ALT SERPL W P-5'-P-CCNC: 15 U/L (ref 1–41)
ANION GAP SERPL CALCULATED.3IONS-SCNC: 11.8 MMOL/L (ref 5–15)
AST SERPL-CCNC: 22 U/L (ref 1–40)
BASOPHILS # BLD AUTO: 0.1 10*3/MM3 (ref 0–0.2)
BASOPHILS NFR BLD AUTO: 1.5 % (ref 0–1.5)
BILIRUB SERPL-MCNC: 0.6 MG/DL (ref 0.2–1.2)
BUN BLD-MCNC: 19 MG/DL (ref 8–23)
BUN/CREAT SERPL: 15.3 (ref 7–25)
CALCIUM SPEC-SCNC: 8.9 MG/DL (ref 8.6–10.5)
CHLORIDE SERPL-SCNC: 102 MMOL/L (ref 98–107)
CHOLEST SERPL-MCNC: 203 MG/DL (ref 0–200)
CO2 SERPL-SCNC: 26.2 MMOL/L (ref 22–29)
CREAT BLD-MCNC: 1.24 MG/DL (ref 0.76–1.27)
DEPRECATED RDW RBC AUTO: 46.7 FL (ref 37–54)
EOSINOPHIL # BLD AUTO: 0.15 10*3/MM3 (ref 0–0.4)
EOSINOPHIL NFR BLD AUTO: 2.2 % (ref 0.3–6.2)
ERYTHROCYTE [DISTWIDTH] IN BLOOD BY AUTOMATED COUNT: 15 % (ref 12.3–15.4)
GFR SERPL CREATININE-BSD FRML MDRD: 57 ML/MIN/1.73
GLOBULIN UR ELPH-MCNC: 2.6 GM/DL
GLUCOSE BLD-MCNC: 103 MG/DL (ref 65–99)
HCT VFR BLD AUTO: 41.7 % (ref 37.5–51)
HDLC SERPL-MCNC: 41 MG/DL (ref 40–60)
HGB BLD-MCNC: 13.8 G/DL (ref 13–17.7)
IMM GRANULOCYTES # BLD AUTO: 0.02 10*3/MM3 (ref 0–0.05)
IMM GRANULOCYTES NFR BLD AUTO: 0.3 % (ref 0–0.5)
LDLC SERPL CALC-MCNC: 140 MG/DL (ref 0–100)
LDLC/HDLC SERPL: 3.4 {RATIO}
LYMPHOCYTES # BLD AUTO: 2.55 10*3/MM3 (ref 0.7–3.1)
LYMPHOCYTES NFR BLD AUTO: 37.4 % (ref 19.6–45.3)
MCH RBC QN AUTO: 28.1 PG (ref 26.6–33)
MCHC RBC AUTO-ENTMCNC: 33.1 G/DL (ref 31.5–35.7)
MCV RBC AUTO: 84.9 FL (ref 79–97)
MONOCYTES # BLD AUTO: 0.63 10*3/MM3 (ref 0.1–0.9)
MONOCYTES NFR BLD AUTO: 9.3 % (ref 5–12)
NEUTROPHILS # BLD AUTO: 3.36 10*3/MM3 (ref 1.7–7)
NEUTROPHILS NFR BLD AUTO: 49.3 % (ref 42.7–76)
NRBC BLD AUTO-RTO: 0 /100 WBC (ref 0–0.2)
PLATELET # BLD AUTO: 331 10*3/MM3 (ref 140–450)
PMV BLD AUTO: 10.6 FL (ref 6–12)
POTASSIUM BLD-SCNC: 4 MMOL/L (ref 3.5–5.2)
PROT SERPL-MCNC: 6.6 G/DL (ref 6–8.5)
RBC # BLD AUTO: 4.91 10*6/MM3 (ref 4.14–5.8)
SODIUM BLD-SCNC: 140 MMOL/L (ref 136–145)
TRIGL SERPL-MCNC: 112 MG/DL (ref 0–150)
VLDLC SERPL-MCNC: 22.4 MG/DL (ref 5–40)
WBC NRBC COR # BLD: 6.81 10*3/MM3 (ref 3.4–10.8)

## 2019-09-20 PROCEDURE — 80061 LIPID PANEL: CPT

## 2019-09-20 PROCEDURE — 36415 COLL VENOUS BLD VENIPUNCTURE: CPT

## 2019-09-20 PROCEDURE — 80053 COMPREHEN METABOLIC PANEL: CPT

## 2019-09-20 PROCEDURE — 85025 COMPLETE CBC W/AUTO DIFF WBC: CPT

## 2019-09-30 ENCOUNTER — TELEPHONE (OUTPATIENT)
Dept: FAMILY MEDICINE CLINIC | Facility: CLINIC | Age: 74
End: 2019-09-30

## 2019-10-01 ENCOUNTER — TELEPHONE (OUTPATIENT)
Dept: FAMILY MEDICINE CLINIC | Facility: CLINIC | Age: 74
End: 2019-10-01

## 2019-10-01 RX ORDER — SILDENAFIL 100 MG/1
100 TABLET, FILM COATED ORAL DAILY PRN
Qty: 10 TABLET | Refills: 2 | Status: SHIPPED | OUTPATIENT
Start: 2019-10-01 | End: 2019-10-04 | Stop reason: SDUPTHER

## 2019-10-01 NOTE — TELEPHONE ENCOUNTER
Pt needs refill viagra would like more than 1 Mo at a time if possible. Needs to know if due the 2nd  pneumonia shot?    [facility] in [place]    Thank You

## 2019-10-02 ENCOUNTER — FLU SHOT (OUTPATIENT)
Dept: FAMILY MEDICINE CLINIC | Facility: CLINIC | Age: 74
End: 2019-10-02

## 2019-10-02 DIAGNOSIS — Z23 IMMUNIZATION, PNEUMOCOCCUS AND INFLUENZA: ICD-10-CM

## 2019-10-02 PROCEDURE — G0008 ADMIN INFLUENZA VIRUS VAC: HCPCS | Performed by: FAMILY MEDICINE

## 2019-10-02 PROCEDURE — 90653 IIV ADJUVANT VACCINE IM: CPT | Performed by: FAMILY MEDICINE

## 2019-10-04 RX ORDER — SILDENAFIL 100 MG/1
100 TABLET, FILM COATED ORAL DAILY PRN
Qty: 10 TABLET | Refills: 2 | Status: SHIPPED | OUTPATIENT
Start: 2019-10-04 | End: 2020-04-30

## 2019-10-15 DIAGNOSIS — N13.8 ENLARGED PROSTATE WITH URINARY OBSTRUCTION: Primary | ICD-10-CM

## 2019-10-15 DIAGNOSIS — N40.1 ENLARGED PROSTATE WITH URINARY OBSTRUCTION: Primary | ICD-10-CM

## 2019-10-16 ENCOUNTER — LAB (OUTPATIENT)
Dept: LAB | Facility: HOSPITAL | Age: 74
End: 2019-10-16

## 2019-10-16 DIAGNOSIS — N13.8 ENLARGED PROSTATE WITH URINARY OBSTRUCTION: ICD-10-CM

## 2019-10-16 DIAGNOSIS — C61 MALIGNANT NEOPLASM OF PROSTATE (HCC): Primary | ICD-10-CM

## 2019-10-16 DIAGNOSIS — N40.1 ENLARGED PROSTATE WITH URINARY OBSTRUCTION: ICD-10-CM

## 2019-10-16 LAB — PSA SERPL-MCNC: 18.3 NG/ML (ref 0–4)

## 2019-10-16 PROCEDURE — 36415 COLL VENOUS BLD VENIPUNCTURE: CPT

## 2019-10-16 PROCEDURE — 84153 ASSAY OF PSA TOTAL: CPT

## 2019-10-17 ENCOUNTER — TELEPHONE (OUTPATIENT)
Dept: FAMILY MEDICINE CLINIC | Facility: CLINIC | Age: 74
End: 2019-10-17

## 2019-10-17 RX ORDER — CLOPIDOGREL BISULFATE 75 MG/1
75 TABLET ORAL DAILY
Qty: 14 TABLET | Refills: 0 | Status: SHIPPED | OUTPATIENT
Start: 2019-10-17 | End: 2020-05-18 | Stop reason: SDUPTHER

## 2019-10-17 NOTE — TELEPHONE ENCOUNTER
Pt needs a refill of clopidogrel sent to [facility] in [place]. His mail order pharmacy can not get it to him before he is out. Said if a 2 wk script could be sent in he would appreciate it. Thanks!

## 2019-10-22 ENCOUNTER — APPOINTMENT (OUTPATIENT)
Dept: CT IMAGING | Facility: HOSPITAL | Age: 74
End: 2019-10-22

## 2019-10-29 ENCOUNTER — HOSPITAL ENCOUNTER (OUTPATIENT)
Dept: CT IMAGING | Facility: HOSPITAL | Age: 74
Discharge: HOME | End: 2019-10-29
Admitting: UROLOGY

## 2019-10-29 DIAGNOSIS — C61 MALIGNANT NEOPLASM OF PROSTATE (HCC): ICD-10-CM

## 2019-10-29 PROCEDURE — 25010000002 IOPAMIDOL 61 % SOLUTION: Performed by: UROLOGY

## 2019-10-29 PROCEDURE — 74178 CT ABD&PLV WO CNTR FLWD CNTR: CPT

## 2019-10-29 RX ADMIN — IOPAMIDOL 90 ML: 612 INJECTION, SOLUTION INTRAVENOUS at 14:04

## 2019-11-29 RX ORDER — ALPRAZOLAM 0.5 MG/1
TABLET ORAL
Qty: 90 TABLET | Refills: 0 | Status: CANCELLED | OUTPATIENT
Start: 2019-11-29

## 2019-12-02 RX ORDER — EZETIMIBE 10 MG/1
TABLET ORAL
Qty: 30 TABLET | Refills: 6 | Status: SHIPPED | OUTPATIENT
Start: 2019-12-02 | End: 2020-02-12 | Stop reason: SDUPTHER

## 2019-12-02 RX ORDER — ALPRAZOLAM 0.5 MG/1
0.5 TABLET ORAL 3 TIMES DAILY
Qty: 90 TABLET | Refills: 2 | Status: SHIPPED | OUTPATIENT
Start: 2019-12-02 | End: 2019-12-06 | Stop reason: SDUPTHER

## 2019-12-02 NOTE — TELEPHONE ENCOUNTER
Dr. Murrieta    . Souleymane Stapleton is requesting a refill on his Xanax 0.5 mg #270 Take 1 tablet TID.    Last OV    09/19/19    Next Matias OV    12/20/2019    Last Script Written  05/21/19  #270 with 1 refill      Last Evgeny     10/24/18, last one on file in EPIC    Please advise on refill    Thank you

## 2019-12-06 ENCOUNTER — OFFICE VISIT (OUTPATIENT)
Dept: FAMILY MEDICINE CLINIC | Facility: CLINIC | Age: 74
End: 2019-12-06

## 2019-12-06 VITALS
BODY MASS INDEX: 26.61 KG/M2 | SYSTOLIC BLOOD PRESSURE: 128 MMHG | DIASTOLIC BLOOD PRESSURE: 60 MMHG | OXYGEN SATURATION: 98 % | HEIGHT: 67 IN | HEART RATE: 77 BPM | WEIGHT: 169.56 LBS

## 2019-12-06 DIAGNOSIS — R10.9 ABDOMINAL DISCOMFORT: Primary | ICD-10-CM

## 2019-12-06 DIAGNOSIS — E78.00 PURE HYPERCHOLESTEROLEMIA: ICD-10-CM

## 2019-12-06 DIAGNOSIS — M54.50 LOW BACK PAIN, UNSPECIFIED BACK PAIN LATERALITY, UNSPECIFIED CHRONICITY, UNSPECIFIED WHETHER SCIATICA PRESENT: ICD-10-CM

## 2019-12-06 DIAGNOSIS — G89.29 CHRONIC PAIN OF BOTH SHOULDERS: ICD-10-CM

## 2019-12-06 DIAGNOSIS — I10 ESSENTIAL HYPERTENSION: ICD-10-CM

## 2019-12-06 DIAGNOSIS — F41.9 ANXIETY: ICD-10-CM

## 2019-12-06 DIAGNOSIS — M25.511 CHRONIC PAIN OF BOTH SHOULDERS: ICD-10-CM

## 2019-12-06 DIAGNOSIS — M25.512 CHRONIC PAIN OF BOTH SHOULDERS: ICD-10-CM

## 2019-12-06 PROCEDURE — 99214 OFFICE O/P EST MOD 30 MIN: CPT | Performed by: FAMILY MEDICINE

## 2019-12-06 RX ORDER — ALPRAZOLAM 0.5 MG/1
0.5 TABLET ORAL 3 TIMES DAILY
Qty: 90 TABLET | Refills: 2 | Status: SHIPPED | OUTPATIENT
Start: 2019-12-06 | End: 2020-05-11 | Stop reason: SDUPTHER

## 2019-12-06 RX ORDER — CEPHALEXIN 500 MG/1
500 CAPSULE ORAL 3 TIMES DAILY
Qty: 30 CAPSULE | Refills: 0 | Status: SHIPPED | OUTPATIENT
Start: 2019-12-06 | End: 2020-01-02

## 2019-12-06 RX ORDER — IRBESARTAN AND HYDROCHLOROTHIAZIDE 150; 12.5 MG/1; MG/1
1 TABLET, FILM COATED ORAL DAILY
Qty: 90 TABLET | Refills: 1 | Status: SHIPPED | OUTPATIENT
Start: 2019-12-06 | End: 2020-01-07 | Stop reason: SDUPTHER

## 2019-12-06 RX ORDER — FINASTERIDE 1 MG/1
1 TABLET, FILM COATED ORAL DAILY
Qty: 90 TABLET | Refills: 3 | Status: SHIPPED | OUTPATIENT
Start: 2019-12-06 | End: 2020-12-17 | Stop reason: SDUPTHER

## 2019-12-06 RX ORDER — TAMSULOSIN HYDROCHLORIDE 0.4 MG/1
1 CAPSULE ORAL DAILY
COMMUNITY
End: 2020-01-14

## 2019-12-06 NOTE — PROGRESS NOTES
"Subjective   Souleymane Stapleton is a 74 y.o. male.   Cc: follow up of chronic medical issues    HPI  The patient comes in for check of their chronic medical issues which include hypercholesterolemia and Hypertension. He is continuing to have some coughin and low abdominal pain.If he standing up,he will get low back pain. He also has some dizziness when he is up. He is coughing up quite a bit.      The following portions of the patient's history were reviewed and updated as appropriate: allergies, current medications, past family history, past medical history, past social history, past surgical history and problem list.    Review of Systems   Constitutional: Negative for fatigue and fever.   Respiratory: Positive for cough. Negative for chest tightness and stridor.    Cardiovascular: Negative for chest pain, palpitations and leg swelling.   Musculoskeletal: Positive for arthralgias, back pain and myalgias.       Objective   Physical Exam   Constitutional: He is oriented to person, place, and time. He appears well-developed and well-nourished.   HENT:   Head: Normocephalic and atraumatic.   Right Ear: External ear normal.   Left Ear: External ear normal.   Nose: Nose normal.   Mouth/Throat: Oropharynx is clear and moist.   Eyes: Conjunctivae are normal.   Neck: Normal range of motion.   Cardiovascular: Normal rate, regular rhythm and normal heart sounds. Exam reveals no gallop and no friction rub.   No murmur heard.  Pulmonary/Chest: Effort normal and breath sounds normal. No stridor. No respiratory distress. He has no wheezes. He has no rales.   Abdominal: Soft. Bowel sounds are normal. He exhibits no distension. There is no tenderness. There is no guarding.   Musculoskeletal:   The shoulders are tender on palpation.His low back is tender.   Neurological: He is alert and oriented to person, place, and time.   Skin: Skin is warm and dry.   Vitals reviewed.        Visit Vitals  /60   Pulse 77   Ht 170.2 cm (67\") "   Wt 76.9 kg (169 lb 9 oz)   SpO2 98%   BMI 26.56 kg/m²     Body mass index is 26.56 kg/m².      Assessment/Plan   Souleymane was seen today for follow-up and hypertension.    Diagnoses and all orders for this visit:    Abdominal discomfort    Anxiety    Pure hypercholesterolemia  -     Lipid panel; Future    Essential hypertension  -     Comprehensive metabolic panel; Future  -     CBC w AUTO Differential; Future    Chronic pain of both shoulders  -     Ambulatory Referral to Orthopedic Surgery    Low back pain, unspecified back pain laterality, unspecified chronicity, unspecified whether sciatica present  -     Ambulatory Referral to Orthopedic Surgery    Other orders  -     irbesartan-hydrochlorothiazide (AVALIDE) 150-12.5 MG tablet; Take 1 tablet by mouth Daily. Take one every other day.  -     ALPRAZolam (XANAX) 0.5 MG tablet; Take 1 tablet by mouth 3 (Three) Times a Day.  -     finasteride (PROPECIA) 1 MG tablet; Take 1 tablet by mouth Daily.  -     cephalexin (KEFLEX) 500 MG capsule; Take 1 capsule by mouth 3 (Three) Times a Day.    Return to the clinic in 3 month/s.  Will contact with results as needed.

## 2019-12-09 ENCOUNTER — LAB (OUTPATIENT)
Dept: LAB | Facility: HOSPITAL | Age: 74
End: 2019-12-09

## 2019-12-09 DIAGNOSIS — I10 ESSENTIAL HYPERTENSION: ICD-10-CM

## 2019-12-09 DIAGNOSIS — E78.00 PURE HYPERCHOLESTEROLEMIA: ICD-10-CM

## 2019-12-09 LAB
ALBUMIN SERPL-MCNC: 4.1 G/DL (ref 3.5–5.2)
ALBUMIN/GLOB SERPL: 1.5 G/DL
ALP SERPL-CCNC: 50 U/L (ref 39–117)
ALT SERPL W P-5'-P-CCNC: 28 U/L (ref 1–41)
ANION GAP SERPL CALCULATED.3IONS-SCNC: 11.9 MMOL/L (ref 5–15)
AST SERPL-CCNC: 22 U/L (ref 1–40)
BASOPHILS # BLD AUTO: 0.1 10*3/MM3 (ref 0–0.2)
BASOPHILS NFR BLD AUTO: 1.4 % (ref 0–1.5)
BILIRUB SERPL-MCNC: 0.4 MG/DL (ref 0.2–1.2)
BUN BLD-MCNC: 17 MG/DL (ref 8–23)
BUN/CREAT SERPL: 14 (ref 7–25)
CALCIUM SPEC-SCNC: 9 MG/DL (ref 8.6–10.5)
CHLORIDE SERPL-SCNC: 104 MMOL/L (ref 98–107)
CHOLEST SERPL-MCNC: 215 MG/DL (ref 0–200)
CO2 SERPL-SCNC: 26.1 MMOL/L (ref 22–29)
CREAT BLD-MCNC: 1.21 MG/DL (ref 0.76–1.27)
DEPRECATED RDW RBC AUTO: 43.1 FL (ref 37–54)
EOSINOPHIL # BLD AUTO: 0.17 10*3/MM3 (ref 0–0.4)
EOSINOPHIL NFR BLD AUTO: 2.4 % (ref 0.3–6.2)
ERYTHROCYTE [DISTWIDTH] IN BLOOD BY AUTOMATED COUNT: 14.4 % (ref 12.3–15.4)
GFR SERPL CREATININE-BSD FRML MDRD: 58 ML/MIN/1.73
GLOBULIN UR ELPH-MCNC: 2.8 GM/DL
GLUCOSE BLD-MCNC: 102 MG/DL (ref 65–99)
HCT VFR BLD AUTO: 42.3 % (ref 37.5–51)
HDLC SERPL-MCNC: 41 MG/DL (ref 40–60)
HGB BLD-MCNC: 14 G/DL (ref 13–17.7)
IMM GRANULOCYTES # BLD AUTO: 0.02 10*3/MM3 (ref 0–0.05)
IMM GRANULOCYTES NFR BLD AUTO: 0.3 % (ref 0–0.5)
LDLC SERPL CALC-MCNC: 143 MG/DL (ref 0–100)
LDLC/HDLC SERPL: 3.5 {RATIO}
LYMPHOCYTES # BLD AUTO: 2.34 10*3/MM3 (ref 0.7–3.1)
LYMPHOCYTES NFR BLD AUTO: 33.1 % (ref 19.6–45.3)
MCH RBC QN AUTO: 27.8 PG (ref 26.6–33)
MCHC RBC AUTO-ENTMCNC: 33.1 G/DL (ref 31.5–35.7)
MCV RBC AUTO: 83.9 FL (ref 79–97)
MONOCYTES # BLD AUTO: 0.67 10*3/MM3 (ref 0.1–0.9)
MONOCYTES NFR BLD AUTO: 9.5 % (ref 5–12)
NEUTROPHILS # BLD AUTO: 3.76 10*3/MM3 (ref 1.7–7)
NEUTROPHILS NFR BLD AUTO: 53.3 % (ref 42.7–76)
NRBC BLD AUTO-RTO: 0 /100 WBC (ref 0–0.2)
PLATELET # BLD AUTO: 327 10*3/MM3 (ref 140–450)
PMV BLD AUTO: 10.9 FL (ref 6–12)
POTASSIUM BLD-SCNC: 4.2 MMOL/L (ref 3.5–5.2)
PROT SERPL-MCNC: 6.9 G/DL (ref 6–8.5)
RBC # BLD AUTO: 5.04 10*6/MM3 (ref 4.14–5.8)
SODIUM BLD-SCNC: 142 MMOL/L (ref 136–145)
TRIGL SERPL-MCNC: 153 MG/DL (ref 0–150)
VLDLC SERPL-MCNC: 30.6 MG/DL (ref 5–40)
WBC NRBC COR # BLD: 7.06 10*3/MM3 (ref 3.4–10.8)

## 2019-12-09 PROCEDURE — 80053 COMPREHEN METABOLIC PANEL: CPT

## 2019-12-09 PROCEDURE — 80061 LIPID PANEL: CPT

## 2019-12-09 PROCEDURE — 36415 COLL VENOUS BLD VENIPUNCTURE: CPT

## 2019-12-09 PROCEDURE — 85025 COMPLETE CBC W/AUTO DIFF WBC: CPT

## 2019-12-30 ENCOUNTER — TELEPHONE (OUTPATIENT)
Dept: FAMILY MEDICINE CLINIC | Facility: CLINIC | Age: 74
End: 2019-12-30

## 2019-12-30 DIAGNOSIS — R05.9 COUGH: Primary | ICD-10-CM

## 2019-12-30 RX ORDER — DEXTROMETHORPHAN HYDROBROMIDE AND PROMETHAZINE HYDROCHLORIDE 15; 6.25 MG/5ML; MG/5ML
5 SYRUP ORAL 4 TIMES DAILY PRN
Qty: 118 ML | Refills: 1 | Status: SHIPPED | OUTPATIENT
Start: 2019-12-30 | End: 2020-05-22

## 2020-01-02 ENCOUNTER — OFFICE VISIT (OUTPATIENT)
Dept: FAMILY MEDICINE CLINIC | Facility: CLINIC | Age: 75
End: 2020-01-02

## 2020-01-02 VITALS
DIASTOLIC BLOOD PRESSURE: 70 MMHG | TEMPERATURE: 98.1 F | BODY MASS INDEX: 26.89 KG/M2 | WEIGHT: 171.31 LBS | HEART RATE: 94 BPM | HEIGHT: 67 IN | SYSTOLIC BLOOD PRESSURE: 144 MMHG | OXYGEN SATURATION: 98 %

## 2020-01-02 DIAGNOSIS — R06.2 WHEEZING: ICD-10-CM

## 2020-01-02 DIAGNOSIS — J40 BRONCHITIS: ICD-10-CM

## 2020-01-02 DIAGNOSIS — R05.9 COUGH: Primary | ICD-10-CM

## 2020-01-02 PROBLEM — K31.7 POLYP OF STOMACH AND DUODENUM: Status: ACTIVE | Noted: 2020-01-02

## 2020-01-02 PROBLEM — K30 FUNCTIONAL DYSPEPSIA: Status: ACTIVE | Noted: 2020-01-02

## 2020-01-02 PROBLEM — A04.9 BACTERIAL INTESTINAL INFECTION, UNSPECIFIED: Status: ACTIVE | Noted: 2020-01-02

## 2020-01-02 PROBLEM — Z86.010 HISTORY OF COLONIC POLYPS: Status: ACTIVE | Noted: 2020-01-02

## 2020-01-02 PROBLEM — K57.30 DIVERTICULOSIS OF LARGE INTESTINE WITHOUT PERFORATION OR ABSCESS WITHOUT BLEEDING: Status: ACTIVE | Noted: 2020-01-02

## 2020-01-02 PROCEDURE — 99214 OFFICE O/P EST MOD 30 MIN: CPT | Performed by: FAMILY MEDICINE

## 2020-01-02 RX ORDER — LEVOFLOXACIN 500 MG/1
500 TABLET, FILM COATED ORAL DAILY
Qty: 10 TABLET | Refills: 0 | Status: SHIPPED | OUTPATIENT
Start: 2020-01-02 | End: 2020-01-14

## 2020-01-02 NOTE — PROGRESS NOTES
"Subjective   Souleymane Stapleton is a 74 y.o. male.   Cc: follow up of cough and congestion  HPI The patient comes in with cough and congestion. This has been going on for about two weeks.   The following portions of the patient's history were reviewed and updated as appropriate: allergies, current medications, past family history, past medical history, past social history, past surgical history and problem list.    Review of Systems   Constitutional: Negative for fatigue and fever.   Respiratory: Negative for cough, chest tightness and stridor.    Cardiovascular: Negative for chest pain, palpitations and leg swelling.       Objective   Physical Exam   Constitutional: He is oriented to person, place, and time. He appears well-developed and well-nourished.   HENT:   Head: Normocephalic and atraumatic.   Right Ear: External ear normal.   Left Ear: External ear normal.   Nose: Nose normal.   Mouth/Throat: Oropharynx is clear and moist.   Eyes: Conjunctivae are normal.   Neck: Normal range of motion.   Cardiovascular: Normal rate, regular rhythm and normal heart sounds. Exam reveals no gallop and no friction rub.   No murmur heard.  Pulmonary/Chest: Effort normal and breath sounds normal. No stridor. No respiratory distress. He has no wheezes. He has no rales.   Abdominal: Soft. Bowel sounds are normal.   Neurological: He is alert and oriented to person, place, and time.   Skin: Skin is warm and dry.   Vitals reviewed.        Visit Vitals  /70   Pulse 94   Temp 98.1 °F (36.7 °C) (Tympanic)   Ht 170.2 cm (67\")   Wt 77.7 kg (171 lb 5 oz)   SpO2 98%   BMI 26.83 kg/m²     Body mass index is 26.83 kg/m².      Assessment/Plan   Souleymane was seen today for uri.    Diagnoses and all orders for this visit:    Cough  -     XR Chest 2 View; Future    Bronchitis  -     XR Chest 2 View; Future    Wheezing    Other orders  -     tiotropium bromide monohydrate (SPIRIVA RESPIMAT) 2.5 MCG/ACT aerosol solution inhaler; Inhale 2 puffs " Daily.  -     levoFLOXacin (LEVAQUIN) 500 MG tablet; Take 1 tablet by mouth Daily.    Follow up as needed.

## 2020-01-06 DIAGNOSIS — G47.00 INSOMNIA, UNSPECIFIED TYPE: ICD-10-CM

## 2020-01-07 RX ORDER — IRBESARTAN AND HYDROCHLOROTHIAZIDE 150; 12.5 MG/1; MG/1
1 TABLET, FILM COATED ORAL DAILY
Qty: 90 TABLET | Refills: 1 | Status: SHIPPED | OUTPATIENT
Start: 2020-01-07 | End: 2020-01-10 | Stop reason: SDUPTHER

## 2020-01-07 RX ORDER — TRAZODONE HYDROCHLORIDE 50 MG/1
TABLET ORAL
Qty: 90 TABLET | Refills: 3 | Status: SHIPPED | OUTPATIENT
Start: 2020-01-07 | End: 2020-10-21

## 2020-01-08 ENCOUNTER — TELEPHONE (OUTPATIENT)
Dept: FAMILY MEDICINE CLINIC | Facility: CLINIC | Age: 75
End: 2020-01-08

## 2020-01-10 RX ORDER — IRBESARTAN AND HYDROCHLOROTHIAZIDE 150; 12.5 MG/1; MG/1
TABLET, FILM COATED ORAL
Qty: 45 TABLET | Refills: 2 | Status: SHIPPED | OUTPATIENT
Start: 2020-01-10 | End: 2020-02-11

## 2020-01-13 DIAGNOSIS — M25.512 BILATERAL SHOULDER PAIN, UNSPECIFIED CHRONICITY: Primary | ICD-10-CM

## 2020-01-13 DIAGNOSIS — M25.511 BILATERAL SHOULDER PAIN, UNSPECIFIED CHRONICITY: Primary | ICD-10-CM

## 2020-01-14 ENCOUNTER — OFFICE VISIT (OUTPATIENT)
Dept: ORTHOPEDIC SURGERY | Facility: CLINIC | Age: 75
End: 2020-01-14

## 2020-01-14 ENCOUNTER — TELEPHONE (OUTPATIENT)
Dept: ORTHOPEDIC SURGERY | Facility: CLINIC | Age: 75
End: 2020-01-14

## 2020-01-14 VITALS — BODY MASS INDEX: 25.28 KG/M2 | HEIGHT: 69 IN | WEIGHT: 170.7 LBS

## 2020-01-14 DIAGNOSIS — C61 PROSTATE CANCER (HCC): ICD-10-CM

## 2020-01-14 DIAGNOSIS — Z86.73 HISTORY OF CEREBROVASCULAR ACCIDENT: ICD-10-CM

## 2020-01-14 DIAGNOSIS — M12.811 ROTATOR CUFF ARTHROPATHY, RIGHT: Primary | ICD-10-CM

## 2020-01-14 DIAGNOSIS — M25.512 BILATERAL SHOULDER PAIN, UNSPECIFIED CHRONICITY: ICD-10-CM

## 2020-01-14 DIAGNOSIS — I25.810 CORONARY ARTERY DISEASE INVOLVING CORONARY BYPASS GRAFT OF NATIVE HEART WITHOUT ANGINA PECTORIS: ICD-10-CM

## 2020-01-14 DIAGNOSIS — M25.511 BILATERAL SHOULDER PAIN, UNSPECIFIED CHRONICITY: ICD-10-CM

## 2020-01-14 DIAGNOSIS — M75.102 ROTATOR CUFF SYNDROME, LEFT: ICD-10-CM

## 2020-01-14 PROCEDURE — 99214 OFFICE O/P EST MOD 30 MIN: CPT | Performed by: ORTHOPAEDIC SURGERY

## 2020-01-14 RX ORDER — BUPIVACAINE HCL/0.9 % NACL/PF 0.1 %
2 PLASTIC BAG, INJECTION (ML) EPIDURAL ONCE
Status: CANCELLED | OUTPATIENT
Start: 2020-02-17 | End: 2020-01-14

## 2020-01-14 RX ORDER — ACETAMINOPHEN 500 MG
1000 TABLET ORAL ONCE
Status: CANCELLED | OUTPATIENT
Start: 2020-02-17 | End: 2020-01-14

## 2020-01-14 RX ORDER — MELOXICAM 15 MG/1
15 TABLET ORAL ONCE
Status: CANCELLED | OUTPATIENT
Start: 2020-02-17 | End: 2020-01-14

## 2020-01-14 RX ORDER — PREGABALIN 75 MG/1
75 CAPSULE ORAL ONCE
Status: CANCELLED | OUTPATIENT
Start: 2020-02-17 | End: 2020-01-14

## 2020-01-14 NOTE — TELEPHONE ENCOUNTER
ALLAN MONTES  [phone]    PATIENT IS WANTING TO KNOW IF HE CAN RECEIVE AN INJECTION IN HIS SHOULDER WHILE HE WAITS FOR HIS SURGERY.

## 2020-01-14 NOTE — TELEPHONE ENCOUNTER
Received a fax from [facility] stating they needed clarification on this prescription. Called [facility] and spoke with Raven and informed her that the sig for this medication is take one tab every other day.

## 2020-01-14 NOTE — TELEPHONE ENCOUNTER
Not if we are planning or proceding.   If we find that we need to wait for 2-3 months then yes.  [email or web address]

## 2020-01-14 NOTE — PROGRESS NOTES
Souleymane Stapleton is a 74 y.o. male returns for     Chief Complaint   Patient presents with   • Left Shoulder - Follow-up   • Right Shoulder - Follow-up       HISTORY OF PRESENT ILLNESS: Patient was seen 2 years ago. He states his symptoms have worsened. He has problems raising his arms. He has tried injections in the past. Repeat xrays taken today.   Pain with activity.  Pain at night.  He has had injections previously.  His pain is slowly and progressively been worsening.  No new injury.  No numbness or tingling.  Mostly he has dull aches but he has occasional sharp stabbing pains.  He is unhappy with his quality of life.     CONCURRENT MEDICAL HISTORY:    Past Medical History:   Diagnosis Date   • Abdominal pain    • Abnormal weight loss    • Acid reflux    • Acute gastritis    • Anxiety state    • Arthritis    • Cancer (CMS/HCC)     Prostate   • History of cerebrovascular accident    • History of colon polyps    • Hypertension    • Nausea    • Nuclear senile cataract    • Presbyopia    • Tobacco use    • Transient cerebral ischemia    • Vitreous detachment of left eye        Allergies   Allergen Reactions   • Statins Myalgia     Muscle soreness    • Tricor [Fenofibrate] Myalgia     Muscle soreness        Current Outpatient Medications on File Prior to Visit   Medication Sig   • ALPRAZolam (XANAX) 0.5 MG tablet Take 1 tablet by mouth 3 (Three) Times a Day.   • busPIRone (BUSPAR) 5 MG tablet Take 1 tablet by mouth 3 (Three) Times a Day.   • clopidogrel (PLAVIX) 75 MG tablet Take 1 tablet by mouth Daily.   • dutasteride (AVODART) 0.5 MG capsule Take 1 capsule by mouth Every Night.   • ezetimibe (ZETIA) 10 MG tablet TAKE 1 TABLET BY MOUTH ONCE DAILY   • finasteride (PROPECIA) 1 MG tablet Take 1 tablet by mouth Daily.   • fluticasone (FLONASE) 50 MCG/ACT nasal spray 2 sprays into the nostril(s) as directed by provider Daily.   • HYDROcod Polst-CPM Polst ER (TUSSIONEX PENNKINETIC ER) 10-8 MG/5ML ER suspension Take 5  mL by mouth Every 12 (Twelve) Hours As Needed for Cough.   • irbesartan-hydrochlorothiazide (AVALIDE) 150-12.5 MG tablet Take one every other day.   • metoprolol succinate XL (TOPROL-XL) 25 MG 24 hr tablet Take 1 tablet by mouth Daily With Dinner.   • omeprazole (priLOSEC) 40 MG capsule TAKE 1 CAPSULE EVERY DAY   • promethazine-dextromethorphan (PROMETHAZINE-DM) 6.25-15 MG/5ML syrup Take 5 mL by mouth 4 (Four) Times a Day As Needed for Cough.   • sildenafil (VIAGRA) 100 MG tablet Take 1 tablet by mouth Daily As Needed for erectile dysfunction.   • simethicone (GAS-X) 80 MG chewable tablet Chew 1 tablet Every 6 (Six) Hours As Needed for Flatulence.   • sodium chloride (OCEAN NASAL SPRAY) 0.65 % nasal spray 2 sprays into the nostril(s) as directed by provider As Needed for Congestion.   • tiotropium bromide monohydrate (SPIRIVA RESPIMAT) 2.5 MCG/ACT aerosol solution inhaler Inhale 2 puffs Daily.   • traZODone (DESYREL) 50 MG tablet TAKE 1 TABLET EVERY NIGHT   • vitamin B-12 (CYANOCOBALAMIN) 500 MCG tablet Take 500 mcg by mouth Daily.   • [DISCONTINUED] levoFLOXacin (LEVAQUIN) 500 MG tablet Take 1 tablet by mouth Daily.   • [DISCONTINUED] tamsulosin (FLOMAX) 0.4 MG capsule 24 hr capsule Take 1 capsule by mouth Daily.     No current facility-administered medications on file prior to visit.        Past Surgical History:   Procedure Laterality Date   • BACK SURGERY     • CARDIAC CATHETERIZATION N/A 6/19/2018    Procedure: Coronary angiography;  Surgeon: Delroy Mcconnell MD;  Location: [facility] CATH INVASIVE LOCATION;  Service: Cardiovascular   • COLONOSCOPY  06/09/2015    Diverticulosis found in the sigmoid colon. Hemorrhoids found in the anus.   • CORONARY ARTERY BYPASS GRAFT N/A 6/22/2018    Procedure: PARAS STERNOTOMY CORONARY ARTERY BYPASS GRAFT TIMES 5 USING RIGHT AND LEFT INTERNAL MAMMARY ARTERIES AND LEFT GREATER SAPHENOUS VEIN GRAFT PER ENDOSCOPIC VEIN HARVESTING AND PRP;  Surgeon: Edgar Pérez MD;   "Location: [facility] OR;  Service: Cardiothoracic   • CRYOTHERAPY  2012    Of Acne   • ENDOSCOPY  2015    EGD w/ biopsy -Multiple polyps, one removed.   • ENDOSCOPY N/A 2019    Procedure: ESOPHAGOGASTRODUODENOSCOPY;  Surgeon: Chuck Rich DO;  Location: [facility] ENDOSCOPY;  Service: Gastroenterology   • HEMORRHOIDECTOMY N/A 3/20/2017    Procedure: Removal of Anal Papilla;  Surgeon: Juan Diego Ken MD;  Location: [facility] OR;  Service:    • INJECTION OF MEDICATION  2010    B12   • INJECTION OF MEDICATION  10/17/2011    Kenalog   • LUMBAR LAMINECTOMY  2010   • OTHER SURGICAL HISTORY  2010    Biopsy, Each Additional Lesion    • SKIN BIOPSY  2010       Family History   Problem Relation Age of Onset   • Dementia Other    • Hypertension Other    • Stroke Other    • Hypertension Mother    • Hypertension Father        Social History     Socioeconomic History   • Marital status: Single     Spouse name: Not on file   • Number of children: Not on file   • Years of education: Not on file   • Highest education level: Not on file   Tobacco Use   • Smoking status: Former Smoker     Types: Cigarettes     Last attempt to quit:      Years since quittin.0   • Smokeless tobacco: Never Used   Substance and Sexual Activity   • Alcohol use: Yes     Alcohol/week: 1.0 standard drinks     Types: 1 Glasses of wine per week     Comment: Social   • Drug use: No   • Sexual activity: Defer           ROS  No fevers or chills.  No chest pain or shortness of air.  No GI or  disturbances.  Other than shoulder pain, all other systems reviewed as negative.    PHYSICAL EXAMINATION:       Ht 174 cm (68.5\")   Wt 77.4 kg (170 lb 11.2 oz)   BMI 25.58 kg/m²     Physical Exam   Constitutional: He is oriented to person, place, and time. He appears well-developed and well-nourished. No distress.   Cardiovascular: Normal rate, regular rhythm and normal heart sounds.   Pulmonary/Chest: Effort normal " and breath sounds normal.   Abdominal: Soft. Bowel sounds are normal.   Neurological: He is alert and oriented to person, place, and time.   Psychiatric: He has a normal mood and affect. His behavior is normal. Judgment and thought content normal.   Vitals reviewed.      GAIT:     [x]  Normal  []  Antalgic    Assistive device: [x]  None  []  Walker     []  Crutches  []  Cane     []  Wheelchair  []  Stretcher    Right Shoulder Exam     Tenderness   The patient is experiencing no tenderness.    Range of Motion   Active abduction: 150   Forward flexion: 150     Muscle Strength   Abduction: 3/5   Supraspinatus: 3/5     Tests   Collins test: positive    Other   Erythema: absent  Sensation: normal  Pulse: present      Left Shoulder Exam     Tenderness   The patient is experiencing no tenderness.     Range of Motion   Active abduction: 160   Forward flexion: 160     Muscle Strength   Abduction: 4/5   Supraspinatus: 4/5     Other   Erythema: absent  Sensation: normal  Pulse: present                 Study Result     MRI left shoulder without contrast.     HISTORY: Chronic left shoulder pain.     Prior exam: Left shoulder September 12, 2017. (Unavailable on  PACS).     TECHNIQUE: Multiplanar multisequence noncontrast images left  shoulder.     FINDINGS: There is acromioclavicular joint arthrosis. There is  capsular hypertrophy projecting both superiorly and inferiorly.  Inferior acromial spur. These findings cause moderate underlying  impingement upon the bursal surface of the supraspinatous tendon.  There is increased signal intensity within the AC joint  suggesting an active inflammatory, arthritic process.     Full-thickness tear distal and anterior aspect of the  supraspinatous tendon. The size of the gap in lateral medial  projection 1.1 cm. The size of the gap in AP projection 1.28 cm.  Series 7 image seven, and series 4 image five.     Signal heterogeneity posterior aspect of the supraspinatous  tendon and  infraspinatus tendon suggesting tendinosis. No  muscular tendinous retraction. No supraspinatus or infraspinatus  atrophy.           Isolated fluid collection inferior to the coracoid process,  subcoracoid bursitis/ganglion, most apparent on series 7 image  13. (2.32 cm craniocaudal diameter, 2.83 cm lateral medial  diameter, 1.5 cm AP diameter.) No appreciable glenohumeral joint  effusion.     Normal-appearing glenoid mika.     IMPRESSION:  CONCLUSION: Acromioclavicular joint arthrosis causing moderate  underlying impingement. Full-thickness rotator cuff tear distal  and anterior aspect of the supraspinatous tendon. No evidence of  any musculotendinous retraction. No supraspinatus or  infraspinatus muscle atrophy. Isolated fluid collection inferior  to the coracoid process compatible with subcoracoid  bursitis/ganglion.      Electronically signed by:  Ricky Christopher MD  9/26/2017 8:04 PM CDT  Workstation: [id number]     Study Result     3 views left shoulder show acceptable position and alignment with no evidence of acute bony abnormality.  No fracture or dislocation is noted.  No significant arthritic changes are noted.  09/12/17 at 10:42 AM by Juan Diego Mendoza MD       Study Result     MRI right shoulder.     HISTORY: Right shoulder pain.     Prior exam: Right shoulder July 18, 2017.     TECHNIQUE: Multiplanar multisequence noncontrast images right  shoulder.     Complete full-thickness rotator cuff tear involving the entire  supraspinatous and the anterior fibers of the infraspinatus  tendon. Size of the gap in AP projection is 3.6 cm. The size of  the gap in lateral medial projection is 4.35 cm. There is  significant muscular tendinous retraction and there is an element  of both supraspinatus and infraspinatus muscle atrophy.     There is extensive acromioclavicular joint arthrosis. A large  fluid collection beneath the distal clavicle and acromion  communicates with the AC joint. This indicates focal  subacromial  bursitis  and therefore indicating disruption of the inferior  acromioclavicular ligament. Small glenohumeral joint effusion.  Normal-appearing glenoid mika.     IMPRESSION:  CONCLUSION: Large full-thickness tear involving the entire  supraspinatous and a large portion of the intraspinous tendons  with muscular tendinous retraction and both supraspinatus and  infraspinatus atrophy.     Extensively acromioclavicular joint  arthrosis. Fluid within the  AC joint communicating with a subacromial bursa beneath the  distal clavicle and acromion, bursitis. This therefore indicates  disruption of the  inferior acromioclavicular ligament.     Electronically signed by:  Ricky Christopher MD  9/8/2017 7:47 PM CDT  Workstation: [id number]       Study Result     Patient Name:  MR. SIMON CARROLL  Patient ID:  [id number]   Ordering:  SIMON JEFFERS  Referring:  SIMON JEFFERS  ------------------------------------------------     Three view right shoulder     HISTORY: Chronic right shoulder pain     AP films with the humerus in internal and external rotation and  scapular Y view were obtained.     COMPARISON: None     Mild hypertrophic change acromioclavicular joint.  No fracture or dislocation.  No other osseous or articular abnormality.     IMPRESSION:  CONCLUSION:  Mild hypertrophic change acromioclavicular joint.     [id number]     Electronically signed by:  Raudel Champagne MD  7/18/2017 4:50 PM CDT     Xr Chest 2 View    Result Date: 1/3/2020  Narrative: Chest x-ray PA and lateral CLINICAL INDICATION: Shortness of breath. Cough, bronchitis. COMPARISON: Chest October 24, 2018. FINDINGS: Cardiac silhouette is normal in size. Pulmonary vascularity is unremarkable. Sternal sutures from prior cardiac surgery. Anterior surgical fusion cervical spine stabilized by compression plate and surgical screws. No focal infiltrate or consolidation.  No pleural effusion.  No pneumothorax.     Impression: CONCLUSION: No evidence of active  disease. Lung fields are clear. Electronically signed by:  Ricky Christopher MD  1/3/2020 2:43 PM CST Workstation: [id number]    Xr Shoulder 2+ View Bilateral    Result Date: 1/14/2020  Narrative: Ordering Provider:  Juan Diego Mendoza MD Ordering Diagnosis/Indication:  Bilateral shoulder pain, unspecified chronicity Procedure:  XR SHOULDER 2+ VW BILATERAL Exam Date:  1/14/20 COMPARISON:  Todays X-rays were compared to previous images dated July and September 2017 for right and left shoulders respectively..     Impression:  3 views of each shoulder shows proximal migration of the humeral head with mild glenohumeral joint arthritic change.  Moderate AC joint arthritic change noted on each view.  The proximal migration is worse on the right shoulder in comparison to the left shoulder.  He also shows evidence of prior cervical spine fusion as well as prior coronary artery bypass grafting as evidenced by the cerclage wires in the sternum.  No acute findings noted. Juan Diego Mendoza MD 1/14/20           ASSESSMENT:    Diagnoses and all orders for this visit:    Rotator cuff arthropathy, right  -     Case Request; Standing  -     ceFAZolin (ANCEF) 2 g in sodium chloride 0.9 % 100 mL IVPB  -     acetaminophen (TYLENOL) tablet 975 mg  -     meloxicam (MOBIC) tablet 15 mg  -     pregabalin (LYRICA) capsule 75 mg  -     Case Request    Bilateral shoulder pain, unspecified chronicity  -     Case Request; Standing  -     ceFAZolin (ANCEF) 2 g in sodium chloride 0.9 % 100 mL IVPB  -     acetaminophen (TYLENOL) tablet 975 mg  -     meloxicam (MOBIC) tablet 15 mg  -     pregabalin (LYRICA) capsule 75 mg  -     Case Request    Rotator cuff syndrome, left  -     Case Request; Standing  -     ceFAZolin (ANCEF) 2 g in sodium chloride 0.9 % 100 mL IVPB  -     acetaminophen (TYLENOL) tablet 975 mg  -     meloxicam (MOBIC) tablet 15 mg  -     pregabalin (LYRICA) capsule 75 mg  -     Case Request    Prostate cancer (CMS/HCC)  -     Case  Request; Standing  -     ceFAZolin (ANCEF) 2 g in sodium chloride 0.9 % 100 mL IVPB  -     acetaminophen (TYLENOL) tablet 975 mg  -     meloxicam (MOBIC) tablet 15 mg  -     pregabalin (LYRICA) capsule 75 mg  -     Case Request    Coronary artery disease involving coronary bypass graft of native heart without angina pectoris  -     Case Request; Standing  -     ceFAZolin (ANCEF) 2 g in sodium chloride 0.9 % 100 mL IVPB  -     acetaminophen (TYLENOL) tablet 975 mg  -     meloxicam (MOBIC) tablet 15 mg  -     pregabalin (LYRICA) capsule 75 mg  -     Case Request    History of cerebrovascular accident  -     Case Request; Standing  -     ceFAZolin (ANCEF) 2 g in sodium chloride 0.9 % 100 mL IVPB  -     acetaminophen (TYLENOL) tablet 975 mg  -     meloxicam (MOBIC) tablet 15 mg  -     pregabalin (LYRICA) capsule 75 mg  -     Case Request    Other orders  -     Follow Anesthesia Guidelines / Standing Orders; Future  -     Follow Anesthesia Guidelines / Standing Orders; Standing  -     Verify NPO Status; Standing  -     Obtain informed consent (if not collected inpatient or PAT); Standing  -     Nerve Block; Standing  -     Provide Patient With ERAS Booklet(s)/Handout  -     Provide NPO Instructions to Patient          PLAN    Patient has seen cardiology approximately once a year.  He last saw Dr. Potts approximately 6 months ago and states that he had a cardiac stress test done approximately 1 year ago.  He is scheduled to see Dr. Paulsno since Dr. Potts has now left.  We are going try to get that appointment moved up if possible as the patient is ready to proceed with surgical intervention would like to facilitate that.    He has tried activity modification, steroid injection, therapy exercises, as well as anti-inflammatory medication and continues to have pain in his right shoulder.  He is ready to proceed with surgical intervention.    Plan to proceed with right reverse total shoulder arthroplasty.    The patient  voiced understanding of the risks, benefits, and alternative forms of treatment that were discussed and the patient consents to proceed with surgery.  All risks, benefits and alternatives were discussed.  Risks including but not exclusive to anesthetic complications, including death, MI, CVA, infection, bleeding DVT, fracture, residual pain and need for future surgery.    This discussion was held with the patient by Juan Diego Mendoza MD and all questions were answered.    He will stop his Plavix 1 week prior to surgery.    Patient's Body mass index is 25.58 kg/m². BMI is within normal parameters. No follow-up required..  Return for Post-operative eval.    Juan Diego Mendoza MD

## 2020-01-17 PROBLEM — M12.811 ROTATOR CUFF ARTHROPATHY, RIGHT: Status: ACTIVE | Noted: 2020-01-17

## 2020-02-11 ENCOUNTER — APPOINTMENT (OUTPATIENT)
Dept: PREADMISSION TESTING | Facility: HOSPITAL | Age: 75
End: 2020-02-11

## 2020-02-11 ENCOUNTER — OFFICE VISIT (OUTPATIENT)
Dept: ORTHOPEDIC SURGERY | Facility: CLINIC | Age: 75
End: 2020-02-11

## 2020-02-11 VITALS
SYSTOLIC BLOOD PRESSURE: 137 MMHG | WEIGHT: 171 LBS | HEART RATE: 74 BPM | RESPIRATION RATE: 12 BRPM | OXYGEN SATURATION: 95 % | DIASTOLIC BLOOD PRESSURE: 77 MMHG | BODY MASS INDEX: 25.33 KG/M2 | HEIGHT: 69 IN

## 2020-02-11 VITALS — BODY MASS INDEX: 25.33 KG/M2 | HEIGHT: 69 IN | WEIGHT: 171 LBS

## 2020-02-11 DIAGNOSIS — M12.811 ROTATOR CUFF ARTHROPATHY, RIGHT: Primary | ICD-10-CM

## 2020-02-11 DIAGNOSIS — C61 PROSTATE CANCER (HCC): ICD-10-CM

## 2020-02-11 DIAGNOSIS — Z86.73 HISTORY OF CEREBROVASCULAR ACCIDENT: ICD-10-CM

## 2020-02-11 DIAGNOSIS — M25.511 BILATERAL SHOULDER PAIN, UNSPECIFIED CHRONICITY: ICD-10-CM

## 2020-02-11 DIAGNOSIS — M25.512 BILATERAL SHOULDER PAIN, UNSPECIFIED CHRONICITY: ICD-10-CM

## 2020-02-11 DIAGNOSIS — I25.810 CORONARY ARTERY DISEASE INVOLVING CORONARY BYPASS GRAFT OF NATIVE HEART WITHOUT ANGINA PECTORIS: ICD-10-CM

## 2020-02-11 LAB
ABO GROUP BLD: NORMAL
ANION GAP SERPL CALCULATED.3IONS-SCNC: 11 MMOL/L (ref 5–15)
BLD GP AB SCN SERPL QL: NEGATIVE
BUN BLD-MCNC: 21 MG/DL (ref 8–23)
BUN/CREAT SERPL: 15.9 (ref 7–25)
CALCIUM SPEC-SCNC: 9.9 MG/DL (ref 8.6–10.5)
CHLORIDE SERPL-SCNC: 101 MMOL/L (ref 98–107)
CO2 SERPL-SCNC: 26 MMOL/L (ref 22–29)
CREAT BLD-MCNC: 1.32 MG/DL (ref 0.76–1.27)
DEPRECATED RDW RBC AUTO: 48.2 FL (ref 37–54)
ERYTHROCYTE [DISTWIDTH] IN BLOOD BY AUTOMATED COUNT: 15.3 % (ref 12.3–15.4)
GFR SERPL CREATININE-BSD FRML MDRD: 53 ML/MIN/1.73
GLUCOSE BLD-MCNC: 99 MG/DL (ref 65–99)
HCT VFR BLD AUTO: 44.3 % (ref 37.5–51)
HGB BLD-MCNC: 14.5 G/DL (ref 13–17.7)
Lab: NORMAL
MCH RBC QN AUTO: 28 PG (ref 26.6–33)
MCHC RBC AUTO-ENTMCNC: 32.7 G/DL (ref 31.5–35.7)
MCV RBC AUTO: 85.5 FL (ref 79–97)
PLATELET # BLD AUTO: 331 10*3/MM3 (ref 140–450)
PMV BLD AUTO: 10.4 FL (ref 6–12)
POTASSIUM BLD-SCNC: 4.7 MMOL/L (ref 3.5–5.2)
RBC # BLD AUTO: 5.18 10*6/MM3 (ref 4.14–5.8)
RH BLD: NEGATIVE
SODIUM BLD-SCNC: 138 MMOL/L (ref 136–145)
T&S EXPIRATION DATE: NORMAL
WBC NRBC COR # BLD: 6.93 10*3/MM3 (ref 3.4–10.8)

## 2020-02-11 PROCEDURE — 93010 ELECTROCARDIOGRAM REPORT: CPT | Performed by: INTERNAL MEDICINE

## 2020-02-11 PROCEDURE — 86850 RBC ANTIBODY SCREEN: CPT | Performed by: ANESTHESIOLOGY

## 2020-02-11 PROCEDURE — 93005 ELECTROCARDIOGRAM TRACING: CPT

## 2020-02-11 PROCEDURE — 99214 OFFICE O/P EST MOD 30 MIN: CPT | Performed by: ORTHOPAEDIC SURGERY

## 2020-02-11 PROCEDURE — 80048 BASIC METABOLIC PNL TOTAL CA: CPT | Performed by: ANESTHESIOLOGY

## 2020-02-11 PROCEDURE — 86901 BLOOD TYPING SEROLOGIC RH(D): CPT | Performed by: ANESTHESIOLOGY

## 2020-02-11 PROCEDURE — 85027 COMPLETE CBC AUTOMATED: CPT | Performed by: ANESTHESIOLOGY

## 2020-02-11 PROCEDURE — 86900 BLOOD TYPING SEROLOGIC ABO: CPT | Performed by: ANESTHESIOLOGY

## 2020-02-11 PROCEDURE — 36415 COLL VENOUS BLD VENIPUNCTURE: CPT

## 2020-02-11 RX ORDER — SODIUM CHLORIDE, SODIUM GLUCONATE, SODIUM ACETATE, POTASSIUM CHLORIDE, AND MAGNESIUM CHLORIDE 526; 502; 368; 37; 30 MG/100ML; MG/100ML; MG/100ML; MG/100ML; MG/100ML
1000 INJECTION, SOLUTION INTRAVENOUS CONTINUOUS
Status: CANCELLED | OUTPATIENT
Start: 2020-02-17

## 2020-02-11 RX ORDER — IRBESARTAN AND HYDROCHLOROTHIAZIDE 150; 12.5 MG/1; MG/1
0.5 TABLET, FILM COATED ORAL DAILY
COMMUNITY
End: 2020-09-21

## 2020-02-11 RX ORDER — CEPHALEXIN 500 MG/1
500 CAPSULE ORAL 3 TIMES DAILY
COMMUNITY
Start: 2020-02-05 | End: 2020-02-19 | Stop reason: HOSPADM

## 2020-02-11 RX ORDER — MULTIVITAMIN WITH IRON
1 TABLET ORAL DAILY
COMMUNITY

## 2020-02-11 RX ORDER — ACETAMINOPHEN 500 MG
500 TABLET ORAL 3 TIMES DAILY
COMMUNITY

## 2020-02-11 NOTE — PAT
Chlorhexidine wash, ERAS pamphlet, and pre-op instructions given, patient voiced understanding. Cardiac hx discussed with Dr. Tatum, no new orders received.

## 2020-02-11 NOTE — H&P (VIEW-ONLY)
Souleymane Stapleton is a 74 y.o. male returns for     Chief Complaint   Patient presents with   • Right Shoulder - Pre-op Exam       HISTORY OF PRESENT ILLNESS: patient scheduled for right reverse total shoulder arthroplasty on 2/17/2020  Pain with activity  Pain at night that affects his sleep  History of long term rotator cuff tear.  No significant improvement with injections  No numbness or tingling  Mostly dull aches but occasional sharp stabbing pains  His pain is slowly and progressively worsening       CONCURRENT MEDICAL HISTORY:    Past Medical History:   Diagnosis Date   • Abdominal pain    • Abnormal weight loss    • Acid reflux    • Acute gastritis    • Anxiety state    • Arthritis    • Cancer (CMS/[facility])     Prostate   • Coronary artery disease    • History of cerebrovascular accident    • History of colon polyps    • Hypertension    • Nausea    • Nuclear senile cataract    • Presbyopia    • Stroke (CMS/[facility]) 2005    TIA   • Tobacco use    • Transient cerebral ischemia    • Vitreous detachment of left eye        Allergies   Allergen Reactions   • Statins Myalgia     Muscle soreness    • Tricor [Fenofibrate] Myalgia     Muscle soreness        Current Outpatient Medications on File Prior to Visit   Medication Sig   • ALPRAZolam (XANAX) 0.5 MG tablet Take 1 tablet by mouth 3 (Three) Times a Day.   • busPIRone (BUSPAR) 5 MG tablet Take 1 tablet by mouth 3 (Three) Times a Day.   • cephalexin (KEFLEX) 500 MG capsule Take 500 mg by mouth 3 (Three) Times a Day.   • clopidogrel (PLAVIX) 75 MG tablet Take 1 tablet by mouth Daily.   • dutasteride (AVODART) 0.5 MG capsule Take 1 capsule by mouth Every Night.   • finasteride (PROPECIA) 1 MG tablet Take 1 tablet by mouth Daily.   • fluticasone (FLONASE) 50 MCG/ACT nasal spray 2 sprays into the nostril(s) as directed by provider Daily. (Patient taking differently: 2 sprays into the nostril(s) as directed by provider Daily As Needed.)   • HYDROcod Polst-CPM Polst ER  (TUSSIONEX PENNKINETIC ER) 10-8 MG/5ML ER suspension Take 5 mL by mouth Every 12 (Twelve) Hours As Needed for Cough.   • Magnesium 250 MG tablet Take 1 tablet by mouth Daily.   • metoprolol succinate XL (TOPROL-XL) 25 MG 24 hr tablet Take 1 tablet by mouth Daily With Dinner.   • promethazine-dextromethorphan (PROMETHAZINE-DM) 6.25-15 MG/5ML syrup Take 5 mL by mouth 4 (Four) Times a Day As Needed for Cough.   • sildenafil (VIAGRA) 100 MG tablet Take 1 tablet by mouth Daily As Needed for erectile dysfunction.   • simethicone (GAS-X) 80 MG chewable tablet Chew 1 tablet Every 6 (Six) Hours As Needed for Flatulence.   • sodium chloride (OCEAN NASAL SPRAY) 0.65 % nasal spray 2 sprays into the nostril(s) as directed by provider As Needed for Congestion.   • traZODone (DESYREL) 50 MG tablet TAKE 1 TABLET EVERY NIGHT   • vitamin B-12 (CYANOCOBALAMIN) 500 MCG tablet Take 500 mcg by mouth Daily.   • acetaminophen (TYLENOL) 500 MG tablet Take 500 mg by mouth 3 (Three) Times a Day.   • irbesartan-hydrochlorothiazide (AVALIDE) 150-12.5 MG tablet Take 0.5 tablets by mouth Daily.     No current facility-administered medications on file prior to visit.        Past Surgical History:   Procedure Laterality Date   • BACK SURGERY     • CARDIAC CATHETERIZATION N/A 6/19/2018    Procedure: Coronary angiography;  Surgeon: Delroy Mcconnell MD;  Location: [facility] INVASIVE LOCATION;  Service: Cardiovascular   • COLONOSCOPY  06/09/2015    Diverticulosis found in the sigmoid colon. Hemorrhoids found in the anus.   • CORONARY ARTERY BYPASS GRAFT N/A 6/22/2018    Procedure: PARAS STERNOTOMY CORONARY ARTERY BYPASS GRAFT TIMES 5 USING RIGHT AND LEFT INTERNAL MAMMARY ARTERIES AND LEFT GREATER SAPHENOUS VEIN GRAFT PER ENDOSCOPIC VEIN HARVESTING AND PRP;  Surgeon: Edgar Pérez MD;  Location: [facility] OR;  Service: Cardiothoracic   • CRYOTHERAPY  08/14/2012    Of Acne   • ENDOSCOPY  09/01/2015    EGD w/ biopsy -Multiple polyps, one  "removed.   • ENDOSCOPY N/A 2019    Procedure: ESOPHAGOGASTRODUODENOSCOPY;  Surgeon: Chuck Rich DO;  Location: [facility] ENDOSCOPY;  Service: Gastroenterology   • HEMORRHOIDECTOMY N/A 3/20/2017    Procedure: Removal of Anal Papilla;  Surgeon: Juan Diego Ken MD;  Location: [facility] OR;  Service:    • INJECTION OF MEDICATION  2010    B12   • INJECTION OF MEDICATION  10/17/2011    Kenalog   • LUMBAR LAMINECTOMY  2010   • OTHER SURGICAL HISTORY  2010    Biopsy, Each Additional Lesion    • SKIN BIOPSY  2010       Family History   Problem Relation Age of Onset   • Dementia Other    • Hypertension Other    • Stroke Other    • Hypertension Mother    • Hypertension Father        Social History     Socioeconomic History   • Marital status: Single     Spouse name: Not on file   • Number of children: Not on file   • Years of education: Not on file   • Highest education level: Not on file   Tobacco Use   • Smoking status: Former Smoker     Types: Cigarettes     Last attempt to quit:      Years since quittin.1   • Smokeless tobacco: Never Used   Substance and Sexual Activity   • Alcohol use: Yes     Alcohol/week: 1.0 standard drinks     Types: 1 Glasses of wine per week     Comment: Social   • Drug use: No   • Sexual activity: Defer            ROS  No fevers or chills.  No chest pain or shortness of air.  No GI or  disturbances. Right shoulder pain, otherwise all systems reviewed as negative.    PHYSICAL EXAMINATION:       Ht 174 cm (68.5\")   Wt 77.6 kg (171 lb)   BMI 25.62 kg/m²     Physical Exam   Constitutional: He is oriented to person, place, and time. He appears well-developed and well-nourished. No distress.   Cardiovascular: Normal rate, regular rhythm and normal heart sounds.   Pulmonary/Chest: Effort normal and breath sounds normal.   Abdominal: Soft. Bowel sounds are normal.   Neurological: He is alert and oriented to person, place, and time.   Psychiatric: He has a " normal mood and affect. His behavior is normal. Judgment and thought content normal.   Vitals reviewed.      GAIT:     [x]  Normal  []  Antalgic    Assistive device: [x]  None  []  Walker     []  Crutches  []  Cane     []  Wheelchair  []  Stretcher    Right Shoulder Exam     Tenderness   The patient is experiencing no tenderness.    Range of Motion   Active abduction: 150   Forward flexion: 150     Muscle Strength   Abduction: 3/5   Supraspinatus: 3/5     Tests   Collins test: positive    Other   Erythema: absent  Sensation: normal  Pulse: present      Left Shoulder Exam     Tenderness   The patient is experiencing no tenderness.     Range of Motion   Active abduction: 160   Forward flexion: 160     Muscle Strength   Abduction: 4/5   Supraspinatus: 4/5     Other   Erythema: absent  Sensation: normal  Pulse: present               No results found.    Study Result     MRI right shoulder.     HISTORY: Right shoulder pain.     Prior exam: Right shoulder July 18, 2017.     TECHNIQUE: Multiplanar multisequence noncontrast images right  shoulder.     Complete full-thickness rotator cuff tear involving the entire  supraspinatous and the anterior fibers of the infraspinatus  tendon. Size of the gap in AP projection is 3.6 cm. The size of  the gap in lateral medial projection is 4.35 cm. There is  significant muscular tendinous retraction and there is an element  of both supraspinatus and infraspinatus muscle atrophy.     There is extensive acromioclavicular joint arthrosis. A large  fluid collection beneath the distal clavicle and acromion  communicates with the AC joint. This indicates focal subacromial  bursitis  and therefore indicating disruption of the inferior  acromioclavicular ligament. Small glenohumeral joint effusion.  Normal-appearing glenoid mika.     IMPRESSION:  CONCLUSION: Large full-thickness tear involving the entire  supraspinatous and a large portion of the intraspinous tendons  with muscular tendinous  retraction and both supraspinatus and  infraspinatus atrophy.     Extensively acromioclavicular joint  arthrosis. Fluid within the  AC joint communicating with a subacromial bursa beneath the  distal clavicle and acromion, bursitis. This therefore indicates  disruption of the  inferior acromioclavicular ligament.     Electronically signed by:  Ricky Christopher MD  9/8/2017 7:47 PM CDT  Workstation: [id number]           ASSESSMENT:    Diagnoses and all orders for this visit:    Rotator cuff arthropathy, right    Prostate cancer (CMS/HCC)    Coronary artery disease involving coronary bypass graft of native heart without angina pectoris    Bilateral shoulder pain, unspecified chronicity    History of cerebrovascular accident    Other orders  -     Magnesium 250 MG tablet; Take 1 tablet by mouth Daily.  -     cephalexin (KEFLEX) 500 MG capsule; Take 500 mg by mouth 3 (Three) Times a Day.          PLAN    Plan reverse Total shoulder arthroplasty right    The patient voiced understanding of the risks, benefits, and alternative forms of treatment that were discussed and the patient consents to proceed with surgery.  All risks, benefits and alternatives were discussed.  Risks including but not exclusive to anesthetic complications, including death, MI, CVA, infection, bleeding DVT, fracture, residual pain and need for future surgery.    This discussion was held with the patient by Juan Diego Mendoza MD and all questions were answered.    All questions answered.      Return for Post-operative eval.    Juan Diego Mendoza MD

## 2020-02-11 NOTE — PROGRESS NOTES
Souleymane Stapleton is a 74 y.o. male returns for     Chief Complaint   Patient presents with   • Right Shoulder - Pre-op Exam       HISTORY OF PRESENT ILLNESS: patient scheduled for right reverse total shoulder arthroplasty on 2/17/2020  Pain with activity  Pain at night that affects his sleep  History of long term rotator cuff tear.  No significant improvement with injections  No numbness or tingling  Mostly dull aches but occasional sharp stabbing pains  His pain is slowly and progressively worsening       CONCURRENT MEDICAL HISTORY:    Past Medical History:   Diagnosis Date   • Abdominal pain    • Abnormal weight loss    • Acid reflux    • Acute gastritis    • Anxiety state    • Arthritis    • Cancer (CMS/[facility])     Prostate   • Coronary artery disease    • History of cerebrovascular accident    • History of colon polyps    • Hypertension    • Nausea    • Nuclear senile cataract    • Presbyopia    • Stroke (CMS/[facility]) 2005    TIA   • Tobacco use    • Transient cerebral ischemia    • Vitreous detachment of left eye        Allergies   Allergen Reactions   • Statins Myalgia     Muscle soreness    • Tricor [Fenofibrate] Myalgia     Muscle soreness        Current Outpatient Medications on File Prior to Visit   Medication Sig   • ALPRAZolam (XANAX) 0.5 MG tablet Take 1 tablet by mouth 3 (Three) Times a Day.   • busPIRone (BUSPAR) 5 MG tablet Take 1 tablet by mouth 3 (Three) Times a Day.   • cephalexin (KEFLEX) 500 MG capsule Take 500 mg by mouth 3 (Three) Times a Day.   • clopidogrel (PLAVIX) 75 MG tablet Take 1 tablet by mouth Daily.   • dutasteride (AVODART) 0.5 MG capsule Take 1 capsule by mouth Every Night.   • finasteride (PROPECIA) 1 MG tablet Take 1 tablet by mouth Daily.   • fluticasone (FLONASE) 50 MCG/ACT nasal spray 2 sprays into the nostril(s) as directed by provider Daily. (Patient taking differently: 2 sprays into the nostril(s) as directed by provider Daily As Needed.)   • HYDROcod Polst-CPM Polst ER  (TUSSIONEX PENNKINETIC ER) 10-8 MG/5ML ER suspension Take 5 mL by mouth Every 12 (Twelve) Hours As Needed for Cough.   • Magnesium 250 MG tablet Take 1 tablet by mouth Daily.   • metoprolol succinate XL (TOPROL-XL) 25 MG 24 hr tablet Take 1 tablet by mouth Daily With Dinner.   • promethazine-dextromethorphan (PROMETHAZINE-DM) 6.25-15 MG/5ML syrup Take 5 mL by mouth 4 (Four) Times a Day As Needed for Cough.   • sildenafil (VIAGRA) 100 MG tablet Take 1 tablet by mouth Daily As Needed for erectile dysfunction.   • simethicone (GAS-X) 80 MG chewable tablet Chew 1 tablet Every 6 (Six) Hours As Needed for Flatulence.   • sodium chloride (OCEAN NASAL SPRAY) 0.65 % nasal spray 2 sprays into the nostril(s) as directed by provider As Needed for Congestion.   • traZODone (DESYREL) 50 MG tablet TAKE 1 TABLET EVERY NIGHT   • vitamin B-12 (CYANOCOBALAMIN) 500 MCG tablet Take 500 mcg by mouth Daily.   • acetaminophen (TYLENOL) 500 MG tablet Take 500 mg by mouth 3 (Three) Times a Day.   • irbesartan-hydrochlorothiazide (AVALIDE) 150-12.5 MG tablet Take 0.5 tablets by mouth Daily.     No current facility-administered medications on file prior to visit.        Past Surgical History:   Procedure Laterality Date   • BACK SURGERY     • CARDIAC CATHETERIZATION N/A 6/19/2018    Procedure: Coronary angiography;  Surgeon: Delroy Mcconnell MD;  Location: [facility] INVASIVE LOCATION;  Service: Cardiovascular   • COLONOSCOPY  06/09/2015    Diverticulosis found in the sigmoid colon. Hemorrhoids found in the anus.   • CORONARY ARTERY BYPASS GRAFT N/A 6/22/2018    Procedure: PARAS STERNOTOMY CORONARY ARTERY BYPASS GRAFT TIMES 5 USING RIGHT AND LEFT INTERNAL MAMMARY ARTERIES AND LEFT GREATER SAPHENOUS VEIN GRAFT PER ENDOSCOPIC VEIN HARVESTING AND PRP;  Surgeon: Edgar Pérez MD;  Location: [facility] OR;  Service: Cardiothoracic   • CRYOTHERAPY  08/14/2012    Of Acne   • ENDOSCOPY  09/01/2015    EGD w/ biopsy -Multiple polyps, one  "removed.   • ENDOSCOPY N/A 2019    Procedure: ESOPHAGOGASTRODUODENOSCOPY;  Surgeon: Chuck Rich DO;  Location: [facility] ENDOSCOPY;  Service: Gastroenterology   • HEMORRHOIDECTOMY N/A 3/20/2017    Procedure: Removal of Anal Papilla;  Surgeon: Juan Diego Ken MD;  Location: [facility] OR;  Service:    • INJECTION OF MEDICATION  2010    B12   • INJECTION OF MEDICATION  10/17/2011    Kenalog   • LUMBAR LAMINECTOMY  2010   • OTHER SURGICAL HISTORY  2010    Biopsy, Each Additional Lesion    • SKIN BIOPSY  2010       Family History   Problem Relation Age of Onset   • Dementia Other    • Hypertension Other    • Stroke Other    • Hypertension Mother    • Hypertension Father        Social History     Socioeconomic History   • Marital status: Single     Spouse name: Not on file   • Number of children: Not on file   • Years of education: Not on file   • Highest education level: Not on file   Tobacco Use   • Smoking status: Former Smoker     Types: Cigarettes     Last attempt to quit:      Years since quittin.1   • Smokeless tobacco: Never Used   Substance and Sexual Activity   • Alcohol use: Yes     Alcohol/week: 1.0 standard drinks     Types: 1 Glasses of wine per week     Comment: Social   • Drug use: No   • Sexual activity: Defer            ROS  No fevers or chills.  No chest pain or shortness of air.  No GI or  disturbances. Right shoulder pain, otherwise all systems reviewed as negative.    PHYSICAL EXAMINATION:       Ht 174 cm (68.5\")   Wt 77.6 kg (171 lb)   BMI 25.62 kg/m²     Physical Exam   Constitutional: He is oriented to person, place, and time. He appears well-developed and well-nourished. No distress.   Cardiovascular: Normal rate, regular rhythm and normal heart sounds.   Pulmonary/Chest: Effort normal and breath sounds normal.   Abdominal: Soft. Bowel sounds are normal.   Neurological: He is alert and oriented to person, place, and time.   Psychiatric: He has a " normal mood and affect. His behavior is normal. Judgment and thought content normal.   Vitals reviewed.      GAIT:     [x]  Normal  []  Antalgic    Assistive device: [x]  None  []  Walker     []  Crutches  []  Cane     []  Wheelchair  []  Stretcher    Right Shoulder Exam     Tenderness   The patient is experiencing no tenderness.    Range of Motion   Active abduction: 150   Forward flexion: 150     Muscle Strength   Abduction: 3/5   Supraspinatus: 3/5     Tests   Collins test: positive    Other   Erythema: absent  Sensation: normal  Pulse: present      Left Shoulder Exam     Tenderness   The patient is experiencing no tenderness.     Range of Motion   Active abduction: 160   Forward flexion: 160     Muscle Strength   Abduction: 4/5   Supraspinatus: 4/5     Other   Erythema: absent  Sensation: normal  Pulse: present               No results found.    Study Result     MRI right shoulder.     HISTORY: Right shoulder pain.     Prior exam: Right shoulder July 18, 2017.     TECHNIQUE: Multiplanar multisequence noncontrast images right  shoulder.     Complete full-thickness rotator cuff tear involving the entire  supraspinatous and the anterior fibers of the infraspinatus  tendon. Size of the gap in AP projection is 3.6 cm. The size of  the gap in lateral medial projection is 4.35 cm. There is  significant muscular tendinous retraction and there is an element  of both supraspinatus and infraspinatus muscle atrophy.     There is extensive acromioclavicular joint arthrosis. A large  fluid collection beneath the distal clavicle and acromion  communicates with the AC joint. This indicates focal subacromial  bursitis  and therefore indicating disruption of the inferior  acromioclavicular ligament. Small glenohumeral joint effusion.  Normal-appearing glenoid mika.     IMPRESSION:  CONCLUSION: Large full-thickness tear involving the entire  supraspinatous and a large portion of the intraspinous tendons  with muscular tendinous  retraction and both supraspinatus and  infraspinatus atrophy.     Extensively acromioclavicular joint  arthrosis. Fluid within the  AC joint communicating with a subacromial bursa beneath the  distal clavicle and acromion, bursitis. This therefore indicates  disruption of the  inferior acromioclavicular ligament.     Electronically signed by:  Ricky Christopher MD  9/8/2017 7:47 PM CDT  Workstation: [id number]           ASSESSMENT:    Diagnoses and all orders for this visit:    Rotator cuff arthropathy, right    Prostate cancer (CMS/HCC)    Coronary artery disease involving coronary bypass graft of native heart without angina pectoris    Bilateral shoulder pain, unspecified chronicity    History of cerebrovascular accident    Other orders  -     Magnesium 250 MG tablet; Take 1 tablet by mouth Daily.  -     cephalexin (KEFLEX) 500 MG capsule; Take 500 mg by mouth 3 (Three) Times a Day.          PLAN    Plan reverse Total shoulder arthroplasty right    The patient voiced understanding of the risks, benefits, and alternative forms of treatment that were discussed and the patient consents to proceed with surgery.  All risks, benefits and alternatives were discussed.  Risks including but not exclusive to anesthetic complications, including death, MI, CVA, infection, bleeding DVT, fracture, residual pain and need for future surgery.    This discussion was held with the patient by Juan Diego Mendoza MD and all questions were answered.    All questions answered.      Return for Post-operative eval.    Juan Diego Mendoza MD

## 2020-02-11 NOTE — DISCHARGE INSTRUCTIONS
[facility]  Pre-op Information and Guidelines    You will be called after 2 p.m. the day before your surgery (Friday for Monday surgery) and notified of your time for arrival and approximate surgery time.  If you have not received a call by 4P.M., please contact Same Day Surgery at [phone] of if outside [facility] call [phone].    Please Follow these Important Safety Guidelines:    • The morning of your procedure, take only the medications listed below with   A sip of water:_____________________________________________       __XANAX, BUSPAR, FLONASE, OMEPRAZOLE, TYLENOL__    • DO NOT eat or drink anything after 12:00 midnight the night before surgery  Specific instructions concerning drinking clear liquids will be discussed during  the pre-surgery instruction call the day before your surgery.    • If you take a blood thinner (ex. Plavix, Coumadin, aspirin), ask your doctor when to stop it before surgery  STOP DATE: _________________    • Only 2 visitors are allowed in patient rooms at a time  Your visitors will be asked to wait in the lobby until the admission process is complete with the exception of a parent with a child and patients in need of special assistance.    • YOU CANNOT DRIVE YOURSELF HOME  You must be accompanied by someone who will be responsible for driving you home after surgery and for your care at home.    • DO NOT chew gum, use breath mints, hard candy, or smoke the day of surgery  • DO NOT drink alcohol for at least 24 hours before your surgery  • DO NOT wear any jewelry and remove all body piercing before coming to the hospital  • DO NOT wear make-up to the hospital  • If you are having surgery on an extremity (arm/leg/foot) remove nail polish/artificial nails on the surgical side  • Clothing, glasses, contacts, dentures, and hairpieces must be removed before surgery  • Bathe the night before or the morning of your surgery and do not use powders/lotions on  skin.

## 2020-02-12 ENCOUNTER — OFFICE VISIT (OUTPATIENT)
Dept: CARDIOLOGY | Facility: CLINIC | Age: 75
End: 2020-02-12

## 2020-02-12 VITALS
OXYGEN SATURATION: 97 % | HEIGHT: 69 IN | BODY MASS INDEX: 25 KG/M2 | SYSTOLIC BLOOD PRESSURE: 120 MMHG | WEIGHT: 168.8 LBS | DIASTOLIC BLOOD PRESSURE: 70 MMHG | HEART RATE: 76 BPM

## 2020-02-12 DIAGNOSIS — I10 ESSENTIAL HYPERTENSION: Primary | ICD-10-CM

## 2020-02-12 DIAGNOSIS — K21.9 GASTROESOPHAGEAL REFLUX DISEASE, ESOPHAGITIS PRESENCE NOT SPECIFIED: ICD-10-CM

## 2020-02-12 DIAGNOSIS — R12 CHRONIC HEARTBURN: ICD-10-CM

## 2020-02-12 DIAGNOSIS — I25.810 CORONARY ARTERY DISEASE INVOLVING CORONARY BYPASS GRAFT OF NATIVE HEART WITHOUT ANGINA PECTORIS: ICD-10-CM

## 2020-02-12 DIAGNOSIS — E78.00 PURE HYPERCHOLESTEROLEMIA: ICD-10-CM

## 2020-02-12 DIAGNOSIS — Z86.73 HISTORY OF CEREBROVASCULAR ACCIDENT: ICD-10-CM

## 2020-02-12 DIAGNOSIS — I25.810 CORONARY ARTERY DISEASE INVOLVING CORONARY BYPASS GRAFT OF NATIVE HEART WITHOUT ANGINA PECTORIS: Primary | ICD-10-CM

## 2020-02-12 PROCEDURE — 99214 OFFICE O/P EST MOD 30 MIN: CPT | Performed by: INTERNAL MEDICINE

## 2020-02-12 RX ORDER — OMEPRAZOLE 40 MG/1
CAPSULE, DELAYED RELEASE ORAL
Qty: 90 CAPSULE | Refills: 2 | Status: SHIPPED | OUTPATIENT
Start: 2020-02-12 | End: 2020-10-21

## 2020-02-12 RX ORDER — EZETIMIBE 10 MG/1
10 TABLET ORAL DAILY
Qty: 90 TABLET | Refills: 3 | Status: SHIPPED | OUTPATIENT
Start: 2020-02-12 | End: 2020-02-13

## 2020-02-12 NOTE — PROGRESS NOTES
Souleymane Stapleton  74 y.o. male    02/12/2020  1. Essential hypertension    2. Pure hypercholesterolemia    3. Coronary artery disease involving coronary bypass graft of native heart without angina pectoris    4. History of cerebrovascular accident        History of Present Illness  Souleymane Stapleton is a 74-year-old male with multiple medical issues including coronary artery disease status post coronary artery bypass surgery in June 2018 (CABG x 5 using bilateral mammary grafts), hypertension, hyperlipidemia, history of cerebrovascular event, who was a patient of Dr. Mcconnell in the past.  He performed well on the exercise treadmill stress test in June 2019 with good exercise tolerance and no ischemic changes.  Echocardiogram last year showed normal left ventricular systolic function with no significant valve abnormalities.  He has done well from a symptom standpoint and denied any chest pain, shortness of breath, palpitation, dizziness or syncope.  He has pain in the right shoulder and is being considered for right shoulder arthroplasty by Dr. Mendoza.    EKG yesterday showed sinus rhythm with heart rate of 67 bpm with no ST-T wave changes suggestive of ischemia.    His labs were reviewed and his LDL was markedly elevated at 143 in December 2019.  The patient has intolerance to statins and was unable to tolerate Repatha.  I have explained to him that he might be able to tolerate Praluent which would be appropriate in this situation.  He takes Zetia 10 mg daily.  He claims to watch his diet closely.  He is concerned about his blood pressure which is been fluctuating and he presently takes Avalide 1 tablet daily alternating with half tablet daily.  He is concerned about high and low blood pressures from time to time.  Clinical exam today was unremarkable with no signs of bronchospasm or congestive heart failure.    SUBJECTIVE    Allergies   Allergen Reactions   • Statins Myalgia     Muscle soreness    • Tricor  [Fenofibrate] Myalgia     Muscle soreness          Past Medical History:   Diagnosis Date   • Abdominal pain    • Abnormal weight loss    • Acid reflux    • Acute gastritis    • Anxiety state    • Arthritis    • Cancer (CMS/HCC)     Prostate   • Coronary artery disease    • History of cerebrovascular accident    • History of colon polyps    • Hypertension    • Nausea    • Nuclear senile cataract    • Presbyopia    • Stroke (CMS/HCC) 2005    TIA   • Tobacco use    • Transient cerebral ischemia    • Vitreous detachment of left eye          Past Surgical History:   Procedure Laterality Date   • BACK SURGERY     • CARDIAC CATHETERIZATION N/A 6/19/2018    Procedure: Coronary angiography;  Surgeon: Delroy Mcconnell MD;  Location: [facility] CATH INVASIVE LOCATION;  Service: Cardiovascular   • COLONOSCOPY  06/09/2015    Diverticulosis found in the sigmoid colon. Hemorrhoids found in the anus.   • CORONARY ARTERY BYPASS GRAFT N/A 6/22/2018    Procedure: PARAS STERNOTOMY CORONARY ARTERY BYPASS GRAFT TIMES 5 USING RIGHT AND LEFT INTERNAL MAMMARY ARTERIES AND LEFT GREATER SAPHENOUS VEIN GRAFT PER ENDOSCOPIC VEIN HARVESTING AND PRP;  Surgeon: Edgar Pérez MD;  Location: [facility] OR;  Service: Cardiothoracic   • CRYOTHERAPY  08/14/2012    Of Acne   • ENDOSCOPY  09/01/2015    EGD w/ biopsy -Multiple polyps, one removed.   • ENDOSCOPY N/A 8/22/2019    Procedure: ESOPHAGOGASTRODUODENOSCOPY;  Surgeon: Chuck Rich DO;  Location: [facility] ENDOSCOPY;  Service: Gastroenterology   • HEMORRHOIDECTOMY N/A 3/20/2017    Procedure: Removal of Anal Papilla;  Surgeon: Juan Diego Ken MD;  Location: [facility] OR;  Service:    • INJECTION OF MEDICATION  09/14/2010    B12   • INJECTION OF MEDICATION  10/17/2011    Kenalog   • LUMBAR LAMINECTOMY  09/29/2010   • OTHER SURGICAL HISTORY  08/19/2010    Biopsy, Each Additional Lesion    • SKIN BIOPSY  08/19/2010         Family History   Problem Relation Age of Onset   • Dementia Other     • Hypertension Other    • Stroke Other    • Hypertension Mother    • Hypertension Father          Social History     Socioeconomic History   • Marital status: Single     Spouse name: Not on file   • Number of children: Not on file   • Years of education: Not on file   • Highest education level: Not on file   Tobacco Use   • Smoking status: Former Smoker     Types: Cigarettes     Last attempt to quit:      Years since quittin.1   • Smokeless tobacco: Never Used   Substance and Sexual Activity   • Alcohol use: Yes     Alcohol/week: 1.0 standard drinks     Types: 1 Glasses of wine per week     Comment: Social   • Drug use: No   • Sexual activity: Defer         Current Outpatient Medications   Medication Sig Dispense Refill   • acetaminophen (TYLENOL) 500 MG tablet Take 500 mg by mouth 3 (Three) Times a Day.     • ALPRAZolam (XANAX) 0.5 MG tablet Take 1 tablet by mouth 3 (Three) Times a Day. 90 tablet 2   • busPIRone (BUSPAR) 5 MG tablet Take 1 tablet by mouth 3 (Three) Times a Day. 270 tablet 3   • cephalexin (KEFLEX) 500 MG capsule Take 500 mg by mouth 3 (Three) Times a Day.     • clopidogrel (PLAVIX) 75 MG tablet Take 1 tablet by mouth Daily. 14 tablet 0   • dutasteride (AVODART) 0.5 MG capsule Take 1 capsule by mouth Every Night. 3 capsule 0   • ezetimibe (ZETIA) 10 MG tablet Take 1 tablet by mouth Daily. 90 tablet 3   • finasteride (PROPECIA) 1 MG tablet Take 1 tablet by mouth Daily. 90 tablet 3   • fluticasone (FLONASE) 50 MCG/ACT nasal spray 2 sprays into the nostril(s) as directed by provider Daily. (Patient taking differently: 2 sprays into the nostril(s) as directed by provider Daily As Needed.) 3 bottle 3   • HYDROcod Polst-CPM Polst ER (TUSSIONEX PENNKINETIC ER) 10-8 MG/5ML ER suspension Take 5 mL by mouth Every 12 (Twelve) Hours As Needed for Cough. 100 mL 0   • irbesartan-hydrochlorothiazide (AVALIDE) 150-12.5 MG tablet Take 0.5 tablets by mouth Daily.     • Magnesium 250 MG tablet Take 1 tablet  "by mouth Daily.     • metoprolol succinate XL (TOPROL-XL) 25 MG 24 hr tablet Take 1 tablet by mouth Daily With Dinner. 90 tablet 5   • omeprazole (priLOSEC) 40 MG capsule TAKE 1 CAPSULE EVERY DAY 90 capsule 2   • promethazine-dextromethorphan (PROMETHAZINE-DM) 6.25-15 MG/5ML syrup Take 5 mL by mouth 4 (Four) Times a Day As Needed for Cough. 118 mL 1   • sildenafil (VIAGRA) 100 MG tablet Take 1 tablet by mouth Daily As Needed for erectile dysfunction. 10 tablet 2   • simethicone (GAS-X) 80 MG chewable tablet Chew 1 tablet Every 6 (Six) Hours As Needed for Flatulence. 56 tablet 1   • sodium chloride (OCEAN NASAL SPRAY) 0.65 % nasal spray 2 sprays into the nostril(s) as directed by provider As Needed for Congestion. 1 each 0   • traZODone (DESYREL) 50 MG tablet TAKE 1 TABLET EVERY NIGHT 90 tablet 3   • vitamin B-12 (CYANOCOBALAMIN) 500 MCG tablet Take 500 mcg by mouth Daily.     • Alirocumab (PRALUENT) 75 MG/ML solution prefilled syringe Inject 75 mg under the skin into the appropriate area as directed Every 14 (Fourteen) Days. 6 syringe 3     No current facility-administered medications for this visit.          OBJECTIVE    /70 (BP Location: Left arm, Patient Position: Sitting, Cuff Size: Adult)   Pulse 76   Ht 174 cm (68.5\")   Wt 76.6 kg (168 lb 12.8 oz)   SpO2 97%   BMI 25.29 kg/m²         Review of Systems     Constitutional:  Denies recent weight loss, weight gain, fever or chills, no change in exercise tolerance     HENT:  Denies any hearing loss, epistaxis, hoarseness, or difficulty speaking.     Eyes: Wears eyeglasses or contact lenses     Respiratory:  Denies dyspnea with exertion,no cough, wheezing, or hemoptysis.     Cardiovascular: Negative for palpations, chest pain, orthopnea, PND, peripheral edema, syncope, or claudication.     Gastrointestinal:  Denies change in bowel habits, dyspepsia, ulcer disease, hematochezia, or melena.     Endocrine: Negative for cold intolerance, heat intolerance, " polydipsia, polyphagia and polyuria.     Genitourinary: Negative.      Musculoskeletal: Pain in the right shoulder    Skin:  Denies any change in hair or nails, rashes, or skin lesions.     Allergic/Immunologic: Negative.  Negative for environmental allergies, food allergies and immunocompromised state.     Neurological:  Denies any history of recurrent headaches, strokes, TIA, or seizure disorder.     Hematological: Denies any food allergies, seasonal allergies, bleeding disorders, or lymphadenopathy.     Psychiatric/Behavioral: Denies any history of depression, substance abuse, or change in cognitive function.         Physical Exam     Constitutional: Cooperative, alert and oriented, in no acute distress.     HENT:   Head: Normocephalic, normal hair patterns, no masses or tenderness.  Ears, Nose, and Throat: No gross abnormalities. No pallor or cyanosis.   Eyes: EOMS intact, PERRL, conjunctivae and lids unremarkable. Fundoscopic exam and visual fields not performed.   Neck: No palpable masses or adenopathy, no thyromegaly, no JVD, carotid pulses are full and equal bilaterally and without  Bruits.     Cardiovascular: Regular rhythm, S1 and S2 normal, no S3 or S4.  No murmurs, gallops, or rubs detected.     Pulmonary/Chest: Chest: normal symmetry, normal respiratory excursion, no intercostal retraction, no use of accessory muscles.            Pulmonary: Normal breath sounds. No rales or ronchi.    Abdominal: Abdomen soft, bowel sounds normoactive, no masses, no hepatosplenomegaly, non-tender, no bruits.     Musculoskeletal: No deformities, clubbing, cyanosis, erythema, or edema observed.    Neurological: No gross motor or sensory deficits noted, affect appropriate, oriented to time, person, place.     Skin: Warm and dry to the touch, no apparent skin lesions or masses noted.     Psychiatric: He has a normal mood and affect. His behavior is normal. Judgment and thought content normal.         Procedures      Lab  Results   Component Value Date    WBC 6.93 02/11/2020    HGB 14.5 02/11/2020    HCT 44.3 02/11/2020    MCV 85.5 02/11/2020     02/11/2020     Lab Results   Component Value Date    GLUCOSE 99 02/11/2020    BUN 21 02/11/2020    CREATININE 1.32 (H) 02/11/2020    EGFRIFNONA 53 (L) 02/11/2020    EGFRIFAFRI 60 05/02/2018    BCR 15.9 02/11/2020    CO2 26.0 02/11/2020    CALCIUM 9.9 02/11/2020    ALBUMIN 4.10 12/09/2019    AST 22 12/09/2019    ALT 28 12/09/2019     Lab Results   Component Value Date    CHOL 215 (H) 12/09/2019    CHOL 203 (H) 09/20/2019    CHOL 176 01/11/2019     Lab Results   Component Value Date    TRIG 153 (H) 12/09/2019    TRIG 112 09/20/2019    TRIG 195 01/11/2019     Lab Results   Component Value Date    HDL 41 12/09/2019    HDL 41 09/20/2019    HDL 40 (L) 01/11/2019     No components found for: LDLCALC  Lab Results   Component Value Date     (H) 12/09/2019     (H) 09/20/2019    LDL 92 04/29/2019     No results found for: HDLLDLRATIO  No components found for: CHOLHDL  No results found for: HGBA1C  Lab Results   Component Value Date    TSH 1.470 01/11/2019           ASSESSMENT AND PLAN  Souleymane Stapleton has multiple medical issues as discussed in the history of present illness but is stable from a cardiac standpoint with no clinical evidence of angina, arrhythmia or congestive heart failure.  He will be low to moderate risk for perioperative cardiac events.  Antiplatelet therapy with Plavix could be held for period of 5 days prior to the procedure and restarted post surgery when appropriate.  For optimization of his blood pressure I have advised him to take irbesartan/HCTZ 150/12.5 mg, half a tablet daily and metoprolol succinate 25 mg has been continued in p.m.  I believe that he would benefit from lipid-lowering therapy with Praluent and this has been recommended at 75 mg every 2 weeks.  Zetia 10 mg daily has been continued.  Prescriptions were refilled.  About 40 minutes was spent  in the assessment and evaluation of this patient    Souleymane was seen today for follow-up.    Diagnoses and all orders for this visit:    Essential hypertension    Pure hypercholesterolemia    Coronary artery disease involving coronary bypass graft of native heart without angina pectoris    History of cerebrovascular accident    Other orders  -     Alirocumab (PRALUENT) 75 MG/ML solution prefilled syringe; Inject 75 mg under the skin into the appropriate area as directed Every 14 (Fourteen) Days.  -     ezetimibe (ZETIA) 10 MG tablet; Take 1 tablet by mouth Daily.        Patient's Body mass index is 25.29 kg/m². BMI is within normal parameters. No follow-up required..  Patient is a non-smoker    Souleymane Stapleton  reports that he quit smoking about 23 years ago. His smoking use included cigarettes. He has never used smokeless tobacco..      Roberto Samano MD  2/12/2020  8:57 AM

## 2020-02-13 RX ORDER — EZETIMIBE 10 MG/1
10 TABLET ORAL DAILY
Qty: 90 TABLET | Refills: 3 | Status: SHIPPED | OUTPATIENT
Start: 2020-02-13 | End: 2021-02-12

## 2020-02-14 ENCOUNTER — ANESTHESIA EVENT (OUTPATIENT)
Dept: PERIOP | Facility: HOSPITAL | Age: 75
End: 2020-02-14

## 2020-02-17 ENCOUNTER — APPOINTMENT (OUTPATIENT)
Dept: GENERAL RADIOLOGY | Facility: HOSPITAL | Age: 75
End: 2020-02-17

## 2020-02-17 ENCOUNTER — ANESTHESIA (OUTPATIENT)
Dept: PERIOP | Facility: HOSPITAL | Age: 75
End: 2020-02-17

## 2020-02-17 ENCOUNTER — HOSPITAL ENCOUNTER (INPATIENT)
Facility: HOSPITAL | Age: 75
LOS: 2 days | Discharge: HOME | End: 2020-02-19
Attending: ORTHOPAEDIC SURGERY | Admitting: ORTHOPAEDIC SURGERY

## 2020-02-17 DIAGNOSIS — M25.512 BILATERAL SHOULDER PAIN, UNSPECIFIED CHRONICITY: ICD-10-CM

## 2020-02-17 DIAGNOSIS — G89.29 CHRONIC RIGHT SHOULDER PAIN: ICD-10-CM

## 2020-02-17 DIAGNOSIS — M25.511 BILATERAL SHOULDER PAIN, UNSPECIFIED CHRONICITY: ICD-10-CM

## 2020-02-17 DIAGNOSIS — Z78.9 IMPAIRED MOBILITY AND ACTIVITIES OF DAILY LIVING: ICD-10-CM

## 2020-02-17 DIAGNOSIS — I25.810 CORONARY ARTERY DISEASE INVOLVING CORONARY BYPASS GRAFT OF NATIVE HEART WITHOUT ANGINA PECTORIS: ICD-10-CM

## 2020-02-17 DIAGNOSIS — M12.811 ROTATOR CUFF ARTHROPATHY, RIGHT: Primary | ICD-10-CM

## 2020-02-17 DIAGNOSIS — M75.42 IMPINGEMENT SYNDROME, SHOULDER, LEFT: ICD-10-CM

## 2020-02-17 DIAGNOSIS — Z74.09 IMPAIRED MOBILITY AND ACTIVITIES OF DAILY LIVING: ICD-10-CM

## 2020-02-17 DIAGNOSIS — M75.102 ROTATOR CUFF SYNDROME, LEFT: ICD-10-CM

## 2020-02-17 DIAGNOSIS — C61 PROSTATE CANCER (HCC): ICD-10-CM

## 2020-02-17 DIAGNOSIS — M25.511 CHRONIC RIGHT SHOULDER PAIN: ICD-10-CM

## 2020-02-17 DIAGNOSIS — Z86.73 HISTORY OF CEREBROVASCULAR ACCIDENT: ICD-10-CM

## 2020-02-17 LAB — MRSA DNA SPEC QL NAA+PROBE: NEGATIVE

## 2020-02-17 PROCEDURE — 88311 DECALCIFY TISSUE: CPT | Performed by: PATHOLOGY

## 2020-02-17 PROCEDURE — 25810000003 SODIUM CHLORIDE 0.9 % WITH KCL 20 MEQ 20-0.9 MEQ/L-% SOLUTION: Performed by: ORTHOPAEDIC SURGERY

## 2020-02-17 PROCEDURE — 25010000002 CEFAZOLIN PER 500 MG: Performed by: ORTHOPAEDIC SURGERY

## 2020-02-17 PROCEDURE — 25010000002 DEXAMETHASONE PER 1 MG: Performed by: NURSE ANESTHETIST, CERTIFIED REGISTERED

## 2020-02-17 PROCEDURE — 25010000002 MIDAZOLAM PER 1 MG: Performed by: NURSE ANESTHETIST, CERTIFIED REGISTERED

## 2020-02-17 PROCEDURE — C1776 JOINT DEVICE (IMPLANTABLE): HCPCS | Performed by: ORTHOPAEDIC SURGERY

## 2020-02-17 PROCEDURE — 0RRJ00Z REPLACEMENT OF RIGHT SHOULDER JOINT WITH REVERSE BALL AND SOCKET SYNTHETIC SUBSTITUTE, OPEN APPROACH: ICD-10-PCS | Performed by: ORTHOPAEDIC SURGERY

## 2020-02-17 PROCEDURE — 73020 X-RAY EXAM OF SHOULDER: CPT

## 2020-02-17 PROCEDURE — 25010000002 PHENYLEPHRINE PER 1 ML: Performed by: NURSE ANESTHETIST, CERTIFIED REGISTERED

## 2020-02-17 PROCEDURE — 23472 RECONSTRUCT SHOULDER JOINT: CPT | Performed by: ORTHOPAEDIC SURGERY

## 2020-02-17 PROCEDURE — 88304 TISSUE EXAM BY PATHOLOGIST: CPT | Performed by: ORTHOPAEDIC SURGERY

## 2020-02-17 PROCEDURE — 25010000002 FENTANYL CITRATE (PF) 100 MCG/2ML SOLUTION: Performed by: NURSE ANESTHETIST, CERTIFIED REGISTERED

## 2020-02-17 PROCEDURE — 88311 DECALCIFY TISSUE: CPT | Performed by: ORTHOPAEDIC SURGERY

## 2020-02-17 PROCEDURE — 88305 TISSUE EXAM BY PATHOLOGIST: CPT | Performed by: ORTHOPAEDIC SURGERY

## 2020-02-17 PROCEDURE — 88304 TISSUE EXAM BY PATHOLOGIST: CPT | Performed by: PATHOLOGY

## 2020-02-17 PROCEDURE — 25010000002 NEOSTIGMINE 4 MG/4ML SOLUTION PREFILLED SYRINGE: Performed by: NURSE ANESTHETIST, CERTIFIED REGISTERED

## 2020-02-17 PROCEDURE — 25010000002 ROPIVACAINE PER 1 MG: Performed by: ANESTHESIOLOGY

## 2020-02-17 PROCEDURE — 25010000002 PROPOFOL 10 MG/ML EMULSION: Performed by: NURSE ANESTHETIST, CERTIFIED REGISTERED

## 2020-02-17 PROCEDURE — 87641 MR-STAPH DNA AMP PROBE: CPT | Performed by: ORTHOPAEDIC SURGERY

## 2020-02-17 DEVICE — SCRW FIX LK HEX 4.75X15MM: Type: IMPLANTABLE DEVICE | Site: SHOULDER | Status: FUNCTIONAL

## 2020-02-17 DEVICE — WAX BONE HEMO NAT 2.5G: Type: IMPLANTABLE DEVICE | Site: SHOULDER | Status: FUNCTIONAL

## 2020-02-17 DEVICE — IMPLANTABLE DEVICE: Type: IMPLANTABLE DEVICE | Site: SHOULDER | Status: FUNCTIONAL

## 2020-02-17 DEVICE — BASEPLT GLEN COMPR MINI W TPR ADAPTR 25: Type: IMPLANTABLE DEVICE | Site: SHOULDER | Status: FUNCTIONAL

## 2020-02-17 DEVICE — SCRW FIX LK HEX 4.75X30MM: Type: IMPLANTABLE DEVICE | Site: SHOULDER | Status: FUNCTIONAL

## 2020-02-17 DEVICE — BEAR HUM PROLNG STD 36MM: Type: IMPLANTABLE DEVICE | Site: SHOULDER | Status: FUNCTIONAL

## 2020-02-17 DEVICE — GLENOSPHERE VERSA DIAL FIX STD 36MM: Type: IMPLANTABLE DEVICE | Site: SHOULDER | Status: FUNCTIONAL

## 2020-02-17 DEVICE — TRY HUM/SHLDR COMPREHENSIVE/REVERSE MINI COCR STD 40MM: Type: IMPLANTABLE DEVICE | Site: SHOULDER | Status: FUNCTIONAL

## 2020-02-17 DEVICE — STEM HUM/SHLDR COMPREHENSIVE MINI 12X83MM: Type: IMPLANTABLE DEVICE | Site: SHOULDER | Status: FUNCTIONAL

## 2020-02-17 DEVICE — SCRW COMPRNSV CNTRL 6.5X25MM REUS: Type: IMPLANTABLE DEVICE | Site: SHOULDER | Status: FUNCTIONAL

## 2020-02-17 RX ORDER — LABETALOL HYDROCHLORIDE 5 MG/ML
5 INJECTION, SOLUTION INTRAVENOUS
Status: DISCONTINUED | OUTPATIENT
Start: 2020-02-17 | End: 2020-02-17 | Stop reason: HOSPADM

## 2020-02-17 RX ORDER — ACETAMINOPHEN 500 MG
1000 TABLET ORAL ONCE
Status: COMPLETED | OUTPATIENT
Start: 2020-02-17 | End: 2020-02-17

## 2020-02-17 RX ORDER — PROMETHAZINE HYDROCHLORIDE 25 MG/ML
12.5 INJECTION, SOLUTION INTRAMUSCULAR; INTRAVENOUS ONCE AS NEEDED
Status: DISCONTINUED | OUTPATIENT
Start: 2020-02-17 | End: 2020-02-17 | Stop reason: HOSPADM

## 2020-02-17 RX ORDER — SODIUM CHLORIDE, SODIUM GLUCONATE, SODIUM ACETATE, POTASSIUM CHLORIDE, AND MAGNESIUM CHLORIDE 526; 502; 368; 37; 30 MG/100ML; MG/100ML; MG/100ML; MG/100ML; MG/100ML
1000 INJECTION, SOLUTION INTRAVENOUS CONTINUOUS
Status: ACTIVE | OUTPATIENT
Start: 2020-02-17 | End: 2020-02-19

## 2020-02-17 RX ORDER — DIPHENHYDRAMINE HYDROCHLORIDE 50 MG/ML
12.5 INJECTION INTRAMUSCULAR; INTRAVENOUS
Status: DISCONTINUED | OUTPATIENT
Start: 2020-02-17 | End: 2020-02-17 | Stop reason: HOSPADM

## 2020-02-17 RX ORDER — TRAZODONE HYDROCHLORIDE 50 MG/1
50 TABLET ORAL NIGHTLY
Status: DISCONTINUED | OUTPATIENT
Start: 2020-02-17 | End: 2020-02-19 | Stop reason: HOSPADM

## 2020-02-17 RX ORDER — ROCURONIUM BROMIDE 10 MG/ML
INJECTION, SOLUTION INTRAVENOUS AS NEEDED
Status: DISCONTINUED | OUTPATIENT
Start: 2020-02-17 | End: 2020-02-17 | Stop reason: SURG

## 2020-02-17 RX ORDER — NEOSTIGMINE METHYLSULFATE 4 MG/4 ML
SYRINGE (ML) INTRAVENOUS AS NEEDED
Status: DISCONTINUED | OUTPATIENT
Start: 2020-02-17 | End: 2020-02-17 | Stop reason: SURG

## 2020-02-17 RX ORDER — ONDANSETRON 2 MG/ML
4 INJECTION INTRAMUSCULAR; INTRAVENOUS ONCE AS NEEDED
Status: DISCONTINUED | OUTPATIENT
Start: 2020-02-17 | End: 2020-02-17 | Stop reason: HOSPADM

## 2020-02-17 RX ORDER — BUPIVACAINE HCL/0.9 % NACL/PF 0.1 %
2 PLASTIC BAG, INJECTION (ML) EPIDURAL EVERY 6 HOURS SCHEDULED
Status: COMPLETED | OUTPATIENT
Start: 2020-02-17 | End: 2020-02-18

## 2020-02-17 RX ORDER — PREGABALIN 75 MG/1
75 CAPSULE ORAL ONCE
Status: COMPLETED | OUTPATIENT
Start: 2020-02-17 | End: 2020-02-17

## 2020-02-17 RX ORDER — SODIUM CHLORIDE, SODIUM GLUCONATE, SODIUM ACETATE, POTASSIUM CHLORIDE, AND MAGNESIUM CHLORIDE 526; 502; 368; 37; 30 MG/100ML; MG/100ML; MG/100ML; MG/100ML; MG/100ML
INJECTION, SOLUTION INTRAVENOUS CONTINUOUS PRN
Status: DISCONTINUED | OUTPATIENT
Start: 2020-02-17 | End: 2020-02-17 | Stop reason: SURG

## 2020-02-17 RX ORDER — ACETAMINOPHEN 650 MG/1
650 SUPPOSITORY RECTAL ONCE AS NEEDED
Status: DISCONTINUED | OUTPATIENT
Start: 2020-02-17 | End: 2020-02-17 | Stop reason: HOSPADM

## 2020-02-17 RX ORDER — ALPRAZOLAM 0.5 MG/1
0.5 TABLET ORAL 3 TIMES DAILY
Status: DISCONTINUED | OUTPATIENT
Start: 2020-02-17 | End: 2020-02-19 | Stop reason: HOSPADM

## 2020-02-17 RX ORDER — 0.9 % SODIUM CHLORIDE 0.9 %
VIAL (ML) INJECTION AS NEEDED
Status: DISCONTINUED | OUTPATIENT
Start: 2020-02-17 | End: 2020-02-17 | Stop reason: HOSPADM

## 2020-02-17 RX ORDER — DEXAMETHASONE SODIUM PHOSPHATE 4 MG/ML
INJECTION, SOLUTION INTRA-ARTICULAR; INTRALESIONAL; INTRAMUSCULAR; INTRAVENOUS; SOFT TISSUE AS NEEDED
Status: DISCONTINUED | OUTPATIENT
Start: 2020-02-17 | End: 2020-02-17 | Stop reason: SURG

## 2020-02-17 RX ORDER — BACITRACIN 50000 [IU]/1
INJECTION, POWDER, FOR SOLUTION INTRAMUSCULAR AS NEEDED
Status: DISCONTINUED | OUTPATIENT
Start: 2020-02-17 | End: 2020-02-17 | Stop reason: HOSPADM

## 2020-02-17 RX ORDER — SODIUM CHLORIDE 0.9 % (FLUSH) 0.9 %
10 SYRINGE (ML) INJECTION AS NEEDED
Status: DISCONTINUED | OUTPATIENT
Start: 2020-02-17 | End: 2020-02-19 | Stop reason: HOSPADM

## 2020-02-17 RX ORDER — BUPIVACAINE HCL/0.9 % NACL/PF 0.1 %
2 PLASTIC BAG, INJECTION (ML) EPIDURAL ONCE
Status: COMPLETED | OUTPATIENT
Start: 2020-02-17 | End: 2020-02-17

## 2020-02-17 RX ORDER — PROPOFOL 10 MG/ML
VIAL (ML) INTRAVENOUS AS NEEDED
Status: DISCONTINUED | OUTPATIENT
Start: 2020-02-17 | End: 2020-02-17 | Stop reason: SURG

## 2020-02-17 RX ORDER — SODIUM CHLORIDE AND POTASSIUM CHLORIDE 150; 900 MG/100ML; MG/100ML
100 INJECTION, SOLUTION INTRAVENOUS CONTINUOUS
Status: DISPENSED | OUTPATIENT
Start: 2020-02-17 | End: 2020-02-18

## 2020-02-17 RX ORDER — LIDOCAINE HYDROCHLORIDE 20 MG/ML
INJECTION, SOLUTION INFILTRATION; PERINEURAL AS NEEDED
Status: DISCONTINUED | OUTPATIENT
Start: 2020-02-17 | End: 2020-02-17 | Stop reason: SURG

## 2020-02-17 RX ORDER — ALUMINA, MAGNESIA, AND SIMETHICONE 2400; 2400; 240 MG/30ML; MG/30ML; MG/30ML
15 SUSPENSION ORAL EVERY 6 HOURS PRN
Status: DISCONTINUED | OUTPATIENT
Start: 2020-02-17 | End: 2020-02-19 | Stop reason: HOSPADM

## 2020-02-17 RX ORDER — BUPIVACAINE HYDROCHLORIDE AND EPINEPHRINE 2.5; 5 MG/ML; UG/ML
INJECTION, SOLUTION EPIDURAL; INFILTRATION; INTRACAUDAL; PERINEURAL AS NEEDED
Status: DISCONTINUED | OUTPATIENT
Start: 2020-02-17 | End: 2020-02-17 | Stop reason: HOSPADM

## 2020-02-17 RX ORDER — ONDANSETRON 2 MG/ML
4 INJECTION INTRAMUSCULAR; INTRAVENOUS EVERY 6 HOURS PRN
Status: DISCONTINUED | OUTPATIENT
Start: 2020-02-17 | End: 2020-02-19 | Stop reason: HOSPADM

## 2020-02-17 RX ORDER — PROMETHAZINE HYDROCHLORIDE 25 MG/1
25 TABLET ORAL ONCE AS NEEDED
Status: DISCONTINUED | OUTPATIENT
Start: 2020-02-17 | End: 2020-02-17 | Stop reason: HOSPADM

## 2020-02-17 RX ORDER — PROMETHAZINE HYDROCHLORIDE 25 MG/1
25 SUPPOSITORY RECTAL ONCE AS NEEDED
Status: DISCONTINUED | OUTPATIENT
Start: 2020-02-17 | End: 2020-02-17 | Stop reason: HOSPADM

## 2020-02-17 RX ORDER — HYDROCODONE BITARTRATE AND ACETAMINOPHEN 7.5; 325 MG/1; MG/1
1 TABLET ORAL EVERY 4 HOURS PRN
Status: DISCONTINUED | OUTPATIENT
Start: 2020-02-17 | End: 2020-02-19 | Stop reason: HOSPADM

## 2020-02-17 RX ORDER — NALOXONE HCL 0.4 MG/ML
0.4 VIAL (ML) INJECTION AS NEEDED
Status: DISCONTINUED | OUTPATIENT
Start: 2020-02-17 | End: 2020-02-17 | Stop reason: HOSPADM

## 2020-02-17 RX ORDER — ACETAMINOPHEN 325 MG/1
650 TABLET ORAL ONCE AS NEEDED
Status: DISCONTINUED | OUTPATIENT
Start: 2020-02-17 | End: 2020-02-17 | Stop reason: HOSPADM

## 2020-02-17 RX ORDER — EPHEDRINE SULFATE 50 MG/ML
5 INJECTION, SOLUTION INTRAVENOUS ONCE AS NEEDED
Status: DISCONTINUED | OUTPATIENT
Start: 2020-02-17 | End: 2020-02-17 | Stop reason: HOSPADM

## 2020-02-17 RX ORDER — FENTANYL CITRATE 50 UG/ML
INJECTION, SOLUTION INTRAMUSCULAR; INTRAVENOUS AS NEEDED
Status: DISCONTINUED | OUTPATIENT
Start: 2020-02-17 | End: 2020-02-17 | Stop reason: SURG

## 2020-02-17 RX ORDER — CLOPIDOGREL BISULFATE 75 MG/1
75 TABLET ORAL DAILY
Status: DISCONTINUED | OUTPATIENT
Start: 2020-02-17 | End: 2020-02-19 | Stop reason: HOSPADM

## 2020-02-17 RX ORDER — EPHEDRINE SULFATE 50 MG/ML
INJECTION, SOLUTION INTRAVENOUS AS NEEDED
Status: DISCONTINUED | OUTPATIENT
Start: 2020-02-17 | End: 2020-02-17 | Stop reason: SURG

## 2020-02-17 RX ORDER — FAMOTIDINE 40 MG/1
40 TABLET, FILM COATED ORAL DAILY
Status: DISCONTINUED | OUTPATIENT
Start: 2020-02-17 | End: 2020-02-19 | Stop reason: HOSPADM

## 2020-02-17 RX ORDER — BUSPIRONE HYDROCHLORIDE 5 MG/1
5 TABLET ORAL 3 TIMES DAILY
Status: DISCONTINUED | OUTPATIENT
Start: 2020-02-17 | End: 2020-02-19 | Stop reason: HOSPADM

## 2020-02-17 RX ORDER — NALOXONE HCL 0.4 MG/ML
0.4 VIAL (ML) INJECTION
Status: DISCONTINUED | OUTPATIENT
Start: 2020-02-17 | End: 2020-02-19 | Stop reason: HOSPADM

## 2020-02-17 RX ORDER — ROPIVACAINE HYDROCHLORIDE 5 MG/ML
INJECTION, SOLUTION EPIDURAL; INFILTRATION; PERINEURAL
Status: COMPLETED | OUTPATIENT
Start: 2020-02-17 | End: 2020-02-17

## 2020-02-17 RX ORDER — MIDAZOLAM HYDROCHLORIDE 1 MG/ML
INJECTION INTRAMUSCULAR; INTRAVENOUS AS NEEDED
Status: DISCONTINUED | OUTPATIENT
Start: 2020-02-17 | End: 2020-02-17 | Stop reason: SURG

## 2020-02-17 RX ORDER — METOPROLOL SUCCINATE 25 MG/1
25 TABLET, EXTENDED RELEASE ORAL
Status: DISCONTINUED | OUTPATIENT
Start: 2020-02-17 | End: 2020-02-19 | Stop reason: HOSPADM

## 2020-02-17 RX ORDER — FLUMAZENIL 0.1 MG/ML
0.2 INJECTION INTRAVENOUS AS NEEDED
Status: DISCONTINUED | OUTPATIENT
Start: 2020-02-17 | End: 2020-02-17 | Stop reason: HOSPADM

## 2020-02-17 RX ORDER — MELOXICAM 15 MG/1
15 TABLET ORAL ONCE
Status: COMPLETED | OUTPATIENT
Start: 2020-02-17 | End: 2020-02-17

## 2020-02-17 RX ORDER — SODIUM CHLORIDE 0.9 % (FLUSH) 0.9 %
10 SYRINGE (ML) INJECTION EVERY 12 HOURS SCHEDULED
Status: DISCONTINUED | OUTPATIENT
Start: 2020-02-17 | End: 2020-02-19 | Stop reason: HOSPADM

## 2020-02-17 RX ORDER — AMOXICILLIN 250 MG
2 CAPSULE ORAL 2 TIMES DAILY
Status: DISCONTINUED | OUTPATIENT
Start: 2020-02-17 | End: 2020-02-19 | Stop reason: HOSPADM

## 2020-02-17 RX ADMIN — ROCURONIUM BROMIDE 50 MG: 10 INJECTION INTRAVENOUS at 14:21

## 2020-02-17 RX ADMIN — SODIUM CHLORIDE, SODIUM GLUCONATE, SODIUM ACETATE, POTASSIUM CHLORIDE, AND MAGNESIUM CHLORIDE: 526; 502; 368; 37; 30 INJECTION, SOLUTION INTRAVENOUS at 17:03

## 2020-02-17 RX ADMIN — ROPIVACAINE HYDROCHLORIDE 25 ML: 5 INJECTION, SOLUTION EPIDURAL; INFILTRATION; PERINEURAL at 14:13

## 2020-02-17 RX ADMIN — FENTANYL CITRATE 100 MCG: 50 INJECTION, SOLUTION INTRAMUSCULAR; INTRAVENOUS at 14:19

## 2020-02-17 RX ADMIN — SODIUM CHLORIDE, SODIUM GLUCONATE, SODIUM ACETATE, POTASSIUM CHLORIDE, AND MAGNESIUM CHLORIDE: 526; 502; 368; 37; 30 INJECTION, SOLUTION INTRAVENOUS at 15:03

## 2020-02-17 RX ADMIN — POTASSIUM CHLORIDE AND SODIUM CHLORIDE 100 ML/HR: 900; 150 INJECTION, SOLUTION INTRAVENOUS at 21:21

## 2020-02-17 RX ADMIN — FAMOTIDINE 40 MG: 40 TABLET, FILM COATED ORAL at 21:20

## 2020-02-17 RX ADMIN — DEXAMETHASONE SODIUM PHOSPHATE 8 MG: 4 INJECTION, SOLUTION INTRAMUSCULAR; INTRAVENOUS at 14:26

## 2020-02-17 RX ADMIN — MIDAZOLAM HYDROCHLORIDE 2 MG: 2 INJECTION, SOLUTION INTRAMUSCULAR; INTRAVENOUS at 14:16

## 2020-02-17 RX ADMIN — PHENYLEPHRINE HYDROCHLORIDE 100 MCG: 10 INJECTION INTRAVENOUS at 15:12

## 2020-02-17 RX ADMIN — SODIUM CHLORIDE, SODIUM GLUCONATE, SODIUM ACETATE, POTASSIUM CHLORIDE, AND MAGNESIUM CHLORIDE: 526; 502; 368; 37; 30 INJECTION, SOLUTION INTRAVENOUS at 14:47

## 2020-02-17 RX ADMIN — EPHEDRINE SULFATE 10 MG: 50 INJECTION INTRAVENOUS at 14:35

## 2020-02-17 RX ADMIN — Medication 2 G: at 14:30

## 2020-02-17 RX ADMIN — EPHEDRINE SULFATE 10 MG: 50 INJECTION INTRAVENOUS at 14:51

## 2020-02-17 RX ADMIN — PHENYLEPHRINE HYDROCHLORIDE 100 MCG: 10 INJECTION INTRAVENOUS at 15:25

## 2020-02-17 RX ADMIN — Medication 3 MG: at 17:07

## 2020-02-17 RX ADMIN — PHENYLEPHRINE HYDROCHLORIDE 100 MCG: 10 INJECTION INTRAVENOUS at 14:48

## 2020-02-17 RX ADMIN — PHENYLEPHRINE HYDROCHLORIDE 100 MCG: 10 INJECTION INTRAVENOUS at 15:00

## 2020-02-17 RX ADMIN — SODIUM CHLORIDE 2 G: 9 INJECTION, SOLUTION INTRAVENOUS at 22:13

## 2020-02-17 RX ADMIN — PHENYLEPHRINE HYDROCHLORIDE 100 MCG: 10 INJECTION INTRAVENOUS at 15:19

## 2020-02-17 RX ADMIN — SODIUM CHLORIDE, SODIUM GLUCONATE, SODIUM ACETATE, POTASSIUM CHLORIDE, AND MAGNESIUM CHLORIDE 1000 ML: 526; 502; 368; 37; 30 INJECTION, SOLUTION INTRAVENOUS at 11:03

## 2020-02-17 RX ADMIN — PROPOFOL 200 MG: 10 INJECTION, EMULSION INTRAVENOUS at 14:20

## 2020-02-17 RX ADMIN — LIDOCAINE HYDROCHLORIDE 60 MG: 20 INJECTION, SOLUTION INFILTRATION; PERINEURAL at 14:19

## 2020-02-17 RX ADMIN — EPHEDRINE SULFATE 10 MG: 50 INJECTION INTRAVENOUS at 15:00

## 2020-02-17 RX ADMIN — SENNOSIDES AND DOCUSATE SODIUM 2 TABLET: 8.6; 5 TABLET ORAL at 21:21

## 2020-02-17 RX ADMIN — EPHEDRINE SULFATE 10 MG: 50 INJECTION INTRAVENOUS at 15:12

## 2020-02-17 RX ADMIN — PHENYLEPHRINE HYDROCHLORIDE 100 MCG: 10 INJECTION INTRAVENOUS at 14:35

## 2020-02-17 RX ADMIN — CLOPIDOGREL BISULFATE 75 MG: 75 TABLET ORAL at 21:21

## 2020-02-17 RX ADMIN — MELOXICAM 15 MG: 15 TABLET ORAL at 11:03

## 2020-02-17 RX ADMIN — ACETAMINOPHEN 1000 MG: 500 TABLET ORAL at 11:03

## 2020-02-17 RX ADMIN — METOPROLOL SUCCINATE 25 MG: 25 TABLET, EXTENDED RELEASE ORAL at 21:21

## 2020-02-17 RX ADMIN — EPHEDRINE SULFATE 10 MG: 50 INJECTION INTRAVENOUS at 13:48

## 2020-02-17 RX ADMIN — GLYCOPYRROLATE 0.4 MG: 0.2 INJECTION, SOLUTION INTRAMUSCULAR; INTRAVITREAL at 17:08

## 2020-02-17 RX ADMIN — TRAZODONE HYDROCHLORIDE 50 MG: 50 TABLET ORAL at 21:21

## 2020-02-17 RX ADMIN — PREGABALIN 75 MG: 75 CAPSULE ORAL at 11:03

## 2020-02-17 RX ADMIN — BUSPIRONE HYDROCHLORIDE 5 MG: 5 TABLET ORAL at 21:21

## 2020-02-17 RX ADMIN — ALPRAZOLAM 0.5 MG: 0.5 TABLET ORAL at 21:20

## 2020-02-17 RX ADMIN — PHENYLEPHRINE HYDROCHLORIDE 100 MCG: 10 INJECTION INTRAVENOUS at 14:37

## 2020-02-17 NOTE — INTERVAL H&P NOTE
H&P reviewed. The patient was examined and there are no changes to the H&P.     Vitals:    02/17/20 1058   BP: 163/74   Pulse: 74   Resp: 17   Temp: 97.7 °F (36.5 °C)   SpO2: 98%     02/17/20 at 1:36 PM by Juan Diego Mendoza MD

## 2020-02-17 NOTE — ANESTHESIA POSTPROCEDURE EVALUATION
Patient: Souleymane Stapleton    Procedure Summary     Date:  02/17/20 Room / Location:  [facility] [place] OR  / [facility] [place]    Anesthesia Start:  1402 Anesthesia Stop:  1718    Procedure:  right reverse total shoulder arthroplasty. (Right Shoulder) Diagnosis:       Bilateral shoulder pain, unspecified chronicity      Rotator cuff syndrome, left      Rotator cuff arthropathy, right      Prostate cancer (CMS/HCC)      Coronary artery disease involving coronary bypass graft of native heart without angina pectoris      History of cerebrovascular accident      (Bilateral shoulder pain, unspecified chronicity [M25.511, M25.512])      (Rotator cuff syndrome, left [M75.102])      (Rotator cuff arthropathy, right [M12.811])      (Prostate cancer (CMS/HCC) [C61])      (Coronary artery disease involving coronary bypass graft of native heart without angina pectoris [I25.810])      (History of cerebrovascular accident [Z86.73])    Surgeon:  Juan Diego Mendoza MD Provider:  Delroy Marin MD    Anesthesia Type:  general with block ASA Status:  3          Anesthesia Type: general with block    Vitals  No vitals data found for the desired time range.          Post Anesthesia Care and Evaluation    Patient location during evaluation: PACU  Patient participation: complete - patient participated  Level of consciousness: awake and alert  Pain management: adequate  Airway patency: patent  Anesthetic complications: No anesthetic complications    Cardiovascular status: acceptable and stable  Respiratory status: acceptable, face mask and spontaneous ventilation  Hydration status: acceptable

## 2020-02-17 NOTE — OP NOTE
TOTAL SHOULDER REVERSE ARTHROPLASTY  Procedure Note    Name:    Souleymane Stapleton  YOB: 1945  Date of surgery:   2/17/2020    Pre-op Diagnosis:   Bilateral shoulder pain, unspecified chronicity [M25.511, M25.512]  Rotator cuff syndrome, left [M75.102]  Rotator cuff arthropathy, right [M12.811]  Prostate cancer (CMS/[facility]) [C61]  Coronary artery disease involving coronary bypass graft of native heart without angina pectoris [I25.810]  History of cerebrovascular accident [Z86.73]    Post-op Diagnosis:    Post-Op Diagnosis Codes:     * Bilateral shoulder pain, unspecified chronicity [M25.511, M25.512]     * Rotator cuff syndrome, left [M75.102]     * Rotator cuff arthropathy, right [M12.811]     * Prostate cancer (CMS/[facility]) [C61]     * Coronary artery disease involving coronary bypass graft of native heart without angina pectoris [I25.810]     * History of cerebrovascular accident [Z86.73]    Procedure:  Procedure(s):  right reverse total shoulder arthroplasty.    Surgeon:  Surgeon(s):  Juan Diego Mendoza MD    ASSISTANT:  Ginny Luz CSA    Anesthesia: General    Staff:   Circulator: Roxanne Damon RN  Scrub Person: Karina Saab; Piter King  Vendor Representative: Edwardo Toscano  Assistant: Ginny Luz CSA    Estimated Blood Loss: 300 mL    Specimens:                ID Type Source Tests Collected by Time   A (Not marked as sent) : right humeral head Tissue Shoulder, Right TISSUE PATHOLOGY EXAM Juan Diego Mendoza MD 2/17/2020 1504         Drains:   Closed/Suction Drain 1 Right Shoulder Accordion 7 Fr. (Active)       Findings:      Complications: None    IMPLANTS:   Implant Name Type Inv. Item Serial No.  Lot No. LRB No. Used   BASEPLT NIXON COMPR MINI W TPR ADAPTR 25 - [id number] Implant BASEPLT NIXON COMPR MINI W TPR ADAPTR 25  [employer] [id number] Right 1   SCRW COMPRNSV CNTRL 6.5X25MM REUS - [id number] Implant SCRW COMPRNSV CNTRL 6.5X25MM REUS  [employer]  [id number] Right 1   SCRW FIX LK HEX 4.99X82BI - [id number] Implant SCRW FIX LK HEX 4.72Q89UZ  [employer] [id number] Right 1   SCRW FIX LK HEX 4.41V66ID - [id number] Implant SCRW FIX LK HEX 4.62Y67RX  [employer] [id number] Right 1   WAX BONE 2.5G - [id number] Implant WAX BONE 2.5G  [employer]  [employer] [id number] Right 1   SCRW FIX LK HEX 4.00A17XR - [id number] Implant SCRW FIX LK HEX [id number]  [employer] [id number] Right 1   GLENOSPHERE VERSA DIAL FXD OFFST36 STD - [id number] Implant GLENOSPHERE VERSA DIAL FXD OFFST36 STD  [employer] [id number] Right 1   SCRW FIX LK HEX 4.87D06WT - [id number] Implant SCRW FIX LK HEX 4.40X47FB  [employer] [id number] Right 1   STEM HUM COMPRNSV ANGELO MINI 12MM - [id number] Implant STEM HUM COMPRNSV ANGELO MINI 12MM  [employer] [id number] Right 1   BEAR HUM PROLNG STD 36MM - [id number] Implant BEAR HUM PROLNG STD 36MM  [employer] [id number] Right 1   TRY HUM STD 40MM - [id number] Implant TRY HUM STD 40MM  [employer] [id number] Right 1         PROCEDURE:    Once consent was obtained and the patient was taken to the operating room and once adequate anesthesia was obtained and the patient was positioned in the modified beachchair position.  The Left arm was confirmed be able to be mobilized and manipulated adequately to allow for instrumenting the humeral canal.  Surgical timeout was performed.    The deltopectoral incision was then made and dissection was carried down to the deltopectoral groove.  Delto-pectoral groove was split bluntly and expanded using a Gelpi retractor and a self retaining retractor.  The releases were performed around the inferior osteophyte.  The subscapularis was identified and released off of the lesser tuberosity fully exposing the inferior bone spur.  The biceps tendon was noted to be torn already and therefore no tenodesis was performed.  The undersurface of the deltoid sleeve was then manually dissected off of the anterior and posterior aspects of the cuff and the  humeral head mobilizing the proximal humerus.  The axillary nerve was identified inferiorly and protected.  The humeral shaft was then mobilized and visualized.  The humeral canal was entered using the smallest reamer.    We began reaming and reamed up to the appropriate size.  The cutting guide was then placed on the shaft of the reamer and pinned in place.  The proximal humeral cut was then made.  The proximal humeral shaft was then addressed.     The glenoid was then exposed retracting the humeral shaft posteriorly.  Once the glenoid had been fully exposed and the labrum removed, the inferior releases were done to fully mobilize the humerus and expose the glenoid.  The central guidepin guide was then utilized to place the guidepin and the central and slightly inferior portion of the glenoid.  This was inserted completely and then the central guidepin was overreamed with the cannulated reamer.  The glenoid reamer was then utilized reaming slightly more inferiorly and superiorly based off of the tilt.  The guide was then utilized to confirm that adequate resection had occurred.  The glenoid component was then opened and inserted and impacted into position.  The central screw was then placed using a depth gauge to confirm the adequate length of the screw.  The inferior and superior screws were then placed using the locking guide.  The anterior and posterior locking screws were then placed as well.  The glenosphere was then chosen and then opened.  It was set for maximal inferior offset.  Glenosphere was then placed and impacted into place.    The proximal humerus was then exposed once again and the trial component was inserted with trial socket.  The shoulder was then reduced and good range of motion was achieved good stability was felt to be achieved as well.  We tried a 3 mm lateral offset but felt that the standard was the better fit.  The shoulder was then dislocated once again the trial component was removed  and then the final trial and insert was then placed.  Final reduction was then performed and the wound was copiously irrigated with bacitracin saline.    The arm was mobilized through an appropriate range of motion without difficulty.  A small Hemovac drain was then placed under the deltoid.  Deltoid fascia was then closed using 0 Vicryl.  Subcutaneous tissue was closed using 0 Vicryl and 2-0 Vicryl and the skin was closed using skin staples.  The wound was covered with Xeroform gauze 4 x 4's and an ABD with Mefix dressing.  Patient was then placed in a sling and awakened and taken to the recovery room in good condition.  She tolerated the procedure very well.  Sponge and needle counts were reported as correct prior to closure the wounds.        Juan Diego Mendoza MD     Date: 2/17/2020  Time: 5:28 PM

## 2020-02-17 NOTE — ANESTHESIA PREPROCEDURE EVALUATION
Anesthesia Evaluation     Patient summary reviewed   NPO Solid Status: > 8 hours  NPO Liquid Status: > 6 hours           Airway   Mallampati: II  TM distance: <3 FB  Neck ROM: limited  Anterior  Dental    (+) poor dentition    Pulmonary     breath sounds clear to auscultation  (+) shortness of breath,   Cardiovascular   Exercise tolerance: good (4-7 METS)    NYHA Classification: II  ECG reviewed  PT is on anticoagulation therapy  Rhythm: regular  Rate: normal    (+) hypertension well controlled 2 medications or greater, CAD, CABG >6 Months, murmur, hyperlipidemia,  carotid artery disease  (-) angina    ROS comment: Negative stress test noted last spring.    Neuro/Psych  (+) TIA, psychiatric history Anxiety,     (-) CVA  GI/Hepatic/Renal/Endo    (+)  GERD,      Musculoskeletal     Abdominal    Substance History      OB/GYN          Other   arthritis,    history of cancer                      Anesthesia Plan    ASA 3     general with block   (Patient understands that the hemidiaphragm may be temporarily anesthetized after block today.  Otherwise plavix on the 11th and no recent chest pain reported.)  intravenous induction     Anesthetic plan, all risks, benefits, and alternatives have been provided, discussed and informed consent has been obtained with: patient.

## 2020-02-17 NOTE — ANESTHESIA PROCEDURE NOTES
Peripheral Block      Patient reassessed immediately prior to procedure    Patient location during procedure: OR  Start time: 2/17/2020 2:05 PM  Stop time: 2/17/2020 2:13 PM  Reason for block: at surgeon's request and post-op pain management  Performed by  CRNA: Kell Vicente, CRNA  Assisted by: Jeny Tucker SRNA  Preanesthetic Checklist  Completed: patient identified, site marked, surgical consent, pre-op evaluation, timeout performed, IV checked, risks and benefits discussed and monitors and equipment checked  Prep:  Pt Position: supine  Sterile barriers:cap, gloves, sterile barriers and mask  Prep: ChloraPrep  Patient monitoring: blood pressure monitoring, continuous pulse oximetry and EKG  Procedure  Sedation:yes  Performed under: PNB  Guidance:ultrasound guided  ULTRASOUND INTERPRETATION.  Using ultrasound guidance a 22 G gauge needle was placed in close proximity to the brachial plexus nerve, at which point, under ultrasound guidance anesthetic was injected in the area of the nerve and spread of the anesthesia was seen on ultrasound in close proximity thereto.  There were no abnormalities seen on ultrasound; a digital image was taken; and the patient tolerated the procedure with no complications. Images:still images obtained, printed/placed on chart    Laterality:right  Block Type:interscalene  Injection Technique:single-shot  Needle Type:echogenic  Resistance on Injection: none    Medications Used: ropivacaine (NAROPIN) 0.5 % injection, 25 mL  Med admintered at 2/17/2020 2:13 PM      Post Assessment  Injection Assessment: negative aspiration for heme, no paresthesia on injection and incremental injection  Patient Tolerance:comfortable throughout block  Complications:no

## 2020-02-17 NOTE — ANESTHESIA PROCEDURE NOTES
Airway  Urgency: elective    Date/Time: 2/17/2020 2:14 AM  Difficult airway    General Information and Staff    Patient location during procedure: OR    Indications and Patient Condition  Indications for airway management: airway protection    Preoxygenated: yes  MILS maintained throughout  Mask difficulty assessment: 1 - vent by mask    Final Airway Details  Final airway type: endotracheal airway      Successful airway: ETT  Cuffed: yes   Successful intubation technique: video laryngoscopy  Facilitating devices/methods: intubating stylet and anterior pressure/BURP  Endotracheal tube insertion site: oral  Blade: Alejandra  Blade size: 4  ETT size (mm): 7.5  Cormack-Lehane Classification: grade IIa - partial view of glottis  Placement verified by: chest auscultation and capnometry   Cuff volume (mL): 8  Measured from: lips  ETT/EBT  to lips (cm): 21  Number of attempts at approach: 2  Assessment: lips, teeth, and gum same as pre-op and atraumatic intubation    Additional Comments  Alejandra used for second attempt. Anterior airway <3 fingerbreaths. Small mouth opening. Decreased neck ROM

## 2020-02-17 NOTE — OP NOTE
TOTAL SHOULDER REVERSE ARTHROPLASTY  Procedure Note    Name:    Souleymane Stapleton  YOB: 1945  Date of surgery:   2/17/2020    Pre-op Diagnosis:   Bilateral shoulder pain, unspecified chronicity [M25.511, M25.512]  Rotator cuff syndrome, left [M75.102]  Rotator cuff arthropathy, right [M12.811]  Prostate cancer (CMS/[facility]) [C61]  Coronary artery disease involving coronary bypass graft of native heart without angina pectoris [I25.810]  History of cerebrovascular accident [Z86.73]    Post-op Diagnosis:    Post-Op Diagnosis Codes:     * Bilateral shoulder pain, unspecified chronicity [M25.511, M25.512]     * Rotator cuff syndrome, left [M75.102]     * Rotator cuff arthropathy, right [M12.811]     * Prostate cancer (CMS/HCC) [C61]     * Coronary artery disease involving coronary bypass graft of native heart without angina pectoris [I25.810]     * History of cerebrovascular accident [Z86.73]    Procedure:  Procedure(s):  right reverse total shoulder arthroplasty.    Surgeon:  Surgeon(s):  Juan Diego Mendoza MD    ASSISTANT:  Ginny Luz CSA    Anesthesia: General    Staff:   Circulator: Tony Mendez RN  Scrub Person: Karina Saab    Estimated Blood Loss: 250    Specimens:                A: femoral head      Drains: * No LDAs found *    Complications: None    IMPLANTS:   Nothing was implanted during the procedure      PROCEDURE:  The patient was taken to the operating room and placed in the supine position. A sequential compression device was carefully placed on the non-operative leg. Preoperative antibiotics were administered. Surgical time out was performed. After adequate induction of anesthesia, the feet were padded and placed in the [place] table boots. The patient was then transferred onto the [place] table and positioned appropriately. The Right hip was then prepped and draped in the usual sterile fashion.   An incision was then made starting 2 cm lateral to the ASIS heading distal and  "lateral at approximately 30 degrees. The subcutaneous fat was then divided down to the fascia overlying the tensor fascia veronica (TFL) muscle. This was sharply divided staying a few cm lateral to the interval between the sartorius and the TFL muscle. This interval was then bluntly dissected. The circumflex vessels were then identified, cauterized, and divided. There was excellent hemostasis. Cobra retractors were then placed around the femoral neck capsule. The anterior capsule was then resected. The retractors were then placed intracapsular and the the femoral neck was identified. The arthritic change was noted to be moderate in the femoral head and along the rim of the acetabulum. The femoral neck cut was made and the femoral head was then engaged with the corkscrew and removed without difficulty. The head was found to be misshapen and devoid of cartilage over most of the joint surface. There were significant osteophytes noted around the periphery of the head.   The acetabulum was then exposed with \"number 7\" retractors. The acetabulum was then addressed with C-arm imaging and the acetabular reamers. We reamed out the medial osteophyte and then up to a solid 'rim' fit. A trial cup was then impacted into position with good fixation. The trial was removed and the hip copiously irrigated. The final acetabular implant was then placed and impacted into position with C-arm guidance.  A single acetabular screw was then placed with excellent purchase to supplement cup stability. The final liner was then placed and then the wound was irrigated once again.    Attention was turned to the femur. The femoral elevator hook was placed. The leg was then moved to the external rotation, extension, and adduction position. Retractors were placed around the proximal femur and then the posterior capsule and conjoined tendon was the Released. The box osteotome was then used to creat the starting hole. The femur was then prepared using the " chili-pepper broach and then we progressively broached up the final broach which fit nicely with excellent rotational and axial stability. The trial neck and head were then placed and the hip was then reduced and c-arm images were taken which confirmed appropriate fit and position of the implants. There were no complicating factors noted, and flouro images were taken which confirmed proper restoration of leg length and offset. The trial components were removed, the hip was copiously irrigated and the final implants were then seated. The hip was then reduced and found to be stable. C-arm images again confirmed appropriate anatomy restoration without complicating factors noted.    The hip was then copiously irrigated.  There was excellent hemostasis. A small hemovac drain was then placed within the joint and then the wound was irrigated once again. . We closed the hip in multiple layers in standard fashion. Sterile dressing were applied. At the end of the case, the sponge and needle counts were reported as being correct. There were no known complications. The patient was then transported to the recovery room.          02/17/20 at 1:30 PM by Juan Diego Mendoza MD

## 2020-02-18 PROCEDURE — 25010000002 CEFAZOLIN PER 500 MG: Performed by: ORTHOPAEDIC SURGERY

## 2020-02-18 PROCEDURE — 25010000002 HYDROMORPHONE 1 MG/ML SOLUTION: Performed by: ORTHOPAEDIC SURGERY

## 2020-02-18 PROCEDURE — 97166 OT EVAL MOD COMPLEX 45 MIN: CPT

## 2020-02-18 PROCEDURE — 99024 POSTOP FOLLOW-UP VISIT: CPT | Performed by: ORTHOPAEDIC SURGERY

## 2020-02-18 PROCEDURE — 97110 THERAPEUTIC EXERCISES: CPT

## 2020-02-18 RX ORDER — IRBESARTAN AND HYDROCHLOROTHIAZIDE 150; 12.5 MG/1; MG/1
TABLET, FILM COATED ORAL
Status: DISCONTINUED | OUTPATIENT
Start: 2020-02-18 | End: 2020-02-19 | Stop reason: HOSPADM

## 2020-02-18 RX ORDER — IRBESARTAN AND HYDROCHLOROTHIAZIDE 150; 12.5 MG/1; MG/1
1 TABLET, FILM COATED ORAL EVERY OTHER DAY
Status: DISCONTINUED | OUTPATIENT
Start: 2020-02-18 | End: 2020-02-18

## 2020-02-18 RX ADMIN — SODIUM CHLORIDE, PRESERVATIVE FREE 10 ML: 5 INJECTION INTRAVENOUS at 08:19

## 2020-02-18 RX ADMIN — HYDROCODONE BITARTRATE AND ACETAMINOPHEN 1 TABLET: 7.5; 325 TABLET ORAL at 23:44

## 2020-02-18 RX ADMIN — ALUMINUM HYDROXIDE, MAGNESIUM HYDROXIDE, AND DIMETHICONE 15 ML: 400; 400; 40 SUSPENSION ORAL at 19:22

## 2020-02-18 RX ADMIN — SODIUM CHLORIDE, PRESERVATIVE FREE 10 ML: 5 INJECTION INTRAVENOUS at 11:31

## 2020-02-18 RX ADMIN — TRAZODONE HYDROCHLORIDE 50 MG: 50 TABLET ORAL at 20:06

## 2020-02-18 RX ADMIN — ALPRAZOLAM 0.5 MG: 0.5 TABLET ORAL at 20:06

## 2020-02-18 RX ADMIN — HYDROCODONE BITARTRATE AND ACETAMINOPHEN 1 TABLET: 7.5; 325 TABLET ORAL at 05:34

## 2020-02-18 RX ADMIN — ALPRAZOLAM 0.5 MG: 0.5 TABLET ORAL at 08:18

## 2020-02-18 RX ADMIN — SODIUM CHLORIDE 2 G: 9 INJECTION, SOLUTION INTRAVENOUS at 03:23

## 2020-02-18 RX ADMIN — SODIUM CHLORIDE 2 G: 9 INJECTION, SOLUTION INTRAVENOUS at 08:18

## 2020-02-18 RX ADMIN — METOPROLOL SUCCINATE 25 MG: 25 TABLET, EXTENDED RELEASE ORAL at 17:56

## 2020-02-18 RX ADMIN — BUSPIRONE HYDROCHLORIDE 5 MG: 5 TABLET ORAL at 20:06

## 2020-02-18 RX ADMIN — SENNOSIDES AND DOCUSATE SODIUM 2 TABLET: 8.6; 5 TABLET ORAL at 20:06

## 2020-02-18 RX ADMIN — FAMOTIDINE 40 MG: 40 TABLET, FILM COATED ORAL at 08:18

## 2020-02-18 RX ADMIN — HYDROCODONE BITARTRATE AND ACETAMINOPHEN 1 TABLET: 7.5; 325 TABLET ORAL at 14:26

## 2020-02-18 RX ADMIN — ALPRAZOLAM 0.5 MG: 0.5 TABLET ORAL at 16:54

## 2020-02-18 RX ADMIN — SODIUM CHLORIDE, PRESERVATIVE FREE 10 ML: 5 INJECTION INTRAVENOUS at 20:06

## 2020-02-18 RX ADMIN — HYDROMORPHONE HYDROCHLORIDE 0.5 MG: 1 INJECTION, SOLUTION INTRAMUSCULAR; INTRAVENOUS; SUBCUTANEOUS at 11:30

## 2020-02-18 RX ADMIN — HYDROCODONE BITARTRATE AND ACETAMINOPHEN 1 TABLET: 7.5; 325 TABLET ORAL at 19:20

## 2020-02-18 RX ADMIN — BUSPIRONE HYDROCHLORIDE 5 MG: 5 TABLET ORAL at 08:18

## 2020-02-18 RX ADMIN — BUSPIRONE HYDROCHLORIDE 5 MG: 5 TABLET ORAL at 16:54

## 2020-02-18 RX ADMIN — HYDROMORPHONE HYDROCHLORIDE 0.5 MG: 1 INJECTION, SOLUTION INTRAMUSCULAR; INTRAVENOUS; SUBCUTANEOUS at 08:29

## 2020-02-18 RX ADMIN — HYDROCODONE BITARTRATE AND ACETAMINOPHEN 1 TABLET: 7.5; 325 TABLET ORAL at 10:20

## 2020-02-18 RX ADMIN — SENNOSIDES AND DOCUSATE SODIUM 2 TABLET: 8.6; 5 TABLET ORAL at 08:18

## 2020-02-18 RX ADMIN — CLOPIDOGREL BISULFATE 75 MG: 75 TABLET ORAL at 08:29

## 2020-02-18 NOTE — PROGRESS NOTES
ORTHOPEDIC PROGRESS NOTE:    Name:  Souleymane Stapleton  Date:    2/18/2020  Date of admission:  2/17/2020    Post op day:  1 Day Post-Op  Procedure:    Procedure(s) (LRB):  right reverse total shoulder arthroplasty. (Right)    Subjective:  No new complaints  Block worked for about 17 hours  Now having some pain but manageable.    Vitals:    Vitals:    02/18/20 0330   BP: 93/55   Pulse: 86   Resp: 18   Temp: 97.2 °F (36.2 °C)   SpO2: 96%       Exam:    Awake and alert  Fingers up and down  Dressing clean  Drain intact.  No erythema    ASSESSMENT:  Active Hospital Problems    Diagnosis POA   • **Rotator cuff arthropathy, right [M12.811] Yes     Added automatically from request for surgery [id number]     • Bilateral shoulder pain [M25.511, M25.512] Yes   • Coronary artery disease involving coronary bypass graft of native heart without angina pectoris [I25.810] Yes   • Rotator cuff syndrome, left [M75.102] Yes   • Prostate cancer (CMS/HCC) [C61] Yes   • History of cerebrovascular accident [Z86.73] Not Applicable         PLAN:  Mobilize  Pain control  Anticipate home today or tomorrow with outpatient PT    02/18/20 at 7:27 AM by Juan Diego Mendoza MD

## 2020-02-18 NOTE — PLAN OF CARE
Problem: Patient Care Overview  Goal: Plan of Care Review  Outcome: Ongoing (interventions implemented as appropriate)  Flowsheets (Taken 2/18/2020 1528)  Progress: no change  Plan of Care Reviewed With: patient  Outcome Summary: pt stood to complete gentle pendulum ex encouraged pt to allow arm to dangle. chon 10x2. no IR/ER.   R  ue back into imobilizer,  pt back to bed.  cont poc

## 2020-02-18 NOTE — PLAN OF CARE
Problem: Patient Care Overview  Goal: Plan of Care Review  Outcome: Ongoing (interventions implemented as appropriate)  Flowsheets  Taken 2/18/2020 0408  Progress: improving  Outcome Summary: no complaint of pain throughout the night; patient rested well; vitals stable; drain output minimal  Taken 2/17/2020 1922  Plan of Care Reviewed With: patient

## 2020-02-18 NOTE — PLAN OF CARE
Problem: Patient Care Overview  Goal: Plan of Care Review  Outcome: Ongoing (interventions implemented as appropriate)  Flowsheets (Taken 2/18/2020 1255)  Plan of Care Reviewed With: patient  Outcome Summary: OT eval on this date.  Pt was supervision with jared mcclellan.  UB dressing edu and demo understanding.  Pt edu on R UE pendulums and gentle forward flex as tolerated.  Pt to be given R UE exercise handout for these exercises.  Pt could benefit from OT services to regain lost strength and function for ADLs.  Rec OP PT or OT for cont therapy for R shld.

## 2020-02-18 NOTE — THERAPY TREATMENT NOTE
Acute Care - Occupational Therapy Treatment Note  [facility]     Patient Name: Souleymane Stapleton  : 1945  MRN: [id number]  Today's Date: 2020  Onset of Illness/Injury or Date of Surgery: 20  Date of Referral to OT: 20  Referring Physician: Kelly Gomez MD    Admit Date: 2020       ICD-10-CM ICD-9-CM   1. Bilateral shoulder pain, unspecified chronicity M25.511 719.41    M25.512    2. Rotator cuff syndrome, left M75.102 726.10   3. Rotator cuff arthropathy, right M12.811 716.81   4. Prostate cancer (CMS/HCC) C61 185   5. Coronary artery disease involving coronary bypass graft of native heart without angina pectoris I25.810 414.05   6. History of cerebrovascular accident Z86.73 V12.54   7. Impaired mobility and activities of daily living Z74.09 799.89     Patient Active Problem List   Diagnosis   • Astigmatism   • Anxiety state   • History of cerebrovascular accident   • Nuclear senile cataract   • Hypertension   • Unspecified hemorrhoids   • Palpitations   • Pure hypercholesterolemia   • Prostate cancer (CMS/HCC)   • Chronic right shoulder pain   • Rotator cuff syndrome, left   • Chronic left shoulder pain   • Impingement syndrome, shoulder, left   • SOB (shortness of breath)   • Angina of effort (CMS/HCC)   • Coronary artery disease involving coronary bypass graft of native heart without angina pectoris   • Cervical spinal stenosis   • Displacement of cervical intervertebral disc   • Displacement of lumbar intervertebral disc without myelopathy   • Follow-up examination after neurological surgery   • Internal carotid artery dissection (CMS/HCC)   • TIA (transient ischemic attack)   • Bacterial intestinal infection, unspecified   • Body mass index (bmi) 25.0-25.9, adult   • Diverticulosis of large intestine without perforation or abscess without bleeding   • Functional dyspepsia   • History of colonic polyps   • Polyp of stomach and duodenum   • Bilateral shoulder pain   • Rotator cuff  arthropathy, right     Past Medical History:   Diagnosis Date   • Abdominal pain    • Abnormal weight loss    • Acid reflux    • Acute gastritis    • Anxiety state    • Arthritis    • Cancer (CMS/HCC)     Prostate   • Coronary artery disease    • History of cerebrovascular accident    • History of colon polyps    • Hypertension    • Nausea    • Nuclear senile cataract    • Presbyopia    • Stroke (CMS/HCC) 2005    TIA   • Tobacco use    • Transient cerebral ischemia    • Vitreous detachment of left eye      Past Surgical History:   Procedure Laterality Date   • BACK SURGERY     • CARDIAC CATHETERIZATION N/A 6/19/2018    Procedure: Coronary angiography;  Surgeon: Delroy Mcconnell MD;  Location: [facility] CATH INVASIVE LOCATION;  Service: Cardiovascular   • COLONOSCOPY  06/09/2015    Diverticulosis found in the sigmoid colon. Hemorrhoids found in the anus.   • CORONARY ARTERY BYPASS GRAFT N/A 6/22/2018    Procedure: PARAS STERNOTOMY CORONARY ARTERY BYPASS GRAFT TIMES 5 USING RIGHT AND LEFT INTERNAL MAMMARY ARTERIES AND LEFT GREATER SAPHENOUS VEIN GRAFT PER ENDOSCOPIC VEIN HARVESTING AND PRP;  Surgeon: Edgar Pérez MD;  Location: [facility] OR;  Service: Cardiothoracic   • CRYOTHERAPY  08/14/2012    Of Acne   • ENDOSCOPY  09/01/2015    EGD w/ biopsy -Multiple polyps, one removed.   • ENDOSCOPY N/A 8/22/2019    Procedure: ESOPHAGOGASTRODUODENOSCOPY;  Surgeon: Chuck Rich DO;  Location: [facility] ENDOSCOPY;  Service: Gastroenterology   • HEMORRHOIDECTOMY N/A 3/20/2017    Procedure: Removal of Anal Papilla;  Surgeon: Juan Diego Ken MD;  Location: [facility] OR;  Service:    • INJECTION OF MEDICATION  09/14/2010    B12   • INJECTION OF MEDICATION  10/17/2011    Kenalog   • LUMBAR LAMINECTOMY  09/29/2010   • OTHER SURGICAL HISTORY  08/19/2010    Biopsy, Each Additional Lesion    • SKIN BIOPSY  08/19/2010   • TOTAL SHOULDER REVERSE ARTHROPLASTY Right 02/17/2020       Therapy Treatment    Rehabilitation  Treatment Summary     Row Name 02/18/20 1445             Treatment Time/Intention    Discipline  occupational therapy assistant  -RC      Document Type  therapy note (daily note)  -RC2      Subjective Information  no complaints;pain  -RC2      Mode of Treatment  occupational therapy;individual therapy  -RC2      Total Minutes, Occupational Therapy Treatment  23  -RC3      Therapy Frequency (OT Eval)  daily  -RC3      Patient Effort  good  -RC3      Existing Precautions/Restrictions  non-weight bearing nwb r ue, gentle pendulums and forward flex as chon no IR/ER  -RC3      Recorded by [RC] Henry Vivian ABDIRAHMAN, TANG/L 02/18/20 1451  [RC2] Henry Vivian ABDIRAHMAN, TANG/L 02/18/20 1516  [RC3] Cathy Figueroaa ABDIRAHMAN, TANG/L 02/18/20 1526      Row Name 02/18/20 1445             Vital Signs    Pre Systolic BP Rehab  159  -RC      Pre Treatment Diastolic BP  89  -RC      Post Systolic BP Rehab  151  -RC2      Post Treatment Diastolic BP  72  -RC2      Pretreatment Heart Rate (beats/min)  --  -RC3      Posttreatment Heart Rate (beats/min)  93  -RC3      Pre SpO2 (%)  94  -RC2      O2 Delivery Pre Treatment  room air  -RC2      Post SpO2 (%)  96  -RC2      O2 Delivery Post Treatment  room air  -RC2      Recorded by [RC] Henry Vivian GARY, TANG/L 02/18/20 1451  [RC2] Henry Vivian ABDIRAHMAN, TANG/L 02/18/20 1509  [RC3] Vivian Figueroa, TANG/L 02/18/20 1526      Row Name 02/18/20 1445             Bed Mobility Assessment/Treatment    Rolling Left [place] (Bed Mobility)  supervision  -RC      Supine-Sit [place] (Bed Mobility)  supervision  -RC      Sit-Supine [place] (Bed Mobility)  supervision  -RC      Comment (Bed Mobility)  hob elevated   -RC      Recorded by [RC] Henry Vivian ABDIRAHMAN, ATNG/L 02/18/20 1526      Row Name 02/18/20 1445             Sit-Stand Transfer    Sit-Stand [place] (Transfers)  conditional independence  -RC      Recorded by [RC] Vivian Figueroa TANG/L 02/18/20 1526      Row Name 02/18/20 1445              Stand-Sit Transfer    Stand-Sit [place] (Transfers)  conditional independence  -RC      Recorded by [RC] Vivian Figueroa TANG/L 02/18/20 1526      Row Name 02/18/20 1445             Therapeutic Exercise    Upper Extremity (Therapeutic Exercise)  -- gentle pendulum ex,   -RC      Exercise Type (Therapeutic Exercise)  -- gentle pendulum ex   -RC      Position (Therapeutic Exercise)  standing  -RC      Sets/Reps (Therapeutic Exercise)  10x2  -RC      Expected Outcome (Therapeutic Exercise)  -- per MD order   -RC      Recorded by [RC] Vivian Figueroa TANG/L 02/18/20 1526      Row Name 02/18/20 1445             Positioning and Restraints    Pre-Treatment Position  in bed  -RC      Post Treatment Position  bed  -RC      In Bed  notified nsg bed alarm was not on prior to tx   -RC      Recorded by [RC] Vivian Figueroa TANG/L 02/18/20 1526      Row Name 02/18/20 1445             Pain Scale: Numbers Pre/Post-Treatment    Pain Scale: Numbers, Pretreatment  5/10  -RC      Pain Scale: Numbers, Post-Treatment  5/10  -RC2      Pain Location - Side  Right  -RC      Pain Location  shoulder  -RC      Pain Intervention(s)  Medication (See MAR)  -RC      Recorded by [RC] Vivian Figueroa TANG/L 02/18/20 1451  [RC2] Vivian Figueroa TANG/L 02/18/20 1526      Row Name                Wound 02/17/20 1506 Right shoulder Incision    Wound - Properties Group Date first assessed: 02/17/20 [KW] Time first assessed: 1506 [KW] Present on Hospital Admission: N [KW] Side: Right [KW] Location: shoulder [KW] Primary Wound Type: Incision [KW] Recorded by:  [KW] Roxanne Damon RN 02/17/20 1506    Row Name 02/18/20 1445             Outcome Summary/Treatment Plan (OT)    Daily Summary of Progress (OT)  progress toward functional goals as expected  -RC      Plan for Continued Treatment (OT)  cont poc   -RC      Recorded by [RC] Vivian Figueroa TANG/L 02/18/20 1526        User Key  (r) = Recorded By, (t) = Taken By, (c) = Cosigned By     Initials Name Effective Dates Discipline    RC Vivian Figueroa, TANG/L 03/07/18 -  OT    KW Roxanne Damon, RN 10/17/16 -  Nurse        Wound 02/17/20 1506 Right shoulder Incision (Active)   Dressing Appearance intact;dry 2/18/2020  8:29 AM   Base dressing in place, unable to visualize 2/18/2020  8:29 AM     Rehab Goal Summary     Row Name 02/18/20 1445 02/18/20 0900          Occupational Therapy Goals    Bathing Goal Selection (OT)  bathing, OT goal 1  -RC  bathing, OT goal 1  -RB     Dressing Goal Selection (OT)  dressing, OT goal 1  -RC  dressing, OT goal 1  -RB     Toileting Goal Selection (OT)  toileting, OT goal 1  -RC  toileting, OT goal 1  -RB     ROM Goal Selection (OT)  ROM, OT goal 1  -RC  ROM, OT goal 1  -RB        Bathing Goal 1 (OT)    Activity/Assistive Device (Bathing Goal 1, OT)  bathing skills, all  -RC  bathing skills, all  -RB     [place] Level/Cues Needed (Bathing Goal 1, OT)  contact guard assist  -RC  contact guard assist  -RB     Time Frame (Bathing Goal 1, OT)  long term goal (LTG)  -RC  long term goal (LTG)  -RB     Progress/Outcomes (Bathing Goal 1, OT)  goal not met  -RC  goal not met  -RB        Dressing Goal 1 (OT)    Activity/Assistive Device (Dressing Goal 1, OT)  dressing skills, all  -RC  dressing skills, all  -RB     [place]/Cues Needed (Dressing Goal 1, OT)  supervision required  -RC  supervision required  -RB     Time Frame (Dressing Goal 1, OT)  long term goal (LTG)  -RC  long term goal (LTG)  -RB     Progress/Outcome (Dressing Goal 1, OT)  goal not met  -RC  goal not met  -RB        Toileting Goal 1 (OT)    Activity/Device (Toileting Goal 1, OT)  toileting skills, all  -RC  toileting skills, all  -RB     [place] Level/Cues Needed (Toileting Goal 1, OT)  supervision required  -RC  supervision required  -RB     Time Frame (Toileting Goal 1, OT)  long term goal (LTG)  -RC  long term goal (LTG)  -RB     Progress/Outcome (Toileting Goal 1, OT)  goal not met  -RC   goal not met  -RB        ROM Goal 1 (OT)    ROM Goal 1 (OT)  Independent with R pendulums and gentle forward flex as tolerated.  No internal/external rotation.    -RC  Independent with R pendulums and gentle forward flex as tolerated.  No internal/external rotation.    -RB     Time Frame (ROM Goal 1, OT)  long term goal (LTG)  -RC  long term goal (LTG)  -RB     Progress/Outcome (ROM Goal 1, OT)  goal not met  -RC  goal not met  -RB       User Key  (r) = Recorded By, (t) = Taken By, (c) = Cosigned By    Initials Name Provider Type Discipline    RB Kody Saleh, OT Occupational Therapist OT    Vivian Godoy COTA/L Occupational Therapy Assistant OT            OT Recommendation and Plan  Outcome Summary/Treatment Plan (OT)  Daily Summary of Progress (OT): progress toward functional goals as expected  Plan for Continued Treatment (OT): cont poc   Therapy Frequency (OT Eval): daily  Daily Summary of Progress (OT): progress toward functional goals as expected  Plan of Care Review  Plan of Care Reviewed With: patient  Plan of Care Reviewed With: patient  Outcome Summary: pt stood to complete gentle pendulum ex encouraged pt to allow arm to dangle. chon 10x2. no IR/ER.   R  ue back into imobilizer,  pt back to bed.  cont poc  Outcome Measures     Row Name 02/18/20 0900             How much help from another is currently needed...    Putting on and taking off regular lower body clothing?  3  -RB      Bathing (including washing, rinsing, and drying)  2  -RB      Toileting (which includes using toilet bed pan or urinal)  3  -RB      Putting on and taking off regular upper body clothing  2  -RB      Taking care of personal grooming (such as brushing teeth)  3  -RB      Eating meals  4  -RB      AM-PAC 6 Clicks Score (OT)  17  -RB         Functional Assessment    Outcome Measure Options  AM-PAC 6 Clicks Daily Activity (OT)  -RB        User Key  (r) = Recorded By, (t) = Taken By, (c) = Cosigned By    Initials Name Provider  Type    RB Kody Saleh OT Occupational Therapist           Time Calculation:   Time Calculation- OT     Row Name 02/18/20 1516 02/18/20 1303          Time Calculation- OT    OT Start Time  1445  -  0900  -RB     OT Stop Time  1508  -  0947  -RB     OT Time Calculation (min)  23 min  -  47 min  -     Total Timed Code Minutes- OT  23 minute(s)  -  --     OT Received On  02/18/20  -  02/18/20  -     OT Goal Re-Cert Due Date  --  03/02/20  -       User Key  (r) = Recorded By, (t) = Taken By, (c) = Cosigned By    Initials Name Provider Type    RB Kody Saleh OT Occupational Therapist     Vivian Figueroa TANG/L Occupational Therapy Assistant        Therapy Charges for Today     Code Description Service Date Service Provider Modifiers Qty    [id number] HC OT THER PROC EA 15 MIN 2/18/2020 Vivian Figueroa TANG/L GO 2               CLYDE Buckley/SHELBY  2/18/2020

## 2020-02-18 NOTE — THERAPY EVALUATION
Acute Care - Occupational Therapy Initial Evaluation  [facility]     Patient Name: Souleymane Stapleton  : 1945  MRN: [id number]  Today's Date: 2020  Onset of Illness/Injury or Date of Surgery: 20  Date of Referral to OT: 20  Referring Physician: Kelly Gomez MD    Admit Date: 2020       ICD-10-CM ICD-9-CM   1. Bilateral shoulder pain, unspecified chronicity M25.511 719.41    M25.512    2. Rotator cuff syndrome, left M75.102 726.10   3. Rotator cuff arthropathy, right M12.811 716.81   4. Prostate cancer (CMS/HCC) C61 185   5. Coronary artery disease involving coronary bypass graft of native heart without angina pectoris I25.810 414.05   6. History of cerebrovascular accident Z86.73 V12.54   7. Impaired mobility and activities of daily living Z74.09 799.89     Patient Active Problem List   Diagnosis   • Astigmatism   • Anxiety state   • History of cerebrovascular accident   • Nuclear senile cataract   • Hypertension   • Unspecified hemorrhoids   • Palpitations   • Pure hypercholesterolemia   • Prostate cancer (CMS/HCC)   • Chronic right shoulder pain   • Rotator cuff syndrome, left   • Chronic left shoulder pain   • Impingement syndrome, shoulder, left   • SOB (shortness of breath)   • Angina of effort (CMS/HCC)   • Coronary artery disease involving coronary bypass graft of native heart without angina pectoris   • Cervical spinal stenosis   • Displacement of cervical intervertebral disc   • Displacement of lumbar intervertebral disc without myelopathy   • Follow-up examination after neurological surgery   • Internal carotid artery dissection (CMS/HCC)   • TIA (transient ischemic attack)   • Bacterial intestinal infection, unspecified   • Body mass index (bmi) 25.0-25.9, adult   • Diverticulosis of large intestine without perforation or abscess without bleeding   • Functional dyspepsia   • History of colonic polyps   • Polyp of stomach and duodenum   • Bilateral shoulder pain   • Rotator  cuff arthropathy, right     Past Medical History:   Diagnosis Date   • Abdominal pain    • Abnormal weight loss    • Acid reflux    • Acute gastritis    • Anxiety state    • Arthritis    • Cancer (CMS/HCC)     Prostate   • Coronary artery disease    • History of cerebrovascular accident    • History of colon polyps    • Hypertension    • Nausea    • Nuclear senile cataract    • Presbyopia    • Stroke (CMS/HCC) 2005    TIA   • Tobacco use    • Transient cerebral ischemia    • Vitreous detachment of left eye      Past Surgical History:   Procedure Laterality Date   • BACK SURGERY     • CARDIAC CATHETERIZATION N/A 6/19/2018    Procedure: Coronary angiography;  Surgeon: Delroy Mcconnell MD;  Location: [facility] CATH INVASIVE LOCATION;  Service: Cardiovascular   • COLONOSCOPY  06/09/2015    Diverticulosis found in the sigmoid colon. Hemorrhoids found in the anus.   • CORONARY ARTERY BYPASS GRAFT N/A 6/22/2018    Procedure: PARAS STERNOTOMY CORONARY ARTERY BYPASS GRAFT TIMES 5 USING RIGHT AND LEFT INTERNAL MAMMARY ARTERIES AND LEFT GREATER SAPHENOUS VEIN GRAFT PER ENDOSCOPIC VEIN HARVESTING AND PRP;  Surgeon: Edgar Pérez MD;  Location: [facility] OR;  Service: Cardiothoracic   • CRYOTHERAPY  08/14/2012    Of Acne   • ENDOSCOPY  09/01/2015    EGD w/ biopsy -Multiple polyps, one removed.   • ENDOSCOPY N/A 8/22/2019    Procedure: ESOPHAGOGASTRODUODENOSCOPY;  Surgeon: Chuck Rich DO;  Location: [facility] ENDOSCOPY;  Service: Gastroenterology   • HEMORRHOIDECTOMY N/A 3/20/2017    Procedure: Removal of Anal Papilla;  Surgeon: Juan Diego Ken MD;  Location: [facility] OR;  Service:    • INJECTION OF MEDICATION  09/14/2010    B12   • INJECTION OF MEDICATION  10/17/2011    Kenalog   • LUMBAR LAMINECTOMY  09/29/2010   • OTHER SURGICAL HISTORY  08/19/2010    Biopsy, Each Additional Lesion    • SKIN BIOPSY  08/19/2010   • TOTAL SHOULDER REVERSE ARTHROPLASTY Right 02/17/2020          OT ASSESSMENT FLOWSHEET (last 12  hours)      Occupational Therapy Evaluation     Row Name 02/18/20 0900                   OT Evaluation Time/Intention    Subjective Information  complains of;pain  -RB        Document Type  evaluation  -RB        Mode of Treatment  individual therapy;occupational therapy  -RB        Patient Effort  good  -RB           General Information    Patient Profile Reviewed?  yes  -RB        Onset of Illness/Injury or Date of Surgery  02/17/20  -RB        Referring Physician  Kelly Gomez MD  -RB        Patient Observations  alert;cooperative;agree to therapy  -RB        General Observations of Patient  Supine in bed with IV and R UE sling.  -RB        Prior Level of Function  independent:  -RB        Equipment Currently Used at Home  none  -RB        Pertinent History of Current Functional Problem  (S) R total reverse shld.  -RB        Existing Precautions/Restrictions  (S) weight bearing;other (see comments) NWB R UE.  Pendulums and forward flex as chon. No IR/ER.  -RB        Equipment Issued to Patient  gait belt  -RB        Risks Reviewed  patient:;LOB  -RB        Benefits Reviewed  patient:;improve function;increase strength;increase independence  -RB           Relationship/Environment    Lives With  alone  -RB           Resource/Environmental Concerns    Current Living Arrangements  home/apartment/condo  -RB           Home Main Entrance    Number of Stairs, Main Entrance  two  -RB        Stair Railings, Main Entrance  none  -RB           Cognitive Assessment/Intervention- PT/OT    Orientation Status (Cognition)  oriented x 4  -RB           Mobility Assessment/Treatment    Extremity Weight-bearing Status  (S) right upper extremity NWB  -RB        Right Upper Extremity (Weight-bearing Status)  non weight-bearing (NWB)  -RB           Bed Mobility Assessment/Treatment    Bed Mobility Assessment/Treatment  rolling left  -RB        Rolling Left [place] (Bed Mobility)  supervision  -RB        Bed Mobility, Safety Issues   decreased use of arms for pushing/pulling  -RB           Functional Mobility    Functional Mobility- Ind. Level  conditional independence  -RB        Functional Mobility-Distance (Feet)  20  -RB           Transfer Assessment/Treatment    Transfer Assessment/Treatment  sit-stand transfer;stand-sit transfer;toilet transfer  -RB           Sit-Stand Transfer    Sit-Stand [place] (Transfers)  conditional independence  -RB           Stand-Sit Transfer    Stand-Sit [place] (Transfers)  conditional independence  -RB           Toilet Transfer    Type (Toilet Transfer)  sit-stand;stand-sit  -RB        [place] Level (Toilet Transfer)  conditional independence  -RB           ADL Assessment/Intervention    BADL Assessment/Intervention  upper body dressing;lower body dressing  -RB           Upper Body Dressing Assessment/Training    Upper Body Dressing [place] Level  upper body dressing skills;other (see comments);pull-over garment Edu pt on how to don UE garment.  -RB           Lower Body Dressing Assessment/Training    Lower Body Dressing [place] Level  lower body dressing skills;doff;don;socks;conditional independence  -RB           General ROM    GENERAL ROM COMMENTS  L UE AROM was WNL;  R hand/wrist WNL; R shld pendulum and mild forward flex to pain tolerance.   -RB           MMT (Manual Muscle Testing)    General MMT Comments  L UE 4+/5 and R UE 4/5 for . Rest not tested.  -RB           Sensory Assessment/Intervention    Sensory General Assessment  light touch sensation deficits identified  -RB           Light Touch Sensation Assessment    Right Upper Extremity: Light Touch Sensation Assessment  mild impairment, 75% or more correct responses  -RB           Positioning and Restraints    Pre-Treatment Position  in bed  -RB        Post Treatment Position  bed  -RB           Pain Assessment    Additional Documentation  Pain Scale: Numbers Pre/Post-Treatment (Group)  -RB           Pain Scale: Numbers  Pre/Post-Treatment    Pain Scale: Numbers, Pretreatment  6/10  -RB        Pain Scale: Numbers, Post-Treatment  6/10  -RB        Pain Location - Side  Right  -RB        Pain Location  shoulder  -RB        Pain Intervention(s)  Medication (See MAR)  -RB           Wound 02/17/20 1506 Right shoulder Incision    Wound - Properties Group Date first assessed: 02/17/20  -KW Time first assessed: 1506  -KW Present on Hospital Admission: N  -KW Side: Right  -KW Location: shoulder  -KW Primary Wound Type: Incision  -KW       Plan of Care Review    Plan of Care Reviewed With  patient  -RB           Clinical Impression (OT)    Date of Referral to OT  02/17/20  -RB        OT Diagnosis  Impaired mob and ADLs.  -RB        Functional Level at Time of Evaluation (OT Eval)  Impaired mob and ADLS.  -RB        Criteria for Skilled Therapeutic Interventions Met (OT Eval)  yes;treatment indicated  -RB        Rehab Potential (OT Eval)  good, to achieve stated therapy goals  -RB        Therapy Frequency (OT Eval)  daily  -RB        Predicted Duration of Therapy Intervention (Therapy Eval)  Until D/C or goals met.  -RB        Care Plan Review (OT)  evaluation/treatment results reviewed;care plan/treatment goals reviewed;risks/benefits reviewed;patient/other agree to care plan  -RB        Anticipated Discharge Disposition (OT)  home with OP services  -RB           Vital Signs    Pre Systolic BP Rehab  120  -RB        Pre Treatment Diastolic BP  59  -RB        Post Systolic BP Rehab  149  -RB        Post Treatment Diastolic BP  83  -RB        Pretreatment Heart Rate (beats/min)  89  -RB        Posttreatment Heart Rate (beats/min)  88  -RB        Pre SpO2 (%)  92  -RB        O2 Delivery Pre Treatment  room air  -RB        Post SpO2 (%)  95  -RB        O2 Delivery Post Treatment  room air  -RB        Pre Patient Position  Supine  -RB        Intra Patient Position  Standing  -RB        Post Patient Position  Supine  -RB           Planned OT  Interventions    Planned Therapy Interventions (OT Eval)  activity tolerance training;adaptive equipment training;BADL retraining;functional balance retraining;occupation/activity based interventions;patient/caregiver education/training;ROM/therapeutic exercise;strengthening exercise;transfer/mobility retraining  -RB           OT Goals    Bathing Goal Selection (OT)  bathing, OT goal 1  -RB        Dressing Goal Selection (OT)  dressing, OT goal 1  -RB        Toileting Goal Selection (OT)  toileting, OT goal 1  -RB        ROM Goal Selection (OT)  ROM, OT goal 1  -RB        Additional Documentation  ROM Goal Selection (OT) (Row)  -RB           Bathing Goal 1 (OT)    Activity/Assistive Device (Bathing Goal 1, OT)  bathing skills, all  -RB        [place] Level/Cues Needed (Bathing Goal 1, OT)  contact guard assist  -RB        Time Frame (Bathing Goal 1, OT)  long term goal (LTG)  -RB        Progress/Outcomes (Bathing Goal 1, OT)  goal not met  -RB           Dressing Goal 1 (OT)    Activity/Assistive Device (Dressing Goal 1, OT)  dressing skills, all  -RB        [place]/Cues Needed (Dressing Goal 1, OT)  supervision required  -RB        Time Frame (Dressing Goal 1, OT)  long term goal (LTG)  -RB        Progress/Outcome (Dressing Goal 1, OT)  goal not met  -RB           Toileting Goal 1 (OT)    Activity/Device (Toileting Goal 1, OT)  toileting skills, all  -RB        [place] Level/Cues Needed (Toileting Goal 1, OT)  supervision required  -RB        Time Frame (Toileting Goal 1, OT)  long term goal (LTG)  -RB        Progress/Outcome (Toileting Goal 1, OT)  goal not met  -RB           ROM Goal 1 (OT)    ROM Goal 1 (OT)  Independent with R pendulums and gentle forward flex as tolerated.  No internal/external rotation.    -RB        Time Frame (ROM Goal 1, OT)  long term goal (LTG)  -RB        Progress/Outcome (ROM Goal 1, OT)  goal not met  -RB           Living Environment    Home Accessibility  stairs to  enter home  -RB          User Key  (r) = Recorded By, (t) = Taken By, (c) = Cosigned By    Initials Name Effective Dates    RB Kody Saleh OT 07/24/19 -     Roxanne Brito RN 10/17/16 -                OT Recommendation and Plan  Outcome Summary/Treatment Plan (OT)  Anticipated Discharge Disposition (OT): home with OP services  Planned Therapy Interventions (OT Eval): activity tolerance training, adaptive equipment training, BADL retraining, functional balance retraining, occupation/activity based interventions, patient/caregiver education/training, ROM/therapeutic exercise, strengthening exercise, transfer/mobility retraining  Therapy Frequency (OT Eval): daily  Plan of Care Review  Plan of Care Reviewed With: patient  Plan of Care Reviewed With: patient  Outcome Summary: OT eval on this date.  Pt was supervision with anand/briseida sock.  UB dressing edu and guido understanding.  Pt edu on R UE pendulums and gentle forward flex as tolerated.  Pt to be given R UE exercise handout for these exercises.  Pt could benefit from OT services to regain lost strength and function for ADLs.  Rec OP PT or OT for cont therapy for R shld.    Outcome Measures     Row Name 02/18/20 0900             How much help from another is currently needed...    Putting on and taking off regular lower body clothing?  3  -RB      Bathing (including washing, rinsing, and drying)  2  -RB      Toileting (which includes using toilet bed pan or urinal)  3  -RB      Putting on and taking off regular upper body clothing  2  -RB      Taking care of personal grooming (such as brushing teeth)  3  -RB      Eating meals  4  -RB      AM-PAC 6 Clicks Score (OT)  17  -RB         Functional Assessment    Outcome Measure Options  AM-PAC 6 Clicks Daily Activity (OT)  -RB        User Key  (r) = Recorded By, (t) = Taken By, (c) = Cosigned By    Initials Name Provider Type    RB Kody Saleh OT Occupational Therapist          Time Calculation:   Time  Calculation- OT     Row Name 02/18/20 1303             Time Calculation- OT    OT Start Time  0900  -RB      OT Stop Time  0947  -RB      OT Time Calculation (min)  47 min  -RB      OT Received On  02/18/20  -RB      OT Goal Re-Cert Due Date  03/02/20  -        User Key  (r) = Recorded By, (t) = Taken By, (c) = Cosigned By    Initials Name Provider Type    RB Kody Saleh OT Occupational Therapist        Therapy Charges for Today     Code Description Service Date Service Provider Modifiers Qty    [id number] HC OT EVAL MOD COMPLEXITY 3 2/18/2020 Kody Saleh OT GO 1               Kody Saleh OT  2/18/2020

## 2020-02-18 NOTE — PLAN OF CARE
Problem: Patient Care Overview  Goal: Plan of Care Review  Outcome: Ongoing (interventions implemented as appropriate)  Flowsheets  Taken 2/18/2020 1245 by Ava Jaquez RN  Progress: no change  Outcome Summary: vss, having trouble controlling pain with PO meds, IV meds working great-MD aware, worked with with therapy, will continue to monitor  Taken 2/18/2020 0900 by Kody Saleh OT  Plan of Care Reviewed With: patient (Pended)

## 2020-02-19 VITALS
SYSTOLIC BLOOD PRESSURE: 132 MMHG | OXYGEN SATURATION: 92 % | RESPIRATION RATE: 18 BRPM | HEIGHT: 69 IN | WEIGHT: 166.45 LBS | HEART RATE: 84 BPM | DIASTOLIC BLOOD PRESSURE: 73 MMHG | TEMPERATURE: 96.5 F | BODY MASS INDEX: 24.65 KG/M2

## 2020-02-19 PROCEDURE — 99024 POSTOP FOLLOW-UP VISIT: CPT | Performed by: ORTHOPAEDIC SURGERY

## 2020-02-19 PROCEDURE — 97530 THERAPEUTIC ACTIVITIES: CPT

## 2020-02-19 PROCEDURE — 97110 THERAPEUTIC EXERCISES: CPT

## 2020-02-19 PROCEDURE — 97535 SELF CARE MNGMENT TRAINING: CPT

## 2020-02-19 RX ORDER — HYDROCODONE BITARTRATE AND ACETAMINOPHEN 7.5; 325 MG/1; MG/1
1 TABLET ORAL EVERY 6 HOURS PRN
Qty: 30 TABLET | Refills: 0 | Status: SHIPPED | OUTPATIENT
Start: 2020-02-19 | End: 2020-02-29

## 2020-02-19 RX ADMIN — ALPRAZOLAM 0.5 MG: 0.5 TABLET ORAL at 08:35

## 2020-02-19 RX ADMIN — IRBESARTAN AND HYDROCHLOROTHIAZIDE: 150; 12.5 TABLET, FILM COATED ORAL at 08:38

## 2020-02-19 RX ADMIN — HYDROCODONE BITARTRATE AND ACETAMINOPHEN 1 TABLET: 7.5; 325 TABLET ORAL at 08:42

## 2020-02-19 RX ADMIN — BUSPIRONE HYDROCHLORIDE 5 MG: 5 TABLET ORAL at 08:34

## 2020-02-19 RX ADMIN — CLOPIDOGREL BISULFATE 75 MG: 75 TABLET ORAL at 08:34

## 2020-02-19 RX ADMIN — FAMOTIDINE 40 MG: 40 TABLET, FILM COATED ORAL at 08:34

## 2020-02-19 RX ADMIN — SENNOSIDES AND DOCUSATE SODIUM 2 TABLET: 8.6; 5 TABLET ORAL at 08:34

## 2020-02-19 NOTE — PROGRESS NOTES
ORTHOPEDIC PROGRESS NOTE:    Name:  Souleymane Stapleton  Date:    2/19/2020  Date of admission:  2/17/2020    Post op day:  2 Days Post-Op  Procedure:    Procedure(s) (LRB):  right reverse total shoulder arthroplasty. (Right)    Subjective:  No new complaints  Pain is much better    Vitals:    Vitals:    02/19/20 0403   BP: 103/64   Pulse: 81   Resp: 18   Temp: 96 °F (35.6 °C)   SpO2: 91%       Exam:  Awake and alert  Finger up and down  Incision clean and dry  Drain removed by me      ASSESSMENT:  Active Hospital Problems    Diagnosis POA   • **Rotator cuff arthropathy, right [M12.811] Yes     Added automatically from request for surgery [id number]     • Bilateral shoulder pain [M25.511, M25.512] Yes   • Coronary artery disease involving coronary bypass graft of native heart without angina pectoris [I25.810] Yes   • Rotator cuff syndrome, left [M75.102] Yes   • Prostate cancer (CMS/HCC) [C61] Yes   • History of cerebrovascular accident [Z86.73] Not Applicable         PLAN:  Mobilize with PT/OT  Anticipate home this morning after OT.  F/u 2 weeks  Start outpatient PT friday 02/19/20 at 7:17 AM by Juan Diego Mendoza MD

## 2020-02-19 NOTE — PLAN OF CARE
Problem: Patient Care Overview  Goal: Plan of Care Review  Flowsheets  Taken 2/19/2020 0352  Progress: improving  Outcome Summary: pain pill given as needed; patient rested throughout the night; vitals stable  Taken 2/18/2020 2006  Plan of Care Reviewed With: patient

## 2020-02-19 NOTE — PLAN OF CARE
Problem: Patient Care Overview  Goal: Plan of Care Review  2/19/2020 1356 by Maria T Jansen, TANG/L  Outcome: Ongoing (interventions implemented as appropriate)  Flowsheets (Taken 2/19/2020 1356)  Progress: improving  Plan of Care Reviewed With: patient  Outcome Summary: pt perf well with OT  pt to dc home this date.  pt perf funct tf with sba-mod I and perf pend at bedside standing  2/19/2020 1356 by Maria T Jansen, TANG/L  Reactivated

## 2020-02-20 ENCOUNTER — READMISSION MANAGEMENT (OUTPATIENT)
Dept: CALL CENTER | Facility: HOSPITAL | Age: 75
End: 2020-02-20

## 2020-02-20 NOTE — PAYOR COMM NOTE
"Pat Reyes  [facility]  (P)[phone]  (F)[phone]    auth#[id number]        Simon Stapleton (74 y.o. Male)     Date of Birth Social Security Number Address Home Phone MRN    1945  [address] [place] [id number] [phone] [id number]    Jew Marital Status          [place] Single       Admission Date Admission Type Admitting Provider Attending Provider Department, Room/Bed    2/17/20 Elective Isaiah Mendoza MD  [facility], 369/1    Discharge Date Discharge Disposition Discharge Destination        2/19/2020 Home or Self Care              Attending Provider:  (none)   Allergies:  Statins, Tricor [Fenofibrate]    Isolation:  None   Infection:  None   Code Status:  Prior    Ht:  174 cm (68.5\")   Wt:  75.5 kg (166 lb 7.2 oz)    Admission Cmt:  None   Principal Problem:  Rotator cuff arthropathy, right [M12.811] More...                 Active Insurance as of 2/17/2020     Primary Coverage     Payor Plan Insurance Group Employer/Plan Group    HUMANA MEDICARE REPLACEMENT HUMANA MEDICARE REPLACEMENT [id number]     Payor Plan Address Payor Plan Phone Number Payor Plan Fax Number Effective Dates    [address] [address] [phone]  1/1/2018 - None Entered    [facility] [address]       Subscriber Name Subscriber Birth Date Member ID       SIMON STAPLETON 1945 [id number]                 Emergency Contacts      (Rel.) Home Phone Work Phone Mobile Phone    Anthony Stapleton (Son) [phone] -- [phone]    Nadeem Stapleton (Relative) [phone] -- [phone]            Discharge Summary    No notes of this type exist for this encounter.         Discharge Order (From admission, onward)     Start     Ordered    02/19/20 0721  Discharge patient  Once     Expected Discharge Date:  02/19/20    Discharge Disposition:  Home or Self Care    Physician of Record for Attribution - Please select from Treatment Team:  ISAIAH MENDOZA " [1055]    Review needed by CMO to determine Physician of Record:  No       Question Answer Comment   Physician of Record for Attribution - Please select from Treatment Team ISAIAH LEMUS    Review needed by CMO to determine Physician of Record No        02/19/20 0744

## 2020-02-21 ENCOUNTER — TELEPHONE (OUTPATIENT)
Dept: FAMILY MEDICINE CLINIC | Facility: CLINIC | Age: 75
End: 2020-02-21

## 2020-02-21 ENCOUNTER — HOSPITAL ENCOUNTER (OUTPATIENT)
Dept: PHYSICAL THERAPY | Facility: HOSPITAL | Age: 75
Setting detail: THERAPIES SERIES
Discharge: HOME | End: 2020-02-21

## 2020-02-21 ENCOUNTER — READMISSION MANAGEMENT (OUTPATIENT)
Dept: CALL CENTER | Facility: HOSPITAL | Age: 75
End: 2020-02-21

## 2020-02-21 DIAGNOSIS — Z96.611 STATUS POST REVERSE TOTAL REPLACEMENT OF RIGHT SHOULDER: ICD-10-CM

## 2020-02-21 DIAGNOSIS — M12.811 ROTATOR CUFF ARTHROPATHY, RIGHT: ICD-10-CM

## 2020-02-21 DIAGNOSIS — M25.512 BILATERAL SHOULDER PAIN, UNSPECIFIED CHRONICITY: Primary | ICD-10-CM

## 2020-02-21 DIAGNOSIS — M25.511 BILATERAL SHOULDER PAIN, UNSPECIFIED CHRONICITY: Primary | ICD-10-CM

## 2020-02-21 PROCEDURE — 97162 PT EVAL MOD COMPLEX 30 MIN: CPT | Performed by: PHYSICAL THERAPIST

## 2020-02-21 NOTE — OUTREACH NOTE
Total Joint Week 1 Survey      Responses   Facility patient discharged from?  [facility]   Does the patient have one of the following disease processes/diagnoses(primary or secondary)?  Total Joint Replacement   Is there a successful TCM telephone encounter documented?  No   Joint surgery performed?  Shoulder   Week 1 attempt successful?  No   Unsuccessful attempts  Attempt 1          Christine Loredo RN

## 2020-02-21 NOTE — TELEPHONE ENCOUNTER
Pt is requesting a Spiriva refill to [facility], Says he had surgery on Monday and he feels like he needs it. Thanks!

## 2020-02-21 NOTE — THERAPY EVALUATION
Outpatient Physical Therapy Ortho Initial Evaluation  [facility]     Patient Name: Souleymane Stapleton  : 1945  MRN: [id number]  Today's Date: 2020      Visit Date: 2020  Attendance:  (Medicare)  Subjective Improvement: n/a  Next MD Appt: 3/3/2020 (Enedelia Galicia)  Recert Date: 3/13/2020    Therapy Diagnosis: R reverse total shoulder arthroplasty 2020         Past Medical History:   Diagnosis Date   • Abdominal pain    • Abnormal weight loss    • Acid reflux    • Acute gastritis    • Anxiety state    • Arthritis    • Cancer (CMS/HCC)     Prostate   • Coronary artery disease    • History of cerebrovascular accident    • History of colon polyps    • Hypertension    • Nausea    • Nuclear senile cataract    • Presbyopia    • Stroke (CMS/HCC) 2005    TIA   • Tobacco use    • Transient cerebral ischemia    • Vitreous detachment of left eye         Past Surgical History:   Procedure Laterality Date   • BACK SURGERY     • CARDIAC CATHETERIZATION N/A 2018    Procedure: Coronary angiography;  Surgeon: Delroy Mcconnell MD;  Location: [facility] CATH INVASIVE LOCATION;  Service: Cardiovascular   • COLONOSCOPY  2015    Diverticulosis found in the sigmoid colon. Hemorrhoids found in the anus.   • CORONARY ARTERY BYPASS GRAFT N/A 2018    Procedure: PARAS STERNOTOMY CORONARY ARTERY BYPASS GRAFT TIMES 5 USING RIGHT AND LEFT INTERNAL MAMMARY ARTERIES AND LEFT GREATER SAPHENOUS VEIN GRAFT PER ENDOSCOPIC VEIN HARVESTING AND PRP;  Surgeon: Edgar Pérez MD;  Location: [facility] OR;  Service: Cardiothoracic   • CRYOTHERAPY  2012    Of Acne   • ENDOSCOPY  2015    EGD w/ biopsy -Multiple polyps, one removed.   • ENDOSCOPY N/A 2019    Procedure: ESOPHAGOGASTRODUODENOSCOPY;  Surgeon: Chuck Rich DO;  Location: [facility] ENDOSCOPY;  Service: Gastroenterology   • HEMORRHOIDECTOMY N/A 3/20/2017    Procedure: Removal of Anal Papilla;  Surgeon: Juan Diego Ken MD;   Location: [facility] OR;  Service:    • INJECTION OF MEDICATION  09/14/2010    B12   • INJECTION OF MEDICATION  10/17/2011    Kenalog   • LUMBAR LAMINECTOMY  09/29/2010   • OTHER SURGICAL HISTORY  08/19/2010    Biopsy, Each Additional Lesion    • SKIN BIOPSY  08/19/2010   • TOTAL SHOULDER ARTHROPLASTY W/ DISTAL CLAVICLE EXCISION Right 2/17/2020    Procedure: right reverse total shoulder arthroplasty.;  Surgeon: Juan Diego Mendoza MD;  Location: [facility];  Service: Orthopedics;  Laterality: Right;   • TOTAL SHOULDER REVERSE ARTHROPLASTY Right 02/17/2020       Current Outpatient Medications:   •  acetaminophen (TYLENOL) 500 MG tablet, Take 500 mg by mouth 3 (Three) Times a Day., Disp: , Rfl:   •  Alirocumab (PRALUENT) 75 MG/ML solution prefilled syringe, Inject 75 mg under the skin into the appropriate area as directed Every 14 (Fourteen) Days., Disp: 6 syringe, Rfl: 3  •  ALPRAZolam (XANAX) 0.5 MG tablet, Take 1 tablet by mouth 3 (Three) Times a Day., Disp: 90 tablet, Rfl: 2  •  busPIRone (BUSPAR) 5 MG tablet, Take 1 tablet by mouth 3 (Three) Times a Day., Disp: 270 tablet, Rfl: 3  •  clopidogrel (PLAVIX) 75 MG tablet, Take 1 tablet by mouth Daily., Disp: 14 tablet, Rfl: 0  •  dutasteride (AVODART) 0.5 MG capsule, Take 1 capsule by mouth Every Night., Disp: 3 capsule, Rfl: 0  •  ezetimibe (ZETIA) 10 MG tablet, Take 1 tablet by mouth Daily., Disp: 90 tablet, Rfl: 3  •  finasteride (PROPECIA) 1 MG tablet, Take 1 tablet by mouth Daily., Disp: 90 tablet, Rfl: 3  •  fluticasone (FLONASE) 50 MCG/ACT nasal spray, 2 sprays into the nostril(s) as directed by provider Daily. (Patient taking differently: 2 sprays into the nostril(s) as directed by provider Daily As Needed.), Disp: 3 bottle, Rfl: 3  •  HYDROcod Polst-CPM Polst ER (TUSSIONEX PENNKINETIC ER) 10-8 MG/5ML ER suspension, Take 5 mL by mouth Every 12 (Twelve) Hours As Needed for Cough., Disp: 100 mL, Rfl: 0  •  HYDROcodone-acetaminophen (NORCO) 7.5-325 MG per tablet,  Take 1 tablet by mouth Every 6 (Six) Hours As Needed for Moderate Pain for up to 10 days., Disp: 30 tablet, Rfl: 0  •  irbesartan-hydrochlorothiazide (AVALIDE) 150-12.5 MG tablet, Take 0.5 tablets by mouth Daily., Disp: , Rfl:   •  Magnesium 250 MG tablet, Take 1 tablet by mouth Daily., Disp: , Rfl:   •  metoprolol succinate XL (TOPROL-XL) 25 MG 24 hr tablet, Take 1 tablet by mouth Daily With Dinner., Disp: 90 tablet, Rfl: 5  •  omeprazole (priLOSEC) 40 MG capsule, TAKE 1 CAPSULE EVERY DAY, Disp: 90 capsule, Rfl: 2  •  promethazine-dextromethorphan (PROMETHAZINE-DM) 6.25-15 MG/5ML syrup, Take 5 mL by mouth 4 (Four) Times a Day As Needed for Cough., Disp: 118 mL, Rfl: 1  •  sildenafil (VIAGRA) 100 MG tablet, Take 1 tablet by mouth Daily As Needed for erectile dysfunction., Disp: 10 tablet, Rfl: 2  •  simethicone (GAS-X) 80 MG chewable tablet, Chew 1 tablet Every 6 (Six) Hours As Needed for Flatulence., Disp: 56 tablet, Rfl: 1  •  sodium chloride (OCEAN NASAL SPRAY) 0.65 % nasal spray, 2 sprays into the nostril(s) as directed by provider As Needed for Congestion., Disp: 1 each, Rfl: 0  •  traZODone (DESYREL) 50 MG tablet, TAKE 1 TABLET EVERY NIGHT, Disp: 90 tablet, Rfl: 3  •  vitamin B-12 (CYANOCOBALAMIN) 500 MCG tablet, Take 500 mcg by mouth Daily., Disp: , Rfl:     Allergies   Allergen Reactions   • Statins Myalgia     Muscle soreness    • Tricor [Fenofibrate] Myalgia     Muscle soreness        Visit Dx:     ICD-10-CM ICD-9-CM   1. Bilateral shoulder pain, unspecified chronicity M25.511 719.41    M25.512    2. Rotator cuff arthropathy, right M12.811 716.81   3. Status post reverse total replacement of right shoulder Z96.611 V43.61         Patient History     Row Name 02/21/20 0800          History    Chief Complaint  Difficulty with daily activities;Pain;Muscle weakness  -SS     Type of Pain  Shoulder pain right  -SS     Date Current Problem(s) Began  02/17/20  -SS     Brief Description of Current Complaint  Patient  "underwent a R reverse total shoulder arthroplasty on 2/17/2020 due to chronic shoulder issues and pain. Was discharged from hospital on 2/19/2020. Felt like he was always coming out of the sling he was given so he obtained a shoulder immobilizer. Continues to have pain and generalized weakness at this time.  male with children.   -SS     Patient/Caregiver Goals  -- \"Back in workable condition.\"  -SS     Current Tobacco Use  no  -SS     Smoking Status  former  -SS     Patient's Rating of General Health  Fair  -SS     Hand Dominance  right-handed  -SS     Occupation/sports/leisure activities  Retired. Hobbies: [employer], hunt, read  -     Surgery/Hospitalization  2/17-2/19/2020 due to reverse total shoulder  -SS        Pain     Pain Location  Shoulder right  -SS     Pain at Present  5;6  -SS     Pain at Best  4 since surgery  -SS     Pain at Worst  10 since surgery  -SS     Pain Frequency  Constant/continuous  -SS     Pain Description  Nagging;Aching  -SS     What Performance Factors Make the Current Problem(s) WORSE?  moving the shoulder  -SS     What Performance Factors Make the Current Problem(s) BETTER?  medication, ice  -SS     Is your sleep disturbed?  Yes  -SS     Is medication used to assist with sleep?  Yes pain medication  -SS     Difficulties at work?  n/a  -SS     Difficulties with ADL's?  decreased  -SS     Difficulties with recreational activities?  decreased  -SS        Fall Risk Assessment    Any falls in the past year:  No  -SS     Does patient have a fear of falling  No  -SS        Daily Activities    Primary Language  English  -        Safety    Are you being hurt, hit, or frightened by anyone at home or in your life?  No  -SS     Are you being neglected by a caregiver  No  -SS       User Key  (r) = Recorded By, (t) = Taken By, (c) = Cosigned By    Initials Name Provider Type    SS Raudel Bell, PT DPT Physical Therapist          PT Ortho     Row Name 02/21/20 0800       " Subjective Comments    Subjective Comments  see Therapy Patient History  -       Precautions and Contraindications    Precautions  R reverse total shoulder arthroplasty 2/17/2020; prostate cancer  -       Subjective Pain    Able to rate subjective pain?  yes  -    Pre-Treatment Pain Level  -- 5-6/10  -SS    Post-Treatment Pain Level  -- 5-6/10  -       Posture/Observations    Posture/Observations Comments  Patient presents to P.T. wearing a shoulder immobilizer on the right. Becomes short of breath walking to examination room. Stapled incision anterior shoulder with no signs of infection  -SS       Right Upper Ext    Rt Shoulder Abduction AROM  deferred  -SS    Rt Shoulder Abduction PROM  65 deg  -SS    Rt Shoulder Extension AROM  deferred  -SS    Rt Shoulder Extension PROM  deferred  -SS    Rt Shoulder Flexion AROM  deferred  -SS    Rt Shoulder Flexion PROM  85 deg  -SS    Rt Shoulder External Rotation AROM  deferred  -SS    Rt Shoulder External Rotation PROM  supine neutral position: 0 deg  -SS    Rt Shoulder Internal Rotation AROM  deferred  -SS    Rt Shoulder Internal Rotation PROM  deferred  -SS       Sensation    Light Touch  No apparent deficits  -      User Key  (r) = Recorded By, (t) = Taken By, (c) = Cosigned By    Initials Name Provider Type    Raudel Godinez, PT DPT Physical Therapist        Therapy Education  Education Details: treatment plan  How Provided: Verbal  Provided to: Patient  Level of Understanding: Verbalized     PT OP Goals     Row Name 02/21/20 0800          PT Short Term Goals    STG Date to Achieve  03/13/20  -     STG 1  Passive flexion to be >/= 120 deg.  -     STG 2  Passive ER with arm at side to be 20 deg.  -        Long Term Goals    LTG Date to Achieve  06/12/20  -     LTG 1  Independent with HEP.  -     LTG 2  QuickDASH score to be </= 35.  -     LTG 3  R shoulder AROM WFLs all planes.  -     LTG 4  Report ability to use R UE for ADLs and light  IADLs.  -        Time Calculation    PT Goal Re-Cert Due Date  03/13/20  -       User Key  (r) = Recorded By, (t) = Taken By, (c) = Cosigned By    Initials Name Provider Type    Raudel Godinez, PT DPT Physical Therapist          PT Assessment/Plan     Row Name 02/21/20 0800          PT Assessment    Functional Limitations  Limitation in home management;Limitations in community activities;Limitations in functional capacity and performance;Performance in leisure activities;Performance in self-care ADL  -     Impairments  Integumentary integrity;Pain;Range of motion;Muscle strength;Dexterity  -     Assessment Comments  Patient is 4 days s/p R total shoulder arthroplasty. ROM is appropriate for his current status.   -     Rehab Potential  Good  -     Patient/caregiver participated in establishment of treatment plan and goals  Yes  -     Patient would benefit from skilled therapy intervention  Yes  -SS        PT Plan    PT Frequency  2x/week;3x/week  -     Predicted Duration of Therapy Intervention (Therapy Eval)  12-16 week  -     PT Plan Comments  [place] Reverse Ball and Socket Arthroplasty Protocol in chart. Slowly progress passive flexion and no force/resistance x 6 weeks per MD orders. Will utilize IFC estim with ice as needed for pain.  -       User Key  (r) = Recorded By, (t) = Taken By, (c) = Cosigned By    Initials Name Provider Type    Raudel Godinez, PT DPT Physical Therapist          Outcome Measure Options: Quick DASH  Quick DASH  Open a tight or new jar.: Severe Difficulty  Do heavy household chores (e.g., wash walls, wash floors): Unable  Carry a shopping bag or briefcase: Mild Difficulty  Wash your back: Unable  Use a knife to cut food: Moderate Difficulty  Recreational activities in which you take some force or impact through your arm, should or hand (e.g. golf, hammering, tennis, etc.): Unable  During the past week, to what extent has your arm, shoulder, or  hand problem interfered with your normal social activities with family, friends, neighbors or groups?: Extremely  During the past week, were you limited in your work or other regular daily activities as a result of your arm, shoulder or hand problem?: Very limited  Arm, Shoulder, or hand pain: Moderate  Tingling (pins and needles) in your arm, shoulder, or hand: Moderate  During the past week, how much difficulty have you had sleeping because of the pain in your arm, shoulder or hand?: Moderate Difficultly  Number of Questions Answered: 11  Quick DASH Score: 70.45         Time Calculation:     Start Time: 0800  Stop Time: 0849  Time Calculation (min): 49 min     Therapy Charges for Today     Code Description Service Date Service Provider Modifiers Qty    [id number] HC PT EVAL MOD COMPLEXITY 3 2/21/2020 Raudel Bell, PT DPT GP 1                   Raudel Bell, PT, DPT, CHT  2/21/2020

## 2020-02-22 ENCOUNTER — TELEPHONE (OUTPATIENT)
Dept: ORTHOPEDIC SURGERY | Facility: CLINIC | Age: 75
End: 2020-02-22

## 2020-02-22 NOTE — TELEPHONE ENCOUNTER
PHONE CALL:    Patient was contacted by phone to ensure that there were no issues.  All questions were answered.  Patient conveyed that they were doing well and that there were no current problems.  Patient will keep their current follow-up appointment.    02/22/20 at 9:49 AM by Juan Diego Mendoza MD

## 2020-02-24 ENCOUNTER — READMISSION MANAGEMENT (OUTPATIENT)
Dept: CALL CENTER | Facility: HOSPITAL | Age: 75
End: 2020-02-24

## 2020-02-24 NOTE — OUTREACH NOTE
Total Joint Week 1 Survey      Responses   Facility patient discharged from?  [facility]   Does the patient have one of the following disease processes/diagnoses(primary or secondary)?  Total Joint Replacement   Is there a successful TCM telephone encounter documented?  No   Joint surgery performed?  Shoulder   Week 1 attempt successful?  Yes   Call start time  1513   Call end time  1518   Discharge diagnosis  Rotator cuff arthropathy right, s/p total shoulder reverse arthroplasty   Does the patient have all medications related to this admission filled (includes all antibiotics, pain medications, etc.)  Yes   Is the patient taking all medications as directed (includes completed medication regime)?  Yes   Is the patient able to teach back alternate methods of pain control?  Ice, Shoulder-elevate above heart/ keep in sling as advised, Reposition, Correct alignment, Short, frequent activity   Does the patient have a follow up appointment with their surgeon?  Yes   Has the patient kept scheduled appointments due by today?  N/A   Has home health visited the patient within 72 hours of discharge?  N/A   Psychosocial issues?  No   Has the patient began therapy sessions (either in the home or as an out patient)?  Yes   If the patient has started attending therapy, what post op day did they begin to attend (either in home or as an out patient)?    outpt   Does the patient have a wound vac in place?  N/A   Has the patient fallen since discharge?  No   Did the patient receive a copy of their discharge instructions?  Yes   Nursing interventions  Reviewed instructions with patient   What is the patient's perception of their functional status since discharge?  Improving   Is the patient able to teach back signs and symptoms of infection?  Incisional drainage, Temp >100.4 for 24h or longer, Blisters around incision, Increased swelling or redness around incision (not associated with surgical edema), Severe discomfort or pain,  Changes in mobility, Shortness of breath or chest pain   Is the patient able to teach back how to prevent infection?  Check incision daily, Wash hands before and after touching incision, Keep incision covered if drainage, Shower only as directed by surgeon, No tub baths, hot tub or swimming, No lotion or creams   Is the patient able to teach back signs and symptoms of DVT?  Redness in calf, Area hot to touch, Shortness of breath or chest pain, Swelling in calf, Severe pain in calf   Is the patient able to teach back home safety measures?  Ability to shower, Accessibility to necessary areas in home, Modifications to reach items, Modifications with ADLs such as dressing, cooking, toileting   Did the patient implement home safety suggestions from pre-surgery classes if attended?  N/A   Is the patient/caregiver able to teach back the hierarchy of who to call/visit for symptoms/problems? PCP, Specialist, Home health nurse, Urgent Care, ED, 911  Yes   Week 1 call completed?  Yes          Ileana Hopper RN

## 2020-02-25 ENCOUNTER — OFFICE VISIT (OUTPATIENT)
Dept: FAMILY MEDICINE CLINIC | Facility: CLINIC | Age: 75
End: 2020-02-25

## 2020-02-25 ENCOUNTER — HOSPITAL ENCOUNTER (OUTPATIENT)
Dept: PHYSICAL THERAPY | Facility: HOSPITAL | Age: 75
Setting detail: THERAPIES SERIES
Discharge: HOME | End: 2020-02-25

## 2020-02-25 VITALS
BODY MASS INDEX: 24.78 KG/M2 | DIASTOLIC BLOOD PRESSURE: 78 MMHG | SYSTOLIC BLOOD PRESSURE: 120 MMHG | RESPIRATION RATE: 20 BRPM | WEIGHT: 167.3 LBS | HEART RATE: 104 BPM | HEIGHT: 69 IN | OXYGEN SATURATION: 90 %

## 2020-02-25 DIAGNOSIS — I10 ESSENTIAL HYPERTENSION: Primary | ICD-10-CM

## 2020-02-25 DIAGNOSIS — E78.00 PURE HYPERCHOLESTEROLEMIA: ICD-10-CM

## 2020-02-25 DIAGNOSIS — M12.811 ROTATOR CUFF ARTHROPATHY, RIGHT: Primary | ICD-10-CM

## 2020-02-25 DIAGNOSIS — M25.511 BILATERAL SHOULDER PAIN, UNSPECIFIED CHRONICITY: ICD-10-CM

## 2020-02-25 DIAGNOSIS — R05.9 COUGH: ICD-10-CM

## 2020-02-25 DIAGNOSIS — I25.810 CORONARY ARTERY DISEASE INVOLVING CORONARY BYPASS GRAFT OF NATIVE HEART WITHOUT ANGINA PECTORIS: ICD-10-CM

## 2020-02-25 DIAGNOSIS — M25.512 BILATERAL SHOULDER PAIN, UNSPECIFIED CHRONICITY: ICD-10-CM

## 2020-02-25 PROCEDURE — 97530 THERAPEUTIC ACTIVITIES: CPT

## 2020-02-25 PROCEDURE — 97112 NEUROMUSCULAR REEDUCATION: CPT

## 2020-02-25 PROCEDURE — 99214 OFFICE O/P EST MOD 30 MIN: CPT | Performed by: FAMILY MEDICINE

## 2020-02-25 PROCEDURE — 97140 MANUAL THERAPY 1/> REGIONS: CPT

## 2020-02-25 PROCEDURE — 97110 THERAPEUTIC EXERCISES: CPT

## 2020-02-25 NOTE — PROGRESS NOTES
Subjective   Souleymane Stapleton is a 74 y.o. male.     Hypertension   This is a chronic problem. The current episode started more than 1 year ago. The problem is controlled. Associated symptoms include anxiety. Pertinent negatives include no chest pain or palpitations. There are no associated agents to hypertension. Current antihypertension treatment includes beta blockers.   Anxiety   Presents for follow-up visit. Patient reports no chest pain, compulsions, confusion, depressed mood, dizziness, hyperventilation or palpitations.       He is at his base line.He has a continued cough that has been associated with clear sputum. He denies fever.This has been going on for several months.  The following portions of the patient's history were reviewed and updated as appropriate: allergies, current medications, past family history, past medical history, past social history, past surgical history and problem list.    Review of Systems   Constitutional: Negative for fatigue and fever.   Respiratory: Negative for cough, chest tightness and stridor.    Cardiovascular: Negative for chest pain and palpitations.   Neurological: Negative for dizziness.   Psychiatric/Behavioral: Negative for confusion.       Objective   Physical Exam   Constitutional: He is oriented to person, place, and time. He appears well-developed and well-nourished.   HENT:   Head: Normocephalic and atraumatic.   Right Ear: External ear normal.   Left Ear: External ear normal.   Nose: Nose normal.   Mouth/Throat: Oropharynx is clear and moist.   Eyes: Conjunctivae are normal.   Neck: Normal range of motion.   Cardiovascular: Normal rate, regular rhythm and normal heart sounds. Exam reveals no gallop and no friction rub.   No murmur heard.  Pulmonary/Chest: Effort normal and breath sounds normal. No stridor. No respiratory distress. He has no wheezes.   Abdominal: Soft. Bowel sounds are normal.   Neurological: He is alert and oriented to person, place, and  "time.   Skin: Skin is warm.   Vitals reviewed.        Visit Vitals  /78   Pulse 104   Resp 20   Ht 174 cm (68.5\")   Wt 75.9 kg (167 lb 4.8 oz)   SpO2 90%   BMI 25.07 kg/m²     Body mass index is 25.07 kg/m².      Assessment/Plan   Souleymane was seen today for follow-up.    Diagnoses and all orders for this visit:    Essential hypertension    Pure hypercholesterolemia    Coronary artery disease involving coronary bypass graft of native heart without angina pectoris    Cough  -     HYDROcod Polst-CPM Polst ER (TUSSIONEX PENNKINETIC ER) 10-8 MG/5ML ER suspension; Take 5 mL by mouth Every 12 (Twelve) Hours As Needed for Cough.    Return to the clinic in 3 month/s.  Will contact with results as needed.  [id number] is appropriate.  Continue on current medications.  "

## 2020-02-25 NOTE — THERAPY TREATMENT NOTE
Outpatient Physical Therapy Ortho Treatment Note  [facility]     Patient Name: Souleymane Stapleton  : 1945  MRN: [id number]  Today's Date: 2020      Visit Date: 2020    Subjective Improvement: slowly improved  Attendance:    Next MD Visit : 3/3/2020 (Enedelia Galicia)  Recert Date:  3/13/2020      Therapy Diagnosis:  R reverse total shoulder arthroplasty 2020    Allergies       StatinsMyalgia   Tricor [Fenofibrate]Myalgia   Uri as Reviewed Reviewed by PT DPT on 2020     Medications (Admitted on 2020)     acetaminophen (TYLENOL) 500 MG tablet     Alirocumab (PRALUENT) 75 MG/ML solution prefilled syringe     ALPRAZolam (XANAX) 0.5 MG tablet     busPIRone (BUSPAR) 5 MG tablet     clopidogrel (PLAVIX) 75 MG tablet     dutasteride (AVODART) 0.5 MG capsule     ezetimibe (ZETIA) 10 MG tablet     finasteride (PROPECIA) 1 MG tablet     fluticasone (FLONASE) 50 MCG/ACT nasal spray     HYDROcod Polst-CPM Polst ER (TUSSIONEX PENNKINETIC ER) 10-8 MG/5ML ER suspension     HYDROcodone-acetaminophen (NORCO) 7.5-325 MG per tablet     irbesartan-hydrochlorothiazide (AVALIDE) 150-12.5 MG tablet     Magnesium 250 MG tablet     metoprolol succinate XL (TOPROL-XL) 25 MG 24 hr tablet     omeprazole (priLOSEC) 40 MG capsule     promethazine-dextromethorphan (PROMETHAZINE-DM) 6.25-15 MG/5ML syrup     sildenafil (VIAGRA) 100 MG tablet     simethicone (GAS-X) 80 MG chewable tablet     sodium chloride (OCEAN NASAL SPRAY) 0.65 % nasal spray     tiotropium bromide monohydrate (SPIRIVA RESPIMAT) 2.5 MCG/ACT aerosol solution inhaler     traZODone (DESYREL) 50 MG tablet     vitamin B-12 (CYANOCOBALAMIN) 500 MCG tablet          Visit Dx:    ICD-10-CM ICD-9-CM   1. Rotator cuff arthropathy, right M12.811 716.81   2. Bilateral shoulder pain, unspecified chronicity M25.511 719.41    M25.512        Patient Active Problem List   Diagnosis   • Astigmatism   • Anxiety state   • History of cerebrovascular accident    • Nuclear senile cataract   • Hypertension   • Unspecified hemorrhoids   • Palpitations   • Pure hypercholesterolemia   • Prostate cancer (CMS/HCC)   • Chronic right shoulder pain   • Rotator cuff syndrome, left   • Chronic left shoulder pain   • Impingement syndrome, shoulder, left   • SOB (shortness of breath)   • Angina of effort (CMS/HCC)   • Coronary artery disease involving coronary bypass graft of native heart without angina pectoris   • Cervical spinal stenosis   • Displacement of cervical intervertebral disc   • Displacement of lumbar intervertebral disc without myelopathy   • Follow-up examination after neurological surgery   • Internal carotid artery dissection (CMS/HCC)   • TIA (transient ischemic attack)   • Bacterial intestinal infection, unspecified   • Body mass index (bmi) 25.0-25.9, adult   • Diverticulosis of large intestine without perforation or abscess without bleeding   • Functional dyspepsia   • History of colonic polyps   • Polyp of stomach and duodenum   • Bilateral shoulder pain   • Rotator cuff arthropathy, right        Past Medical History:   Diagnosis Date   • Abdominal pain    • Abnormal weight loss    • Acid reflux    • Acute gastritis    • Anxiety state    • Arthritis    • Cancer (CMS/HCC)     Prostate   • Coronary artery disease    • History of cerebrovascular accident    • History of colon polyps    • Hypertension    • Nausea    • Nuclear senile cataract    • Presbyopia    • Stroke (CMS/HCC) 2005    TIA   • Tobacco use    • Transient cerebral ischemia    • Vitreous detachment of left eye         Past Surgical History:   Procedure Laterality Date   • BACK SURGERY     • CARDIAC CATHETERIZATION N/A 6/19/2018    Procedure: Coronary angiography;  Surgeon: Delroy Mcconnell MD;  Location: [facility] INVASIVE LOCATION;  Service: Cardiovascular   • COLONOSCOPY  06/09/2015    Diverticulosis found in the sigmoid colon. Hemorrhoids found in the anus.   • CORONARY ARTERY BYPASS GRAFT N/A  6/22/2018    Procedure: PARAS STERNOTOMY CORONARY ARTERY BYPASS GRAFT TIMES 5 USING RIGHT AND LEFT INTERNAL MAMMARY ARTERIES AND LEFT GREATER SAPHENOUS VEIN GRAFT PER ENDOSCOPIC VEIN HARVESTING AND PRP;  Surgeon: Edgar Pérez MD;  Location: [facility];  Service: Cardiothoracic   • CRYOTHERAPY  08/14/2012    Of Acne   • ENDOSCOPY  09/01/2015    EGD w/ biopsy -Multiple polyps, one removed.   • ENDOSCOPY N/A 8/22/2019    Procedure: ESOPHAGOGASTRODUODENOSCOPY;  Surgeon: Chuck Rich DO;  Location: [facility] ENDOSCOPY;  Service: Gastroenterology   • HEMORRHOIDECTOMY N/A 3/20/2017    Procedure: Removal of Anal Papilla;  Surgeon: Juan Diego Ken MD;  Location: [facility] OR;  Service:    • INJECTION OF MEDICATION  09/14/2010    B12   • INJECTION OF MEDICATION  10/17/2011    Kenalog   • LUMBAR LAMINECTOMY  09/29/2010   • OTHER SURGICAL HISTORY  08/19/2010    Biopsy, Each Additional Lesion    • SKIN BIOPSY  08/19/2010   • TOTAL SHOULDER ARTHROPLASTY W/ DISTAL CLAVICLE EXCISION Right 2/17/2020    Procedure: right reverse total shoulder arthroplasty.;  Surgeon: Juan Diego Mendoza MD;  Location: [facility];  Service: Orthopedics;  Laterality: Right;   • TOTAL SHOULDER REVERSE ARTHROPLASTY Right 02/17/2020         PT Assessment/Plan     Row Name 02/25/20 0800          PT Assessment    Functional Limitations  Limitation in home management;Limitations in community activities;Limitations in functional capacity and performance;Performance in leisure activities;Performance in self-care ADL  -PD     Impairments  Integumentary integrity;Pain;Range of motion;Muscle strength;Dexterity  -PD     Assessment Comments  patient is in 2nd week post op. R upper trap a bit sore but everything else is WNL.     -PD     Rehab Potential  Good  -PD     Patient/caregiver participated in establishment of treatment plan and goals  Yes  -PD     Patient would benefit from skilled therapy intervention  Yes  -PD        PT Plan    PT  Frequency  2x/week;3x/week  -PD     Predicted Duration of Therapy Intervention (Therapy Eval)  12-16 weeks  -PD     PT Plan Comments  [place] reverse total shoulder replacement protocal in chart.  -PD       User Key  (r) = Recorded By, (t) = Taken By, (c) = Cosigned By    Initials Name Provider Type    PD Won Neely, PT Physical Therapist            OP Exercises     Row Name 02/25/20 0800             Subjective Comments    Subjective Comments  pt reports R upper traps stiff and sore.   -PD         Subjective Pain    Able to rate subjective pain?  yes  -PD      Pre-Treatment Pain Level  4  -PD      Post-Treatment Pain Level  5  -PD         Exercise 1    Exercise Name 1  PROM for shoulder all directions  -PD      Sets 1  3  -PD      Reps 1  10  -PD         Exercise 2    Exercise Name 2  isometric shoulder flx/ext, abd, ER   -PD      Sets 2  1  -PD      Reps 2  10  -PD         Exercise 3    Exercise Name 3  shoulder shrugs  -PD      Cueing 3  Verbal;Tactile  -PD      Sets 3  1  -PD      Reps 3  6  -PD         Exercise 4    Exercise Name 4  STM to R upper traps  -PD      Sets 4  2  -PD      Time 4  5min   -PD         Exercise 5    Exercise Name 5  elbow AROM  -PD      Sets 5  1  -PD      Reps 5  10  -PD         Exercise 6    Exercise Name 6  wrist and hand AROM  -PD      Sets 6  1  -PD      Reps 6  20  -PD        User Key  (r) = Recorded By, (t) = Taken By, (c) = Cosigned By    Initials Name Provider Type    PD Won Neely, PT Physical Therapist          PT OP Goals     Row Name 02/25/20 0800          PT Short Term Goals    STG Date to Achieve  03/13/20  -PD     STG 1  Passive flexion to be >/= 120 deg.  -PD     STG 1 Progress  Ongoing  -PD     STG 2  Passive ER with arm at side to be 20 deg.  -PD     STG 2 Progress  Ongoing  -PD        Long Term Goals    LTG Date to Achieve  06/12/20  -PD     LTG 1  Independent with HEP.  -PD     LTG 1 Progress  Ongoing  -PD     LTG 2  QuickDASH score to be </= 35.  -PD      LTG 2 Progress  Ongoing  -PD     LTG 3  R shoulder AROM WFLs all planes.  -PD     LTG 3 Progress  Ongoing  -PD     LTG 4  Report ability to use R UE for ADLs and light IADLs.  -PD     LTG 4 Progress  Ongoing  -PD        Time Calculation    PT Goal Re-Cert Due Date  03/13/20  -PD       User Key  (r) = Recorded By, (t) = Taken By, (c) = Cosigned By    Initials Name Provider Type    PD Won Neely, PT Physical Therapist          Time Calculation:      Therapy Charges for Today     Code Description Service Date Service Provider Modifiers Qty    57900719606  PT THER PROC EA 15 MIN 2/25/2020 Won Neely, PT GP 1    27832720677 HC PT NEUROMUSC RE EDUCATION EA 15 MIN 2/25/2020 Won Neely, PT GP 1    61447570666 HC PT MANUAL THERAPY EA 15 MIN 2/25/2020 Won Neely, PT GP 1    26603972583 HC PT THERAPEUTIC ACT EA 15 MIN 2/25/2020 Won Neely, PT GP 1          Won Neely PT  2/25/2020

## 2020-02-27 ENCOUNTER — APPOINTMENT (OUTPATIENT)
Dept: PHYSICAL THERAPY | Facility: HOSPITAL | Age: 75
End: 2020-02-27

## 2020-02-28 ENCOUNTER — HOSPITAL ENCOUNTER (OUTPATIENT)
Dept: PHYSICAL THERAPY | Facility: HOSPITAL | Age: 75
Setting detail: THERAPIES SERIES
Discharge: HOME | End: 2020-02-28

## 2020-02-28 DIAGNOSIS — M12.811 ROTATOR CUFF ARTHROPATHY, RIGHT: Primary | ICD-10-CM

## 2020-02-28 DIAGNOSIS — M25.511 BILATERAL SHOULDER PAIN, UNSPECIFIED CHRONICITY: ICD-10-CM

## 2020-02-28 DIAGNOSIS — M25.512 BILATERAL SHOULDER PAIN, UNSPECIFIED CHRONICITY: ICD-10-CM

## 2020-02-28 DIAGNOSIS — Z96.611 STATUS POST REVERSE TOTAL REPLACEMENT OF RIGHT SHOULDER: Primary | ICD-10-CM

## 2020-02-28 DIAGNOSIS — M12.811 ROTATOR CUFF ARTHROPATHY, RIGHT: ICD-10-CM

## 2020-02-28 LAB
LAB AP CASE REPORT: NORMAL
PATH REPORT.FINAL DX SPEC: NORMAL
PATH REPORT.GROSS SPEC: NORMAL

## 2020-02-28 PROCEDURE — 97110 THERAPEUTIC EXERCISES: CPT

## 2020-02-28 NOTE — THERAPY TREATMENT NOTE
Outpatient Physical Therapy Ortho Treatment Note  [facility]   Patient Name: Souleymane Stapleton  : 1945  MRN: [id number]  Today's Date: 2020      Visit Date: 2020   Attendance: 3/3  Subjective improvement: 20%  Recert: 3/13/20  MD Appointment: 3/3/20      Visit Dx:    ICD-10-CM ICD-9-CM   1. Status post reverse total replacement of right shoulder Z96.611 V43.61   2. Rotator cuff arthropathy, right M12.811 716.81   3. Bilateral shoulder pain, unspecified chronicity M25.511 719.41    M25.512        Patient Active Problem List   Diagnosis   • Astigmatism   • Anxiety state   • History of cerebrovascular accident   • Nuclear senile cataract   • Hypertension   • Unspecified hemorrhoids   • Palpitations   • Pure hypercholesterolemia   • Prostate cancer (CMS/HCC)   • Chronic right shoulder pain   • Rotator cuff syndrome, left   • Chronic left shoulder pain   • Impingement syndrome, shoulder, left   • SOB (shortness of breath)   • Angina of effort (CMS/HCC)   • Coronary artery disease involving coronary bypass graft of native heart without angina pectoris   • Cervical spinal stenosis   • Displacement of cervical intervertebral disc   • Displacement of lumbar intervertebral disc without myelopathy   • Follow-up examination after neurological surgery   • Internal carotid artery dissection (CMS/HCC)   • TIA (transient ischemic attack)   • Bacterial intestinal infection, unspecified   • Body mass index (bmi) 25.0-25.9, adult   • Diverticulosis of large intestine without perforation or abscess without bleeding   • Functional dyspepsia   • History of colonic polyps   • Polyp of stomach and duodenum   • Bilateral shoulder pain   • Rotator cuff arthropathy, right        Past Medical History:   Diagnosis Date   • Abdominal pain    • Abnormal weight loss    • Acid reflux    • Acute gastritis    • Anxiety state    • Arthritis    • Cancer (CMS/HCC)     Prostate   • Coronary artery disease    • History of  cerebrovascular accident    • History of colon polyps    • Hypertension    • Nausea    • Nuclear senile cataract    • Presbyopia    • Stroke (CMS/HCC) 2005    TIA   • Tobacco use    • Transient cerebral ischemia    • Vitreous detachment of left eye         Past Surgical History:   Procedure Laterality Date   • BACK SURGERY     • CARDIAC CATHETERIZATION N/A 6/19/2018    Procedure: Coronary angiography;  Surgeon: Delroy Mcconnell MD;  Location: [facility] CATH INVASIVE LOCATION;  Service: Cardiovascular   • COLONOSCOPY  06/09/2015    Diverticulosis found in the sigmoid colon. Hemorrhoids found in the anus.   • CORONARY ARTERY BYPASS GRAFT N/A 6/22/2018    Procedure: PARAS STERNOTOMY CORONARY ARTERY BYPASS GRAFT TIMES 5 USING RIGHT AND LEFT INTERNAL MAMMARY ARTERIES AND LEFT GREATER SAPHENOUS VEIN GRAFT PER ENDOSCOPIC VEIN HARVESTING AND PRP;  Surgeon: Edgar Pérez MD;  Location: [facility] OR;  Service: Cardiothoracic   • CRYOTHERAPY  08/14/2012    Of Acne   • ENDOSCOPY  09/01/2015    EGD w/ biopsy -Multiple polyps, one removed.   • ENDOSCOPY N/A 8/22/2019    Procedure: ESOPHAGOGASTRODUODENOSCOPY;  Surgeon: Chuck Rich DO;  Location: [facility] ENDOSCOPY;  Service: Gastroenterology   • HEMORRHOIDECTOMY N/A 3/20/2017    Procedure: Removal of Anal Papilla;  Surgeon: Juan Diego Ken MD;  Location: [facility] OR;  Service:    • INJECTION OF MEDICATION  09/14/2010    B12   • INJECTION OF MEDICATION  10/17/2011    Kenalog   • LUMBAR LAMINECTOMY  09/29/2010   • OTHER SURGICAL HISTORY  08/19/2010    Biopsy, Each Additional Lesion    • SHOULDER ROTATOR CUFF REPAIR Right 02/17/2020   • SKIN BIOPSY  08/19/2010   • TOTAL SHOULDER ARTHROPLASTY W/ DISTAL CLAVICLE EXCISION Right 2/17/2020    Procedure: right reverse total shoulder arthroplasty.;  Surgeon: Juan Diego Mendoza MD;  Location: [facility] OR;  Service: Orthopedics;  Laterality: Right;   • TOTAL SHOULDER REVERSE ARTHROPLASTY Right 02/17/2020       PT Ortho      Row Name 02/28/20 0800       Precautions and Contraindications    Precautions  (S) R reverse total shoulder arthroplasty 2/17/2020; prostate cancer  -EM       Subjective Pain    Post-Treatment Pain Level  4  -EM       Posture/Observations    Posture/Observations Comments  Pt present wearing sling.   -EM       Right Upper Ext    Rt Shoulder Abduction PROM  88  -EM    Rt Shoulder Flexion PROM  109  -EM    Rt Shoulder External Rotation PROM  to neutral at 0 deg abd  -EM    Rt Shoulder Internal Rotation PROM  to belly at 0 deg abd  -EM      User Key  (r) = Recorded By, (t) = Taken By, (c) = Cosigned By    Initials Name Provider Type    EM Jeremiah Champagne, PTA Physical Therapy Assistant                      PT Assessment/Plan     Row Name 02/28/20 0800          PT Assessment    Functional Limitations  Limitation in home management;Limitations in community activities;Limitations in functional capacity and performance;Performance in leisure activities;Performance in self-care ADL  -EM     Impairments  Integumentary integrity;Pain;Range of motion;Muscle strength;Dexterity  -EM     Assessment Comments  Pt chon tx well with significant improvements noted with flex and abd.   -EM     Rehab Potential  Good  -EM     Patient/caregiver participated in establishment of treatment plan and goals  Yes  -EM     Patient would benefit from skilled therapy intervention  Yes  -EM        PT Plan    PT Frequency  2x/week;3x/week  -EM     Predicted Duration of Therapy Intervention (Therapy Eval)  12-16wks  -EM     PT Plan Comments  MD note next tx. Cont to progress as protocol allows.   -EM       User Key  (r) = Recorded By, (t) = Taken By, (c) = Cosigned By    Initials Name Provider Type    EM Jeremiah Champagne PTA Physical Therapy Assistant          Modalities     Row Name 02/28/20 0800             Subjective Comments    Subjective Comments  Pt c/o R UT pain. Pt states he is doing better each day.   -EM         Ice    Ice Applied  Yes  -EM       "Location  R shoulder  -EM      Rx Minutes  10 mins  -EM        User Key  (r) = Recorded By, (t) = Taken By, (c) = Cosigned By    Initials Name Provider Type    EM Jeremiah Champagne PTA Physical Therapy Assistant        OP Exercises     Row Name 02/28/20 0800             Subjective Comments    Subjective Comments  Pt c/o R UT pain. Pt states he is doing better each day.   -EM         Subjective Pain    Able to rate subjective pain?  yes  -EM      Pre-Treatment Pain Level  4  -EM      Post-Treatment Pain Level  4  -EM         Exercise 1    Exercise Name 1  Shoulder Shruggs/Rolls  -EM      Sets 1  1  -EM      Reps 1  20  -EM         Exercise 2    Exercise Name 2  Scap Retraction   -EM      Sets 2  1  -EM      Reps 2  20  -EM         Exercise 3    Exercise Name 3  R UT S  -EM      Sets 3  3  -EM      Time 3  30\"  -EM         Exercise 4    Exercise Name 4  Elbow Flex/Ext  -EM      Sets 4  1  -EM      Reps 4  20  -EM         Exercise 5    Exercise Name 5  Putty  Squeeze  -EM      Time 5  5'  -EM         Exercise 6    Exercise Name 6  Shoulder Isometrics: Flex, Abd  -EM      Sets 6  1  -EM      Reps 6  20  -EM      Time 6  5\" Hold  -EM         Exercise 7    Exercise Name 7  PROM  -EM      Time 7  13'  -EM         Exercise 8    Exercise Name 8  Ice  -EM      Time 8  15'  -EM        User Key  (r) = Recorded By, (t) = Taken By, (c) = Cosigned By    Initials Name Provider Type    EM Jeremiah Champagne, MAGALY Physical Therapy Assistant                       PT OP Goals     Row Name 02/28/20 0800          PT Short Term Goals    STG Date to Achieve  03/13/20  -EM     STG 1  Passive flexion to be >/= 120 deg.  -EM     STG 1 Progress  Ongoing  -EM     STG 2  Passive ER with arm at side to be 20 deg.  -EM     STG 2 Progress  Ongoing  -EM        Long Term Goals    LTG Date to Achieve  06/12/20  -EM     LTG 1  Independent with HEP.  -EM     LTG 1 Progress  Ongoing  -EM     LTG 2  QuickDASH score to be </= 35.  -EM     LTG 2 Progress  Ongoing  " -EM     LTG 3  R shoulder AROM WFLs all planes.  -EM     LTG 3 Progress  Ongoing  -EM     LTG 4  Report ability to use R UE for ADLs and light IADLs.  -EM     LTG 4 Progress  Ongoing  -EM        Time Calculation    PT Goal Re-Cert Due Date  03/13/20  -EM       User Key  (r) = Recorded By, (t) = Taken By, (c) = Cosigned By    Initials Name Provider Type    EM Jeremiah Champagne, MAGALY Physical Therapy Assistant                         Time Calculation:   Start Time: 0758  Stop Time: 0920  Time Calculation (min): 82 min  Total Timed Code Minutes- PT: 82 minute(s)  Therapy Charges for Today     Code Description Service Date Service Provider Modifiers Qty    56792463589 HC PT THER PROC EA 15 MIN 2/28/2020 Jeremiah Champagne PTA GP 4    61630901848 HC PT THER SUPP EA 15 MIN 2/28/2020 Jeremiah Champagne PTA GP 1                    Jeremiah Champagne PTA  2/28/2020

## 2020-02-28 NOTE — THERAPY TREATMENT NOTE
Outpatient Physical Therapy Ortho Treatment Note  [facility]     Patient Name: Souleymane Stapleton  : 1945  MRN: [id number]  Today's Date: 2020      Visit Date: 2020   Attendance: 3/3  Subjective improvement: 20%  Recert: 3/13/20  MD Appointment: 3/3/20      Visit Dx:    ICD-10-CM ICD-9-CM   1. Status post reverse total replacement of right shoulder Z96.611 V43.61   2. Rotator cuff arthropathy, right M12.811 716.81   3. Bilateral shoulder pain, unspecified chronicity M25.511 719.41    M25.512        Patient Active Problem List   Diagnosis   • Astigmatism   • Anxiety state   • History of cerebrovascular accident   • Nuclear senile cataract   • Hypertension   • Unspecified hemorrhoids   • Palpitations   • Pure hypercholesterolemia   • Prostate cancer (CMS/HCC)   • Chronic right shoulder pain   • Rotator cuff syndrome, left   • Chronic left shoulder pain   • Impingement syndrome, shoulder, left   • SOB (shortness of breath)   • Angina of effort (CMS/HCC)   • Coronary artery disease involving coronary bypass graft of native heart without angina pectoris   • Cervical spinal stenosis   • Displacement of cervical intervertebral disc   • Displacement of lumbar intervertebral disc without myelopathy   • Follow-up examination after neurological surgery   • Internal carotid artery dissection (CMS/HCC)   • TIA (transient ischemic attack)   • Bacterial intestinal infection, unspecified   • Body mass index (bmi) 25.0-25.9, adult   • Diverticulosis of large intestine without perforation or abscess without bleeding   • Functional dyspepsia   • History of colonic polyps   • Polyp of stomach and duodenum   • Bilateral shoulder pain   • Rotator cuff arthropathy, right        Past Medical History:   Diagnosis Date   • Abdominal pain    • Abnormal weight loss    • Acid reflux    • Acute gastritis    • Anxiety state    • Arthritis    • Cancer (CMS/HCC)     Prostate   • Coronary artery disease    • History of  cerebrovascular accident    • History of colon polyps    • Hypertension    • Nausea    • Nuclear senile cataract    • Presbyopia    • Stroke (CMS/HCC) 2005    TIA   • Tobacco use    • Transient cerebral ischemia    • Vitreous detachment of left eye         Past Surgical History:   Procedure Laterality Date   • BACK SURGERY     • CARDIAC CATHETERIZATION N/A 6/19/2018    Procedure: Coronary angiography;  Surgeon: Delroy Mcconnell MD;  Location: [facility] CATH INVASIVE LOCATION;  Service: Cardiovascular   • COLONOSCOPY  06/09/2015    Diverticulosis found in the sigmoid colon. Hemorrhoids found in the anus.   • CORONARY ARTERY BYPASS GRAFT N/A 6/22/2018    Procedure: PARAS STERNOTOMY CORONARY ARTERY BYPASS GRAFT TIMES 5 USING RIGHT AND LEFT INTERNAL MAMMARY ARTERIES AND LEFT GREATER SAPHENOUS VEIN GRAFT PER ENDOSCOPIC VEIN HARVESTING AND PRP;  Surgeon: Edgar Pérez MD;  Location: [facility] OR;  Service: Cardiothoracic   • CRYOTHERAPY  08/14/2012    Of Acne   • ENDOSCOPY  09/01/2015    EGD w/ biopsy -Multiple polyps, one removed.   • ENDOSCOPY N/A 8/22/2019    Procedure: ESOPHAGOGASTRODUODENOSCOPY;  Surgeon: Chuck Rich DO;  Location: [facility] ENDOSCOPY;  Service: Gastroenterology   • HEMORRHOIDECTOMY N/A 3/20/2017    Procedure: Removal of Anal Papilla;  Surgeon: Juan Diego Ken MD;  Location: [facility] OR;  Service:    • INJECTION OF MEDICATION  09/14/2010    B12   • INJECTION OF MEDICATION  10/17/2011    Kenalog   • LUMBAR LAMINECTOMY  09/29/2010   • OTHER SURGICAL HISTORY  08/19/2010    Biopsy, Each Additional Lesion    • SHOULDER ROTATOR CUFF REPAIR Right 02/17/2020   • SKIN BIOPSY  08/19/2010   • TOTAL SHOULDER ARTHROPLASTY W/ DISTAL CLAVICLE EXCISION Right 2/17/2020    Procedure: right reverse total shoulder arthroplasty.;  Surgeon: Juan Diego Mendoza MD;  Location: [facility] OR;  Service: Orthopedics;  Laterality: Right;   • TOTAL SHOULDER REVERSE ARTHROPLASTY Right 02/17/2020       PT Ortho      Row Name 02/28/20 0800       Precautions and Contraindications    Precautions  (S) R reverse total shoulder arthroplasty 2/17/2020; prostate cancer  -EM       Subjective Pain    Post-Treatment Pain Level  4  -EM       Posture/Observations    Posture/Observations Comments  Pt present wearing sling.   -EM       Right Upper Ext    Rt Shoulder Abduction PROM  88  -EM    Rt Shoulder Flexion PROM  109  -EM    Rt Shoulder External Rotation PROM  to neutral at 0 deg abd  -EM    Rt Shoulder Internal Rotation PROM  to belly at 0 deg abd  -EM      User Key  (r) = Recorded By, (t) = Taken By, (c) = Cosigned By    Initials Name Provider Type    EM Jeremiah Champagne, PTA Physical Therapy Assistant                      PT Assessment/Plan     Row Name 02/28/20 0800          PT Assessment    Functional Limitations  Limitation in home management;Limitations in community activities;Limitations in functional capacity and performance;Performance in leisure activities;Performance in self-care ADL  -EM     Impairments  Integumentary integrity;Pain;Range of motion;Muscle strength;Dexterity  -EM     Assessment Comments  Pt chon tx well with significant improvements noted with flex and abd.   -EM     Rehab Potential  Good  -EM     Patient/caregiver participated in establishment of treatment plan and goals  Yes  -EM     Patient would benefit from skilled therapy intervention  Yes  -EM        PT Plan    PT Frequency  2x/week;3x/week  -EM     Predicted Duration of Therapy Intervention (Therapy Eval)  12-16wks  -EM     PT Plan Comments  MD note next tx. Cont to progress as protocol allows.   -EM       User Key  (r) = Recorded By, (t) = Taken By, (c) = Cosigned By    Initials Name Provider Type    EM Jeremiah Champagne PTA Physical Therapy Assistant          Modalities     Row Name 02/28/20 0800             Subjective Comments    Subjective Comments  Pt c/o R UT pain. Pt states he is doing better each day.   -EM         Ice    Ice Applied  Yes  -EM       "Location  R shoulder  -EM      Rx Minutes  10 mins  -EM        User Key  (r) = Recorded By, (t) = Taken By, (c) = Cosigned By    Initials Name Provider Type    EM Jeremiah Champagne PTA Physical Therapy Assistant        OP Exercises     Row Name 02/28/20 0800             Subjective Comments    Subjective Comments  Pt c/o R UT pain. Pt states he is doing better each day.   -EM         Subjective Pain    Able to rate subjective pain?  yes  -EM      Pre-Treatment Pain Level  4  -EM      Post-Treatment Pain Level  4  -EM         Exercise 1    Exercise Name 1  Shoulder Shruggs/Rolls  -EM      Sets 1  1  -EM      Reps 1  20  -EM         Exercise 2    Exercise Name 2  Scap Retraction   -EM      Sets 2  1  -EM      Reps 2  20  -EM         Exercise 3    Exercise Name 3  R UT S  -EM      Sets 3  3  -EM      Time 3  30\"  -EM         Exercise 4    Exercise Name 4  Elbow Flex/Ext  -EM      Sets 4  1  -EM      Reps 4  20  -EM         Exercise 5    Exercise Name 5  Putty  Squeeze  -EM      Time 5  5'  -EM         Exercise 6    Exercise Name 6  Shoulder Isometrics: Flex, Abd, ER, Ext  -EM      Sets 6  1  -EM      Reps 6  20  -EM      Time 6  5\" Hold  -EM         Exercise 7    Exercise Name 7  PROM  -EM      Time 7  13'  -EM         Exercise 8    Exercise Name 8  Ice  -EM      Time 8  15'  -EM        User Key  (r) = Recorded By, (t) = Taken By, (c) = Cosigned By    Initials Name Provider Type    EM Jeremiah Champagne PTA Physical Therapy Assistant                       PT OP Goals     Row Name 02/28/20 0800          PT Short Term Goals    STG Date to Achieve  03/13/20  -EM     STG 1  Passive flexion to be >/= 120 deg.  -EM     STG 1 Progress  Ongoing  -EM     STG 2  Passive ER with arm at side to be 20 deg.  -EM     STG 2 Progress  Ongoing  -EM        Long Term Goals    LTG Date to Achieve  06/12/20  -EM     LTG 1  Independent with HEP.  -EM     LTG 1 Progress  Ongoing  -EM     LTG 2  QuickDASH score to be </= 35.  -EM     LTG 2 Progress  " Ongoing  -EM     LTG 3  R shoulder AROM WFLs all planes.  -EM     LTG 3 Progress  Ongoing  -EM     LTG 4  Report ability to use R UE for ADLs and light IADLs.  -EM     LTG 4 Progress  Ongoing  -EM        Time Calculation    PT Goal Re-Cert Due Date  03/13/20  -EM       User Key  (r) = Recorded By, (t) = Taken By, (c) = Cosigned By    Initials Name Provider Type    EM Jeremiah Champagne, MAGALY Physical Therapy Assistant                         Time Calculation:   Start Time: 0758  Stop Time: 0920  Time Calculation (min): 82 min  Total Timed Code Minutes- PT: 82 minute(s)  Therapy Charges for Today     Code Description Service Date Service Provider Modifiers Qty    71073369177 HC PT THER PROC EA 15 MIN 2/28/2020 Jeremiah Champagne PTA GP 4    88619232166 HC PT THER SUPP EA 15 MIN 2/28/2020 Jeremiah Champagne PTA GP 1                    Jeremiah Champagne PTA  2/28/2020

## 2020-03-02 ENCOUNTER — READMISSION MANAGEMENT (OUTPATIENT)
Dept: CALL CENTER | Facility: HOSPITAL | Age: 75
End: 2020-03-02

## 2020-03-02 NOTE — OUTREACH NOTE
Total Joint Week 2 Survey      Responses   Facility patient discharged from?  [facility]   Does the patient have one of the following disease processes/diagnoses(primary or secondary)?  Total Joint Replacement   Joint surgery performed?  Shoulder   Week 2 attempt successful?  No   Unsuccessful attempts  Attempt 1          Ela Harden RN

## 2020-03-03 ENCOUNTER — OFFICE VISIT (OUTPATIENT)
Dept: ORTHOPEDIC SURGERY | Facility: CLINIC | Age: 75
End: 2020-03-03

## 2020-03-03 ENCOUNTER — HOSPITAL ENCOUNTER (OUTPATIENT)
Dept: PHYSICAL THERAPY | Facility: HOSPITAL | Age: 75
Setting detail: THERAPIES SERIES
Discharge: HOME | End: 2020-03-03

## 2020-03-03 VITALS — WEIGHT: 167.7 LBS | HEIGHT: 69 IN | BODY MASS INDEX: 24.84 KG/M2

## 2020-03-03 DIAGNOSIS — M12.811 ROTATOR CUFF ARTHROPATHY, RIGHT: Primary | ICD-10-CM

## 2020-03-03 DIAGNOSIS — G89.29 CHRONIC RIGHT SHOULDER PAIN: ICD-10-CM

## 2020-03-03 DIAGNOSIS — M25.511 CHRONIC RIGHT SHOULDER PAIN: ICD-10-CM

## 2020-03-03 DIAGNOSIS — Z96.611 STATUS POST REVERSE ARTHROPLASTY OF RIGHT SHOULDER: ICD-10-CM

## 2020-03-03 DIAGNOSIS — Z96.611 STATUS POST REVERSE TOTAL REPLACEMENT OF RIGHT SHOULDER: Primary | ICD-10-CM

## 2020-03-03 PROCEDURE — 97140 MANUAL THERAPY 1/> REGIONS: CPT

## 2020-03-03 PROCEDURE — 99024 POSTOP FOLLOW-UP VISIT: CPT | Performed by: NURSE PRACTITIONER

## 2020-03-03 PROCEDURE — 97110 THERAPEUTIC EXERCISES: CPT

## 2020-03-03 NOTE — PROGRESS NOTES
"Souleymane Stapleton is a 74 y.o. male returns for     Chief Complaint   Patient presents with   • Right Shoulder - Post-op       HISTORY OF PRESENT ILLNESS: Patient presents to office for postoperative follow up status post right reverse total shoulder arthroplasty performed per Dr. Mendoza on 2/17/2020. Patient is doing well postoperatively with no unusual complaints or concerns noted. Right shoulder pain has been gradually improving. The patient is only taking Tylenol for pain. Patient continues with his current course of physical therapy. Patient continues with use of his sling to the right arm as directed. Patient continues with all restrictions as previously instructed for no active use of the right arm. No new complaints or concerns noted. X-rays repeated today.      CONCURRENT MEDICAL HISTORY:    The following portions of the patient's history were reviewed and updated as appropriate: allergies, current medications, past family history, past medical history, past social history, past surgical history and problem list.     ROS  No fevers or chills.  No chest pain or shortness of air.  No GI or  disturbances. Right shoulder pain.     PHYSICAL EXAMINATION:       Ht 174 cm (68.5\")   Wt 76.1 kg (167 lb 11.2 oz)   BMI 25.13 kg/m²     Physical Exam    GAIT:     [x]  Normal  []  Antalgic    Assistive device: [x]  None  []  Walker     []  Crutches  []  Cane     []  Wheelchair  []  Stretcher    Right Shoulder Exam     Tenderness   Right shoulder tenderness location: Mild, diffuse.    Range of Motion   Passive abduction: 80   Forward flexion: 100 (passive)     Muscle Strength   Right shoulder normal muscle strength: deferred.    Other   Erythema: absent  Sensation: normal  Pulse: present    Comments:  Surgical incision is well-approximated with no erythema and no drainage noted. No signs of infection noted. Mild, generalized swelling. Neurovascularly intact.             Xr Shoulder 1 View Right    Result Date: " 2/17/2020  Narrative: Five-view portable right shoulder HISTORY: Postop arthroplasty Portable AP semierect film of the right shoulder obtained at 5:27 PM. COMPARISON: January 14, 2020 FINDINGS: Reverse total shoulder prosthesis now in place. Anatomic relationship of the components of prosthesis as viewed in the AP projection. Immediate postoperative soft tissue air. Surgical skin staples. No fracture or dislocation. No other osseous or articular abnormality. Postoperative changes in the sternum and mediastinum. EKG leads in place.     Impression: CONCLUSION: Immediate postop right reverse total shoulder arthroplasty [id number] Electronically signed by:  Raudel Champagne MD  2/17/2020 5:50 PM CST Workstation: [employer] Shoulder 2+ View Right    Result Date: 3/3/2020  Narrative: 3 views of the right shoulder reveal postsurgical changes post reverse total shoulder arthroplasty.  Prostheses appear well-positioned and well-aligned with no evidence of hardware failure or loosening noted.  There are subcutaneous staples noted to the anterior shoulder, otherwise soft tissues appear unremarkable.  No significant changes are noted in the implant when compared with prior images from 2/17/2020.  No acute bony radiologic abnormalities are noted at this time.03/03/20 at 11:55 AM by CARLOS De Jesus         ASSESSMENT:    Diagnoses and all orders for this visit:    Rotator cuff arthropathy, right    Status post reverse arthroplasty of right shoulder    Chronic right shoulder pain    PLAN    X-rays of right shoulder performed in office today and reviewed. The right shoulder implant appears stable and unchanged. Patient is doing well postoperatively and progressing as expected. Surgical incision is healing as expected with no signs of infection noted. Staples are removed today and steri-strips placed. Wound care instructions, including care of steri-strips. Patient is instructed to continue to monitor the incision for any  signs of infection including erythema, increased tenderness, increased swelling, warmth and/or purulent drainage. Patient is instructed to notify the office immediately if any such signs of infection are noted. Continue with current course of physical therapy and pendulums at home. Continue with use of the sling to the right arm for immobilization and protections. We discussed that he can transition out of the sling in the next 2 weeks but he should continue to wear it out in public for protection. Continue with current restrictions for no active use of the right arm for now. Patient is no longer taking pain medication. He reports his pain is well-controlled. Continue with Tylenol as needed. Follow up in 4 weeks for recheck.     Return in about 4 weeks (around 3/31/2020) for Recheck.        This document has been electronically signed by CARLOS De Jesus on March 6, 2020 9:56 AM      CARLOS De Jesus

## 2020-03-03 NOTE — THERAPY TREATMENT NOTE
Outpatient Physical Therapy Ortho Treatment Note  [facility]     Patient Name: Souleymane Stapleton  : 1945  MRN: [id number]  Today's Date: 3/3/2020      Visit Date: 2020  Attendance:   Subjective improvement: 50%  Recert: 3/13/20  MD Appointment: 3/3/20  Visit Dx:    ICD-10-CM ICD-9-CM   1. Status post reverse total replacement of right shoulder Z96.611 V43.61       Patient Active Problem List   Diagnosis   • Astigmatism   • Anxiety state   • History of cerebrovascular accident   • Nuclear senile cataract   • Hypertension   • Unspecified hemorrhoids   • Palpitations   • Pure hypercholesterolemia   • Prostate cancer (CMS/HCC)   • Chronic right shoulder pain   • Rotator cuff syndrome, left   • Chronic left shoulder pain   • Impingement syndrome, shoulder, left   • SOB (shortness of breath)   • Angina of effort (CMS/HCC)   • Coronary artery disease involving coronary bypass graft of native heart without angina pectoris   • Cervical spinal stenosis   • Displacement of cervical intervertebral disc   • Displacement of lumbar intervertebral disc without myelopathy   • Follow-up examination after neurological surgery   • Internal carotid artery dissection (CMS/HCC)   • TIA (transient ischemic attack)   • Bacterial intestinal infection, unspecified   • Body mass index (bmi) 25.0-25.9, adult   • Diverticulosis of large intestine without perforation or abscess without bleeding   • Functional dyspepsia   • History of colonic polyps   • Polyp of stomach and duodenum   • Bilateral shoulder pain   • Rotator cuff arthropathy, right        Past Medical History:   Diagnosis Date   • Abdominal pain    • Abnormal weight loss    • Acid reflux    • Acute gastritis    • Anxiety state    • Arthritis    • Cancer (CMS/HCC)     Prostate   • Coronary artery disease    • History of cerebrovascular accident    • History of colon polyps    • Hypertension    • Nausea    • Nuclear senile cataract    • Presbyopia    •  Stroke (CMS/HCC) 2005    TIA   • Tobacco use    • Transient cerebral ischemia    • Vitreous detachment of left eye         Past Surgical History:   Procedure Laterality Date   • BACK SURGERY     • CARDIAC CATHETERIZATION N/A 6/19/2018    Procedure: Coronary angiography;  Surgeon: Delroy Mcconnell MD;  Location: [facility] CATH INVASIVE LOCATION;  Service: Cardiovascular   • COLONOSCOPY  06/09/2015    Diverticulosis found in the sigmoid colon. Hemorrhoids found in the anus.   • CORONARY ARTERY BYPASS GRAFT N/A 6/22/2018    Procedure: PARAS STERNOTOMY CORONARY ARTERY BYPASS GRAFT TIMES 5 USING RIGHT AND LEFT INTERNAL MAMMARY ARTERIES AND LEFT GREATER SAPHENOUS VEIN GRAFT PER ENDOSCOPIC VEIN HARVESTING AND PRP;  Surgeon: Edgar Pérez MD;  Location: [facility] OR;  Service: Cardiothoracic   • CRYOTHERAPY  08/14/2012    Of Acne   • ENDOSCOPY  09/01/2015    EGD w/ biopsy -Multiple polyps, one removed.   • ENDOSCOPY N/A 8/22/2019    Procedure: ESOPHAGOGASTRODUODENOSCOPY;  Surgeon: Chuck Rich DO;  Location: [facility] ENDOSCOPY;  Service: Gastroenterology   • HEMORRHOIDECTOMY N/A 3/20/2017    Procedure: Removal of Anal Papilla;  Surgeon: Juan Diego Ken MD;  Location: [facility] OR;  Service:    • INJECTION OF MEDICATION  09/14/2010    B12   • INJECTION OF MEDICATION  10/17/2011    Kenalog   • LUMBAR LAMINECTOMY  09/29/2010   • OTHER SURGICAL HISTORY  08/19/2010    Biopsy, Each Additional Lesion    • SHOULDER ROTATOR CUFF REPAIR Right 02/17/2020   • SKIN BIOPSY  08/19/2010   • TOTAL SHOULDER ARTHROPLASTY W/ DISTAL CLAVICLE EXCISION Right 2/17/2020    Procedure: right reverse total shoulder arthroplasty.;  Surgeon: Juan Diego Mendoza MD;  Location: [facility] OR;  Service: Orthopedics;  Laterality: Right;   • TOTAL SHOULDER REVERSE ARTHROPLASTY Right 02/17/2020         PT Assessment/Plan     Row Name 03/03/20 0939          PT Assessment    Functional Limitations  Limitation in home management;Limitations in  "community activities;Limitations in functional capacity and performance;Performance in leisure activities;Performance in self-care ADL  -PD     Impairments  Integumentary integrity;Pain;Range of motion;Muscle strength;Dexterity  -PD     Assessment Comments  pt chon tx well especially with pain management  -PD        PT Plan    PT Frequency  2x/week;3x/week  -PD     Predicted Duration of Therapy Intervention (Therapy Eval)  12-16 weeks  -PD     PT Plan Comments  cont and progress with POC  -PD       User Key  (r) = Recorded By, (t) = Taken By, (c) = Cosigned By    Initials Name Provider Type    Won Zavala, PT Physical Therapist          Modalities     Row Name 03/03/20 0939 03/03/20 0900          Subjective Comments    Subjective Comments  pt no c/o pain  -PD  --  -PD        Subjective Pain    Able to rate subjective pain?  yes  -PD  --  -PD     Pre-Treatment Pain Level  0  -PD  --  -PD     Post-Treatment Pain Level  --  --  -PD       User Key  (r) = Recorded By, (t) = Taken By, (c) = Cosigned By    Initials Name Provider Type    PD Won Neely, PT Physical Therapist        OP Exercises     Row Name 03/03/20 0939 03/03/20 0900          Subjective Comments    Subjective Comments  pt no c/o pain  -PD  --  -PD        Subjective Pain    Able to rate subjective pain?  yes  -PD  --  -PD     Pre-Treatment Pain Level  0  -PD  --  -PD     Post-Treatment Pain Level  0  -PD  --  -PD        Exercise 1    Exercise Name 1  Shoulder Shruggs/Rolls  -PD  --     Sets 1  1  -PD  --     Reps 1  30  -PD  --        Exercise 2    Exercise Name 2  scap retraction  -PD  --     Sets 2  1  -PD  --     Reps 2  30  -PD  --        Exercise 3    Exercise Name 3  R UT S  -PD  --     Sets 3  3  -PD  --     Time 3  30\"  -PD  --        Exercise 4    Exercise Name 4  Elbow Flex/Ext  -PD  --     Sets 4  1  -PD  --     Reps 4  30  -PD  --        Exercise 5    Exercise Name 5  Putty  Squeeze  -PD  --     Time 5  5'  -PD  --        Exercise 6    " Exercise Name 6  shoulder AAROM without gravity  -PD  --     Sets 6  1  -PD  --     Reps 6  20  -PD  --        Exercise 8    Exercise Name 8  pendulum with 1#  -PD  --     Time 8  8 min  -PD  --     Additional Comments  fwrd, bckward, side to side, cicles  -PD  --       User Key  (r) = Recorded By, (t) = Taken By, (c) = Cosigned By    Initials Name Provider Type    PD Won Neely PT Physical Therapist            PT OP Goals     Row Name 03/03/20 0939          PT Short Term Goals    STG Date to Achieve  03/13/20  -PD     STG 1  Passive flexion to be >/= 120 deg.  -PD     STG 1 Progress  Ongoing  -PD     STG 2  Passive ER with arm at side to be 20 deg.  -PD     STG 2 Progress  Ongoing  -PD     STG 3  Increase R cervical lateral flexion by >/= 6 degrees  -PD     STG 3 Progress  Progressing  -PD        Long Term Goals    LTG Date to Achieve  06/12/20  -PD     LTG 1  Independent with HEP.  -PD     LTG 1 Progress  Ongoing  -PD     LTG 2  QuickDASH score to be </= 35.  -PD     LTG 2 Progress  Ongoing  -PD     LTG 3  R shoulder AROM WFLs all planes.  -PD     LTG 3 Progress  Ongoing  -PD     LTG 4  Report ability to use R UE for ADLs and light IADLs.  -PD     LTG 4 Progress  Ongoing  -PD     LTG 5  Note a >/= 50% improvement in instances of R UE radiculopathy  -PD     LTG 5 Progress  Met  -PD        Time Calculation    PT Goal Re-Cert Due Date  03/13/20  -PD       User Key  (r) = Recorded By, (t) = Taken By, (c) = Cosigned By    Initials Name Provider Type    Won Zavala, PT Physical Therapist            Time Calculation:      Therapy Charges for Today     Code Description Service Date Service Provider Modifiers Qty    92743408280 HC PT THER PROC EA 15 MIN 3/3/2020 Won Neely, PT GP 1    86418504551 HC PT MANUAL THERAPY EA 15 MIN 3/3/2020 Won Neely, PT GP 1            Won Neely PT  3/3/2020

## 2020-03-04 ENCOUNTER — READMISSION MANAGEMENT (OUTPATIENT)
Dept: CALL CENTER | Facility: HOSPITAL | Age: 75
End: 2020-03-04

## 2020-03-04 NOTE — OUTREACH NOTE
Total Joint Week 2 Survey      Responses   [facility] patient discharged from?  [place]   Does the patient have one of the following disease processes/diagnoses(primary or secondary)?  Total Joint Replacement   Joint surgery performed?  Shoulder   Week 2 attempt successful?  Yes   Call start time  1449   Call end time  1451   Has the patient been back in either the hospital or Emergency Department since discharge?  No   General alerts for this patient  Surgical tech   Discharge diagnosis  Rotator cuff arthropathy right, s/p total shoulder reverse arthroplasty   Does the patient have all medications related to this admission filled (includes all antibiotics, pain medications, etc.)  Yes   Is the patient taking all medications as directed (includes completed medication regime)?  Yes   Is the patient able to teach back alternate methods of pain control?  Ice, Shoulder-elevate above heart/ keep in sling as advised, Reposition, Correct alignment, Short, frequent activity   Does the patient have a follow up appointment with their surgeon?  Yes   Has the patient kept scheduled appointments due by today?  Yes   Has home health visited the patient within 72 hours of discharge?  N/A   Psychosocial issues?  No   Has the patient began therapy sessions (either in the home or as an out patient)?  Yes   Does the patient have a wound vac in place?  N/A   Has the patient fallen since discharge?  No   Did the patient receive a copy of their discharge instructions?  Yes   Nursing interventions  Reviewed instructions with patient   What is the patient's perception of their functional status since discharge?  Improving   Is the patient able to teach back signs and symptoms of infection?  Incisional drainage, Temp >100.4 for 24h or longer, Blisters around incision, Increased swelling or redness around incision (not associated with surgical edema), Severe discomfort or pain, Changes in mobility, Shortness of breath or chest  pain   Is the patient able to teach back how to prevent infection?  Check incision daily, Wash hands before and after touching incision, Keep incision covered if drainage, Shower only as directed by surgeon, No tub baths, hot tub or swimming, No lotion or creams   Is the patient able to teach back signs and symptoms of DVT?  Redness in calf, Area hot to touch, Shortness of breath or chest pain, Swelling in calf, Severe pain in calf   Is the patient able to teach back home safety measures?  Ability to shower, Accessibility to necessary areas in home, Modifications to reach items, Modifications with ADLs such as dressing, cooking, toileting   Did the patient implement home safety suggestions from pre-surgery classes if attended?  N/A   Is the patient/caregiver able to teach back the hierarchy of who to call/visit for symptoms/problems? PCP, Specialist, Home health nurse, Urgent Care, ED, 911  Yes   Week 2 call completed?  Yes          Jin Carter RN

## 2020-03-06 ENCOUNTER — HOSPITAL ENCOUNTER (OUTPATIENT)
Dept: PHYSICAL THERAPY | Facility: HOSPITAL | Age: 75
Setting detail: THERAPIES SERIES
Discharge: HOME | End: 2020-03-06

## 2020-03-06 DIAGNOSIS — Z96.611 STATUS POST REVERSE TOTAL REPLACEMENT OF RIGHT SHOULDER: Primary | ICD-10-CM

## 2020-03-06 PROCEDURE — 97110 THERAPEUTIC EXERCISES: CPT

## 2020-03-06 NOTE — THERAPY TREATMENT NOTE
Outpatient Physical Therapy Ortho Treatment Note  [facility]     Patient Name: Souleymane Stapleton  : 1945  MRN: [id number]  Today's Date: 3/6/2020      Visit Date: 2020   Attendance:   Subjective improvement:50%  Recert: 3/13/20  MD Appointment: 3/3/20      Visit Dx:    ICD-10-CM ICD-9-CM   1. Status post reverse total replacement of right shoulder Z96.611 V43.61       Patient Active Problem List   Diagnosis   • Astigmatism   • Anxiety state   • History of cerebrovascular accident   • Nuclear senile cataract   • Hypertension   • Unspecified hemorrhoids   • Palpitations   • Pure hypercholesterolemia   • Prostate cancer (CMS/HCC)   • Chronic right shoulder pain   • Rotator cuff syndrome, left   • Chronic left shoulder pain   • Impingement syndrome, shoulder, left   • SOB (shortness of breath)   • Angina of effort (CMS/HCC)   • Coronary artery disease involving coronary bypass graft of native heart without angina pectoris   • Cervical spinal stenosis   • Displacement of cervical intervertebral disc   • Displacement of lumbar intervertebral disc without myelopathy   • Follow-up examination after neurological surgery   • Internal carotid artery dissection (CMS/HCC)   • TIA (transient ischemic attack)   • Bacterial intestinal infection, unspecified   • Body mass index (bmi) 25.0-25.9, adult   • Diverticulosis of large intestine without perforation or abscess without bleeding   • Functional dyspepsia   • History of colonic polyps   • Polyp of stomach and duodenum   • Bilateral shoulder pain   • Rotator cuff arthropathy, right   • Status post reverse arthroplasty of right shoulder        Past Medical History:   Diagnosis Date   • Abdominal pain    • Abnormal weight loss    • Acid reflux    • Acute gastritis    • Anxiety state    • Arthritis    • Cancer (CMS/HCC)     Prostate   • Coronary artery disease    • History of cerebrovascular accident    • History of colon polyps    • Hypertension    •  Nausea    • Nuclear senile cataract    • Presbyopia    • Stroke (CMS/HCC) 2005    TIA   • Tobacco use    • Transient cerebral ischemia    • Vitreous detachment of left eye         Past Surgical History:   Procedure Laterality Date   • BACK SURGERY     • CARDIAC CATHETERIZATION N/A 6/19/2018    Procedure: Coronary angiography;  Surgeon: Delroy Mcconnell MD;  Location: [facility] CATH INVASIVE LOCATION;  Service: Cardiovascular   • COLONOSCOPY  06/09/2015    Diverticulosis found in the sigmoid colon. Hemorrhoids found in the anus.   • CORONARY ARTERY BYPASS GRAFT N/A 6/22/2018    Procedure: PARAS STERNOTOMY CORONARY ARTERY BYPASS GRAFT TIMES 5 USING RIGHT AND LEFT INTERNAL MAMMARY ARTERIES AND LEFT GREATER SAPHENOUS VEIN GRAFT PER ENDOSCOPIC VEIN HARVESTING AND PRP;  Surgeon: Edgar Pérez MD;  Location: [facility] OR;  Service: Cardiothoracic   • CRYOTHERAPY  08/14/2012    Of Acne   • ENDOSCOPY  09/01/2015    EGD w/ biopsy -Multiple polyps, one removed.   • ENDOSCOPY N/A 8/22/2019    Procedure: ESOPHAGOGASTRODUODENOSCOPY;  Surgeon: Chuck Rich DO;  Location: [facility] ENDOSCOPY;  Service: Gastroenterology   • HEMORRHOIDECTOMY N/A 3/20/2017    Procedure: Removal of Anal Papilla;  Surgeon: Juan Diego Ken MD;  Location: [facility] OR;  Service:    • INJECTION OF MEDICATION  09/14/2010    B12   • INJECTION OF MEDICATION  10/17/2011    Kenalog   • LUMBAR LAMINECTOMY  09/29/2010   • OTHER SURGICAL HISTORY  08/19/2010    Biopsy, Each Additional Lesion    • SHOULDER ROTATOR CUFF REPAIR Right 02/17/2020   • SKIN BIOPSY  08/19/2010   • TOTAL SHOULDER ARTHROPLASTY W/ DISTAL CLAVICLE EXCISION Right 2/17/2020    Procedure: right reverse total shoulder arthroplasty.;  Surgeon: Juan Diego Mendoza MD;  Location: [facility] OR;  Service: Orthopedics;  Laterality: Right;   • TOTAL SHOULDER REVERSE ARTHROPLASTY Right 02/17/2020       PT Ortho     Row Name 03/06/20 0800       Precautions and Contraindications     Precautions  R reverse total shoulder arthroplasty 2/17/2020; prostate cancer  -EM       Subjective Pain    Able to rate subjective pain?  yes  -EM    Pre-Treatment Pain Level  0  -EM    Post-Treatment Pain Level  4  -EM       Posture/Observations    Posture/Observations Comments  Pt present wearing sling.  Incision good, steristrips intact.   -EM      User Key  (r) = Recorded By, (t) = Taken By, (c) = Cosigned By    Initials Name Provider Type    EM Jeremiah Champagne PTA Physical Therapy Assistant                      PT Assessment/Plan     Row Name 03/06/20 0900          PT Assessment    Functional Limitations  Limitation in home management;Limitations in community activities;Limitations in functional capacity and performance;Performance in leisure activities;Performance in self-care ADL  -EM     Impairments  Integumentary integrity;Pain;Range of motion;Muscle strength;Dexterity  -EM     Assessment Comments  Pt chon tx well with addition of new therex added to progra,. Pt is compliant with HEP and demo good undestanding of updated HEP.   -EM     Rehab Potential  Good  -EM     Patient/caregiver participated in establishment of treatment plan and goals  Yes  -EM     Patient would benefit from skilled therapy intervention  Yes  -EM        PT Plan    PT Frequency  2x/week;3x/week  -EM     Predicted Duration of Therapy Intervention (Therapy Eval)  12-16 wks  -EM     PT Plan Comments  Cont current POC,  progress as chon per protocol.   -EM       User Key  (r) = Recorded By, (t) = Taken By, (c) = Cosigned By    Initials Name Provider Type    EM Jeremiah Champagne PTA Physical Therapy Assistant          Modalities     Row Name 03/06/20 0800             Ice    Ice Applied  Yes  -EM      Location  R shoulder  -EM      Rx Minutes  15 mins  -EM        User Key  (r) = Recorded By, (t) = Taken By, (c) = Cosigned By    Initials Name Provider Type    EM Jeremiah Champagne PTA Physical Therapy Assistant        OP Exercises     Row Name  "03/06/20 0800             Subjective Comments    Subjective Comments  \"Im doing better with each day.\" Pt states the MD appt went well, staples removed.   -EM         Subjective Pain    Able to rate subjective pain?  yes  -EM      Pre-Treatment Pain Level  0  -EM      Post-Treatment Pain Level  4  -EM         Exercise 1    Exercise Name 1  Shoulder Shruggs/Rolls  -EM      Sets 1  1  -EM      Reps 1  30  -EM         Exercise 2    Exercise Name 2  scap retraction  -EM      Sets 2  1  -EM      Reps 2  30  -EM         Exercise 3    Exercise Name 3  R UT S  -EM      Sets 3  3  -EM      Time 3  30\"  -EM         Exercise 4    Exercise Name 4  Shoulder Rolls:CW, CCW  -EM      Sets 4  1  -EM      Reps 4  30  -EM         Exercise 5    Exercise Name 5  3 Way Elbow Curl: Pronated, Neutral, Supinated  -EM      Sets 5  1  -EM      Reps 5  30  -EM         Exercise 6    Exercise Name 6  Shoulder Isometrics: Flex, Abd, ER, Ext  -EM      Sets 6  1  -EM      Reps 6  30  -EM      Time 6  5\" Hold  -EM         Exercise 7    Exercise Name 7  PROM  -EM      Time 7  8'  -EM         Exercise 8    Exercise Name 8  Ice  -EM      Time 8  15'  -EM        User Key  (r) = Recorded By, (t) = Taken By, (c) = Cosigned By    Initials Name Provider Type    EM Jeremiah Champagne PTA Physical Therapy Assistant                      Manual Rx (last 36 hours)      Manual Treatments     Row Name 03/06/20 0700             Manual Rx 1    Manual Rx 1 Location  R shoulder  -EM      Manual Rx 1 Type  PROM: Flex, Abd, IR, ER  -EM      Manual Rx 1 Duration  8'  -EM        User Key  (r) = Recorded By, (t) = Taken By, (c) = Cosigned By    Initials Name Provider Type    EM Jeremiah Champagne, MAGALY Physical Therapy Assistant          PT OP Goals     Row Name 03/06/20 0900          PT Short Term Goals    STG Date to Achieve  03/13/20  -EM     STG 1  Passive flexion to be >/= 120 deg.  -EM     STG 1 Progress  Ongoing  -EM     STG 2  Passive ER with arm at side to be 20 deg.  -EM "     STG 2 Progress  Ongoing  -EM     STG 3  Increase R cervical lateral flexion by >/= 6 degrees  -EM     STG 3 Progress  Progressing  -EM        Long Term Goals    LTG Date to Achieve  06/12/20  -EM     LTG 1  Independent with HEP.  -EM     LTG 1 Progress  Ongoing  -EM     LTG 2  QuickDASH score to be </= 35.  -EM     LTG 2 Progress  Ongoing  -EM     LTG 3  R shoulder AROM WFLs all planes.  -EM     LTG 3 Progress  Ongoing  -EM     LTG 4  Report ability to use R UE for ADLs and light IADLs.  -EM     LTG 4 Progress  Ongoing  -EM     LTG 5  Note a >/= 50% improvement in instances of R UE radiculopathy  -EM     LTG 5 Progress  (S) Met  -EM        Time Calculation    PT Goal Re-Cert Due Date  03/13/20  -EM       User Key  (r) = Recorded By, (t) = Taken By, (c) = Cosigned By    Initials Name Provider Type    EM Jeremiah Champagne PTA Physical Therapy Assistant          Therapy Education  Education Details: Updated HEP Issued: UT S, LS S, Pendulums, IsometricsL flex, abd, ER, Ext  Given: HEP  Program: Progressed  How Provided: Verbal, Demonstration, Written  Provided to: Patient  Level of Understanding: Verbalized, Demonstrated              Time Calculation:   Start Time: 0803  Stop Time: 0911  Time Calculation (min): 68 min  PT Non-Billable Time (min): 15 min  Total Timed Code Minutes- PT: 53 minute(s)  Therapy Charges for Today     Code Description Service Date Service Provider Modifiers Qty    73942848920 HC PT THER PROC EA 15 MIN 3/6/2020 Jeremiah Champagne, MAGALY GP 4    60101978064 HC PT THER SUPP EA 15 MIN 3/6/2020 Jeremiah Champagne PTA GP 1                    Jeremiah Champagne PTA  3/6/2020

## 2020-03-10 ENCOUNTER — HOSPITAL ENCOUNTER (OUTPATIENT)
Dept: PHYSICAL THERAPY | Facility: HOSPITAL | Age: 75
Setting detail: THERAPIES SERIES
Discharge: HOME | End: 2020-03-10

## 2020-03-10 DIAGNOSIS — Z96.611 STATUS POST REVERSE TOTAL REPLACEMENT OF RIGHT SHOULDER: Primary | ICD-10-CM

## 2020-03-10 PROCEDURE — 97110 THERAPEUTIC EXERCISES: CPT

## 2020-03-10 PROCEDURE — G0283 ELEC STIM OTHER THAN WOUND: HCPCS

## 2020-03-10 NOTE — THERAPY TREATMENT NOTE
Outpatient Physical Therapy Ortho Treatment Note  [facility]     Patient Name: Souleymane Stapleton  : 1945  MRN: [id number]  Today's Date: 3/10/2020      Visit Date: 03/10/2020     Subjective Improvement not assessed this date  Vitis   Visits approved medicare  RTMD ?  recert Date 3-    S/P Right RTS on 2020  Post op 3 weeks 1 day    Visit Dx:    ICD-10-CM ICD-9-CM   1. Status post reverse total replacement of right shoulder Z96.611 V43.61       Patient Active Problem List   Diagnosis   • Astigmatism   • Anxiety state   • History of cerebrovascular accident   • Nuclear senile cataract   • Hypertension   • Unspecified hemorrhoids   • Palpitations   • Pure hypercholesterolemia   • Prostate cancer (CMS/HCC)   • Chronic right shoulder pain   • Rotator cuff syndrome, left   • Chronic left shoulder pain   • Impingement syndrome, shoulder, left   • SOB (shortness of breath)   • Angina of effort (CMS/HCC)   • Coronary artery disease involving coronary bypass graft of native heart without angina pectoris   • Cervical spinal stenosis   • Displacement of cervical intervertebral disc   • Displacement of lumbar intervertebral disc without myelopathy   • Follow-up examination after neurological surgery   • Internal carotid artery dissection (CMS/HCC)   • TIA (transient ischemic attack)   • Bacterial intestinal infection, unspecified   • Body mass index (bmi) 25.0-25.9, adult   • Diverticulosis of large intestine without perforation or abscess without bleeding   • Functional dyspepsia   • History of colonic polyps   • Polyp of stomach and duodenum   • Bilateral shoulder pain   • Rotator cuff arthropathy, right   • Status post reverse arthroplasty of right shoulder        Past Medical History:   Diagnosis Date   • Abdominal pain    • Abnormal weight loss    • Acid reflux    • Acute gastritis    • Anxiety state    • Arthritis    • Cancer (CMS/HCC)     Prostate   • Coronary artery disease    • History  of cerebrovascular accident    • History of colon polyps    • Hypertension    • Nausea    • Nuclear senile cataract    • Presbyopia    • Stroke (CMS/HCC) 2005    TIA   • Tobacco use    • Transient cerebral ischemia    • Vitreous detachment of left eye         Past Surgical History:   Procedure Laterality Date   • BACK SURGERY     • CARDIAC CATHETERIZATION N/A 6/19/2018    Procedure: Coronary angiography;  Surgeon: Delroy Mcconnell MD;  Location: [facility] CATH INVASIVE LOCATION;  Service: Cardiovascular   • COLONOSCOPY  06/09/2015    Diverticulosis found in the sigmoid colon. Hemorrhoids found in the anus.   • CORONARY ARTERY BYPASS GRAFT N/A 6/22/2018    Procedure: PARAS STERNOTOMY CORONARY ARTERY BYPASS GRAFT TIMES 5 USING RIGHT AND LEFT INTERNAL MAMMARY ARTERIES AND LEFT GREATER SAPHENOUS VEIN GRAFT PER ENDOSCOPIC VEIN HARVESTING AND PRP;  Surgeon: Edgar Pérez MD;  Location: [facility] OR;  Service: Cardiothoracic   • CRYOTHERAPY  08/14/2012    Of Acne   • ENDOSCOPY  09/01/2015    EGD w/ biopsy -Multiple polyps, one removed.   • ENDOSCOPY N/A 8/22/2019    Procedure: ESOPHAGOGASTRODUODENOSCOPY;  Surgeon: Chuck Rich DO;  Location: [facility] ENDOSCOPY;  Service: Gastroenterology   • HEMORRHOIDECTOMY N/A 3/20/2017    Procedure: Removal of Anal Papilla;  Surgeon: Juan Diego Ken MD;  Location: [facility] OR;  Service:    • INJECTION OF MEDICATION  09/14/2010    B12   • INJECTION OF MEDICATION  10/17/2011    Kenalog   • LUMBAR LAMINECTOMY  09/29/2010   • OTHER SURGICAL HISTORY  08/19/2010    Biopsy, Each Additional Lesion    • SHOULDER ROTATOR CUFF REPAIR Right 02/17/2020   • SKIN BIOPSY  08/19/2010   • TOTAL SHOULDER ARTHROPLASTY W/ DISTAL CLAVICLE EXCISION Right 2/17/2020    Procedure: right reverse total shoulder arthroplasty.;  Surgeon: Juan Diego Mendoza MD;  Location: [facility] OR;  Service: Orthopedics;  Laterality: Right;   • TOTAL SHOULDER REVERSE ARTHROPLASTY Right 02/17/2020       PT Ortho      Row Name 03/10/20 0800       Subjective Comments    Subjective Comments  Patient states that he is not wearing his sling when inside the home but he does wear it when out in public.  He does report some increase pain and stiffness in right shoulder  -CP       Precautions and Contraindications    Precautions/Limitations  shoulder precautions  -CP    Precautions  R RTS 2-  -CP    Contraindications  Post op 3 wks 1 day  -CP       Subjective Pain    Able to rate subjective pain?  yes  -CP    Pre-Treatment Pain Level  2  -CP       Posture/Observations    Posture/Observations Comments  wearing shoulder sling  -CP      User Key  (r) = Recorded By, (t) = Taken By, (c) = Cosigned By    Initials Name Provider Type    Nila Medrano, MAGALY Physical Therapy Assistant                      PT Assessment/Plan     Row Name 03/10/20 0845          PT Assessment    Assessment Comments  Shoulder precaution and HEP was reviewed with patient today.  He appears to understand shoulder precautions.  -CP        PT Plan    PT Frequency  2x/week;3x/week  -CP     Predicted Duration of Therapy Intervention (Therapy Eval)  12-16 weeks  -CP     PT Plan Comments  Cont with POC.  Take PROM measurements next visit  -CP       User Key  (r) = Recorded By, (t) = Taken By, (c) = Cosigned By    Initials Name Provider Type    CP Nila Sharif, MAGALY Physical Therapy Assistant          Modalities     Row Name 03/10/20 0800             Ice    Ice Applied  Yes  -CP      Location  right shoulder  -CP      Rx Minutes  15 mins  -CP      Ice S/P Rx  Yes  -CP         ELECTRICAL STIMULATION    Attended/Unattended  Unattended  -CP      Stimulation Type  IFC  -CP      Location/Electrode Placement/Other  right shoulder  -CP       PT E-Stim Unattended (Manual) Minutes  15  -CP        User Key  (r) = Recorded By, (t) = Taken By, (c) = Cosigned By    Initials Name Provider Type    Nila Medrano, MAGALY Physical Therapy Assistant        OP  "Exercises     Row Name 03/10/20 0800             Subjective Comments    Subjective Comments  Patient states that he is not wearing his sling when inside the home but he does wear it when out in public.  He does report some increase pain and stiffness in right shoulder  -CP         Subjective Pain    Able to rate subjective pain?  yes  -CP      Pre-Treatment Pain Level  2  -CP      Post-Treatment Pain Level  0  -CP         Exercise 1    Exercise Name 1  PROM  -CP      Time 1  20  -CP      Additional Comments  IR/ER to neutral  -CP         Exercise 2    Exercise Name 2  review shoulder precaution and protocal  -CP         Exercise 3    Exercise Name 3  isometric shoulder fl, ab, er  -CP      Cueing 3  Verbal;Demo  -CP      Sets 3  1  -CP      Reps 3  10  -CP      Time 3  5\" holds  -CP        User Key  (r) = Recorded By, (t) = Taken By, (c) = Cosigned By    Initials Name Provider Type    Nila Medrano, PTA Physical Therapy Assistant                       PT OP Goals     Row Name 03/10/20 0800          PT Short Term Goals    STG Date to Achieve  03/13/20  -CP     STG 1  Passive flexion to be >/= 120 deg.  -CP     STG 1 Progress  Ongoing  -CP     STG 2  Passive ER with arm at side to be 20 deg.  -CP     STG 2 Progress  Ongoing  -CP     STG 3  Increase R cervical lateral flexion by >/= 6 degrees  -CP     STG 3 Progress  Progressing  -CP        Long Term Goals    LTG Date to Achieve  06/12/20  -CP     LTG 1  Independent with HEP.  -CP     LTG 1 Progress  Ongoing  -CP     LTG 2  QuickDASH score to be </= 35.  -CP     LTG 2 Progress  Ongoing  -CP     LTG 3  R shoulder AROM WFLs all planes.  -CP     LTG 3 Progress  Ongoing  -CP     LTG 4  Report ability to use R UE for ADLs and light IADLs.  -CP     LTG 4 Progress  Ongoing  -CP     LTG 5  Note a >/= 50% improvement in instances of R UE radiculopathy  -CP     LTG 5 Progress  Met  -CP        Time Calculation    PT Goal Re-Cert Due Date  03/13/20  -CP       User Key  (r) " = Recorded By, (t) = Taken By, (c) = Cosigned By    Initials Name Provider Type    CP Nila Sharif PTA Physical Therapy Assistant          Therapy Education  Education Details: shoulder isometric fl ab and er  Given: HEP  Program: New  How Provided: Verbal, Demonstration  Provided to: Patient  Level of Understanding: Teach back education performed, Verbalized, Demonstrated              Time Calculation:   Start Time: 0800  Stop Time: 0901  Time Calculation (min): 61 min  Total Timed Code Minutes- PT: 33 minute(s)  Therapy Charges for Today     Code Description Service Date Service Provider Modifiers Qty    37382717429 HC PT ELECTRICAL STIM UNATTENDED 3/10/2020 Nila Sharif PTA  1    24979053608 HC PT THER PROC EA 15 MIN 3/10/2020 Nila Sharif PTA GP 2                    Nila Sharif PTA  3/10/2020

## 2020-03-13 ENCOUNTER — HOSPITAL ENCOUNTER (OUTPATIENT)
Dept: PHYSICAL THERAPY | Facility: HOSPITAL | Age: 75
Setting detail: THERAPIES SERIES
Discharge: HOME | End: 2020-03-13

## 2020-03-13 DIAGNOSIS — Z96.611 STATUS POST REVERSE TOTAL REPLACEMENT OF RIGHT SHOULDER: Primary | ICD-10-CM

## 2020-03-13 DIAGNOSIS — M12.811 ROTATOR CUFF ARTHROPATHY, RIGHT: ICD-10-CM

## 2020-03-13 PROCEDURE — 97110 THERAPEUTIC EXERCISES: CPT

## 2020-03-13 PROCEDURE — G0283 ELEC STIM OTHER THAN WOUND: HCPCS

## 2020-03-13 NOTE — THERAPY TREATMENT NOTE
Outpatient Physical Therapy Ortho Treatment Note  [facility]     Patient Name: Souleymane Stapleton  : 1945  MRN: [id number]  Today's Date: 3/13/2020      Visit Date: 2020     Subjective Improvement not assessed  Visits   Visits approved medicare  RTMD 3-  Recert Date 3-    S/P Right RTS on 2020  Post op 3 weeks 4 days    Visit Dx:    ICD-10-CM ICD-9-CM   1. Status post reverse total replacement of right shoulder Z96.611 V43.61   2. Rotator cuff arthropathy, right M12.811 716.81       Patient Active Problem List   Diagnosis   • Astigmatism   • Anxiety state   • History of cerebrovascular accident   • Nuclear senile cataract   • Hypertension   • Unspecified hemorrhoids   • Palpitations   • Pure hypercholesterolemia   • Prostate cancer (CMS/HCC)   • Chronic right shoulder pain   • Rotator cuff syndrome, left   • Chronic left shoulder pain   • Impingement syndrome, shoulder, left   • SOB (shortness of breath)   • Angina of effort (CMS/HCC)   • Coronary artery disease involving coronary bypass graft of native heart without angina pectoris   • Cervical spinal stenosis   • Displacement of cervical intervertebral disc   • Displacement of lumbar intervertebral disc without myelopathy   • Follow-up examination after neurological surgery   • Internal carotid artery dissection (CMS/HCC)   • TIA (transient ischemic attack)   • Bacterial intestinal infection, unspecified   • Body mass index (bmi) 25.0-25.9, adult   • Diverticulosis of large intestine without perforation or abscess without bleeding   • Functional dyspepsia   • History of colonic polyps   • Polyp of stomach and duodenum   • Bilateral shoulder pain   • Rotator cuff arthropathy, right   • Status post reverse arthroplasty of right shoulder        Past Medical History:   Diagnosis Date   • Abdominal pain    • Abnormal weight loss    • Acid reflux    • Acute gastritis    • Anxiety state    • Arthritis    • Cancer (CMS/HCC)      Prostate   • Coronary artery disease    • History of cerebrovascular accident    • History of colon polyps    • Hypertension    • Nausea    • Nuclear senile cataract    • Presbyopia    • Stroke (CMS/HCC) 2005    TIA   • Tobacco use    • Transient cerebral ischemia    • Vitreous detachment of left eye         Past Surgical History:   Procedure Laterality Date   • BACK SURGERY     • CARDIAC CATHETERIZATION N/A 6/19/2018    Procedure: Coronary angiography;  Surgeon: Delroy Mcconnell MD;  Location: [facility] CATH INVASIVE LOCATION;  Service: Cardiovascular   • COLONOSCOPY  06/09/2015    Diverticulosis found in the sigmoid colon. Hemorrhoids found in the anus.   • CORONARY ARTERY BYPASS GRAFT N/A 6/22/2018    Procedure: PARAS STERNOTOMY CORONARY ARTERY BYPASS GRAFT TIMES 5 USING RIGHT AND LEFT INTERNAL MAMMARY ARTERIES AND LEFT GREATER SAPHENOUS VEIN GRAFT PER ENDOSCOPIC VEIN HARVESTING AND PRP;  Surgeon: Edgar Pérez MD;  Location: [facility] OR;  Service: Cardiothoracic   • CRYOTHERAPY  08/14/2012    Of Acne   • ENDOSCOPY  09/01/2015    EGD w/ biopsy -Multiple polyps, one removed.   • ENDOSCOPY N/A 8/22/2019    Procedure: ESOPHAGOGASTRODUODENOSCOPY;  Surgeon: Chuck Rich DO;  Location: [facility] ENDOSCOPY;  Service: Gastroenterology   • HEMORRHOIDECTOMY N/A 3/20/2017    Procedure: Removal of Anal Papilla;  Surgeon: Juan Diego Ken MD;  Location: [facility] OR;  Service:    • INJECTION OF MEDICATION  09/14/2010    B12   • INJECTION OF MEDICATION  10/17/2011    Kenalog   • LUMBAR LAMINECTOMY  09/29/2010   • OTHER SURGICAL HISTORY  08/19/2010    Biopsy, Each Additional Lesion    • SHOULDER ROTATOR CUFF REPAIR Right 02/17/2020   • SKIN BIOPSY  08/19/2010   • TOTAL SHOULDER ARTHROPLASTY W/ DISTAL CLAVICLE EXCISION Right 2/17/2020    Procedure: right reverse total shoulder arthroplasty.;  Surgeon: Juan Diego Mendoza MD;  Location: [facility] OR;  Service: Orthopedics;  Laterality: Right;   • TOTAL SHOULDER  REVERSE ARTHROPLASTY Right 02/17/2020       PT Ortho     Row Name 03/13/20 0800       Precautions and Contraindications    Precautions  R RTS 2-  -CP    Contraindications  Post op 3 wks 4 days  -CP       Posture/Observations    Posture/Observations Comments  wearing shoulder sling  -CP    Row Name 03/13/20 0700       Right Upper Ext    Rt Shoulder Abduction PROM  144  -CP    Rt Shoulder Flexion PROM  141  -CP    Rt Shoulder External Rotation PROM  neutral  -CP    Rt Shoulder Internal Rotation PROM  neutral  -CP      User Key  (r) = Recorded By, (t) = Taken By, (c) = Cosigned By    Initials Name Provider Type    CP Nila Sharif, MAGALY Physical Therapy Assistant                      PT Assessment/Plan     Row Name 03/13/20 0844          PT Assessment    Assessment Comments  Patient is compliant with HEP.  He was instructed that it would be ok for him to come to fitness on his own and use LE equipment.  Increase PROM.  -CP        PT Plan    PT Frequency  2x/week;3x/week  -CP     Predicted Duration of Therapy Intervention (Therapy Eval)  12-16 weeks  -CP     PT Plan Comments  Cont with POC.  Begin 4 week post op protocal  -CP       User Key  (r) = Recorded By, (t) = Taken By, (c) = Cosigned By    Initials Name Provider Type    Nila Medrano PTA Physical Therapy Assistant          Modalities     Row Name 03/13/20 0700             Ice    Ice Applied  Yes  -CP      Location  right shoulder  -CP      Rx Minutes  15 mins  -CP      Ice S/P Rx  Yes  -CP         ELECTRICAL STIMULATION    Attended/Unattended  Unattended  -CP      Stimulation Type  IFC  -CP      Location/Electrode Placement/Other  right shoulder  -CP       PT E-Stim Unattended (Manual) Minutes  15  -CP        User Key  (r) = Recorded By, (t) = Taken By, (c) = Cosigned By    Initials Name Provider Type    Nila Medrano PTA Physical Therapy Assistant        OP Exercises     Row Name 03/13/20 0700             Subjective Comments     Subjective Comments  Patient reports no pain at rest.  He is going without the sling while inside his home.  He does have pain after HEP but ices and takes over the counter.  -CP         Subjective Pain    Able to rate subjective pain?  yes  -CP      Pre-Treatment Pain Level  0  -CP      Post-Treatment Pain Level  0  -CP         Exercise 1    Exercise Name 1  Review patients HEP   -CP         Exercise 2    Exercise Name 2  A/AAROM elbow fl and ext supine.  -CP      Cueing 2  Verbal;Tactile  -CP      Reps 2  20  -CP         Exercise 3    Exercise Name 3  PROM  -CP      Time 3  25  -CP      Additional Comments  IR/ER to neutral  -CP        User Key  (r) = Recorded By, (t) = Taken By, (c) = Cosigned By    Initials Name Provider Type    Nila Medrano, PTA Physical Therapy Assistant                       PT OP Goals     Row Name 03/13/20 0800          PT Short Term Goals    STG Date to Achieve  03/13/20  -CP     STG 1  Passive flexion to be >/= 120 deg.  -CP     STG 1 Progress  Ongoing  -CP     STG 2  Passive ER with arm at side to be 20 deg.  -CP     STG 2 Progress  Ongoing  -CP     STG 3  Increase R cervical lateral flexion by >/= 6 degrees  -CP     STG 3 Progress  Progressing  -CP        Long Term Goals    LTG Date to Achieve  06/12/20  -CP     LTG 1  Independent with HEP.  -CP     LTG 1 Progress  Ongoing  -CP     LTG 2  QuickDASH score to be </= 35.  -CP     LTG 2 Progress  Ongoing  -CP     LTG 3  R shoulder AROM WFLs all planes.  -CP     LTG 3 Progress  Ongoing  -CP     LTG 4  Report ability to use R UE for ADLs and light IADLs.  -CP     LTG 4 Progress  Ongoing  -CP     LTG 5  Note a >/= 50% improvement in instances of R UE radiculopathy  -CP     LTG 5 Progress  Met  -CP        Time Calculation    PT Goal Re-Cert Due Date  03/13/20  -CP       User Key  (r) = Recorded By, (t) = Taken By, (c) = Cosigned By    Initials Name Provider Type    Nila Medrano, MAGALY Physical Therapy Assistant                          Time Calculation:   Start Time: 0759  Stop Time: 0900  Time Calculation (min): 61 min  Total Timed Code Minutes- PT: 33 minute(s)  Therapy Charges for Today     Code Description Service Date Service Provider Modifiers Qty    86666098710 HC PT ELECTRICAL STIM UNATTENDED 3/13/2020 Nila Sharif, PTA  1    79345472594 HC PT THER PROC EA 15 MIN 3/13/2020 Nila Sharif, PTA GP 2                    Nila Sharif PTA  3/13/2020

## 2020-03-17 ENCOUNTER — HOSPITAL ENCOUNTER (OUTPATIENT)
Dept: PHYSICAL THERAPY | Facility: HOSPITAL | Age: 75
Setting detail: THERAPIES SERIES
Discharge: HOME | End: 2020-03-17

## 2020-03-17 DIAGNOSIS — Z96.611 STATUS POST REVERSE TOTAL REPLACEMENT OF RIGHT SHOULDER: Primary | ICD-10-CM

## 2020-03-17 DIAGNOSIS — M25.511 BILATERAL SHOULDER PAIN, UNSPECIFIED CHRONICITY: ICD-10-CM

## 2020-03-17 DIAGNOSIS — M25.512 BILATERAL SHOULDER PAIN, UNSPECIFIED CHRONICITY: ICD-10-CM

## 2020-03-17 DIAGNOSIS — M12.811 ROTATOR CUFF ARTHROPATHY, RIGHT: ICD-10-CM

## 2020-03-17 PROCEDURE — G0283 ELEC STIM OTHER THAN WOUND: HCPCS

## 2020-03-17 PROCEDURE — 97110 THERAPEUTIC EXERCISES: CPT

## 2020-03-17 NOTE — THERAPY TREATMENT NOTE
Outpatient Physical Therapy Ortho Treatment Note  [facility]     Patient Name: Souleymane Stapleotn  : 1945  MRN: [id number]  Today's Date: 3/17/2020      Visit Date: 2020     Subjective Improvement not sure   Visits 8  Visits approved medicare  RTMD 3-  Recert Date 3-    S/P Right RTS on 2020    Visit Dx:    ICD-10-CM ICD-9-CM   1. Status post reverse total replacement of right shoulder Z96.611 V43.61   2. Rotator cuff arthropathy, right M12.811 716.81   3. Bilateral shoulder pain, unspecified chronicity M25.511 719.41    M25.512        Patient Active Problem List   Diagnosis   • Astigmatism   • Anxiety state   • History of cerebrovascular accident   • Nuclear senile cataract   • Hypertension   • Unspecified hemorrhoids   • Palpitations   • Pure hypercholesterolemia   • Prostate cancer (CMS/HCC)   • Chronic right shoulder pain   • Rotator cuff syndrome, left   • Chronic left shoulder pain   • Impingement syndrome, shoulder, left   • SOB (shortness of breath)   • Angina of effort (CMS/HCC)   • Coronary artery disease involving coronary bypass graft of native heart without angina pectoris   • Cervical spinal stenosis   • Displacement of cervical intervertebral disc   • Displacement of lumbar intervertebral disc without myelopathy   • Follow-up examination after neurological surgery   • Internal carotid artery dissection (CMS/HCC)   • TIA (transient ischemic attack)   • Bacterial intestinal infection, unspecified   • Body mass index (bmi) 25.0-25.9, adult   • Diverticulosis of large intestine without perforation or abscess without bleeding   • Functional dyspepsia   • History of colonic polyps   • Polyp of stomach and duodenum   • Bilateral shoulder pain   • Rotator cuff arthropathy, right   • Status post reverse arthroplasty of right shoulder        Past Medical History:   Diagnosis Date   • Abdominal pain    • Abnormal weight loss    • Acid reflux    • Acute gastritis    •  Anxiety state    • Arthritis    • Cancer (CMS/HCC)     Prostate   • Coronary artery disease    • History of cerebrovascular accident    • History of colon polyps    • Hypertension    • Nausea    • Nuclear senile cataract    • Presbyopia    • Stroke (CMS/HCC) 2005    TIA   • Tobacco use    • Transient cerebral ischemia    • Vitreous detachment of left eye         Past Surgical History:   Procedure Laterality Date   • BACK SURGERY     • CARDIAC CATHETERIZATION N/A 6/19/2018    Procedure: Coronary angiography;  Surgeon: Delryo Mcconnell MD;  Location: [facility] CATH INVASIVE LOCATION;  Service: Cardiovascular   • COLONOSCOPY  06/09/2015    Diverticulosis found in the sigmoid colon. Hemorrhoids found in the anus.   • CORONARY ARTERY BYPASS GRAFT N/A 6/22/2018    Procedure: PARAS STERNOTOMY CORONARY ARTERY BYPASS GRAFT TIMES 5 USING RIGHT AND LEFT INTERNAL MAMMARY ARTERIES AND LEFT GREATER SAPHENOUS VEIN GRAFT PER ENDOSCOPIC VEIN HARVESTING AND PRP;  Surgeon: Edgar Pérez MD;  Location: [facility] OR;  Service: Cardiothoracic   • CRYOTHERAPY  08/14/2012    Of Acne   • ENDOSCOPY  09/01/2015    EGD w/ biopsy -Multiple polyps, one removed.   • ENDOSCOPY N/A 8/22/2019    Procedure: ESOPHAGOGASTRODUODENOSCOPY;  Surgeon: Chuck Rich DO;  Location: [facility] ENDOSCOPY;  Service: Gastroenterology   • HEMORRHOIDECTOMY N/A 3/20/2017    Procedure: Removal of Anal Papilla;  Surgeon: Juan Diego Ken MD;  Location: [facility] OR;  Service:    • INJECTION OF MEDICATION  09/14/2010    B12   • INJECTION OF MEDICATION  10/17/2011    Kenalog   • LUMBAR LAMINECTOMY  09/29/2010   • OTHER SURGICAL HISTORY  08/19/2010    Biopsy, Each Additional Lesion    • SHOULDER ROTATOR CUFF REPAIR Right 02/17/2020   • SKIN BIOPSY  08/19/2010   • TOTAL SHOULDER ARTHROPLASTY W/ DISTAL CLAVICLE EXCISION Right 2/17/2020    Procedure: right reverse total shoulder arthroplasty.;  Surgeon: Juan Diego Mendoza MD;  Location: [facility] OR;  Service:  Orthopedics;  Laterality: Right;   • TOTAL SHOULDER REVERSE ARTHROPLASTY Right 02/17/2020                       PT Assessment/Plan     Row Name 03/17/20 0905          PT Assessment    Assessment Comments  Increase in PROM noted today.  Patient is progressing very well.  -CP        PT Plan    PT Frequency  2x/week;3x/week  -CP     Predicted Duration of Therapy Intervention (Therapy Eval)  12-16 weeks  -CP     PT Plan Comments  Cont with POC and protocol.  Recheck next visit.    -CP       User Key  (r) = Recorded By, (t) = Taken By, (c) = Cosigned By    Initials Name Provider Type    Nila Medrano PTA Physical Therapy Assistant          Modalities     Row Name 03/17/20 0900             Subjective Comments    Subjective Comments  Patient reports slight pain in right shoulder.  -CP         Subjective Pain    Able to rate subjective pain?  yes  -CP      Pre-Treatment Pain Level  2  -CP         Ice    Ice Applied  Yes  -CP      Location  right shoulder  -CP      Rx Minutes  15 mins  -CP      Ice S/P Rx  Yes  -CP         ELECTRICAL STIMULATION    Attended/Unattended  Unattended  -CP      Stimulation Type  IFC  -CP      Location/Electrode Placement/Other  right shoulder  -CP       PT E-Stim Unattended (Manual) Minutes  15  -CP        User Key  (r) = Recorded By, (t) = Taken By, (c) = Cosigned By    Initials Name Provider Type    Nila Medrano PTA Physical Therapy Assistant        OP Exercises     Row Name 03/17/20 0900             Subjective Comments    Subjective Comments  Patient reports slight pain in right shoulder.  -CP         Subjective Pain    Able to rate subjective pain?  yes  -CP      Pre-Treatment Pain Level  2  -CP         Exercise 1    Exercise Name 1  pulleys fl and scap  -CP      Cueing 1  Verbal;Tactile  -CP      Reps 1  30 each  -CP         Exercise 2    Exercise Name 2  Pro II level 1  -CP      Cueing 2  Verbal  -CP      Time 2  10  -CP      Additional Comments  UE/LE  -CP          Exercise 3    Exercise Name 3  supine chest press to fl with hands clasp AAROM  -CP      Cueing 3  Verbal;Tactile;Demo  -CP      Sets 3  2  -CP      Reps 3  10  -CP         Exercise 4    Exercise Name 4  PROM  -CP      Time 4  15  -CP      Additional Comments  IR/ER at side  -CP         Exercise 5    Exercise Name 5  adjust pulleys for HEP  -CP         Exercise 6    Exercise Name 6  counter wipes  -CP      Cueing 6  Verbal;Demo  -CP      Reps 6  20  -CP         Exercise 7    Exercise Name 7  counter bows for fl  -CP      Cueing 7  Verbal;Demo  -CP      Reps 7  5  -CP        User Key  (r) = Recorded By, (t) = Taken By, (c) = Cosigned By    Initials Name Provider Type    Nila Medrano, PTA Physical Therapy Assistant                       PT OP Goals     Row Name 03/17/20 0900          PT Short Term Goals    STG Date to Achieve  03/13/20  -CP     STG 1  Passive flexion to be >/= 120 deg.  -CP     STG 1 Progress  Ongoing  -CP     STG 2  Passive ER with arm at side to be 20 deg.  -CP     STG 2 Progress  Ongoing  -CP     STG 3  Increase R cervical lateral flexion by >/= 6 degrees  -CP     STG 3 Progress  Progressing  -CP        Long Term Goals    LTG Date to Achieve  06/12/20  -CP     LTG 1  Independent with HEP.  -CP     LTG 1 Progress  Ongoing  -CP     LTG 2  QuickDASH score to be </= 35.  -CP     LTG 2 Progress  Ongoing  -CP     LTG 3  R shoulder AROM WFLs all planes.  -CP     LTG 3 Progress  Ongoing  -CP     LTG 4  Report ability to use R UE for ADLs and light IADLs.  -CP     LTG 4 Progress  Ongoing  -CP     LTG 5  Note a >/= 50% improvement in instances of R UE radiculopathy  -CP     LTG 5 Progress  Met  -CP        Time Calculation    PT Goal Re-Cert Due Date  03/13/20  -CP       User Key  (r) = Recorded By, (t) = Taken By, (c) = Cosigned By    Initials Name Provider Type    Nila Medrano PTA Physical Therapy Assistant          Therapy Education  Education Details: pulleys fl and scap, supine AAROM  chest press to fl with hand clasp,  counter/table wipes. counter bows  Given: HEP  Program: New  How Provided: Verbal, Demonstration, Written  Provided to: Patient  Level of Understanding: Teach back education performed, Verbalized, Demonstrated              Time Calculation:   Start Time: 0805  Stop Time: 0915  Time Calculation (min): 70 min  Total Timed Code Minutes- PT: 50 minute(s)  Therapy Charges for Today     Code Description Service Date Service Provider Modifiers Qty    51587588122 HC PT ELECTRICAL STIM UNATTENDED 3/17/2020 Nila Sharif, PTA  1    72146879576 HC PT THER PROC EA 15 MIN 3/17/2020 Nila Sharif, PTA GP 3                    Nila Sharif PTA  3/17/2020

## 2020-03-18 ENCOUNTER — READMISSION MANAGEMENT (OUTPATIENT)
Dept: CALL CENTER | Facility: HOSPITAL | Age: 75
End: 2020-03-18

## 2020-03-18 NOTE — OUTREACH NOTE
Total Joint Month 1 Survey      Responses   [facility] facility patient discharged from?  [place]   Does the patient have one of the following disease processes/diagnoses(primary or secondary)?  Total Joint Replacement   Joint surgery performed?  Shoulder   Month 1 attempt successful?  No   Unsuccessful attempts  Attempt 1          Chanelle Blanco RN

## 2020-03-20 ENCOUNTER — TELEPHONE (OUTPATIENT)
Dept: FAMILY MEDICINE CLINIC | Facility: CLINIC | Age: 75
End: 2020-03-20

## 2020-03-20 ENCOUNTER — HOSPITAL ENCOUNTER (OUTPATIENT)
Dept: PHYSICAL THERAPY | Facility: HOSPITAL | Age: 75
Setting detail: THERAPIES SERIES
Discharge: HOME | End: 2020-03-20

## 2020-03-20 DIAGNOSIS — Z96.611 STATUS POST REVERSE TOTAL REPLACEMENT OF RIGHT SHOULDER: Primary | ICD-10-CM

## 2020-03-20 DIAGNOSIS — M12.811 ROTATOR CUFF ARTHROPATHY, RIGHT: ICD-10-CM

## 2020-03-20 PROCEDURE — 97110 THERAPEUTIC EXERCISES: CPT | Performed by: PHYSICAL THERAPIST

## 2020-03-20 PROCEDURE — 97140 MANUAL THERAPY 1/> REGIONS: CPT | Performed by: PHYSICAL THERAPIST

## 2020-03-20 RX ORDER — AMOXICILLIN 500 MG/1
500 CAPSULE ORAL 3 TIMES DAILY
Qty: 30 CAPSULE | Refills: 0 | Status: SHIPPED | OUTPATIENT
Start: 2020-03-20 | End: 2020-05-22

## 2020-03-20 NOTE — THERAPY TREATMENT NOTE
Outpatient Physical Therapy Ortho Progress Note  [facility]     Patient Name: Souleymane Stapleton  : 1945  MRN: [id number]  Today's Date: 3/20/2020      Visit Date: 2020     Subjective Improvement not sure   Visits   Visits approved medicare  RTMD 3-  Recert Date 4-     S/P Right RTS on 2020  Visit Dx:    ICD-10-CM ICD-9-CM   1. Status post reverse total replacement of right shoulder Z96.611 V43.61   2. Rotator cuff arthropathy, right M12.811 716.81       Patient Active Problem List   Diagnosis   • Astigmatism   • Anxiety state   • History of cerebrovascular accident   • Nuclear senile cataract   • Hypertension   • Unspecified hemorrhoids   • Palpitations   • Pure hypercholesterolemia   • Prostate cancer (CMS/HCC)   • Chronic right shoulder pain   • Rotator cuff syndrome, left   • Chronic left shoulder pain   • Impingement syndrome, shoulder, left   • SOB (shortness of breath)   • Angina of effort (CMS/HCC)   • Coronary artery disease involving coronary bypass graft of native heart without angina pectoris   • Cervical spinal stenosis   • Displacement of cervical intervertebral disc   • Displacement of lumbar intervertebral disc without myelopathy   • Follow-up examination after neurological surgery   • Internal carotid artery dissection (CMS/HCC)   • TIA (transient ischemic attack)   • Bacterial intestinal infection, unspecified   • Body mass index (bmi) 25.0-25.9, adult   • Diverticulosis of large intestine without perforation or abscess without bleeding   • Functional dyspepsia   • History of colonic polyps   • Polyp of stomach and duodenum   • Bilateral shoulder pain   • Rotator cuff arthropathy, right   • Status post reverse arthroplasty of right shoulder        Past Medical History:   Diagnosis Date   • Abdominal pain    • Abnormal weight loss    • Acid reflux    • Acute gastritis    • Anxiety state    • Arthritis    • Cancer (CMS/HCC)     Prostate   • Coronary  artery disease    • History of cerebrovascular accident    • History of colon polyps    • Hypertension    • Nausea    • Nuclear senile cataract    • Presbyopia    • Stroke (CMS/HCC) 2005    TIA   • Tobacco use    • Transient cerebral ischemia    • Vitreous detachment of left eye         Past Surgical History:   Procedure Laterality Date   • BACK SURGERY     • CARDIAC CATHETERIZATION N/A 6/19/2018    Procedure: Coronary angiography;  Surgeon: Delroy Mcconnell MD;  Location: [facility] CATH INVASIVE LOCATION;  Service: Cardiovascular   • COLONOSCOPY  06/09/2015    Diverticulosis found in the sigmoid colon. Hemorrhoids found in the anus.   • CORONARY ARTERY BYPASS GRAFT N/A 6/22/2018    Procedure: PARAS STERNOTOMY CORONARY ARTERY BYPASS GRAFT TIMES 5 USING RIGHT AND LEFT INTERNAL MAMMARY ARTERIES AND LEFT GREATER SAPHENOUS VEIN GRAFT PER ENDOSCOPIC VEIN HARVESTING AND PRP;  Surgeon: Edgar Pérez MD;  Location: [facility] OR;  Service: Cardiothoracic   • CRYOTHERAPY  08/14/2012    Of Acne   • ENDOSCOPY  09/01/2015    EGD w/ biopsy -Multiple polyps, one removed.   • ENDOSCOPY N/A 8/22/2019    Procedure: ESOPHAGOGASTRODUODENOSCOPY;  Surgeon: Chuck Rich DO;  Location: [facility] ENDOSCOPY;  Service: Gastroenterology   • HEMORRHOIDECTOMY N/A 3/20/2017    Procedure: Removal of Anal Papilla;  Surgeon: Juan Diego Ken MD;  Location: [facility] OR;  Service:    • INJECTION OF MEDICATION  09/14/2010    B12   • INJECTION OF MEDICATION  10/17/2011    Kenalog   • LUMBAR LAMINECTOMY  09/29/2010   • OTHER SURGICAL HISTORY  08/19/2010    Biopsy, Each Additional Lesion    • SHOULDER ROTATOR CUFF REPAIR Right 02/17/2020   • SKIN BIOPSY  08/19/2010   • TOTAL SHOULDER ARTHROPLASTY W/ DISTAL CLAVICLE EXCISION Right 2/17/2020    Procedure: right reverse total shoulder arthroplasty.;  Surgeon: Juan Diego Mendoza MD;  Location: [facility] OR;  Service: Orthopedics;  Laterality: Right;   • TOTAL SHOULDER REVERSE ARTHROPLASTY  Right 02/17/2020       PT Ortho     Row Name 03/20/20 0800       General ROM    GENERAL ROM COMMENTS  horiz add to lateral left shoulder  -SW       Right Upper Ext    Rt Shoulder Abduction AROM  90  -SW    Rt Shoulder Abduction PROM  90  -SW    Rt Shoulder Extension AROM  30  -SW    Rt Shoulder Flexion AROM  90  -SW    Rt Shoulder Flexion PROM  110  -SW    Rt Shoulder External Rotation AROM  to ear  -SW    Rt Shoulder External Rotation PROM  20@45   -SW    Rt Shoulder Internal Rotation AROM  to lateral buttock  -SW    Rt Shoulder Internal Rotation PROM  45@45  -SW      User Key  (r) = Recorded By, (t) = Taken By, (c) = Cosigned By    Initials Name Provider Type    Karen Knowles Physical Therapist                      PT Assessment/Plan     Row Name 03/20/20 0800          PT Assessment    Functional Limitations  Limitation in home management;Limitations in community activities;Limitations in functional capacity and performance;Performance in leisure activities;Performance in self-care ADL  -SW     Impairments  Integumentary integrity;Pain;Range of motion;Muscle strength;Dexterity  -SW     Assessment Comments  Patient needs further work on right shoulder strength and ROM as would be expected at 4 weeks post op.   -SW     Rehab Potential  Good  -SW     Patient/caregiver participated in establishment of treatment plan and goals  Yes  -SW     Patient would benefit from skilled therapy intervention  Yes  -SW        PT Plan    PT Frequency  2x/week;3x/week  -SW     Predicted Duration of Therapy Intervention (Therapy Eval)  12-16-weeks  -SW     Planned CPT's?  PT RE-EVAL: 95784;PT THER ACT EA 15 MIN: 56803;PT THER PROC EA 15 MIN: 05608;PT MANUAL THERAPY EA 15 MIN: 17192;PT NEUROMUSC RE-EDUCATION EA 15 MIN: 04904;PT SELF CARE/HOME MGMT/TRAIN EA 15: 58078;PT HOT OR COLD PACK TREAT MCARE  -SW     Physical Therapy Interventions (Optional Details)  manual therapy techniques;modalities;neuromuscular re-education;ROM (Range of  "Motion);strengthening;stretching  -SW     PT Plan Comments  Continue per POC.   -SW       User Key  (r) = Recorded By, (t) = Taken By, (c) = Cosigned By    Initials Name Provider Type    Karen Knowles Physical Therapist          Modalities     Row Name 03/20/20 0800             Ice    Ice Applied  Yes  -SW      Location  right shoulder  -SW      Rx Minutes  15 mins  -SW      Ice S/P Rx  Yes  -SW        User Key  (r) = Recorded By, (t) = Taken By, (c) = Cosigned By    Initials Name Provider Type    Karen Knowles Physical Therapist        OP Exercises     Row Name 03/20/20 0800             Subjective Comments    Subjective Comments  Patient reports compliance with HEP.   -SW         Subjective Pain    Able to rate subjective pain?  yes  -SW      Pre-Treatment Pain Level  2  -SW         Exercise 1    Exercise Name 1  Pro II L1  -SW      Time 1  5'F/5'B  -SW         Exercise 2    Exercise Name 2  see manual  -SW         Exercise 3    Exercise Name 3  supine flexion white tube  -SW      Reps 3  10x5\"  -SW         Exercise 4    Exercise Name 4  supine punches white tube  -SW      Reps 4  20  -SW         Exercise 5    Exercise Name 5  shrugs 2#  -SW      Reps 5  20  -SW         Exercise 6    Exercise Name 6  isometrics-flex/ext/abd/er  -SW      Reps 6  5\"x5  -SW        User Key  (r) = Recorded By, (t) = Taken By, (c) = Cosigned By    Initials Name Provider Type    Karen Knowles Physical Therapist                      Manual Rx (last 36 hours)      Manual Treatments     Row Name 03/20/20 0700             Manual Rx 1    Manual Rx 1 Location  R shoulder  -SW      Manual Rx 1 Type  PROM  -SW      Manual Rx 1 Duration  10'  -SW        User Key  (r) = Recorded By, (t) = Taken By, (c) = Cosigned By    Initials Name Provider Type    Karen Knowles Physical Therapist          PT OP Goals     Row Name 03/20/20 0800          PT Short Term Goals    STG Date to Achieve  04/10/20  -SW     STG 1  Passive flexion to be >/= 120 " deg.  -     STG 1 Progress  Ongoing  -     STG 2  Passive ER with arm at side to be 20 deg.  -     STG 2 Progress  Met  -     STG 3  Increase R cervical lateral flexion by >/= 6 degrees  -     STG 3 Progress  Progressing  -        Long Term Goals    LTG Date to Achieve  06/12/20  -     LTG 1  Independent with HEP.  -     LTG 1 Progress  Ongoing  -     LTG 2  QuickDASH score to be </= 35.  -     LTG 2 Progress  Met  -     LTG 3  R shoulder AROM WFLs all planes.  -     LTG 3 Progress  Ongoing  -     LTG 4  Report ability to use R UE for ADLs and light IADLs.  -     LTG 4 Progress  Ongoing  -     LTG 5  Note a >/= 50% improvement in instances of R UE radiculopathy  -     LT 5 Progress  Met  -        Time Calculation    PT Goal Re-Cert Due Date  04/10/20  -       User Key  (r) = Recorded By, (t) = Taken By, (c) = Cosigned By    Initials Name Provider Type    Karen Knowles Physical Therapist          Therapy Education  Education Details: isometrics per flowsheet  Given: HEP  Program: New  How Provided: Verbal, Demonstration, Written  Provided to: Patient  Level of Understanding: Verbalized, Demonstrated    Outcome Measure Options: Quick DASH  Quick DASH  Open a tight or new jar.: Mild Difficulty  Do heavy household chores (e.g., wash walls, wash floors): Mild Difficulty  Carry a shopping bag or briefcase: Mild Difficulty  Wash your back: Severe Difficulty  Use a knife to cut food: Mild Difficulty  Recreational activities in which you take some force or impact through your arm, should or hand (e.g. golf, hammering, tennis, etc.): Moderate Difficulty  During the past week, to what extent has your arm, shoulder, or hand problem interfered with your normal social activites with family, friends, neighbors or groups?: Not at all  During the past week, were you limited in your work or other regular daily activities as a result of your arm, shoulder or hand problem?: Moderately  Limited  Arm, Shoulder, or hand pain: Mild  Tingling (pins and needles) in your arm, shoulder, or hand: None  During the past week, how much difficulty have you had sleeping because of the pain in your arm, shoulder or hand?: No difficulty  Number of Questions Answered: 11  Quick DASH Score: 27.27         Time Calculation:   Start Time: 0802  Stop Time: 0900  Time Calculation (min): 58 min  Therapy Charges for Today     Code Description Service Date Service Provider Modifiers Qty    47334904928 HC PT THER PROC EA 15 MIN 3/20/2020 Karen Garcia GP 2    [id number]  PT MANUAL THERAPY EA 15 MIN 3/20/2020 Karen Garcia GP 1    [id number]  PT THER SUPP EA 15 MIN 3/20/2020 Karen Garcia GP 1          PT G-Codes  Outcome Measure Options: Quick DASH  Quick DASH Score: 27.27         Karen Garcia  3/20/2020

## 2020-03-24 ENCOUNTER — HOSPITAL ENCOUNTER (OUTPATIENT)
Dept: PHYSICAL THERAPY | Facility: HOSPITAL | Age: 75
Setting detail: THERAPIES SERIES
Discharge: HOME | End: 2020-03-24

## 2020-03-24 NOTE — THERAPY TREATMENT NOTE
Outpatient Physical Therapy Ortho Treatment Note  [facility]     Patient Name: Souleymane Stapleton  : 1945  MRN: [id number]  Today's Date: 3/24/2020      Visit Date: 2020    Visit Dx:  No diagnosis found.    Patient Active Problem List   Diagnosis   • Astigmatism   • Anxiety state   • History of cerebrovascular accident   • Nuclear senile cataract   • Hypertension   • Unspecified hemorrhoids   • Palpitations   • Pure hypercholesterolemia   • Prostate cancer (CMS/HCC)   • Chronic right shoulder pain   • Rotator cuff syndrome, left   • Chronic left shoulder pain   • Impingement syndrome, shoulder, left   • SOB (shortness of breath)   • Angina of effort (CMS/HCC)   • Coronary artery disease involving coronary bypass graft of native heart without angina pectoris   • Cervical spinal stenosis   • Displacement of cervical intervertebral disc   • Displacement of lumbar intervertebral disc without myelopathy   • Follow-up examination after neurological surgery   • Internal carotid artery dissection (CMS/HCC)   • TIA (transient ischemic attack)   • Bacterial intestinal infection, unspecified   • Body mass index (bmi) 25.0-25.9, adult   • Diverticulosis of large intestine without perforation or abscess without bleeding   • Functional dyspepsia   • History of colonic polyps   • Polyp of stomach and duodenum   • Bilateral shoulder pain   • Rotator cuff arthropathy, right   • Status post reverse arthroplasty of right shoulder        Past Medical History:   Diagnosis Date   • Abdominal pain    • Abnormal weight loss    • Acid reflux    • Acute gastritis    • Anxiety state    • Arthritis    • Cancer (CMS/HCC)     Prostate   • Coronary artery disease    • History of cerebrovascular accident    • History of colon polyps    • Hypertension    • Nausea    • Nuclear senile cataract    • Presbyopia    • Stroke (CMS/HCC)     TIA   • Tobacco use    • Transient cerebral ischemia    • Vitreous detachment of left eye          Past Surgical History:   Procedure Laterality Date   • BACK SURGERY     • CARDIAC CATHETERIZATION N/A 6/19/2018    Procedure: Coronary angiography;  Surgeon: Delroy Mcconnell MD;  Location: [facility] CATH INVASIVE LOCATION;  Service: Cardiovascular   • COLONOSCOPY  06/09/2015    Diverticulosis found in the sigmoid colon. Hemorrhoids found in the anus.   • CORONARY ARTERY BYPASS GRAFT N/A 6/22/2018    Procedure: PARAS STERNOTOMY CORONARY ARTERY BYPASS GRAFT TIMES 5 USING RIGHT AND LEFT INTERNAL MAMMARY ARTERIES AND LEFT GREATER SAPHENOUS VEIN GRAFT PER ENDOSCOPIC VEIN HARVESTING AND PRP;  Surgeon: Edgar Pérez MD;  Location: [facility] OR;  Service: Cardiothoracic   • CRYOTHERAPY  08/14/2012    Of Acne   • ENDOSCOPY  09/01/2015    EGD w/ biopsy -Multiple polyps, one removed.   • ENDOSCOPY N/A 8/22/2019    Procedure: ESOPHAGOGASTRODUODENOSCOPY;  Surgeon: Chuck Rich DO;  Location: [facility] ENDOSCOPY;  Service: Gastroenterology   • HEMORRHOIDECTOMY N/A 3/20/2017    Procedure: Removal of Anal Papilla;  Surgeon: Juan Diego Ken MD;  Location: [facility] OR;  Service:    • INJECTION OF MEDICATION  09/14/2010    B12   • INJECTION OF MEDICATION  10/17/2011    Kenalog   • LUMBAR LAMINECTOMY  09/29/2010   • OTHER SURGICAL HISTORY  08/19/2010    Biopsy, Each Additional Lesion    • SHOULDER ROTATOR CUFF REPAIR Right 02/17/2020   • SKIN BIOPSY  08/19/2010   • TOTAL SHOULDER ARTHROPLASTY W/ DISTAL CLAVICLE EXCISION Right 2/17/2020    Procedure: right reverse total shoulder arthroplasty.;  Surgeon: Juan Diego Mendoza MD;  Location: [facility];  Service: Orthopedics;  Laterality: Right;   • TOTAL SHOULDER REVERSE ARTHROPLASTY Right 02/17/2020       PT Ortho     Row Name 03/24/20 0755       Precautions and Contraindications    Precautions  R RTS 2-  -BB       Subjective Pain    Able to rate subjective pain?  yes  -BB    Pre-Treatment Pain Level  0  -BB    Post-Treatment Pain Level  0  -BB        Posture/Observations    Posture/Observations Comments  No sling noted. Scapular winging noted   -BB      User Key  (r) = Recorded By, (t) = Taken By, (c) = Cosigned By    Initials Name Provider Type    Loraine Yang PT DPT Physical Therapist                      PT Assessment/Plan     Row Name 03/24/20 0755          PT Assessment    Assessment Comments  PROM flexion and scaption at about 140 degrees at least without pain. Able to improve dumping of scapula with cues.   -BB        PT Plan    PT Frequency  2x/week  -BB     PT Plan Comments  continue per protocol- monitor serratus punches control and scap squeezes control   -BB       User Key  (r) = Recorded By, (t) = Taken By, (c) = Cosigned By    Initials Name Provider Type    Loraine Yang PT DPT Physical Therapist          Modalities     Row Name 03/24/20 0755             Ice    Patient reports will apply ice at home to involved area  -- ice to go  -BB        User Key  (r) = Recorded By, (t) = Taken By, (c) = Cosigned By    Initials Name Provider Type    Loraine Yang PT DPT Physical Therapist        OP Exercises     Row Name 03/24/20 0757             Subjective Comments    Subjective Comments  Notes mostly just soreness in tricep. Does HEP and ice 2x day. Notes having trouble feeding with right arm.   -BB         Subjective Pain    Able to rate subjective pain?  yes  -BB      Pre-Treatment Pain Level  0  -BB      Post-Treatment Pain Level  0  -BB         Exercise 1    Exercise Name 1  PROM of elbow into supination   -BB      Time 1  7'  -BB      Additional Comments  Full AAROM noted after stretch   -BB         Exercise 2    Exercise Name 2  SL scapular PNF for retraction   -BB      Time 2  8'  -BB         Exercise 3    Exercise Name 3  PROM   -BB      Time 3  10'  -BB      Additional Comments  flexion/scaption   -BB         Exercise 4    Exercise Name 4  Supine serratus puches   -BB      Sets 4  2  -BB      Reps 4  10  -BB      Additional Comments   manual assist for muscular retrain  -BB         Exercise 5    Exercise Name 5  pro 2 fwd/bkwd   -BB      Time 5  10'  -BB        User Key  (r) = Recorded By, (t) = Taken By, (c) = Cosigned By    Initials Name Provider Type    Loraine Yang, PT DPT Physical Therapist                       PT OP Goals     Row Name 03/24/20 0755          PT Short Term Goals    STG Date to Achieve  04/10/20  -BB     STG 1  Passive flexion to be >/= 120 deg.  -BB     STG 1 Progress  Met  -BB     STG 2  Passive ER with arm at side to be 20 deg.  -BB     STG 2 Progress  Met  -BB     STG 3  Increase R cervical lateral flexion by >/= 6 degrees  -BB     STG 3 Progress  Progressing  -BB        Long Term Goals    LTG Date to Achieve  06/12/20  -BB     LTG 1  Independent with HEP.  -BB     LTG 1 Progress  Ongoing  -BB     LTG 2  QuickDASH score to be </= 35.  -BB     LTG 2 Progress  Met  -BB     LTG 3  R shoulder AROM WFLs all planes.  -BB     LTG 3 Progress  Ongoing  -BB     LTG 4  Report ability to use R UE for ADLs and light IADLs.  -BB     LTG 4 Progress  Ongoing  -BB     LTG 5  Note a >/= 50% improvement in instances of R UE radiculopathy  -BB     LTG 5 Progress  Met  -BB        Time Calculation    PT Goal Re-Cert Due Date  04/10/20  -BB       User Key  (r) = Recorded By, (t) = Taken By, (c) = Cosigned By    Initials Name Provider Type    Loraine Yang, PURNIMA DPT Physical Therapist          Therapy Education  Education Details: HEP adjusted to limit compensations               Time Calculation:   Start Time: 0755  Stop Time: 0847  Time Calculation (min): 52 min                Loraine Alegre PT DPT  3/24/2020

## 2020-03-25 ENCOUNTER — READMISSION MANAGEMENT (OUTPATIENT)
Dept: CALL CENTER | Facility: HOSPITAL | Age: 75
End: 2020-03-25

## 2020-03-25 NOTE — OUTREACH NOTE
Total Joint Month 1 Survey      Responses   [facility] patient discharged from?  [facility]   Does the patient have one of the following disease processes/diagnoses(primary or secondary)?  Total Joint Replacement   Joint surgery performed?  Shoulder   Month 1 attempt successful?  No   Unsuccessful attempts  Attempt 2          Alon Dill RN

## 2020-03-27 ENCOUNTER — HOSPITAL ENCOUNTER (OUTPATIENT)
Dept: PHYSICAL THERAPY | Facility: HOSPITAL | Age: 75
Setting detail: THERAPIES SERIES
Discharge: HOME | End: 2020-03-27

## 2020-03-27 DIAGNOSIS — Z96.611 STATUS POST REVERSE TOTAL REPLACEMENT OF RIGHT SHOULDER: Primary | ICD-10-CM

## 2020-03-27 DIAGNOSIS — M12.811 ROTATOR CUFF ARTHROPATHY, RIGHT: ICD-10-CM

## 2020-03-27 PROCEDURE — 97140 MANUAL THERAPY 1/> REGIONS: CPT | Performed by: PHYSICAL THERAPIST

## 2020-03-27 PROCEDURE — 97110 THERAPEUTIC EXERCISES: CPT | Performed by: PHYSICAL THERAPIST

## 2020-03-27 NOTE — THERAPY TREATMENT NOTE
Outpatient Physical Therapy Ortho Treatment Note  [facility]     Patient Name: Souleymane Stapleton  : 1945  MRN: [id number]  Today's Date: 3/27/2020      Visit Date: 2020  Subjective Improvement not sure   Visits   Visits approved medicare  RTMD 3-  Recert Date 4-     S/P Right RTS on 2020  Visit Dx:    ICD-10-CM ICD-9-CM   1. Status post reverse total replacement of right shoulder Z96.611 V43.61   2. Rotator cuff arthropathy, right M12.811 716.81       Patient Active Problem List   Diagnosis   • Astigmatism   • Anxiety state   • History of cerebrovascular accident   • Nuclear senile cataract   • Hypertension   • Unspecified hemorrhoids   • Palpitations   • Pure hypercholesterolemia   • Prostate cancer (CMS/HCC)   • Chronic right shoulder pain   • Rotator cuff syndrome, left   • Chronic left shoulder pain   • Impingement syndrome, shoulder, left   • SOB (shortness of breath)   • Angina of effort (CMS/HCC)   • Coronary artery disease involving coronary bypass graft of native heart without angina pectoris   • Cervical spinal stenosis   • Displacement of cervical intervertebral disc   • Displacement of lumbar intervertebral disc without myelopathy   • Follow-up examination after neurological surgery   • Internal carotid artery dissection (CMS/HCC)   • TIA (transient ischemic attack)   • Bacterial intestinal infection, unspecified   • Body mass index (bmi) 25.0-25.9, adult   • Diverticulosis of large intestine without perforation or abscess without bleeding   • Functional dyspepsia   • History of colonic polyps   • Polyp of stomach and duodenum   • Bilateral shoulder pain   • Rotator cuff arthropathy, right   • Status post reverse arthroplasty of right shoulder        Past Medical History:   Diagnosis Date   • Abdominal pain    • Abnormal weight loss    • Acid reflux    • Acute gastritis    • Anxiety state    • Arthritis    • Cancer (CMS/HCC)     Prostate   • Coronary  artery disease    • History of cerebrovascular accident    • History of colon polyps    • Hypertension    • Nausea    • Nuclear senile cataract    • Presbyopia    • Stroke (CMS/HCC) 2005    TIA   • Tobacco use    • Transient cerebral ischemia    • Vitreous detachment of left eye         Past Surgical History:   Procedure Laterality Date   • BACK SURGERY     • CARDIAC CATHETERIZATION N/A 6/19/2018    Procedure: Coronary angiography;  Surgeon: Delroy Mcconnell MD;  Location: [facility] CATH INVASIVE LOCATION;  Service: Cardiovascular   • COLONOSCOPY  06/09/2015    Diverticulosis found in the sigmoid colon. Hemorrhoids found in the anus.   • CORONARY ARTERY BYPASS GRAFT N/A 6/22/2018    Procedure: PARAS STERNOTOMY CORONARY ARTERY BYPASS GRAFT TIMES 5 USING RIGHT AND LEFT INTERNAL MAMMARY ARTERIES AND LEFT GREATER SAPHENOUS VEIN GRAFT PER ENDOSCOPIC VEIN HARVESTING AND PRP;  Surgeon: Edgar Pérez MD;  Location: [facility] OR;  Service: Cardiothoracic   • CRYOTHERAPY  08/14/2012    Of Acne   • ENDOSCOPY  09/01/2015    EGD w/ biopsy -Multiple polyps, one removed.   • ENDOSCOPY N/A 8/22/2019    Procedure: ESOPHAGOGASTRODUODENOSCOPY;  Surgeon: Chuck Rich DO;  Location: [facility] ENDOSCOPY;  Service: Gastroenterology   • HEMORRHOIDECTOMY N/A 3/20/2017    Procedure: Removal of Anal Papilla;  Surgeon: Juan Diego Ken MD;  Location: [facility] OR;  Service:    • INJECTION OF MEDICATION  09/14/2010    B12   • INJECTION OF MEDICATION  10/17/2011    Kenalog   • LUMBAR LAMINECTOMY  09/29/2010   • OTHER SURGICAL HISTORY  08/19/2010    Biopsy, Each Additional Lesion    • SHOULDER ROTATOR CUFF REPAIR Right 02/17/2020   • SKIN BIOPSY  08/19/2010   • TOTAL SHOULDER ARTHROPLASTY W/ DISTAL CLAVICLE EXCISION Right 2/17/2020    Procedure: right reverse total shoulder arthroplasty.;  Surgeon: Juan Diego Mendoza MD;  Location: [facility] OR;  Service: Orthopedics;  Laterality: Right;   • TOTAL SHOULDER REVERSE ARTHROPLASTY  "Right 02/17/2020       PT Ortho     Row Name 03/27/20 0800       Subjective Pain    Able to rate subjective pain?  yes  -SW    Pre-Treatment Pain Level  0  -SW       Right Upper Ext    Rt Shoulder Abduction PROM  90  -SW    Rt Shoulder Flexion PROM  120  -SW    Rt Shoulder External Rotation PROM  40@45  -SW    Rt Shoulder Internal Rotation PROM  45@45  -SW       MMT Right Upper Ext    Rt Shoulder External Rotation MMT, Gross Movement  (2-/5) poor minus  -SW      User Key  (r) = Recorded By, (t) = Taken By, (c) = Cosigned By    Initials Name Provider Type    Karen Knowles Physical Therapist                      PT Assessment/Plan     Row Name 03/27/20 0800          PT Assessment    Assessment Comments  Patient exhibits marked weakness in right shoulder ER's which is limiting ADL's.    -SW        PT Plan    PT Frequency  2x/week  -SW     Predicted Duration of Therapy Intervention (Therapy Eval)  12-16 weeks  -     PT Plan Comments  Continue per protocol.   -SW       User Key  (r) = Recorded By, (t) = Taken By, (c) = Cosigned By    Initials Name Provider Type    Karen Knowles Physical Therapist            OP Exercises     Row Name 03/27/20 0800             Subjective Comments    Subjective Comments  Patient reports he has difficulty eating with right hand and that would be one of his goals for therapy.   -SW         Subjective Pain    Able to rate subjective pain?  yes  -SW      Pre-Treatment Pain Level  0  -SW         Exercise 1    Exercise Name 1  Pro II L1  -SW      Time 1  5'F/5'B  -SW         Exercise 2    Exercise Name 2  see manual  -SW         Exercise 3    Exercise Name 3  supine flexion white tube  -SW      Reps 3  10x5\"  -SW         Exercise 4    Exercise Name 4  supine punches white tube  -SW      Reps 4  20  -SW         Exercise 5    Exercise Name 5  shrugs 3#  -SW      Reps 5  20  -SW         Exercise 6    Exercise Name 6  isometrics-flex/ext/abd/er  -SW      Reps 6  5\"x5  -SW      Additional " Comments  not performed  -SW         Exercise 7    Exercise Name 7  biceps cul in sitting 1#  -SW      Sets 7  2  -SW      Reps 7  10  -SW         Exercise 8    Exercise Name 8  triceps extension 2#  -SW      Reps 8  20  -SW         Exercise 9    Exercise Name 9  pronation/supination 2# in sitting  -SW      Sets 9  2  -SW      Reps 9  10  -SW         Exercise 10    Exercise Name 10  Er sitting elbow by side 0#  -SW      Sets 10  20  -SW         Exercise 11    Exercise Name 11  prone retraction  -SW      Reps 11  20  -SW        User Key  (r) = Recorded By, (t) = Taken By, (c) = Cosigned By    Initials Name Provider Type    Karen Knowles Physical Therapist                      Manual Rx (last 36 hours)      Manual Treatments     Row Name 03/27/20 0700             Manual Rx 1    Manual Rx 1 Location  R shoulder  -SW      Manual Rx 1 Type  PROM  -SW      Manual Rx 1 Duration  10'  -SW        User Key  (r) = Recorded By, (t) = Taken By, (c) = Cosigned By    Initials Name Provider Type    Karen Knowles Physical Therapist          PT OP Goals     Row Name 03/27/20 0800          PT Short Term Goals    STG Date to Achieve  04/10/20  -     STG 1  Passive flexion to be >/= 120 deg.  -     STG 1 Progress  Met  -     STG 2  Passive ER with arm at side to be 20 deg.  -     STG 2 Progress  Met  -     STG 3  Increase R cervical lateral flexion by >/= 6 degrees  -     STG 3 Progress  Progressing  -        Long Term Goals    LTG Date to Achieve  06/12/20  -     LTG 1  Independent with HEP.  -     LTG 1 Progress  Ongoing  -     LTG 2  QuickDASH score to be </= 35.  -     LTG 2 Progress  Met  -     LTG 3  R shoulder AROM WFLs all planes.  -     LTG 3 Progress  Ongoing  -     LTG 4  Report ability to use R UE for ADLs and light IADLs.  -     LTG 4 Progress  Ongoing  -     LTG 5  Note a >/= 50% improvement in instances of R UE radiculopathy  -     LTG 5 Progress  Met  -     LTG 6  Patient will be  able to eat with right UE without difficulty.   -     LTG 6 Progress  New  -        Time Calculation    PT Goal Re-Cert Due Date  04/10/20  -       User Key  (r) = Recorded By, (t) = Taken By, (c) = Cosigned By    Initials Name Provider Type    Karen Knowles Physical Therapist          Therapy Education  Education Details: tricep extension, bicep curl, prone retraction, pronation/supination, B ER in sitting per flowsheet  Given: HEP  Program: New  How Provided: Verbal, Demonstration, Written  Provided to: Patient  Level of Understanding: Verbalized, Demonstrated              Time Calculation:   Start Time: 0847  Stop Time: 0930  Time Calculation (min): 43 min  Therapy Charges for Today     Code Description Service Date Service Provider Modifiers Qty    64230863760 HC PT THER PROC EA 15 MIN 3/27/2020 Karen Garcia GP 2    16071664272 HC PT MANUAL THERAPY EA 15 MIN 3/27/2020 Karen Garcia GP 1                    Karen Garcia  3/27/2020

## 2020-03-31 ENCOUNTER — TELEMEDICINE (OUTPATIENT)
Dept: ORTHOPEDIC SURGERY | Facility: CLINIC | Age: 75
End: 2020-03-31

## 2020-03-31 ENCOUNTER — HOSPITAL ENCOUNTER (OUTPATIENT)
Dept: PHYSICAL THERAPY | Facility: HOSPITAL | Age: 75
Setting detail: THERAPIES SERIES
Discharge: HOME | End: 2020-03-31

## 2020-03-31 DIAGNOSIS — M12.811 ROTATOR CUFF ARTHROPATHY, RIGHT: ICD-10-CM

## 2020-03-31 DIAGNOSIS — Z96.611 STATUS POST REVERSE TOTAL REPLACEMENT OF RIGHT SHOULDER: Primary | ICD-10-CM

## 2020-03-31 DIAGNOSIS — Z96.611 STATUS POST REVERSE ARTHROPLASTY OF RIGHT SHOULDER: ICD-10-CM

## 2020-03-31 DIAGNOSIS — M12.811 ROTATOR CUFF ARTHROPATHY, RIGHT: Primary | ICD-10-CM

## 2020-03-31 PROCEDURE — 97110 THERAPEUTIC EXERCISES: CPT | Performed by: PHYSICAL THERAPIST

## 2020-03-31 PROCEDURE — 99024 POSTOP FOLLOW-UP VISIT: CPT | Performed by: ORTHOPAEDIC SURGERY

## 2020-03-31 NOTE — PROGRESS NOTES
Souleymane Stapleton is a 74 y.o. male returns for     Chief Complaint   Patient presents with   • Shoulder Pain     right       HISTORY OF PRESENT ILLNESS:  shoulder pain is improving.  He does have some consistent soreness in his tricep and he has some soreness at the apex of his incision that he says is slowly improving.  No fevers or chills.  He continues to do home exercises and started with internal and external rotation today.  He has difficulty feeding himself with his right hand but says that that is slowly improving.     CONCURRENT MEDICAL HISTORY:    The following portions of the patient's history were reviewed and updated as appropriate: allergies, current medications, past family history, past medical history, past social history, past surgical history and problem list.     ROS  No fevers or chills.  No chest pain or shortness of air.  No GI or  disturbances.    PHYSICAL EXAMINATION:           Physical Exam   Constitutional: He is oriented to person, place, and time.   Neurological: He is alert and oriented to person, place, and time.   Psychiatric: He has a normal mood and affect. His behavior is normal. Judgment and thought content normal.                 Xr Shoulder 2+ View Right    Result Date: 3/3/2020  Narrative: 3 views of the right shoulder reveal postsurgical changes post reverse total shoulder arthroplasty.  Prostheses appear well-positioned and well-aligned with no evidence of hardware failure or loosening noted.  There are subcutaneous staples noted to the anterior shoulder, otherwise soft tissues appear unremarkable.  No significant changes are noted in the implant when compared with prior images from 2/17/2020.  No acute bony radiologic abnormalities are noted at this time.03/03/20 at 11:55 AM by CARLOS De Jesus             ASSESSMENT:    Diagnoses and all orders for this visit:    Rotator cuff arthropathy, right    Status post reverse arthroplasty of right  shoulder          PLAN    He will continue with home exercises.  Continue to slowly progress strength and conditioning as tolerated.  He will call if he has any specific questions, problems, or concerns.  Follow-up in 8 weeks with repeat x-rays of the right shoulder.    Return in about 8 weeks (around 5/26/2020) for Recheck with repeat xrays.    Juan Diego Mendoza MD

## 2020-04-03 ENCOUNTER — HOSPITAL ENCOUNTER (OUTPATIENT)
Dept: PHYSICAL THERAPY | Facility: HOSPITAL | Age: 75
Setting detail: THERAPIES SERIES
Discharge: HOME | End: 2020-04-03

## 2020-04-03 DIAGNOSIS — Z96.611 STATUS POST REVERSE TOTAL REPLACEMENT OF RIGHT SHOULDER: ICD-10-CM

## 2020-04-03 DIAGNOSIS — M12.811 ROTATOR CUFF ARTHROPATHY, RIGHT: ICD-10-CM

## 2020-04-03 DIAGNOSIS — M25.512 BILATERAL SHOULDER PAIN, UNSPECIFIED CHRONICITY: Primary | ICD-10-CM

## 2020-04-03 DIAGNOSIS — M25.511 BILATERAL SHOULDER PAIN, UNSPECIFIED CHRONICITY: Primary | ICD-10-CM

## 2020-04-03 PROCEDURE — 97110 THERAPEUTIC EXERCISES: CPT | Performed by: PHYSICAL THERAPIST

## 2020-04-03 NOTE — THERAPY TREATMENT NOTE
Outpatient Physical Therapy Ortho Treatment Note  [facility]     Patient Name: Souleymane Stapleton  : 1945  MRN: [id number]  Today's Date: 4/3/2020      Visit Date: 2020  Attendance:  (Medical necessity)  Subjective Improvement: not rated this date  Next MD Appt: to be scheduled  Recert Date: 4/10/2020    Therapy Diagnosis: R reverse total shoulder arthroplasty 2020    Visit Dx:    ICD-10-CM ICD-9-CM   1. Bilateral shoulder pain, unspecified chronicity M25.511 719.41    M25.512    2. Rotator cuff arthropathy, right M12.811 716.81   3. Status post reverse total replacement of right shoulder Z96.611 V43.61            Past Medical History:   Diagnosis Date   • Abdominal pain    • Abnormal weight loss    • Acid reflux    • Acute gastritis    • Anxiety state    • Arthritis    • Cancer (CMS/HCC)     Prostate   • Coronary artery disease    • History of cerebrovascular accident    • History of colon polyps    • Hypertension    • Nausea    • Nuclear senile cataract    • Presbyopia    • Stroke (CMS/HCC) 2005    TIA   • Tobacco use    • Transient cerebral ischemia    • Vitreous detachment of left eye         Past Surgical History:   Procedure Laterality Date   • BACK SURGERY     • CARDIAC CATHETERIZATION N/A 2018    Procedure: Coronary angiography;  Surgeon: Delroy Mcconnell MD;  Location: [facility] INVASIVE LOCATION;  Service: Cardiovascular   • COLONOSCOPY  2015    Diverticulosis found in the sigmoid colon. Hemorrhoids found in the anus.   • CORONARY ARTERY BYPASS GRAFT N/A 2018    Procedure: PARAS STERNOTOMY CORONARY ARTERY BYPASS GRAFT TIMES 5 USING RIGHT AND LEFT INTERNAL MAMMARY ARTERIES AND LEFT GREATER SAPHENOUS VEIN GRAFT PER ENDOSCOPIC VEIN HARVESTING AND PRP;  Surgeon: Edgar Pérez MD;  Location: [facility] OR;  Service: Cardiothoracic   • CRYOTHERAPY  2012    Of Acne   • ENDOSCOPY  2015    EGD w/ biopsy -Multiple polyps, one removed.   •  ENDOSCOPY N/A 8/22/2019    Procedure: ESOPHAGOGASTRODUODENOSCOPY;  Surgeon: Chuck Rich DO;  Location: [facility] ENDOSCOPY;  Service: Gastroenterology   • HEMORRHOIDECTOMY N/A 3/20/2017    Procedure: Removal of Anal Papilla;  Surgeon: Juan Diego Ken MD;  Location: [facility] OR;  Service:    • INJECTION OF MEDICATION  09/14/2010    B12   • INJECTION OF MEDICATION  10/17/2011    Kenalog   • LUMBAR LAMINECTOMY  09/29/2010   • OTHER SURGICAL HISTORY  08/19/2010    Biopsy, Each Additional Lesion    • SHOULDER ROTATOR CUFF REPAIR Right 02/17/2020   • SKIN BIOPSY  08/19/2010   • TOTAL SHOULDER ARTHROPLASTY W/ DISTAL CLAVICLE EXCISION Right 2/17/2020    Procedure: right reverse total shoulder arthroplasty.;  Surgeon: Juan Diego Mendoza MD;  Location: [facility];  Service: Orthopedics;  Laterality: Right;   • TOTAL SHOULDER REVERSE ARTHROPLASTY Right 02/17/2020       PT Ortho     Row Name 04/03/20 0900       Precautions and Contraindications    Precautions  R rev total shoulder 2-  -SS       Right Upper Ext    Rt Shoulder Abduction AROM  102  -SS    Rt Shoulder Flexion AROM  105  -SS      User Key  (r) = Recorded By, (t) = Taken By, (c) = Cosigned By    Initials Name Provider Type    SS Raudel Bell, PT DPT Physical Therapist          PT Assessment/Plan     Row Name 04/03/20 0900          PT Assessment    Functional Limitations  Limitation in home management;Limitations in community activities;Limitations in functional capacity and performance;Performance in leisure activities;Performance in self-care ADL  -SS     Impairments  Integumentary integrity;Pain;Range of motion;Muscle strength;Dexterity  -     Assessment Comments  Good effort with therex. Continues to have limited ER. Patient was dispensed 1 yard of yellow theraband for use in HEP.  -SS     Rehab Potential  Good  -SS     Patient/caregiver participated in establishment of treatment plan and goals  Yes  -     Patient would benefit  "from skilled therapy intervention  Yes  -SS        PT Plan    PT Frequency  2x/week  -SS     Predicted Duration of Therapy Intervention (Therapy Eval)  12-16 weeks  -SS     PT Plan Comments  Advance per protocol. Continue to work on ER AROM.  -SS       User Key  (r) = Recorded By, (t) = Taken By, (c) = Cosigned By    Initials Name Provider Type    Raudel Godinez, PT DPT Physical Therapist          Modalities     Row Name 04/03/20 0900             Ice    Ice Applied  Yes  -SS      Location  R shoulder  -SS      Rx Minutes  15 mins  -SS      Ice S/P Rx  Yes  -SS        User Key  (r) = Recorded By, (t) = Taken By, (c) = Cosigned By    Initials Name Provider Type    Raudel Godinez, PT DPT Physical Therapist        OP Exercises     Row Name 04/03/20 0900             Precautions    Existing Precautions/Restrictions  -- R rev total shoulder 2-  -SS         Subjective Comments    Subjective Comments  Soreness in the triceps. Still having trouble feeding himself with the right hand. Shoulder/arm fatigues with feeding. Working on HEP 2x/day. Reports that MD thinks that his triceps soreness may be swelling/fluid.   -SS         Subjective Pain    Able to rate subjective pain?  yes  -SS      Pre-Treatment Pain Level  0  -SS      Post-Treatment Pain Level  0 \"just rayna sore\"  -SS         Exercise 1    Exercise Name 1  Pro2, Seat 9, UE, F/R, strength/endurance  -SS      Cueing 1  Verbal  -SS      Time 1  10 mins  -SS      Additional Comments  Level 3  -SS         Exercise 2    Exercise Name 2  T-band IR with arm at side  -SS      Cueing 2  Verbal  -SS      Sets 2  3  -SS      Reps 2  10  -SS      Additional Comments  yellow t-band  -SS         Exercise 3    Exercise Name 3  AA ER with arm at side  -SS      Cueing 3  Verbal;Tactile  -SS      Sets 3  3  -SS      Reps 3  30  -SS         Exercise 4    Exercise Name 4  Isometric shoulder ER  -SS      Cueing 4  Verbal;Tactile  -SS      Sets 4  1  -SS      " Reps 4  15  -SS      Time 4  3 sec hold  -SS         Exercise 5    Exercise Name 5  Chicken wing  -SS      Cueing 5  Verbal;Demo  -SS      Sets 5  3  -SS      Reps 5  10  -SS         Exercise 6    Exercise Name 6  Standing shoulder flexion  -SS      Cueing 6  Verbal  -SS      Sets 6  1  -SS      Reps 6  20  -SS         Exercise 7    Exercise Name 7  Standing shoulder active abduction  -SS      Cueing 7  Verbal  -SS      Sets 7  1  -SS      Reps 7  20  -SS        User Key  (r) = Recorded By, (t) = Taken By, (c) = Cosigned By    Initials Name Provider Type    Raudel Godinez, PT DPT Physical Therapist          PT OP Goals     Row Name 04/03/20 0900          PT Short Term Goals    STG Date to Achieve  04/10/20  -     STG 1  Passive flexion to be >/= 120 deg.  -     STG 1 Progress  Met  -     STG 2  Passive ER with arm at side to be 20 deg.  -     STG 2 Progress  Met  -     STG 3  Increase R cervical lateral flexion by >/= 6 degrees  -     STG 3 Progress  Progressing  -        Long Term Goals    LTG Date to Achieve  06/12/20  -     LTG 1  Independent with HEP.  -     LTG 1 Progress  Ongoing  -     LTG 2  QuickDASH score to be </= 35.  -     LTG 2 Progress  Met  -     LTG 3  R shoulder AROM WFLs all planes.  -     LTG 3 Progress  Ongoing  -     LTG 4  Report ability to use R UE for ADLs and light IADLs.  -     LTG 4 Progress  Ongoing  -     LTG 5  Note a >/= 50% improvement in instances of R UE radiculopathy  -     LTG 5 Progress  Met  -     LTG 6  Patient will be able to eat with right UE without difficulty.   -     LTG 6 Progress  New  -        Time Calculation    PT Goal Re-Cert Due Date  04/10/20  -       User Key  (r) = Recorded By, (t) = Taken By, (c) = Cosigned By    Initials Name Provider Type    Raudel Godinez, PT DPT Physical Therapist          Therapy Education  Given: HEP  Program: Reinforced  How Provided: Verbal  Provided to: Patient  Level of  Understanding: Verbalized              Time Calculation:   Start Time: 0928  Stop Time: 1017  Time Calculation (min): 49 min  Total Timed Code Minutes- PT: 33 minute(s)  Therapy Charges for Today     Code Description Service Date Service Provider Modifiers Qty    07774222154 HC PT THER PROC EA 15 MIN 4/3/2020 Raudel Bell, PT DPT GP 2    31378399592 HC PT THER SUPP EA 15 MIN 4/3/2020 Raudel Bell, PT DPT GP 1                    Raudel Bell, PT, DPT, CHT  4/3/2020

## 2020-04-05 NOTE — OUTREACH NOTE
Prep Survey      Responses   Facility patient discharged from?  [facility]   Is patient eligible?  Yes   Discharge diagnosis  Rotator cuff arhtropathy right, s/p total shoulder reverse arthroplasty   Does the patient have one of the following disease processes/diagnoses(primary or secondary)?  Total Joint Replacement   Does the patient have Home health ordered?  No   Is there a DME ordered?  No   Comments regarding appointments  See AVS   Prep survey completed?  Yes          Talisha Flores RN        
Juan Carlos

## 2020-04-06 ENCOUNTER — HOSPITAL ENCOUNTER (OUTPATIENT)
Dept: PHYSICAL THERAPY | Facility: HOSPITAL | Age: 75
Setting detail: THERAPIES SERIES
Discharge: HOME | End: 2020-04-06

## 2020-04-06 DIAGNOSIS — Z96.611 STATUS POST REVERSE TOTAL REPLACEMENT OF RIGHT SHOULDER: ICD-10-CM

## 2020-04-06 DIAGNOSIS — M12.811 ROTATOR CUFF ARTHROPATHY, RIGHT: Primary | ICD-10-CM

## 2020-04-06 PROCEDURE — 97110 THERAPEUTIC EXERCISES: CPT

## 2020-04-06 NOTE — THERAPY TREATMENT NOTE
Outpatient Physical Therapy Ortho Treatment Note  [facility]     Patient Name: Souleymane Stapleton  : 1945  MRN: [id number]  Today's Date: 2020      Visit Date: 2020  Attendance:  (Medical necessity)  Subjective Improvement: not rated this date  Next MD Appt: to be scheduled  Recert Date: 4/10/2020     Visit Dx:    ICD-10-CM ICD-9-CM   1. Rotator cuff arthropathy, right M12.811 716.81   2. Status post reverse total replacement of right shoulder Z96.611 V43.61       Patient Active Problem List   Diagnosis   • Astigmatism   • Anxiety state   • History of cerebrovascular accident   • Nuclear senile cataract   • Hypertension   • Unspecified hemorrhoids   • Palpitations   • Pure hypercholesterolemia   • Prostate cancer (CMS/HCC)   • Chronic right shoulder pain   • Rotator cuff syndrome, left   • Chronic left shoulder pain   • Impingement syndrome, shoulder, left   • SOB (shortness of breath)   • Angina of effort (CMS/HCC)   • Coronary artery disease involving coronary bypass graft of native heart without angina pectoris   • Cervical spinal stenosis   • Displacement of cervical intervertebral disc   • Displacement of lumbar intervertebral disc without myelopathy   • Follow-up examination after neurological surgery   • Internal carotid artery dissection (CMS/HCC)   • TIA (transient ischemic attack)   • Bacterial intestinal infection, unspecified   • Body mass index (bmi) 25.0-25.9, adult   • Diverticulosis of large intestine without perforation or abscess without bleeding   • Functional dyspepsia   • History of colonic polyps   • Polyp of stomach and duodenum   • Bilateral shoulder pain   • Rotator cuff arthropathy, right   • Status post reverse arthroplasty of right shoulder        Past Medical History:   Diagnosis Date   • Abdominal pain    • Abnormal weight loss    • Acid reflux    • Acute gastritis    • Anxiety state    • Arthritis    • Cancer (CMS/HCC)     Prostate   • Coronary artery  disease    • History of cerebrovascular accident    • History of colon polyps    • Hypertension    • Nausea    • Nuclear senile cataract    • Presbyopia    • Stroke (CMS/HCC) 2005    TIA   • Tobacco use    • Transient cerebral ischemia    • Vitreous detachment of left eye         Past Surgical History:   Procedure Laterality Date   • BACK SURGERY     • CARDIAC CATHETERIZATION N/A 6/19/2018    Procedure: Coronary angiography;  Surgeon: Delroy Mcconnell MD;  Location: [facility] CATH INVASIVE LOCATION;  Service: Cardiovascular   • COLONOSCOPY  06/09/2015    Diverticulosis found in the sigmoid colon. Hemorrhoids found in the anus.   • CORONARY ARTERY BYPASS GRAFT N/A 6/22/2018    Procedure: PARAS STERNOTOMY CORONARY ARTERY BYPASS GRAFT TIMES 5 USING RIGHT AND LEFT INTERNAL MAMMARY ARTERIES AND LEFT GREATER SAPHENOUS VEIN GRAFT PER ENDOSCOPIC VEIN HARVESTING AND PRP;  Surgeon: Edgar Pérez MD;  Location: [facility] OR;  Service: Cardiothoracic   • CRYOTHERAPY  08/14/2012    Of Acne   • ENDOSCOPY  09/01/2015    EGD w/ biopsy -Multiple polyps, one removed.   • ENDOSCOPY N/A 8/22/2019    Procedure: ESOPHAGOGASTRODUODENOSCOPY;  Surgeon: Chuck Rich DO;  Location: [facility] ENDOSCOPY;  Service: Gastroenterology   • HEMORRHOIDECTOMY N/A 3/20/2017    Procedure: Removal of Anal Papilla;  Surgeon: Juan Diego Ken MD;  Location: [facility] OR;  Service:    • INJECTION OF MEDICATION  09/14/2010    B12   • INJECTION OF MEDICATION  10/17/2011    Kenalog   • LUMBAR LAMINECTOMY  09/29/2010   • OTHER SURGICAL HISTORY  08/19/2010    Biopsy, Each Additional Lesion    • SHOULDER ROTATOR CUFF REPAIR Right 02/17/2020   • SKIN BIOPSY  08/19/2010   • TOTAL SHOULDER ARTHROPLASTY W/ DISTAL CLAVICLE EXCISION Right 2/17/2020    Procedure: right reverse total shoulder arthroplasty.;  Surgeon: Juan Diego Mendoza MD;  Location: [facility] OR;  Service: Orthopedics;  Laterality: Right;   • TOTAL SHOULDER REVERSE ARTHROPLASTY Right  02/17/2020           PT Assessment/Plan     Row Name 04/06/20 0715          PT Assessment    Assessment Comments  pt chon tx well, bit of difficulty with ER ROM.   -PD        PT Plan    PT Frequency  2x/week  -PD     Predicted Duration of Therapy Intervention (Therapy Eval)  12-16 weeks  -PD     PT Plan Comments  advance to 8 weeks post op next session.  -PD       User Key  (r) = Recorded By, (t) = Taken By, (c) = Cosigned By    Initials Name Provider Type    Won Zavala, PT Physical Therapist            OP Exercises     Row Name 04/06/20 0715             Subjective Comments    Subjective Comments  pt reports feeling a bit better, a little sore from last session.  -PD         Subjective Pain    Able to rate subjective pain?  yes  -PD      Pre-Treatment Pain Level  0  -PD      Post-Treatment Pain Level  0  -PD         Exercise 1    Exercise Name 1  Pro2, Seat 9, UE, F/R, strength/endurance  -PD      Time 1  10 min  -PD      Additional Comments  level 1  -PD         Exercise 2    Exercise Name 2  IR with cybex  -PD      Sets 2  2  -PD      Reps 2  10  -PD      Additional Comments  1 plate  -PD         Exercise 3    Exercise Name 3  wall pushup  -PD      Sets 3  2  -PD      Reps 3  20  -PD         Exercise 4    Exercise Name 4  shoulder abd with dumbell   -PD      Sets 4  2  -PD      Reps 4  10  -PD      Additional Comments  2#  -PD         Exercise 5    Exercise Name 5  AAROM ER with stick  -PD      Sets 5  3  -PD      Reps 5  15  -PD         Exercise 6    Exercise Name 6  AAROM shoulder flex with stick  -PD      Sets 6  3  -PD      Reps 6  15  -PD         Exercise 7    Exercise Name 7  AAROM shoulder abd with stick  -PD      Sets 7  3  -PD      Reps 7  15  -PD         Exercise 8    Exercise Name 8  serratus punch  -PD      Sets 8  3  -PD      Reps 8  10  -PD      Additional Comments  2#  -PD        User Key  (r) = Recorded By, (t) = Taken By, (c) = Cosigned By    Initials Name Provider Type    Won Zavala,  PT Physical Therapist              PT OP Goals     Row Name 04/06/20 0715          PT Short Term Goals    STG Date to Achieve  04/10/20  -PD     STG 1  Passive flexion to be >/= 120 deg.  -PD     STG 1 Progress  Met  -PD     STG 2  Passive ER with arm at side to be 20 deg.  -PD     STG 2 Progress  Met  -PD     STG 3  Increase R cervical lateral flexion by >/= 6 degrees  -PD     STG 3 Progress  Progressing  -PD        Long Term Goals    LTG Date to Achieve  06/12/20  -PD     LTG 1  Independent with HEP.  -PD     LTG 1 Progress  Ongoing  -PD     LTG 2  QuickDASH score to be </= 35.  -PD     LTG 2 Progress  Met  -PD     LTG 3  R shoulder AROM WFLs all planes.  -PD     LTG 3 Progress  Ongoing  -PD     LTG 4  Report ability to use R UE for ADLs and light IADLs.  -PD     LTG 4 Progress  Ongoing  -PD     LTG 5  Note a >/= 50% improvement in instances of R UE radiculopathy  -PD     LTG 5 Progress  Met  -PD     LTG 6  Patient will be able to eat with right UE without difficulty.   -PD     LTG 6 Progress  New  -PD        Time Calculation    PT Goal Re-Cert Due Date  04/10/20  -PD       User Key  (r) = Recorded By, (t) = Taken By, (c) = Cosigned By    Initials Name Provider Type    PD Won Neely, PT Physical Therapist            Time Calculation:      Therapy Charges for Today     Code Description Service Date Service Provider Modifiers Qty    90300260267 HC PT THER PROC EA 15 MIN 4/6/2020 Won Neely, PT GP 3          Won Neely PT  4/6/2020

## 2020-04-09 ENCOUNTER — HOSPITAL ENCOUNTER (OUTPATIENT)
Dept: PHYSICAL THERAPY | Facility: HOSPITAL | Age: 75
Setting detail: THERAPIES SERIES
Discharge: HOME | End: 2020-04-09

## 2020-04-09 DIAGNOSIS — Z96.611 STATUS POST REVERSE TOTAL REPLACEMENT OF RIGHT SHOULDER: ICD-10-CM

## 2020-04-09 DIAGNOSIS — M12.811 ROTATOR CUFF ARTHROPATHY, RIGHT: Primary | ICD-10-CM

## 2020-04-09 PROCEDURE — 97110 THERAPEUTIC EXERCISES: CPT

## 2020-04-09 NOTE — THERAPY TREATMENT NOTE
Outpatient Physical Therapy Ortho Treatment Note  [facility]     Patient Name: Souleymane Stapleton  : 1945  MRN: [id number]  Today's Date: 2020      Visit Date: 2020  Attendance: 14/15 (Medical necessity)  Subjective Improvement: not rated this date  Next MD Appt: to be scheduled  Recert Date: 4/10/2020  Visit Dx:    ICD-10-CM ICD-9-CM   1. Rotator cuff arthropathy, right M12.811 716.81   2. Status post reverse total replacement of right shoulder Z96.611 V43.61       Patient Active Problem List   Diagnosis   • Astigmatism   • Anxiety state   • History of cerebrovascular accident   • Nuclear senile cataract   • Hypertension   • Unspecified hemorrhoids   • Palpitations   • Pure hypercholesterolemia   • Prostate cancer (CMS/HCC)   • Chronic right shoulder pain   • Rotator cuff syndrome, left   • Chronic left shoulder pain   • Impingement syndrome, shoulder, left   • SOB (shortness of breath)   • Angina of effort (CMS/HCC)   • Coronary artery disease involving coronary bypass graft of native heart without angina pectoris   • Cervical spinal stenosis   • Displacement of cervical intervertebral disc   • Displacement of lumbar intervertebral disc without myelopathy   • Follow-up examination after neurological surgery   • Internal carotid artery dissection (CMS/HCC)   • TIA (transient ischemic attack)   • Bacterial intestinal infection, unspecified   • Body mass index (bmi) 25.0-25.9, adult   • Diverticulosis of large intestine without perforation or abscess without bleeding   • Functional dyspepsia   • History of colonic polyps   • Polyp of stomach and duodenum   • Bilateral shoulder pain   • Rotator cuff arthropathy, right   • Status post reverse arthroplasty of right shoulder        Past Medical History:   Diagnosis Date   • Abdominal pain    • Abnormal weight loss    • Acid reflux    • Acute gastritis    • Anxiety state    • Arthritis    • Cancer (CMS/HCC)     Prostate   • Coronary artery  disease    • History of cerebrovascular accident    • History of colon polyps    • Hypertension    • Nausea    • Nuclear senile cataract    • Presbyopia    • Stroke (CMS/HCC) 2005    TIA   • Tobacco use    • Transient cerebral ischemia    • Vitreous detachment of left eye         Past Surgical History:   Procedure Laterality Date   • BACK SURGERY     • CARDIAC CATHETERIZATION N/A 6/19/2018    Procedure: Coronary angiography;  Surgeon: Delroy Mcconnell MD;  Location: [facility] CATH INVASIVE LOCATION;  Service: Cardiovascular   • COLONOSCOPY  06/09/2015    Diverticulosis found in the sigmoid colon. Hemorrhoids found in the anus.   • CORONARY ARTERY BYPASS GRAFT N/A 6/22/2018    Procedure: PARAS STERNOTOMY CORONARY ARTERY BYPASS GRAFT TIMES 5 USING RIGHT AND LEFT INTERNAL MAMMARY ARTERIES AND LEFT GREATER SAPHENOUS VEIN GRAFT PER ENDOSCOPIC VEIN HARVESTING AND PRP;  Surgeon: Edgar Pérez MD;  Location: [facility] OR;  Service: Cardiothoracic   • CRYOTHERAPY  08/14/2012    Of Acne   • ENDOSCOPY  09/01/2015    EGD w/ biopsy -Multiple polyps, one removed.   • ENDOSCOPY N/A 8/22/2019    Procedure: ESOPHAGOGASTRODUODENOSCOPY;  Surgeon: Chuck Rich DO;  Location: [facility] ENDOSCOPY;  Service: Gastroenterology   • HEMORRHOIDECTOMY N/A 3/20/2017    Procedure: Removal of Anal Papilla;  Surgeon: Juan Diego Ken MD;  Location: [facility] OR;  Service:    • INJECTION OF MEDICATION  09/14/2010    B12   • INJECTION OF MEDICATION  10/17/2011    Kenalog   • LUMBAR LAMINECTOMY  09/29/2010   • OTHER SURGICAL HISTORY  08/19/2010    Biopsy, Each Additional Lesion    • SHOULDER ROTATOR CUFF REPAIR Right 02/17/2020   • SKIN BIOPSY  08/19/2010   • TOTAL SHOULDER ARTHROPLASTY W/ DISTAL CLAVICLE EXCISION Right 2/17/2020    Procedure: right reverse total shoulder arthroplasty.;  Surgeon: Juan Diego Mendoza MD;  Location: [facility] OR;  Service: Orthopedics;  Laterality: Right;   • TOTAL SHOULDER REVERSE ARTHROPLASTY Right  02/17/2020       PT Ortho     Row Name 04/09/20 0800       Right Upper Ext    Rt Shoulder Abduction AROM  140  -PD    Rt Shoulder Flexion AROM  160  -PD      User Key  (r) = Recorded By, (t) = Taken By, (c) = Cosigned By    Initials Name Provider Type    Won Zavala, PT Physical Therapist            PT Assessment/Plan     Row Name 04/09/20 0800          PT Assessment    Assessment Comments  pt became a bit sore during today's session but is tolerated the tx very well.  -PD        PT Plan    PT Frequency  2x/week  -PD     Predicted Duration of Therapy Intervention (Therapy Eval)  12-16 weeks  -PD     PT Plan Comments  advance to 8 weeks post op next session.  -PD       User Key  (r) = Recorded By, (t) = Taken By, (c) = Cosigned By    Initials Name Provider Type    Won Zavala, PT Physical Therapist          Modalities     Row Name 04/09/20 0800             Ice    Ice Applied  Yes  -PD      Location  R shoulder  -PD      Rx Minutes  15 mins  -PD        User Key  (r) = Recorded By, (t) = Taken By, (c) = Cosigned By    Initials Name Provider Type    Wno Zavala, PT Physical Therapist        OP Exercises     Row Name 04/09/20 0800             Subjective Comments    Subjective Comments  pt reports that he was tight and stiff after doing some exercises yesterday but no pain, he iced it afterwards.   -PD         Subjective Pain    Able to rate subjective pain?  yes  -PD      Pre-Treatment Pain Level  0  -PD      Post-Treatment Pain Level  2  -PD         Exercise 1    Exercise Name 1  Pro2, Seat 9, UE, F/R, strength/endurance  -PD      Time 1  10 min  -PD      Additional Comments  level 2  -PD         Exercise 2    Exercise Name 2  T-band IR with arm at side  -PD      Sets 2  3  -PD      Reps 2  10  -PD      Additional Comments  blue t band  -PD         Exercise 3    Exercise Name 3  wall pushup  -PD      Sets 3  2  -PD      Reps 3  20  -PD         Exercise 4    Exercise Name 4  shoulder abd with dumbell    -PD      Sets 4  2  -PD      Reps 4  10  -PD      Additional Comments  2#  -PD         Exercise 5    Exercise Name 5  AAROM ER with stick  -PD      Sets 5  3  -PD      Reps 5  15  -PD         Exercise 6    Exercise Name 6  AAROM shoulder flex with stick  -PD      Sets 6  3  -PD      Reps 6  15  -PD         Exercise 7    Exercise Name 7  AAROM shoulder abd with stick  -PD      Sets 7  3  -PD      Reps 7  15  -PD         Exercise 8    Exercise Name 8  serratus punch  -PD      Sets 8  3  -PD      Reps 8  10  -PD      Additional Comments  3#  -PD         Exercise 9    Exercise Name 9  ER with yellow band  -PD      Sets 9  3  -PD      Reps 9  10  -PD         Exercise 10    Exercise Name 10  B shoulder flx with 3# wand  -PD      Sets 10  2  -PD      Reps 10  10  -PD         Exercise 11    Exercise Name 11  mid rows cybex  -PD      Sets 11  3  -PD      Reps 11  10  -PD      Additional Comments  4 plates  -PD         Exercise 12    Exercise Name 12  shoulder abd supine  -PD      Reps 12  3  -PD      Time 12  10  -PD         Exercise 13    Exercise Name 13  ice  -PD        User Key  (r) = Recorded By, (t) = Taken By, (c) = Cosigned By    Initials Name Provider Type    PD Won Neely, PT Physical Therapist              PT OP Goals     Row Name 04/09/20 0800          PT Short Term Goals    STG Date to Achieve  04/10/20  -PD     STG 1  Passive flexion to be >/= 120 deg.  -PD     STG 1 Progress  Met  -PD     STG 2  Passive ER with arm at side to be 20 deg.  -PD     STG 2 Progress  Met  -PD     STG 3  Increase R cervical lateral flexion by >/= 6 degrees  -PD     STG 3 Progress  Progressing  -PD        Long Term Goals    LTG Date to Achieve  06/12/20  -PD     LTG 1  Independent with HEP.  -PD     LTG 1 Progress  Ongoing  -PD     LTG 2  QuickDASH score to be </= 35.  -PD     LTG 2 Progress  Met  -PD     LTG 3  R shoulder AROM WFLs all planes.  -PD     LTG 3 Progress  Ongoing  -PD     LTG 4  Report ability to use R UE for ADLs  and light IADLs.  -PD     LTG 4 Progress  Ongoing  -PD     LTG 5  Note a >/= 50% improvement in instances of R UE radiculopathy  -PD     LTG 5 Progress  Met  -PD     LTG 6  Patient will be able to eat with right UE without difficulty.   -PD     LTG 6 Progress  New  -PD        Time Calculation    PT Goal Re-Cert Due Date  04/10/20  -PD       User Key  (r) = Recorded By, (t) = Taken By, (c) = Cosigned By    Initials Name Provider Type    Won Zavala, PT Physical Therapist            Time Calculation:      Therapy Charges for Today     Code Description Service Date Service Provider Modifiers Qty    12186161861 HC PT THER PROC EA 15 MIN 4/9/2020 Won Neely, PT GP 3            Won Neely PT  4/9/2020

## 2020-04-13 ENCOUNTER — HOSPITAL ENCOUNTER (OUTPATIENT)
Dept: PHYSICAL THERAPY | Facility: HOSPITAL | Age: 75
Setting detail: THERAPIES SERIES
Discharge: HOME | End: 2020-04-13

## 2020-04-13 DIAGNOSIS — M12.811 ROTATOR CUFF ARTHROPATHY, RIGHT: Primary | ICD-10-CM

## 2020-04-13 PROCEDURE — 97140 MANUAL THERAPY 1/> REGIONS: CPT

## 2020-04-13 PROCEDURE — 97110 THERAPEUTIC EXERCISES: CPT

## 2020-04-13 NOTE — THERAPY TREATMENT NOTE
Outpatient Physical Therapy Ortho Treatment Note  [facility]     Patient Name: Souleymane Stapleton  : 1945  MRN: [id number]  Today's Date: 2020      Visit Date: 2020  Attendance: 15/16 (Medical necessity)  Subjective Improvement: not rated this date  Next MD Appt: to be scheduled  Recert Date: 4/10/2020  Visit Dx:    ICD-10-CM ICD-9-CM   1. Rotator cuff arthropathy, right M12.811 716.81       Patient Active Problem List   Diagnosis   • Astigmatism   • Anxiety state   • History of cerebrovascular accident   • Nuclear senile cataract   • Hypertension   • Unspecified hemorrhoids   • Palpitations   • Pure hypercholesterolemia   • Prostate cancer (CMS/HCC)   • Chronic right shoulder pain   • Rotator cuff syndrome, left   • Chronic left shoulder pain   • Impingement syndrome, shoulder, left   • SOB (shortness of breath)   • Angina of effort (CMS/HCC)   • Coronary artery disease involving coronary bypass graft of native heart without angina pectoris   • Cervical spinal stenosis   • Displacement of cervical intervertebral disc   • Displacement of lumbar intervertebral disc without myelopathy   • Follow-up examination after neurological surgery   • Internal carotid artery dissection (CMS/HCC)   • TIA (transient ischemic attack)   • Bacterial intestinal infection, unspecified   • Body mass index (bmi) 25.0-25.9, adult   • Diverticulosis of large intestine without perforation or abscess without bleeding   • Functional dyspepsia   • History of colonic polyps   • Polyp of stomach and duodenum   • Bilateral shoulder pain   • Rotator cuff arthropathy, right   • Status post reverse arthroplasty of right shoulder        Past Medical History:   Diagnosis Date   • Abdominal pain    • Abnormal weight loss    • Acid reflux    • Acute gastritis    • Anxiety state    • Arthritis    • Cancer (CMS/HCC)     Prostate   • Coronary artery disease    • History of cerebrovascular accident    • History of colon polyps     • Hypertension    • Nausea    • Nuclear senile cataract    • Presbyopia    • Stroke (CMS/HCC) 2005    TIA   • Tobacco use    • Transient cerebral ischemia    • Vitreous detachment of left eye         Past Surgical History:   Procedure Laterality Date   • BACK SURGERY     • CARDIAC CATHETERIZATION N/A 6/19/2018    Procedure: Coronary angiography;  Surgeon: Delroy Mcconnell MD;  Location: [facility] CATH INVASIVE LOCATION;  Service: Cardiovascular   • COLONOSCOPY  06/09/2015    Diverticulosis found in the sigmoid colon. Hemorrhoids found in the anus.   • CORONARY ARTERY BYPASS GRAFT N/A 6/22/2018    Procedure: PARAS STERNOTOMY CORONARY ARTERY BYPASS GRAFT TIMES 5 USING RIGHT AND LEFT INTERNAL MAMMARY ARTERIES AND LEFT GREATER SAPHENOUS VEIN GRAFT PER ENDOSCOPIC VEIN HARVESTING AND PRP;  Surgeon: Edgar Pérez MD;  Location: [facility];  Service: Cardiothoracic   • CRYOTHERAPY  08/14/2012    Of Acne   • ENDOSCOPY  09/01/2015    EGD w/ biopsy -Multiple polyps, one removed.   • ENDOSCOPY N/A 8/22/2019    Procedure: ESOPHAGOGASTRODUODENOSCOPY;  Surgeon: Chuck Rich DO;  Location: [facility] ENDOSCOPY;  Service: Gastroenterology   • HEMORRHOIDECTOMY N/A 3/20/2017    Procedure: Removal of Anal Papilla;  Surgeon: Juan Diego Ken MD;  Location: [facility] OR;  Service:    • INJECTION OF MEDICATION  09/14/2010    B12   • INJECTION OF MEDICATION  10/17/2011    Kenalog   • LUMBAR LAMINECTOMY  09/29/2010   • OTHER SURGICAL HISTORY  08/19/2010    Biopsy, Each Additional Lesion    • SHOULDER ROTATOR CUFF REPAIR Right 02/17/2020   • SKIN BIOPSY  08/19/2010   • TOTAL SHOULDER ARTHROPLASTY W/ DISTAL CLAVICLE EXCISION Right 2/17/2020    Procedure: right reverse total shoulder arthroplasty.;  Surgeon: Juan Diego Mendoza MD;  Location: [facility] OR;  Service: Orthopedics;  Laterality: Right;   • TOTAL SHOULDER REVERSE ARTHROPLASTY Right 02/17/2020         PT Assessment/Plan     Row Name 04/13/20 0800          PT  Assessment    Assessment Comments  pt was sore at the end but chon tx well.   -PD        PT Plan    PT Frequency  2x/week  -PD     Predicted Duration of Therapy Intervention (Therapy Eval)  12-16 weeks  -PD     PT Plan Comments  add body blade. cont POC  -PD       User Key  (r) = Recorded By, (t) = Taken By, (c) = Cosigned By    Initials Name Provider Type    PD Won Neely, PT Physical Therapist            OP Exercises     Row Name 04/13/20 0800             Subjective Comments    Subjective Comments  pt reports that he's been doing the exercises twice a day and is a bit sore today.   -PD         Subjective Pain    Able to rate subjective pain?  yes  -PD      Pre-Treatment Pain Level  2  -PD         Exercise 1    Exercise Name 1  Pro2, Seat 9, UE, F/R, strength/endurance  -PD      Time 1  10 min  -PD      Additional Comments  level 2.2  -PD         Exercise 2    Exercise Name 2  doorway stretch arms low  -PD      Sets 2  3  -PD      Time 2  30se hold  -PD         Exercise 3    Exercise Name 3  ER wand to 60 deg  -PD      Sets 3  3  -PD      Reps 3  5  -PD      Time 3  10sec hold  -PD         Exercise 4    Exercise Name 4  flex wand  -PD      Sets 4  3  -PD      Reps 4  10  -PD         Exercise 5    Exercise Name 5  abd wand  -PD      Sets 5  3  -PD      Reps 5  10  -PD         Exercise 6    Exercise Name 6  body blade flex 90 deg  -PD      Sets 6  3  -PD      Time 6  30 sec  -PD         Exercise 7    Exercise Name 7  body blade abd in 90 deg  -PD      Sets 7  3  -PD      Time 7  30 sec   -PD         Exercise 8    Exercise Name 8  manual triceps stretch  -PD      Sets 8  3  -PD      Time 8  30 sec  -PD         Exercise 9    Exercise Name 9  manual STM to triceps  -PD      Time 9  7 min  -PD         Exercise 10    Exercise Name 10  mid rows cybex  -PD      Sets 10  3  -PD      Reps 10  10  -PD      Additional Comments  4 plates  -PD         Exercise 11    Exercise Name 11  ice for home.   -PD        User Key  (r) =  Recorded By, (t) = Taken By, (c) = Cosigned By    Initials Name Provider Type    PD Won Neely, PT Physical Therapist                PT OP Goals     Row Name 04/13/20 0800          PT Short Term Goals    STG Date to Achieve  04/10/20  -PD     STG 1  Passive flexion to be >/= 120 deg.  -PD     STG 1 Progress  Met  -PD     STG 2  Passive ER with arm at side to be 20 deg.  -PD     STG 2 Progress  Met  -PD     STG 3  Increase R cervical lateral flexion by >/= 6 degrees  -PD     STG 3 Progress  Progressing  -PD        Long Term Goals    LTG Date to Achieve  06/12/20  -PD     LTG 1  Independent with HEP.  -PD     LTG 1 Progress  Ongoing  -PD     LTG 2  QuickDASH score to be </= 35.  -PD     LTG 2 Progress  Met  -PD     LTG 3  R shoulder AROM WFLs all planes.  -PD     LTG 3 Progress  Ongoing  -PD     LTG 4  Report ability to use R UE for ADLs and light IADLs.  -PD     LTG 4 Progress  Ongoing  -PD     LTG 5  Note a >/= 50% improvement in instances of R UE radiculopathy  -PD     LTG 5 Progress  Met  -PD     LTG 6  Patient will be able to eat with right UE without difficulty.   -PD     LTG 6 Progress  New  -PD        Time Calculation    PT Goal Re-Cert Due Date  04/10/20  -PD       User Key  (r) = Recorded By, (t) = Taken By, (c) = Cosigned By    Initials Name Provider Type    PD Won Neely, PT Physical Therapist              Time Calculation:      Therapy Charges for Today     Code Description Service Date Service Provider Modifiers Qty    50443931116 HC PT MANUAL THERAPY EA 15 MIN 4/13/2020 Won Neely, PT GP 1    78943949901 HC PT THER PROC EA 15 MIN 4/13/2020 Won Neely, PT GP 2              Won Neely PT  4/13/2020

## 2020-04-16 ENCOUNTER — HOSPITAL ENCOUNTER (OUTPATIENT)
Dept: PHYSICAL THERAPY | Facility: HOSPITAL | Age: 75
Setting detail: THERAPIES SERIES
Discharge: HOME | End: 2020-04-16

## 2020-04-16 DIAGNOSIS — M12.811 ROTATOR CUFF ARTHROPATHY, RIGHT: Primary | ICD-10-CM

## 2020-04-16 PROCEDURE — 97110 THERAPEUTIC EXERCISES: CPT

## 2020-04-16 NOTE — THERAPY TREATMENT NOTE
Outpatient Physical Therapy Ortho Treatment Note  [facility]     Patient Name: Souleymane Stapleton  : 1945  MRN: [id number]  Today's Date: 2020      Visit Date: 2020  Attendance:  (Medical necessity)  Subjective Improvement: 70%  Next MD Appt: to be scheduled  Recert Date: 4/10/2020  Visit Dx:    ICD-10-CM ICD-9-CM   1. Rotator cuff arthropathy, right M12.811 716.81       Patient Active Problem List   Diagnosis   • Astigmatism   • Anxiety state   • History of cerebrovascular accident   • Nuclear senile cataract   • Hypertension   • Unspecified hemorrhoids   • Palpitations   • Pure hypercholesterolemia   • Prostate cancer (CMS/HCC)   • Chronic right shoulder pain   • Rotator cuff syndrome, left   • Chronic left shoulder pain   • Impingement syndrome, shoulder, left   • SOB (shortness of breath)   • Angina of effort (CMS/HCC)   • Coronary artery disease involving coronary bypass graft of native heart without angina pectoris   • Cervical spinal stenosis   • Displacement of cervical intervertebral disc   • Displacement of lumbar intervertebral disc without myelopathy   • Follow-up examination after neurological surgery   • Internal carotid artery dissection (CMS/HCC)   • TIA (transient ischemic attack)   • Bacterial intestinal infection, unspecified   • Body mass index (bmi) 25.0-25.9, adult   • Diverticulosis of large intestine without perforation or abscess without bleeding   • Functional dyspepsia   • History of colonic polyps   • Polyp of stomach and duodenum   • Bilateral shoulder pain   • Rotator cuff arthropathy, right   • Status post reverse arthroplasty of right shoulder        Past Medical History:   Diagnosis Date   • Abdominal pain    • Abnormal weight loss    • Acid reflux    • Acute gastritis    • Anxiety state    • Arthritis    • Cancer (CMS/HCC)     Prostate   • Coronary artery disease    • History of cerebrovascular accident    • History of colon polyps    • Hypertension     • Nausea    • Nuclear senile cataract    • Presbyopia    • Stroke (CMS/HCC) 2005    TIA   • Tobacco use    • Transient cerebral ischemia    • Vitreous detachment of left eye         Past Surgical History:   Procedure Laterality Date   • BACK SURGERY     • CARDIAC CATHETERIZATION N/A 6/19/2018    Procedure: Coronary angiography;  Surgeon: Delroy Mcconnell MD;  Location: [facility] CATH INVASIVE LOCATION;  Service: Cardiovascular   • COLONOSCOPY  06/09/2015    Diverticulosis found in the sigmoid colon. Hemorrhoids found in the anus.   • CORONARY ARTERY BYPASS GRAFT N/A 6/22/2018    Procedure: PARAS STERNOTOMY CORONARY ARTERY BYPASS GRAFT TIMES 5 USING RIGHT AND LEFT INTERNAL MAMMARY ARTERIES AND LEFT GREATER SAPHENOUS VEIN GRAFT PER ENDOSCOPIC VEIN HARVESTING AND PRP;  Surgeon: Edgar Pérez MD;  Location: [facility] OR;  Service: Cardiothoracic   • CRYOTHERAPY  08/14/2012    Of Acne   • ENDOSCOPY  09/01/2015    EGD w/ biopsy -Multiple polyps, one removed.   • ENDOSCOPY N/A 8/22/2019    Procedure: ESOPHAGOGASTRODUODENOSCOPY;  Surgeon: Chuck Rich DO;  Location: [facility] ENDOSCOPY;  Service: Gastroenterology   • HEMORRHOIDECTOMY N/A 3/20/2017    Procedure: Removal of Anal Papilla;  Surgeon: Juan Diego Ken MD;  Location: [facility] OR;  Service:    • INJECTION OF MEDICATION  09/14/2010    B12   • INJECTION OF MEDICATION  10/17/2011    Kenalog   • LUMBAR LAMINECTOMY  09/29/2010   • OTHER SURGICAL HISTORY  08/19/2010    Biopsy, Each Additional Lesion    • SHOULDER ROTATOR CUFF REPAIR Right 02/17/2020   • SKIN BIOPSY  08/19/2010   • TOTAL SHOULDER ARTHROPLASTY W/ DISTAL CLAVICLE EXCISION Right 2/17/2020    Procedure: right reverse total shoulder arthroplasty.;  Surgeon: Juan Diego Mendoza MD;  Location: [facility] OR;  Service: Orthopedics;  Laterality: Right;   • TOTAL SHOULDER REVERSE ARTHROPLASTY Right 02/17/2020       PT Ortho     Row Name 04/16/20 0800       Subjective Pain    Post-Treatment Pain  Level  0  -PD       Right Upper Ext    Rt Shoulder Abduction AROM  145  -PD    Rt Shoulder Flexion AROM  160  -PD      User Key  (r) = Recorded By, (t) = Taken By, (c) = Cosigned By    Initials Name Provider Type    Won Zavala, PT Physical Therapist            PT Assessment/Plan     Row Name 04/16/20 0800          PT Assessment    Assessment Comments  pt feels the muscles working and reported that he can do more with the R arm. chon tx very well today.  -PD        PT Plan    PT Frequency  2x/week  -PD     Predicted Duration of Therapy Intervention (Therapy Eval)  12-16 weeks  -PD     PT Plan Comments  cont POC  -PD       User Key  (r) = Recorded By, (t) = Taken By, (c) = Cosigned By    Initials Name Provider Type    Won Zavala PT Physical Therapist          Modalities     Row Name 04/16/20 0800             Ice    Ice Applied  Yes  -PD      Location  R shoulder  -PD      Rx Minutes  15 mins  -PD        User Key  (r) = Recorded By, (t) = Taken By, (c) = Cosigned By    Initials Name Provider Type    Won Zavala PT Physical Therapist        OP Exercises     Row Name 04/16/20 0800             Subjective Comments    Subjective Comments  pt reports that he was a little bit sore form last session.   -PD         Subjective Pain    Able to rate subjective pain?  yes  -PD      Pre-Treatment Pain Level  0  -PD      Post-Treatment Pain Level  0  -PD         Exercise 1    Exercise Name 1  Pro2, Seat 9, UE, F/R, strength/endurance  -PD      Time 1  10 min   -PD      Additional Comments  level 2.2  -PD         Exercise 2    Exercise Name 2  doorway stretch arms low  -PD      Sets 2  3  -PD      Time 2  30se hold  -PD         Exercise 3    Exercise Name 3  towel stretch for triceps  -PD      Sets 3  3  -PD      Time 3  30sec hold  -PD         Exercise 4    Exercise Name 4  wall push up with yoga ball  -PD      Sets 4  3  -PD      Reps 4  10  -PD      Additional Comments  -- thumb was hurting a little bit d/t  arthritis.   -PD         Exercise 5    Exercise Name 5  cybex rows  -PD      Sets 5  3  -PD      Reps 5  15  -PD      Additional Comments  4 plates  -PD         Exercise 6    Exercise Name 6  cybex lat pull down  -PD      Sets 6  3  -PD      Reps 6  15  -PD      Additional Comments  4 plates  -PD         Exercise 7    Exercise Name 7  body blade abd in 90 deg  -PD      Sets 7  3  -PD      Time 7  30 sec   -PD         Exercise 8    Exercise Name 8  body blade flex 90 deg  -PD      Sets 8  3  -PD      Time 8  30 sec  -PD         Exercise 9    Exercise Name 9  body blade ER arm at side  -PD      Sets 9  3  -PD      Time 9  30sec   -PD         Exercise 10    Exercise Name 10   eccentril shoulder flexion  -PD      Sets 10  1  -PD      Reps 10  10  -PD      Additional Comments  3 plates  -PD         Exercise 11    Exercise Name 11  ER ball toss   -PD      Sets 11  1  -PD      Reps 11  25  -PD      Additional Comments  2#  -PD         Exercise 12    Exercise Name 12  ice   -PD        User Key  (r) = Recorded By, (t) = Taken By, (c) = Cosigned By    Initials Name Provider Type    PD Won Neely, PT Physical Therapist              PT OP Goals     Row Name 04/16/20 0800          PT Short Term Goals    STG Date to Achieve  04/10/20  -PD     STG 1  Passive flexion to be >/= 120 deg.  -PD     STG 1 Progress  Met  -PD     STG 2  Passive ER with arm at side to be 20 deg.  -PD     STG 2 Progress  Met  -PD     STG 3  Increase R cervical lateral flexion by >/= 6 degrees  -PD     STG 3 Progress  Progressing  -PD        Long Term Goals    LTG Date to Achieve  06/12/20  -PD     LTG 1  Independent with HEP.  -PD     LTG 1 Progress  Ongoing  -PD     LTG 2  QuickDASH score to be </= 35.  -PD     LTG 2 Progress  Met  -PD     LTG 3  R shoulder AROM WFLs all planes.  -PD     LTG 3 Progress  Ongoing  -PD     LTG 4  Report ability to use R UE for ADLs and light IADLs.  -PD     LTG 4 Progress  Ongoing  -PD     LTG 5  Note a >/= 50%  improvement in instances of R UE radiculopathy  -PD     LTG 5 Progress  Met  -PD     LTG 6  Patient will be able to eat with right UE without difficulty.   -PD     LTG 6 Progress  New  -PD        Time Calculation    PT Goal Re-Cert Due Date  04/10/20  -PD       User Key  (r) = Recorded By, (t) = Taken By, (c) = Cosigned By    Initials Name Provider Type    PD Won Neely, PT Physical Therapist            Time Calculation:      Therapy Charges for Today     Code Description Service Date Service Provider Modifiers Qty    41604965319 HC PT THER PROC EA 15 MIN 4/16/2020 Won Neely, PT GP 3                Won Neely, PT  4/16/2020

## 2020-04-17 ENCOUNTER — HOSPITAL ENCOUNTER (OUTPATIENT)
Dept: PHYSICAL THERAPY | Facility: HOSPITAL | Age: 75
Setting detail: THERAPIES SERIES
Discharge: HOME | End: 2020-04-17

## 2020-04-17 DIAGNOSIS — Z96.611 STATUS POST REVERSE TOTAL REPLACEMENT OF RIGHT SHOULDER: ICD-10-CM

## 2020-04-17 DIAGNOSIS — M12.811 ROTATOR CUFF ARTHROPATHY, RIGHT: Primary | ICD-10-CM

## 2020-04-17 PROCEDURE — 97110 THERAPEUTIC EXERCISES: CPT | Performed by: PHYSICAL THERAPIST

## 2020-04-17 NOTE — THERAPY PROGRESS REPORT/RE-CERT
Outpatient Physical Therapy Ortho Progress Note  [facility]     Patient Name: Souleymane Stapleton  : 1945  MRN: [id number]  Today's Date: 2020      Visit Date: 2020  Attendance:  (Medical necessity)  Subjective Improvement: 80%  Next MD Appt: none scheduled with Ortho  Recert Date: 2020     Therapy Diagnosis: R reverse total shoulder arthroplasty 2020     Changes in Medications: none noted  Changes in MD Orders: none noted  Number of Work Days Lost: n/a         Past Medical History:   Diagnosis Date   • Abdominal pain    • Abnormal weight loss    • Acid reflux    • Acute gastritis    • Anxiety state    • Arthritis    • Cancer (CMS/HCC)     Prostate   • Coronary artery disease    • History of cerebrovascular accident    • History of colon polyps    • Hypertension    • Nausea    • Nuclear senile cataract    • Presbyopia    • Stroke (CMS/HCC) 2005    TIA   • Tobacco use    • Transient cerebral ischemia    • Vitreous detachment of left eye         Past Surgical History:   Procedure Laterality Date   • BACK SURGERY     • CARDIAC CATHETERIZATION N/A 2018    Procedure: Coronary angiography;  Surgeon: Delroy Mcconnell MD;  Location: [facility] CATH INVASIVE LOCATION;  Service: Cardiovascular   • COLONOSCOPY  2015    Diverticulosis found in the sigmoid colon. Hemorrhoids found in the anus.   • CORONARY ARTERY BYPASS GRAFT N/A 2018    Procedure: PARAS STERNOTOMY CORONARY ARTERY BYPASS GRAFT TIMES 5 USING RIGHT AND LEFT INTERNAL MAMMARY ARTERIES AND LEFT GREATER SAPHENOUS VEIN GRAFT PER ENDOSCOPIC VEIN HARVESTING AND PRP;  Surgeon: Edgar Pérez MD;  Location: [facility];  Service: Cardiothoracic   • CRYOTHERAPY  2012    Of Acne   • ENDOSCOPY  2015    EGD w/ biopsy -Multiple polyps, one removed.   • ENDOSCOPY N/A 2019    Procedure: ESOPHAGOGASTRODUODENOSCOPY;  Surgeon: Chuck Rich DO;  Location: [facility] ENDOSCOPY;  Service: Gastroenterology  "  • HEMORRHOIDECTOMY N/A 3/20/2017    Procedure: Removal of Anal Papilla;  Surgeon: Juan Diego Ken MD;  Location: [facility];  Service:    • INJECTION OF MEDICATION  09/14/2010    B12   • INJECTION OF MEDICATION  10/17/2011    Kenalog   • LUMBAR LAMINECTOMY  09/29/2010   • OTHER SURGICAL HISTORY  08/19/2010    Biopsy, Each Additional Lesion    • SHOULDER ROTATOR CUFF REPAIR Right 02/17/2020   • SKIN BIOPSY  08/19/2010   • TOTAL SHOULDER ARTHROPLASTY W/ DISTAL CLAVICLE EXCISION Right 2/17/2020    Procedure: right reverse total shoulder arthroplasty.;  Surgeon: Juan Diego Mendoza MD;  Location: [facility];  Service: Orthopedics;  Laterality: Right;   • TOTAL SHOULDER REVERSE ARTHROPLASTY Right 02/17/2020       Visit Dx:     ICD-10-CM ICD-9-CM   1. Rotator cuff arthropathy, right M12.811 716.81   2. Status post reverse total replacement of right shoulder Z96.611 V43.61             PT Ortho     Row Name 04/17/20 0800       Subjective Comments    Subjective Comments  B thumb stay sore constantly due to arthritis and increased use. Right shoulder stiffness and soreness. Shoulder progressing \"good.\" Motion doing better. Continued trouble placing hand to mouth and reaching behind his back. 80% subjective improvement.   -SS       Precautions and Contraindications    Precautions  R rev total shoulder 2-  -SS       Subjective Pain    Able to rate subjective pain?  yes  -SS    Pre-Treatment Pain Level  0  -SS    Post-Treatment Pain Level  0  -SS       Right Upper Ext    Rt Shoulder Abduction AROM  122 deg  -SS    Rt Shoulder Extension AROM  38 deg  -SS    Rt Shoulder Flexion AROM  140 deg  -SS    Rt Shoulder External Rotation PROM  Functional ER: hand to top of head; 62 deg in supine with shoulder abducted 45 deg; 71 deg in supine 90/90  -SS    Rt Shoulder Internal Rotation PROM  Functional IR: hand to R glut/ischial tuberosity; 70 deg in supine with shoulder abducted 45 deg; 68 deg in supine 90/90  -SS       " MMT Right Upper Ext    Rt Upper Extremity Comments   MMT deferred  -      User Key  (r) = Recorded By, (t) = Taken By, (c) = Cosigned By    Initials Name Provider Type    Raudel Godinez, PT DPT Physical Therapist          Therapy Education  Given: HEP  Program: Reinforced  How Provided: Verbal  Provided to: Patient  Level of Understanding: Verbalized     PT OP Goals     Row Name 04/17/20 0800          PT Short Term Goals    STG Date to Achieve  04/10/20  -     STG 1  Passive flexion to be >/= 120 deg.  -     STG 1 Progress  Met  -     STG 2  Passive ER with arm at side to be 20 deg.  -     STG 2 Progress  Met  -     STG 3  Increase R cervical lateral flexion by >/= 6 degrees  -     STG 3 Progress  Goal Revised  -     STG 3 Progress Comments  goal deleted going forward  -     STG 4  R shoulder extension to be >/= 45 deg.  -     STG 4 Progress  New  -        Long Term Goals    LTG Date to Achieve  06/12/20  -     LTG 1  Independent with HEP.  -     LTG 1 Progress  Not Met;Ongoing  -     LTG 2  QuickDASH score to be </= 35.  -     LTG 2 Progress  Met  -     LTG 3  R shoulder AROM WFLs all planes.  -     LTG 3 Progress  Not Met;Ongoing  -     LTG 4  Report ability to use R UE for ADLs and light IADLs.  -     LTG 4 Progress  Ongoing  -     LTG 5  Note a >/= 50% improvement in instances of R UE radiculopathy  -     LTG 5 Progress  Met  -     LTG 6  Patient will be able to eat with right UE without difficulty.   -     LTG 6 Progress  Not Met;Ongoing  -        Time Calculation    PT Goal Re-Cert Due Date  05/08/20  -       User Key  (r) = Recorded By, (t) = Taken By, (c) = Cosigned By    Initials Name Provider Type    Raudel Godinez, PT DPT Physical Therapist          PT Assessment/Plan     Row Name 04/17/20 0800          PT Assessment    Functional Limitations  Limitation in home management;Limitations in community activities;Limitations in functional  "capacity and performance;Performance in leisure activities;Performance in self-care ADL  -     Impairments  Pain;Range of motion;Muscle strength;Dexterity  -     Assessment Comments  Progressing well. Limitations most related to loss of functional ER & IR. Tolerates treatment well. Pain 2/10 after therex.  -     Rehab Potential  Good  -SS     Patient/caregiver participated in establishment of treatment plan and goals  Yes  -SS     Patient would benefit from skilled therapy intervention  Yes  -SS        PT Plan    PT Frequency  2x/week  -     Predicted Duration of Therapy Intervention (Therapy Eval)  8-12 weeks  -     PT Plan Comments  Continue protocol. Progressive shoulder, UE, and scapular strengthening, stretching, ROM, ice as needed for pain.  -       User Key  (r) = Recorded By, (t) = Taken By, (c) = Cosigned By    Initials Name Provider Type    Raudel Godinez, PT DPT Physical Therapist          Modalities     Row Name 04/17/20 0800             Ice    Ice Applied  Yes  -SS      Location  R shoulder  -      Rx Minutes  10 mins  -SS      Ice S/P Rx  Yes  -SS        User Key  (r) = Recorded By, (t) = Taken By, (c) = Cosigned By    Initials Name Provider Type    Raudel Godinez, PT DPT Physical Therapist        OP Exercises     Row Name 04/17/20 0800             Precautions    Existing Precautions/Restrictions  -- R rev total shoulder 2-  -         Subjective Comments    Subjective Comments  B thumb stay sore constantly due to arthritis and increased use. Right shoulder stiffness and soreness. Shoulder progressing \"good.\" Motion doing better. Continued trouble placing hand to mouth and reaching behind his back. 80% subjective improvement.   -         Subjective Pain    Able to rate subjective pain?  yes  -SS      Pre-Treatment Pain Level  0  -SS      Post-Treatment Pain Level  0  -SS         Exercise 1    Exercise Name 1  Pro2, Seat 10, UE, F/R, strength/endurance  -  "     Cueing 1  Verbal  -SS      Time 1  10 mins  -SS      Additional Comments  Level 2.5  -SS         Exercise 2    Exercise Name 2  Doorway pec stretch  -SS      Cueing 2  Verbal  -SS      Sets 2  3  -SS      Time 2  30 sec hold  -SS         Exercise 3    Exercise Name 3  Wipers (functional IR)  -SS      Cueing 3  Verbal;Demo  -SS      Sets 3  1  -SS      Reps 3  15  -SS         Exercise 4    Exercise Name 4  Wand biceps stretch  -SS      Cueing 4  Verbal;Demo  -SS      Sets 4  3  -SS      Time 4  30 sec hold  -SS         Exercise 5    Exercise Name 5  DB shoulder flexion  -SS      Cueing 5  Verbal;Demo  -SS      Sets 5  2  -SS      Reps 5  15  -SS      Additional Comments  1#  -SS         Exercise 6    Exercise Name 6  DB scaption to 70 deg  -SS      Cueing 6  Verbal;Demo  -SS      Sets 6  2  -SS      Reps 6  15  -SS      Additional Comments  1#  -SS         Exercise 7    Exercise Name 7   shoulder extension  -SS      Cueing 7  Verbal;Demo  -SS      Sets 7  3  -SS      Reps 7  10  -SS      Additional Comments  1 plate  -SS         Exercise 8    Exercise Name 8   single arm row  -SS      Cueing 8  Verbal;Demo  -SS      Sets 8  2  -SS      Reps 8  10  -SS      Additional Comments  2 plates  -SS         Exercise 9    Exercise Name 9  Triceps stretch  -SS      Cueing 9  Tactile  -SS      Sets 9  3  -SS      Time 9  30 sec  -SS         Exercise 10    Exercise Name 10  Manually resisted elbow extension with eccentric return with shoulder at approximately 30 deg  -SS      Cueing 10  Verbal;Tactile  -SS      Sets 10  1  -SS      Reps 10  5  -SS        User Key  (r) = Recorded By, (t) = Taken By, (c) = Cosigned By    Initials Name Provider Type    Raudel Godinez PT DPT Physical Therapist        Time Calculation:     Start Time: 0758  Stop Time: 0903  Time Calculation (min): 65 min  Total Timed Code Minutes- PT: 54 minute(s)     Therapy Charges for Today     Code Description Service Date Service  Provider Modifiers Qty    20813707425 HC PT THER PROC EA 15 MIN 4/17/2020 Raudel Bell, PT DPT GP 4    05447391389 HC PT THER SUPP EA 15 MIN 4/17/2020 Raudel Bell, PT DPT GP 1                    Raudel Bell, PT, DPT, CHT  4/17/2020

## 2020-04-20 ENCOUNTER — HOSPITAL ENCOUNTER (OUTPATIENT)
Dept: PHYSICAL THERAPY | Facility: HOSPITAL | Age: 75
Setting detail: THERAPIES SERIES
Discharge: HOME | End: 2020-04-20

## 2020-04-20 DIAGNOSIS — M12.811 ROTATOR CUFF ARTHROPATHY, RIGHT: Primary | ICD-10-CM

## 2020-04-20 PROCEDURE — 97110 THERAPEUTIC EXERCISES: CPT

## 2020-04-20 NOTE — THERAPY TREATMENT NOTE
Outpatient Physical Therapy Ortho Treatment Note  [facility]     Patient Name: Souleymane Stapleton  : 1945  MRN: [id number]  Today's Date: 2020      Visit Date: 2020  Attendance:  (Medical necessity)  Subjective Improvement: 80%  Next MD Appt: none scheduled with Ortho  Recert Date: 2020  Visit Dx:    ICD-10-CM ICD-9-CM   1. Rotator cuff arthropathy, right M12.811 716.81       Patient Active Problem List   Diagnosis   • Astigmatism   • Anxiety state   • History of cerebrovascular accident   • Nuclear senile cataract   • Hypertension   • Unspecified hemorrhoids   • Palpitations   • Pure hypercholesterolemia   • Prostate cancer (CMS/HCC)   • Chronic right shoulder pain   • Rotator cuff syndrome, left   • Chronic left shoulder pain   • Impingement syndrome, shoulder, left   • SOB (shortness of breath)   • Angina of effort (CMS/HCC)   • Coronary artery disease involving coronary bypass graft of native heart without angina pectoris   • Cervical spinal stenosis   • Displacement of cervical intervertebral disc   • Displacement of lumbar intervertebral disc without myelopathy   • Follow-up examination after neurological surgery   • Internal carotid artery dissection (CMS/HCC)   • TIA (transient ischemic attack)   • Bacterial intestinal infection, unspecified   • Body mass index (bmi) 25.0-25.9, adult   • Diverticulosis of large intestine without perforation or abscess without bleeding   • Functional dyspepsia   • History of colonic polyps   • Polyp of stomach and duodenum   • Bilateral shoulder pain   • Rotator cuff arthropathy, right   • Status post reverse arthroplasty of right shoulder        Past Medical History:   Diagnosis Date   • Abdominal pain    • Abnormal weight loss    • Acid reflux    • Acute gastritis    • Anxiety state    • Arthritis    • Cancer (CMS/HCC)     Prostate   • Coronary artery disease    • History of cerebrovascular accident    • History of colon polyps    •  Hypertension    • Nausea    • Nuclear senile cataract    • Presbyopia    • Stroke (CMS/HCC) 2005    TIA   • Tobacco use    • Transient cerebral ischemia    • Vitreous detachment of left eye         Past Surgical History:   Procedure Laterality Date   • BACK SURGERY     • CARDIAC CATHETERIZATION N/A 6/19/2018    Procedure: Coronary angiography;  Surgeon: Delroy Mcconnell MD;  Location: [facility] CATH INVASIVE LOCATION;  Service: Cardiovascular   • COLONOSCOPY  06/09/2015    Diverticulosis found in the sigmoid colon. Hemorrhoids found in the anus.   • CORONARY ARTERY BYPASS GRAFT N/A 6/22/2018    Procedure: PARAS STERNOTOMY CORONARY ARTERY BYPASS GRAFT TIMES 5 USING RIGHT AND LEFT INTERNAL MAMMARY ARTERIES AND LEFT GREATER SAPHENOUS VEIN GRAFT PER ENDOSCOPIC VEIN HARVESTING AND PRP;  Surgeon: Edgar Pérez MD;  Location: [facility] OR;  Service: Cardiothoracic   • CRYOTHERAPY  08/14/2012    Of Acne   • ENDOSCOPY  09/01/2015    EGD w/ biopsy -Multiple polyps, one removed.   • ENDOSCOPY N/A 8/22/2019    Procedure: ESOPHAGOGASTRODUODENOSCOPY;  Surgeon: Chuck Rich DO;  Location: [facility] ENDOSCOPY;  Service: Gastroenterology   • HEMORRHOIDECTOMY N/A 3/20/2017    Procedure: Removal of Anal Papilla;  Surgeon: Juan Diego Ken MD;  Location: [facility] OR;  Service:    • INJECTION OF MEDICATION  09/14/2010    B12   • INJECTION OF MEDICATION  10/17/2011    Kenalog   • LUMBAR LAMINECTOMY  09/29/2010   • OTHER SURGICAL HISTORY  08/19/2010    Biopsy, Each Additional Lesion    • SHOULDER ROTATOR CUFF REPAIR Right 02/17/2020   • SKIN BIOPSY  08/19/2010   • TOTAL SHOULDER ARTHROPLASTY W/ DISTAL CLAVICLE EXCISION Right 2/17/2020    Procedure: right reverse total shoulder arthroplasty.;  Surgeon: Juan Diego Mendoza MD;  Location: [facility] OR;  Service: Orthopedics;  Laterality: Right;   • TOTAL SHOULDER REVERSE ARTHROPLASTY Right 02/17/2020           PT Assessment/Plan     Row Name 04/20/20 0800          PT  Assessment    Functional Limitations  Limitation in home management;Limitations in community activities;Limitations in functional capacity and performance;Performance in leisure activities;Performance in self-care ADL  -PD     Impairments  Pain;Range of motion;Muscle strength;Dexterity  -PD     Assessment Comments  pt had a little difficulty with the eccentric shoulder abdution but tolerated tx well.   -PD     Rehab Potential  Good  -PD     Patient/caregiver participated in establishment of treatment plan and goals  Yes  -PD     Patient would benefit from skilled therapy intervention  Yes  -PD        PT Plan    PT Frequency  2x/week  -PD     Predicted Duration of Therapy Intervention (Therapy Eval)  8-12 weeks  -PD     PT Plan Comments  continue with protocol and POC.   -PD       User Key  (r) = Recorded By, (t) = Taken By, (c) = Cosigned By    Initials Name Provider Type    PD Won Neely, PT Physical Therapist            OP Exercises     Row Name 04/20/20 0800             Subjective Comments    Subjective Comments  pt reports that he is sore from the last session.   -PD         Subjective Pain    Able to rate subjective pain?  yes  -PD      Pre-Treatment Pain Level  0  -PD      Post-Treatment Pain Level  0  -PD         Exercise 1    Exercise Name 1  Pro2, Seat 10, UE, F/R, strength/endurance  -PD      Time 1  10 mins  -PD      Additional Comments  level 3.0  -PD         Exercise 2    Exercise Name 2  Doorway pec stretch  -PD      Sets 2  3  -PD      Time 2  30 sec hold  -PD         Exercise 3    Exercise Name 3  towel stretch for triceps  -PD      Sets 3  3  -PD      Time 3  30sec hold  -PD         Exercise 4    Exercise Name 4  wall push up with yoga ball  -PD      Sets 4  3  -PD      Reps 4  10  -PD         Exercise 5    Exercise Name 5  cybex rows  -PD      Sets 5  3  -PD      Reps 5  15  -PD      Additional Comments  4 plates  -PD         Exercise 6    Exercise Name 6  cybex lat pull down  -PD      Sets 6   3  -PD      Reps 6  15  -PD      Additional Comments  4 plates  -PD         Exercise 7    Exercise Name 7  body blade abd in 90 deg  -PD      Sets 7  3  -PD      Time 7  30 sec   -PD         Exercise 8    Exercise Name 8  body blade flex 90 deg  -PD      Sets 8  3  -PD      Time 8  30 sec  -PD         Exercise 9    Exercise Name 9  body blade ER arm at side  -PD      Sets 9  3  -PD      Time 9  30sec   -PD         Exercise 10    Exercise Name 10   eccentril shoulder flexion  -PD      Sets 10  1  -PD      Reps 10  15  -PD      Additional Comments  3 plates  -PD         Exercise 11    Exercise Name 11  ER ball toss   -PD      Sets 11  1  -PD      Reps 11  25  -PD      Additional Comments  2#  -PD         Exercise 12    Exercise Name 12   eccentril shoulder flexion  -PD      Reps 12  15  -PD      Additional Comments  3 plates  -PD         Exercise 13    Exercise Name 13  ice for home  -PD        User Key  (r) = Recorded By, (t) = Taken By, (c) = Cosigned By    Initials Name Provider Type    PD Won Neely, PT Physical Therapist              PT OP Goals     Row Name 04/20/20 0800          PT Short Term Goals    STG Date to Achieve  04/10/20  -PD     STG 1  Passive flexion to be >/= 120 deg.  -PD     STG 1 Progress  Met  -PD     STG 2  Passive ER with arm at side to be 20 deg.  -PD     STG 2 Progress  Met  -PD     STG 3  Increase R cervical lateral flexion by >/= 6 degrees  -PD     STG 3 Progress  Goal Revised  -PD     STG 4  R shoulder extension to be >/= 45 deg.  -PD     STG 4 Progress  New  -PD        Long Term Goals    LTG Date to Achieve  06/12/20  -PD     LTG 1  Independent with HEP.  -PD     LTG 1 Progress  Not Met;Ongoing  -PD     LTG 2  QuickDASH score to be </= 35.  -PD     LTG 2 Progress  Met  -PD     LTG 3  R shoulder AROM WFLs all planes.  -PD     LTG 3 Progress  Not Met;Ongoing  -PD     LTG 4  Report ability to use R UE for ADLs and light IADLs.  -PD     LTG 4 Progress  Ongoing  -PD     LTG 5   Note a >/= 50% improvement in instances of R UE radiculopathy  -PD     LTG 5 Progress  Met  -PD     LTG 6  Patient will be able to eat with right UE without difficulty.   -PD     LTG 6 Progress  Not Met;Ongoing  -PD        Time Calculation    PT Goal Re-Cert Due Date  05/08/20  -PD       User Key  (r) = Recorded By, (t) = Taken By, (c) = Cosigned By    Initials Name Provider Type    PD Won Neely, PT Physical Therapist            Time Calculation:      Therapy Charges for Today     Code Description Service Date Service Provider Modifiers Qty    98161784620 HC PT THER PROC EA 15 MIN 4/20/2020 Won Neely, PT GP 3            Won Neely PT  4/20/2020

## 2020-04-22 ENCOUNTER — HOSPITAL ENCOUNTER (OUTPATIENT)
Dept: PHYSICAL THERAPY | Facility: HOSPITAL | Age: 75
Setting detail: THERAPIES SERIES
Discharge: HOME | End: 2020-04-22

## 2020-04-22 DIAGNOSIS — Z96.611 STATUS POST REVERSE TOTAL REPLACEMENT OF RIGHT SHOULDER: ICD-10-CM

## 2020-04-22 DIAGNOSIS — M12.811 ROTATOR CUFF ARTHROPATHY, RIGHT: Primary | ICD-10-CM

## 2020-04-22 PROCEDURE — 97110 THERAPEUTIC EXERCISES: CPT

## 2020-04-22 NOTE — THERAPY TREATMENT NOTE
Outpatient Physical Therapy Ortho Treatment Note  [facility]     Patient Name: Souleymane Stapleton  : 1945  MRN: [id number]  Today's Date: 2020      Visit Date: 2020  Attendance:  (Medical necessity)  Subjective Improvement: 80%  Next MD Appt: none scheduled with Ortho  Recert Date: 2020  Visit Dx:    ICD-10-CM ICD-9-CM   1. Rotator cuff arthropathy, right M12.811 716.81   2. Status post reverse total replacement of right shoulder Z96.611 V43.61       Patient Active Problem List   Diagnosis   • Astigmatism   • Anxiety state   • History of cerebrovascular accident   • Nuclear senile cataract   • Hypertension   • Unspecified hemorrhoids   • Palpitations   • Pure hypercholesterolemia   • Prostate cancer (CMS/HCC)   • Chronic right shoulder pain   • Rotator cuff syndrome, left   • Chronic left shoulder pain   • Impingement syndrome, shoulder, left   • SOB (shortness of breath)   • Angina of effort (CMS/HCC)   • Coronary artery disease involving coronary bypass graft of native heart without angina pectoris   • Cervical spinal stenosis   • Displacement of cervical intervertebral disc   • Displacement of lumbar intervertebral disc without myelopathy   • Follow-up examination after neurological surgery   • Internal carotid artery dissection (CMS/HCC)   • TIA (transient ischemic attack)   • Bacterial intestinal infection, unspecified   • Body mass index (bmi) 25.0-25.9, adult   • Diverticulosis of large intestine without perforation or abscess without bleeding   • Functional dyspepsia   • History of colonic polyps   • Polyp of stomach and duodenum   • Bilateral shoulder pain   • Rotator cuff arthropathy, right   • Status post reverse arthroplasty of right shoulder        Past Medical History:   Diagnosis Date   • Abdominal pain    • Abnormal weight loss    • Acid reflux    • Acute gastritis    • Anxiety state    • Arthritis    • Cancer (CMS/HCC)     Prostate   • Coronary artery disease     • History of cerebrovascular accident    • History of colon polyps    • Hypertension    • Nausea    • Nuclear senile cataract    • Presbyopia    • Stroke (CMS/HCC) 2005    TIA   • Tobacco use    • Transient cerebral ischemia    • Vitreous detachment of left eye         Past Surgical History:   Procedure Laterality Date   • BACK SURGERY     • CARDIAC CATHETERIZATION N/A 6/19/2018    Procedure: Coronary angiography;  Surgeon: Delroy Mcconnell MD;  Location: [facility] CATH INVASIVE LOCATION;  Service: Cardiovascular   • COLONOSCOPY  06/09/2015    Diverticulosis found in the sigmoid colon. Hemorrhoids found in the anus.   • CORONARY ARTERY BYPASS GRAFT N/A 6/22/2018    Procedure: PARAS STERNOTOMY CORONARY ARTERY BYPASS GRAFT TIMES 5 USING RIGHT AND LEFT INTERNAL MAMMARY ARTERIES AND LEFT GREATER SAPHENOUS VEIN GRAFT PER ENDOSCOPIC VEIN HARVESTING AND PRP;  Surgeon: Edgar Pérez MD;  Location: [facility] OR;  Service: Cardiothoracic   • CRYOTHERAPY  08/14/2012    Of Acne   • ENDOSCOPY  09/01/2015    EGD w/ biopsy -Multiple polyps, one removed.   • ENDOSCOPY N/A 8/22/2019    Procedure: ESOPHAGOGASTRODUODENOSCOPY;  Surgeon: Chuck Rich DO;  Location: [facility] ENDOSCOPY;  Service: Gastroenterology   • HEMORRHOIDECTOMY N/A 3/20/2017    Procedure: Removal of Anal Papilla;  Surgeon: Juan Diego Ken MD;  Location: [facility] OR;  Service:    • INJECTION OF MEDICATION  09/14/2010    B12   • INJECTION OF MEDICATION  10/17/2011    Kenalog   • LUMBAR LAMINECTOMY  09/29/2010   • OTHER SURGICAL HISTORY  08/19/2010    Biopsy, Each Additional Lesion    • SHOULDER ROTATOR CUFF REPAIR Right 02/17/2020   • SKIN BIOPSY  08/19/2010   • TOTAL SHOULDER ARTHROPLASTY W/ DISTAL CLAVICLE EXCISION Right 2/17/2020    Procedure: right reverse total shoulder arthroplasty.;  Surgeon: Juan Diego Mendoza MD;  Location: [facility] OR;  Service: Orthopedics;  Laterality: Right;   • TOTAL SHOULDER REVERSE ARTHROPLASTY Right 02/17/2020        PT Ortho     Row Name 04/22/20 0800       Right Upper Ext    Rt Shoulder Abduction AROM  131  -PD    Rt Shoulder Flexion AROM  141  -PD    Rt Shoulder External Rotation PROM  22  -PD    Rt Shoulder Internal Rotation PROM  thumb at L2 after inching his way there  -PD      User Key  (r) = Recorded By, (t) = Taken By, (c) = Cosigned By    Initials Name Provider Type    Won Zavala, PT Physical Therapist              PT Assessment/Plan     Row Name 04/22/20 0800          PT Assessment    Functional Limitations  Limitation in home management;Limitations in community activities;Limitations in functional capacity and performance;Performance in leisure activities;Performance in self-care ADL  -PD     Impairments  Pain;Range of motion;Muscle strength;Dexterity  -PD     Assessment Comments  pt had some soreness with some of the exercises but chon tx well.   -PD     Rehab Potential  Good  -PD     Patient/caregiver participated in establishment of treatment plan and goals  Yes  -PD     Patient would benefit from skilled therapy intervention  Yes  -PD        PT Plan    PT Frequency  2x/week  -PD     Predicted Duration of Therapy Intervention (Therapy Eval)  8-12 weeks  -PD     PT Plan Comments  cont with POC  -PD       User Key  (r) = Recorded By, (t) = Taken By, (c) = Cosigned By    Initials Name Provider Type    PD Won Neely, PT Physical Therapist            OP Exercises     Row Name 04/22/20 0800             Subjective Comments    Subjective Comments  pt reports that he is sore. He painted some farm stauffer and that didn't help the pain.   -PD         Subjective Pain    Able to rate subjective pain?  yes  -PD      Pre-Treatment Pain Level  0  -PD      Post-Treatment Pain Level  0  -PD         Exercise 1    Exercise Name 1  Pro2, Seat 10, UE, F/R, strength/endurance  -PD      Time 1  10 mins  -PD      Additional Comments  level 3.0  -PD         Exercise 2    Exercise Name 2  Doorway pec stretch  -PD      Sets 2   3  -PD      Time 2  30 sec hold  -PD         Exercise 3    Exercise Name 3  towel stretch for triceps  -PD      Sets 3  3  -PD      Time 3  30sec hold  -PD         Exercise 4    Exercise Name 4  wall push up with yoga ball  -PD      Sets 4  3  -PD      Reps 4  10  -PD         Exercise 5    Exercise Name 5  IR sidelying stretch  -PD      Sets 5  1  -PD      Reps 5  10  -PD      Time 5  5 sec hold  -PD         Exercise 6    Exercise Name 6  ER with wand  -PD      Sets 6  1  -PD      Reps 6  10  -PD         Exercise 7    Exercise Name 7  flexion with wand  -PD      Sets 7  1  -PD      Reps 7  10  -PD         Exercise 8    Exercise Name 8  abd with wand  -PD      Sets 8  1  -PD      Reps 8  10  -PD         Exercise 9    Exercise Name 9  body blade ER arm at side  -PD      Sets 9  3  -PD      Time 9  30 sec   -PD         Exercise 10    Exercise Name 10  bodybald flx  -PD      Sets 10  3  -PD      Time 10  30 sec  -PD         Exercise 11    Exercise Name 11  ER ball toss   -PD      Sets 11  1  -PD      Reps 11  25  -PD      Additional Comments  2#  -PD         Exercise 12    Exercise Name 12  ball toss overhead  -PD      Reps 12  1  -PD      Time 12  25  -PD      Additional Comments  4#  -PD         Exercise 13    Exercise Name 13  ice for home  -PD         Exercise 14    Exercise Name 14  paraffin bath for hands  -PD        User Key  (r) = Recorded By, (t) = Taken By, (c) = Cosigned By    Initials Name Provider Type    PD Won Neely, PT Physical Therapist            PT OP Goals     Row Name 04/22/20 0800          PT Short Term Goals    STG Date to Achieve  04/10/20  -PD     STG 1  Passive flexion to be >/= 120 deg.  -PD     STG 1 Progress  Met  -PD     STG 2  Passive ER with arm at side to be 20 deg.  -PD     STG 2 Progress  Met  -PD     STG 3  Increase R cervical lateral flexion by >/= 6 degrees  -PD     STG 3 Progress  Goal Revised  -PD     STG 4  R shoulder extension to be >/= 45 deg.  -PD     STG 4 Progress   Ongoing  -PD        Long Term Goals    LTG Date to Achieve  06/12/20  -PD     LTG 1  Independent with HEP.  -PD     LTG 1 Progress  Not Met;Ongoing  -PD     LTG 2  QuickDASH score to be </= 35.  -PD     LTG 2 Progress  Met  -PD     LTG 3  R shoulder AROM WFLs all planes.  -PD     LTG 3 Progress  Not Met;Ongoing  -PD     LTG 4  Report ability to use R UE for ADLs and light IADLs.  -PD     LTG 4 Progress  Ongoing  -PD     LTG 5  Note a >/= 50% improvement in instances of R UE radiculopathy  -PD     LTG 5 Progress  Met  -PD     LTG 6  Patient will be able to eat with right UE without difficulty.   -PD     LTG 6 Progress  Not Met;Ongoing  -PD        Time Calculation    PT Goal Re-Cert Due Date  05/08/20  -PD       User Key  (r) = Recorded By, (t) = Taken By, (c) = Cosigned By    Initials Name Provider Type    PD Won Neely, PT Physical Therapist              Time Calculation:      Therapy Charges for Today     Code Description Service Date Service Provider Modifiers Qty    13616705879 HC PT THER PROC EA 15 MIN 4/22/2020 Won Neely, PT GP 3            Won Neely PT  4/22/2020

## 2020-04-23 ENCOUNTER — APPOINTMENT (OUTPATIENT)
Dept: PHYSICAL THERAPY | Facility: HOSPITAL | Age: 75
End: 2020-04-23

## 2020-04-24 ENCOUNTER — READMISSION MANAGEMENT (OUTPATIENT)
Dept: CALL CENTER | Facility: HOSPITAL | Age: 75
End: 2020-04-24

## 2020-04-24 ENCOUNTER — HOSPITAL ENCOUNTER (OUTPATIENT)
Dept: PHYSICAL THERAPY | Facility: HOSPITAL | Age: 75
Setting detail: THERAPIES SERIES
Discharge: HOME | End: 2020-04-24

## 2020-04-24 DIAGNOSIS — M12.811 ROTATOR CUFF ARTHROPATHY, RIGHT: Primary | ICD-10-CM

## 2020-04-24 DIAGNOSIS — Z96.611 STATUS POST REVERSE TOTAL REPLACEMENT OF RIGHT SHOULDER: ICD-10-CM

## 2020-04-24 PROCEDURE — 97110 THERAPEUTIC EXERCISES: CPT

## 2020-04-24 NOTE — THERAPY TREATMENT NOTE
Outpatient Physical Therapy Ortho Treatment Note  [facility]     Patient Name: Souleymane Stapleton  : 1945  MRN: [id number]  Today's Date: 2020      Visit Date: 2020  Attendance:  (Medical necessity)  Subjective Improvement: 80%  Next MD Appt: none scheduled with Ortho  Recert Date: 2020  Visit Dx:    ICD-10-CM ICD-9-CM   1. Rotator cuff arthropathy, right M12.811 716.81   2. Status post reverse total replacement of right shoulder Z96.611 V43.61       Patient Active Problem List   Diagnosis   • Astigmatism   • Anxiety state   • History of cerebrovascular accident   • Nuclear senile cataract   • Hypertension   • Unspecified hemorrhoids   • Palpitations   • Pure hypercholesterolemia   • Prostate cancer (CMS/HCC)   • Chronic right shoulder pain   • Rotator cuff syndrome, left   • Chronic left shoulder pain   • Impingement syndrome, shoulder, left   • SOB (shortness of breath)   • Angina of effort (CMS/HCC)   • Coronary artery disease involving coronary bypass graft of native heart without angina pectoris   • Cervical spinal stenosis   • Displacement of cervical intervertebral disc   • Displacement of lumbar intervertebral disc without myelopathy   • Follow-up examination after neurological surgery   • Internal carotid artery dissection (CMS/HCC)   • TIA (transient ischemic attack)   • Bacterial intestinal infection, unspecified   • Body mass index (bmi) 25.0-25.9, adult   • Diverticulosis of large intestine without perforation or abscess without bleeding   • Functional dyspepsia   • History of colonic polyps   • Polyp of stomach and duodenum   • Bilateral shoulder pain   • Rotator cuff arthropathy, right   • Status post reverse arthroplasty of right shoulder        Past Medical History:   Diagnosis Date   • Abdominal pain    • Abnormal weight loss    • Acid reflux    • Acute gastritis    • Anxiety state    • Arthritis    • Cancer (CMS/HCC)     Prostate   • Coronary artery disease     • History of cerebrovascular accident    • History of colon polyps    • Hypertension    • Nausea    • Nuclear senile cataract    • Presbyopia    • Stroke (CMS/HCC) 2005    TIA   • Tobacco use    • Transient cerebral ischemia    • Vitreous detachment of left eye         Past Surgical History:   Procedure Laterality Date   • BACK SURGERY     • CARDIAC CATHETERIZATION N/A 6/19/2018    Procedure: Coronary angiography;  Surgeon: Delroy Mcconnell MD;  Location: [facility] CATH INVASIVE LOCATION;  Service: Cardiovascular   • COLONOSCOPY  06/09/2015    Diverticulosis found in the sigmoid colon. Hemorrhoids found in the anus.   • CORONARY ARTERY BYPASS GRAFT N/A 6/22/2018    Procedure: PARAS STERNOTOMY CORONARY ARTERY BYPASS GRAFT TIMES 5 USING RIGHT AND LEFT INTERNAL MAMMARY ARTERIES AND LEFT GREATER SAPHENOUS VEIN GRAFT PER ENDOSCOPIC VEIN HARVESTING AND PRP;  Surgeon: Edgar Pérez MD;  Location: [facility] OR;  Service: Cardiothoracic   • CRYOTHERAPY  08/14/2012    Of Acne   • ENDOSCOPY  09/01/2015    EGD w/ biopsy -Multiple polyps, one removed.   • ENDOSCOPY N/A 8/22/2019    Procedure: ESOPHAGOGASTRODUODENOSCOPY;  Surgeon: Chuck Rich DO;  Location: [facility] ENDOSCOPY;  Service: Gastroenterology   • HEMORRHOIDECTOMY N/A 3/20/2017    Procedure: Removal of Anal Papilla;  Surgeon: Juan Diego Ken MD;  Location: [facility] OR;  Service:    • INJECTION OF MEDICATION  09/14/2010    B12   • INJECTION OF MEDICATION  10/17/2011    Kenalog   • LUMBAR LAMINECTOMY  09/29/2010   • OTHER SURGICAL HISTORY  08/19/2010    Biopsy, Each Additional Lesion    • SHOULDER ROTATOR CUFF REPAIR Right 02/17/2020   • SKIN BIOPSY  08/19/2010   • TOTAL SHOULDER ARTHROPLASTY W/ DISTAL CLAVICLE EXCISION Right 2/17/2020    Procedure: right reverse total shoulder arthroplasty.;  Surgeon: Juan Diego Mendoza MD;  Location: [facility] OR;  Service: Orthopedics;  Laterality: Right;   • TOTAL SHOULDER REVERSE ARTHROPLASTY Right 02/17/2020            PT Assessment/Plan     Row Name 04/24/20 0800          PT Assessment    Functional Limitations  Limitation in home management;Limitations in community activities;Limitations in functional capacity and performance;Performance in leisure activities;Performance in self-care ADL  -PD     Impairments  Pain;Range of motion;Muscle strength;Dexterity  -PD     Assessment Comments  pt chon tx very well today, no issues with any exercises.   -PD     Rehab Potential  Good  -PD     Patient/caregiver participated in establishment of treatment plan and goals  Yes  -PD     Patient would benefit from skilled therapy intervention  Yes  -PD        PT Plan    PT Frequency  2x/week  -PD     Predicted Duration of Therapy Intervention (Therapy Eval)  8-12 weeks  -PD     PT Plan Comments  cont with POC and progress with strengthening,.   -PD       User Key  (r) = Recorded By, (t) = Taken By, (c) = Cosigned By    Initials Name Provider Type    PD Won Neely, PT Physical Therapist            OP Exercises     Row Name 04/24/20 0800             Subjective Comments    Subjective Comments  pt reports no pain or soreness. Just a bit of tightness in the joints and in the tricep  -PD         Subjective Pain    Able to rate subjective pain?  yes  -PD      Pre-Treatment Pain Level  0  -PD      Post-Treatment Pain Level  0  -PD         Exercise 1    Exercise Name 1  Pro2, Seat 10, UE, F/R, strength/endurance  -PD      Time 1  10 mins  -PD      Additional Comments  level 3.5  -PD         Exercise 2    Exercise Name 2  Doorway pec stretch  -PD      Sets 2  3  -PD      Time 2  30 sec hold  -PD         Exercise 3    Exercise Name 3  towel stretch for triceps  -PD      Sets 3  3  -PD      Time 3  30sec hold  -PD         Exercise 4    Exercise Name 4  wall push up with yoga ball  -PD      Sets 4  3  -PD      Reps 4  10  -PD         Exercise 5    Exercise Name 5  foam roll for triceps  -PD      Sets 5  1  -PD      Time 5  2 min  -PD          Exercise 6    Exercise Name 6  ER with wand  -PD      Sets 6  1  -PD      Reps 6  10  -PD         Exercise 7    Exercise Name 7  flexion with wand  -PD      Sets 7  1  -PD      Reps 7  10  -PD         Exercise 8    Exercise Name 8  abd with wand  -PD      Sets 8  1  -PD      Reps 8  10  -PD         Exercise 9    Exercise Name 9  body blade ER arm at side  -PD      Sets 9  3  -PD      Time 9  30 sec   -PD         Exercise 10    Exercise Name 10  bodybald flx  -PD      Sets 10  3  -PD      Time 10  30 sec  -PD         Exercise 11    Exercise Name 11  ER ball toss   -PD      Sets 11  1  -PD      Reps 11  25  -PD      Additional Comments  2#  -PD         Exercise 12    Exercise Name 12  ball toss overhead  -PD      Reps 12  1  -PD      Time 12  25  -PD      Additional Comments  4#  -PD         Exercise 13    Exercise Name 13  ice for home  -PD         Exercise 14    Exercise Name 14  paraffin bath for hands  -PD        User Key  (r) = Recorded By, (t) = Taken By, (c) = Cosigned By    Initials Name Provider Type    PD Won Neely, PT Physical Therapist              PT OP Goals     Row Name 04/24/20 0800          PT Short Term Goals    STG Date to Achieve  04/10/20  -PD     STG 1  Passive flexion to be >/= 120 deg.  -PD     STG 1 Progress  Met  -PD     STG 2  Passive ER with arm at side to be 20 deg.  -PD     STG 2 Progress  Met  -PD     STG 3  Increase R cervical lateral flexion by >/= 6 degrees  -PD     STG 3 Progress  Goal Revised  -PD     STG 4  R shoulder extension to be >/= 45 deg.  -PD     STG 4 Progress  Ongoing  -PD        Long Term Goals    LTG Date to Achieve  06/12/20  -PD     LTG 1  Independent with HEP.  -PD     LTG 1 Progress  Not Met;Ongoing  -PD     LTG 2  QuickDASH score to be </= 35.  -PD     LTG 2 Progress  Met  -PD     LTG 3  R shoulder AROM WFLs all planes.  -PD     LTG 3 Progress  Not Met;Ongoing  -PD     LTG 4  Report ability to use R UE for ADLs and light IADLs.  -PD     LTG 4 Progress  Ongoing   -PD     LTG 5  Note a >/= 50% improvement in instances of R UE radiculopathy  -PD     LTG 5 Progress  Met  -PD     LTG 6  Patient will be able to eat with right UE without difficulty.   -PD     LTG 6 Progress  Not Met;Ongoing  -PD        Time Calculation    PT Goal Re-Cert Due Date  05/08/20  -PD       User Key  (r) = Recorded By, (t) = Taken By, (c) = Cosigned By    Initials Name Provider Type    Won Zavala, PT Physical Therapist          Time Calculation:      Therapy Charges for Today     Code Description Service Date Service Provider Modifiers Qty    76898709170 HC PT THER PROC EA 15 MIN 4/24/2020 Won Neely, PT GP 3            Won Neely PT  4/24/2020

## 2020-04-24 NOTE — OUTREACH NOTE
Total Joint Month 2 Survey      Responses   [facility] patient discharged from?  [place]   Does the patient have one of the following disease processes/diagnoses(primary or secondary)?  Total Joint Replacement   Joint surgery performed?  Shoulder   Month 2 attempt successful?  Yes   Call start time  1426   Call end time  1429   Has the patient been back in either the hospital or Emergency Department since discharge?  No   Discharge diagnosis  rotator cuff arthropathy, right,  s/p total shoulder reverse arthroplasty   Is the patient taking all medications as directed (includes completed medication regime)?  N/A   Is the patient able to teach back alternate methods of pain control?  Ice, Shoulder-elevate above heart/ keep in sling as advised, Correct alignment, Reposition   Has the patient kept scheduled appointments due by today?  Yes   Is the patient still receiving Home Health Services?  N/A   Is the patient still attending therapy sessions(either in the home or as an outpatient)?  Yes   Has the patient fallen since discharge?  No   What is the patient's perception of their functional status since discharge?  Improving   Is the patient/caregiver able to teach back the hierarchy of who to call/visit for symptoms/problems? PCP, Specialist, Home health nurse, Urgent Care, ED, 911  Yes   Additional teach back comments  incision looks good. healing well. still going to PT 3x week   Month 2 Call Completed?  Yes   Wrap up additional comments  verified he has number for [employer] if needed.           Matilde Chauhan RN

## 2020-04-27 ENCOUNTER — HOSPITAL ENCOUNTER (OUTPATIENT)
Dept: PHYSICAL THERAPY | Facility: HOSPITAL | Age: 75
Setting detail: THERAPIES SERIES
Discharge: HOME | End: 2020-04-27

## 2020-04-27 DIAGNOSIS — Z96.611 STATUS POST REVERSE TOTAL REPLACEMENT OF RIGHT SHOULDER: ICD-10-CM

## 2020-04-27 DIAGNOSIS — M12.811 ROTATOR CUFF ARTHROPATHY, RIGHT: Primary | ICD-10-CM

## 2020-04-27 PROCEDURE — 97110 THERAPEUTIC EXERCISES: CPT

## 2020-04-27 NOTE — THERAPY TREATMENT NOTE
Outpatient Physical Therapy Ortho Treatment Note  [facility]     Patient Name: Souleymane Stapleton  : 1945  MRN: [id number]  Today's Date: 2020      Visit Date: 2020  Attendance:  (Medical necessity)  Subjective Improvement: 80%  Next MD Appt: none scheduled with Ortho  Recert Date: 2020  Visit Dx:    ICD-10-CM ICD-9-CM   1. Rotator cuff arthropathy, right M12.811 716.81   2. Status post reverse total replacement of right shoulder Z96.611 V43.61       Patient Active Problem List   Diagnosis   • Astigmatism   • Anxiety state   • History of cerebrovascular accident   • Nuclear senile cataract   • Hypertension   • Unspecified hemorrhoids   • Palpitations   • Pure hypercholesterolemia   • Prostate cancer (CMS/HCC)   • Chronic right shoulder pain   • Rotator cuff syndrome, left   • Chronic left shoulder pain   • Impingement syndrome, shoulder, left   • SOB (shortness of breath)   • Angina of effort (CMS/HCC)   • Coronary artery disease involving coronary bypass graft of native heart without angina pectoris   • Cervical spinal stenosis   • Displacement of cervical intervertebral disc   • Displacement of lumbar intervertebral disc without myelopathy   • Follow-up examination after neurological surgery   • Internal carotid artery dissection (CMS/HCC)   • TIA (transient ischemic attack)   • Bacterial intestinal infection, unspecified   • Body mass index (bmi) 25.0-25.9, adult   • Diverticulosis of large intestine without perforation or abscess without bleeding   • Functional dyspepsia   • History of colonic polyps   • Polyp of stomach and duodenum   • Bilateral shoulder pain   • Rotator cuff arthropathy, right   • Status post reverse arthroplasty of right shoulder        Past Medical History:   Diagnosis Date   • Abdominal pain    • Abnormal weight loss    • Acid reflux    • Acute gastritis    • Anxiety state    • Arthritis    • Cancer (CMS/HCC)     Prostate   • Coronary artery disease     • History of cerebrovascular accident    • History of colon polyps    • Hypertension    • Nausea    • Nuclear senile cataract    • Presbyopia    • Stroke (CMS/HCC) 2005    TIA   • Tobacco use    • Transient cerebral ischemia    • Vitreous detachment of left eye         Past Surgical History:   Procedure Laterality Date   • BACK SURGERY     • CARDIAC CATHETERIZATION N/A 6/19/2018    Procedure: Coronary angiography;  Surgeon: Delroy Mcconnell MD;  Location: [facility] CATH INVASIVE LOCATION;  Service: Cardiovascular   • COLONOSCOPY  06/09/2015    Diverticulosis found in the sigmoid colon. Hemorrhoids found in the anus.   • CORONARY ARTERY BYPASS GRAFT N/A 6/22/2018    Procedure: PARAS STERNOTOMY CORONARY ARTERY BYPASS GRAFT TIMES 5 USING RIGHT AND LEFT INTERNAL MAMMARY ARTERIES AND LEFT GREATER SAPHENOUS VEIN GRAFT PER ENDOSCOPIC VEIN HARVESTING AND PRP;  Surgeon: Edgar Pérez MD;  Location: [facility] OR;  Service: Cardiothoracic   • CRYOTHERAPY  08/14/2012    Of Acne   • ENDOSCOPY  09/01/2015    EGD w/ biopsy -Multiple polyps, one removed.   • ENDOSCOPY N/A 8/22/2019    Procedure: ESOPHAGOGASTRODUODENOSCOPY;  Surgeon: Chuck Rich DO;  Location: [facility] ENDOSCOPY;  Service: Gastroenterology   • HEMORRHOIDECTOMY N/A 3/20/2017    Procedure: Removal of Anal Papilla;  Surgeon: Juan Dieog Ken MD;  Location: [facility] OR;  Service:    • INJECTION OF MEDICATION  09/14/2010    B12   • INJECTION OF MEDICATION  10/17/2011    Kenalog   • LUMBAR LAMINECTOMY  09/29/2010   • OTHER SURGICAL HISTORY  08/19/2010    Biopsy, Each Additional Lesion    • SHOULDER ROTATOR CUFF REPAIR Right 02/17/2020   • SKIN BIOPSY  08/19/2010   • TOTAL SHOULDER ARTHROPLASTY W/ DISTAL CLAVICLE EXCISION Right 2/17/2020    Procedure: right reverse total shoulder arthroplasty.;  Surgeon: Juan Diego Mendoza MD;  Location: [facility] OR;  Service: Orthopedics;  Laterality: Right;   • TOTAL SHOULDER REVERSE ARTHROPLASTY Right 02/17/2020        PT Ortho     Row Name 04/27/20 0800       Subjective Pain    Post-Treatment Pain Level  0  -PD       Right Upper Ext    Rt Shoulder Abduction AROM  135  -PD    Rt Shoulder Flexion AROM  145  -PD    Rt Shoulder External Rotation PROM  23  -PD    Rt Shoulder Internal Rotation PROM  thumg at L2  -PD      User Key  (r) = Recorded By, (t) = Taken By, (c) = Cosigned By    Initials Name Provider Type    PD Won Neely PT Physical Therapist            PT Assessment/Plan     Row Name 04/27/20 0800          PT Assessment    Functional Limitations  Limitation in home management;Limitations in community activities;Limitations in functional capacity and performance;Performance in leisure activities;Performance in self-care ADL  -PD     Impairments  Pain;Range of motion;Muscle strength;Dexterity  -PD     Assessment Comments  pt was able to tolerate a little more intensive exercise today, no issues with the exercise.  -PD     Rehab Potential  Good  -PD     Patient/caregiver participated in establishment of treatment plan and goals  Yes  -PD     Patient would benefit from skilled therapy intervention  Yes  -PD        PT Plan    PT Frequency  2x/week  -PD     Predicted Duration of Therapy Intervention (Therapy Eval)  8-12 weeks  -PD     PT Plan Comments  cont progressing with POC  -PD       User Key  (r) = Recorded By, (t) = Taken By, (c) = Cosigned By    Initials Name Provider Type    PD Won Neely PT Physical Therapist            OP Exercises     Row Name 04/27/20 0800             Subjective Comments    Subjective Comments  pt reports that he has a little bit of soreness in the R shoulder. Exercises on Saturday but no Sunday to give it ia rest.   -PD         Subjective Pain    Able to rate subjective pain?  yes  -PD      Pre-Treatment Pain Level  0  -PD      Post-Treatment Pain Level  0  -PD         Exercise 1    Exercise Name 1  Pro2, Seat 10, UE, F/R, strength/endurance  -PD      Time 1  10 min  -PD       Additional Comments  level 3.0  -PD         Exercise 2    Exercise Name 2  Doorway pec stretch  -PD      Sets 2  3  -PD      Time 2  30 sec hold  -PD         Exercise 3    Exercise Name 3  towel stretch for triceps  -PD      Sets 3  3  -PD      Time 3  30sec hold  -PD         Exercise 4    Exercise Name 4  wall push up with yoga ball  -PD      Sets 4  3  -PD      Reps 4  10  -PD         Exercise 5    Exercise Name 5  foam roll for triceps  -PD      Sets 5  1  -PD      Time 5  2 min  -PD         Exercise 6    Exercise Name 6  ER with wand  -PD      Sets 6  1  -PD      Reps 6  10  -PD         Exercise 7    Exercise Name 7  D! pattern with all toss  -PD      Sets 7  2  -PD      Reps 7  10  -PD         Exercise 8    Exercise Name 8  abd ball toss  -PD      Sets 8  2  -PD      Reps 8  10  -PD         Exercise 9    Exercise Name 9  ER ball toss  -PD      Sets 9  2  -PD      Reps 9  25  -PD         Exercise 10    Exercise Name 10  overhead ball toss  -PD      Sets 10  2  -PD      Reps 10  25  -PD         Exercise 11    Exercise Name 11  backwards ball toss over head  -PD      Sets 11  2  -PD      Reps 11  10  -PD         Exercise 12    Exercise Name 12  ice for home  -PD         Exercise 13    Exercise Name 13  paraffin bath for hand  -PD        User Key  (r) = Recorded By, (t) = Taken By, (c) = Cosigned By    Initials Name Provider Type    PD Won Neely, PT Physical Therapist            PT OP Goals     Row Name 04/27/20 0800          PT Short Term Goals    STG Date to Achieve  04/10/20  -PD     STG 1  Passive flexion to be >/= 120 deg.  -PD     STG 1 Progress  Met  -PD     STG 2  Passive ER with arm at side to be 20 deg.  -PD     STG 2 Progress  Met  -PD     STG 3  Increase R cervical lateral flexion by >/= 6 degrees  -PD     STG 3 Progress  Goal Revised  -PD     STG 4  R shoulder extension to be >/= 45 deg.  -PD     STG 4 Progress  Ongoing  -PD        Long Term Goals    LTG Date to Achieve  06/12/20  -PD     LTG 1   Independent with HEP.  -PD     LTG 1 Progress  Not Met;Ongoing  -PD     LTG 2  QuickDASH score to be </= 35.  -PD     LTG 2 Progress  Met  -PD     LTG 3  R shoulder AROM WFLs all planes.  -PD     LTG 3 Progress  Not Met;Ongoing  -PD     LTG 4  Report ability to use R UE for ADLs and light IADLs.  -PD     LTG 4 Progress  Ongoing  -PD     LTG 5  Note a >/= 50% improvement in instances of R UE radiculopathy  -PD     LTG 5 Progress  Met  -PD     LTG 6  Patient will be able to eat with right UE without difficulty.   -PD     LTG 6 Progress  Not Met;Ongoing  -PD        Time Calculation    PT Goal Re-Cert Due Date  05/08/20  -PD       User Key  (r) = Recorded By, (t) = Taken By, (c) = Cosigned By    Initials Name Provider Type    Won Zavala, PURNIMA Physical Therapist              Time Calculation:      Therapy Charges for Today     Code Description Service Date Service Provider Modifiers Qty    10079270297 HC PT THER PROC EA 15 MIN 4/27/2020 Won Neely, PURNIMA GP 3          Won Neely PT  4/27/2020

## 2020-04-29 ENCOUNTER — HOSPITAL ENCOUNTER (OUTPATIENT)
Dept: PHYSICAL THERAPY | Facility: HOSPITAL | Age: 75
Setting detail: THERAPIES SERIES
Discharge: HOME | End: 2020-04-29

## 2020-04-29 DIAGNOSIS — M12.811 ROTATOR CUFF ARTHROPATHY, RIGHT: Primary | ICD-10-CM

## 2020-04-29 DIAGNOSIS — Z96.611 STATUS POST REVERSE TOTAL REPLACEMENT OF RIGHT SHOULDER: ICD-10-CM

## 2020-04-29 PROCEDURE — 97110 THERAPEUTIC EXERCISES: CPT

## 2020-04-29 NOTE — THERAPY TREATMENT NOTE
Outpatient Physical Therapy Ortho Treatment Note  [facility]     Patient Name: Souleymane Stapleton  : 1945  MRN: [id number]  Today's Date: 2020      Visit Date: 2020  Attendance:  (Medical necessity)  Subjective Improvement: 80%  Next MD Appt: none scheduled with Ortho  Recert Date: 2020  Visit Dx:    ICD-10-CM ICD-9-CM   1. Rotator cuff arthropathy, right M12.811 716.81   2. Status post reverse total replacement of right shoulder Z96.611 V43.61       Patient Active Problem List   Diagnosis   • Astigmatism   • Anxiety state   • History of cerebrovascular accident   • Nuclear senile cataract   • Hypertension   • Unspecified hemorrhoids   • Palpitations   • Pure hypercholesterolemia   • Prostate cancer (CMS/HCC)   • Chronic right shoulder pain   • Rotator cuff syndrome, left   • Chronic left shoulder pain   • Impingement syndrome, shoulder, left   • SOB (shortness of breath)   • Angina of effort (CMS/HCC)   • Coronary artery disease involving coronary bypass graft of native heart without angina pectoris   • Cervical spinal stenosis   • Displacement of cervical intervertebral disc   • Displacement of lumbar intervertebral disc without myelopathy   • Follow-up examination after neurological surgery   • Internal carotid artery dissection (CMS/HCC)   • TIA (transient ischemic attack)   • Bacterial intestinal infection, unspecified   • Body mass index (bmi) 25.0-25.9, adult   • Diverticulosis of large intestine without perforation or abscess without bleeding   • Functional dyspepsia   • History of colonic polyps   • Polyp of stomach and duodenum   • Bilateral shoulder pain   • Rotator cuff arthropathy, right   • Status post reverse arthroplasty of right shoulder        Past Medical History:   Diagnosis Date   • Abdominal pain    • Abnormal weight loss    • Acid reflux    • Acute gastritis    • Anxiety state    • Arthritis    • Cancer (CMS/HCC)     Prostate   • Coronary artery disease     • History of cerebrovascular accident    • History of colon polyps    • Hypertension    • Nausea    • Nuclear senile cataract    • Presbyopia    • Stroke (CMS/HCC) 2005    TIA   • Tobacco use    • Transient cerebral ischemia    • Vitreous detachment of left eye         Past Surgical History:   Procedure Laterality Date   • BACK SURGERY     • CARDIAC CATHETERIZATION N/A 6/19/2018    Procedure: Coronary angiography;  Surgeon: Delroy Mcconnell MD;  Location: [facility] CATH INVASIVE LOCATION;  Service: Cardiovascular   • COLONOSCOPY  06/09/2015    Diverticulosis found in the sigmoid colon. Hemorrhoids found in the anus.   • CORONARY ARTERY BYPASS GRAFT N/A 6/22/2018    Procedure: PARAS STERNOTOMY CORONARY ARTERY BYPASS GRAFT TIMES 5 USING RIGHT AND LEFT INTERNAL MAMMARY ARTERIES AND LEFT GREATER SAPHENOUS VEIN GRAFT PER ENDOSCOPIC VEIN HARVESTING AND PRP;  Surgeon: Edgar Pérez MD;  Location: [facility] OR;  Service: Cardiothoracic   • CRYOTHERAPY  08/14/2012    Of Acne   • ENDOSCOPY  09/01/2015    EGD w/ biopsy -Multiple polyps, one removed.   • ENDOSCOPY N/A 8/22/2019    Procedure: ESOPHAGOGASTRODUODENOSCOPY;  Surgeon: Chuck Rich DO;  Location: [facility] ENDOSCOPY;  Service: Gastroenterology   • HEMORRHOIDECTOMY N/A 3/20/2017    Procedure: Removal of Anal Papilla;  Surgeon: Juan Diego Ken MD;  Location: [facility] OR;  Service:    • INJECTION OF MEDICATION  09/14/2010    B12   • INJECTION OF MEDICATION  10/17/2011    Kenalog   • LUMBAR LAMINECTOMY  09/29/2010   • OTHER SURGICAL HISTORY  08/19/2010    Biopsy, Each Additional Lesion    • SHOULDER ROTATOR CUFF REPAIR Right 02/17/2020   • SKIN BIOPSY  08/19/2010   • TOTAL SHOULDER ARTHROPLASTY W/ DISTAL CLAVICLE EXCISION Right 2/17/2020    Procedure: right reverse total shoulder arthroplasty.;  Surgeon: Juan Diego Mendoza MD;  Location: [facility] OR;  Service: Orthopedics;  Laterality: Right;   • TOTAL SHOULDER REVERSE ARTHROPLASTY Right 02/17/2020        PT Ortho     Row Name 04/29/20 0800       Right Upper Ext    Rt Shoulder Abduction AROM  135  -PD    Rt Shoulder Flexion AROM  152  -PD    Rt Shoulder External Rotation PROM  36  -PD    Rt Shoulder Internal Rotation AROM  neutral  -PD    Rt Shoulder Internal Rotation PROM  thumb at T11  -PD      User Key  (r) = Recorded By, (t) = Taken By, (c) = Cosigned By    Initials Name Provider Type    PD Won Neely, PT Physical Therapist              PT Assessment/Plan     Row Name 04/29/20 0800          PT Assessment    Functional Limitations  Limitation in home management;Limitations in community activities;Limitations in functional capacity and performance;Performance in leisure activities;Performance in self-care ADL  -PD     Impairments  Pain;Range of motion;Muscle strength;Dexterity  -PD     Assessment Comments  pt chon tx very well. AROM lacking in ER and joint stiffness with IR.   -PD     Rehab Potential  Good  -PD     Patient/caregiver participated in establishment of treatment plan and goals  Yes  -PD     Patient would benefit from skilled therapy intervention  Yes  -PD        PT Plan    PT Frequency  2x/week  -PD     Predicted Duration of Therapy Intervention (Therapy Eval)  8-12 weeks  -PD     PT Plan Comments  strengthening and stretching.   -PD       User Key  (r) = Recorded By, (t) = Taken By, (c) = Cosigned By    Initials Name Provider Type    PD Won Neely, PT Physical Therapist            OP Exercises     Row Name 04/29/20 0800             Subjective Comments    Subjective Comments  pt reports no soreness or pain in the shoulder but some achiness in the hands with a trigger finger on the left index finger  -PD         Subjective Pain    Able to rate subjective pain?  yes  -PD      Pre-Treatment Pain Level  0  -PD      Post-Treatment Pain Level  0  -PD         Exercise 1    Exercise Name 1  Pro2, Seat 10, UE, F/R, strength/endurance  -PD      Time 1  10 min  -PD      Additional Comments  level 3.0   -PD         Exercise 2    Exercise Name 2  remeasure  -PD         Exercise 3    Exercise Name 3  cybex overhead press  -PD      Sets 3  3  -PD      Reps 3  10  -PD      Additional Comments  5#  -PD         Exercise 4    Exercise Name 4  cybex chest press  -PD      Sets 4  3  -PD      Reps 4  10  -PD      Additional Comments  10#  -PD         Exercise 5    Exercise Name 5  cybex row  -PD      Sets 5  3  -PD      Reps 5  10  -PD      Additional Comments  5#  -PD         Exercise 6    Exercise Name 6  cybex incline pull  -PD      Sets 6  3  -PD      Reps 6  10  -PD      Additional Comments  5#  -PD         Exercise 7    Exercise Name 7  cybex fly  -PD      Sets 7  1  -PD      Reps 7  10  -PD      Additional Comments  0#  -PD         Exercise 8    Exercise Name 8  cybex rear delt  -PD      Sets 8  3  -PD      Reps 8  10  -PD      Additional Comments  0#  -PD         Exercise 9    Exercise Name 9  ER isometric arm at side  -PD      Sets 9  3  -PD      Reps 9  10  -PD         Exercise 10    Exercise Name 10  D2 patern with biodex  -PD      Sets 10  2  -PD      Reps 10  10  -PD      Additional Comments  1 plate  -PD         Exercise 11    Exercise Name 11  doorway stretch  -PD      Sets 11  3  -PD      Time 11  30 sec hold  -PD         Exercise 12    Exercise Name 12  tricep foam roll  -PD      Time 12  2 min  -PD         Exercise 13    Exercise Name 13  paraffin bath for hand  -PD        User Key  (r) = Recorded By, (t) = Taken By, (c) = Cosigned By    Initials Name Provider Type    PD Won Neely, PT Physical Therapist              PT OP Goals     Row Name 04/29/20 0800          PT Short Term Goals    STG Date to Achieve  04/10/20  -PD     STG 1  Passive flexion to be >/= 120 deg.  -PD     STG 1 Progress  Met  -PD     STG 2  Passive ER with arm at side to be 20 deg.  -PD     STG 2 Progress  Met  -PD     STG 3  Increase R cervical lateral flexion by >/= 6 degrees  -PD     STG 3 Progress  Goal Revised  -PD     STG 4  R  shoulder extension to be >/= 45 deg.  -PD     STG 4 Progress  Ongoing  -PD        Long Term Goals    LTG Date to Achieve  06/12/20  -PD     LTG 1  Independent with HEP.  -PD     LTG 1 Progress  Not Met;Ongoing  -PD     LTG 2  QuickDASH score to be </= 35.  -PD     LTG 2 Progress  Met  -PD     LTG 3  R shoulder AROM WFLs all planes.  -PD     LTG 3 Progress  Not Met;Ongoing  -PD     LTG 4  Report ability to use R UE for ADLs and light IADLs.  -PD     LTG 4 Progress  Ongoing  -PD     LTG 5  Note a >/= 50% improvement in instances of R UE radiculopathy  -PD     LTG 5 Progress  Met  -PD     LTG 6  Patient will be able to eat with right UE without difficulty.   -PD     LTG 6 Progress  Not Met;Ongoing  -PD        Time Calculation    PT Goal Re-Cert Due Date  05/08/20  -PD       User Key  (r) = Recorded By, (t) = Taken By, (c) = Cosigned By    Initials Name Provider Type    PD Won Neely, PT Physical Therapist                 Time Calculation:      Therapy Charges for Today     Code Description Service Date Service Provider Modifiers Qty    41446865742 HC PT THER PROC EA 15 MIN 4/29/2020 Won Neely, PT GP 3                Won Neely PT  4/29/2020

## 2020-04-30 RX ORDER — SILDENAFIL 100 MG/1
TABLET, FILM COATED ORAL
Qty: 10 TABLET | Refills: 0 | Status: SHIPPED | OUTPATIENT
Start: 2020-04-30 | End: 2020-05-01

## 2020-05-01 ENCOUNTER — HOSPITAL ENCOUNTER (OUTPATIENT)
Dept: PHYSICAL THERAPY | Facility: HOSPITAL | Age: 75
Setting detail: THERAPIES SERIES
Discharge: HOME | End: 2020-05-01

## 2020-05-01 DIAGNOSIS — M12.811 ROTATOR CUFF ARTHROPATHY, RIGHT: Primary | ICD-10-CM

## 2020-05-01 DIAGNOSIS — Z96.611 STATUS POST REVERSE TOTAL REPLACEMENT OF RIGHT SHOULDER: ICD-10-CM

## 2020-05-01 PROCEDURE — 97110 THERAPEUTIC EXERCISES: CPT

## 2020-05-01 RX ORDER — SILDENAFIL 100 MG/1
TABLET, FILM COATED ORAL
Qty: 10 TABLET | Refills: 0 | Status: SHIPPED | OUTPATIENT
Start: 2020-05-01 | End: 2022-04-25

## 2020-05-01 NOTE — THERAPY TREATMENT NOTE
Outpatient Physical Therapy Ortho Treatment Note  [facility]     Patient Name: Souleymane Stapleton  : 1945  MRN: [id number]  Today's Date: 2020      Visit Date: 2020  Attendance:  (Medical necessity)  Subjective Improvement: 80%  Next MD Appt: none scheduled with Ortho  Recert Date: 2020  Visit Dx:    ICD-10-CM ICD-9-CM   1. Rotator cuff arthropathy, right M12.811 716.81   2. Status post reverse total replacement of right shoulder Z96.611 V43.61       Patient Active Problem List   Diagnosis   • Astigmatism   • Anxiety state   • History of cerebrovascular accident   • Nuclear senile cataract   • Hypertension   • Unspecified hemorrhoids   • Palpitations   • Pure hypercholesterolemia   • Prostate cancer (CMS/HCC)   • Chronic right shoulder pain   • Rotator cuff syndrome, left   • Chronic left shoulder pain   • Impingement syndrome, shoulder, left   • SOB (shortness of breath)   • Angina of effort (CMS/HCC)   • Coronary artery disease involving coronary bypass graft of native heart without angina pectoris   • Cervical spinal stenosis   • Displacement of cervical intervertebral disc   • Displacement of lumbar intervertebral disc without myelopathy   • Follow-up examination after neurological surgery   • Internal carotid artery dissection (CMS/HCC)   • TIA (transient ischemic attack)   • Bacterial intestinal infection, unspecified   • Body mass index (bmi) 25.0-25.9, adult   • Diverticulosis of large intestine without perforation or abscess without bleeding   • Functional dyspepsia   • History of colonic polyps   • Polyp of stomach and duodenum   • Bilateral shoulder pain   • Rotator cuff arthropathy, right   • Status post reverse arthroplasty of right shoulder        Past Medical History:   Diagnosis Date   • Abdominal pain    • Abnormal weight loss    • Acid reflux    • Acute gastritis    • Anxiety state    • Arthritis    • Cancer (CMS/HCC)     Prostate   • Coronary artery disease     • History of cerebrovascular accident    • History of colon polyps    • Hypertension    • Nausea    • Nuclear senile cataract    • Presbyopia    • Stroke (CMS/HCC) 2005    TIA   • Tobacco use    • Transient cerebral ischemia    • Vitreous detachment of left eye         Past Surgical History:   Procedure Laterality Date   • BACK SURGERY     • CARDIAC CATHETERIZATION N/A 6/19/2018    Procedure: Coronary angiography;  Surgeon: Delroy Mcconnell MD;  Location: [facility] CATH INVASIVE LOCATION;  Service: Cardiovascular   • COLONOSCOPY  06/09/2015    Diverticulosis found in the sigmoid colon. Hemorrhoids found in the anus.   • CORONARY ARTERY BYPASS GRAFT N/A 6/22/2018    Procedure: PARAS STERNOTOMY CORONARY ARTERY BYPASS GRAFT TIMES 5 USING RIGHT AND LEFT INTERNAL MAMMARY ARTERIES AND LEFT GREATER SAPHENOUS VEIN GRAFT PER ENDOSCOPIC VEIN HARVESTING AND PRP;  Surgeon: Edgra Pérez MD;  Location: [facility] OR;  Service: Cardiothoracic   • CRYOTHERAPY  08/14/2012    Of Acne   • ENDOSCOPY  09/01/2015    EGD w/ biopsy -Multiple polyps, one removed.   • ENDOSCOPY N/A 8/22/2019    Procedure: ESOPHAGOGASTRODUODENOSCOPY;  Surgeon: Chuck Rich DO;  Location: [facility] ENDOSCOPY;  Service: Gastroenterology   • HEMORRHOIDECTOMY N/A 3/20/2017    Procedure: Removal of Anal Papilla;  Surgeon: Juan Diego Ken MD;  Location: [facility] OR;  Service:    • INJECTION OF MEDICATION  09/14/2010    B12   • INJECTION OF MEDICATION  10/17/2011    Kenalog   • LUMBAR LAMINECTOMY  09/29/2010   • OTHER SURGICAL HISTORY  08/19/2010    Biopsy, Each Additional Lesion    • SHOULDER ROTATOR CUFF REPAIR Right 02/17/2020   • SKIN BIOPSY  08/19/2010   • TOTAL SHOULDER ARTHROPLASTY W/ DISTAL CLAVICLE EXCISION Right 2/17/2020    Procedure: right reverse total shoulder arthroplasty.;  Surgeon: Juan Diego Mendoza MD;  Location: [facility] OR;  Service: Orthopedics;  Laterality: Right;   • TOTAL SHOULDER REVERSE ARTHROPLASTY Right 02/17/2020              PT Assessment/Plan     Row Name 05/01/20 0800          PT Assessment    Functional Limitations  Limitation in home management;Limitations in community activities;Limitations in functional capacity and performance;Performance in leisure activities;Performance in self-care ADL  -PD     Impairments  Pain;Range of motion;Muscle strength;Dexterity  -PD     Assessment Comments  pt was able to perform the sidyling ER with better posture today, chon tx very well.   -PD     Rehab Potential  Good  -PD     Patient/caregiver participated in establishment of treatment plan and goals  Yes  -PD     Patient would benefit from skilled therapy intervention  Yes  -PD        PT Plan    PT Frequency  2x/week  -PD     Predicted Duration of Therapy Intervention (Therapy Eval)  8-12 weeks  -PD     PT Plan Comments  strengthening and stretching.   -PD       User Key  (r) = Recorded By, (t) = Taken By, (c) = Cosigned By    Initials Name Provider Type    PD Won Neely, PT Physical Therapist            OP Exercises     Row Name 05/01/20 0800             Subjective Comments    Subjective Comments  pt reports some soreness in the shoulder probably from the last session and he did the HEP yesterday.   -PD         Subjective Pain    Able to rate subjective pain?  yes  -PD      Pre-Treatment Pain Level  0  -PD         Exercise 1    Exercise Name 1  Pro2, Seat 10, UE, F/R, strength/endurance  -PD      Time 1  10 min  -PD      Additional Comments  level 3.0  -PD         Exercise 2    Exercise Name 2  doorway pec stretch  -PD      Sets 2  3  -PD      Time 2  30 sec hold  -PD         Exercise 3    Exercise Name 3  biodex lat pull down  -PD      Sets 3  3  -PD      Reps 3  10  -PD      Additional Comments  4 plates  -PD         Exercise 4    Exercise Name 4  cybex rows  -PD      Sets 4  3  -PD      Reps 4  10  -PD      Additional Comments  4 plates  -PD         Exercise 5    Exercise Name 5  biodex biceeps curls  -PD      Sets 5  3  -PD       Reps 5  10  -PD      Additional Comments  4 plates   -PD         Exercise 6    Exercise Name 6  ball toss overhead  -PD      Sets 6  2  -PD      Reps 6  25  -PD      Additional Comments  4#  -PD         Exercise 7    Exercise Name 7  ER ball toss  -PD      Sets 7  2  -PD      Reps 7  25  -PD      Additional Comments  2#  -PD         Exercise 8    Exercise Name 8  sidelying ER with wand  -PD      Sets 8  2  -PD      Reps 8  10  -PD         Exercise 9    Exercise Name 9  paraffin bath  -PD        User Key  (r) = Recorded By, (t) = Taken By, (c) = Cosigned By    Initials Name Provider Type    PD Won Neely, PT Physical Therapist              PT OP Goals     Row Name 05/01/20 0800          PT Short Term Goals    STG Date to Achieve  04/10/20  -PD     STG 1  Passive flexion to be >/= 120 deg.  -PD     STG 1 Progress  Met  -PD     STG 2  Passive ER with arm at side to be 20 deg.  -PD     STG 2 Progress  Met  -PD     STG 3  Increase R cervical lateral flexion by >/= 6 degrees  -PD     STG 3 Progress  Goal Revised  -PD     STG 4  R shoulder extension to be >/= 45 deg.  -PD     STG 4 Progress  Ongoing  -PD        Long Term Goals    LTG Date to Achieve  06/12/20  -PD     LTG 1  Independent with HEP.  -PD     LTG 1 Progress  Not Met;Ongoing  -PD     LTG 2  QuickDASH score to be </= 35.  -PD     LTG 2 Progress  Met  -PD     LTG 3  R shoulder AROM WFLs all planes.  -PD     LTG 3 Progress  Not Met;Ongoing  -PD     LTG 4  Report ability to use R UE for ADLs and light IADLs.  -PD     LTG 4 Progress  Ongoing  -PD     LTG 5  Note a >/= 50% improvement in instances of R UE radiculopathy  -PD     LTG 5 Progress  Met  -PD     LTG 6  Patient will be able to eat with right UE without difficulty.   -PD     LTG 6 Progress  Not Met;Ongoing  -PD        Time Calculation    PT Goal Re-Cert Due Date  05/08/20  -PD       User Key  (r) = Recorded By, (t) = Taken By, (c) = Cosigned By    Initials Name Provider Type    Won Zavala, PT  Physical Therapist              Time Calculation:      Therapy Charges for Today     Code Description Service Date Service Provider Modifiers Qty    76172892413 HC PT THER PROC EA 15 MIN 5/1/2020 Won Neely, PT GP 3            Won Neely, PT  5/1/2020

## 2020-05-04 ENCOUNTER — HOSPITAL ENCOUNTER (OUTPATIENT)
Dept: PHYSICAL THERAPY | Facility: HOSPITAL | Age: 75
Setting detail: THERAPIES SERIES
Discharge: HOME | End: 2020-05-04

## 2020-05-04 DIAGNOSIS — Z96.611 STATUS POST REVERSE TOTAL REPLACEMENT OF RIGHT SHOULDER: ICD-10-CM

## 2020-05-04 DIAGNOSIS — M12.811 ROTATOR CUFF ARTHROPATHY, RIGHT: Primary | ICD-10-CM

## 2020-05-04 PROCEDURE — 97110 THERAPEUTIC EXERCISES: CPT

## 2020-05-04 NOTE — THERAPY TREATMENT NOTE
Outpatient Physical Therapy Ortho Treatment Note  [facility]     Patient Name: Souleymane Stapleton  : 1945  MRN: [id number]  Today's Date: 2020      Visit Date: 2020  Attendance:  (Medical necessity)  Subjective Improvement: 80%  Next MD Appt: none scheduled with Ortho  Recert Date: 2020  Visit Dx:    ICD-10-CM ICD-9-CM   1. Rotator cuff arthropathy, right M12.811 716.81   2. Status post reverse total replacement of right shoulder Z96.611 V43.61       Patient Active Problem List   Diagnosis   • Astigmatism   • Anxiety state   • History of cerebrovascular accident   • Nuclear senile cataract   • Hypertension   • Unspecified hemorrhoids   • Palpitations   • Pure hypercholesterolemia   • Prostate cancer (CMS/HCC)   • Chronic right shoulder pain   • Rotator cuff syndrome, left   • Chronic left shoulder pain   • Impingement syndrome, shoulder, left   • SOB (shortness of breath)   • Angina of effort (CMS/HCC)   • Coronary artery disease involving coronary bypass graft of native heart without angina pectoris   • Cervical spinal stenosis   • Displacement of cervical intervertebral disc   • Displacement of lumbar intervertebral disc without myelopathy   • Follow-up examination after neurological surgery   • Internal carotid artery dissection (CMS/HCC)   • TIA (transient ischemic attack)   • Bacterial intestinal infection, unspecified   • Body mass index (bmi) 25.0-25.9, adult   • Diverticulosis of large intestine without perforation or abscess without bleeding   • Functional dyspepsia   • History of colonic polyps   • Polyp of stomach and duodenum   • Bilateral shoulder pain   • Rotator cuff arthropathy, right   • Status post reverse arthroplasty of right shoulder        Past Medical History:   Diagnosis Date   • Abdominal pain    • Abnormal weight loss    • Acid reflux    • Acute gastritis    • Anxiety state    • Arthritis    • Cancer (CMS/HCC)     Prostate   • Coronary artery disease     • History of cerebrovascular accident    • History of colon polyps    • Hypertension    • Nausea    • Nuclear senile cataract    • Presbyopia    • Stroke (CMS/HCC) 2005    TIA   • Tobacco use    • Transient cerebral ischemia    • Vitreous detachment of left eye         Past Surgical History:   Procedure Laterality Date   • BACK SURGERY     • CARDIAC CATHETERIZATION N/A 6/19/2018    Procedure: Coronary angiography;  Surgeon: Delroy Mcconnell MD;  Location: [facility] CATH INVASIVE LOCATION;  Service: Cardiovascular   • COLONOSCOPY  06/09/2015    Diverticulosis found in the sigmoid colon. Hemorrhoids found in the anus.   • CORONARY ARTERY BYPASS GRAFT N/A 6/22/2018    Procedure: PARAS STERNOTOMY CORONARY ARTERY BYPASS GRAFT TIMES 5 USING RIGHT AND LEFT INTERNAL MAMMARY ARTERIES AND LEFT GREATER SAPHENOUS VEIN GRAFT PER ENDOSCOPIC VEIN HARVESTING AND PRP;  Surgeon: Edgar Pérez MD;  Location: [facility] OR;  Service: Cardiothoracic   • CRYOTHERAPY  08/14/2012    Of Acne   • ENDOSCOPY  09/01/2015    EGD w/ biopsy -Multiple polyps, one removed.   • ENDOSCOPY N/A 8/22/2019    Procedure: ESOPHAGOGASTRODUODENOSCOPY;  Surgeon: Chuck Rich DO;  Location: [facility] ENDOSCOPY;  Service: Gastroenterology   • HEMORRHOIDECTOMY N/A 3/20/2017    Procedure: Removal of Anal Papilla;  Surgeon: Juan Diego Ken MD;  Location: [facility] OR;  Service:    • INJECTION OF MEDICATION  09/14/2010    B12   • INJECTION OF MEDICATION  10/17/2011    Kenalog   • LUMBAR LAMINECTOMY  09/29/2010   • OTHER SURGICAL HISTORY  08/19/2010    Biopsy, Each Additional Lesion    • SHOULDER ROTATOR CUFF REPAIR Right 02/17/2020   • SKIN BIOPSY  08/19/2010   • TOTAL SHOULDER ARTHROPLASTY W/ DISTAL CLAVICLE EXCISION Right 2/17/2020    Procedure: right reverse total shoulder arthroplasty.;  Surgeon: Juan Diego Mendoza MD;  Location: [facility] OR;  Service: Orthopedics;  Laterality: Right;   • TOTAL SHOULDER REVERSE ARTHROPLASTY Right 02/17/2020        PT Ortho     Row Name 05/04/20 0800       Right Upper Ext    Rt Shoulder Abduction AROM  148  -PD    Rt Shoulder Flexion AROM  145  -PD    Rt Shoulder External Rotation PROM  T2  -PD    Rt Shoulder Internal Rotation PROM  T11  -PD      User Key  (r) = Recorded By, (t) = Taken By, (c) = Cosigned By    Initials Name Provider Type    Won Zavala PT Physical Therapist            PT Assessment/Plan     Row Name 05/04/20 0800          PT Assessment    Functional Limitations  Limitation in home management;Limitations in community activities;Limitations in functional capacity and performance;Performance in leisure activities;Performance in self-care ADL  -PD     Impairments  Pain;Range of motion;Muscle strength;Dexterity  -PD     Assessment Comments  pt got fatigued by the end of the session today, chon tx very well.   -PD     Rehab Potential  Good  -PD     Patient/caregiver participated in establishment of treatment plan and goals  Yes  -PD     Patient would benefit from skilled therapy intervention  Yes  -PD        PT Plan    PT Frequency  2x/week  -PD     Predicted Duration of Therapy Intervention (Therapy Eval)  8-12 weeks  -PD     PT Plan Comments  ER strengthening.   -PD       User Key  (r) = Recorded By, (t) = Taken By, (c) = Cosigned By    Initials Name Provider Type    PD Won Neely, PT Physical Therapist            OP Exercises     Row Name 05/04/20 0800             Subjective Comments    Subjective Comments  pt reports some soreness but no pain today. He was able to complete some chores such as mowing the lawn with a sit mower. pt still has problems with feeding himself with the right arm and reaching the top of his head.   -PD         Subjective Pain    Able to rate subjective pain?  yes  -PD      Pre-Treatment Pain Level  0  -PD      Post-Treatment Pain Level  0  -PD         Exercise 1    Exercise Name 1  Pro2, Seat 10, UE, F/R, strength/endurance  -PD      Time 1  10 min  -PD      Additional  Comments  level 3.5  -PD         Exercise 2    Exercise Name 2  doorway pec stretch  -PD      Sets 2  3  -PD      Time 2  30 sec hold  -PD         Exercise 3    Exercise Name 3  cybex overhead press  -PD      Sets 3  3  -PD      Reps 3  10  -PD      Additional Comments  5#  -PD         Exercise 4    Exercise Name 4  cybex chest press  -PD      Sets 4  3  -PD      Reps 4  10  -PD      Additional Comments  10#  -PD         Exercise 5    Exercise Name 5  cybex row  -PD      Sets 5  3  -PD      Reps 5  10  -PD      Additional Comments  5#  -PD         Exercise 6    Exercise Name 6  cybex incline pull  -PD      Sets 6  3  -PD      Reps 6  10  -PD      Additional Comments  5#  -PD         Exercise 7    Exercise Name 7  cybex fly  -PD      Sets 7  1  -PD      Reps 7  10  -PD      Additional Comments  0#  -PD         Exercise 8    Exercise Name 8  cybex rear delt  -PD      Sets 8  3  -PD      Reps 8  10  -PD      Additional Comments  0#  -PD         Exercise 9    Exercise Name 9  ER isometric arm at side  -PD      Sets 9  3  -PD      Reps 9  10  -PD         Exercise 10    Exercise Name 10  D2 patern with biodex  -PD      Sets 10  2  -PD      Reps 10  10  -PD      Additional Comments  1 plate  -PD         Exercise 11    Exercise Name 11  paraffin bath  -PD         Exercise 12    Exercise Name 12  ice for home  -PD        User Key  (r) = Recorded By, (t) = Taken By, (c) = Cosigned By    Initials Name Provider Type    PD Won Neely, PT Physical Therapist            PT OP Goals     Row Name 05/04/20 0800          PT Short Term Goals    STG Date to Achieve  04/10/20  -PD     STG 1  Passive flexion to be >/= 120 deg.  -PD     STG 1 Progress  Met  -PD     STG 2  Passive ER with arm at side to be 20 deg.  -PD     STG 2 Progress  Met  -PD     STG 3  Increase R cervical lateral flexion by >/= 6 degrees  -PD     STG 3 Progress  Goal Revised  -PD     STG 4  R shoulder extension to be >/= 45 deg.  -PD     STG 4 Progress  Ongoing   -PD        Long Term Goals    LTG Date to Achieve  06/12/20  -PD     LTG 1  Independent with HEP.  -PD     LTG 1 Progress  Not Met;Ongoing  -PD     LTG 2  QuickDASH score to be </= 35.  -PD     LTG 2 Progress  Met  -PD     LTG 3  R shoulder AROM WFLs all planes.  -PD     LTG 3 Progress  Not Met;Ongoing  -PD     LTG 4  Report ability to use R UE for ADLs and light IADLs.  -PD     LTG 4 Progress  Ongoing  -PD     LTG 5  Note a >/= 50% improvement in instances of R UE radiculopathy  -PD     LTG 5 Progress  Met  -PD     LTG 6  Patient will be able to eat with right UE without difficulty.   -PD     LTG 6 Progress  Not Met;Ongoing  -PD        Time Calculation    PT Goal Re-Cert Due Date  05/08/20  -PD       User Key  (r) = Recorded By, (t) = Taken By, (c) = Cosigned By    Initials Name Provider Type    PD Won Neely, PT Physical Therapist            Time Calculation:      Therapy Charges for Today     Code Description Service Date Service Provider Modifiers Qty    90544074408 HC PT THER PROC EA 15 MIN 5/4/2020 Won Neely, PT GP 3              Won Neely PT  5/4/2020

## 2020-05-06 ENCOUNTER — HOSPITAL ENCOUNTER (OUTPATIENT)
Dept: PHYSICAL THERAPY | Facility: HOSPITAL | Age: 75
Setting detail: THERAPIES SERIES
Discharge: HOME | End: 2020-05-06

## 2020-05-06 DIAGNOSIS — Z96.611 STATUS POST REVERSE TOTAL REPLACEMENT OF RIGHT SHOULDER: ICD-10-CM

## 2020-05-06 DIAGNOSIS — M12.811 ROTATOR CUFF ARTHROPATHY, RIGHT: Primary | ICD-10-CM

## 2020-05-06 PROCEDURE — 97140 MANUAL THERAPY 1/> REGIONS: CPT

## 2020-05-06 PROCEDURE — 97110 THERAPEUTIC EXERCISES: CPT

## 2020-05-06 NOTE — THERAPY TREATMENT NOTE
Outpatient Physical Therapy Ortho Treatment Note  [facility]     Patient Name: Souleymane Stapleton  : 1945  MRN: [id number]  Today's Date: 2020      Visit Date: 2020  Attendance:  (Medical necessity)  Subjective Improvement: 80%  Next MD Appt: none scheduled with Ortho  Recert Date: 2020  Visit Dx:    ICD-10-CM ICD-9-CM   1. Rotator cuff arthropathy, right M12.811 716.81   2. Status post reverse total replacement of right shoulder Z96.611 V43.61       Patient Active Problem List   Diagnosis   • Astigmatism   • Anxiety state   • History of cerebrovascular accident   • Nuclear senile cataract   • Hypertension   • Unspecified hemorrhoids   • Palpitations   • Pure hypercholesterolemia   • Prostate cancer (CMS/HCC)   • Chronic right shoulder pain   • Rotator cuff syndrome, left   • Chronic left shoulder pain   • Impingement syndrome, shoulder, left   • SOB (shortness of breath)   • Angina of effort (CMS/HCC)   • Coronary artery disease involving coronary bypass graft of native heart without angina pectoris   • Cervical spinal stenosis   • Displacement of cervical intervertebral disc   • Displacement of lumbar intervertebral disc without myelopathy   • Follow-up examination after neurological surgery   • Internal carotid artery dissection (CMS/HCC)   • TIA (transient ischemic attack)   • Bacterial intestinal infection, unspecified   • Body mass index (bmi) 25.0-25.9, adult   • Diverticulosis of large intestine without perforation or abscess without bleeding   • Functional dyspepsia   • History of colonic polyps   • Polyp of stomach and duodenum   • Bilateral shoulder pain   • Rotator cuff arthropathy, right   • Status post reverse arthroplasty of right shoulder        Past Medical History:   Diagnosis Date   • Abdominal pain    • Abnormal weight loss    • Acid reflux    • Acute gastritis    • Anxiety state    • Arthritis    • Cancer (CMS/HCC)     Prostate   • Coronary artery disease     • History of cerebrovascular accident    • History of colon polyps    • Hypertension    • Nausea    • Nuclear senile cataract    • Presbyopia    • Stroke (CMS/HCC) 2005    TIA   • Tobacco use    • Transient cerebral ischemia    • Vitreous detachment of left eye         Past Surgical History:   Procedure Laterality Date   • BACK SURGERY     • CARDIAC CATHETERIZATION N/A 6/19/2018    Procedure: Coronary angiography;  Surgeon: Delroy Mcconnell MD;  Location: [facility] CATH INVASIVE LOCATION;  Service: Cardiovascular   • COLONOSCOPY  06/09/2015    Diverticulosis found in the sigmoid colon. Hemorrhoids found in the anus.   • CORONARY ARTERY BYPASS GRAFT N/A 6/22/2018    Procedure: PARAS STERNOTOMY CORONARY ARTERY BYPASS GRAFT TIMES 5 USING RIGHT AND LEFT INTERNAL MAMMARY ARTERIES AND LEFT GREATER SAPHENOUS VEIN GRAFT PER ENDOSCOPIC VEIN HARVESTING AND PRP;  Surgeon: Edgar Pérez MD;  Location: [facility] OR;  Service: Cardiothoracic   • CRYOTHERAPY  08/14/2012    Of Acne   • ENDOSCOPY  09/01/2015    EGD w/ biopsy -Multiple polyps, one removed.   • ENDOSCOPY N/A 8/22/2019    Procedure: ESOPHAGOGASTRODUODENOSCOPY;  Surgeon: Chuck Rich DO;  Location: [facility] ENDOSCOPY;  Service: Gastroenterology   • HEMORRHOIDECTOMY N/A 3/20/2017    Procedure: Removal of Anal Papilla;  Surgeon: Juan Diego Ken MD;  Location: [facility] OR;  Service:    • INJECTION OF MEDICATION  09/14/2010    B12   • INJECTION OF MEDICATION  10/17/2011    Kenalog   • LUMBAR LAMINECTOMY  09/29/2010   • OTHER SURGICAL HISTORY  08/19/2010    Biopsy, Each Additional Lesion    • SHOULDER ROTATOR CUFF REPAIR Right 02/17/2020   • SKIN BIOPSY  08/19/2010   • TOTAL SHOULDER ARTHROPLASTY W/ DISTAL CLAVICLE EXCISION Right 2/17/2020    Procedure: right reverse total shoulder arthroplasty.;  Surgeon: Juan Diego Mendoza MD;  Location: [facility] OR;  Service: Orthopedics;  Laterality: Right;   • TOTAL SHOULDER REVERSE ARTHROPLASTY Right 02/17/2020        PT Ortho     Row Name 05/06/20 0800       Right Upper Ext    Rt Shoulder Abduction AROM  148  -PD    Rt Shoulder Flexion AROM  145  -PD    Rt Shoulder External Rotation PROM  T2  -PD    Rt Shoulder Internal Rotation PROM  T11  -PD      User Key  (r) = Recorded By, (t) = Taken By, (c) = Cosigned By    Initials Name Provider Type    Won Zavala, PT Physical Therapist            PT Assessment/Plan     Row Name 05/06/20 0800          PT Assessment    Functional Limitations  Limitation in home management;Limitations in community activities;Limitations in functional capacity and performance;Performance in leisure activities;Performance in self-care ADL  -PD     Impairments  Pain;Range of motion;Muscle strength;Dexterity  -PD     Assessment Comments  pt chon tx well, pt c/o hand arthritis and asked about hand braces of some sorts for the left hand. Some sharp pain with end ROM for ER in the pec insersion area.   -PD     Rehab Potential  Good  -PD     Patient/caregiver participated in establishment of treatment plan and goals  Yes  -PD     Patient would benefit from skilled therapy intervention  Yes  -PD        PT Plan    PT Frequency  2x/week  -PD     Predicted Duration of Therapy Intervention (Therapy Eval)  8-12 weeks  -PD     PT Plan Comments  ER strengthening.   -PD       User Key  (r) = Recorded By, (t) = Taken By, (c) = Cosigned By    Initials Name Provider Type    Won Zavala PT Physical Therapist            OP Exercises     Row Name 05/06/20 0800             Subjective Comments    Subjective Comments  pt reports some stiffness and some soreness in the shoulder, maybe a little tender from last session.   -PD         Subjective Pain    Able to rate subjective pain?  yes  -PD      Pre-Treatment Pain Level  0  -PD         Exercise 1    Exercise Name 1  Pro2, Seat 10, UE, F/R, strength/endurance  -PD      Time 1  10 min  -PD      Additional Comments  level 3.5   -PD         Exercise 2    Exercise Name  2  doorway pec stretch  -PD      Sets 2  3  -PD      Time 2  30 sec hold  -PD         Exercise 3    Exercise Name 3  Doorway ER stretch arm at side  -PD      Sets 3  3  -PD      Time 3  30 sec hold  -PD         Exercise 4    Exercise Name 4  triceps towel stretch stretch  -PD      Sets 4  3  -PD      Time 4  30 sec hold  -PD         Exercise 5    Exercise Name 5  triceps foam roll  -PD      Time 5  2 min  -PD         Exercise 6    Exercise Name 6  ER isometric/eccentric manual  -PD      Sets 6  3  -PD      Reps 6  10  -PD      Time 6  5 sec hold  -PD         Exercise 7    Exercise Name 7  ball toss overhead  -PD      Sets 7  2  -PD      Reps 7  25  -PD      Additional Comments  4#  -PD         Exercise 8    Exercise Name 8  D2 pattern ball toos  -PD      Sets 8  2  -PD      Reps 8  15  -PD      Additional Comments  2#  -PD         Exercise 9    Exercise Name 9  ER AAROM arm at 90 deg abd.  -PD      Sets 9  2  -PD      Reps 9  10  -PD         Exercise 10    Exercise Name 10  ball toss backwards  -PD      Sets 10  2  -PD      Reps 10  15  -PD         Exercise 11    Exercise Name 11  ball toss basket ball  -PD      Sets 11  2  -PD      Reps 11  15  -PD         Exercise 12    Exercise Name 12  paraffin bath  -PD         Exercise 13    Exercise Name 13  ice for home  -PD        User Key  (r) = Recorded By, (t) = Taken By, (c) = Cosigned By    Initials Name Provider Type    PD Won Neely, PT Physical Therapist              PT OP Goals     Row Name 05/06/20 0800          PT Short Term Goals    STG Date to Achieve  04/10/20  -PD     STG 1  Passive flexion to be >/= 120 deg.  -PD     STG 1 Progress  Met  -PD     STG 2  Passive ER with arm at side to be 20 deg.  -PD     STG 2 Progress  Met  -PD     STG 3  Increase R cervical lateral flexion by >/= 6 degrees  -PD     STG 3 Progress  Goal Revised  -PD     STG 4  R shoulder extension to be >/= 45 deg.  -PD     STG 4 Progress  Ongoing  -PD        Long Term Goals    LTG Date  to Achieve  06/12/20  -PD     LTG 1  Independent with HEP.  -PD     LTG 1 Progress  Not Met;Ongoing  -PD     LTG 2  QuickDASH score to be </= 35.  -PD     LTG 2 Progress  Met  -PD     LTG 3  R shoulder AROM WFLs all planes.  -PD     LTG 3 Progress  Not Met;Ongoing  -PD     LTG 4  Report ability to use R UE for ADLs and light IADLs.  -PD     LTG 4 Progress  Ongoing  -PD     LTG 5  Note a >/= 50% improvement in instances of R UE radiculopathy  -PD     LTG 5 Progress  Met  -PD     LTG 6  Patient will be able to eat with right UE without difficulty.   -PD     LTG 6 Progress  Not Met;Ongoing  -PD        Time Calculation    PT Goal Re-Cert Due Date  05/08/20  -PD       User Key  (r) = Recorded By, (t) = Taken By, (c) = Cosigned By    Initials Name Provider Type    PD Won Neely, PT Physical Therapist              Time Calculation:      Therapy Charges for Today     Code Description Service Date Service Provider Modifiers Qty    24751371290 HC PT THER PROC EA 15 MIN 5/6/2020 Won Neely, PT GP 2    75689987047 HC PT MANUAL THERAPY EA 15 MIN 5/6/2020 Won Neely, PT GP 1            Won Neely PT  5/6/2020

## 2020-05-08 ENCOUNTER — HOSPITAL ENCOUNTER (OUTPATIENT)
Dept: PHYSICAL THERAPY | Facility: HOSPITAL | Age: 75
Setting detail: THERAPIES SERIES
Discharge: HOME | End: 2020-05-08

## 2020-05-08 DIAGNOSIS — M25.512 BILATERAL SHOULDER PAIN, UNSPECIFIED CHRONICITY: ICD-10-CM

## 2020-05-08 DIAGNOSIS — M25.511 BILATERAL SHOULDER PAIN, UNSPECIFIED CHRONICITY: ICD-10-CM

## 2020-05-08 DIAGNOSIS — Z96.611 STATUS POST REVERSE TOTAL REPLACEMENT OF RIGHT SHOULDER: ICD-10-CM

## 2020-05-08 DIAGNOSIS — M12.811 ROTATOR CUFF ARTHROPATHY, RIGHT: Primary | ICD-10-CM

## 2020-05-08 PROCEDURE — 97110 THERAPEUTIC EXERCISES: CPT

## 2020-05-08 PROCEDURE — 97164 PT RE-EVAL EST PLAN CARE: CPT

## 2020-05-08 NOTE — THERAPY PROGRESS REPORT/RE-CERT
Outpatient Physical Therapy Ortho Re-Evaluation  [facility]     Patient Name: Souleymane Stapleton  : 1945  MRN: [id number]  Today's Date: 2020      Visit Date: 2020  Attendance:  (Medical necessity)  Subjective Improvement: 80%  Next MD Appt: none scheduled with Ortho  Recert Date: 2020    Patient Active Problem List   Diagnosis   • Astigmatism   • Anxiety state   • History of cerebrovascular accident   • Nuclear senile cataract   • Hypertension   • Unspecified hemorrhoids   • Palpitations   • Pure hypercholesterolemia   • Prostate cancer (CMS/HCC)   • Chronic right shoulder pain   • Rotator cuff syndrome, left   • Chronic left shoulder pain   • Impingement syndrome, shoulder, left   • SOB (shortness of breath)   • Angina of effort (CMS/HCC)   • Coronary artery disease involving coronary bypass graft of native heart without angina pectoris   • Cervical spinal stenosis   • Displacement of cervical intervertebral disc   • Displacement of lumbar intervertebral disc without myelopathy   • Follow-up examination after neurological surgery   • Internal carotid artery dissection (CMS/HCC)   • TIA (transient ischemic attack)   • Bacterial intestinal infection, unspecified   • Body mass index (bmi) 25.0-25.9, adult   • Diverticulosis of large intestine without perforation or abscess without bleeding   • Functional dyspepsia   • History of colonic polyps   • Polyp of stomach and duodenum   • Bilateral shoulder pain   • Rotator cuff arthropathy, right   • Status post reverse arthroplasty of right shoulder        Past Medical History:   Diagnosis Date   • Abdominal pain    • Abnormal weight loss    • Acid reflux    • Acute gastritis    • Anxiety state    • Arthritis    • Cancer (CMS/HCC)     Prostate   • Coronary artery disease    • History of cerebrovascular accident    • History of colon polyps    • Hypertension    • Nausea    • Nuclear senile cataract    • Presbyopia    • Stroke (CMS/HCC)  2005    TIA   • Tobacco use    • Transient cerebral ischemia    • Vitreous detachment of left eye         Past Surgical History:   Procedure Laterality Date   • BACK SURGERY     • CARDIAC CATHETERIZATION N/A 6/19/2018    Procedure: Coronary angiography;  Surgeon: Delroy Mcconnell MD;  Location: [facility] CATH INVASIVE LOCATION;  Service: Cardiovascular   • COLONOSCOPY  06/09/2015    Diverticulosis found in the sigmoid colon. Hemorrhoids found in the anus.   • CORONARY ARTERY BYPASS GRAFT N/A 6/22/2018    Procedure: PARAS STERNOTOMY CORONARY ARTERY BYPASS GRAFT TIMES 5 USING RIGHT AND LEFT INTERNAL MAMMARY ARTERIES AND LEFT GREATER SAPHENOUS VEIN GRAFT PER ENDOSCOPIC VEIN HARVESTING AND PRP;  Surgeon: Edgar Pérez MD;  Location: [facility] OR;  Service: Cardiothoracic   • CRYOTHERAPY  08/14/2012    Of Acne   • ENDOSCOPY  09/01/2015    EGD w/ biopsy -Multiple polyps, one removed.   • ENDOSCOPY N/A 8/22/2019    Procedure: ESOPHAGOGASTRODUODENOSCOPY;  Surgeon: Chuck Rich DO;  Location: [facility] ENDOSCOPY;  Service: Gastroenterology   • HEMORRHOIDECTOMY N/A 3/20/2017    Procedure: Removal of Anal Papilla;  Surgeon: Juan Diego Ken MD;  Location: [facility] OR;  Service:    • INJECTION OF MEDICATION  09/14/2010    B12   • INJECTION OF MEDICATION  10/17/2011    Kenalog   • LUMBAR LAMINECTOMY  09/29/2010   • OTHER SURGICAL HISTORY  08/19/2010    Biopsy, Each Additional Lesion    • SHOULDER ROTATOR CUFF REPAIR Right 02/17/2020   • SKIN BIOPSY  08/19/2010   • TOTAL SHOULDER ARTHROPLASTY W/ DISTAL CLAVICLE EXCISION Right 2/17/2020    Procedure: right reverse total shoulder arthroplasty.;  Surgeon: Juan Diego Mendoza MD;  Location: [facility] OR;  Service: Orthopedics;  Laterality: Right;   • TOTAL SHOULDER REVERSE ARTHROPLASTY Right 02/17/2020       Visit Dx:     ICD-10-CM ICD-9-CM   1. Rotator cuff arthropathy, right M12.811 716.81   2. Status post reverse total replacement of right shoulder Z96.611 V43.61    3. Bilateral shoulder pain, unspecified chronicity M25.511 719.41    M25.512              PT Ortho     Row Name 05/08/20 0800       Subjective Pain    Post-Treatment Pain Level  0  -PD       Right Upper Ext    Rt Shoulder Abduction AROM  148  -PD    Rt Shoulder Flexion AROM  145  -PD    Rt Shoulder External Rotation PROM  T2  -PD    Rt Shoulder Internal Rotation PROM  T11  -PD       MMT (Manual Muscle Testing)    General MMT Comments  3/5 ER R shoulder  -PD      User Key  (r) = Recorded By, (t) = Taken By, (c) = Cosigned By    Initials Name Provider Type    PD Won Neely, PT Physical Therapist                PT OP Goals     Row Name 05/08/20 0800          PT Short Term Goals    STG Date to Achieve  04/10/20  -PD     STG 1  Passive flexion to be >/= 120 deg.  -PD     STG 1 Progress  Met  -PD     STG 2  Passive ER with arm at side to be 20 deg.  -PD     STG 2 Progress  Met  -PD     STG 3  Increase R cervical lateral flexion by >/= 6 degrees  -PD     STG 3 Progress  Ongoing  -PD     STG 4  R shoulder extension to be >/= 45 deg.  -PD     STG 4 Progress  Ongoing  -PD        Long Term Goals    LTG Date to Achieve  06/12/20  -PD     LTG 1  Independent with HEP.  -PD     LTG 1 Progress  Met;Partially Met  -PD     LTG 2  QuickDASH score to be </= 35.  -PD     LTG 2 Progress  Met  -PD     LTG 3  R shoulder AROM WFLs all planes.  -PD     LTG 3 Progress  Progressing;Partially Met  -PD     LTG 4  Report ability to use R UE for ADLs and light IADLs.  -PD     LTG 4 Progress  Progressing  -PD     LTG 5  Note a >/= 50% improvement in instances of R UE radiculopathy  -PD     LTG 5 Progress  Met  -PD     LTG 6  Patient will be able to eat with right UE without difficulty.   -PD     LTG 6 Progress  Not Met;Ongoing  -PD        Time Calculation    PT Goal Re-Cert Due Date  05/08/20  -PD       User Key  (r) = Recorded By, (t) = Taken By, (c) = Cosigned By    Initials Name Provider Type    Won Zavala, PT Physical Therapist           PT Assessment/Plan     Row Name 05/08/20 0800          PT Assessment    Functional Limitations  Limitation in home management;Limitations in community activities;Limitations in functional capacity and performance;Performance in leisure activities;Performance in self-care ADL  -PD     Impairments  Pain;Range of motion;Muscle strength;Dexterity  -PD     Assessment Comments  pt has progressed quite a bit with ROM and met many goals but pt still remains with diffulcty doing ADL, ER, IR and strength in those directions. Pt stilll has some pain in the triceps and around the shoulder joint.   -PD     Rehab Potential  Good  -PD     Patient/caregiver participated in establishment of treatment plan and goals  Yes  -PD     Patient would benefit from skilled therapy intervention  Yes  -PD        PT Plan    PT Frequency  2x/week  -PD     Predicted Duration of Therapy Intervention (Therapy Eval)  4 more weeks  -PD     PT Plan Comments  ER strengthening, IR stretching, coordination in R arm.   -PD       User Key  (r) = Recorded By, (t) = Taken By, (c) = Cosigned By    Initials Name Provider Type    PD Won Neely, PT Physical Therapist            OP Exercises     Row Name 05/08/20 0800             Subjective Comments    Subjective Comments  pt reports no pain but soreness still.   -PD         Subjective Pain    Able to rate subjective pain?  yes  -PD      Pre-Treatment Pain Level  0  -PD      Post-Treatment Pain Level  0  -PD         Exercise 1    Exercise Name 1  Pro2, Seat 10, UE, F/R, strength/endurance  -PD      Time 1  10 min   -PD      Additional Comments  level 4.0  -PD         Exercise 2    Exercise Name 2  doorway pec stretch  -PD      Sets 2  3  -PD      Time 2  30 sec hold  -PD         Exercise 3    Exercise Name 3  Doorway ER stretch arm at side  -PD      Sets 3  3  -PD      Time 3  30 sec hold  -PD         Exercise 4    Exercise Name 4  ball roll for triceps  -PD      Time 4  2 min  -PD         Exercise 5     Exercise Name 5  triceps pull down  -PD      Sets 5  3  -PD      Reps 5  10  -PD      Additional Comments  4 plates  -PD         Exercise 6    Exercise Name 6  one arm triceps pull downs  -PD      Sets 6  3  -PD      Reps 6  10  -PD      Additional Comments  2 plates  -PD         Exercise 7    Exercise Name 7  skull crushes  -PD      Sets 7  3  -PD      Reps 7  10  -PD      Additional Comments  5#  -PD         Exercise 8    Exercise Name 8  elbow pronation and supination  -PD      Sets 8  3  -PD      Reps 8  10  -PD      Additional Comments  3#  -PD         Exercise 9    Exercise Name 9  recheck  -PD         Exercise 10    Exercise Name 10  paraffin bath  -PD        User Key  (r) = Recorded By, (t) = Taken By, (c) = Cosigned By    Initials Name Provider Type    Won Zavala PT Physical Therapist               Outcome Measure Options: Quick DASH  Quick DASH  Open a tight or new jar.: No Difficulty  Do heavy household chores (e.g., wash walls, wash floors): Mild Difficulty  Carry a shopping bag or briefcase: No Difficulty  Wash your back: Severe Difficulty  Use a knife to cut food: Mild Difficulty  Recreational activities in which you take some force or impact through your arm, should or hand (e.g. golf, hammering, tennis, etc.): Mild Difficulty  During the past week, to what extent has your arm, shoulder, or hand problem interfered with your normal social activites with family, friends, neighbors or groups?: Slightly  During the past week, were you limited in your work or other regular daily activities as a result of your arm, shoulder or hand problem?: Slightly Limited  Arm, Shoulder, or hand pain: None  Tingling (pins and needles) in your arm, shoulder, or hand: Mild  During the past week, how much difficulty have you had sleeping because of the pain in your arm, shoulder or hand?: No difficulty  Number of Questions Answered: 11  Quick DASH Score: 20.45         Time Calculation:           Therapy Charges for  Today     Code Description Service Date Service Provider Modifiers Qty    99138291072 HC PT RE-EVAL ESTABLISHED PLAN 2 5/8/2020 Won Neely, PT GP 1    42307483201 HC PT THER PROC EA 15 MIN 5/8/2020 Won Neely, PT GP 2          PT G-Codes  Outcome Measure Options: Quick DASH  Quick DASH Score: 20.45         Won Neely, PT  5/8/2020

## 2020-05-11 ENCOUNTER — HOSPITAL ENCOUNTER (OUTPATIENT)
Dept: PHYSICAL THERAPY | Facility: HOSPITAL | Age: 75
Setting detail: THERAPIES SERIES
Discharge: HOME | End: 2020-05-11

## 2020-05-11 ENCOUNTER — TELEPHONE (OUTPATIENT)
Dept: FAMILY MEDICINE CLINIC | Facility: CLINIC | Age: 75
End: 2020-05-11

## 2020-05-11 DIAGNOSIS — Z96.611 STATUS POST REVERSE TOTAL REPLACEMENT OF RIGHT SHOULDER: ICD-10-CM

## 2020-05-11 DIAGNOSIS — F41.9 ANXIETY: Primary | ICD-10-CM

## 2020-05-11 DIAGNOSIS — M12.811 ROTATOR CUFF ARTHROPATHY, RIGHT: Primary | ICD-10-CM

## 2020-05-11 PROCEDURE — 97110 THERAPEUTIC EXERCISES: CPT

## 2020-05-11 RX ORDER — ALPRAZOLAM 0.5 MG/1
0.5 TABLET ORAL 3 TIMES DAILY
Qty: 90 TABLET | Refills: 2 | Status: CANCELLED | OUTPATIENT
Start: 2020-05-11

## 2020-05-11 RX ORDER — ALPRAZOLAM 0.5 MG/1
0.5 TABLET ORAL 3 TIMES DAILY
Qty: 90 TABLET | Refills: 1 | Status: SHIPPED | OUTPATIENT
Start: 2020-05-11 | End: 2020-12-03 | Stop reason: SDUPTHER

## 2020-05-11 NOTE — THERAPY TREATMENT NOTE
Outpatient Physical Therapy Ortho Treatment Note  [facility]     Patient Name: Souleymane Stapleton  : 1945  MRN: [id number]  Today's Date: 2020      Visit Date: 2020  Attendance:  (Medical necessity)  Subjective Improvement: 80%  Next MD Appt: none scheduled with Ortho  Recert Date: 2020  Visit Dx:    ICD-10-CM ICD-9-CM   1. Rotator cuff arthropathy, right M12.811 716.81   2. Status post reverse total replacement of right shoulder Z96.611 V43.61       Patient Active Problem List   Diagnosis   • Astigmatism   • Anxiety state   • History of cerebrovascular accident   • Nuclear senile cataract   • Hypertension   • Unspecified hemorrhoids   • Palpitations   • Pure hypercholesterolemia   • Prostate cancer (CMS/HCC)   • Chronic right shoulder pain   • Rotator cuff syndrome, left   • Chronic left shoulder pain   • Impingement syndrome, shoulder, left   • SOB (shortness of breath)   • Angina of effort (CMS/HCC)   • Coronary artery disease involving coronary bypass graft of native heart without angina pectoris   • Cervical spinal stenosis   • Displacement of cervical intervertebral disc   • Displacement of lumbar intervertebral disc without myelopathy   • Follow-up examination after neurological surgery   • Internal carotid artery dissection (CMS/HCC)   • TIA (transient ischemic attack)   • Bacterial intestinal infection, unspecified   • Body mass index (bmi) 25.0-25.9, adult   • Diverticulosis of large intestine without perforation or abscess without bleeding   • Functional dyspepsia   • History of colonic polyps   • Polyp of stomach and duodenum   • Bilateral shoulder pain   • Rotator cuff arthropathy, right   • Status post reverse arthroplasty of right shoulder        Past Medical History:   Diagnosis Date   • Abdominal pain    • Abnormal weight loss    • Acid reflux    • Acute gastritis    • Anxiety state    • Arthritis    • Cancer (CMS/HCC)     Prostate   • Coronary artery disease     • History of cerebrovascular accident    • History of colon polyps    • Hypertension    • Nausea    • Nuclear senile cataract    • Presbyopia    • Stroke (CMS/HCC) 2005    TIA   • Tobacco use    • Transient cerebral ischemia    • Vitreous detachment of left eye         Past Surgical History:   Procedure Laterality Date   • BACK SURGERY     • CARDIAC CATHETERIZATION N/A 6/19/2018    Procedure: Coronary angiography;  Surgeon: Delroy Mcconnell MD;  Location: [facility] CATH INVASIVE LOCATION;  Service: Cardiovascular   • COLONOSCOPY  06/09/2015    Diverticulosis found in the sigmoid colon. Hemorrhoids found in the anus.   • CORONARY ARTERY BYPASS GRAFT N/A 6/22/2018    Procedure: PARAS STERNOTOMY CORONARY ARTERY BYPASS GRAFT TIMES 5 USING RIGHT AND LEFT INTERNAL MAMMARY ARTERIES AND LEFT GREATER SAPHENOUS VEIN GRAFT PER ENDOSCOPIC VEIN HARVESTING AND PRP;  Surgeon: Edgar Pérez MD;  Location: [facility] OR;  Service: Cardiothoracic   • CRYOTHERAPY  08/14/2012    Of Acne   • ENDOSCOPY  09/01/2015    EGD w/ biopsy -Multiple polyps, one removed.   • ENDOSCOPY N/A 8/22/2019    Procedure: ESOPHAGOGASTRODUODENOSCOPY;  Surgeon: Chuck Rich DO;  Location: [facility] ENDOSCOPY;  Service: Gastroenterology   • HEMORRHOIDECTOMY N/A 3/20/2017    Procedure: Removal of Anal Papilla;  Surgeon: Juan Diego Ken MD;  Location: [facility] OR;  Service:    • INJECTION OF MEDICATION  09/14/2010    B12   • INJECTION OF MEDICATION  10/17/2011    Kenalog   • LUMBAR LAMINECTOMY  09/29/2010   • OTHER SURGICAL HISTORY  08/19/2010    Biopsy, Each Additional Lesion    • SHOULDER ROTATOR CUFF REPAIR Right 02/17/2020   • SKIN BIOPSY  08/19/2010   • TOTAL SHOULDER ARTHROPLASTY W/ DISTAL CLAVICLE EXCISION Right 2/17/2020    Procedure: right reverse total shoulder arthroplasty.;  Surgeon: Juan Diego Mendoza MD;  Location: [facility] OR;  Service: Orthopedics;  Laterality: Right;   • TOTAL SHOULDER REVERSE ARTHROPLASTY Right 02/17/2020        PT Ortho     Row Name 05/11/20 0800       Right Upper Ext    Rt Shoulder Abduction AROM  148  -PD    Rt Shoulder Flexion AROM  145  -PD    Rt Shoulder External Rotation PROM  T2  -PD    Rt Shoulder Internal Rotation PROM  T11  -PD      User Key  (r) = Recorded By, (t) = Taken By, (c) = Cosigned By    Initials Name Provider Type    Won Zavala PT Physical Therapist            PT Assessment/Plan     Row Name 05/11/20 0800          PT Assessment    Functional Limitations  Limitation in home management;Limitations in community activities;Limitations in functional capacity and performance;Performance in leisure activities;Performance in self-care ADL  -PD     Impairments  Pain;Range of motion;Muscle strength;Dexterity;Coordination  -PD     Assessment Comments  pt chon tx well, still having strength issues with the ER in full ROM.   -PD     Rehab Potential  Good  -PD     Patient/caregiver participated in establishment of treatment plan and goals  Yes  -PD     Patient would benefit from skilled therapy intervention  Yes  -PD        PT Plan    PT Frequency  2x/week  -PD     Predicted Duration of Therapy Intervention (Therapy Eval)  4 more weeks  -PD     PT Plan Comments  ER strengthening, IR stretching, coordination in R arm.   -PD       User Key  (r) = Recorded By, (t) = Taken By, (c) = Cosigned By    Initials Name Provider Type    Won Zavala, PURNIMA Physical Therapist            OP Exercises     Row Name 05/11/20 0800             Subjective Comments    Subjective Comments  pt reports reports no pain or issues over the wknd but has a sore spot behind the arm.   -PD         Subjective Pain    Able to rate subjective pain?  yes  -PD      Pre-Treatment Pain Level  0  -PD      Post-Treatment Pain Level  0  -PD         Exercise 1    Exercise Name 1  Pro2, Seat 10, UE, F/R, strength/endurance  -PD      Time 1  10 min   -PD      Additional Comments  level 4.0  -PD         Exercise 2    Exercise Name 2  manual ER  arm at 90 deg  -PD      Sets 2  3  -PD      Reps 2  10  -PD         Exercise 3    Exercise Name 3  ER with towel arm at 90 deg  -PD      Sets 3  1  -PD      Reps 3  10  -PD         Exercise 4    Exercise Name 4  ER coordination ball baounce against wall  -PD      Time 4  5 min  -PD         Exercise 5    Exercise Name 5  sleeper stretch  -PD      Sets 5  3  -PD      Time 5  30 sec hold  -PD         Exercise 6    Exercise Name 6  HEP review  -PD         Exercise 7    Exercise Name 7  paraffin bath for hand  -PD         Exercise 8    Exercise Name 8  ER isometrics  -PD        User Key  (r) = Recorded By, (t) = Taken By, (c) = Cosigned By    Initials Name Provider Type    PD Won Neely, PT Physical Therapist          PT OP Goals     Row Name 05/11/20 0800          PT Short Term Goals    STG Date to Achieve  04/10/20  -PD     STG 1  Passive flexion to be >/= 120 deg.  -PD     STG 1 Progress  Met  -PD     STG 2  Passive ER with arm at side to be 20 deg.  -PD     STG 2 Progress  Met  -PD     STG 3  Increase R cervical lateral flexion by >/= 6 degrees  -PD     STG 3 Progress  Ongoing  -PD     STG 4  R shoulder extension to be >/= 45 deg.  -PD     STG 4 Progress  Ongoing  -PD        Long Term Goals    LTG Date to Achieve  06/12/20  -PD     LTG 1  Independent with HEP.  -PD     LTG 1 Progress  Met;Partially Met  -PD     LTG 2  QuickDASH score to be </= 35.  -PD     LTG 2 Progress  Met  -PD     LTG 3  R shoulder AROM WFLs all planes.  -PD     LTG 3 Progress  Progressing;Partially Met  -PD     LTG 4  Report ability to use R UE for ADLs and light IADLs.  -PD     LTG 4 Progress  Progressing  -PD     LTG 5  Note a >/= 50% improvement in instances of R UE radiculopathy  -PD     LTG 5 Progress  Met  -PD     LTG 6  Patient will be able to eat with right UE without difficulty.   -PD     LTG 6 Progress  Not Met;Ongoing  -PD        Time Calculation    PT Goal Re-Cert Due Date  05/29/20  -PD       User Key  (r) = Recorded By, (t) =  Taken By, (c) = Cosigned By    Initials Name Provider Type    PD Won Neely, PT Physical Therapist            Time Calculation:      Therapy Charges for Today     Code Description Service Date Service Provider Modifiers Qty    58916416155 HC PT THER PROC EA 15 MIN 5/11/2020 Won Neely, PT GP 3          Won Neely, PT  5/11/2020

## 2020-05-11 NOTE — TELEPHONE ENCOUNTER
Dr. Murrieta.    Mr. Souleymane Stapleton is requesting a refill on his Xanax 0.5 mg  #90  Take 1 TID,  to be sent to [facility] mail [facility].    Last OV   02/25/2020     Next Matias OV    Visit date not found    Last Script Written  12/06/2019  #90 with 2 refills      Last Evgeny     02/25/2020    Please advise on refill    Thank you           ----- Message from Souleymane Stapleton sent at 5/11/2020 10:36 AM CDT -----  Regarding: Prescription Question  Contact: [phone]  [facility].....    I need my prescription for Alpralozam renewed. I use [facility]. Hope all is well with you and family. Thanks!

## 2020-05-14 ENCOUNTER — HOSPITAL ENCOUNTER (OUTPATIENT)
Dept: PHYSICAL THERAPY | Facility: HOSPITAL | Age: 75
Setting detail: THERAPIES SERIES
Discharge: HOME | End: 2020-05-14

## 2020-05-14 DIAGNOSIS — Z96.611 STATUS POST REVERSE TOTAL REPLACEMENT OF RIGHT SHOULDER: ICD-10-CM

## 2020-05-14 DIAGNOSIS — M12.811 ROTATOR CUFF ARTHROPATHY, RIGHT: Primary | ICD-10-CM

## 2020-05-14 PROCEDURE — 97110 THERAPEUTIC EXERCISES: CPT

## 2020-05-14 NOTE — THERAPY TREATMENT NOTE
Outpatient Physical Therapy Ortho Treatment Note  [facility]     Patient Name: Souleymane Stapleton  : 1945  MRN: [id number]  Today's Date: 2020      Visit Date: 2020  Attendance:  (Medical necessity)  Subjective Improvement: 80%  Next MD Appt: none scheduled with Ortho  Recert Date: 2020  Visit Dx:    ICD-10-CM ICD-9-CM   1. Rotator cuff arthropathy, right M12.811 716.81   2. Status post reverse total replacement of right shoulder Z96.611 V43.61       Patient Active Problem List   Diagnosis   • Astigmatism   • Anxiety state   • History of cerebrovascular accident   • Nuclear senile cataract   • Hypertension   • Unspecified hemorrhoids   • Palpitations   • Pure hypercholesterolemia   • Prostate cancer (CMS/HCC)   • Chronic right shoulder pain   • Rotator cuff syndrome, left   • Chronic left shoulder pain   • Impingement syndrome, shoulder, left   • SOB (shortness of breath)   • Angina of effort (CMS/HCC)   • Coronary artery disease involving coronary bypass graft of native heart without angina pectoris   • Cervical spinal stenosis   • Displacement of cervical intervertebral disc   • Displacement of lumbar intervertebral disc without myelopathy   • Follow-up examination after neurological surgery   • Internal carotid artery dissection (CMS/HCC)   • TIA (transient ischemic attack)   • Bacterial intestinal infection, unspecified   • Body mass index (bmi) 25.0-25.9, adult   • Diverticulosis of large intestine without perforation or abscess without bleeding   • Functional dyspepsia   • History of colonic polyps   • Polyp of stomach and duodenum   • Bilateral shoulder pain   • Rotator cuff arthropathy, right   • Status post reverse arthroplasty of right shoulder        Past Medical History:   Diagnosis Date   • Abdominal pain    • Abnormal weight loss    • Acid reflux    • Acute gastritis    • Anxiety state    • Arthritis    • Cancer (CMS/HCC)     Prostate   • Coronary artery disease     • History of cerebrovascular accident    • History of colon polyps    • Hypertension    • Nausea    • Nuclear senile cataract    • Presbyopia    • Stroke (CMS/HCC) 2005    TIA   • Tobacco use    • Transient cerebral ischemia    • Vitreous detachment of left eye         Past Surgical History:   Procedure Laterality Date   • BACK SURGERY     • CARDIAC CATHETERIZATION N/A 6/19/2018    Procedure: Coronary angiography;  Surgeon: Delroy Mcconnell MD;  Location: [facility] CATH INVASIVE LOCATION;  Service: Cardiovascular   • COLONOSCOPY  06/09/2015    Diverticulosis found in the sigmoid colon. Hemorrhoids found in the anus.   • CORONARY ARTERY BYPASS GRAFT N/A 6/22/2018    Procedure: PARAS STERNOTOMY CORONARY ARTERY BYPASS GRAFT TIMES 5 USING RIGHT AND LEFT INTERNAL MAMMARY ARTERIES AND LEFT GREATER SAPHENOUS VEIN GRAFT PER ENDOSCOPIC VEIN HARVESTING AND PRP;  Surgeon: Edgar Pérez MD;  Location: [facility] OR;  Service: Cardiothoracic   • CRYOTHERAPY  08/14/2012    Of Acne   • ENDOSCOPY  09/01/2015    EGD w/ biopsy -Multiple polyps, one removed.   • ENDOSCOPY N/A 8/22/2019    Procedure: ESOPHAGOGASTRODUODENOSCOPY;  Surgeon: Chuck Rich DO;  Location: [facility] ENDOSCOPY;  Service: Gastroenterology   • HEMORRHOIDECTOMY N/A 3/20/2017    Procedure: Removal of Anal Papilla;  Surgeon: Juan Diego Ken MD;  Location: [facility] OR;  Service:    • INJECTION OF MEDICATION  09/14/2010    B12   • INJECTION OF MEDICATION  10/17/2011    Kenalog   • LUMBAR LAMINECTOMY  09/29/2010   • OTHER SURGICAL HISTORY  08/19/2010    Biopsy, Each Additional Lesion    • SHOULDER ROTATOR CUFF REPAIR Right 02/17/2020   • SKIN BIOPSY  08/19/2010   • TOTAL SHOULDER ARTHROPLASTY W/ DISTAL CLAVICLE EXCISION Right 2/17/2020    Procedure: right reverse total shoulder arthroplasty.;  Surgeon: Juan Diego Mendoza MD;  Location: [facility] OR;  Service: Orthopedics;  Laterality: Right;   • TOTAL SHOULDER REVERSE ARTHROPLASTY Right 02/17/2020        PT Ortho     Row Name 05/14/20 0800       Subjective Comments    Subjective Comments  pt reports that eating with his right hand is a little better but still having to concentrate more.   -PD       Subjective Pain    Able to rate subjective pain?  yes  -PD    Pre-Treatment Pain Level  0  -PD       Right Upper Ext    Rt Shoulder Abduction AROM  148  -PD    Rt Shoulder Flexion AROM  145  -PD    Rt Shoulder External Rotation PROM  T2  -PD    Rt Shoulder Internal Rotation PROM  T11  -PD      User Key  (r) = Recorded By, (t) = Taken By, (c) = Cosigned By    Initials Name Provider Type    Won Zavala PT Physical Therapist              PT Assessment/Plan     Row Name 05/14/20 0800          PT Assessment    Functional Limitations  Limitation in home management;Limitations in community activities;Limitations in functional capacity and performance;Performance in leisure activities;Performance in self-care ADL  -PD     Impairments  Pain;Range of motion;Muscle strength;Dexterity;Coordination  -PD     Assessment Comments  pt still having difficulty with end range ER but chon rest of tx well.   -PD     Rehab Potential  Good  -PD     Patient/caregiver participated in establishment of treatment plan and goals  Yes  -PD     Patient would benefit from skilled therapy intervention  Yes  -PD        PT Plan    PT Frequency  2x/week  -PD     Predicted Duration of Therapy Intervention (Therapy Eval)  4 more weeks  -PD     PT Plan Comments  ER strengthening, IR stretching, coordination in R arm.   -PD       User Key  (r) = Recorded By, (t) = Taken By, (c) = Cosigned By    Initials Name Provider Type    Won Zavala PT Physical Therapist            OP Exercises     Row Name 05/14/20 0800             Subjective Comments    Subjective Comments  pt reports that eating with his right hand is a little better but still having to concentrate more.   -PD         Subjective Pain    Able to rate subjective pain?  yes  -PD       Pre-Treatment Pain Level  0  -PD         Exercise 1    Exercise Name 1  Pro2, Seat 10, UE, F/R, strength/endurance  -PD      Time 1  10 min  -PD      Additional Comments  level 4.0  -PD         Exercise 2    Exercise Name 2  cybex press ups  -PD      Sets 2  3  -PD      Reps 2  10  -PD      Additional Comments  5#  -PD         Exercise 3    Exercise Name 3  cybex chest press  -PD      Sets 3  3  -PD      Reps 3  10  -PD      Additional Comments  5#  -PD         Exercise 4    Exercise Name 4  cybex row  -PD      Sets 4  3  -PD      Reps 4  10  -PD      Additional Comments  5#  -PD         Exercise 5    Exercise Name 5  cybex lat pull down  -PD      Sets 5  3  -PD      Reps 5  10  -PD      Additional Comments  5#  -PD         Exercise 6    Exercise Name 6  cybex fly  -PD      Sets 6  3  -PD      Reps 6  10  -PD      Additional Comments  5#  -PD         Exercise 7    Exercise Name 7  cybex rear delt  -PD      Sets 7  3  -PD      Reps 7  10  -PD      Additional Comments  5#  -PD         Exercise 8    Exercise Name 8  ER [employer] biodex  -PD      Sets 8  1  -PD      Reps 8  10  -PD         Exercise 9    Exercise Name 9  HEP review  -PD         Exercise 10    Exercise Name 10  paraffin bath  -PD        User Key  (r) = Recorded By, (t) = Taken By, (c) = Cosigned By    Initials Name Provider Type    Won Zavala, PT Physical Therapist              PT OP Goals     Row Name 05/14/20 0800          PT Short Term Goals    STG Date to Achieve  04/10/20  -PD     STG 1  Passive flexion to be >/= 120 deg.  -PD     STG 1 Progress  Met  -PD     STG 2  Passive ER with arm at side to be 20 deg.  -PD     STG 2 Progress  Met  -PD     STG 3  Increase R cervical lateral flexion by >/= 6 degrees  -PD     STG 3 Progress  Ongoing  -PD     STG 4  R shoulder extension to be >/= 45 deg.  -PD     STG 4 Progress  Ongoing  -PD        Long Term Goals    LTG Date to Achieve  06/12/20  -PD     LTG 1  Independent with HEP.  -PD     LTG 1 Progress   Met;Partially Met  -PD     LTG 2  QuickDASH score to be </= 35.  -PD     LTG 2 Progress  Met  -PD     LTG 3  R shoulder AROM WFLs all planes.  -PD     LTG 3 Progress  Progressing;Partially Met  -PD     LTG 4  Report ability to use R UE for ADLs and light IADLs.  -PD     LTG 4 Progress  Progressing  -PD     LTG 5  Note a >/= 50% improvement in instances of R UE radiculopathy  -PD     LTG 5 Progress  Met  -PD     LTG 6  Patient will be able to eat with right UE without difficulty.   -PD     LTG 6 Progress  Not Met;Ongoing  -PD        Time Calculation    PT Goal Re-Cert Due Date  05/29/20  -PD       User Key  (r) = Recorded By, (t) = Taken By, (c) = Cosigned By    Initials Name Provider Type    Won Zavala, PT Physical Therapist                         Time Calculation:      Therapy Charges for Today     Code Description Service Date Service Provider Modifiers Qty    04389039590 HC PT THER PROC EA 15 MIN 5/14/2020 oWn Neely, PT GP 3                    Won Neely PT  5/14/2020

## 2020-05-15 ENCOUNTER — APPOINTMENT (OUTPATIENT)
Dept: PHYSICAL THERAPY | Facility: HOSPITAL | Age: 75
End: 2020-05-15

## 2020-05-18 ENCOUNTER — HOSPITAL ENCOUNTER (OUTPATIENT)
Dept: PHYSICAL THERAPY | Facility: HOSPITAL | Age: 75
Setting detail: THERAPIES SERIES
Discharge: HOME | End: 2020-05-18

## 2020-05-18 DIAGNOSIS — Z96.611 STATUS POST REVERSE TOTAL REPLACEMENT OF RIGHT SHOULDER: ICD-10-CM

## 2020-05-18 DIAGNOSIS — M12.811 ROTATOR CUFF ARTHROPATHY, RIGHT: Primary | ICD-10-CM

## 2020-05-18 PROCEDURE — 97140 MANUAL THERAPY 1/> REGIONS: CPT | Performed by: PHYSICAL THERAPIST

## 2020-05-18 PROCEDURE — 97110 THERAPEUTIC EXERCISES: CPT | Performed by: PHYSICAL THERAPIST

## 2020-05-18 RX ORDER — CLOPIDOGREL BISULFATE 75 MG/1
75 TABLET ORAL DAILY
Qty: 90 TABLET | Refills: 1 | Status: SHIPPED | OUTPATIENT
Start: 2020-05-18 | End: 2020-07-14

## 2020-05-18 NOTE — THERAPY TREATMENT NOTE
Outpatient Physical Therapy Ortho Treatment Note  [facility]     Patient Name: Souleymane Stapleton  : 1945  MRN: [id number]  Today's Date: 2020      Visit Date: 2020  Attendance:  (Medical necessity)  Subjective Improvement: 80%  Next MD Appt: none scheduled with Ortho  Recert Date: 2020  Visit Dx:    ICD-10-CM ICD-9-CM   1. Rotator cuff arthropathy, right M12.811 716.81   2. Status post reverse total replacement of right shoulder Z96.611 V43.61       Patient Active Problem List   Diagnosis   • Astigmatism   • Anxiety state   • History of cerebrovascular accident   • Nuclear senile cataract   • Hypertension   • Unspecified hemorrhoids   • Palpitations   • Pure hypercholesterolemia   • Prostate cancer (CMS/HCC)   • Chronic right shoulder pain   • Rotator cuff syndrome, left   • Chronic left shoulder pain   • Impingement syndrome, shoulder, left   • SOB (shortness of breath)   • Angina of effort (CMS/HCC)   • Coronary artery disease involving coronary bypass graft of native heart without angina pectoris   • Cervical spinal stenosis   • Displacement of cervical intervertebral disc   • Displacement of lumbar intervertebral disc without myelopathy   • Follow-up examination after neurological surgery   • Internal carotid artery dissection (CMS/HCC)   • TIA (transient ischemic attack)   • Bacterial intestinal infection, unspecified   • Body mass index (bmi) 25.0-25.9, adult   • Diverticulosis of large intestine without perforation or abscess without bleeding   • Functional dyspepsia   • History of colonic polyps   • Polyp of stomach and duodenum   • Bilateral shoulder pain   • Rotator cuff arthropathy, right   • Status post reverse arthroplasty of right shoulder        Past Medical History:   Diagnosis Date   • Abdominal pain    • Abnormal weight loss    • Acid reflux    • Acute gastritis    • Anxiety state    • Arthritis    • Cancer (CMS/HCC)     Prostate   • Coronary artery disease     • History of cerebrovascular accident    • History of colon polyps    • Hypertension    • Nausea    • Nuclear senile cataract    • Presbyopia    • Stroke (CMS/HCC) 2005    TIA   • Tobacco use    • Transient cerebral ischemia    • Vitreous detachment of left eye         Past Surgical History:   Procedure Laterality Date   • BACK SURGERY     • CARDIAC CATHETERIZATION N/A 6/19/2018    Procedure: Coronary angiography;  Surgeon: Delroy Mcconnell MD;  Location: [facility] CATH INVASIVE LOCATION;  Service: Cardiovascular   • COLONOSCOPY  06/09/2015    Diverticulosis found in the sigmoid colon. Hemorrhoids found in the anus.   • CORONARY ARTERY BYPASS GRAFT N/A 6/22/2018    Procedure: PARAS STERNOTOMY CORONARY ARTERY BYPASS GRAFT TIMES 5 USING RIGHT AND LEFT INTERNAL MAMMARY ARTERIES AND LEFT GREATER SAPHENOUS VEIN GRAFT PER ENDOSCOPIC VEIN HARVESTING AND PRP;  Surgeon: Edgar Pérez MD;  Location: [facility] OR;  Service: Cardiothoracic   • CRYOTHERAPY  08/14/2012    Of Acne   • ENDOSCOPY  09/01/2015    EGD w/ biopsy -Multiple polyps, one removed.   • ENDOSCOPY N/A 8/22/2019    Procedure: ESOPHAGOGASTRODUODENOSCOPY;  Surgeon: Chuck Rich DO;  Location: [facility] ENDOSCOPY;  Service: Gastroenterology   • HEMORRHOIDECTOMY N/A 3/20/2017    Procedure: Removal of Anal Papilla;  Surgeon: Juan Diego Ken MD;  Location: [facility] OR;  Service:    • INJECTION OF MEDICATION  09/14/2010    B12   • INJECTION OF MEDICATION  10/17/2011    Kenalog   • LUMBAR LAMINECTOMY  09/29/2010   • OTHER SURGICAL HISTORY  08/19/2010    Biopsy, Each Additional Lesion    • SHOULDER ROTATOR CUFF REPAIR Right 02/17/2020   • SKIN BIOPSY  08/19/2010   • TOTAL SHOULDER ARTHROPLASTY W/ DISTAL CLAVICLE EXCISION Right 2/17/2020    Procedure: right reverse total shoulder arthroplasty.;  Surgeon: Juan Diego Mendoza MD;  Location: [facility] OR;  Service: Orthopedics;  Laterality: Right;   • TOTAL SHOULDER REVERSE ARTHROPLASTY Right 02/17/2020        PT Ortho     Row Name 05/18/20 0800       Subjective Comments    Subjective Comments  Patient reports he was eating at end of last week and began to have a sharp, intermittent pain with certain movement. He has not used right arm in last 2 days, but he still has sharp pain.   -SW       Subjective Pain    Able to rate subjective pain?  yes  -SW    Pre-Treatment Pain Level  0  -SW       Right Upper Ext    Rt Shoulder Abduction AROM  145  -SW    Rt Shoulder Flexion AROM  145  -SW    Rt Shoulder External Rotation PROM  T2  -SW    Rt Shoulder Internal Rotation PROM  L1  -SW       MMT Right Upper Ext    Rt Shoulder Flexion MMT, Gross Movement  (4/5) good  -SW    Rt Shoulder Extension MMT, Gross Movement  (5/5) normal  -SW    Rt Shoulder ABduction MMT, Gross Movement  (4-/5) good minus  -SW    Rt Shoulder Internal Rotation MMT, Gross Movement  (4/5) good  -SW    Rt Shoulder External Rotation MMT, Gross Movement  (2+/5) poor plus  -SW      User Key  (r) = Recorded By, (t) = Taken By, (c) = Cosigned By    Initials Name Provider Type    Karen Knowles Physical Therapist                      PT Assessment/Plan     Row Name 05/18/20 0800          PT Assessment    Assessment Comments  Patient exhibits no loss of right shoulder ROM except mild IR ROM. He  can give resistance in all directions without an increase in pain. He exhibits mild to moderate tenderness over right coracoid process. He plans to call physician to report exacerbation of symptoms. Exercoses were kept light today. He had no increase in symptoms following therapy.   -SW     Rehab Potential  Good  -SW     Patient/caregiver participated in establishment of treatment plan and goals  Yes  -SW     Patient would benefit from skilled therapy intervention  Yes  -SW        PT Plan    PT Frequency  2x/week  -SW     Predicted Duration of Therapy Intervention (Therapy Eval)  4 more weeks  -SW     PT Plan Comments  Continue per POC wiht focus on ER strengthening and  stabilization right UE.   -SW       User Key  (r) = Recorded By, (t) = Taken By, (c) = Cosigned By    Initials Name Provider Type    Karen Knowles Physical Therapist            OP Exercises     Row Name 05/18/20 0800             Subjective Comments    Subjective Comments  Patient reports he was eating at end of last week and began to have a sharp, intermittent pain with certain movement. He has not used right arm in last 2 days, but he still has sharp pain.   -         Subjective Pain    Able to rate subjective pain?  yes  -      Pre-Treatment Pain Level  0  -         Exercise 1    Exercise Name 1  Pro2, Seat 10, UE, F/R, strength/endurance  -SW      Time 1  10 min  -SW      Additional Comments  L2  -SW         Exercise 2    Exercise Name 2  see  PROM  -         Exercise 3    Exercise Name 3  rows/ext/er/ir-red except er yellow  -SW      Reps 3  20  -SW        User Key  (r) = Recorded By, (t) = Taken By, (c) = Cosigned By    Initials Name Provider Type    Karen Knowles Physical Therapist                      Manual Rx (last 36 hours)      Manual Treatments     Row Name 05/18/20 0700             Manual Rx 1    Manual Rx 1 Location  R shoulder  -SW      Manual Rx 1 Type  PROM  -SW      Manual Rx 1 Duration  15'  -SW        User Key  (r) = Recorded By, (t) = Taken By, (c) = Cosigned By    Initials Name Provider Type    Karen Knowles Physical Therapist          PT OP Goals     Row Name 05/18/20 0800          PT Short Term Goals    STG Date to Achieve  04/10/20  -     STG 1  Passive flexion to be >/= 120 deg.  -     STG 1 Progress  Met  -     STG 2  Passive ER with arm at side to be 20 deg.  -     STG 2 Progress  Met  -     STG 3  Increase R cervical lateral flexion by >/= 6 degrees  -     STG 3 Progress  Ongoing  -     STG 4  R shoulder extension to be >/= 45 deg.  -     STG 4 Progress  Ongoing  -        Long Term Goals    LTG Date to Achieve  06/12/20  -     LTG 1  Independent with  HEP.  -     LTG 1 Progress  Met;Partially Met  -     LTG 2  QuickDASH score to be </= 35.  -     LTG 2 Progress  Met  -     LTG 3  R shoulder AROM WFLs all planes.  -     LTG 3 Progress  Progressing;Partially Met  -     LTG 4  Report ability to use R UE for ADLs and light IADLs.  -     LTG 4 Progress  Progressing  -     LTG 5  Note a >/= 50% improvement in instances of R UE radiculopathy  -     LTG 5 Progress  Met  -     LTG 6  Patient will be able to eat with right UE without difficulty.   -     LTG 6 Progress  Not Met;Ongoing  -       User Key  (r) = Recorded By, (t) = Taken By, (c) = Cosigned By    Initials Name Provider Type    Karen Knowles Physical Therapist                         Time Calculation:   Start Time: 0757  Stop Time: 0842  Time Calculation (min): 45 min  Therapy Charges for Today     Code Description Service Date Service Provider Modifiers Qty    42758421474 HC PT THER PROC EA 15 MIN 5/18/2020 Karen Garcia GP 2    30455683598 HC PT MANUAL THERAPY EA 15 MIN 5/18/2020 Karen Garcia GP 1                    Karen Garcia  5/18/2020

## 2020-05-20 ENCOUNTER — TELEPHONE (OUTPATIENT)
Dept: ORTHOPEDIC SURGERY | Facility: CLINIC | Age: 75
End: 2020-05-20

## 2020-05-20 NOTE — TELEPHONE ENCOUNTER
ALLAN      Pt CALLED REQUESTING TO COME IN AS SOON AS POSSIBLE TO BE SEEN HE STATES THE OTHER NIGHT WHILE EATING SUPPER HIS RT SHOULDER LOCKED UP AND STARTED HAVING EXTREME PAIN AND HAS NOT GOTTEN ANY BETTER..    WHEN WOULD BE A GOOD TIME FOR Pt TO COME IN?

## 2020-05-21 ENCOUNTER — HOSPITAL ENCOUNTER (OUTPATIENT)
Dept: PHYSICAL THERAPY | Facility: HOSPITAL | Age: 75
Setting detail: THERAPIES SERIES
Discharge: HOME | End: 2020-05-21

## 2020-05-21 DIAGNOSIS — M12.811 ROTATOR CUFF ARTHROPATHY, RIGHT: Primary | ICD-10-CM

## 2020-05-21 DIAGNOSIS — Z96.611 STATUS POST REVERSE TOTAL REPLACEMENT OF RIGHT SHOULDER: ICD-10-CM

## 2020-05-21 DIAGNOSIS — Z96.611 STATUS POST REVERSE ARTHROPLASTY OF RIGHT SHOULDER: Primary | ICD-10-CM

## 2020-05-21 DIAGNOSIS — M12.811 ROTATOR CUFF ARTHROPATHY, RIGHT: ICD-10-CM

## 2020-05-21 PROCEDURE — 97110 THERAPEUTIC EXERCISES: CPT | Performed by: PHYSICAL THERAPIST

## 2020-05-21 NOTE — THERAPY PROGRESS REPORT/RE-CERT
Outpatient Physical Therapy Ortho Progress Note  [facility]     Patient Name: Souleymane Stapleton  : 1945  MRN: [id number]  Today's Date: 2020      Visit Date: 2020  Attendance:  (Medical necessity)  Subjective Improvement: 80-85%  Next MD Appt: none scheduled with Ortho  Recert Date: 2020  Patient Active Problem List   Diagnosis   • Astigmatism   • Anxiety state   • History of cerebrovascular accident   • Nuclear senile cataract   • Hypertension   • Unspecified hemorrhoids   • Palpitations   • Pure hypercholesterolemia   • Prostate cancer (CMS/HCC)   • Chronic right shoulder pain   • Rotator cuff syndrome, left   • Chronic left shoulder pain   • Impingement syndrome, shoulder, left   • SOB (shortness of breath)   • Angina of effort (CMS/HCC)   • Coronary artery disease involving coronary bypass graft of native heart without angina pectoris   • Cervical spinal stenosis   • Displacement of cervical intervertebral disc   • Displacement of lumbar intervertebral disc without myelopathy   • Follow-up examination after neurological surgery   • Internal carotid artery dissection (CMS/HCC)   • TIA (transient ischemic attack)   • Bacterial intestinal infection, unspecified   • Body mass index (bmi) 25.0-25.9, adult   • Diverticulosis of large intestine without perforation or abscess without bleeding   • Functional dyspepsia   • History of colonic polyps   • Polyp of stomach and duodenum   • Bilateral shoulder pain   • Rotator cuff arthropathy, right   • Status post reverse arthroplasty of right shoulder        Past Medical History:   Diagnosis Date   • Abdominal pain    • Abnormal weight loss    • Acid reflux    • Acute gastritis    • Anxiety state    • Arthritis    • Cancer (CMS/HCC)     Prostate   • Coronary artery disease    • History of cerebrovascular accident    • History of colon polyps    • Hypertension    • Nausea    • Nuclear senile cataract    • Presbyopia    • Stroke  (CMS/[facility]) 2005    TIA   • Tobacco use    • Transient cerebral ischemia    • Vitreous detachment of left eye         Past Surgical History:   Procedure Laterality Date   • BACK SURGERY     • CARDIAC CATHETERIZATION N/A 6/19/2018    Procedure: Coronary angiography;  Surgeon: Delroy Mcconnell MD;  Location: [facility] CATH INVASIVE LOCATION;  Service: Cardiovascular   • COLONOSCOPY  06/09/2015    Diverticulosis found in the sigmoid colon. Hemorrhoids found in the anus.   • CORONARY ARTERY BYPASS GRAFT N/A 6/22/2018    Procedure: PARAS STERNOTOMY CORONARY ARTERY BYPASS GRAFT TIMES 5 USING RIGHT AND LEFT INTERNAL MAMMARY ARTERIES AND LEFT GREATER SAPHENOUS VEIN GRAFT PER ENDOSCOPIC VEIN HARVESTING AND PRP;  Surgeon: Edgar Pérez MD;  Location: [facility] OR;  Service: Cardiothoracic   • CRYOTHERAPY  08/14/2012    Of Acne   • ENDOSCOPY  09/01/2015    EGD w/ biopsy -Multiple polyps, one removed.   • ENDOSCOPY N/A 8/22/2019    Procedure: ESOPHAGOGASTRODUODENOSCOPY;  Surgeon: Chuck Rich DO;  Location: [facility] ENDOSCOPY;  Service: Gastroenterology   • HEMORRHOIDECTOMY N/A 3/20/2017    Procedure: Removal of Anal Papilla;  Surgeon: Juan Diego Ken MD;  Location: [facility] OR;  Service:    • INJECTION OF MEDICATION  09/14/2010    B12   • INJECTION OF MEDICATION  10/17/2011    Kenalog   • LUMBAR LAMINECTOMY  09/29/2010   • OTHER SURGICAL HISTORY  08/19/2010    Biopsy, Each Additional Lesion    • SHOULDER ROTATOR CUFF REPAIR Right 02/17/2020   • SKIN BIOPSY  08/19/2010   • TOTAL SHOULDER ARTHROPLASTY W/ DISTAL CLAVICLE EXCISION Right 2/17/2020    Procedure: right reverse total shoulder arthroplasty.;  Surgeon: Juan Diego Mendoza MD;  Location: [facility] OR;  Service: Orthopedics;  Laterality: Right;   • TOTAL SHOULDER REVERSE ARTHROPLASTY Right 02/17/2020       Visit Dx:     ICD-10-CM ICD-9-CM   1. Rotator cuff arthropathy, right M12.811 716.81   2. Status post reverse total replacement of right shoulder Z96.611  V43.61             PT Ortho     Row Name 05/21/20 0801       Subjective Comments    Subjective Comments  Pt reports most pain with reaching his R hand toward his mouth with eating. 80-85% improvement overall.   -BS       Precautions and Contraindications    Precautions/Limitations  shoulder precautions  -BS       Subjective Pain    Able to rate subjective pain?  yes  -BS    Pre-Treatment Pain Level  2  -BS       Right Upper Ext    Rt Shoulder Abduction AROM  125  -BS    Rt Shoulder Extension AROM  45  -BS    Rt Shoulder Flexion AROM  160  -BS    Rt Shoulder External Rotation AROM  57  -BS    Rt Shoulder Internal Rotation AROM  82  -BS      User Key  (r) = Recorded By, (t) = Taken By, (c) = Cosigned By    Initials Name Provider Type    Betito Ferro, PT Physical Therapist                            PT OP Goals     Row Name 05/21/20 0801          PT Short Term Goals    STG Date to Achieve  04/10/20  -BS     STG 1  Passive flexion to be >/= 120 deg.  -BS     STG 1 Progress  Met  -BS     STG 2  Passive ER with arm at side to be 20 deg.  -BS     STG 2 Progress  Met  -BS     STG 3  Increase R cervical lateral flexion by >/= 6 degrees  -BS     STG 3 Progress  Goal Revised goal deleted  -BS     STG 4  R shoulder extension to be >/= 45 deg.  -BS     STG 4 Progress  Met  -BS        Long Term Goals    LTG Date to Achieve  06/12/20  -BS     LTG 1  Independent with HEP.  -BS     LTG 1 Progress  Met;Partially Met  -BS     LTG 2  QuickDASH score to be </= 35.  -BS     LTG 2 Progress  Met  -BS     LTG 3  R shoulder AROM WFLs all planes.  -BS     LTG 3 Progress  Progressing;Partially Met  -BS     LTG 4  Report ability to use R UE for ADLs and light IADLs.  -BS     LTG 4 Progress  Progressing  -BS     LTG 5  Note a >/= 50% improvement in instances of R UE radiculopathy  -BS     LTG 5 Progress  Met  -BS     LTG 6  Patient will be able to eat with right UE without difficulty.   -BS     LTG 6 Progress  Not Met;Ongoing  -BS         Time Calculation    PT Goal Re-Cert Due Date  06/11/20  -BS       User Key  (r) = Recorded By, (t) = Taken By, (c) = Cosigned By    Initials Name Provider Type    Betito Ferro PT Physical Therapist          PT Assessment/Plan     Row Name 05/21/20 0801          PT Assessment    Functional Limitations  Limitation in home management;Limitations in community activities;Limitations in functional capacity and performance;Performance in leisure activities;Performance in self-care ADL  -BS     Impairments  Pain;Range of motion;Muscle strength;Dexterity;Coordination  -BS     Assessment Comments  Pt progressing toward goals, most restricted ROM into abduction and greatest weakness into R shoulder ER>abduction. Would benefit from ongoing PT to address ROM and MMT deficits as well as to further address pain management concerns.   -BS     Please refer to paper survey for additional self-reported information  Yes  -BS     Rehab Potential  Good  -BS     Patient/caregiver participated in establishment of treatment plan and goals  Yes  -BS     Patient would benefit from skilled therapy intervention  Yes  -BS        PT Plan    PT Frequency  2x/week  -BS     Predicted Duration of Therapy Intervention (Therapy Eval)  3-4 weeks  -BS     PT Plan Comments  further address abd and ER weakness of R shoulder.   -BS       User Key  (r) = Recorded By, (t) = Taken By, (c) = Cosigned By    Initials Name Provider Type    Betito Ferro PT Physical Therapist            OP Exercises     Row Name 05/21/20 0801             Subjective Comments    Subjective Comments  Pt reports most pain with reaching his R hand toward his mouth with eating. 80-85% improvement overall.   -BS         Subjective Pain    Able to rate subjective pain?  yes  -BS      Pre-Treatment Pain Level  2  -BS      Post-Treatment Pain Level  1  -BS         Exercise 1    Exercise Name 1  Pro2, Seat 10, UE, F/R, strength/endurance  -BS      Time 1  10 min  -BS       Additional Comments  L4  -BS         Exercise 2    Exercise Name 2  PROM-supine R shoulder  -BS      Time 2  5'  -BS         Exercise 3    Exercise Name 3  S/L R shoulder ER  -BS      Sets 3  1  -BS      Reps 3  10  -BS         Exercise 4    Exercise Name 4  yellow TB R shoulder ER  -BS      Sets 4  2  -BS      Reps 4  10  -BS         Exercise 5    Exercise Name 5  wall push ups  -BS      Reps 5  20  -BS         Exercise 6    Exercise Name 6  MMT/ROM reassessment  -BS      Time 6  3'  -BS         Exercise 7    Exercise Name 7  resisted mid rows w/ yellow TB  -BS      Sets 7  1  -BS      Reps 7  20  -BS        User Key  (r) = Recorded By, (t) = Taken By, (c) = Cosigned By    Initials Name Provider Type    Betito Ferro, PT Physical Therapist                        Outcome Measure Options: Quick DASH  Quick DASH  Open a tight or new jar.: No Difficulty  Do heavy household chores (e.g., wash walls, wash floors): No Difficulty  Carry a shopping bag or briefcase: No Difficulty  Wash your back: Severe Difficulty  Use a knife to cut food: Moderate Difficulty  Recreational activities in which you take some force or impact through your arm, should or hand (e.g. golf, hammering, tennis, etc.): Moderate Difficulty  During the past week, to what extent has your arm, shoulder, or hand problem interfered with your normal social activites with family, friends, neighbors or groups?: Moderately  During the past week, were you limited in your work or other regular daily activities as a result of your arm, shoulder or hand problem?: Moderately Limited  Arm, Shoulder, or hand pain: Moderate  Tingling (pins and needles) in your arm, shoulder, or hand: None  During the past week, how much difficulty have you had sleeping because of the pain in your arm, shoulder or hand?: No difficulty  Number of Questions Answered: 11  Quick DASH Score: 29.55         Time Calculation:     Start Time: 0801  Stop Time: 0842  Time Calculation (min): 41  min  Total Timed Code Minutes- PT: 41 minute(s)     Therapy Charges for Today     Code Description Service Date Service Provider Modifiers Qty    64165239133 HC PT THER PROC EA 15 MIN 5/21/2020 Betito Hope, PT GP 3          PT G-Codes  Outcome Measure Options: Quick DASH  Quick DASH Score: 29.55         Betito Hope, PT  5/21/2020

## 2020-05-22 ENCOUNTER — OFFICE VISIT (OUTPATIENT)
Dept: ORTHOPEDIC SURGERY | Facility: CLINIC | Age: 75
End: 2020-05-22

## 2020-05-22 VITALS — BODY MASS INDEX: 24.56 KG/M2 | HEIGHT: 69 IN | WEIGHT: 165.8 LBS

## 2020-05-22 DIAGNOSIS — G89.29 CHRONIC RIGHT SHOULDER PAIN: ICD-10-CM

## 2020-05-22 DIAGNOSIS — M25.511 CHRONIC RIGHT SHOULDER PAIN: ICD-10-CM

## 2020-05-22 DIAGNOSIS — Z96.611 STATUS POST REVERSE ARTHROPLASTY OF RIGHT SHOULDER: Primary | ICD-10-CM

## 2020-05-22 DIAGNOSIS — M12.811 ROTATOR CUFF ARTHROPATHY, RIGHT: ICD-10-CM

## 2020-05-22 PROCEDURE — 99213 OFFICE O/P EST LOW 20 MIN: CPT | Performed by: ORTHOPAEDIC SURGERY

## 2020-05-22 RX ORDER — MELOXICAM 15 MG/1
15 TABLET ORAL DAILY
Qty: 30 TABLET | Refills: 0 | Status: SHIPPED | OUTPATIENT
Start: 2020-05-22 | End: 2020-06-10 | Stop reason: SINTOL

## 2020-05-22 NOTE — PROGRESS NOTES
"Souleymane Stapleton is a 74 y.o. male returns for     Chief Complaint   Patient presents with   • Right Shoulder - Follow-up       HISTORY OF PRESENT ILLNESS:  Follow up Right shoulder with xray.  Patient he was doing great until about 1 week ago.  He was eating at the dinner table when he suddenly experienced difficulty lifting arm.  Patient states that he has no fevers or chills.  He has continued having pain since this episode.  He is not having pain at rest but with any attempted motion he has having a sharp pain on the anterior aspect of the shoulder.  He has some pain in his tricep as well.  No numbness or tingling.     CONCURRENT MEDICAL HISTORY:    The following portions of the patient's history were reviewed and updated as appropriate: allergies, current medications, past family history, past medical history, past social history, past surgical history and problem list.     ROS  No fevers or chills.  No chest pain or shortness of air.  No GI or  disturbances.    PHYSICAL EXAMINATION:       Ht 174 cm (68.5\")   Wt 75.2 kg (165 lb 12.8 oz)   BMI 24.84 kg/m²     Physical Exam   Constitutional: He is oriented to person, place, and time. He appears well-developed and well-nourished.   Neurological: He is alert and oriented to person, place, and time.   Psychiatric: He has a normal mood and affect. His behavior is normal. Judgment and thought content normal.       GAIT:     [x]  Normal  []  Antalgic    Assistive device: [x]  None  []  Walker     []  Crutches  []  Cane     []  Wheelchair  []  Stretcher    Right Shoulder Exam     Tenderness   Right shoulder tenderness location: Very tender over the coracoid process.  Mild tenderness over the anterior aspect of the shoulder along the deltopectoral groove.    Range of Motion   Active abduction: 90   Forward flexion: 90     Muscle Strength   Abduction: 4/5   Supraspinatus: 4/5     Tests   Collins test: negative  Impingement: negative    Other   Erythema: " absent  Sensation: normal  Pulse: present              Xr Shoulder 2+ View Right    Result Date: 5/22/2020  Narrative: Ordering Provider:  Juan Diego Mendoza MD Ordering Diagnosis/Indication:  Status post reverse arthroplasty of right shoulder, Rotator cuff arthropathy, right Procedure:  XR SHOULDER 2+ VW RIGHT Exam Date:  5/22/20 COMPARISON:  Todays X-rays were compared to previous images dated March 3, 2020.     Impression:  3 views of the right shoulder show acceptable position and alignment of a right reverse total shoulder arthroplasty.  No sign of implant loosening or failure is noted.  Evidence of sternal wires consistent with prior coronary artery bypass grafting.  Also noted are screws and a plate on the lower cervical spine consistent with cervical fusion.  No interval change from prior x-ray.  No other acute findings. Juan Diego Mendoza MD 5/22/20             ASSESSMENT:    Diagnoses and all orders for this visit:    Status post reverse arthroplasty of right shoulder    Rotator cuff arthropathy, right    Chronic right shoulder pain    Other orders  -     meloxicam (MOBIC) 15 MG tablet; Take 1 tablet by mouth Daily for 30 days.          PLAN    We discussed using a sling for support.  Back off to passive range of motion only over the next 2 weeks.  Gentle pendulum exercises.  Most likely he has a strain or tear to the coracobrachialis or the short head of the biceps tendon.  Reevaluate in 2 weeks and we discussed the possibility of an MRI if his symptoms are not improving.  If he is improving then we will progress motion and strength as tolerated.    Patient's Body mass index is 24.84 kg/m². BMI is within normal parameters. No follow-up required..    Return in about 2 weeks (around 6/5/2020) for recheck.    Juan Diego Mendoza MD

## 2020-05-26 ENCOUNTER — HOSPITAL ENCOUNTER (OUTPATIENT)
Dept: PHYSICAL THERAPY | Facility: HOSPITAL | Age: 75
Setting detail: THERAPIES SERIES
Discharge: HOME | End: 2020-05-26

## 2020-05-26 ENCOUNTER — READMISSION MANAGEMENT (OUTPATIENT)
Dept: CALL CENTER | Facility: HOSPITAL | Age: 75
End: 2020-05-26

## 2020-05-26 DIAGNOSIS — Z96.611 STATUS POST REVERSE TOTAL REPLACEMENT OF RIGHT SHOULDER: ICD-10-CM

## 2020-05-26 DIAGNOSIS — M12.811 ROTATOR CUFF ARTHROPATHY, RIGHT: Primary | ICD-10-CM

## 2020-05-26 PROCEDURE — 97140 MANUAL THERAPY 1/> REGIONS: CPT | Performed by: PHYSICAL THERAPIST

## 2020-05-26 NOTE — THERAPY TREATMENT NOTE
Outpatient Physical Therapy Ortho Treatment Note  [facility]     Patient Name: Souleymane Stapleton  : 1945  MRN: [id number]  Today's Date: 2020      Visit Date: 2020  Attendance:  (Medical necessity)  Subjective Improvement: 80-85% (reduced improvement due to recent setback)  Next MD Appt: 6-5-20  Recert Date: 2020  Visit Dx:    ICD-10-CM ICD-9-CM   1. Rotator cuff arthropathy, right M12.811 716.81   2. Status post reverse total replacement of right shoulder Z96.611 V43.61       Patient Active Problem List   Diagnosis   • Astigmatism   • Anxiety state   • History of cerebrovascular accident   • Nuclear senile cataract   • Hypertension   • Unspecified hemorrhoids   • Palpitations   • Pure hypercholesterolemia   • Prostate cancer (CMS/HCC)   • Chronic right shoulder pain   • Rotator cuff syndrome, left   • Chronic left shoulder pain   • Impingement syndrome, shoulder, left   • SOB (shortness of breath)   • Angina of effort (CMS/HCC)   • Coronary artery disease involving coronary bypass graft of native heart without angina pectoris   • Cervical spinal stenosis   • Displacement of cervical intervertebral disc   • Displacement of lumbar intervertebral disc without myelopathy   • Follow-up examination after neurological surgery   • Internal carotid artery dissection (CMS/HCC)   • TIA (transient ischemic attack)   • Bacterial intestinal infection, unspecified   • Body mass index (bmi) 25.0-25.9, adult   • Diverticulosis of large intestine without perforation or abscess without bleeding   • Functional dyspepsia   • History of colonic polyps   • Polyp of stomach and duodenum   • Bilateral shoulder pain   • Rotator cuff arthropathy, right   • Status post reverse arthroplasty of right shoulder        Past Medical History:   Diagnosis Date   • Abdominal pain    • Abnormal weight loss    • Acid reflux    • Acute gastritis    • Anxiety state    • Arthritis    • Cancer (CMS/HCC)     Prostate    • Coronary artery disease    • History of cerebrovascular accident    • History of colon polyps    • Hypertension    • Nausea    • Nuclear senile cataract    • Presbyopia    • Stroke (CMS/HCC) 2005    TIA   • Tobacco use    • Transient cerebral ischemia    • Vitreous detachment of left eye         Past Surgical History:   Procedure Laterality Date   • BACK SURGERY     • CARDIAC CATHETERIZATION N/A 6/19/2018    Procedure: Coronary angiography;  Surgeon: Delroy Mcconnell MD;  Location: [facility] CATH INVASIVE LOCATION;  Service: Cardiovascular   • COLONOSCOPY  06/09/2015    Diverticulosis found in the sigmoid colon. Hemorrhoids found in the anus.   • CORONARY ARTERY BYPASS GRAFT N/A 6/22/2018    Procedure: PARAS STERNOTOMY CORONARY ARTERY BYPASS GRAFT TIMES 5 USING RIGHT AND LEFT INTERNAL MAMMARY ARTERIES AND LEFT GREATER SAPHENOUS VEIN GRAFT PER ENDOSCOPIC VEIN HARVESTING AND PRP;  Surgeon: Edgar Pérez MD;  Location: [facility] OR;  Service: Cardiothoracic   • CRYOTHERAPY  08/14/2012    Of Acne   • ENDOSCOPY  09/01/2015    EGD w/ biopsy -Multiple polyps, one removed.   • ENDOSCOPY N/A 8/22/2019    Procedure: ESOPHAGOGASTRODUODENOSCOPY;  Surgeon: Chuck Rich DO;  Location: [facility] ENDOSCOPY;  Service: Gastroenterology   • HEMORRHOIDECTOMY N/A 3/20/2017    Procedure: Removal of Anal Papilla;  Surgeon: Juan Diego Ken MD;  Location: [facility] OR;  Service:    • INJECTION OF MEDICATION  09/14/2010    B12   • INJECTION OF MEDICATION  10/17/2011    Kenalog   • LUMBAR LAMINECTOMY  09/29/2010   • OTHER SURGICAL HISTORY  08/19/2010    Biopsy, Each Additional Lesion    • SHOULDER ROTATOR CUFF REPAIR Right 02/17/2020   • SKIN BIOPSY  08/19/2010   • TOTAL SHOULDER ARTHROPLASTY W/ DISTAL CLAVICLE EXCISION Right 2/17/2020    Procedure: right reverse total shoulder arthroplasty.;  Surgeon: Juan Diego Mendoza MD;  Location: [facility] OR;  Service: Orthopedics;  Laterality: Right;   • TOTAL SHOULDER REVERSE  ARTHROPLASTY Right 02/17/2020       PT Ortho     Row Name 05/26/20 0800       Subjective Comments    Subjective Comments  Patient reports he saw physician on 5-22-20 and physician suspects a mild muscle tear of coracobrachialis. Physician placed patient back in sling and instructed to perform PROM only for next 2 weeks. Patient reports pain primarily with bringing right arm to mouth.   -SW       Precautions and Contraindications    Precautions/Limitations  (S) -- PROM only for 2 weeks from 5-22-20  -    Precautions  R rev total shoulder 2-  -       Subjective Pain    Able to rate subjective pain?  yes  -    Pre-Treatment Pain Level  0  -SW       Right Upper Ext    Rt Shoulder Abduction PROM  135  -SW    Rt Shoulder Flexion PROM  120  -SW    Rt Shoulder External Rotation PROM  80@90  -SW    Rt Shoulder Internal Rotation PROM  50@90  -SW      User Key  (r) = Recorded By, (t) = Taken By, (c) = Cosigned By    Initials Name Provider Type    Karen Knowles Physical Therapist                      PT Assessment/Plan     Row Name 05/26/20 0800          PT Assessment    Assessment Comments  Patient exhibits pain with passive flexion and horizontal adduction.  -        PT Plan    PT Frequency  2x/week  -     Predicted Duration of Therapy Intervention (Therapy Eval)  4 weeks  -     PT Plan Comments  Continue with PROM only for next 2 weeks.   -       User Key  (r) = Recorded By, (t) = Taken By, (c) = Cosigned By    Initials Name Provider Type    Karen Knowles Physical Therapist            OP Exercises     Row Name 05/26/20 0800             Subjective Comments    Subjective Comments  Patient reports he saw physician on 5-22-20 and physician suspects a mild muscle tear of coracobrachialis. Physician placed patient back in sling and instructed to perform PROM only for next 2 weeks. Patient reports pain primarily with bringing right arm to mouth.   -SW         Subjective Pain    Able to rate subjective  pain?  yes  -      Pre-Treatment Pain Level  0  -         Exercise 1    Exercise Name 1  see manual  -        User Key  (r) = Recorded By, (t) = Taken By, (c) = Cosigned By    Initials Name Provider Type    Karen Knowles Physical Therapist                      Manual Rx (last 36 hours)      Manual Treatments     Row Name 05/26/20 0700             Manual Rx 1    Manual Rx 1 Location  R shoulder  -      Manual Rx 1 Type  PROM  -      Manual Rx 1 Duration  25'  -        User Key  (r) = Recorded By, (t) = Taken By, (c) = Cosigned By    Initials Name Provider Type    Karen Knowles Physical Therapist          PT OP Goals     Row Name 05/26/20 0800          PT Short Term Goals    STG Date to Achieve  04/10/20  -     STG 1  Passive flexion to be >/= 120 deg.  -     STG 1 Progress  Met  -     STG 2  Passive ER with arm at side to be 20 deg.  -     STG 2 Progress  Met  [facility]     STG 3  Increase R cervical lateral flexion by >/= 6 degrees  -     STG 3 Progress  Goal Revised goal deleted  -     STG 4  R shoulder extension to be >/= 45 deg.  -     STG 4 Progress  Met  -        Long Term Goals    LTG Date to Achieve  06/12/20  -     LTG 1  Independent with HEP.  -     LTG 1 Progress  Met;Partially Met  -     LTG 2  QuickDASH score to be </= 35.  -     LTG 2 Progress  Met  -     LTG 3  R shoulder AROM WFLs all planes.  -     LTG 3 Progress  Progressing;Partially Met  -     LTG 4  Report ability to use R UE for ADLs and light IADLs.  -     LTG 4 Progress  Progressing  -     LTG 5  Note a >/= 50% improvement in instances of R UE radiculopathy  -     LTG 5 Progress  Met  -     LTG 6  Patient will be able to eat with right UE without difficulty.   -     LTG 6 Progress  Not Met;Ongoing  -        Time Calculation    PT Goal Re-Cert Due Date  06/11/20  -       User Key  (r) = Recorded By, (t) = Taken By, (c) = Cosigned By    Initials Name Provider Type    Karen Knowles  Physical Therapist                         Time Calculation:   Start Time: 0800  Stop Time: 0830  Time Calculation (min): 30 min  Therapy Charges for Today     Code Description Service Date Service Provider Modifiers Qty    [id number] HC PT MANUAL THERAPY EA 15 MIN 5/26/2020 Karen Garcia GP 2                    Karen Garcia  5/26/2020

## 2020-05-26 NOTE — OUTREACH NOTE
Total Joint Month 3 Survey      Responses   [facility] patient discharged from?  [place]   Does the patient have one of the following disease processes/diagnoses(primary or secondary)?  Total Joint Replacement   Joint surgery performed?  Shoulder   Month 3 attempt successful?  No   Unsuccessful attempts  Attempt 1          Ileana Hopper RN

## 2020-05-28 ENCOUNTER — HOSPITAL ENCOUNTER (OUTPATIENT)
Dept: PHYSICAL THERAPY | Facility: HOSPITAL | Age: 75
Setting detail: THERAPIES SERIES
Discharge: HOME | End: 2020-05-28

## 2020-05-28 DIAGNOSIS — Z96.611 STATUS POST REVERSE TOTAL REPLACEMENT OF RIGHT SHOULDER: ICD-10-CM

## 2020-05-28 DIAGNOSIS — M12.811 ROTATOR CUFF ARTHROPATHY, RIGHT: Primary | ICD-10-CM

## 2020-05-28 PROCEDURE — 97140 MANUAL THERAPY 1/> REGIONS: CPT | Performed by: PHYSICAL THERAPIST

## 2020-05-28 NOTE — THERAPY TREATMENT NOTE
Outpatient Physical Therapy Ortho Treatment Note  [facility]     Patient Name: Souleymane Stapleton  : 1945  MRN: [id number]  Today's Date: 2020      Visit Date: 2020  Attendance: 32/33 (Medical necessity)  Subjective Improvement: 80-85% (reduced improvement due to recent setback)  Next MD Appt: 6-5-20  Recert Date: 2020  Visit Dx:    ICD-10-CM ICD-9-CM   1. Rotator cuff arthropathy, right M12.811 716.81   2. Status post reverse total replacement of right shoulder Z96.611 V43.61       Patient Active Problem List   Diagnosis   • Astigmatism   • Anxiety state   • History of cerebrovascular accident   • Nuclear senile cataract   • Hypertension   • Unspecified hemorrhoids   • Palpitations   • Pure hypercholesterolemia   • Prostate cancer (CMS/HCC)   • Chronic right shoulder pain   • Rotator cuff syndrome, left   • Chronic left shoulder pain   • Impingement syndrome, shoulder, left   • SOB (shortness of breath)   • Angina of effort (CMS/HCC)   • Coronary artery disease involving coronary bypass graft of native heart without angina pectoris   • Cervical spinal stenosis   • Displacement of cervical intervertebral disc   • Displacement of lumbar intervertebral disc without myelopathy   • Follow-up examination after neurological surgery   • Internal carotid artery dissection (CMS/HCC)   • TIA (transient ischemic attack)   • Bacterial intestinal infection, unspecified   • Body mass index (bmi) 25.0-25.9, adult   • Diverticulosis of large intestine without perforation or abscess without bleeding   • Functional dyspepsia   • History of colonic polyps   • Polyp of stomach and duodenum   • Bilateral shoulder pain   • Rotator cuff arthropathy, right   • Status post reverse arthroplasty of right shoulder        Past Medical History:   Diagnosis Date   • Abdominal pain    • Abnormal weight loss    • Acid reflux    • Acute gastritis    • Anxiety state    • Arthritis    • Cancer (CMS/HCC)     Prostate    • Coronary artery disease    • History of cerebrovascular accident    • History of colon polyps    • Hypertension    • Nausea    • Nuclear senile cataract    • Presbyopia    • Stroke (CMS/HCC) 2005    TIA   • Tobacco use    • Transient cerebral ischemia    • Vitreous detachment of left eye         Past Surgical History:   Procedure Laterality Date   • BACK SURGERY     • CARDIAC CATHETERIZATION N/A 6/19/2018    Procedure: Coronary angiography;  Surgeon: Delroy Mcconnell MD;  Location: [facility] CATH INVASIVE LOCATION;  Service: Cardiovascular   • COLONOSCOPY  06/09/2015    Diverticulosis found in the sigmoid colon. Hemorrhoids found in the anus.   • CORONARY ARTERY BYPASS GRAFT N/A 6/22/2018    Procedure: PARAS STERNOTOMY CORONARY ARTERY BYPASS GRAFT TIMES 5 USING RIGHT AND LEFT INTERNAL MAMMARY ARTERIES AND LEFT GREATER SAPHENOUS VEIN GRAFT PER ENDOSCOPIC VEIN HARVESTING AND PRP;  Surgeon: Edgar Pérez MD;  Location: [facility] OR;  Service: Cardiothoracic   • CRYOTHERAPY  08/14/2012    Of Acne   • ENDOSCOPY  09/01/2015    EGD w/ biopsy -Multiple polyps, one removed.   • ENDOSCOPY N/A 8/22/2019    Procedure: ESOPHAGOGASTRODUODENOSCOPY;  Surgeon: Chuck Rich DO;  Location: [facility] ENDOSCOPY;  Service: Gastroenterology   • HEMORRHOIDECTOMY N/A 3/20/2017    Procedure: Removal of Anal Papilla;  Surgeon: Juan Diego Ken MD;  Location: [facility] OR;  Service:    • INJECTION OF MEDICATION  09/14/2010    B12   • INJECTION OF MEDICATION  10/17/2011    Kenalog   • LUMBAR LAMINECTOMY  09/29/2010   • OTHER SURGICAL HISTORY  08/19/2010    Biopsy, Each Additional Lesion    • SHOULDER ROTATOR CUFF REPAIR Right 02/17/2020   • SKIN BIOPSY  08/19/2010   • TOTAL SHOULDER ARTHROPLASTY W/ DISTAL CLAVICLE EXCISION Right 2/17/2020    Procedure: right reverse total shoulder arthroplasty.;  Surgeon: Juan Diego Mendoza MD;  Location: [facility] OR;  Service: Orthopedics;  Laterality: Right;   • TOTAL SHOULDER REVERSE  ARTHROPLASTY Right 02/17/2020       PT Ortho     Row Name 05/28/20 0800       Subjective Comments    Subjective Comments  Patient reports right shoulder has hurt very little over past 2 days. He did have some pain putting in his right hearing aid this morning. He also still as pain bring his right hand to his mouth.   -       Precautions and Contraindications    Precautions/Limitations  (S) -- PROM for 2 weeks from 5-22-20.  -    Precautions  R rev total shoulder 2-  -SW       Subjective Pain    Able to rate subjective pain?  yes  -SW    Pre-Treatment Pain Level  0  -SW    Post-Treatment Pain Level  0  -      User Key  (r) = Recorded By, (t) = Taken By, (c) = Cosigned By    Initials Name Provider Type    Karen Knowles Physical Therapist                      PT Assessment/Plan     Row Name 05/28/20 0800          PT Assessment    Assessment Comments  Patient exhibited no pain with PROM in any direction todoay.   -        PT Plan    PT Frequency  2x/week  -SW     Predicted Duration of Therapy Intervention (Therapy Eval)  4 weeks  -     PT Plan Comments  Continue with PROM through June 5, 2020.  -       User Key  (r) = Recorded By, (t) = Taken By, (c) = Cosigned By    Initials Name Provider Type    Karen Knowles Physical Therapist            OP Exercises     Row Name 05/28/20 0800             Subjective Comments    Subjective Comments  Patient reports right shoulder has hurt very little over past 2 days. He did have some pain putting in his right hearing aid this morning. He also still as pain bring his right hand to his mouth.   -SW         Subjective Pain    Able to rate subjective pain?  yes  -SW      Pre-Treatment Pain Level  0  -SW      Post-Treatment Pain Level  0  -SW         Exercise 1    Exercise Name 1  see manual  -        User Key  (r) = Recorded By, (t) = Taken By, (c) = Cosigned By    Initials Name Provider Type    Karen Knowles Physical Therapist                      Manual  Rx (last 36 hours)      Manual Treatments     Row Name 05/28/20 0700             Manual Rx 1    Manual Rx 1 Location  R shoulder  -      Manual Rx 1 Type  PROM  -      Manual Rx 1 Duration  25'  -        User Key  (r) = Recorded By, (t) = Taken By, (c) = Cosigned By    Initials Name Provider Type    Karen Knowles Physical Therapist          PT OP Goals     Row Name 05/28/20 0800          PT Short Term Goals    STG Date to Achieve  04/10/20  -     STG 1  Passive flexion to be >/= 120 deg.  -     STG 1 Progress  Met  -     STG 2  Passive ER with arm at side to be 20 deg.  -     STG 2 Progress  Met  -     STG 3  Increase R cervical lateral flexion by >/= 6 degrees  -     STG 3 Progress  Goal Revised goal deleted  -     STG 4  R shoulder extension to be >/= 45 deg.  -     STG 4 Progress  Met  -        Long Term Goals    LTG Date to Achieve  06/12/20  -     LTG 1  Independent with HEP.  -     LTG 1 Progress  Met;Partially Met  -     LTG 2  QuickDASH score to be </= 35.  -     LTG 2 Progress  Met  -     LTG 3  R shoulder AROM WFLs all planes.  -     LTG 3 Progress  Progressing;Partially Met  -     LTG 4  Report ability to use R UE for ADLs and light IADLs.  -     LTG 4 Progress  Progressing  -     LTG 5  Note a >/= 50% improvement in instances of R UE radiculopathy  -     LTG 5 Progress  Met  -     LTG 6  Patient will be able to eat with right UE without difficulty.   -     LTG 6 Progress  Not Met;Ongoing  -        Time Calculation    PT Goal Re-Cert Due Date  06/11/20  -       User Key  (r) = Recorded By, (t) = Taken By, (c) = Cosigned By    Initials Name Provider Type    Karen Knowles Physical Therapist                         Time Calculation:   Start Time: 0800  Stop Time: 0825  Time Calculation (min): 25 min  Therapy Charges for Today     Code Description Service Date Service Provider Modifiers Qty    01603703659 HC PT MANUAL THERAPY EA 15 MIN 5/28/2020  Karen Garcia GP 2                    Karen Garcia  5/28/2020

## 2020-05-29 ENCOUNTER — READMISSION MANAGEMENT (OUTPATIENT)
Dept: CALL CENTER | Facility: HOSPITAL | Age: 75
End: 2020-05-29

## 2020-05-29 NOTE — OUTREACH NOTE
Total Joint Month 3 Survey      Responses   [facility] patient discharged from?  [place]   Does the patient have one of the following disease processes/diagnoses(primary or secondary)?  Total Joint Replacement   Joint surgery performed?  Shoulder   Month 3 attempt successful?  Yes   Revoke  Phone number issues          Jin Carter RN

## 2020-06-01 ENCOUNTER — HOSPITAL ENCOUNTER (OUTPATIENT)
Dept: PHYSICAL THERAPY | Facility: HOSPITAL | Age: 75
Setting detail: THERAPIES SERIES
Discharge: HOME | End: 2020-06-01

## 2020-06-01 DIAGNOSIS — Z96.611 STATUS POST REVERSE TOTAL REPLACEMENT OF RIGHT SHOULDER: ICD-10-CM

## 2020-06-01 DIAGNOSIS — M12.811 ROTATOR CUFF ARTHROPATHY, RIGHT: Primary | ICD-10-CM

## 2020-06-01 PROCEDURE — 97140 MANUAL THERAPY 1/> REGIONS: CPT | Performed by: PHYSICAL THERAPIST

## 2020-06-01 NOTE — THERAPY TREATMENT NOTE
Outpatient Physical Therapy Ortho Treatment Note  [facility]     Patient Name: Souleymane Stapleton  : 1945  MRN: [id number]  Today's Date: 2020      Visit Date: 2020  Attendance: 33/34 (Medical necessity)  Subjective Improvement: 80-85% (reduced improvement due to recent setback)  Next MD Appt: 6-5-20  Recert Date: 2020  Visit Dx:    ICD-10-CM ICD-9-CM   1. Rotator cuff arthropathy, right M12.811 716.81   2. Status post reverse total replacement of right shoulder Z96.611 V43.61       Patient Active Problem List   Diagnosis   • Astigmatism   • Anxiety state   • History of cerebrovascular accident   • Nuclear senile cataract   • Hypertension   • Unspecified hemorrhoids   • Palpitations   • Pure hypercholesterolemia   • Prostate cancer (CMS/HCC)   • Chronic right shoulder pain   • Rotator cuff syndrome, left   • Chronic left shoulder pain   • Impingement syndrome, shoulder, left   • SOB (shortness of breath)   • Angina of effort (CMS/HCC)   • Coronary artery disease involving coronary bypass graft of native heart without angina pectoris   • Cervical spinal stenosis   • Displacement of cervical intervertebral disc   • Displacement of lumbar intervertebral disc without myelopathy   • Follow-up examination after neurological surgery   • Internal carotid artery dissection (CMS/HCC)   • TIA (transient ischemic attack)   • Bacterial intestinal infection, unspecified   • Body mass index (bmi) 25.0-25.9, adult   • Diverticulosis of large intestine without perforation or abscess without bleeding   • Functional dyspepsia   • History of colonic polyps   • Polyp of stomach and duodenum   • Bilateral shoulder pain   • Rotator cuff arthropathy, right   • Status post reverse arthroplasty of right shoulder        Past Medical History:   Diagnosis Date   • Abdominal pain    • Abnormal weight loss    • Acid reflux    • Acute gastritis    • Anxiety state    • Arthritis    • Cancer (CMS/HCC)     Prostate    • Coronary artery disease    • History of cerebrovascular accident    • History of colon polyps    • Hypertension    • Nausea    • Nuclear senile cataract    • Presbyopia    • Stroke (CMS/HCC) 2005    TIA   • Tobacco use    • Transient cerebral ischemia    • Vitreous detachment of left eye         Past Surgical History:   Procedure Laterality Date   • BACK SURGERY     • CARDIAC CATHETERIZATION N/A 6/19/2018    Procedure: Coronary angiography;  Surgeon: Delroy Mcconnell MD;  Location: [facility] CATH INVASIVE LOCATION;  Service: Cardiovascular   • COLONOSCOPY  06/09/2015    Diverticulosis found in the sigmoid colon. Hemorrhoids found in the anus.   • CORONARY ARTERY BYPASS GRAFT N/A 6/22/2018    Procedure: PARAS STERNOTOMY CORONARY ARTERY BYPASS GRAFT TIMES 5 USING RIGHT AND LEFT INTERNAL MAMMARY ARTERIES AND LEFT GREATER SAPHENOUS VEIN GRAFT PER ENDOSCOPIC VEIN HARVESTING AND PRP;  Surgeon: Edgar Pérez MD;  Location: [facility] OR;  Service: Cardiothoracic   • CRYOTHERAPY  08/14/2012    Of Acne   • ENDOSCOPY  09/01/2015    EGD w/ biopsy -Multiple polyps, one removed.   • ENDOSCOPY N/A 8/22/2019    Procedure: ESOPHAGOGASTRODUODENOSCOPY;  Surgeon: Chuck Rich DO;  Location: [facility] ENDOSCOPY;  Service: Gastroenterology   • HEMORRHOIDECTOMY N/A 3/20/2017    Procedure: Removal of Anal Papilla;  Surgeon: Juan Diego Ken MD;  Location: [facility] OR;  Service:    • INJECTION OF MEDICATION  09/14/2010    B12   • INJECTION OF MEDICATION  10/17/2011    Kenalog   • LUMBAR LAMINECTOMY  09/29/2010   • OTHER SURGICAL HISTORY  08/19/2010    Biopsy, Each Additional Lesion    • SHOULDER ROTATOR CUFF REPAIR Right 02/17/2020   • SKIN BIOPSY  08/19/2010   • TOTAL SHOULDER ARTHROPLASTY W/ DISTAL CLAVICLE EXCISION Right 2/17/2020    Procedure: right reverse total shoulder arthroplasty.;  Surgeon: Juan Diego Mendoza MD;  Location: [facility] OR;  Service: Orthopedics;  Laterality: Right;   • TOTAL SHOULDER REVERSE  ARTHROPLASTY Right 02/17/2020       PT Ortho     Row Name 06/01/20 0800       Subjective Comments    Subjective Comments  Patient reports pain continues to improve.   -SW       Precautions and Contraindications    Precautions/Limitations  (S) -- PROM for 2 weeks from 5-22-20  -SW    Precautions  R rev total shoulder 2-  -       Subjective Pain    Able to rate subjective pain?  yes  -SW    Pre-Treatment Pain Level  0  -SW    Post-Treatment Pain Level  0  -SW       Special Tests/Palpation    Special Tests/Palpation  -- TTP over coracoid process  -SW       General ROM    GENERAL ROM COMMENTS  h add to back of left shoulder w mild pain  -SW       Right Upper Ext    Rt Shoulder Abduction AROM  150  -SW    Rt Shoulder Abduction PROM  150  -SW    Rt Shoulder Flexion AROM  150  -SW    Rt Shoulder Flexion PROM  150  -SW    Rt Shoulder External Rotation AROM  T2  -SW    Rt Shoulder External Rotation PROM  90@90  -SW    Rt Shoulder Internal Rotation AROM  center buttock  -SW    Rt Shoulder Internal Rotation PROM  70@90  -SW      User Key  (r) = Recorded By, (t) = Taken By, (c) = Cosigned By    Initials Name Provider Type    Karen Knowles Physical Therapist                      PT Assessment/Plan     Row Name 06/01/20 0800          PT Assessment    Assessment Comments  Patient exhibits improved rigth shoulder ROM with a concomitant reduction in subjective symptoms.   -        PT Plan    PT Frequency  2x/week  -SW     Predicted Duration of Therapy Intervention (Therapy Eval)  4 weeks   -     PT Plan Comments  Continue with PROM. Send progress note to physician at [place].   -       User Key  (r) = Recorded By, (t) = Taken By, (c) = Cosigned By    Initials Name Provider Type    Karen Knowles Physical Therapist            OP Exercises     Row Name 06/01/20 0800             Subjective Comments    Subjective Comments  Patient reports pain continues to improve.   -         Subjective Pain    Able to rate subjective  pain?  yes  -      Pre-Treatment Pain Level  0  -      Post-Treatment Pain Level  0  -SW         Exercise 1    Exercise Name 1  see manual  -        User Key  (r) = Recorded By, (t) = Taken By, (c) = Cosigned By    Initials Name Provider Type    Karen Knowles Physical Therapist                      Manual Rx (last 36 hours)      Manual Treatments     Row Name 06/01/20 0700             Manual Rx 1    Manual Rx 1 Location  R shoulder  -      Manual Rx 1 Type  PROM  -      Manual Rx 1 Duration  25'  -        User Key  (r) = Recorded By, (t) = Taken By, (c) = Cosigned By    Initials Name Provider Type    Karen Knowles Physical Therapist          PT OP Goals     Row Name 06/01/20 0800          PT Short Term Goals    STG Date to Achieve  04/10/20  -     STG 1  Passive flexion to be >/= 120 deg.  -     STG 1 Progress  Met  -     STG 2  Passive ER with arm at side to be 20 deg.  -     STG 2 Progress  Met  -     STG 3  Increase R cervical lateral flexion by >/= 6 degrees  -     STG 3 Progress  Goal Revised goal deleted  -     STG 4  R shoulder extension to be >/= 45 deg.  -     STG 4 Progress  Met  -        Long Term Goals    LTG Date to Achieve  06/12/20  -     LTG 1  Independent with HEP.  -     LTG 1 Progress  Met;Partially Met  -     LTG 2  QuickDASH score to be </= 35.  -     LTG 2 Progress  Met  -     LTG 3  R shoulder AROM WFLs all planes.  -     LTG 3 Progress  Progressing;Partially Met  -     LTG 3 Progress Comments  WFL's all directions except IR  -     LTG 4  Report ability to use R UE for ADLs and light IADLs.  -     LTG 4 Progress  Progressing  -     LTG 5  Note a >/= 50% improvement in instances of R UE radiculopathy  -     LTG 5 Progress  Met  -     LTG 6  Patient will be able to eat with right UE without difficulty.   -     LTG 6 Progress  Not Met;Ongoing  -        Time Calculation    PT Goal Re-Cert Due Date  07/11/20  -       User Key  (r) =  Recorded By, (t) = Taken By, (c) = Cosigned By    Initials Name Provider Type    Karen Knowles Physical Therapist                         Time Calculation:   Start Time: 0801  Stop Time: 0830  Time Calculation (min): 29 min  Therapy Charges for Today     Code Description Service Date Service Provider Modifiers Qty    72762560487 HC PT MANUAL THERAPY EA 15 MIN 6/1/2020 Karen Garcia GP 2                    Karen Garcia  6/1/2020

## 2020-06-04 ENCOUNTER — HOSPITAL ENCOUNTER (OUTPATIENT)
Dept: PHYSICAL THERAPY | Facility: HOSPITAL | Age: 75
Setting detail: THERAPIES SERIES
Discharge: HOME | End: 2020-06-04

## 2020-06-04 DIAGNOSIS — Z96.611 STATUS POST REVERSE TOTAL REPLACEMENT OF RIGHT SHOULDER: ICD-10-CM

## 2020-06-04 DIAGNOSIS — M12.811 ROTATOR CUFF ARTHROPATHY, RIGHT: Primary | ICD-10-CM

## 2020-06-04 PROCEDURE — 97140 MANUAL THERAPY 1/> REGIONS: CPT | Performed by: PHYSICAL THERAPIST

## 2020-06-04 NOTE — THERAPY TREATMENT NOTE
Outpatient Physical Therapy Ortho Treatment Note  [facility]     Patient Name: Souleymane Stapleton  : 1945  MRN: [id number]  Today's Date: 2020      Visit Date: 2020  Attendance: 34/35 (Medical necessity)  Subjective Improvement: 80-85% (reduced improvement due to recent setback)  Next MD Appt: 6-20  Recert Date: 2020  Visit Dx:    ICD-10-CM ICD-9-CM   1. Rotator cuff arthropathy, right M12.811 716.81   2. Status post reverse total replacement of right shoulder Z96.611 V43.61       Patient Active Problem List   Diagnosis   • Astigmatism   • Anxiety state   • History of cerebrovascular accident   • Nuclear senile cataract   • Hypertension   • Unspecified hemorrhoids   • Palpitations   • Pure hypercholesterolemia   • Prostate cancer (CMS/HCC)   • Chronic right shoulder pain   • Rotator cuff syndrome, left   • Chronic left shoulder pain   • Impingement syndrome, shoulder, left   • SOB (shortness of breath)   • Angina of effort (CMS/HCC)   • Coronary artery disease involving coronary bypass graft of native heart without angina pectoris   • Cervical spinal stenosis   • Displacement of cervical intervertebral disc   • Displacement of lumbar intervertebral disc without myelopathy   • Follow-up examination after neurological surgery   • Internal carotid artery dissection (CMS/HCC)   • TIA (transient ischemic attack)   • Bacterial intestinal infection, unspecified   • Body mass index (bmi) 25.0-25.9, adult   • Diverticulosis of large intestine without perforation or abscess without bleeding   • Functional dyspepsia   • History of colonic polyps   • Polyp of stomach and duodenum   • Bilateral shoulder pain   • Rotator cuff arthropathy, right   • Status post reverse arthroplasty of right shoulder        Past Medical History:   Diagnosis Date   • Abdominal pain    • Abnormal weight loss    • Acid reflux    • Acute gastritis    • Anxiety state    • Arthritis    • Cancer (CMS/HCC)     Prostate    • Coronary artery disease    • History of cerebrovascular accident    • History of colon polyps    • Hypertension    • Nausea    • Nuclear senile cataract    • Presbyopia    • Stroke (CMS/HCC) 2005    TIA   • Tobacco use    • Transient cerebral ischemia    • Vitreous detachment of left eye         Past Surgical History:   Procedure Laterality Date   • BACK SURGERY     • CARDIAC CATHETERIZATION N/A 6/19/2018    Procedure: Coronary angiography;  Surgeon: Delroy Mcconnell MD;  Location: [facility] CATH INVASIVE LOCATION;  Service: Cardiovascular   • COLONOSCOPY  06/09/2015    Diverticulosis found in the sigmoid colon. Hemorrhoids found in the anus.   • CORONARY ARTERY BYPASS GRAFT N/A 6/22/2018    Procedure: PARAS STERNOTOMY CORONARY ARTERY BYPASS GRAFT TIMES 5 USING RIGHT AND LEFT INTERNAL MAMMARY ARTERIES AND LEFT GREATER SAPHENOUS VEIN GRAFT PER ENDOSCOPIC VEIN HARVESTING AND PRP;  Surgeon: Edgar Pérez MD;  Location: [facility] OR;  Service: Cardiothoracic   • CRYOTHERAPY  08/14/2012    Of Acne   • ENDOSCOPY  09/01/2015    EGD w/ biopsy -Multiple polyps, one removed.   • ENDOSCOPY N/A 8/22/2019    Procedure: ESOPHAGOGASTRODUODENOSCOPY;  Surgeon: Chuck Rich DO;  Location: [facility] ENDOSCOPY;  Service: Gastroenterology   • HEMORRHOIDECTOMY N/A 3/20/2017    Procedure: Removal of Anal Papilla;  Surgeon: Juan Diego Ken MD;  Location: [facility] OR;  Service:    • INJECTION OF MEDICATION  09/14/2010    B12   • INJECTION OF MEDICATION  10/17/2011    Kenalog   • LUMBAR LAMINECTOMY  09/29/2010   • OTHER SURGICAL HISTORY  08/19/2010    Biopsy, Each Additional Lesion    • SHOULDER ROTATOR CUFF REPAIR Right 02/17/2020   • SKIN BIOPSY  08/19/2010   • TOTAL SHOULDER ARTHROPLASTY W/ DISTAL CLAVICLE EXCISION Right 2/17/2020    Procedure: right reverse total shoulder arthroplasty.;  Surgeon: Juan Diego Mendoza MD;  Location: [facility] OR;  Service: Orthopedics;  Laterality: Right;   • TOTAL SHOULDER REVERSE  ARTHROPLASTY Right 02/17/2020       PT Ortho     Row Name 06/04/20 0800       Subjective Comments    Subjective Comments  Patient reports significant improvement in right shoulder pain over last 2 weeks since resting the arm. He is still having pain getting hand to moutn.   -       Precautions and Contraindications    Precautions/Limitations  (S) -- PROM for 2 weks from 5-22-20  -    Precautions  R rev total shoulder 2-  -       Subjective Pain    Able to rate subjective pain?  yes  -SW    Pre-Treatment Pain Level  0  -SW    Post-Treatment Pain Level  0  -SW       Right Upper Ext    Rt Shoulder Abduction AROM  150  -SW    Rt Shoulder Abduction PROM  150  -SW    Rt Shoulder Flexion AROM  150  -SW    Rt Shoulder Flexion PROM  150  -SW    Rt Shoulder External Rotation AROM  T2  -SW    Rt Shoulder External Rotation PROM  90@90  -SW    Rt Shoulder Internal Rotation AROM  center buttock  -SW    Rt Shoulder Internal Rotation PROM  70@90  -SW       MMT Right Upper Ext    Rt Shoulder Flexion MMT, Gross Movement  (4/5) good  -SW    Rt Shoulder Extension MMT, Gross Movement  (5/5) normal  -SW    Rt Shoulder ABduction MMT, Gross Movement  (4-/5) good minus  -SW    Rt Shoulder Internal Rotation MMT, Gross Movement  (5/5) normal  -SW    Rt Shoulder External Rotation MMT, Gross Movement  (2+/5) poor plus  -SW      User Key  (r) = Recorded By, (t) = Taken By, (c) = Cosigned By    Initials Name Provider Type    Karen Knowles Physical Therapist                      PT Assessment/Plan     Row Name 06/04/20 0800          PT Assessment    Assessment Comments  Patient exhibits improved subjective symptoms since resting right arm over last two weeks. His active and passive ROM is excellent. Right shoulder strength is overall good except marked weakness in ER's which is making eating with right hand challenging.   -        PT Plan    PT Frequency  2x/week  -SW     Predicted Duration of Therapy Intervention (Therapy Eval)   4 weeks  -     PT Plan Comments  Patient to fu with physician tomorrow. Progress note given to patient today to take with him requesting instruction as to whether to resume AROM and resistive exercises.   -       User Key  (r) = Recorded By, (t) = Taken By, (c) = Cosigned By    Initials Name Provider Type    Karen Knowles Physical Therapist            OP Exercises     Row Name 06/04/20 0800             Subjective Comments    Subjective Comments  Patient reports significant improvement in right shoulder pain over last 2 weeks since resting the arm. He is still having pain getting hand to moutn.   -         Subjective Pain    Able to rate subjective pain?  yes  -      Pre-Treatment Pain Level  0  -      Post-Treatment Pain Level  0  -         Exercise 1    Exercise Name 1  see manual  -        User Key  (r) = Recorded By, (t) = Taken By, (c) = Cosigned By    Initials Name Provider Type    Karen Knowles Physical Therapist                      Manual Rx (last 36 hours)      Manual Treatments     Row Name 06/04/20 0700             Manual Rx 1    Manual Rx 1 Location  R shoulder  -      Manual Rx 1 Type  PROM  -      Manual Rx 1 Duration  25'  -        User Key  (r) = Recorded By, (t) = Taken By, (c) = Cosigned By    Initials Name Provider Type    Karen Knowles Physical Therapist          PT OP Goals     Row Name 06/04/20 0800          PT Short Term Goals    STG Date to Achieve  04/10/20  -     STG 1  Passive flexion to be >/= 120 deg.  -     STG 1 Progress  Met  [facility]     STG 2  Passive ER with arm at side to be 20 deg.  -     STG 2 Progress  Met  -     STG 3  Increase R cervical lateral flexion by >/= 6 degrees  -     STG 3 Progress  Goal Revised goal deleted  -     STG 4  R shoulder extension to be >/= 45 deg.  -     STG 4 Progress  Met  -        Long Term Goals    LTG Date to Achieve  06/12/20  -     LTG 1  Independent with HEP.  -     LTG 1 Progress  Met;Partially Met   -     LTG 2  QuickDASH score to be </= 35.  -     LTG 2 Progress  Met  -     LTG 3  R shoulder AROM WFLs all planes.  -     LTG 3 Progress  Progressing;Partially Met  -     LTG 4  Report ability to use R UE for ADLs and light IADLs.  -     LTG 4 Progress  Progressing  -     LTG 5  Note a >/= 50% improvement in instances of R UE radiculopathy  [facility]  Met  -     LTG 6  Patient will be able to eat with right UE without difficulty.   -     LTG 6 Progress  Not Met;Ongoing  -        Time Calculation    PT Goal Re-Cert Due Date  06/11/20  -       User Key  (r) = Recorded By, (t) = Taken By, (c) = Cosigned By    Initials Name Provider Type    Karen Knowles Physical Therapist                         Time Calculation:   Start Time: 0758  Stop Time: 0825  Time Calculation (min): 27 min  Therapy Charges for Today     Code Description Service Date Service Provider Modifiers Qty    03909375012 HC PT MANUAL THERAPY EA 15 MIN 6/4/2020 Karen Garcia GP 2                    Karen Garcia  6/4/2020

## 2020-06-05 ENCOUNTER — OFFICE VISIT (OUTPATIENT)
Dept: ORTHOPEDIC SURGERY | Facility: CLINIC | Age: 75
End: 2020-06-05

## 2020-06-05 VITALS — BODY MASS INDEX: 24.73 KG/M2 | WEIGHT: 167 LBS | HEIGHT: 69 IN

## 2020-06-05 DIAGNOSIS — M12.811 ROTATOR CUFF ARTHROPATHY, RIGHT: ICD-10-CM

## 2020-06-05 DIAGNOSIS — Z96.611 STATUS POST REVERSE ARTHROPLASTY OF RIGHT SHOULDER: Primary | ICD-10-CM

## 2020-06-05 DIAGNOSIS — G89.29 CHRONIC RIGHT SHOULDER PAIN: ICD-10-CM

## 2020-06-05 DIAGNOSIS — M25.511 CHRONIC RIGHT SHOULDER PAIN: ICD-10-CM

## 2020-06-05 PROCEDURE — 99213 OFFICE O/P EST LOW 20 MIN: CPT | Performed by: ORTHOPAEDIC SURGERY

## 2020-06-05 NOTE — PROGRESS NOTES
"Souleymane Stapleton is a 74 y.o. male returns for     Chief Complaint   Patient presents with   • Right Shoulder - Follow-up       HISTORY OF PRESENT ILLNESS: Follow up on right shoulder. Pain level of 0/10.  Pain is much better  Using the sling most of the time  Has restarted PT and exercises.  Still having a sharp pain along anterior aspect of shoulder, inferior aspect of incision.  No fever or chills  No numbness or tingling.     CONCURRENT MEDICAL HISTORY:    The following portions of the patient's history were reviewed and updated as appropriate: allergies, current medications, past family history, past medical history, past social history, past surgical history and problem list.     ROS  No fevers or chills.  No chest pain or shortness of air.  No GI or  disturbances.    PHYSICAL EXAMINATION:       Ht 174 cm (68.5\")   Wt 75.8 kg (167 lb)   BMI 25.02 kg/m²     Physical Exam   Constitutional: He is oriented to person, place, and time. He appears well-developed and well-nourished.   Neurological: He is alert and oriented to person, place, and time.   Psychiatric: He has a normal mood and affect. His behavior is normal. Judgment and thought content normal.       GAIT:     [x]  Normal  []  Antalgic    Assistive device: [x]  None  []  Walker     []  Crutches  []  Cane     []  Wheelchair  []  Stretcher    Right Shoulder Exam     Tenderness   Right shoulder tenderness location: Mild tenderness along the anterior aspect of the shoulder.    Range of Motion   Active abduction: 120   Forward flexion: 120     Muscle Strength   Internal rotation: 3/5   External rotation: 3/5     Other   Erythema: absent  Scars: present  Sensation: normal  Pulse: present              Xr Shoulder 2+ View Right    Result Date: 5/22/2020  Narrative: Ordering Provider:  Juan Diego Mendoza MD Ordering Diagnosis/Indication:  Status post reverse arthroplasty of right shoulder, Rotator cuff arthropathy, right Procedure:  XR SHOULDER 2+ VW " RIGHT Exam Date:  5/22/20 COMPARISON:  Todays X-rays were compared to previous images dated March 3, 2020.     Impression:  3 views of the right shoulder show acceptable position and alignment of a right reverse total shoulder arthroplasty.  No sign of implant loosening or failure is noted.  Evidence of sternal wires consistent with prior coronary artery bypass grafting.  Also noted are screws and a plate on the lower cervical spine consistent with cervical fusion.  No interval change from prior x-ray.  No other acute findings. Juan Diego Mendoza MD 5/22/20             ASSESSMENT:    Diagnoses and all orders for this visit:    Status post reverse arthroplasty of right shoulder    Rotator cuff arthropathy, right    Chronic right shoulder pain          PLAN    He will continue with range of motion and strengthening exercises of his right shoulder.  We discussed slowly progressing strength as pain allows.  If the pain worsens again or persists then we discussed advanced imaging for further assessment.  Otherwise slowly progress along total shoulder arthroplasty protocol.  Recheck in 8 weeks or sooner if necessary.    Patient's Body mass index is 25.02 kg/m². BMI is within normal parameters. No follow-up required..    Return in about 8 weeks (around 7/31/2020) for Recheck with repeat xrays.    Juan Diego Mendoza MD

## 2020-06-08 ENCOUNTER — HOSPITAL ENCOUNTER (OUTPATIENT)
Dept: PHYSICAL THERAPY | Facility: HOSPITAL | Age: 75
Setting detail: THERAPIES SERIES
Discharge: HOME | End: 2020-06-08

## 2020-06-08 DIAGNOSIS — M12.811 ROTATOR CUFF ARTHROPATHY, RIGHT: Primary | ICD-10-CM

## 2020-06-08 DIAGNOSIS — Z96.611 STATUS POST REVERSE TOTAL REPLACEMENT OF RIGHT SHOULDER: ICD-10-CM

## 2020-06-08 DIAGNOSIS — Z96.611 STATUS POST REVERSE ARTHROPLASTY OF RIGHT SHOULDER: ICD-10-CM

## 2020-06-08 PROCEDURE — 97110 THERAPEUTIC EXERCISES: CPT | Performed by: PHYSICAL THERAPIST

## 2020-06-08 PROCEDURE — 97530 THERAPEUTIC ACTIVITIES: CPT | Performed by: PHYSICAL THERAPIST

## 2020-06-08 NOTE — THERAPY TREATMENT NOTE
Outpatient Physical Therapy Ortho Progress Note  [facility]     Patient Name: Souleymane Stapleton  : 1945  MRN: [id number]  Today's Date: 2020      Visit Date: 2020  Attendance: 35/36 (Medical necessity)  Subjective Improvement: 80-85% (reduced improvement due to recent setback)  Next MD Appt: 6-5-20  Recert Date: 2020  Visit Dx:    ICD-10-CM ICD-9-CM   1. Rotator cuff arthropathy, right M12.811 716.81   2. Status post reverse total replacement of right shoulder Z96.611 V43.61       Patient Active Problem List   Diagnosis   • Astigmatism   • Anxiety state   • History of cerebrovascular accident   • Nuclear senile cataract   • Hypertension   • Unspecified hemorrhoids   • Palpitations   • Pure hypercholesterolemia   • Prostate cancer (CMS/HCC)   • Chronic right shoulder pain   • Rotator cuff syndrome, left   • Chronic left shoulder pain   • Impingement syndrome, shoulder, left   • SOB (shortness of breath)   • Angina of effort (CMS/HCC)   • Coronary artery disease involving coronary bypass graft of native heart without angina pectoris   • Cervical spinal stenosis   • Displacement of cervical intervertebral disc   • Displacement of lumbar intervertebral disc without myelopathy   • Follow-up examination after neurological surgery   • Internal carotid artery dissection (CMS/HCC)   • TIA (transient ischemic attack)   • Bacterial intestinal infection, unspecified   • Body mass index (bmi) 25.0-25.9, adult   • Diverticulosis of large intestine without perforation or abscess without bleeding   • Functional dyspepsia   • History of colonic polyps   • Polyp of stomach and duodenum   • Bilateral shoulder pain   • Rotator cuff arthropathy, right   • Status post reverse arthroplasty of right shoulder        Past Medical History:   Diagnosis Date   • Abdominal pain    • Abnormal weight loss    • Acid reflux    • Acute gastritis    • Anxiety state    • Arthritis    • Cancer (CMS/HCC)     Prostate   •  Coronary artery disease    • History of cerebrovascular accident    • History of colon polyps    • Hypertension    • Nausea    • Nuclear senile cataract    • Presbyopia    • Stroke (CMS/HCC) 2005    TIA   • Tobacco use    • Transient cerebral ischemia    • Vitreous detachment of left eye         Past Surgical History:   Procedure Laterality Date   • BACK SURGERY     • CARDIAC CATHETERIZATION N/A 6/19/2018    Procedure: Coronary angiography;  Surgeon: Delroy Mcconnell MD;  Location: [facility] CATH INVASIVE LOCATION;  Service: Cardiovascular   • COLONOSCOPY  06/09/2015    Diverticulosis found in the sigmoid colon. Hemorrhoids found in the anus.   • CORONARY ARTERY BYPASS GRAFT N/A 6/22/2018    Procedure: PARAS STERNOTOMY CORONARY ARTERY BYPASS GRAFT TIMES 5 USING RIGHT AND LEFT INTERNAL MAMMARY ARTERIES AND LEFT GREATER SAPHENOUS VEIN GRAFT PER ENDOSCOPIC VEIN HARVESTING AND PRP;  Surgeon: Edgar Pérez MD;  Location: [facility] OR;  Service: Cardiothoracic   • CRYOTHERAPY  08/14/2012    Of Acne   • ENDOSCOPY  09/01/2015    EGD w/ biopsy -Multiple polyps, one removed.   • ENDOSCOPY N/A 8/22/2019    Procedure: ESOPHAGOGASTRODUODENOSCOPY;  Surgeon: Chuck Rich DO;  Location: [facility] ENDOSCOPY;  Service: Gastroenterology   • HEMORRHOIDECTOMY N/A 3/20/2017    Procedure: Removal of Anal Papilla;  Surgeon: Juan Diego Ken MD;  Location: [facility] OR;  Service:    • INJECTION OF MEDICATION  09/14/2010    B12   • INJECTION OF MEDICATION  10/17/2011    Kenalog   • LUMBAR LAMINECTOMY  09/29/2010   • OTHER SURGICAL HISTORY  08/19/2010    Biopsy, Each Additional Lesion    • SHOULDER ROTATOR CUFF REPAIR Right 02/17/2020   • SKIN BIOPSY  08/19/2010   • TOTAL SHOULDER ARTHROPLASTY W/ DISTAL CLAVICLE EXCISION Right 2/17/2020    Procedure: right reverse total shoulder arthroplasty.;  Surgeon: Juan Diego Mendoza MD;  Location: [facility] OR;  Service: Orthopedics;  Laterality: Right;   • TOTAL SHOULDER REVERSE  ARTHROPLASTY Right 02/17/2020       PT Ortho     Row Name 06/08/20 0800       Subjective Comments    Subjective Comments  Patient reports physician advised him to continue to wear sling when he goes out. He can take it off in the house and resume light activities with right UE. He can also resume AROM and strengthening in therapy within pain tolerance.   -       Precautions and Contraindications    Precautions/Limitations  -- per MD note 6-5-20: progress ROm and strength as pain allows  -    Precautions  R rev total shoulder 2-  -       Subjective Pain    Able to rate subjective pain?  yes  -SW    Pre-Treatment Pain Level  0  -SW    Post-Treatment Pain Level  1  -SW       General ROM    GENERAL ROM COMMENTS  h adduction to back of left shoulder with mild pain at coracoid process  -SW       Right Upper Ext    Rt Shoulder Abduction AROM  150  -SW    Rt Shoulder Abduction PROM  150  -SW    Rt Shoulder Flexion AROM  150  -SW    Rt Shoulder Flexion PROM  150  -SW    Rt Shoulder External Rotation AROM  T2  -SW    Rt Shoulder External Rotation PROM  90@90  -SW    Rt Shoulder Internal Rotation AROM  center buttock  -SW    Rt Shoulder Internal Rotation PROM  70@90  -SW       MMT Right Upper Ext    Rt Shoulder Flexion MMT, Gross Movement  (4/5) good  -SW    Rt Shoulder Extension MMT, Gross Movement  (5/5) normal  -SW    Rt Shoulder ABduction MMT, Gross Movement  (4-/5) good minus  -SW    Rt Shoulder Internal Rotation MMT, Gross Movement  (5/5) normal  -SW    Rt Shoulder External Rotation MMT, Gross Movement  (2+/5) poor plus  -SW      User Key  (r) = Recorded By, (t) = Taken By, (c) = Cosigned By    Initials Name Provider Type    Karen Knowles Physical Therapist                      PT Assessment/Plan     Row Name 06/08/20 0800          PT Assessment    Functional Limitations  Limitation in home management;Limitations in community activities;Limitations in functional capacity and performance;Performance in  "leisure activities;Performance in self-care ADL  -SW     Impairments  Pain;Range of motion;Muscle strength;Dexterity;Coordination  -     Assessment Comments  Patient resumed AAROM, AROM, and strengthening exercises with good tolerance and only a very mild increase in pain.   -SW     Rehab Potential  Good  -SW     Patient/caregiver participated in establishment of treatment plan and goals  Yes  -SW     Patient would benefit from skilled therapy intervention  Yes  -SW        PT Plan    PT Frequency  2x/week  -SW     Predicted Duration of Therapy Intervention (Therapy Eval)  4 weeks  -SW     Planned CPT's?  PT RE-EVAL: 12634;PT THER PROC EA 15 MIN: 53336;PT THER ACT EA 15 MIN: 10238;PT MANUAL THERAPY EA 15 MIN: 34515;PT NEUROMUSC RE-EDUCATION EA 15 MIN: 19665;PT SELF CARE/HOME MGMT/TRAIN EA 15: 23523;PT HOT OR COLD PACK TREAT [facility]  -     Physical Therapy Interventions (Optional Details)  home exercise program;manual therapy techniques;neuromuscular re-education;ROM (Range of Motion);strengthening;stretching  -     PT Plan Comments  Give patient updated HEP if still doing well pain wise at next visit.   -       User Key  (r) = Recorded By, (t) = Taken By, (c) = Cosigned By    Initials Name Provider Type    Karen Knowles Physical Therapist            OP Exercises     Row Name 06/08/20 0800             Subjective Comments    Subjective Comments  Patient reports physician advised him to continue to wear sling when he goes out. He can take it off in the house and resume light activities with right UE. He can also resume AROM and strengthening in therapy within pain tolerance.   -         Subjective Pain    Able to rate subjective pain?  yes  -SW      Pre-Treatment Pain Level  0  -SW      Post-Treatment Pain Level  1  -SW         Exercise 1    Exercise Name 1  Pro II L1  -SW      Time 1  5'F/5'B  -SW         Exercise 2    Exercise Name 2  supine flexion 2# tube  -SW      Reps 2  10x5\"  -SW         Exercise 3    " "Exercise Name 3  supine punch 2# tube  -SW      Reps 3  20  -SW         Exercise 4    Exercise Name 4  AA ER sidelying   -SW      Reps 4  10x5\"  -SW         Exercise 5    Exercise Name 5  No $   -SW      Reps 5  20  -SW         Exercise 6    Exercise Name 6  horizontal abduction yellow tb  -SW      Reps 6  20  -SW         Exercise 7    Exercise Name 7  Flexion/scaption/abduction  -SW      Reps 7  10 ea side with isometric hold on opposite side  -SW         Exercise 8    Exercise Name 8  D1 and D2 flexion and extension  -SW      Reps 8  20 ea  -SW         Exercise 9    Exercise Name 9  IR/ER yellow tb  -SW      Reps 9  20  -SW         Exercise 10    Exercise Name 10  body blade elbow by side  -SW      Reps 10  5x10\"  -SW        User Key  (r) = Recorded By, (t) = Taken By, (c) = Cosigned By    Initials Name Provider Type    Karen Knowles Physical Therapist                       PT OP Goals     Row Name 06/08/20 0800          PT Short Term Goals    STG Date to Achieve  06/22/20  -     STG 1  Passive flexion to be >/= 120 deg.  -     STG 1 Progress  Met  -     STG 2  Passive ER with arm at side to be 20 deg.  -     STG 2 Progress  Met  -     STG 3  Increase R cervical lateral flexion by >/= 6 degrees  -     STG 3 Progress  Met goal deleted  -     STG 4  R shoulder extension to be >/= 45 deg.  -     STG 4 Progress  Met  -        Long Term Goals    LTG Date to Achieve  07/06/20  -     LTG 1  Independent with HEP.  -     LTG 1 Progress  Met;Partially Met  -     LTG 2  QuickDASH score to be </= 35.  -     LTG 2 Progress  Met  -     LTG 3  R shoulder AROM WFLs all planes.  -     LTG 3 Progress  Progressing;Partially Met  -     LTG 4  Report ability to use R UE for ADLs and light IADLs.  -     LTG 4 Progress  Progressing  -     LTG 5  Note a >/= 50% improvement in instances of R UE radiculopathy  -     LTG 5 Progress  Met  -     LTG 6  Patient will be able to eat with right UE " without difficulty.   -     LTG 6 Progress  Not Met;Ongoing  -        Time Calculation    PT Goal Re-Cert Due Date  06/29/20  -       User Key  (r) = Recorded By, (t) = Taken By, (c) = Cosigned By    Initials Name Provider Type    Karen Knowles Physical Therapist               Outcome Measure Options: Quick DASH  Quick DASH  Open a tight or new jar.: No Difficulty  Do heavy household chores (e.g., wash walls, wash floors): No Difficulty  Carry a shopping bag or briefcase: No Difficulty  Wash your back: Severe Difficulty  Use a knife to cut food: Moderate Difficulty  Recreational activities in which you take some force or impact through your arm, should or hand (e.g. golf, hammering, tennis, etc.): Moderate Difficulty  During the past week, to what extent has your arm, shoulder, or hand problem interfered with your normal social activites with family, friends, neighbors or groups?: Not at all  During the past week, were you limited in your work or other regular daily activities as a result of your arm, shoulder or hand problem?: Slightly Limited  Arm, Shoulder, or hand pain: None  Tingling (pins and needles) in your arm, shoulder, or hand: Mild  During the past week, how much difficulty have you had sleeping because of the pain in your arm, shoulder or hand?: No difficulty  Number of Questions Answered: 11  Quick DASH Score: 20.45         Time Calculation:   Start Time: 0800  Stop Time: 0845  Time Calculation (min): 45 min  Therapy Charges for Today     Code Description Service Date Service Provider Modifiers Qty    16014059541 HC PT THER PROC EA 15 MIN 6/8/2020 Karen Garcia GP 2    51075870375 HC PT THERAPEUTIC ACT EA 15 MIN 6/8/2020 Karen Garcia GP 1          PT G-Codes  Outcome Measure Options: Quick DASH  Quick DASH Score: 20.45         Karen Garcia  6/8/2020

## 2020-06-10 ENCOUNTER — TELEPHONE (OUTPATIENT)
Dept: ORTHOPEDIC SURGERY | Facility: CLINIC | Age: 75
End: 2020-06-10

## 2020-06-10 DIAGNOSIS — G89.29 CHRONIC RIGHT SHOULDER PAIN: Primary | ICD-10-CM

## 2020-06-10 DIAGNOSIS — M25.511 CHRONIC RIGHT SHOULDER PAIN: Primary | ICD-10-CM

## 2020-06-10 RX ORDER — TRAMADOL HYDROCHLORIDE 50 MG/1
50 TABLET ORAL EVERY 8 HOURS PRN
Qty: 30 TABLET | Refills: 0 | Status: SHIPPED | OUTPATIENT
Start: 2020-06-10 | End: 2020-06-12

## 2020-06-10 NOTE — TELEPHONE ENCOUNTER
DR LEMUS.  PATIENT CALLED STATING THE MELOXICAM IS TEARING HIS STOMACH UP, HE FEELS LIGHT HEADED AND EARS ARE RED.   HE SAID IT HAS GOTTEN PROGRESSIVELY WORSE.  CAN YOU SUGGEST SOMETHING ELSE?

## 2020-06-11 ENCOUNTER — TRANSCRIBE ORDERS (OUTPATIENT)
Dept: LAB | Facility: HOSPITAL | Age: 75
End: 2020-06-11

## 2020-06-11 ENCOUNTER — HOSPITAL ENCOUNTER (OUTPATIENT)
Dept: PHYSICAL THERAPY | Facility: HOSPITAL | Age: 75
Setting detail: THERAPIES SERIES
Discharge: HOME | End: 2020-06-11

## 2020-06-11 DIAGNOSIS — C61 PROSTATE CANCER (HCC): Primary | ICD-10-CM

## 2020-06-11 DIAGNOSIS — Z96.611 STATUS POST REVERSE TOTAL REPLACEMENT OF RIGHT SHOULDER: ICD-10-CM

## 2020-06-11 DIAGNOSIS — M12.811 ROTATOR CUFF ARTHROPATHY, RIGHT: Primary | ICD-10-CM

## 2020-06-11 DIAGNOSIS — Z96.611 STATUS POST REVERSE ARTHROPLASTY OF RIGHT SHOULDER: ICD-10-CM

## 2020-06-11 PROCEDURE — 97110 THERAPEUTIC EXERCISES: CPT | Performed by: PHYSICAL THERAPIST

## 2020-06-11 PROCEDURE — 97140 MANUAL THERAPY 1/> REGIONS: CPT | Performed by: PHYSICAL THERAPIST

## 2020-06-11 NOTE — THERAPY TREATMENT NOTE
Outpatient Physical Therapy Ortho Treatment Note  [facility]     Patient Name: Souleymane Stapleton  : 1945  MRN: [id number]  Today's Date: 2020      Visit Date: 2020  Attendance: 36/37 (Medical necessity)  Subjective Improvement: 80-85% (reduced improvement due to recent setback)  Next MD Appt: 7-7-20  Recert Date: 2020  Visit Dx:    ICD-10-CM ICD-9-CM   1. Rotator cuff arthropathy, right M12.811 716.81   2. Status post reverse total replacement of right shoulder Z96.611 V43.61   3. Status post reverse arthroplasty of right shoulder Z96.611 V43.61       Patient Active Problem List   Diagnosis   • Astigmatism   • Anxiety state   • History of cerebrovascular accident   • Nuclear senile cataract   • Hypertension   • Unspecified hemorrhoids   • Palpitations   • Pure hypercholesterolemia   • Prostate cancer (CMS/HCC)   • Chronic right shoulder pain   • Rotator cuff syndrome, left   • Chronic left shoulder pain   • Impingement syndrome, shoulder, left   • SOB (shortness of breath)   • Angina of effort (CMS/HCC)   • Coronary artery disease involving coronary bypass graft of native heart without angina pectoris   • Cervical spinal stenosis   • Displacement of cervical intervertebral disc   • Displacement of lumbar intervertebral disc without myelopathy   • Follow-up examination after neurological surgery   • Internal carotid artery dissection (CMS/HCC)   • TIA (transient ischemic attack)   • Bacterial intestinal infection, unspecified   • Body mass index (bmi) 25.0-25.9, adult   • Diverticulosis of large intestine without perforation or abscess without bleeding   • Functional dyspepsia   • History of colonic polyps   • Polyp of stomach and duodenum   • Bilateral shoulder pain   • Rotator cuff arthropathy, right   • Status post reverse arthroplasty of right shoulder        Past Medical History:   Diagnosis Date   • Abdominal pain    • Abnormal weight loss    • Acid reflux    • Acute gastritis     • Anxiety state    • Arthritis    • Cancer (CMS/HCC)     Prostate   • Coronary artery disease    • History of cerebrovascular accident    • History of colon polyps    • Hypertension    • Nausea    • Nuclear senile cataract    • Presbyopia    • Stroke (CMS/HCC) 2005    TIA   • Tobacco use    • Transient cerebral ischemia    • Vitreous detachment of left eye         Past Surgical History:   Procedure Laterality Date   • BACK SURGERY     • CARDIAC CATHETERIZATION N/A 6/19/2018    Procedure: Coronary angiography;  Surgeon: Delroy Mcconnell MD;  Location: [facility] CATH INVASIVE LOCATION;  Service: Cardiovascular   • COLONOSCOPY  06/09/2015    Diverticulosis found in the sigmoid colon. Hemorrhoids found in the anus.   • CORONARY ARTERY BYPASS GRAFT N/A 6/22/2018    Procedure: PARAS STERNOTOMY CORONARY ARTERY BYPASS GRAFT TIMES 5 USING RIGHT AND LEFT INTERNAL MAMMARY ARTERIES AND LEFT GREATER SAPHENOUS VEIN GRAFT PER ENDOSCOPIC VEIN HARVESTING AND PRP;  Surgeon: Edgar Pérez MD;  Location: [facility] OR;  Service: Cardiothoracic   • CRYOTHERAPY  08/14/2012    Of Acne   • ENDOSCOPY  09/01/2015    EGD w/ biopsy -Multiple polyps, one removed.   • ENDOSCOPY N/A 8/22/2019    Procedure: ESOPHAGOGASTRODUODENOSCOPY;  Surgeon: Chuck Rich DO;  Location: [facility] ENDOSCOPY;  Service: Gastroenterology   • HEMORRHOIDECTOMY N/A 3/20/2017    Procedure: Removal of Anal Papilla;  Surgeon: Juan Diego Ken MD;  Location: [facility] OR;  Service:    • INJECTION OF MEDICATION  09/14/2010    B12   • INJECTION OF MEDICATION  10/17/2011    Kenalog   • LUMBAR LAMINECTOMY  09/29/2010   • OTHER SURGICAL HISTORY  08/19/2010    Biopsy, Each Additional Lesion    • SHOULDER ROTATOR CUFF REPAIR Right 02/17/2020   • SKIN BIOPSY  08/19/2010   • TOTAL SHOULDER ARTHROPLASTY W/ DISTAL CLAVICLE EXCISION Right 2/17/2020    Procedure: right reverse total shoulder arthroplasty.;  Surgeon: Juan Diego Mendoza MD;  Location: [facility];   Service: Orthopedics;  Laterality: Right;   • TOTAL SHOULDER REVERSE ARTHROPLASTY Right 02/17/2020       PT Ortho     Row Name 06/11/20 0800       Precautions and Contraindications    Precautions  R rev total shoulder 2-  -       MMT Right Upper Ext    Rt Shoulder Flexion MMT, Gross Movement  (4/5) good  -SW    Rt Shoulder Extension MMT, Gross Movement  (5/5) normal  -SW    Rt Shoulder ABduction MMT, Gross Movement  (4-/5) good minus  -SW    Rt Shoulder Internal Rotation MMT, Gross Movement  (5/5) normal  -SW    Rt Shoulder External Rotation MMT, Gross Movement  (2+/5) poor plus  -SW      User Key  (r) = Recorded By, (t) = Taken By, (c) = Cosigned By    Initials Name Provider Type    Karen Knowles Physical Therapist                      PT Assessment/Plan     Row Name 06/11/20 0800          PT Assessment    Assessment Comments  Patient given updated HEP to be performed 1x/day. He continues to experience a marked increse in pain primarily with IR and adduction of right UE. He exhibited an improvement in ER sterngth today as exhbited by further ROM against gravity with ER and performance of increased reps.   -        PT Plan    PT Frequency  2x/week  -SW     Predicted Duration of Therapy Intervention (Therapy Eval)  4 weeks  -     PT Plan Comments  Continue with focus on strengthening as patient tolerates.  -       User Key  (r) = Recorded By, (t) = Taken By, (c) = Cosigned By    Initials Name Provider Type    Karen Knowles Physical Therapist            OP Exercises     Row Name 06/11/20 0800             Subjective Comments    Subjective Comments  Patient reports no increase in pain after last PT visit. Two  days ago he reached down to dry leg and experienced a marked increase in right hsoulder pain. He has had increased right soulder pain since that time with certain movements. He cn eat with right hand now without pain.   -MICAELA         Subjective Pain    Able to rate subjective pain?  yes  -SW    "   Pre-Treatment Pain Level  0  -SW         Exercise 1    Exercise Name 1  see manual  -SW         Exercise 2    Exercise Name 2  supine flexion 4# tube  -SW      Reps 2  10x5\"  -SW         Exercise 3    Exercise Name 3  supine punch 4# tube  -SW      Reps 3  20  -SW         Exercise 4    Exercise Name 4  AA ER sidelying   -SW      Reps 4  20x5\"  -SW         Exercise 5    Exercise Name 5  No $   -SW      Reps 5  20  -SW         Exercise 6    Exercise Name 6  horizontal abduction yellow tb  -SW      Reps 6  20  -SW         Exercise 7    Exercise Name 7  Flexion/scaption/abduction  -SW      Reps 7  10 ea side with isometric hold on opposite side  -SW         Exercise 8    Exercise Name 8  D1 and D2 flexion and extension  -SW      Reps 8  20 ea  -SW      Additional Comments  not performed  -SW         Exercise 9    Exercise Name 9  IR/ER yellow tb  -SW      Reps 9  20  -SW         Exercise 10    Exercise Name 10  body blade elbow by side  -SW      Reps 10  5x10\"  -SW      Additional Comments  noot performed  -SW        User Key  (r) = Recorded By, (t) = Taken By, (c) = Cosigned By    Initials Name Provider Type    Karen Knowles Physical Therapist                      Manual Rx (last 36 hours)      Manual Treatments     Row Name 06/11/20 0700             Manual Rx 1    Manual Rx 1 Location  R shoulder  -SW      Manual Rx 1 Type  PROM  -SW      Manual Rx 1 Duration  10'  -SW        User Key  (r) = Recorded By, (t) = Taken By, (c) = Cosigned By    Initials Name Provider Type    Karen Knowles Physical Therapist          PT OP Goals     Row Name 06/11/20 0800          PT Short Term Goals    STG Date to Achieve  06/22/20  -SW     STG 1  Passive flexion to be >/= 120 deg.  -SW     STG 1 Progress  Met  -SW     STG 2  Passive ER with arm at side to be 20 deg.  -SW     STG 2 Progress  Met  -SW     STG 3  Increase R cervical lateral flexion by >/= 6 degrees  -SW     STG 3 Progress  Met goal deleted  -SW     STG 4  R " shoulder extension to be >/= 45 deg.  -     STG 4 Progress  Met  -        Long Term Goals    LTG Date to Achieve  07/06/20  -     LTG 1  Independent with HEP.  -     LTG 1 Progress  Met;Partially Met  -     LTG 2  QuickDASH score to be </= 35.  -     LTG 2 Progress  Met  -     LTG 3  R shoulder AROM WFLs all planes.  -     LTG 3 Progress  Progressing;Partially Met  -     LTG 4  Report ability to use R UE for ADLs and light IADLs.  -     LTG 4 Progress  Progressing  -     LTG 5  Note a >/= 50% improvement in instances of R UE radiculopathy  -     LTG 5 Progress  Met  -     LTG 6  Patient will be able to eat with right UE without difficulty.   -     LTG 6 Progress  Not Met;Ongoing  -        Time Calculation    PT Goal Re-Cert Due Date  06/29/20  -       User Key  (r) = Recorded By, (t) = Taken By, (c) = Cosigned By    Initials Name Provider Type    Karen Knowles Physical Therapist          Therapy Education  Education Details: HEP per flowsheet  Given: HEP, Symptoms/condition management  Program: New  How Provided: Verbal, Demonstration, Written  Provided to: Patient  Level of Understanding: Verbalized, Demonstrated              Time Calculation:   Start Time: 0803  Stop Time: 0845  Time Calculation (min): 42 min  Therapy Charges for Today     Code Description Service Date Service Provider Modifiers Qty    11166397750 HC PT MANUAL THERAPY EA 15 MIN 6/11/2020 Karen Garcia GP 1    92546135872 HC PT THER PROC EA 15 MIN 6/11/2020 Karen Garcia GP 2                    Karen Garcia  6/11/2020

## 2020-06-12 ENCOUNTER — OFFICE VISIT (OUTPATIENT)
Dept: FAMILY MEDICINE CLINIC | Facility: CLINIC | Age: 75
End: 2020-06-12

## 2020-06-12 VITALS
HEIGHT: 69 IN | BODY MASS INDEX: 24.59 KG/M2 | DIASTOLIC BLOOD PRESSURE: 72 MMHG | WEIGHT: 166 LBS | OXYGEN SATURATION: 97 % | SYSTOLIC BLOOD PRESSURE: 126 MMHG | HEART RATE: 90 BPM

## 2020-06-12 DIAGNOSIS — E78.00 PURE HYPERCHOLESTEROLEMIA: ICD-10-CM

## 2020-06-12 DIAGNOSIS — I10 ESSENTIAL HYPERTENSION: ICD-10-CM

## 2020-06-12 DIAGNOSIS — R42 DIZZINESS: Primary | ICD-10-CM

## 2020-06-12 DIAGNOSIS — I25.119 CORONARY ARTERY DISEASE INVOLVING NATIVE CORONARY ARTERY OF NATIVE HEART WITH ANGINA PECTORIS (HCC): ICD-10-CM

## 2020-06-12 PROCEDURE — 99214 OFFICE O/P EST MOD 30 MIN: CPT | Performed by: FAMILY MEDICINE

## 2020-06-12 RX ORDER — MECLIZINE HCL 12.5 MG/1
12.5 TABLET ORAL 3 TIMES DAILY PRN
Qty: 90 TABLET | Refills: 1 | Status: SHIPPED | OUTPATIENT
Start: 2020-06-12 | End: 2020-11-04

## 2020-06-12 RX ORDER — MECLIZINE HCL 12.5 MG/1
12.5 TABLET ORAL 3 TIMES DAILY PRN
Qty: 90 TABLET | Refills: 2 | Status: SHIPPED | OUTPATIENT
Start: 2020-06-12 | End: 2020-06-12

## 2020-06-12 NOTE — PROGRESS NOTES
Subjective   Souleymane Stapleton is a 74 y.o. male.   Cc: follow up of chronic medical issues    Dizziness   This is a chronic problem. The current episode started more than 1 year ago. The problem occurs daily. The problem has been gradually worsening. Associated symptoms include arthralgias, fatigue, headaches, nausea and neck pain. Pertinent negatives include no abdominal pain, anorexia, change in bowel habit, chest pain, chills, congestion, coughing, diaphoresis, fever, joint swelling, myalgias, sore throat, swollen glands, vertigo or vomiting.   Hypertension   This is a chronic problem. The current episode started more than 1 year ago. Associated symptoms include headaches, neck pain and shortness of breath. Pertinent negatives include no anxiety, blurred vision, chest pain, orthopnea, palpitations, peripheral edema or sweats. Current antihypertension treatment includes beta blockers and diuretics.   Hyperlipidemia   This is a chronic problem. The current episode started more than 1 year ago. The problem is controlled. Associated symptoms include shortness of breath. Pertinent negatives include no chest pain or myalgias. Current antihyperlipidemic treatment includes ezetimibe.   Coronary Artery Disease   Presents for follow-up visit. Symptoms include dizziness, shortness of breath and weight gain. Pertinent negatives include no chest pain, chest pressure, chest tightness, leg swelling, muscle weakness or palpitations. Risk factors include hyperlipidemia.       The following portions of the patient's history were reviewed and updated as appropriate: allergies, current medications, past family history, past medical history, past social history, past surgical history and problem list.    Review of Systems   Constitutional: Positive for fatigue and weight gain. Negative for chills, diaphoresis and fever.   HENT: Negative for congestion and sore throat.    Eyes: Negative for blurred vision.   Respiratory: Positive  "for shortness of breath. Negative for cough and chest tightness.    Cardiovascular: Negative for chest pain, palpitations, orthopnea and leg swelling.   Gastrointestinal: Positive for nausea. Negative for abdominal pain, anorexia, change in bowel habit and vomiting.   Musculoskeletal: Positive for arthralgias and neck pain. Negative for joint swelling, myalgias and muscle weakness.   Neurological: Positive for dizziness and headaches. Negative for vertigo.       Objective   Physical Exam   Constitutional: He is oriented to person, place, and time. He appears well-developed and well-nourished.   HENT:   Head: Normocephalic and atraumatic.   Right Ear: External ear normal.   Left Ear: External ear normal.   Nose: Nose normal.   Mouth/Throat: Oropharynx is clear and moist.   Eyes: Pupils are equal, round, and reactive to light.   Cardiovascular: Normal rate, regular rhythm and normal heart sounds. Exam reveals no friction rub.   No murmur heard.  Pulmonary/Chest: Effort normal and breath sounds normal. No stridor. No respiratory distress. He has no wheezes.   Abdominal: Soft. Bowel sounds are normal. He exhibits no distension. There is no tenderness.   Musculoskeletal:   Right shoulder in a sling.   Neurological: He is alert and oriented to person, place, and time. No cranial nerve deficit.   No dizziness with Romberg.   Skin: Skin is warm and dry.   Vitals reviewed.        Visit Vitals  /72   Pulse 90   Ht 174 cm (68.5\")   Wt 75.3 kg (166 lb)   SpO2 97%   BMI 24.87 kg/m²     Body mass index is 24.87 kg/m².      Assessment/Plan   Souleymane was seen today for follow-up, dizziness and medication problem.    Diagnoses and all orders for this visit:    Dizziness  -     Sedimentation rate, automated; Future  -     MRI Brain With & Without Contrast; Future    Essential hypertension  -     Comprehensive metabolic panel; Future  -     CBC w AUTO Differential; Future    Pure hypercholesterolemia  -     Lipid panel; Future  - "     T4, free; Future  -     TSH; Future    Coronary artery disease involving native coronary artery of native heart with angina pectoris (CMS/HCC)    Other orders  -     Discontinue: meclizine (ANTIVERT) 12.5 MG tablet; Take 1 tablet by mouth 3 (Three) Times a Day As Needed for Dizziness.  -     meclizine (ANTIVERT) 12.5 MG tablet; Take 1 tablet by mouth 3 (Three) Times a Day As Needed for Dizziness.    Return to the clinic in 3 month/s.  Will contact with results as needed.  Will back off of the Buspar.  Answers for HPI/ROS submitted by the patient on 6/12/2020   What is the primary reason for your visit?: Other  Please describe your symptoms.: Back pain, lightheadness, lethargy  Have you had these symptoms before?: Yes  How long have you been having these symptoms?: Greater than 2 weeks

## 2020-06-15 ENCOUNTER — LAB (OUTPATIENT)
Dept: LAB | Facility: HOSPITAL | Age: 75
End: 2020-06-15

## 2020-06-15 ENCOUNTER — APPOINTMENT (OUTPATIENT)
Dept: PHYSICAL THERAPY | Facility: HOSPITAL | Age: 75
End: 2020-06-15

## 2020-06-15 DIAGNOSIS — R42 DIZZINESS: ICD-10-CM

## 2020-06-15 DIAGNOSIS — I10 ESSENTIAL HYPERTENSION: ICD-10-CM

## 2020-06-15 DIAGNOSIS — E78.00 PURE HYPERCHOLESTEROLEMIA: ICD-10-CM

## 2020-06-15 LAB
ALBUMIN SERPL-MCNC: 4.4 G/DL (ref 3.5–5.2)
ALBUMIN/GLOB SERPL: 1.9 G/DL
ALP SERPL-CCNC: 50 U/L (ref 39–117)
ALT SERPL W P-5'-P-CCNC: 18 U/L (ref 1–41)
ANION GAP SERPL CALCULATED.3IONS-SCNC: 11 MMOL/L (ref 5–15)
AST SERPL-CCNC: 21 U/L (ref 1–40)
BASOPHILS # BLD AUTO: 0.13 10*3/MM3 (ref 0–0.2)
BASOPHILS NFR BLD AUTO: 1.5 % (ref 0–1.5)
BILIRUB SERPL-MCNC: 0.3 MG/DL (ref 0.2–1.2)
BUN BLD-MCNC: 21 MG/DL (ref 8–23)
BUN/CREAT SERPL: 17.5 (ref 7–25)
CALCIUM SPEC-SCNC: 9 MG/DL (ref 8.6–10.5)
CHLORIDE SERPL-SCNC: 103 MMOL/L (ref 98–107)
CHOLEST SERPL-MCNC: 201 MG/DL (ref 0–200)
CO2 SERPL-SCNC: 24 MMOL/L (ref 22–29)
CREAT BLD-MCNC: 1.2 MG/DL (ref 0.76–1.27)
DEPRECATED RDW RBC AUTO: 43.8 FL (ref 37–54)
EOSINOPHIL # BLD AUTO: 0.16 10*3/MM3 (ref 0–0.4)
EOSINOPHIL NFR BLD AUTO: 1.9 % (ref 0.3–6.2)
ERYTHROCYTE [DISTWIDTH] IN BLOOD BY AUTOMATED COUNT: 15 % (ref 12.3–15.4)
ERYTHROCYTE [SEDIMENTATION RATE] IN BLOOD: 4 MM/HR (ref 0–20)
GFR SERPL CREATININE-BSD FRML MDRD: 59 ML/MIN/1.73
GLOBULIN UR ELPH-MCNC: 2.3 GM/DL
GLUCOSE BLD-MCNC: 96 MG/DL (ref 65–99)
HCT VFR BLD AUTO: 38.6 % (ref 37.5–51)
HDLC SERPL-MCNC: 41 MG/DL (ref 40–60)
HGB BLD-MCNC: 13 G/DL (ref 13–17.7)
IMM GRANULOCYTES # BLD AUTO: 0.02 10*3/MM3 (ref 0–0.05)
IMM GRANULOCYTES NFR BLD AUTO: 0.2 % (ref 0–0.5)
LDLC SERPL CALC-MCNC: 140 MG/DL (ref 0–100)
LDLC/HDLC SERPL: 3.4 {RATIO}
LYMPHOCYTES # BLD AUTO: 2.68 10*3/MM3 (ref 0.7–3.1)
LYMPHOCYTES NFR BLD AUTO: 31.2 % (ref 19.6–45.3)
MCH RBC QN AUTO: 27.2 PG (ref 26.6–33)
MCHC RBC AUTO-ENTMCNC: 33.7 G/DL (ref 31.5–35.7)
MCV RBC AUTO: 80.8 FL (ref 79–97)
MONOCYTES # BLD AUTO: 0.84 10*3/MM3 (ref 0.1–0.9)
MONOCYTES NFR BLD AUTO: 9.8 % (ref 5–12)
NEUTROPHILS # BLD AUTO: 4.76 10*3/MM3 (ref 1.7–7)
NEUTROPHILS NFR BLD AUTO: 55.4 % (ref 42.7–76)
NRBC BLD AUTO-RTO: 0 /100 WBC (ref 0–0.2)
PLATELET # BLD AUTO: 357 10*3/MM3 (ref 140–450)
PMV BLD AUTO: 10.7 FL (ref 6–12)
POTASSIUM BLD-SCNC: 4.3 MMOL/L (ref 3.5–5.2)
PROT SERPL-MCNC: 6.7 G/DL (ref 6–8.5)
RBC # BLD AUTO: 4.78 10*6/MM3 (ref 4.14–5.8)
SODIUM BLD-SCNC: 138 MMOL/L (ref 136–145)
T4 FREE SERPL-MCNC: 1.36 NG/DL (ref 0.93–1.7)
TRIGL SERPL-MCNC: 102 MG/DL (ref 0–150)
TSH SERPL DL<=0.05 MIU/L-ACNC: 1.5 UIU/ML (ref 0.27–4.2)
VLDLC SERPL-MCNC: 20.4 MG/DL (ref 5–40)
WBC NRBC COR # BLD: 8.59 10*3/MM3 (ref 3.4–10.8)

## 2020-06-15 PROCEDURE — 36415 COLL VENOUS BLD VENIPUNCTURE: CPT

## 2020-06-15 PROCEDURE — 84443 ASSAY THYROID STIM HORMONE: CPT

## 2020-06-15 PROCEDURE — 80061 LIPID PANEL: CPT

## 2020-06-15 PROCEDURE — 84439 ASSAY OF FREE THYROXINE: CPT

## 2020-06-15 PROCEDURE — 85025 COMPLETE CBC W/AUTO DIFF WBC: CPT

## 2020-06-15 PROCEDURE — 85652 RBC SED RATE AUTOMATED: CPT

## 2020-06-15 PROCEDURE — 80053 COMPREHEN METABOLIC PANEL: CPT

## 2020-06-16 ENCOUNTER — HOSPITAL ENCOUNTER (OUTPATIENT)
Dept: PHYSICAL THERAPY | Facility: HOSPITAL | Age: 75
Setting detail: THERAPIES SERIES
Discharge: HOME | End: 2020-06-16

## 2020-06-16 DIAGNOSIS — Z96.611 STATUS POST REVERSE TOTAL REPLACEMENT OF RIGHT SHOULDER: ICD-10-CM

## 2020-06-16 DIAGNOSIS — M12.811 ROTATOR CUFF ARTHROPATHY, RIGHT: Primary | ICD-10-CM

## 2020-06-16 PROCEDURE — 97140 MANUAL THERAPY 1/> REGIONS: CPT | Performed by: PHYSICAL THERAPIST

## 2020-06-16 PROCEDURE — 97110 THERAPEUTIC EXERCISES: CPT | Performed by: PHYSICAL THERAPIST

## 2020-06-18 ENCOUNTER — HOSPITAL ENCOUNTER (OUTPATIENT)
Dept: PHYSICAL THERAPY | Facility: HOSPITAL | Age: 75
Setting detail: THERAPIES SERIES
Discharge: HOME | End: 2020-06-18

## 2020-06-18 ENCOUNTER — HOSPITAL ENCOUNTER (OUTPATIENT)
Dept: MRI IMAGING | Facility: HOSPITAL | Age: 75
Discharge: HOME | End: 2020-06-18
Admitting: FAMILY MEDICINE

## 2020-06-18 DIAGNOSIS — M12.811 ROTATOR CUFF ARTHROPATHY, RIGHT: Primary | ICD-10-CM

## 2020-06-18 DIAGNOSIS — R42 DIZZINESS: ICD-10-CM

## 2020-06-18 DIAGNOSIS — Z96.611 STATUS POST REVERSE TOTAL REPLACEMENT OF RIGHT SHOULDER: ICD-10-CM

## 2020-06-18 PROCEDURE — A9579 GAD-BASE MR CONTRAST NOS,1ML: HCPCS | Performed by: FAMILY MEDICINE

## 2020-06-18 PROCEDURE — 97110 THERAPEUTIC EXERCISES: CPT | Performed by: PHYSICAL THERAPIST

## 2020-06-18 PROCEDURE — 25010000002 GADOTERIDOL PER 1 ML: Performed by: FAMILY MEDICINE

## 2020-06-18 PROCEDURE — 70553 MRI BRAIN STEM W/O & W/DYE: CPT

## 2020-06-18 PROCEDURE — 97140 MANUAL THERAPY 1/> REGIONS: CPT | Performed by: PHYSICAL THERAPIST

## 2020-06-18 RX ADMIN — GADOTERIDOL 17 ML: 279.3 INJECTION, SOLUTION INTRAVENOUS at 15:51

## 2020-06-18 NOTE — THERAPY TREATMENT NOTE
Outpatient Physical Therapy Ortho Treatment Note  [facility]     Patient Name: Souleymane Stapleton  : 1945  MRN: [id number]  Today's Date: 2020      Visit Date: 2020  Attendance: 38/39 (Medical necessity)  Subjective Improvement: 80-85% (reduced improvement due to recent setback)  Next MD Appt: 7-7-20  Recert Date: 2020  Visit Dx:    ICD-10-CM ICD-9-CM   1. Rotator cuff arthropathy, right M12.811 716.81   2. Status post reverse total replacement of right shoulder Z96.611 V43.61       Patient Active Problem List   Diagnosis   • Astigmatism   • Anxiety state   • History of cerebrovascular accident   • Nuclear senile cataract   • Hypertension   • Unspecified hemorrhoids   • Palpitations   • Pure hypercholesterolemia   • Prostate cancer (CMS/HCC)   • Chronic right shoulder pain   • Rotator cuff syndrome, left   • Chronic left shoulder pain   • Impingement syndrome, shoulder, left   • SOB (shortness of breath)   • Angina of effort (CMS/HCC)   • Coronary artery disease involving coronary bypass graft of native heart without angina pectoris   • Cervical spinal stenosis   • Displacement of cervical intervertebral disc   • Displacement of lumbar intervertebral disc without myelopathy   • Follow-up examination after neurological surgery   • Internal carotid artery dissection (CMS/HCC)   • TIA (transient ischemic attack)   • Bacterial intestinal infection, unspecified   • Body mass index (bmi) 25.0-25.9, adult   • Diverticulosis of large intestine without perforation or abscess without bleeding   • Functional dyspepsia   • History of colonic polyps   • Polyp of stomach and duodenum   • Bilateral shoulder pain   • Rotator cuff arthropathy, right   • Status post reverse arthroplasty of right shoulder        Past Medical History:   Diagnosis Date   • Abdominal pain    • Abnormal weight loss    • Acid reflux    • Acute gastritis    • Anxiety state    • Arthritis    • Cancer (CMS/HCC)     Prostate    • Coronary artery disease    • History of cerebrovascular accident    • History of colon polyps    • Hypertension    • Nausea    • Nuclear senile cataract    • Presbyopia    • Stroke (CMS/HCC) 2005    TIA   • Tobacco use    • Transient cerebral ischemia    • Vitreous detachment of left eye         Past Surgical History:   Procedure Laterality Date   • BACK SURGERY     • CARDIAC CATHETERIZATION N/A 6/19/2018    Procedure: Coronary angiography;  Surgeon: Delroy Mcconnell MD;  Location: [facility] CATH INVASIVE LOCATION;  Service: Cardiovascular   • COLONOSCOPY  06/09/2015    Diverticulosis found in the sigmoid colon. Hemorrhoids found in the anus.   • CORONARY ARTERY BYPASS GRAFT N/A 6/22/2018    Procedure: PARAS STERNOTOMY CORONARY ARTERY BYPASS GRAFT TIMES 5 USING RIGHT AND LEFT INTERNAL MAMMARY ARTERIES AND LEFT GREATER SAPHENOUS VEIN GRAFT PER ENDOSCOPIC VEIN HARVESTING AND PRP;  Surgeon: Edgar Pérez MD;  Location: [facility] OR;  Service: Cardiothoracic   • CRYOTHERAPY  08/14/2012    Of Acne   • ENDOSCOPY  09/01/2015    EGD w/ biopsy -Multiple polyps, one removed.   • ENDOSCOPY N/A 8/22/2019    Procedure: ESOPHAGOGASTRODUODENOSCOPY;  Surgeon: Chuck Rich DO;  Location: [facility] ENDOSCOPY;  Service: Gastroenterology   • HEMORRHOIDECTOMY N/A 3/20/2017    Procedure: Removal of Anal Papilla;  Surgeon: Juan Diego Ken MD;  Location: [facility] OR;  Service:    • INJECTION OF MEDICATION  09/14/2010    B12   • INJECTION OF MEDICATION  10/17/2011    Kenalog   • LUMBAR LAMINECTOMY  09/29/2010   • OTHER SURGICAL HISTORY  08/19/2010    Biopsy, Each Additional Lesion    • SHOULDER ROTATOR CUFF REPAIR Right 02/17/2020   • SKIN BIOPSY  08/19/2010   • TOTAL SHOULDER ARTHROPLASTY W/ DISTAL CLAVICLE EXCISION Right 2/17/2020    Procedure: right reverse total shoulder arthroplasty.;  Surgeon: Juan Diego Mendoza MD;  Location: [facility] OR;  Service: Orthopedics;  Laterality: Right;   • TOTAL SHOULDER REVERSE  "ARTHROPLASTY Right 02/17/2020       PT Ortho     Row Name 06/18/20 0800       Subjective Comments    Subjective Comments  Patient reports he has had no incidences of sharp pain in last couple of days, but he is \"babying\" his right UE. He is not eating much with right UE as he is concerned this will exacerbate symptoms. When he keep his elbow out, he has less pain. When he drops his elbow down he is more likely to have pain.    -SW       Subjective Pain    Post-Treatment Pain Level  0  -      User Key  (r) = Recorded By, (t) = Taken By, (c) = Cosigned By    Initials Name Provider Type    Karen Knowles Physical Therapist                      PT Assessment/Plan     Row Name 06/18/20 0800          PT Assessment    Assessment Comments  Patient exhibits gradual strength gain in right shoulder except minimal improvement in ER strength. Wekaness in ER's is resulting in poor stabilization of right shoulder which may be contributing to impingement at anterior shoulder.    -        PT Plan    PT Frequency  2x/week  -     Predicted Duration of Therapy Intervention (Therapy Eval)  2 more weeks  -     PT Plan Comments  Focus on right UE strengthening specifically scapular stabilization and ER.   -       User Key  (r) = Recorded By, (t) = Taken By, (c) = Cosigned By    Initials Name Provider Type    Karen Knowles Physical Therapist            OP Exercises     Row Name 06/18/20 0800             Subjective Comments    Subjective Comments  Patient reports he has had no incidences of sharp pain in last couple of days, but he is \"babying\" his right UE. He is not eating much with right UE as he is concerned this will exacerbate symptoms. When he keep his elbow out, he has less pain. When he drops his elbow down he is more likely to have pain.    -SW         Subjective Pain    Able to rate subjective pain?  yes  -SW      Pre-Treatment Pain Level  0  -SW      Post-Treatment Pain Level  0  -SW         Exercise 1    Exercise " "Name 1  see manual  -SW         Exercise 2    Exercise Name 2  supine flexion 5# tube  -SW      Reps 2  10x5\"  -SW         Exercise 3    Exercise Name 3  supine punch 5# tube  -SW      Reps 3  20  -SW         Exercise 4    Exercise Name 4  AA ER sidelying   -SW      Reps 4  20x5\"  -SW         Exercise 5    Exercise Name 5  No $   -SW      Reps 5  20  -SW         Exercise 6    Exercise Name 6  horizontal abduction green tb  -SW      Reps 6  20  -SW         Exercise 7    Exercise Name 7  Flexion/scaption/abduction 1#  -SW      Reps 7  10 ea side with isometric hold on opposite side  -SW         Exercise 8    Exercise Name 8  D1 and D2 flexion and extension 1#  -SW      Reps 8  20 ea  -SW         Exercise 9    Exercise Name 9  IR red/ER yellow tb  -SW      Reps 9  20  -SW         Exercise 10    Exercise Name 10  body blade elbow by side  -SW      Reps 10  5x10\"  -SW        User Key  (r) = Recorded By, (t) = Taken By, (c) = Cosigned By    Initials Name Provider Type    Karen Knowles Physical Therapist                      Manual Rx (last 36 hours)      Manual Treatments     Row Name 06/18/20 0700             Manual Rx 1    Manual Rx 1 Location  R shoulder  -SW      Manual Rx 1 Type  PROM  -SW      Manual Rx 1 Duration  10'  -SW        User Key  (r) = Recorded By, (t) = Taken By, (c) = Cosigned By    Initials Name Provider Type    Karen Knowles Physical Therapist          PT OP Goals     Row Name 06/18/20 0800          PT Short Term Goals    STG Date to Achieve  06/22/20  -     STG 1  Passive flexion to be >/= 120 deg.  -SW     STG 1 Progress  Met  -SW     STG 2  Passive ER with arm at side to be 20 deg.  -SW     STG 2 Progress  Met  -SW     STG 3  Increase R cervical lateral flexion by >/= 6 degrees  -SW     STG 3 Progress  Met goal deleted  -SW     STG 4  R shoulder extension to be >/= 45 deg.  -SW     STG 4 Progress  Met  -        Long Term Goals    LTG Date to Achieve  07/06/20  -SW     LTG 1  Independent " with HEP.  -     LTG 1 Progress  Met  -     LTG 2  QuickDASH score to be </= 35.  -     LTG 2 Progress  Met  -     LTG 3  R shoulder AROM WFLs all planes.  -     LTG 3 Progress  Progressing;Partially Met  -     LTG 4  Report ability to use R UE for ADLs and light IADLs.  -     LTG 4 Progress  Progressing  -     LTG 5  Note a >/= 50% improvement in instances of R UE radiculopathy  -     LTG 5 Progress  Partially Met  -     LTG 6  Patient will be able to eat with right UE without difficulty.   -     LTG 6 Progress  Ongoing  -        Time Calculation    PT Goal Re-Cert Due Date  06/29/20  -       User Key  (r) = Recorded By, (t) = Taken By, (c) = Cosigned By    Initials Name Provider Type    Karen Knowles Physical Therapist                         Time Calculation:   Start Time: 0800  Stop Time: 0835  Time Calculation (min): 35 min  Therapy Charges for Today     Code Description Service Date Service Provider Modifiers Qty    13959735474 HC PT THER PROC EA 15 MIN 6/18/2020 Karen Garcia GP 2    95001890663 HC PT MANUAL THERAPY EA 15 MIN 6/18/2020 Karen Garcia GP 1                    Karen Garcia  6/18/2020

## 2020-06-23 ENCOUNTER — HOSPITAL ENCOUNTER (OUTPATIENT)
Dept: PHYSICAL THERAPY | Facility: HOSPITAL | Age: 75
Setting detail: THERAPIES SERIES
Discharge: HOME | End: 2020-06-23

## 2020-06-23 DIAGNOSIS — M12.811 ROTATOR CUFF ARTHROPATHY, RIGHT: Primary | ICD-10-CM

## 2020-06-23 DIAGNOSIS — Z96.611 STATUS POST REVERSE TOTAL REPLACEMENT OF RIGHT SHOULDER: ICD-10-CM

## 2020-06-23 PROCEDURE — 97110 THERAPEUTIC EXERCISES: CPT | Performed by: PHYSICAL THERAPIST

## 2020-06-23 PROCEDURE — 97140 MANUAL THERAPY 1/> REGIONS: CPT | Performed by: PHYSICAL THERAPIST

## 2020-06-23 NOTE — THERAPY TREATMENT NOTE
Outpatient Physical Therapy Ortho Treatment Note  [facility]     Patient Name: Souleymane Stapleton  : 1945  MRN: [id number]  Today's Date: 2020      Visit Date: 2020  Attendance: 39/40 (Medical necessity)  Subjective Improvement: 80-85% (reduced improvement due to recent setback)  Next MD Appt: 7-7-20  Recert Date: 2020  Visit Dx:    ICD-10-CM ICD-9-CM   1. Rotator cuff arthropathy, right M12.811 716.81   2. Status post reverse total replacement of right shoulder Z96.611 V43.61       Patient Active Problem List   Diagnosis   • Astigmatism   • Anxiety state   • History of cerebrovascular accident   • Nuclear senile cataract   • Hypertension   • Unspecified hemorrhoids   • Palpitations   • Pure hypercholesterolemia   • Prostate cancer (CMS/HCC)   • Chronic right shoulder pain   • Rotator cuff syndrome, left   • Chronic left shoulder pain   • Impingement syndrome, shoulder, left   • SOB (shortness of breath)   • Angina of effort (CMS/HCC)   • Coronary artery disease involving coronary bypass graft of native heart without angina pectoris   • Cervical spinal stenosis   • Displacement of cervical intervertebral disc   • Displacement of lumbar intervertebral disc without myelopathy   • Follow-up examination after neurological surgery   • Internal carotid artery dissection (CMS/HCC)   • TIA (transient ischemic attack)   • Bacterial intestinal infection, unspecified   • Body mass index (bmi) 25.0-25.9, adult   • Diverticulosis of large intestine without perforation or abscess without bleeding   • Functional dyspepsia   • History of colonic polyps   • Polyp of stomach and duodenum   • Bilateral shoulder pain   • Rotator cuff arthropathy, right   • Status post reverse arthroplasty of right shoulder        Past Medical History:   Diagnosis Date   • Abdominal pain    • Abnormal weight loss    • Acid reflux    • Acute gastritis    • Anxiety state    • Arthritis    • Cancer (CMS/HCC)     Prostate    • Coronary artery disease    • History of cerebrovascular accident    • History of colon polyps    • Hypertension    • Nausea    • Nuclear senile cataract    • Presbyopia    • Stroke (CMS/HCC) 2005    TIA   • Tobacco use    • Transient cerebral ischemia    • Vitreous detachment of left eye         Past Surgical History:   Procedure Laterality Date   • BACK SURGERY     • CARDIAC CATHETERIZATION N/A 6/19/2018    Procedure: Coronary angiography;  Surgeon: Delroy Mcconnell MD;  Location: [facility] CATH INVASIVE LOCATION;  Service: Cardiovascular   • COLONOSCOPY  06/09/2015    Diverticulosis found in the sigmoid colon. Hemorrhoids found in the anus.   • CORONARY ARTERY BYPASS GRAFT N/A 6/22/2018    Procedure: PARAS STERNOTOMY CORONARY ARTERY BYPASS GRAFT TIMES 5 USING RIGHT AND LEFT INTERNAL MAMMARY ARTERIES AND LEFT GREATER SAPHENOUS VEIN GRAFT PER ENDOSCOPIC VEIN HARVESTING AND PRP;  Surgeon: Edgar Pérez MD;  Location: [facility] OR;  Service: Cardiothoracic   • CRYOTHERAPY  08/14/2012    Of Acne   • ENDOSCOPY  09/01/2015    EGD w/ biopsy -Multiple polyps, one removed.   • ENDOSCOPY N/A 8/22/2019    Procedure: ESOPHAGOGASTRODUODENOSCOPY;  Surgeon: Chuck Rich DO;  Location: [facility] ENDOSCOPY;  Service: Gastroenterology   • HEMORRHOIDECTOMY N/A 3/20/2017    Procedure: Removal of Anal Papilla;  Surgeon: Juan Diego Ken MD;  Location: [facility] OR;  Service:    • INJECTION OF MEDICATION  09/14/2010    B12   • INJECTION OF MEDICATION  10/17/2011    Kenalog   • LUMBAR LAMINECTOMY  09/29/2010   • OTHER SURGICAL HISTORY  08/19/2010    Biopsy, Each Additional Lesion    • SHOULDER ROTATOR CUFF REPAIR Right 02/17/2020   • SKIN BIOPSY  08/19/2010   • TOTAL SHOULDER ARTHROPLASTY W/ DISTAL CLAVICLE EXCISION Right 2/17/2020    Procedure: right reverse total shoulder arthroplasty.;  Surgeon: Juan Diego Mendoza MD;  Location: [facility] OR;  Service: Orthopedics;  Laterality: Right;   • TOTAL SHOULDER REVERSE  "ARTHROPLASTY Right 02/17/2020       PT Ortho     Row Name 06/23/20 0800       Subjective Comments    Subjective Comments  Patient reports right shoulder feels more stable. He still has difficulty eating with right hand.   -SW       Subjective Pain    Able to rate subjective pain?  yes  -SW    Pre-Treatment Pain Level  0  -SW    Post-Treatment Pain Level  0  -SW      User Key  (r) = Recorded By, (t) = Taken By, (c) = Cosigned By    Initials Name Provider Type    Karen Knowles Physical Therapist                      PT Assessment/Plan     Row Name 06/23/20 0800          PT Assessment    Assessment Comments  Patient continues to make very gradual right UE strength gains. ER musculature is still very weak.  -SW        PT Plan    PT Frequency  2x/week  -SW     Predicted Duration of Therapy Intervention (Therapy Eval)  1 more week  -SW     PT Plan Comments  Continue for 1 more week with focus on right UE strength particularly ER's and scapular stabilization.   -SW       User Key  (r) = Recorded By, (t) = Taken By, (c) = Cosigned By    Initials Name Provider Type    Karen Knowles Physical Therapist            OP Exercises     Row Name 06/23/20 0800             Subjective Comments    Subjective Comments  Patient reports right shoulder feels more stable. He still has difficulty eating with right hand.   -SW         Subjective Pain    Able to rate subjective pain?  yes  -SW      Pre-Treatment Pain Level  0  -SW      Post-Treatment Pain Level  0  -SW         Exercise 1    Exercise Name 1  see manual  -SW         Exercise 2    Exercise Name 2  standing flexion 5# tube  -SW      Reps 2  10x5\"  -SW         Exercise 3    Exercise Name 3  supine punch 4#  -SW      Reps 3  20  -SW         Exercise 4    Exercise Name 4  AA ER sidelying   -SW      Reps 4  20x5\"  -SW         Exercise 5    Exercise Name 5  No $   -SW      Reps 5  20  -SW         Exercise 6    Exercise Name 6  horizontal abduction green tb  -SW      Reps 6  20  -SW " "        Exercise 7    Exercise Name 7  Flexion/scaption/abduction 2#  -SW      Reps 7  10 ea side with isometric hold on opposite side  -SW         Exercise 8    Exercise Name 8  D1 and D2 flexion and extension 1# B  -SW      Reps 8  20 ea  -SW         Exercise 9    Exercise Name 9  IR red/ER yellow tb  -SW      Reps 9  20  -SW      Additional Comments  not performed  -SW         Exercise 10    Exercise Name 10  body blade elbow by side  -SW      Reps 10  5x10\"  -SW         Exercise 11    Exercise Name 11  bent over retractopm 4#  -SW      Reps 11  20  -SW        User Key  (r) = Recorded By, (t) = Taken By, (c) = Cosigned By    Initials Name Provider Type    Karen Knowles Physical Therapist                      Manual Rx (last 36 hours)      Manual Treatments     Row Name 06/23/20 0700             Manual Rx 1    Manual Rx 1 Location  R shoulder  -SW      Manual Rx 1 Type  PROM  -SW      Manual Rx 1 Duration  10'  -SW        User Key  (r) = Recorded By, (t) = Taken By, (c) = Cosigned By    Initials Name Provider Type    Karen Knowles Physical Therapist          PT OP Goals     Row Name 06/23/20 0800          PT Short Term Goals    STG Date to Achieve  06/22/20  -     STG 1  Passive flexion to be >/= 120 deg.  -     STG 1 Progress  Met  -     STG 2  Passive ER with arm at side to be 20 deg.  -     STG 2 Progress  Met  [facility]     STG 3  Increase R cervical lateral flexion by >/= 6 degrees  -     STG 3 Progress  Met goal deleted  -     STG 4  R shoulder extension to be >/= 45 deg.  -     STG 4 Progress  Met  -        Long Term Goals    LTG Date to Achieve  07/06/20  -     LTG 1  Independent with HEP.  -     LTG 1 Progress  Met  -     LTG 2  QuickDASH score to be </= 35.  -     LTG 2 Progress  Met  -     LTG 3  R shoulder AROM WFLs all planes.  -     LTG 3 Progress  Progressing;Partially Met  -     LTG 4  Report ability to use R UE for ADLs and light IADLs.  -     LTG 4 Progress  " Progressing  -     LTG 5  Note a >/= 50% improvement in instances of R UE radiculopathy  -     LTG 5 Progress  Partially Met  -     LTG 6  Patient will be able to eat with right UE without difficulty.   -     LTG 6 Progress  Ongoing  -        Time Calculation    PT Goal Re-Cert Due Date  06/29/20  -       User Key  (r) = Recorded By, (t) = Taken By, (c) = Cosigned By    Initials Name Provider Type    Karen Knowles Physical Therapist                         Time Calculation:   Start Time: 0800  Stop Time: 0842  Time Calculation (min): 42 min  Therapy Charges for Today     Code Description Service Date Service Provider Modifiers Qty    78865323770 HC PT MANUAL THERAPY EA 15 MIN 6/23/2020 Karen Garcia GP 1    74374098777 HC PT THER PROC EA 15 MIN 6/23/2020 Karen Garcia GP 2                    Karen Garcia  6/23/2020

## 2020-06-25 ENCOUNTER — HOSPITAL ENCOUNTER (OUTPATIENT)
Dept: PHYSICAL THERAPY | Facility: HOSPITAL | Age: 75
Setting detail: THERAPIES SERIES
Discharge: HOME | End: 2020-06-25

## 2020-06-25 DIAGNOSIS — M12.811 ROTATOR CUFF ARTHROPATHY, RIGHT: Primary | ICD-10-CM

## 2020-06-25 DIAGNOSIS — Z96.611 STATUS POST REVERSE TOTAL REPLACEMENT OF RIGHT SHOULDER: ICD-10-CM

## 2020-06-25 PROCEDURE — 97110 THERAPEUTIC EXERCISES: CPT | Performed by: PHYSICAL THERAPIST

## 2020-06-25 NOTE — THERAPY TREATMENT NOTE
Outpatient Physical Therapy Ortho Treatment Note  [facility]     Patient Name: Souleymane Stapleton  : 1945  MRN: [id number]  Today's Date: 2020      Visit Date: 2020  Attendance: 40/41 (Medical necessity)  Subjective Improvement: 80-85% (reduced improvement due to recent setback)  Next MD Appt: 7-7-20  Recert Date: 2020    Visit Dx:    ICD-10-CM ICD-9-CM   1. Rotator cuff arthropathy, right M12.811 716.81   2. Status post reverse total replacement of right shoulder Z96.611 V43.61       Patient Active Problem List   Diagnosis   • Astigmatism   • Anxiety state   • History of cerebrovascular accident   • Nuclear senile cataract   • Hypertension   • Unspecified hemorrhoids   • Palpitations   • Pure hypercholesterolemia   • Prostate cancer (CMS/HCC)   • Chronic right shoulder pain   • Rotator cuff syndrome, left   • Chronic left shoulder pain   • Impingement syndrome, shoulder, left   • SOB (shortness of breath)   • Angina of effort (CMS/HCC)   • Coronary artery disease involving coronary bypass graft of native heart without angina pectoris   • Cervical spinal stenosis   • Displacement of cervical intervertebral disc   • Displacement of lumbar intervertebral disc without myelopathy   • Follow-up examination after neurological surgery   • Internal carotid artery dissection (CMS/HCC)   • TIA (transient ischemic attack)   • Bacterial intestinal infection, unspecified   • Body mass index (bmi) 25.0-25.9, adult   • Diverticulosis of large intestine without perforation or abscess without bleeding   • Functional dyspepsia   • History of colonic polyps   • Polyp of stomach and duodenum   • Bilateral shoulder pain   • Rotator cuff arthropathy, right   • Status post reverse arthroplasty of right shoulder        Past Medical History:   Diagnosis Date   • Abdominal pain    • Abnormal weight loss    • Acid reflux    • Acute gastritis    • Anxiety state    • Arthritis    • Cancer (CMS/HCC)     Prostate    • Coronary artery disease    • History of cerebrovascular accident    • History of colon polyps    • Hypertension    • Nausea    • Nuclear senile cataract    • Presbyopia    • Stroke (CMS/HCC) 2005    TIA   • Tobacco use    • Transient cerebral ischemia    • Vitreous detachment of left eye         Past Surgical History:   Procedure Laterality Date   • BACK SURGERY     • CARDIAC CATHETERIZATION N/A 6/19/2018    Procedure: Coronary angiography;  Surgeon: Delroy Mcconnell MD;  Location: [facility] CATH INVASIVE LOCATION;  Service: Cardiovascular   • COLONOSCOPY  06/09/2015    Diverticulosis found in the sigmoid colon. Hemorrhoids found in the anus.   • CORONARY ARTERY BYPASS GRAFT N/A 6/22/2018    Procedure: PARAS STERNOTOMY CORONARY ARTERY BYPASS GRAFT TIMES 5 USING RIGHT AND LEFT INTERNAL MAMMARY ARTERIES AND LEFT GREATER SAPHENOUS VEIN GRAFT PER ENDOSCOPIC VEIN HARVESTING AND PRP;  Surgeon: Edgar Pérez MD;  Location: [facility] OR;  Service: Cardiothoracic   • CRYOTHERAPY  08/14/2012    Of Acne   • ENDOSCOPY  09/01/2015    EGD w/ biopsy -Multiple polyps, one removed.   • ENDOSCOPY N/A 8/22/2019    Procedure: ESOPHAGOGASTRODUODENOSCOPY;  Surgeon: Chuck Rich DO;  Location: [facility] ENDOSCOPY;  Service: Gastroenterology   • HEMORRHOIDECTOMY N/A 3/20/2017    Procedure: Removal of Anal Papilla;  Surgeon: Juan Diego Ken MD;  Location: [facility] OR;  Service:    • INJECTION OF MEDICATION  09/14/2010    B12   • INJECTION OF MEDICATION  10/17/2011    Kenalog   • LUMBAR LAMINECTOMY  09/29/2010   • OTHER SURGICAL HISTORY  08/19/2010    Biopsy, Each Additional Lesion    • SHOULDER ROTATOR CUFF REPAIR Right 02/17/2020   • SKIN BIOPSY  08/19/2010   • TOTAL SHOULDER ARTHROPLASTY W/ DISTAL CLAVICLE EXCISION Right 2/17/2020    Procedure: right reverse total shoulder arthroplasty.;  Surgeon: Juan Diego Mendoza MD;  Location: [facility] OR;  Service: Orthopedics;  Laterality: Right;   • TOTAL SHOULDER REVERSE  "ARTHROPLASTY Right 02/17/2020       PT Ortho     Row Name 06/25/20 0848       Subjective Pain    Post-Treatment Pain Level  0  -BS      User Key  (r) = Recorded By, (t) = Taken By, (c) = Cosigned By    Initials Name Provider Type    Betito Ferro, PT Physical Therapist                      PT Assessment/Plan     Row Name 06/25/20 0848          PT Assessment    Assessment Comments  Good ROM values with all planes with R shoulder. Main weakness is still ER of R shoulder.  -BS        PT Plan    PT Frequency  2x/week  -BS     Predicted Duration of Therapy Intervention (Therapy Eval)  1 more week  -BS     PT Plan Comments  attempt isometric L shoulder ER recruitment sidestepping with TB.  -BS       User Key  (r) = Recorded By, (t) = Taken By, (c) = Cosigned By    Initials Name Provider Type    Betito Ferro, PT Physical Therapist            OP Exercises     Row Name 06/25/20 0848             Subjective Comments    Subjective Comments  pt reports 80% improvement thus far.   -BS         Subjective Pain    Able to rate subjective pain?  yes  -BS      Pre-Treatment Pain Level  0  -BS      Post-Treatment Pain Level  0  -BS         Exercise 1    Exercise Name 1  see manual  -BS         Exercise 2    Exercise Name 2  standing flexion 5# tube  -BS      Reps 2  10x5\"  -BS         Exercise 3    Exercise Name 3  supine punch 4#  -BS      Reps 3  20  -BS         Exercise 4    Exercise Name 4  AA ER sidelying   -BS      Reps 4  20x5\"  -BS         Exercise 5    Exercise Name 5  No $   -BS      Reps 5  20  -BS         Exercise 6    Exercise Name 6  horizontal abduction green tb  -BS      Reps 6  20  -BS         Exercise 7    Exercise Name 7  Flexion/scaption/abduction 2#  -BS      Reps 7  10 ea side with isometric hold on opposite side  -BS         Exercise 9    Exercise Name 9  IR yellow/ER yellow tb  -BS      Reps 9  20  -BS         Exercise 12    Exercise Name 12  Pro II, level 3  -BS      Time 12  10'  -BS        User Key  " (r) = Recorded By, (t) = Taken By, (c) = Cosigned By    Initials Name Provider Type    Betito Ferro, PT Physical Therapist                       PT OP Goals     Row Name 06/25/20 0848          PT Short Term Goals    STG Date to Achieve  06/22/20  -BS     STG 1  Passive flexion to be >/= 120 deg.  -BS     STG 1 Progress  Met  -BS     STG 2  Passive ER with arm at side to be 20 deg.  -BS     STG 2 Progress  Met  -BS     STG 3  Increase R cervical lateral flexion by >/= 6 degrees  -BS     STG 3 Progress  Met goal deleted  -BS     STG 4  R shoulder extension to be >/= 45 deg.  -BS     STG 4 Progress  Met  -BS        Long Term Goals    LTG Date to Achieve  07/06/20  -BS     LTG 1  Independent with HEP.  -BS     LTG 1 Progress  Met  -BS     LTG 2  QuickDASH score to be </= 35.  -BS     LTG 2 Progress  Met  -BS     LTG 3  R shoulder AROM WFLs all planes.  -BS     LTG 3 Progress  Progressing;Partially Met  -BS     LTG 4  Report ability to use R UE for ADLs and light IADLs.  -BS     LTG 4 Progress  Progressing  -BS     LTG 5  Note a >/= 50% improvement in instances of R UE radiculopathy  -BS     LTG 5 Progress  Partially Met  -BS     LTG 6  Patient will be able to eat with right UE without difficulty.   -BS     LTG 6 Progress  Ongoing  -BS        Time Calculation    PT Goal Re-Cert Due Date  06/29/20  -BS       User Key  (r) = Recorded By, (t) = Taken By, (c) = Cosigned By    Initials Name Provider Type    Betito Ferro PT Physical Therapist                         Time Calculation:   Start Time: 0848  Stop Time: 0930  Time Calculation (min): 42 min  Total Timed Code Minutes- PT: 42 minute(s)  Therapy Charges for Today     Code Description Service Date Service Provider Modifiers Qty    36713863857 HC PT THER PROC EA 15 MIN 6/25/2020 Betito Hope, PT GP 3                    Betito Hope PT  6/25/2020

## 2020-06-30 ENCOUNTER — HOSPITAL ENCOUNTER (OUTPATIENT)
Dept: PHYSICAL THERAPY | Facility: HOSPITAL | Age: 75
Setting detail: THERAPIES SERIES
Discharge: HOME | End: 2020-06-30

## 2020-06-30 ENCOUNTER — LAB (OUTPATIENT)
Dept: LAB | Facility: HOSPITAL | Age: 75
End: 2020-06-30

## 2020-06-30 DIAGNOSIS — Z96.611 STATUS POST REVERSE TOTAL REPLACEMENT OF RIGHT SHOULDER: ICD-10-CM

## 2020-06-30 DIAGNOSIS — M12.811 ROTATOR CUFF ARTHROPATHY, RIGHT: Primary | ICD-10-CM

## 2020-06-30 DIAGNOSIS — C61 PROSTATE CANCER (HCC): ICD-10-CM

## 2020-06-30 LAB — PSA SERPL-MCNC: 19 NG/ML (ref 0–4)

## 2020-06-30 PROCEDURE — 97140 MANUAL THERAPY 1/> REGIONS: CPT | Performed by: PHYSICAL THERAPIST

## 2020-06-30 PROCEDURE — 36415 COLL VENOUS BLD VENIPUNCTURE: CPT

## 2020-06-30 PROCEDURE — 84153 ASSAY OF PSA TOTAL: CPT

## 2020-06-30 PROCEDURE — 97110 THERAPEUTIC EXERCISES: CPT | Performed by: PHYSICAL THERAPIST

## 2020-07-02 ENCOUNTER — HOSPITAL ENCOUNTER (OUTPATIENT)
Dept: PHYSICAL THERAPY | Facility: HOSPITAL | Age: 75
Setting detail: THERAPIES SERIES
Discharge: HOME | End: 2020-07-02

## 2020-07-02 DIAGNOSIS — Z96.611 STATUS POST REVERSE TOTAL REPLACEMENT OF RIGHT SHOULDER: ICD-10-CM

## 2020-07-02 DIAGNOSIS — M12.811 ROTATOR CUFF ARTHROPATHY, RIGHT: Primary | ICD-10-CM

## 2020-07-02 PROCEDURE — 97110 THERAPEUTIC EXERCISES: CPT | Performed by: PHYSICAL THERAPIST

## 2020-07-02 PROCEDURE — 97140 MANUAL THERAPY 1/> REGIONS: CPT | Performed by: PHYSICAL THERAPIST

## 2020-07-02 NOTE — THERAPY DISCHARGE NOTE
Outpatient Physical Therapy Ortho Progress Note/Discharge Summary  [facility]     Patient Name: Souleymane Stapleton  : 1945  MRN: [id number]  Today's Date: 2020      Visit Date: 2020  Attendance: 42/43 (Medical necessity)  Subjective Improvement: 80-85% (reduced improvement due to recent setback)  Next MD Appt: 20  Recert Date:   Visit Dx:    ICD-10-CM ICD-9-CM   1. Rotator cuff arthropathy, right M12.811 716.81   2. Status post reverse total replacement of right shoulder Z96.611 V43.61       Patient Active Problem List   Diagnosis   • Astigmatism   • Anxiety state   • History of cerebrovascular accident   • Nuclear senile cataract   • Hypertension   • Unspecified hemorrhoids   • Palpitations   • Pure hypercholesterolemia   • Prostate cancer (CMS/HCC)   • Chronic right shoulder pain   • Rotator cuff syndrome, left   • Chronic left shoulder pain   • Impingement syndrome, shoulder, left   • SOB (shortness of breath)   • Angina of effort (CMS/HCC)   • Coronary artery disease involving coronary bypass graft of native heart without angina pectoris   • Cervical spinal stenosis   • Displacement of cervical intervertebral disc   • Displacement of lumbar intervertebral disc without myelopathy   • Follow-up examination after neurological surgery   • Internal carotid artery dissection (CMS/HCC)   • TIA (transient ischemic attack)   • Bacterial intestinal infection, unspecified   • Body mass index (bmi) 25.0-25.9, adult   • Diverticulosis of large intestine without perforation or abscess without bleeding   • Functional dyspepsia   • History of colonic polyps   • Polyp of stomach and duodenum   • Bilateral shoulder pain   • Rotator cuff arthropathy, right   • Status post reverse arthroplasty of right shoulder        Past Medical History:   Diagnosis Date   • Abdominal pain    • Abnormal weight loss    • Acid reflux    • Acute gastritis    • Anxiety state    • Arthritis    • Cancer  (CMS/[facility])     Prostate   • Coronary artery disease    • History of cerebrovascular accident    • History of colon polyps    • Hypertension    • Nausea    • Nuclear senile cataract    • Presbyopia    • Stroke (CMS/HCC) 2005    TIA   • Tobacco use    • Transient cerebral ischemia    • Vitreous detachment of left eye         Past Surgical History:   Procedure Laterality Date   • BACK SURGERY     • CARDIAC CATHETERIZATION N/A 6/19/2018    Procedure: Coronary angiography;  Surgeon: Delroy Mcconnell MD;  Location: [facility] CATH INVASIVE LOCATION;  Service: Cardiovascular   • COLONOSCOPY  06/09/2015    Diverticulosis found in the sigmoid colon. Hemorrhoids found in the anus.   • CORONARY ARTERY BYPASS GRAFT N/A 6/22/2018    Procedure: APRAS STERNOTOMY CORONARY ARTERY BYPASS GRAFT TIMES 5 USING RIGHT AND LEFT INTERNAL MAMMARY ARTERIES AND LEFT GREATER SAPHENOUS VEIN GRAFT PER ENDOSCOPIC VEIN HARVESTING AND PRP;  Surgeon: Edgar Pérez MD;  Location: [facility];  Service: Cardiothoracic   • CRYOTHERAPY  08/14/2012    Of Acne   • ENDOSCOPY  09/01/2015    EGD w/ biopsy -Multiple polyps, one removed.   • ENDOSCOPY N/A 8/22/2019    Procedure: ESOPHAGOGASTRODUODENOSCOPY;  Surgeon: Chuck Rich DO;  Location: [facility] ENDOSCOPY;  Service: Gastroenterology   • HEMORRHOIDECTOMY N/A 3/20/2017    Procedure: Removal of Anal Papilla;  Surgeon: Juan Diego Ken MD;  Location: [facility] OR;  Service:    • INJECTION OF MEDICATION  09/14/2010    B12   • INJECTION OF MEDICATION  10/17/2011    Kenalog   • LUMBAR LAMINECTOMY  09/29/2010   • OTHER SURGICAL HISTORY  08/19/2010    Biopsy, Each Additional Lesion    • SHOULDER ROTATOR CUFF REPAIR Right 02/17/2020   • SKIN BIOPSY  08/19/2010   • TOTAL SHOULDER ARTHROPLASTY W/ DISTAL CLAVICLE EXCISION Right 2/17/2020    Procedure: right reverse total shoulder arthroplasty.;  Surgeon: Juan Diego Mendoza MD;  Location: [facility] OR;  Service: Orthopedics;  Laterality: Right;   •  TOTAL SHOULDER REVERSE ARTHROPLASTY Right 02/17/2020       PT Ortho     Row Name 07/02/20 0900       Subjective Comments    Subjective Comments  Patient still has difficulty getting wallet out of  right back pocket and cutting food with knife in right hand in sitting.   -SW       Subjective Pain    Able to rate subjective pain?  yes  -SW    Pre-Treatment Pain Level  0  -SW       Right Upper Ext    Rt Shoulder Abduction AROM  150  -SW    Rt Shoulder Abduction PROM  150  -SW    Rt Shoulder Flexion AROM  150  -SW    Rt Shoulder Flexion PROM  150  -SW    Rt Shoulder External Rotation AROM  T2  -SW    Rt Shoulder External Rotation PROM  90@90  -SW    Rt Shoulder Internal Rotation AROM  L5  -SW    Rt Shoulder Internal Rotation PROM  70@90  -SW       MMT (Manual Muscle Testing)    General MMT Comments  unable to lift hand off back in IR  -SW       MMT Right Upper Ext    Rt Shoulder Flexion MMT, Gross Movement  (4/5) good  -SW    Rt Shoulder Extension MMT, Gross Movement  (5/5) normal  -SW    Rt Shoulder ABduction MMT, Gross Movement  (4/5) good  -SW    Rt Shoulder Internal Rotation MMT, Gross Movement  (5/5) normal  -SW    Rt Shoulder External Rotation MMT, Gross Movement  (3-/5) fair minus  -SW        Strength Right    Right  Test 1  68  -SW    Right  Test 2  57  -SW    Right  Test 3  45  -SW     Strength Average Right  56.67  -SW        Strength Left    Left  Test 1  55  -SW    Left  Test 2  46  -SW    Left  Test 3  41  -SW     Strength Average Left  47.33  -SW      User Key  (r) = Recorded By, (t) = Taken By, (c) = Cosigned By    Initials Name Provider Type    SW Karen Garcia Physical Therapist                      PT Assessment/Plan     Row Name 07/02/20 0900          PT Assessment    Assessment Comments  Patient continues to make very gradual improvements in right shoulder strength. He still has intermittent pain at anterior shoulder. This may be a reflection of the muscle  "imbalance between IR and ER's not able to stabilize the replacement in the G-H joint well. Patient is independent in following  HEP to address remaining imapirments.   -SW        PT Plan    PT Plan Comments  DC to independent HEP.   -SW       User Key  (r) = Recorded By, (t) = Taken By, (c) = Cosigned By    Initials Name Provider Type    Karen Knowles Physical Therapist              OP Exercises     Row Name 07/02/20 0900             Subjective Comments    Subjective Comments  Patient still has difficulty getting wallet out of  right back pocket and cutting food with knife in right hand in sitting.   -SW         Subjective Pain    Able to rate subjective pain?  yes  -SW      Pre-Treatment Pain Level  0  -SW         Exercise 1    Exercise Name 1  see manual  -SW         Exercise 2    Exercise Name 2  standing flexion 5# tube  -SW      Reps 2  10x5\"  -SW         Exercise 3    Exercise Name 3  supine punch 5#  -SW      Reps 3  20  -SW      Additional Comments  NP  -SW         Exercise 4    Exercise Name 4  AA ER sidelying   -SW      Reps 4  20x5\"  -SW         Exercise 5    Exercise Name 5  No $   -SW      Reps 5  20  -SW      Additional Comments  np  -SW         Exercise 6    Exercise Name 6  horizontal abduction green tb  -SW      Reps 6  20  -SW         Exercise 7    Exercise Name 7  Flexion/scaption/abduction 3#  -SW      Reps 7  10 ea side with isometric hold on opposite side  -SW         Exercise 8    Exercise Name 8  D1 and D2 flexion 2# weights  -SW      Reps 8  20  -SW         Exercise 9    Exercise Name 9  IR yel/ER yellow tb  -SW      Reps 9  20  -SW      Additional Comments  np  -SW         Exercise 10    Exercise Name 10  body blade elbow by side  -SW      Reps 10  20\"x3  -SW         Exercise 12    Exercise Name 12  Pro II, level 4  -SW      Time 12  10'  -SW        User Key  (r) = Recorded By, (t) = Taken By, (c) = Cosigned By    Initials Name Provider Type    Karen Knowles Physical Therapist    "                     Manual Rx (last 36 hours)      Manual Treatments     Row Name 07/02/20 0900             Manual Rx 1    Manual Rx 1 Location  R shoulder  -      Manual Rx 1 Type  PROM  -      Manual Rx 1 Duration  10'  -        User Key  (r) = Recorded By, (t) = Taken By, (c) = Cosigned By    Initials Name Provider Type    Karen Knowles Physical Therapist          PT OP Goals     Row Name 07/02/20 0900          PT Short Term Goals    STG Date to Achieve  06/22/20  -     STG 1  Passive flexion to be >/= 120 deg.  -     STG 1 Progress  Met  -     STG 2  Passive ER with arm at side to be 20 deg.  -     STG 2 Progress  Met  -     STG 3  Increase R cervical lateral flexion by >/= 6 degrees  -     STG 3 Progress  Met goal deleted  -     STG 4  R shoulder extension to be >/= 45 deg.  -     STG 4 Progress  Met  -        Long Term Goals    LTG Date to Achieve  07/06/20  -     LTG 1  Independent with HEP.  -     LTG 1 Progress  Met  -     LTG 2  QuickDASH score to be </= 35.  -     LTG 2 Progress  Met  -     LTG 3  R shoulder AROM WFLs all planes.  -     LTG 3 Progress  Progressing;Partially Met  -     LTG 4  Report ability to use R UE for ADLs and light IADLs.  -     LTG 4 Progress  Progressing;Not Met  -     LTG 5  Note a >/= 50% improvement in instances of R UE radiculopathy  -     LTG 5 Progress  Met;Not Met  -     LTG 6  Patient will be able to eat with right UE without difficulty.   -     LTG 6 Progress  Ongoing;Not Met  -        Time Calculation    PT Goal Re-Cert Due Date  07/23/20  -       User Key  (r) = Recorded By, (t) = Taken By, (c) = Cosigned By    Initials Name Provider Type    Karen Knowles Physical Therapist               Quick DASH  Open a tight or new jar.: Mild Difficulty  Do heavy household chores (e.g., wash walls, wash floors): No Difficulty  Carry a shopping bag or briefcase: No Difficulty  Wash your back: Severe Difficulty  Use a knife to  cut food: Moderate Difficulty  Recreational activities in which you take some force or impact through your arm, should or hand (e.g. golf, hammering, tennis, etc.): No Difficulty  During the past week, to what extent has your arm, shoulder, or hand problem interfered with your normal social activites with family, friends, neighbors or groups?: Slightly  During the past week, were you limited in your work or other regular daily activities as a result of your arm, shoulder or hand problem?: Not limited at all  Arm, Shoulder, or hand pain: None  Tingling (pins and needles) in your arm, shoulder, or hand: Mild  During the past week, how much difficulty have you had sleeping because of the pain in your arm, shoulder or hand?: No difficulty  Number of Questions Answered: 11  Quick DASH Score: 18.18         Time Calculation:   Start Time: 0930  Stop Time: 1010  Time Calculation (min): 40 min  Therapy Charges for Today     Code Description Service Date Service Provider Modifiers Qty    27996192909 HC PT THER PROC EA 15 MIN 7/2/2020 Karen Garcia GP 2    69995846777 HC PT MANUAL THERAPY EA 15 MIN 7/2/2020 Karen Garcia GP 1          PT G-Codes  Quick DASH Score: 18.18     OP PT Discharge Summary  Date of Discharge: 07/02/20  Reason for Discharge: Maximum functional potential achieved  Outcomes Achieved: Patient able to partially acheive established goals  Discharge Destination: Home with home program      Karen Garcia  7/2/2020

## 2020-07-06 DIAGNOSIS — M25.511 CHRONIC RIGHT SHOULDER PAIN: Primary | ICD-10-CM

## 2020-07-06 DIAGNOSIS — G89.29 CHRONIC RIGHT SHOULDER PAIN: Primary | ICD-10-CM

## 2020-07-06 DIAGNOSIS — Z96.611 STATUS POST REVERSE ARTHROPLASTY OF RIGHT SHOULDER: ICD-10-CM

## 2020-07-07 ENCOUNTER — OFFICE VISIT (OUTPATIENT)
Dept: ORTHOPEDIC SURGERY | Facility: CLINIC | Age: 75
End: 2020-07-07

## 2020-07-07 ENCOUNTER — APPOINTMENT (OUTPATIENT)
Dept: PHYSICAL THERAPY | Facility: HOSPITAL | Age: 75
End: 2020-07-07

## 2020-07-07 VITALS — HEIGHT: 69 IN | BODY MASS INDEX: 24.47 KG/M2 | WEIGHT: 165.2 LBS

## 2020-07-07 DIAGNOSIS — Z96.611 STATUS POST REVERSE ARTHROPLASTY OF RIGHT SHOULDER: Primary | ICD-10-CM

## 2020-07-07 DIAGNOSIS — Z86.73 HISTORY OF CEREBROVASCULAR ACCIDENT: ICD-10-CM

## 2020-07-07 DIAGNOSIS — I25.810 CORONARY ARTERY DISEASE INVOLVING CORONARY BYPASS GRAFT OF NATIVE HEART WITHOUT ANGINA PECTORIS: ICD-10-CM

## 2020-07-07 PROCEDURE — 99213 OFFICE O/P EST LOW 20 MIN: CPT | Performed by: ORTHOPAEDIC SURGERY

## 2020-07-07 PROCEDURE — U0002 COVID-19 LAB TEST NON-CDC: HCPCS | Performed by: FAMILY MEDICINE

## 2020-07-07 NOTE — PROGRESS NOTES
"Souleymane Stapleton is a 74 y.o. male returns for     Chief Complaint   Patient presents with   • Right Shoulder - Follow-up     X-rays done today in office  HISTORY OF PRESENT ILLNESS:follow up right shoulder. S/P       02/17/20 (5m) Juan Diego Mendoza MD    right reverse total shoulder arthroplasty. - Right     Pain scale today 0/10  Patient states that his pain is improving.  His function is slowly improving as well.  He still reports stiffness in internal and external rotation.  No numbness or tingling  No fevers or chills.     CONCURRENT MEDICAL HISTORY:    The following portions of the patient's history were reviewed and updated as appropriate: allergies, current medications, past family history, past medical history, past social history, past surgical history and problem list.     ROS  No fevers or chills.  No chest pain or shortness of air.  No GI or  disturbances.    PHYSICAL EXAMINATION:       Ht 174 cm (68.5\")   Wt 74.9 kg (165 lb 3.2 oz)   BMI 24.75 kg/m²     Physical Exam   Constitutional: He is oriented to person, place, and time. He appears well-developed and well-nourished.   Neurological: He is alert and oriented to person, place, and time.   Psychiatric: He has a normal mood and affect. His behavior is normal. Judgment and thought content normal.       GAIT:     [x]  Normal  []  Antalgic    Assistive device: [x]  None  []  Walker     []  Crutches  []  Cane     []  Wheelchair  []  Stretcher    Right Shoulder Exam     Range of Motion   Active abduction: 120   External rotation: 10   Forward flexion: 130     Muscle Strength   Abduction: 4/5   External rotation: 3/5     Other   Erythema: absent  Sensation: normal  Pulse: present              Xr Shoulder 2+ View Right    Result Date: 7/7/2020  Narrative: Ordering Provider:  Juan Diego Mendoza MD Ordering Diagnosis/Indication:  Chronic right shoulder pain, Status post reverse arthroplasty of right shoulder Procedure:  XR SHOULDER 2+ VW " RIGHT Exam Date:  7/7/20 COMPARISON:  Todays X-rays were compared to previous images dated May 22, 2020.     Impression:  3 views of the right shoulder show acceptable position and alignment of a right reverse total shoulder arthroplasty.  No sign of implant loosening or failure is noted.  There is a drill bit fragment noted in the medial calcar unchanged from prior x-ray.  He has hardware the base of his neck and sternal wires consistent with prior surgical interventions.  No other acute findings noted. Juan Diego Mendoza MD 7/7/20     Mri Brain With & Without Contrast    Result Date: 6/18/2020  Narrative: EXAM DESCRIPTION: MRI BRAIN W WO CONTRAST CLINICAL HISTORY: 74-year-old male with dizziness, unsteadiness, lightheadedness.. COMPARISON: 7/9/2019 TECHNIQUE: Multisequence multiplanar imaging of the brain is performed prior to and following contrast administration. IV CONTRAST: 17 mL ProHance FINDINGS: Brain Parenchyma:  The craniovertebral junction and the included cervical cord are unremarkable. No Chiari malformation. Normal appearance the pituitary and stalk. The midline structures are intact. No evidence of intracranial hemorrhage or suspicious extra-axial fluid collection. No edema, midline shift, or mass effect. Redemonstrated findings of prominent perivascular spaces bilaterally. The cerebellopontine angles are unremarkable. No mass or pathologic contrast enhancement. Diffusion: No restricted diffusion present supportive of acute infarct. Atrophy: Age-appropriate parenchymal volume. Microvascular Disease: No significant sequela. Vessels: Normal signal void within major intracranial vessels. Extra-axial Spaces: Unremarkable. Ventricles: No hydrocephalus. Bones: No suspicious marrow signal alteration. Sinuses and Mastoids: No air-fluid level or significant mucosal thickening. No mastoid effusion. Scalp and Soft Tissues: Unremarkable. Orbits: Unremarkable.     Impression: No evidence of intracranial  hemorrhage, mass/mass effect, acute infarct, or pathologic contrast enhancement. Electronically signed by:  Uri Fenton MD  6/18/2020 6:06 PM CDT Workstation: [id number]            ASSESSMENT:    Diagnoses and all orders for this visit:    Status post reverse arthroplasty of right shoulder    Coronary artery disease involving coronary bypass graft of native heart without angina pectoris    History of cerebrovascular accident          PLAN    We discussed continued strengthening conditioning exercises.  Continue to slowly progress motion as well as stretching.  Continue to progress daily activity.  We discussed following up in 6 months with repeat x-rays.  We also discussed that he could return sooner if necessary if he had a return of his pain.    Patient's Body mass index is 24.75 kg/m². BMI is within normal parameters. No follow-up required..  Return in about 6 months (around 1/7/2021) for Recheck with repeat xrays.    Juan Diego Mendoza MD

## 2020-07-08 PROBLEM — K31.7 GASTRIC POLYPOSIS: Status: ACTIVE | Noted: 2020-07-08

## 2020-07-08 PROBLEM — K63.8219 SMALL INTESTINAL BACTERIAL OVERGROWTH: Status: ACTIVE | Noted: 2020-07-08

## 2020-07-08 PROBLEM — K63.89 SMALL INTESTINAL BACTERIAL OVERGROWTH: Status: ACTIVE | Noted: 2020-07-08

## 2020-07-08 PROBLEM — K57.30 DIVERTICULOSIS OF COLON WITHOUT DIVERTICULITIS: Status: ACTIVE | Noted: 2020-07-08

## 2020-07-09 ENCOUNTER — APPOINTMENT (OUTPATIENT)
Dept: PHYSICAL THERAPY | Facility: HOSPITAL | Age: 75
End: 2020-07-09

## 2020-07-14 RX ORDER — CLOPIDOGREL BISULFATE 75 MG/1
TABLET ORAL
Qty: 90 TABLET | Refills: 1 | Status: SHIPPED | OUTPATIENT
Start: 2020-07-14 | End: 2020-10-21

## 2020-07-17 ENCOUNTER — HOSPITAL ENCOUNTER (OUTPATIENT)
Dept: ULTRASOUND IMAGING | Facility: HOSPITAL | Age: 75
Discharge: HOME | End: 2020-07-17
Admitting: UROLOGY

## 2020-07-17 ENCOUNTER — HOSPITAL ENCOUNTER (OUTPATIENT)
Dept: ULTRASOUND IMAGING | Facility: HOSPITAL | Age: 75
Discharge: HOME | End: 2020-07-17

## 2020-07-17 DIAGNOSIS — N50.82 SCROTAL PAIN: ICD-10-CM

## 2020-07-17 PROCEDURE — 93976 VASCULAR STUDY: CPT

## 2020-07-17 PROCEDURE — 76870 US EXAM SCROTUM: CPT

## 2020-07-29 ENCOUNTER — HOSPITAL ENCOUNTER (OUTPATIENT)
Dept: ULTRASOUND IMAGING | Facility: HOSPITAL | Age: 75
Discharge: HOME | End: 2020-07-29
Admitting: UROLOGY

## 2020-07-29 DIAGNOSIS — R60.0 LOWER EXTREMITY EDEMA: ICD-10-CM

## 2020-07-29 PROCEDURE — 93970 EXTREMITY STUDY: CPT

## 2020-08-13 ENCOUNTER — OFFICE VISIT (OUTPATIENT)
Dept: CARDIOLOGY | Facility: CLINIC | Age: 75
End: 2020-08-13

## 2020-08-13 VITALS
WEIGHT: 167.8 LBS | OXYGEN SATURATION: 98 % | SYSTOLIC BLOOD PRESSURE: 126 MMHG | HEART RATE: 68 BPM | DIASTOLIC BLOOD PRESSURE: 80 MMHG | BODY MASS INDEX: 25.43 KG/M2 | HEIGHT: 68 IN

## 2020-08-13 DIAGNOSIS — Z95.1 S/P CABG (CORONARY ARTERY BYPASS GRAFT): ICD-10-CM

## 2020-08-13 DIAGNOSIS — Z86.73 HISTORY OF CEREBROVASCULAR ACCIDENT: ICD-10-CM

## 2020-08-13 DIAGNOSIS — I10 ESSENTIAL HYPERTENSION: Primary | ICD-10-CM

## 2020-08-13 DIAGNOSIS — E78.00 PURE HYPERCHOLESTEROLEMIA: ICD-10-CM

## 2020-08-13 PROCEDURE — 99214 OFFICE O/P EST MOD 30 MIN: CPT | Performed by: INTERNAL MEDICINE

## 2020-08-13 NOTE — PROGRESS NOTES
Souleymane Stapleton  74 y.o. male    1. Essential hypertension    2. S/P CABG (coronary artery bypass graft)    3. History of cerebrovascular accident    4. Pure hypercholesterolemia        History of Present Illness  Souleymane Stapleton is a 74-year-old male with multiple medical issues including coronary artery disease status post coronary artery bypass surgery in June 2018 (CABG x 5 using bilateral mammary grafts), hypertension, hyperlipidemia, history of cerebrovascular event.    He has done reasonably well and able to perform his activities of daily living without any restrictions.  His blood pressure has been in the normal range.  He does however report intermittent NYHA class II dyspnea on exertion.  No PND or orthopnea.  No chest pain is reported.  Clinical exam today did not reveal any signs of bronchospasm or congestive heart failure.    He is known to have markedly elevated LDL and when checked in June 2020 was 140.  He has tried Repatha in the past but could not tolerate this medication.  I did suggest Praluent and had a long discussion with him about pros and cons.  He wishes to try this.  We will discuss this with his insurance company.  He has significant intolerance to statins and has been taking Zetia with no optimization of LDL.    SUBJECTIVE    Allergies   Allergen Reactions   • Statins Myalgia     Muscle soreness    • Tricor [Fenofibrate] Myalgia     Muscle soreness          Past Medical History:   Diagnosis Date   • Abdominal pain    • Abnormal weight loss    • Acid reflux    • Acute gastritis    • Anxiety state    • Arthritis    • Cancer (CMS/HCC)     Prostate   • Coronary artery disease    • History of cerebrovascular accident    • History of colon polyps    • Hypertension    • Nausea    • Nuclear senile cataract    • Presbyopia    • Stroke (CMS/HCC) 2005    TIA   • Tobacco use    • Transient cerebral ischemia    • Vitreous detachment of left eye          Past Surgical History:   Procedure  Laterality Date   • BACK SURGERY     • CARDIAC CATHETERIZATION N/A 6/19/2018    Procedure: Coronary angiography;  Surgeon: Delroy Mcconnell MD;  Location: [facility] CATH INVASIVE LOCATION;  Service: Cardiovascular   • COLONOSCOPY  06/09/2015    Diverticulosis found in the sigmoid colon. Hemorrhoids found in the anus.   • CORONARY ARTERY BYPASS GRAFT N/A 6/22/2018    Procedure: PARAS STERNOTOMY CORONARY ARTERY BYPASS GRAFT TIMES 5 USING RIGHT AND LEFT INTERNAL MAMMARY ARTERIES AND LEFT GREATER SAPHENOUS VEIN GRAFT PER ENDOSCOPIC VEIN HARVESTING AND PRP;  Surgeon: Edgar Pérez MD;  Location: [facility] OR;  Service: Cardiothoracic   • CRYOTHERAPY  08/14/2012    Of Acne   • ENDOSCOPY  09/01/2015    EGD w/ biopsy -Multiple polyps, one removed.   • ENDOSCOPY N/A 8/22/2019    Procedure: ESOPHAGOGASTRODUODENOSCOPY;  Surgeon: Chuck Rich DO;  Location: [facility] ENDOSCOPY;  Service: Gastroenterology   • HEMORRHOIDECTOMY N/A 3/20/2017    Procedure: Removal of Anal Papilla;  Surgeon: Juan Diego Ken MD;  Location: [facility] OR;  Service:    • INJECTION OF MEDICATION  09/14/2010    B12   • INJECTION OF MEDICATION  10/17/2011    Kenalog   • LUMBAR LAMINECTOMY  09/29/2010   • OTHER SURGICAL HISTORY  08/19/2010    Biopsy, Each Additional Lesion    • SHOULDER ROTATOR CUFF REPAIR Right 02/17/2020   • SKIN BIOPSY  08/19/2010   • TOTAL SHOULDER ARTHROPLASTY W/ DISTAL CLAVICLE EXCISION Right 2/17/2020    Procedure: right reverse total shoulder arthroplasty.;  Surgeon: Juan Diego Mendoza MD;  Location: [facility];  Service: Orthopedics;  Laterality: Right;   • TOTAL SHOULDER REVERSE ARTHROPLASTY Right 02/17/2020         Family History   Problem Relation Age of Onset   • Dementia Other    • Hypertension Other    • Stroke Other    • Hypertension Mother    • Hypertension Father          Social History     Socioeconomic History   • Marital status: Single     Spouse name: Not on file   • Number of children: Not on file    • Years of education: Not on file   • Highest education level: Not on file   Tobacco Use   • Smoking status: Former Smoker     Types: Cigarettes     Last attempt to quit:      Years since quittin.6   • Smokeless tobacco: Never Used   Substance and Sexual Activity   • Alcohol use: Yes     Alcohol/week: 1.0 standard drinks     Types: 1 Glasses of wine per week     Comment: Social   • Drug use: No   • Sexual activity: Defer         Current Outpatient Medications   Medication Sig Dispense Refill   • acetaminophen (TYLENOL) 500 MG tablet Take 500 mg by mouth 3 (Three) Times a Day.     • ALPRAZolam (XANAX) 0.5 MG tablet Take 1 tablet by mouth 3 (Three) Times a Day. 90 tablet 1   • busPIRone (BUSPAR) 5 MG tablet Take 1 tablet by mouth 3 (Three) Times a Day. 270 tablet 3   • clopidogrel (PLAVIX) 75 MG tablet TAKE 1 TABLET EVERY DAY 90 tablet 1   • dutasteride (AVODART) 0.5 MG capsule Take 1 capsule by mouth Every Night. 3 capsule 0   • ezetimibe (ZETIA) 10 MG tablet Take 1 tablet by mouth Daily. 90 tablet 3   • finasteride (PROPECIA) 1 MG tablet Take 1 tablet by mouth Daily. 90 tablet 3   • fluticasone (FLONASE) 50 MCG/ACT nasal spray 2 sprays into the nostril(s) as directed by provider Daily. (Patient taking differently: 2 sprays into the nostril(s) as directed by provider Daily As Needed.) 3 bottle 3   • irbesartan-hydrochlorothiazide (AVALIDE) 150-12.5 MG tablet Take 0.5 tablets by mouth Daily.     • Magnesium 250 MG tablet Take 1 tablet by mouth Daily.     • meclizine (ANTIVERT) 12.5 MG tablet Take 1 tablet by mouth 3 (Three) Times a Day As Needed for Dizziness. 90 tablet 1   • metoprolol succinate XL (TOPROL-XL) 25 MG 24 hr tablet Take 1 tablet by mouth Daily With Dinner. 90 tablet 5   • omeprazole (priLOSEC) 40 MG capsule TAKE 1 CAPSULE EVERY DAY 90 capsule 2   • sildenafil (VIAGRA) 100 MG tablet Take 1 tablet by mouth once daily as needed for Erectile dysfunction 10 tablet 0   • simethicone (GAS-X) 80 MG  "chewable tablet Chew 1 tablet Every 6 (Six) Hours As Needed for Flatulence. 56 tablet 1   • traZODone (DESYREL) 50 MG tablet TAKE 1 TABLET EVERY NIGHT 90 tablet 3   • vitamin B-12 (CYANOCOBALAMIN) 500 MCG tablet Take 500 mcg by mouth Daily.       No current facility-administered medications for this visit.          OBJECTIVE    /80 (BP Location: Left arm, Patient Position: Sitting, Cuff Size: Adult)   Pulse 68   Ht 172.7 cm (68\")   Wt 76.1 kg (167 lb 12.8 oz)   SpO2 98%   BMI 25.51 kg/m²         Review of Systems     Constitutional:  Denies recent weight loss, weight gain, fever or chills, no change in exercise tolerance     HENT:  Denies any hearing loss, epistaxis, hoarseness, or difficulty speaking.     Eyes: Wears eyeglasses or contact lenses     Respiratory:  Dyspnea with exertion,no cough, wheezing, or hemoptysis.     Cardiovascular: Negative for palpations, chest pain, orthopnea, PND, peripheral edema, syncope    Gastrointestinal:  Denies change in bowel habits, dyspepsia, ulcer disease, hematochezia, or melena.     Endocrine: Negative for cold intolerance, heat intolerance, polydipsia, polyphagia and polyuria.     Genitourinary: Negative.      Musculoskeletal: Pain in the right shoulder, s/p shoulder surgery by     Neurological:  Denies any history of recurrent headaches, strokes, TIA, or seizure disorder.     Hematological: Denies any food allergies, seasonal allergies, bleeding disorders, or lymphadenopathy.     Psychiatric/Behavioral: Denies any history of depression, substance abuse, or change in cognitive function.     Physical Exam     Constitutional: Cooperative, alert and oriented, in no acute distress.     HENT:   Head: Normocephalic, normal hair patterns, no masses or tenderness.  Ears, Nose, and Throat: No gross abnormalities. No pallor or cyanosis.   Eyes: EOMS intact, PERRL, conjunctivae and lids unremarkable. Fundoscopic exam and visual fields not performed.   Neck: No " palpable masses or adenopathy, no thyromegaly, no JVD, carotid pulses are full and equal bilaterally and without  Bruits.     Cardiovascular: Regular rhythm, S1 and S2 normal, no S3 or S4.  No murmurs, gallops, or rubs detected.     Pulmonary/Chest: Chest: normal symmetry, normal respiratory excursion, no intercostal retraction, no use of accessory muscles.            Pulmonary: Normal breath sounds. No rales or ronchi.    Abdominal: Abdomen soft, bowel sounds normoactive, no masses, no hepatosplenomegaly, non-tender, no bruits.     Musculoskeletal: No deformities, clubbing, cyanosis, erythema, or edema observed.    Neurological: No gross motor or sensory deficits noted, affect appropriate, oriented to time, person, place.     Skin: Warm and dry to the touch, no apparent skin lesions or masses noted.     Psychiatric: He has a normal mood and affect. His behavior is normal. Judgment and thought content normal.         Procedures      Lab Results   Component Value Date    WBC 8.59 06/15/2020    HGB 13.0 06/15/2020    HCT 38.6 06/15/2020    MCV 80.8 06/15/2020     06/15/2020     Lab Results   Component Value Date    GLUCOSE 96 06/15/2020    BUN 21 06/15/2020    CREATININE 1.20 06/15/2020    EGFRIFNONA 59 (L) 06/15/2020    EGFRIFAFRI 60 05/02/2018    BCR 17.5 06/15/2020    CO2 24.0 06/15/2020    CALCIUM 9.0 06/15/2020    ALBUMIN 4.40 06/15/2020    AST 21 06/15/2020    ALT 18 06/15/2020     Lab Results   Component Value Date    CHOL 201 (H) 06/15/2020    CHOL 215 (H) 12/09/2019    CHOL 203 (H) 09/20/2019     Lab Results   Component Value Date    TRIG 102 06/15/2020    TRIG 153 (H) 12/09/2019    TRIG 112 09/20/2019     Lab Results   Component Value Date    HDL 41 06/15/2020    HDL 41 12/09/2019    HDL 41 09/20/2019     No components found for: LDLCALC  Lab Results   Component Value Date     (H) 06/15/2020     (H) 12/09/2019     (H) 09/20/2019     No results found for: HDLLDLRATIO  No components  found for: CHOLHDL  No results found for: HGBA1C  Lab Results   Component Value Date    TSH 1.500 06/15/2020           ASSESSMENT AND PLAN  Souleymane Stapleton has multiple medical issues as discussed in the history of present illness but is stable from a cardiac standpoint with no definite clinical evidence of angina, arrhythmia or congestive heart failure.    I have advised him to continue irbesartan/HCTZ 150/12.5 mg, half a tablet daily and metoprolol succinate 25 mg.I believe that he would benefit from lipid-lowering therapy with Praluent and this has been recommended at 75 mg every 2 weeks.  Zetia 10 mg daily has been continued.  Patient did perform well on the exercise treadmill stress test in May 2019.  I did discuss further work-up to assess his dyspnea but the patient believes that it could be due to allergies and wants to wait at this time.    Souleymane was seen today for follow-up.    Diagnoses and all orders for this visit:    Essential hypertension    S/P CABG (coronary artery bypass graft)    History of cerebrovascular accident    Pure hypercholesterolemia        Patient's Body mass index is 25.51 kg/m². BMI is within normal parameters. No follow-up required..  Patient is a non-smoker    Souleymane Stapleton  reports that he quit smoking about 23 years ago. His smoking use included cigarettes. He has never used smokeless tobacco..      Roberto Samano MD  8/13/2020  12:00

## 2020-08-26 ENCOUNTER — TELEPHONE (OUTPATIENT)
Dept: FAMILY MEDICINE CLINIC | Facility: CLINIC | Age: 75
End: 2020-08-26

## 2020-08-26 NOTE — TELEPHONE ENCOUNTER
PATIENT IS REQUESTING TO HAVE A MEDICATION CALLED IN (SPIRIVA). IS REQUESTING FOR MEDICATION TO BE SENT TO [facility] [address] [facility], [address] - [phone]  - [phone]   [phone].

## 2020-09-11 ENCOUNTER — OFFICE VISIT (OUTPATIENT)
Dept: ORTHOPEDIC SURGERY | Facility: CLINIC | Age: 75
End: 2020-09-11

## 2020-09-11 VITALS — HEIGHT: 68 IN | BODY MASS INDEX: 25.46 KG/M2 | WEIGHT: 168 LBS

## 2020-09-11 DIAGNOSIS — Z96.611 STATUS POST REVERSE ARTHROPLASTY OF RIGHT SHOULDER: Primary | ICD-10-CM

## 2020-09-11 PROCEDURE — 99213 OFFICE O/P EST LOW 20 MIN: CPT | Performed by: NURSE PRACTITIONER

## 2020-09-11 NOTE — PROGRESS NOTES
"Souleymane Stapleton is a 74 y.o. male      Chief Complaint   Patient presents with   • Right Shoulder - Follow-up       HISTORY OF PRESENT ILLNESS: Patient is a 74-year-old male who presents today for follow-up of right shoulder pain.  Patient underwent reverse shoulder arthroplasty on 2/17/2020, he is 7 months postop.  He reports that last Friday he went to push a door close to dead bolted and when he did he felt a sharp 9 out of 10 pain, since then he has had a dull ache that he rates as a 4-10.  He also states that there are different movements such as trying to cut food with a fork that caused him the sharp pain and discomfort.  He reports that pressure helps with pain and he has been wearing shoulder brace.  He states at times it feels like his shoulder may be moving in and out of place.  He denies weakness.  He denies nausea, vomiting, fever, numbness, tingling, burning.  He reports that he discontinued formal physical therapy but has been performing exercises taught to him at home.             CONCURRENT MEDICAL HISTORY:    The following portions of the patient's history were reviewed and updated as appropriate: allergies, current medications, past family history, past medical history, past social history, past surgical history and problem list.     ROS  No fevers or chills.  No chest pain or shortness of air.  No GI or  disturbances. Right shoulder pain    PHYSICAL EXAMINATION:       Ht 172.7 cm (68\")   Wt 76.2 kg (168 lb)   BMI 25.54 kg/m²     Physical Exam   Constitutional: He is oriented to person, place, and time. He appears well-developed and well-nourished.  Non-toxic appearance. No distress.   HENT:   Head: Normocephalic.   Pulmonary/Chest: Effort normal. No respiratory distress.   Neurological: He is alert and oriented to person, place, and time.   Skin: Skin is warm and dry.   Psychiatric: He has a normal mood and affect. His behavior is normal. Judgment and thought content normal.   Nursing " note and vitals reviewed.      GAIT:     []  Normal  []  Antalgic    Assistive device: [x]  None  []  Walker     []  Crutches  []  Cane     []  Wheelchair  []  Stretcher    Right Shoulder Exam     Tenderness   Right shoulder tenderness location: coracoid     Range of Motion   Active abduction: 90   Extension: normal   External rotation: normal   Forward flexion: 130   Internal rotation 0 degrees: normal   Internal rotation 90 degrees: normal     Muscle Strength   Abduction: 5/5     Other   Erythema: absent    Comments:  Stable shoulder joint      Left Shoulder Exam     Muscle Strength   Abduction: 5/5               Xr Shoulder 2+ View Right    Result Date: 9/11/2020  Narrative: Study: XR Shoulder 2 + VW, Right Comparison: 7/06/2020 Narrative: Right shoulder is status post reverse total shoulder arthroplasty.  There is acceptable position and alignment of right shoulder.  Drillbit fragment noted at the medial calcar appears unchanged from previous imaging.  No evidence of implant loosening or failure.  There is hardware at the base of his neck and sternal wires consistent with prior surgical interventions.  No acute bony abnormality identified.  Babs Ledezma APRN 9/11/2020             ASSESSMENT:    Diagnoses and all orders for this visit:    Status post reverse arthroplasty of right shoulder  -     XR Shoulder 2+ View Right  -     CT shoulder right wo contrast; Future          PLAN  Patient's Body mass index is 25.54 kg/m². BMI is within normal parameters. No follow-up required..    X-rays reviewed with Dr. Mendoza.  Recommendation from Dr. Mendoza is to proceed with CT with 3D reconstruction.  In interim patient to continue OTC NSAIDs, shoulder brace, rest/ice/heat for pain control.  Patient to return to office sooner if needed otherwise to follow-up after CT to review results.      Return for Return after CT.    Babs Ledezma, APRN

## 2020-09-21 RX ORDER — IRBESARTAN AND HYDROCHLOROTHIAZIDE 150; 12.5 MG/1; MG/1
TABLET, FILM COATED ORAL
Qty: 45 TABLET | Refills: 3 | Status: SHIPPED | OUTPATIENT
Start: 2020-09-21 | End: 2020-10-21

## 2020-09-23 ENCOUNTER — HOSPITAL ENCOUNTER (OUTPATIENT)
Dept: CT IMAGING | Facility: HOSPITAL | Age: 75
Discharge: HOME | End: 2020-09-23
Admitting: NURSE PRACTITIONER

## 2020-09-23 ENCOUNTER — OFFICE VISIT (OUTPATIENT)
Dept: FAMILY MEDICINE CLINIC | Facility: CLINIC | Age: 75
End: 2020-09-23

## 2020-09-23 VITALS
WEIGHT: 168 LBS | HEIGHT: 68 IN | BODY MASS INDEX: 25.46 KG/M2 | DIASTOLIC BLOOD PRESSURE: 70 MMHG | SYSTOLIC BLOOD PRESSURE: 120 MMHG | OXYGEN SATURATION: 97 % | HEART RATE: 65 BPM

## 2020-09-23 DIAGNOSIS — Z96.611 STATUS POST REVERSE ARTHROPLASTY OF RIGHT SHOULDER: ICD-10-CM

## 2020-09-23 DIAGNOSIS — E78.00 PURE HYPERCHOLESTEROLEMIA: ICD-10-CM

## 2020-09-23 DIAGNOSIS — I10 ESSENTIAL HYPERTENSION: ICD-10-CM

## 2020-09-23 DIAGNOSIS — I25.10 CORONARY ARTERY DISEASE INVOLVING NATIVE HEART WITHOUT ANGINA PECTORIS, UNSPECIFIED VESSEL OR LESION TYPE: Primary | ICD-10-CM

## 2020-09-23 DIAGNOSIS — Z23 ENCOUNTER FOR IMMUNIZATION: ICD-10-CM

## 2020-09-23 PROCEDURE — 73200 CT UPPER EXTREMITY W/O DYE: CPT

## 2020-09-23 PROCEDURE — 90694 VACC AIIV4 NO PRSRV 0.5ML IM: CPT | Performed by: FAMILY MEDICINE

## 2020-09-23 PROCEDURE — G0008 ADMIN INFLUENZA VIRUS VAC: HCPCS | Performed by: FAMILY MEDICINE

## 2020-09-23 PROCEDURE — 99214 OFFICE O/P EST MOD 30 MIN: CPT | Performed by: FAMILY MEDICINE

## 2020-09-23 NOTE — PROGRESS NOTES
Subjective   Souleymane Stapleton is a 75 y.o. male.   Cc: follow up of chronic medical issues        Coronary Artery Disease  Presents for follow-up visit. Symptoms include dizziness and shortness of breath. Pertinent negatives include no chest pain, chest pressure, chest tightness, leg swelling, muscle weakness, palpitations or weight gain. Risk factors include hyperlipidemia.   Hypertension  This is a chronic problem. The current episode started more than 1 year ago. The problem has been gradually improving since onset. Associated symptoms include anxiety, blurred vision, neck pain and shortness of breath. Pertinent negatives include no chest pain, headaches, malaise/fatigue, orthopnea, palpitations, peripheral edema, PND or sweats. Current antihypertension treatment includes angiotensin blockers and diuretics.   Hyperlipidemia  This is a chronic problem. The current episode started more than 1 year ago. Recent lipid tests were reviewed and are variable. Associated symptoms include myalgias and shortness of breath. Pertinent negatives include no chest pain. Current antihyperlipidemic treatment includes ezetimibe.       The following portions of the patient's history were reviewed and updated as appropriate: allergies, current medications, past family history, past medical history, past social history, past surgical history and problem list.    Review of Systems   Constitutional: Negative for fatigue, fever, malaise/fatigue and weight gain.   Eyes: Positive for blurred vision.   Respiratory: Positive for shortness of breath. Negative for chest tightness.    Cardiovascular: Negative for chest pain, palpitations, orthopnea, leg swelling and PND.   Musculoskeletal: Positive for arthralgias, back pain, joint swelling, myalgias and neck pain. Negative for muscle weakness.   Neurological: Positive for dizziness. Negative for headaches.       Objective   Physical Exam  Vitals signs reviewed.   Constitutional:        "Appearance: Normal appearance.   HENT:      Head: Normocephalic and atraumatic.      Right Ear: Tympanic membrane and external ear normal.      Left Ear: Tympanic membrane and external ear normal.      Nose: Nose normal.      Mouth/Throat:      Mouth: Mucous membranes are moist.      Pharynx: Oropharynx is clear.   Eyes:      Extraocular Movements: Extraocular movements intact.      Pupils: Pupils are equal, round, and reactive to light.   Neck:      Musculoskeletal: Normal range of motion and neck supple.   Cardiovascular:      Rate and Rhythm: Normal rate and regular rhythm.      Heart sounds: Normal heart sounds. No murmur. No friction rub. No gallop.    Pulmonary:      Effort: Pulmonary effort is normal. No respiratory distress.      Breath sounds: Normal breath sounds. No stridor. No wheezing, rhonchi or rales.   Chest:      Chest wall: No tenderness.   Abdominal:      General: Abdomen is flat. Bowel sounds are normal. There is no distension.      Palpations: Abdomen is soft.      Tenderness: There is no abdominal tenderness.   Musculoskeletal:      Comments: Back is non tender. Shoulders are non tender.   Skin:     General: Skin is warm and dry.   Neurological:      Mental Status: He is alert.           Visit Vitals  /70   Pulse 65   Ht 172.7 cm (68\")   Wt 76.2 kg (168 lb)   SpO2 97%   BMI 25.54 kg/m²     Body mass index is 25.54 kg/m².      Assessment/Plan   Souleymane was seen today for follow-up.    Diagnoses and all orders for this visit:    Coronary artery disease involving native heart without angina pectoris, unspecified vessel or lesion type    Pure hypercholesterolemia  -     Lipid panel; Future    Essential hypertension  -     Comprehensive metabolic panel; Future  -     CBC w AUTO Differential; Future    Other orders  -     Fluad Quad 65+ yrs ([id number])    Return to the clinic in 3 month/s.  Will contact with results as needed.    "

## 2020-09-25 RX ORDER — FLUTICASONE PROPIONATE 50 MCG
SPRAY, SUSPENSION (ML) NASAL
Qty: 48 G | Refills: 6 | Status: SHIPPED | OUTPATIENT
Start: 2020-09-25 | End: 2020-10-21

## 2020-09-28 ENCOUNTER — LAB (OUTPATIENT)
Dept: LAB | Facility: HOSPITAL | Age: 75
End: 2020-09-28

## 2020-09-28 ENCOUNTER — OFFICE VISIT (OUTPATIENT)
Dept: ORTHOPEDIC SURGERY | Facility: CLINIC | Age: 75
End: 2020-09-28

## 2020-09-28 VITALS — WEIGHT: 168 LBS | HEIGHT: 68 IN | BODY MASS INDEX: 25.46 KG/M2

## 2020-09-28 DIAGNOSIS — I10 ESSENTIAL HYPERTENSION: ICD-10-CM

## 2020-09-28 DIAGNOSIS — M25.511 CHRONIC RIGHT SHOULDER PAIN: Primary | ICD-10-CM

## 2020-09-28 DIAGNOSIS — G89.29 CHRONIC RIGHT SHOULDER PAIN: Primary | ICD-10-CM

## 2020-09-28 DIAGNOSIS — E78.00 PURE HYPERCHOLESTEROLEMIA: ICD-10-CM

## 2020-09-28 LAB
ALBUMIN SERPL-MCNC: 4.5 G/DL (ref 3.5–5.2)
ALBUMIN/GLOB SERPL: 1.6 G/DL
ALP SERPL-CCNC: 61 U/L (ref 39–117)
ALT SERPL W P-5'-P-CCNC: 20 U/L (ref 1–41)
ANION GAP SERPL CALCULATED.3IONS-SCNC: 10.9 MMOL/L (ref 5–15)
AST SERPL-CCNC: 22 U/L (ref 1–40)
BASOPHILS # BLD AUTO: 0.11 10*3/MM3 (ref 0–0.2)
BASOPHILS NFR BLD AUTO: 1.3 % (ref 0–1.5)
BILIRUB SERPL-MCNC: 0.5 MG/DL (ref 0–1.2)
BUN SERPL-MCNC: 23 MG/DL (ref 8–23)
BUN/CREAT SERPL: 18.9 (ref 7–25)
CALCIUM SPEC-SCNC: 8.9 MG/DL (ref 8.6–10.5)
CHLORIDE SERPL-SCNC: 101 MMOL/L (ref 98–107)
CHOLEST SERPL-MCNC: 228 MG/DL (ref 0–200)
CO2 SERPL-SCNC: 25.1 MMOL/L (ref 22–29)
CREAT SERPL-MCNC: 1.22 MG/DL (ref 0.76–1.27)
DEPRECATED RDW RBC AUTO: 44.6 FL (ref 37–54)
EOSINOPHIL # BLD AUTO: 0.09 10*3/MM3 (ref 0–0.4)
EOSINOPHIL NFR BLD AUTO: 1.1 % (ref 0.3–6.2)
ERYTHROCYTE [DISTWIDTH] IN BLOOD BY AUTOMATED COUNT: 14.9 % (ref 12.3–15.4)
GFR SERPL CREATININE-BSD FRML MDRD: 58 ML/MIN/1.73
GLOBULIN UR ELPH-MCNC: 2.8 GM/DL
GLUCOSE SERPL-MCNC: 82 MG/DL (ref 65–99)
HCT VFR BLD AUTO: 41.9 % (ref 37.5–51)
HDLC SERPL-MCNC: 40 MG/DL (ref 40–60)
HGB BLD-MCNC: 14.2 G/DL (ref 13–17.7)
IMM GRANULOCYTES # BLD AUTO: 0.02 10*3/MM3 (ref 0–0.05)
IMM GRANULOCYTES NFR BLD AUTO: 0.2 % (ref 0–0.5)
LDLC SERPL CALC-MCNC: 148 MG/DL (ref 0–100)
LDLC/HDLC SERPL: 3.7 {RATIO}
LYMPHOCYTES # BLD AUTO: 3.07 10*3/MM3 (ref 0.7–3.1)
LYMPHOCYTES NFR BLD AUTO: 36.6 % (ref 19.6–45.3)
MCH RBC QN AUTO: 27.9 PG (ref 26.6–33)
MCHC RBC AUTO-ENTMCNC: 33.9 G/DL (ref 31.5–35.7)
MCV RBC AUTO: 82.3 FL (ref 79–97)
MONOCYTES # BLD AUTO: 0.77 10*3/MM3 (ref 0.1–0.9)
MONOCYTES NFR BLD AUTO: 9.2 % (ref 5–12)
NEUTROPHILS NFR BLD AUTO: 4.32 10*3/MM3 (ref 1.7–7)
NEUTROPHILS NFR BLD AUTO: 51.6 % (ref 42.7–76)
NRBC BLD AUTO-RTO: 0 /100 WBC (ref 0–0.2)
PLATELET # BLD AUTO: 359 10*3/MM3 (ref 140–450)
PMV BLD AUTO: 10.3 FL (ref 6–12)
POTASSIUM SERPL-SCNC: 3.7 MMOL/L (ref 3.5–5.2)
PROT SERPL-MCNC: 7.3 G/DL (ref 6–8.5)
RBC # BLD AUTO: 5.09 10*6/MM3 (ref 4.14–5.8)
SODIUM SERPL-SCNC: 137 MMOL/L (ref 136–145)
TRIGL SERPL-MCNC: 201 MG/DL (ref 0–150)
VLDLC SERPL-MCNC: 40.2 MG/DL (ref 5–40)
WBC # BLD AUTO: 8.38 10*3/MM3 (ref 3.4–10.8)

## 2020-09-28 PROCEDURE — 36415 COLL VENOUS BLD VENIPUNCTURE: CPT

## 2020-09-28 PROCEDURE — 96372 THER/PROPH/DIAG INJ SC/IM: CPT | Performed by: NURSE PRACTITIONER

## 2020-09-28 PROCEDURE — 85025 COMPLETE CBC W/AUTO DIFF WBC: CPT

## 2020-09-28 PROCEDURE — 99213 OFFICE O/P EST LOW 20 MIN: CPT | Performed by: NURSE PRACTITIONER

## 2020-09-28 PROCEDURE — 80053 COMPREHEN METABOLIC PANEL: CPT

## 2020-09-28 PROCEDURE — 80061 LIPID PANEL: CPT

## 2020-09-28 RX ORDER — AMOXICILLIN 500 MG/1
500 CAPSULE ORAL 3 TIMES DAILY
Qty: 30 CAPSULE | Refills: 0 | Status: SHIPPED | OUTPATIENT
Start: 2020-09-28 | End: 2020-10-21

## 2020-09-28 RX ORDER — TRIAMCINOLONE ACETONIDE 40 MG/ML
80 INJECTION, SUSPENSION INTRA-ARTICULAR; INTRAMUSCULAR ONCE
Status: COMPLETED | OUTPATIENT
Start: 2020-09-28 | End: 2020-09-28

## 2020-09-28 RX ADMIN — TRIAMCINOLONE ACETONIDE 80 MG: 40 INJECTION, SUSPENSION INTRA-ARTICULAR; INTRAMUSCULAR at 14:08

## 2020-09-28 NOTE — PROGRESS NOTES
"Souleymane Stapleton is a 75 y.o. male      Chief Complaint   Patient presents with   • Right Shoulder - Follow-up       HISTORY OF PRESENT ILLNESS: Patient is a 74 year old male who presents for follow-up of right shoulder pain.  Patient underwent reverse shoulder arthroplasty on 2/17/2020, he is a little over 7 months postop. He reports that since surgery he has had issue with certain movments, mainly those that require psuhing, like closing doors, he also has pain with cutting meat. He reports since last being seen his pain has improveed from the sharp pain he was experiencing to now a \"soreness\". He rates his pain today as a 3/10.  He is here to review CT results.       CONCURRENT MEDICAL HISTORY:    The following portions of the patient's history were reviewed and updated as appropriate: allergies, current medications, past family history, past medical history, past social history, past surgical history and problem list.     ROS  No fevers or chills.  No chest pain or shortness of air.  No GI or  disturbances.  Right shoulder pain.    PHYSICAL EXAMINATION:       Ht 172.7 cm (68\")   Wt 76.2 kg (168 lb)   BMI 25.54 kg/m²     Physical Exam  Vitals signs and nursing note reviewed.   Constitutional:       General: He is not in acute distress.     Appearance: He is well-developed. He is not toxic-appearing.   HENT:      Head: Normocephalic.   Pulmonary:      Effort: Pulmonary effort is normal. No respiratory distress.   Skin:     General: Skin is warm and dry.   Neurological:      Mental Status: He is alert and oriented to person, place, and time.   Psychiatric:         Behavior: Behavior normal.         Thought Content: Thought content normal.         Judgment: Judgment normal.         GAIT:     []  Normal  []  Antalgic    Assistive device: []  None  []  Walker     []  Crutches  []  Cane     []  Wheelchair  []  Stretcher    Right Shoulder Exam     Tenderness   Right shoulder tenderness location: coracoid "     Range of Motion   Active abduction: 90   Extension: normal   External rotation: normal   Forward flexion: 130   Internal rotation 0 degrees: normal   Internal rotation 90 degrees: normal     Muscle Strength   Abduction: 5/5     Other   Erythema: absent    Comments:  Stable shoulder joint      Left Shoulder Exam     Muscle Strength   Abduction: 5/5               Xr Shoulder 2+ View Right    Result Date: 9/11/2020  Narrative: Study: XR Shoulder 2 + VW, Right Comparison: 7/06/2020 Narrative: Right shoulder is status post reverse total shoulder arthroplasty.  There is acceptable position and alignment of right shoulder.  Drillbit fragment noted at the medial calcar appears unchanged from previous imaging.  No evidence of implant loosening or failure.  There is hardware at the base of his neck and sternal wires consistent with prior surgical interventions.  No acute bony abnormality identified.  Babs Ledezma APRN 9/11/2020     Ct Shoulder Right Wo Contrast    Result Date: 9/23/2020  Narrative: EXAM: CT SHOULDER WITHOUT IV CONTRAST, RIGHT SHOULDER COMPARISONS: Radiograph 9/11/2020, 7/7/2020, 3/3/2020 INDICATION: Shoulder replaced, sharp pain., Z96.611 Presence of right artificial shoulder joint TECHNIQUE: Noncontrast CT images were obtained through the right shoulder. Orthogonal and 3-D reformats were provided. FINDINGS: Patient is status post right total shoulder arthroplasty. No evidence of hardware fracture. No significant perihardware lucency within the limitations of metallic artifact. Prosthetic alignment is unchanged from March 3, 2020 comparison and within normal limits. No acute osseous fracture. No dislocation. No suspicious osseous lesion. Visualized lung is clear. Soft tissues are grossly unremarkable for noncontrast CT technique.     Impression: Patient status post right total shoulder arthroplasty without evidence of hardware failure. No acute osseous abnormality. Electronically signed by:  Alexandra  Zafar RAJAN  9/23/2020 5:01 PM CDT Workstation: [id number]            ASSESSMENT:    Diagnoses and all orders for this visit:    Chronic right shoulder pain  -     triamcinolone acetonide (KENALOG-40) injection 80 mg          PLAN      CT results reviewed with patient.  No acute osseous abnormality seen.  No evidence of hardware failure.  Conservative options reviewed again with patient including PT, steroid injection, rice therapy.  After considering options patient desires to proceed with IM steroid injection, IM steroid injection given.  Patient tolerated this well.  Patient declines formal physical therapy at this time but states that he will do exercises at home taught to him by physical therapy.  Offered patient to return to discuss further surgical options with Dr. Mendoza, patient declines at this time and states he is not currently interested in surgery as his symptoms at this point time are tolerable.  Patient to return as needed.    Patient's Body mass index is 25.54 kg/m². BMI is within normal parameters. No follow-up required..      Return if symptoms worsen or fail to improve.    Babs Ledezma, APRN

## 2020-10-14 RX ORDER — METOPROLOL SUCCINATE 25 MG/1
25 TABLET, EXTENDED RELEASE ORAL
Qty: 90 TABLET | Refills: 3 | Status: SHIPPED | OUTPATIENT
Start: 2020-10-14 | End: 2021-10-08 | Stop reason: SDUPTHER

## 2020-10-20 ENCOUNTER — LAB (OUTPATIENT)
Dept: LAB | Facility: HOSPITAL | Age: 75
End: 2020-10-20

## 2020-10-20 DIAGNOSIS — Z01.818 PREOP TESTING: Primary | ICD-10-CM

## 2020-10-20 PROCEDURE — U0003 INFECTIOUS AGENT DETECTION BY NUCLEIC ACID (DNA OR RNA); SEVERE ACUTE RESPIRATORY SYNDROME CORONAVIRUS 2 (SARS-COV-2) (CORONAVIRUS DISEASE [COVID-19]), AMPLIFIED PROBE TECHNIQUE, MAKING USE OF HIGH THROUGHPUT TECHNOLOGIES AS DESCRIBED BY CMS-2020-01-R: HCPCS

## 2020-10-20 PROCEDURE — C9803 HOPD COVID-19 SPEC COLLECT: HCPCS

## 2020-10-21 LAB
COVID LABCORP PRIORITY: NORMAL
SARS-COV-2 RNA RESP QL NAA+PROBE: NOT DETECTED

## 2020-10-21 RX ORDER — FLUTICASONE PROPIONATE 50 MCG
2 SPRAY, SUSPENSION (ML) NASAL DAILY PRN
COMMUNITY
End: 2022-01-21

## 2020-10-21 RX ORDER — TRAZODONE HYDROCHLORIDE 50 MG/1
50 TABLET ORAL NIGHTLY
COMMUNITY
End: 2021-01-14

## 2020-10-21 RX ORDER — IRBESARTAN AND HYDROCHLOROTHIAZIDE 150; 12.5 MG/1; MG/1
1 TABLET, FILM COATED ORAL DAILY
COMMUNITY
End: 2021-04-26 | Stop reason: SDUPTHER

## 2020-10-21 RX ORDER — OMEPRAZOLE 40 MG/1
40 CAPSULE, DELAYED RELEASE ORAL DAILY
COMMUNITY
End: 2021-01-14

## 2020-10-21 RX ORDER — CLOPIDOGREL BISULFATE 75 MG/1
75 TABLET ORAL DAILY
COMMUNITY
End: 2021-01-14

## 2020-10-23 ENCOUNTER — ANESTHESIA EVENT (OUTPATIENT)
Dept: PERIOP | Facility: HOSPITAL | Age: 75
End: 2020-10-23

## 2020-10-23 ENCOUNTER — ANESTHESIA (OUTPATIENT)
Dept: PERIOP | Facility: HOSPITAL | Age: 75
End: 2020-10-23

## 2020-10-23 ENCOUNTER — HOSPITAL ENCOUNTER (OUTPATIENT)
Facility: HOSPITAL | Age: 75
Setting detail: HOSPITAL OUTPATIENT SURGERY
Discharge: HOME | End: 2020-10-23
Attending: OPHTHALMOLOGY | Admitting: OPHTHALMOLOGY

## 2020-10-23 VITALS
BODY MASS INDEX: 24.86 KG/M2 | TEMPERATURE: 97.6 F | SYSTOLIC BLOOD PRESSURE: 139 MMHG | WEIGHT: 164.02 LBS | DIASTOLIC BLOOD PRESSURE: 76 MMHG | OXYGEN SATURATION: 94 % | RESPIRATION RATE: 18 BRPM | HEART RATE: 67 BPM | HEIGHT: 68 IN

## 2020-10-23 PROCEDURE — 25010000002 FENTANYL CITRATE (PF) 100 MCG/2ML SOLUTION: Performed by: NURSE ANESTHETIST, CERTIFIED REGISTERED

## 2020-10-23 PROCEDURE — 25010000002 EPINEPHRINE PER 0.1 MG: Performed by: OPHTHALMOLOGY

## 2020-10-23 PROCEDURE — 25010000002 MIDAZOLAM PER 1 MG: Performed by: NURSE ANESTHETIST, CERTIFIED REGISTERED

## 2020-10-23 PROCEDURE — V2632 POST CHMBR INTRAOCULAR LENS: HCPCS | Performed by: OPHTHALMOLOGY

## 2020-10-23 PROCEDURE — 25010000002 VANCOMYCIN 1 G RECONSTITUTED SOLUTION 1 EACH VIAL: Performed by: OPHTHALMOLOGY

## 2020-10-23 DEVICE — LENS ACRYSOF IQ 6X13MM SN60WF 19.0: Type: IMPLANTABLE DEVICE | Site: EYE | Status: FUNCTIONAL

## 2020-10-23 RX ORDER — PREDNISOLONE ACETATE 10 MG/ML
SUSPENSION/ DROPS OPHTHALMIC AS NEEDED
Status: DISCONTINUED | OUTPATIENT
Start: 2020-10-23 | End: 2020-10-23 | Stop reason: HOSPADM

## 2020-10-23 RX ORDER — PHENYLEPHRINE HCL 2.5 %
1 DROPS OPHTHALMIC (EYE)
Status: COMPLETED | OUTPATIENT
Start: 2020-10-23 | End: 2020-10-23

## 2020-10-23 RX ORDER — SODIUM CHLORIDE 0.9 % (FLUSH) 0.9 %
10 SYRINGE (ML) INJECTION AS NEEDED
Status: DISCONTINUED | OUTPATIENT
Start: 2020-10-23 | End: 2020-10-23 | Stop reason: HOSPADM

## 2020-10-23 RX ORDER — MIDAZOLAM HYDROCHLORIDE 1 MG/ML
INJECTION INTRAMUSCULAR; INTRAVENOUS AS NEEDED
Status: DISCONTINUED | OUTPATIENT
Start: 2020-10-23 | End: 2020-10-23 | Stop reason: SURG

## 2020-10-23 RX ORDER — BRIMONIDINE TARTRATE 0.15 %
DROPS OPHTHALMIC (EYE) AS NEEDED
Status: DISCONTINUED | OUTPATIENT
Start: 2020-10-23 | End: 2020-10-23 | Stop reason: HOSPADM

## 2020-10-23 RX ORDER — MOXIFLOXACIN 5 MG/ML
SOLUTION/ DROPS OPHTHALMIC AS NEEDED
Status: DISCONTINUED | OUTPATIENT
Start: 2020-10-23 | End: 2020-10-23 | Stop reason: HOSPADM

## 2020-10-23 RX ORDER — FENTANYL CITRATE 50 UG/ML
INJECTION, SOLUTION INTRAMUSCULAR; INTRAVENOUS AS NEEDED
Status: DISCONTINUED | OUTPATIENT
Start: 2020-10-23 | End: 2020-10-23 | Stop reason: SURG

## 2020-10-23 RX ORDER — TETRACAINE HYDROCHLORIDE 5 MG/ML
1 SOLUTION OPHTHALMIC
Status: COMPLETED | OUTPATIENT
Start: 2020-10-23 | End: 2020-10-23

## 2020-10-23 RX ORDER — CYCLOPENTOLATE HYDROCHLORIDE 20 MG/ML
1 SOLUTION/ DROPS OPHTHALMIC
Status: COMPLETED | OUTPATIENT
Start: 2020-10-23 | End: 2020-10-23

## 2020-10-23 RX ORDER — PROPARACAINE HYDROCHLORIDE 5 MG/ML
SOLUTION/ DROPS OPHTHALMIC AS NEEDED
Status: DISCONTINUED | OUTPATIENT
Start: 2020-10-23 | End: 2020-10-23 | Stop reason: HOSPADM

## 2020-10-23 RX ORDER — TETRACAINE HYDROCHLORIDE 5 MG/ML
SOLUTION OPHTHALMIC AS NEEDED
Status: DISCONTINUED | OUTPATIENT
Start: 2020-10-23 | End: 2020-10-23 | Stop reason: HOSPADM

## 2020-10-23 RX ADMIN — PHENYLEPHRINE HYDROCHLORIDE 1 DROP: 25 SOLUTION/ DROPS OPHTHALMIC at 06:55

## 2020-10-23 RX ADMIN — TETRACAINE HYDROCHLORIDE 1 DROP: 5 SOLUTION OPHTHALMIC at 07:07

## 2020-10-23 RX ADMIN — PHENYLEPHRINE HYDROCHLORIDE 1 DROP: 25 SOLUTION/ DROPS OPHTHALMIC at 06:45

## 2020-10-23 RX ADMIN — CYCLOPENTOLATE HYDROCHLORIDE 1 DROP: 20 SOLUTION/ DROPS OPHTHALMIC at 06:45

## 2020-10-23 RX ADMIN — CYCLOPENTOLATE HYDROCHLORIDE 1 DROP: 20 SOLUTION/ DROPS OPHTHALMIC at 06:55

## 2020-10-23 RX ADMIN — CYCLOPENTOLATE HYDROCHLORIDE 1 DROP: 20 SOLUTION/ DROPS OPHTHALMIC at 07:07

## 2020-10-23 RX ADMIN — TETRACAINE HYDROCHLORIDE 1 DROP: 5 SOLUTION OPHTHALMIC at 06:45

## 2020-10-23 RX ADMIN — MIDAZOLAM HYDROCHLORIDE 1 MG: 2 INJECTION, SOLUTION INTRAMUSCULAR; INTRAVENOUS at 08:33

## 2020-10-23 RX ADMIN — FENTANYL CITRATE 50 MCG: 50 INJECTION, SOLUTION INTRAMUSCULAR; INTRAVENOUS at 08:33

## 2020-10-23 RX ADMIN — SODIUM CHLORIDE, PRESERVATIVE FREE 10 ML: 5 INJECTION INTRAVENOUS at 06:54

## 2020-10-23 RX ADMIN — SODIUM CHLORIDE, PRESERVATIVE FREE 5 ML: 5 INJECTION INTRAVENOUS at 08:33

## 2020-10-23 RX ADMIN — PHENYLEPHRINE HYDROCHLORIDE 1 DROP: 25 SOLUTION/ DROPS OPHTHALMIC at 07:07

## 2020-10-23 NOTE — ANESTHESIA PREPROCEDURE EVALUATION
Anesthesia Evaluation     no history of anesthetic complications:  NPO Solid Status: > 8 hours  NPO Liquid Status: > 2 hours           Airway   Mallampati: II  TM distance: >3 FB  Neck ROM: full  Possible difficult intubation  Dental    (+) poor dentition    Pulmonary     breath sounds clear to auscultation  (-) COPD, asthma, sleep apnea, not a smoker  Cardiovascular - normal exam  Exercise tolerance: good (4-7 METS)    ECG reviewed  PT is on anticoagulation therapy  Patient on routine beta blocker and Beta blocker given within 24 hours of surgery  Rhythm: regular  Rate: normal    (+) hypertension well controlled less than 2 medications, CAD, CABG >6 Months, hyperlipidemia,  carotid artery disease  (-) valvular problems/murmurs, past MI, dysrhythmias, angina, DVT    ROS comment: Normal sinus rhythm  Normal ECG  When compared with ECG of 20-DEC-2017 14:12,  No significant change was found  Confirmed by [facility]     Neuro/Psych  (+) TIA (2005), psychiatric history Anxiety and Depression,     (-) seizures, headaches, weakness, numbness  GI/Hepatic/Renal/Endo    (+)  GERD well controlled,    (-) hepatitis, liver disease, no renal disease, diabetes    Musculoskeletal     (+) back pain,   Abdominal    Substance History   (+) alcohol use (glass of wine daily),   (-) drug use     OB/GYN          Other   arthritis,    history of cancer (prostate)                    Anesthesia Plan    ASA 3     MAC     intravenous induction     Anesthetic plan, all risks, benefits, and alternatives have been provided, discussed and informed consent has been obtained with: patient.

## 2020-10-23 NOTE — ANESTHESIA POSTPROCEDURE EVALUATION
Patient: Souleymane Stapleton    Procedure Summary     Date: 10/23/20 Room / Location: [facility] OR  / [facility] OR    Anesthesia Start: 0830 Anesthesia Stop: 0843    Procedure: REMOVE CATARACT AND IMPLANT  INTRAOCULAR LENS (Left Eye) Diagnosis:       Age-related nuclear cataract of left eye      (age-related nuclear cataract of left eye)    Surgeon: Van Funes MD Provider: Beau Tello MD    Anesthesia Type: MAC ASA Status: 3          Anesthesia Type: MAC    Vitals  No vitals data found for the desired time range.          Post Anesthesia Care and Evaluation    Patient location during evaluation: bedside  Patient participation: complete - patient participated  Level of consciousness: awake and alert  Pain management: adequate  Airway patency: patent  Anesthetic complications: No anesthetic complications  PONV Status: none  Cardiovascular status: acceptable  Respiratory status: acceptable and room air  Hydration status: acceptable

## 2020-11-03 ENCOUNTER — LAB (OUTPATIENT)
Dept: LAB | Facility: HOSPITAL | Age: 75
End: 2020-11-03

## 2020-11-03 DIAGNOSIS — Z01.818 PREOP TESTING: Primary | ICD-10-CM

## 2020-11-03 PROCEDURE — U0003 INFECTIOUS AGENT DETECTION BY NUCLEIC ACID (DNA OR RNA); SEVERE ACUTE RESPIRATORY SYNDROME CORONAVIRUS 2 (SARS-COV-2) (CORONAVIRUS DISEASE [COVID-19]), AMPLIFIED PROBE TECHNIQUE, MAKING USE OF HIGH THROUGHPUT TECHNOLOGIES AS DESCRIBED BY CMS-2020-01-R: HCPCS

## 2020-11-03 PROCEDURE — C9803 HOPD COVID-19 SPEC COLLECT: HCPCS

## 2020-11-04 LAB
COVID LABCORP PRIORITY: NORMAL
SARS-COV-2 RNA RESP QL NAA+PROBE: NOT DETECTED

## 2020-11-06 ENCOUNTER — HOSPITAL ENCOUNTER (OUTPATIENT)
Facility: HOSPITAL | Age: 75
Setting detail: HOSPITAL OUTPATIENT SURGERY
Discharge: HOME | End: 2020-11-06
Attending: OPHTHALMOLOGY | Admitting: OPHTHALMOLOGY

## 2020-11-06 ENCOUNTER — ANESTHESIA EVENT (OUTPATIENT)
Dept: PERIOP | Facility: HOSPITAL | Age: 75
End: 2020-11-06

## 2020-11-06 ENCOUNTER — ANESTHESIA (OUTPATIENT)
Dept: PERIOP | Facility: HOSPITAL | Age: 75
End: 2020-11-06

## 2020-11-06 VITALS
TEMPERATURE: 98.4 F | DIASTOLIC BLOOD PRESSURE: 75 MMHG | OXYGEN SATURATION: 93 % | HEIGHT: 68 IN | WEIGHT: 163.14 LBS | BODY MASS INDEX: 24.73 KG/M2 | RESPIRATION RATE: 20 BRPM | SYSTOLIC BLOOD PRESSURE: 135 MMHG | HEART RATE: 73 BPM

## 2020-11-06 PROCEDURE — 25010000002 MIDAZOLAM PER 1 MG: Performed by: NURSE ANESTHETIST, CERTIFIED REGISTERED

## 2020-11-06 PROCEDURE — 25010000002 EPINEPHRINE PER 0.1 MG: Performed by: OPHTHALMOLOGY

## 2020-11-06 PROCEDURE — 25010000002 VANCOMYCIN 1 G RECONSTITUTED SOLUTION 1 EACH VIAL: Performed by: OPHTHALMOLOGY

## 2020-11-06 PROCEDURE — V2632 POST CHMBR INTRAOCULAR LENS: HCPCS | Performed by: OPHTHALMOLOGY

## 2020-11-06 PROCEDURE — 25010000002 FENTANYL CITRATE (PF) 100 MCG/2ML SOLUTION: Performed by: NURSE ANESTHETIST, CERTIFIED REGISTERED

## 2020-11-06 DEVICE — LENS ACRYSOF IQ 6X13MM SN60WF 19.0: Type: IMPLANTABLE DEVICE | Site: EYE | Status: FUNCTIONAL

## 2020-11-06 RX ORDER — MOXIFLOXACIN 5 MG/ML
SOLUTION/ DROPS OPHTHALMIC AS NEEDED
Status: DISCONTINUED | OUTPATIENT
Start: 2020-11-06 | End: 2020-11-06 | Stop reason: HOSPADM

## 2020-11-06 RX ORDER — PREDNISOLONE ACETATE 10 MG/ML
SUSPENSION/ DROPS OPHTHALMIC AS NEEDED
Status: DISCONTINUED | OUTPATIENT
Start: 2020-11-06 | End: 2020-11-06 | Stop reason: HOSPADM

## 2020-11-06 RX ORDER — BRIMONIDINE TARTRATE 0.15 %
DROPS OPHTHALMIC (EYE) AS NEEDED
Status: DISCONTINUED | OUTPATIENT
Start: 2020-11-06 | End: 2020-11-06 | Stop reason: HOSPADM

## 2020-11-06 RX ORDER — PHENYLEPHRINE HCL 2.5 %
1 DROPS OPHTHALMIC (EYE)
Status: COMPLETED | OUTPATIENT
Start: 2020-11-06 | End: 2020-11-06

## 2020-11-06 RX ORDER — TETRACAINE HYDROCHLORIDE 5 MG/ML
SOLUTION OPHTHALMIC AS NEEDED
Status: DISCONTINUED | OUTPATIENT
Start: 2020-11-06 | End: 2020-11-06 | Stop reason: HOSPADM

## 2020-11-06 RX ORDER — CYCLOPENTOLATE HYDROCHLORIDE 20 MG/ML
1 SOLUTION/ DROPS OPHTHALMIC
Status: COMPLETED | OUTPATIENT
Start: 2020-11-06 | End: 2020-11-06

## 2020-11-06 RX ORDER — PROPARACAINE HYDROCHLORIDE 5 MG/ML
SOLUTION/ DROPS OPHTHALMIC AS NEEDED
Status: DISCONTINUED | OUTPATIENT
Start: 2020-11-06 | End: 2020-11-06 | Stop reason: HOSPADM

## 2020-11-06 RX ORDER — FENTANYL CITRATE 50 UG/ML
INJECTION, SOLUTION INTRAMUSCULAR; INTRAVENOUS AS NEEDED
Status: DISCONTINUED | OUTPATIENT
Start: 2020-11-06 | End: 2020-11-06 | Stop reason: SURG

## 2020-11-06 RX ORDER — SODIUM CHLORIDE 0.9 % (FLUSH) 0.9 %
10 SYRINGE (ML) INJECTION AS NEEDED
Status: DISCONTINUED | OUTPATIENT
Start: 2020-11-06 | End: 2020-11-06 | Stop reason: HOSPADM

## 2020-11-06 RX ORDER — TETRACAINE HYDROCHLORIDE 5 MG/ML
1 SOLUTION OPHTHALMIC
Status: COMPLETED | OUTPATIENT
Start: 2020-11-06 | End: 2020-11-06

## 2020-11-06 RX ORDER — MIDAZOLAM HYDROCHLORIDE 1 MG/ML
INJECTION INTRAMUSCULAR; INTRAVENOUS AS NEEDED
Status: DISCONTINUED | OUTPATIENT
Start: 2020-11-06 | End: 2020-11-06 | Stop reason: SURG

## 2020-11-06 RX ADMIN — TETRACAINE HYDROCHLORIDE 1 DROP: 5 SOLUTION OPHTHALMIC at 11:39

## 2020-11-06 RX ADMIN — PHENYLEPHRINE HYDROCHLORIDE 1 DROP: 25 SOLUTION/ DROPS OPHTHALMIC at 11:19

## 2020-11-06 RX ADMIN — CYCLOPENTOLATE HYDROCHLORIDE 1 DROP: 20 SOLUTION/ DROPS OPHTHALMIC at 11:19

## 2020-11-06 RX ADMIN — FENTANYL CITRATE 50 MCG: 50 INJECTION, SOLUTION INTRAMUSCULAR; INTRAVENOUS at 12:04

## 2020-11-06 RX ADMIN — MIDAZOLAM HYDROCHLORIDE 1 MG: 2 INJECTION, SOLUTION INTRAMUSCULAR; INTRAVENOUS at 12:04

## 2020-11-06 RX ADMIN — TETRACAINE HYDROCHLORIDE 1 DROP: 5 SOLUTION OPHTHALMIC at 11:19

## 2020-11-06 RX ADMIN — PHENYLEPHRINE HYDROCHLORIDE 1 DROP: 25 SOLUTION/ DROPS OPHTHALMIC at 11:39

## 2020-11-06 RX ADMIN — CYCLOPENTOLATE HYDROCHLORIDE 1 DROP: 20 SOLUTION/ DROPS OPHTHALMIC at 11:39

## 2020-11-06 RX ADMIN — Medication 5 ML: at 12:04

## 2020-11-06 RX ADMIN — CYCLOPENTOLATE HYDROCHLORIDE 1 DROP: 20 SOLUTION/ DROPS OPHTHALMIC at 11:29

## 2020-11-06 RX ADMIN — PHENYLEPHRINE HYDROCHLORIDE 1 DROP: 25 SOLUTION/ DROPS OPHTHALMIC at 11:29

## 2020-11-06 NOTE — ANESTHESIA PREPROCEDURE EVALUATION
Anesthesia Evaluation     no history of anesthetic complications:  NPO Solid Status: > 8 hours  NPO Liquid Status: > 4 hours           Airway   Mallampati: II  TM distance: >3 FB  Neck ROM: full  Possible difficult intubation  Dental    (+) poor dentition    Pulmonary     breath sounds clear to auscultation  (-) COPD, asthma, sleep apnea, not a smoker  Cardiovascular - normal exam  Exercise tolerance: good (4-7 METS)    ECG reviewed  PT is on anticoagulation therapy  Patient on routine beta blocker and Beta blocker given within 24 hours of surgery  Rhythm: regular  Rate: normal    (+) hypertension well controlled less than 2 medications, CAD, CABG >6 Months, hyperlipidemia,  carotid artery disease  (-) valvular problems/murmurs, past MI, dysrhythmias, angina, DVT    ROS comment: Normal sinus rhythm  Normal ECG  When compared with ECG of 20-DEC-2017 14:12,  No significant change was found  Confirmed by [facility]     Neuro/Psych  (+) TIA (2005), psychiatric history Anxiety and Depression,     (-) seizures, headaches, weakness, numbness  GI/Hepatic/Renal/Endo    (+)  GERD well controlled,    (-) hepatitis, liver disease, no renal disease, diabetes    Musculoskeletal     (+) back pain,   Abdominal    Substance History   (+) alcohol use (glass of wine daily),   (-) drug use     OB/GYN          Other   arthritis,    history of cancer (prostate)                      Anesthesia Plan    ASA 3     MAC   (2nd eye)  intravenous induction     Anesthetic plan, all risks, benefits, and alternatives have been provided, discussed and informed consent has been obtained with: patient.

## 2020-11-06 NOTE — ANESTHESIA POSTPROCEDURE EVALUATION
Patient: Souleymane Stapleton    Procedure Summary     Date: 11/06/20 Room / Location: [facility] OR 06 / [facility] OR    Anesthesia Start: 1201 Anesthesia Stop: 1214    Procedure: REMOVE CATARACT AND IMPLANT INTRAOCULAR LENS (Right Eye) Diagnosis:       Age-related nuclear cataract of right eye      (age-related nuclear cataract of right eye)    Surgeon: Van Funes MD Provider: Ava Damon CRNA    Anesthesia Type: MAC ASA Status: 3          Anesthesia Type: MAC    Vitals  No vitals data found for the desired time range.          Post Anesthesia Care and Evaluation    Patient location during evaluation: bedside  Patient participation: complete - patient participated  Level of consciousness: awake and alert  Pain management: adequate  Airway patency: patent  Anesthetic complications: No anesthetic complications  PONV Status: none  Cardiovascular status: acceptable  Respiratory status: acceptable and room air  Hydration status: acceptable

## 2020-11-12 ENCOUNTER — OFFICE VISIT (OUTPATIENT)
Dept: SURGERY | Facility: CLINIC | Age: 75
End: 2020-11-12

## 2020-11-12 VITALS
BODY MASS INDEX: 25.01 KG/M2 | DIASTOLIC BLOOD PRESSURE: 82 MMHG | HEIGHT: 68 IN | TEMPERATURE: 97.9 F | SYSTOLIC BLOOD PRESSURE: 126 MMHG | HEART RATE: 95 BPM | WEIGHT: 165 LBS

## 2020-11-12 DIAGNOSIS — R19.4 CHANGE IN BOWEL HABITS: ICD-10-CM

## 2020-11-12 DIAGNOSIS — R10.31 RIGHT GROIN PAIN: Primary | ICD-10-CM

## 2020-11-12 PROCEDURE — 99202 OFFICE O/P NEW SF 15 MIN: CPT | Performed by: SURGERY

## 2020-11-12 RX ORDER — SODIUM CHLORIDE 0.9 % (FLUSH) 0.9 %
10 SYRINGE (ML) INJECTION AS NEEDED
Status: CANCELLED | OUTPATIENT
Start: 2020-11-12

## 2020-11-12 RX ORDER — DEXTROSE AND SODIUM CHLORIDE 5; .45 G/100ML; G/100ML
30 INJECTION, SOLUTION INTRAVENOUS CONTINUOUS PRN
Status: CANCELLED | OUTPATIENT
Start: 2020-11-12

## 2020-11-12 RX ORDER — PREDNISOLONE ACETATE 10 MG/ML
SUSPENSION/ DROPS OPHTHALMIC 4 TIMES DAILY
COMMUNITY
Start: 2020-10-16 | End: 2021-02-08

## 2020-11-12 RX ORDER — OFLOXACIN 3 MG/ML
SOLUTION/ DROPS OPHTHALMIC 4 TIMES DAILY
COMMUNITY
Start: 2020-10-16 | End: 2021-02-08

## 2020-11-12 RX ORDER — SODIUM CHLORIDE 0.9 % (FLUSH) 0.9 %
3 SYRINGE (ML) INJECTION EVERY 12 HOURS SCHEDULED
Status: CANCELLED | OUTPATIENT
Start: 2020-11-12

## 2020-11-12 RX ORDER — DICLOFENAC SODIUM 1 MG/ML
SOLUTION/ DROPS OPHTHALMIC 4 TIMES DAILY
COMMUNITY
Start: 2020-10-16 | End: 2021-02-08

## 2020-11-12 RX ORDER — LIDOCAINE HYDROCHLORIDE 10 MG/ML
0.1 INJECTION, SOLUTION EPIDURAL; INFILTRATION; INTRACAUDAL; PERINEURAL ONCE AS NEEDED
Status: CANCELLED | OUTPATIENT
Start: 2020-11-12

## 2020-11-12 NOTE — PROGRESS NOTES
CHIEF COMPLAINT:    Right groin pain, assess for possible hernia    Recent change in bowel habits    HISTORY OF PRESENT ILLNESS:    Souleymane Stapleton is a 75 y.o. male who is known to me for prior hemorrhoid surgery several years ago.  He returns today having been referred by Dr. Douglas who follows him for prostate cancer.  Over the last several months patient has noted intermittent right groin pain with extension down into the right hemiscrotum.  He underwent an ultrasound of this area in July that showed possible bowel within his right scrotum.  He is referred now for assessment for possible hernia.  Other than a recent change in the shape of his stools he notes no other complaints.    Past Medical History:   Diagnosis Date   • Abdominal pain    • Abnormal weight loss    • Acid reflux    • Acute gastritis    • Anxiety state    • Cancer (CMS/HCC)     Prostate   • Coronary artery disease     CABG 2018.   is followed by Dr Samano   • History of cerebrovascular accident    • History of colon polyps    • Hyperlipidemia    • Hypertension    • Nausea    • Nuclear senile cataract    • Presbyopia    • Stroke (CMS/HCC) 2005    TIA   • Tobacco use    • Transient cerebral ischemia    • Vitreous detachment of left eye        Past Surgical History:   Procedure Laterality Date   • BACK SURGERY     • CARDIAC CATHETERIZATION N/A 6/19/2018    Procedure: Coronary angiography;  Surgeon: Delroy Mcconnell MD;  Location: [facility] CATH INVASIVE LOCATION;  Service: Cardiovascular   • CATARACT EXTRACTION W/ INTRAOCULAR LENS IMPLANT Left 10/23/2020    Procedure: REMOVE CATARACT AND IMPLANT  INTRAOCULAR LENS;  Surgeon: Van Funes MD;  Location: [facility] OR;  Service: Ophthalmology;  Laterality: Left;   • CATARACT EXTRACTION W/ INTRAOCULAR LENS IMPLANT Right 11/6/2020    Procedure: REMOVE CATARACT AND IMPLANT INTRAOCULAR LENS;  Surgeon: Van Funes MD;  Location: [facility] OR;  Service: Ophthalmology;  Laterality:  Right;   • COLONOSCOPY  06/09/2015    Diverticulosis found in the sigmoid colon. Hemorrhoids found in the anus.   • CORONARY ARTERY BYPASS GRAFT N/A 6/22/2018    Procedure: PARAS STERNOTOMY CORONARY ARTERY BYPASS GRAFT TIMES 5 USING RIGHT AND LEFT INTERNAL MAMMARY ARTERIES AND LEFT GREATER SAPHENOUS VEIN GRAFT PER ENDOSCOPIC VEIN HARVESTING AND PRP;  Surgeon: Edgar Pérez MD;  Location: [facility];  Service: Cardiothoracic   • CRYOTHERAPY  08/14/2012    Of Acne   • ENDOSCOPY  09/01/2015    EGD w/ biopsy -Multiple polyps, one removed.   • ENDOSCOPY N/A 8/22/2019    Procedure: ESOPHAGOGASTRODUODENOSCOPY;  Surgeon: Chuck Rich DO;  Location: [facility] ENDOSCOPY;  Service: Gastroenterology   • HEMORRHOIDECTOMY N/A 3/20/2017    Procedure: Removal of Anal Papilla;  Surgeon: Juan Diego Ken MD;  Location: [facility] OR;  Service:    • LUMBAR LAMINECTOMY  09/29/2010   • OTHER SURGICAL HISTORY  08/19/2010    Biopsy, Each Additional Lesion    • SKIN BIOPSY  08/19/2010   • TOTAL SHOULDER ARTHROPLASTY W/ DISTAL CLAVICLE EXCISION Right 2/17/2020    Procedure: right reverse total shoulder arthroplasty.;  Surgeon: Juan Diego Mendoza MD;  Location: [facility];  Service: Orthopedics;  Laterality: Right;       Prior to Admission medications    Medication Sig Start Date End Date Taking? Authorizing Provider   acetaminophen (TYLENOL) 500 MG tablet Take 500 mg by mouth 3 (Three) Times a Day.   Yes Dmitriy Oconnor MD   ALPRAZolam (XANAX) 0.5 MG tablet Take 1 tablet by mouth 3 (Three) Times a Day. 5/11/20  Yes Souleymane Murrieta MD   clopidogrel (PLAVIX) 75 MG tablet Take 75 mg by mouth Daily.   Yes Dmitriy Oconnor MD   diclofenac (VOLTAREN) 0.1 % ophthalmic solution 4 (Four) Times a Day. 10/16/20  Yes Dmitriy Oconnor MD   dutasteride (AVODART) 0.5 MG capsule Take 1 capsule by mouth Every Night. 4/13/18  Yes Souleymane Murrieta MD   ezetimibe (ZETIA) 10 MG tablet Take 1 tablet by mouth Daily.  2/13/20  Yes Roberto Samano MD   finasteride (PROPECIA) 1 MG tablet Take 1 tablet by mouth Daily. 12/6/19  Yes Souleymane Murrieta MD   fluticasone (FLONASE) 50 MCG/ACT nasal spray 2 sprays into the nostril(s) as directed by provider Daily As Needed for Rhinitis.   Yes Dmitriy Oconnor MD   irbesartan-hydrochlorothiazide (Avalide) 150-12.5 MG tablet Take 1 tablet by mouth Daily.   Yes Dmitriy Oconnor MD   Magnesium 250 MG tablet Take 1 tablet by mouth Daily.   Yes Dmitriy Oconnor MD   metoprolol succinate XL (TOPROL-XL) 25 MG 24 hr tablet Take 1 tablet by mouth Daily With Dinner. 10/14/20  Yes Roberto Samano MD   ofloxacin (OCUFLOX) 0.3 % ophthalmic solution 4 (Four) Times a Day. 10/16/20  Yes Dmitriy Oconnor MD   omeprazole (priLOSEC) 40 MG capsule Take 40 mg by mouth Daily.   Yes ProviderDmitriy MD   prednisoLONE acetate (PRED FORTE) 1 % ophthalmic suspension 4 (Four) Times a Day. 10/16/20  Yes Dmitriy Oconnor MD   sildenafil (VIAGRA) 100 MG tablet Take 1 tablet by mouth once daily as needed for Erectile dysfunction 5/1/20  Yes Souleymane Murrieta MD   Tiotropium Bromide Monohydrate (SPIRIVA RESPIMAT) 1.25 MCG/ACT aerosol solution inhaler Inhale 2 puffs Daily. 8/26/20  Yes Souleymane Murrieta MD   traZODone (DESYREL) 50 MG tablet Take 50 mg by mouth Every Night.   Yes ProviderDmitriy MD   vitamin B-12 (CYANOCOBALAMIN) 500 MCG tablet Take 500 mcg by mouth Daily.   Yes ProviderDmitriy MD   simethicone (GAS-X) 80 MG chewable tablet Chew 1 tablet Every 6 (Six) Hours As Needed for Flatulence. 7/27/18   Souleymane Murrieta MD       Allergies   Allergen Reactions   • Statins Myalgia   • Tricor [Fenofibrate] Myalgia       Family History   Problem Relation Age of Onset   • Dementia Other    • Hypertension Other    • Stroke Other    • Hypertension Mother    • Hypertension Father        Social History     Socioeconomic History   • Marital status:  "Single     Spouse name: Not on file   • Number of children: Not on file   • Years of education: Not on file   • Highest education level: Not on file   Tobacco Use   • Smoking status: Former Smoker     Types: Cigarettes     Quit date:      Years since quittin.8   • Smokeless tobacco: Never Used   Substance and Sexual Activity   • Alcohol use: Yes     Alcohol/week: 1.0 standard drinks     Types: 1 Glasses of wine per week     Comment: Social   • Drug use: No   • Sexual activity: Defer       Review of Systems   Constitutional: Negative for appetite change, chills and fever.   Respiratory: Negative for shortness of breath.    Cardiovascular: Negative for chest pain.   Gastrointestinal: Negative for abdominal pain, blood in stool, constipation, diarrhea, nausea and vomiting.       Objective     /82   Pulse 95   Temp 97.9 °F (36.6 °C) (Temporal)   Ht 172.7 cm (68\")   Wt 74.8 kg (165 lb)   BMI 25.09 kg/m²     Physical Exam  Constitutional:       General: He is not in acute distress.     Appearance: Normal appearance. He is not ill-appearing, toxic-appearing or diaphoretic.   HENT:      Head: Normocephalic and atraumatic.   Eyes:      General: No scleral icterus.        Right eye: No discharge.         Left eye: No discharge.      Extraocular Movements: Extraocular movements intact.      Conjunctiva/sclera: Conjunctivae normal.   Cardiovascular:      Rate and Rhythm: Normal rate and regular rhythm.   Abdominal:      General: There is no distension.      Palpations: Abdomen is soft. There is no mass.      Tenderness: There is no abdominal tenderness. There is no guarding or rebound.      Hernia: No hernia is present.   Skin:     General: Skin is warm.   Neurological:      General: No focal deficit present.      Mental Status: He is alert and oriented to person, place, and time.   Psychiatric:         Mood and Affect: Mood normal.         Behavior: Behavior normal.         Thought Content: Thought content " normal.         Judgment: Judgment normal.         DIAGNOSTIC DATA:    Ultrasound images and report from July 2020 reviewed showing small hydrocele in the left hemiscrotum, possible bowel within the right hemiscrotum    ASSESSMENT:    No apparent hernia on physical examination, possible varicocele on right side.  Recent change in bowel habits.    PLAN:    The patient states that he is due for colonoscopy and given his recent change in bowel habits I recommend that he undergo colonoscopy.  The risk and benefits of colonoscopy have been discussed and he is agreeable to proceeding we will hold his Plavix for 5 days prior to the procedure.  On exam he has no evidence of a hernia.  Continued observation of this area would be recommended.          This document has been electronically signed by Juan Diego Ken MD on November 12, 2020 14:28 CST

## 2020-11-13 ENCOUNTER — LAB (OUTPATIENT)
Dept: LAB | Facility: HOSPITAL | Age: 75
End: 2020-11-13

## 2020-11-13 ENCOUNTER — TRANSCRIBE ORDERS (OUTPATIENT)
Dept: LAB | Facility: HOSPITAL | Age: 75
End: 2020-11-13

## 2020-11-13 DIAGNOSIS — N13.8 ENLARGED PROSTATE WITH URINARY OBSTRUCTION: Primary | ICD-10-CM

## 2020-11-13 DIAGNOSIS — N40.1 ENLARGED PROSTATE WITH URINARY OBSTRUCTION: Primary | ICD-10-CM

## 2020-11-13 LAB — PSA SERPL-MCNC: 19.8 NG/ML (ref 0–4)

## 2020-11-13 PROCEDURE — 36415 COLL VENOUS BLD VENIPUNCTURE: CPT | Performed by: UROLOGY

## 2020-11-13 PROCEDURE — 84153 ASSAY OF PSA TOTAL: CPT | Performed by: UROLOGY

## 2020-12-03 DIAGNOSIS — F41.9 ANXIETY: ICD-10-CM

## 2020-12-03 RX ORDER — ALPRAZOLAM 0.5 MG/1
0.5 TABLET ORAL 3 TIMES DAILY
Qty: 90 TABLET | Refills: 0 | Status: SHIPPED | OUTPATIENT
Start: 2020-12-03 | End: 2020-12-07 | Stop reason: SDUPTHER

## 2020-12-07 DIAGNOSIS — F41.9 ANXIETY: ICD-10-CM

## 2020-12-07 RX ORDER — ALPRAZOLAM 0.5 MG/1
0.5 TABLET ORAL 3 TIMES DAILY
Qty: 90 TABLET | Refills: 0 | Status: SHIPPED | OUTPATIENT
Start: 2020-12-07 | End: 2020-12-17 | Stop reason: SDUPTHER

## 2020-12-10 ENCOUNTER — TRANSCRIBE ORDERS (OUTPATIENT)
Dept: CT IMAGING | Facility: HOSPITAL | Age: 75
End: 2020-12-10

## 2020-12-10 DIAGNOSIS — C61 MALIGNANT NEOPLASM OF PROSTATE (HCC): Primary | ICD-10-CM

## 2020-12-16 ENCOUNTER — HOSPITAL ENCOUNTER (OUTPATIENT)
Dept: CT IMAGING | Facility: HOSPITAL | Age: 75
Discharge: HOME | End: 2020-12-16

## 2020-12-16 ENCOUNTER — LAB (OUTPATIENT)
Dept: LAB | Facility: HOSPITAL | Age: 75
End: 2020-12-16

## 2020-12-16 DIAGNOSIS — C61 MALIGNANT NEOPLASM OF PROSTATE (HCC): ICD-10-CM

## 2020-12-16 DIAGNOSIS — C61 PROSTATE CANCER (HCC): ICD-10-CM

## 2020-12-16 LAB
BUN SERPL-MCNC: 18 MG/DL (ref 8–23)
CREAT SERPL-MCNC: 1.19 MG/DL (ref 0.76–1.27)
GFR SERPL CREATININE-BSD FRML MDRD: 59 ML/MIN/1.73

## 2020-12-16 PROCEDURE — 84520 ASSAY OF UREA NITROGEN: CPT

## 2020-12-16 PROCEDURE — 36415 COLL VENOUS BLD VENIPUNCTURE: CPT

## 2020-12-16 PROCEDURE — 82565 ASSAY OF CREATININE: CPT

## 2020-12-16 PROCEDURE — 74178 CT ABD&PLV WO CNTR FLWD CNTR: CPT

## 2020-12-16 PROCEDURE — 25010000002 IOPAMIDOL 61 % SOLUTION: Performed by: UROLOGY

## 2020-12-16 RX ADMIN — IOPAMIDOL 90 ML: 612 INJECTION, SOLUTION INTRAVENOUS at 09:32

## 2020-12-17 ENCOUNTER — OFFICE VISIT (OUTPATIENT)
Dept: FAMILY MEDICINE CLINIC | Facility: CLINIC | Age: 75
End: 2020-12-17

## 2020-12-17 VITALS
SYSTOLIC BLOOD PRESSURE: 122 MMHG | BODY MASS INDEX: 25.33 KG/M2 | DIASTOLIC BLOOD PRESSURE: 64 MMHG | HEART RATE: 83 BPM | OXYGEN SATURATION: 98 % | HEIGHT: 68 IN | WEIGHT: 167.13 LBS

## 2020-12-17 DIAGNOSIS — F41.9 ANXIETY: ICD-10-CM

## 2020-12-17 DIAGNOSIS — M54.50 LOW BACK PAIN, UNSPECIFIED BACK PAIN LATERALITY, UNSPECIFIED CHRONICITY, UNSPECIFIED WHETHER SCIATICA PRESENT: ICD-10-CM

## 2020-12-17 DIAGNOSIS — R10.13 EPIGASTRIC PAIN: Primary | ICD-10-CM

## 2020-12-17 PROCEDURE — 99214 OFFICE O/P EST MOD 30 MIN: CPT | Performed by: FAMILY MEDICINE

## 2020-12-17 RX ORDER — ALPRAZOLAM 0.5 MG/1
0.5 TABLET ORAL 3 TIMES DAILY
Qty: 270 TABLET | Refills: 1 | Status: SHIPPED | OUTPATIENT
Start: 2020-12-17 | End: 2021-07-14 | Stop reason: SDUPTHER

## 2020-12-17 RX ORDER — AMOXICILLIN 500 MG/1
500 CAPSULE ORAL 3 TIMES DAILY
Qty: 30 CAPSULE | Refills: 0 | Status: SHIPPED | OUTPATIENT
Start: 2020-12-17 | End: 2020-12-18 | Stop reason: SDUPTHER

## 2020-12-17 RX ORDER — FINASTERIDE 1 MG/1
1 TABLET, FILM COATED ORAL DAILY
Qty: 90 TABLET | Refills: 3 | Status: SHIPPED | OUTPATIENT
Start: 2020-12-17 | End: 2020-12-18 | Stop reason: SDUPTHER

## 2020-12-17 NOTE — PROGRESS NOTES
Subjective   Souleymane Stapleton is a 75 y.o. male.   Cc: follow up of chronic medical issues    Anxiety  Presents for follow-up visit. Symptoms include nervous/anxious behavior. Patient reports no chest pain, compulsions, confusion, decreased concentration, depressed mood, dizziness, dry mouth, excessive worry, feeling of choking, insomnia, irritability, malaise, muscle tension, nausea, obsessions, palpitations, panic, restlessness, shortness of breath or suicidal ideas.       His back has been bothering him for some time. He will be standing and it will get worse. He has had some issues with dizziness also. He will wake up in the AM and it will worsen as the day goes on.  The patient comes in with c/o epigastric pain. It has been bothering him for some time. He has been seeing Dr. Rich for GI issues. In the past,Amoxicillin has helped with the stomach issues. Will address that.  The following portions of the patient's history were reviewed and updated as appropriate: allergies, current medications, past family history, past medical history, past social history, past surgical history and problem list.    Review of Systems   Constitutional: Negative for irritability.   Respiratory: Negative for shortness of breath.    Cardiovascular: Negative for chest pain and palpitations.   Gastrointestinal: Negative for nausea.   Neurological: Negative for dizziness.   Psychiatric/Behavioral: Negative for confusion, decreased concentration and suicidal ideas. The patient is nervous/anxious. The patient does not have insomnia.        Objective   Physical Exam  Vitals signs reviewed.   Constitutional:       Appearance: Normal appearance.   HENT:      Head: Normocephalic and atraumatic.      Right Ear: Tympanic membrane, ear canal and external ear normal.      Left Ear: Tympanic membrane, ear canal and external ear normal.      Nose: Nose normal.      Mouth/Throat:      Mouth: Mucous membranes are moist.      Pharynx: Oropharynx  "is clear.   Eyes:      Extraocular Movements: Extraocular movements intact.      Pupils: Pupils are equal, round, and reactive to light.   Neck:      Musculoskeletal: Normal range of motion and neck supple.   Cardiovascular:      Rate and Rhythm: Normal rate and regular rhythm.      Heart sounds: Normal heart sounds. No murmur. No friction rub. No gallop.    Pulmonary:      Effort: Pulmonary effort is normal. No respiratory distress.      Breath sounds: Normal breath sounds. No stridor. No wheezing, rhonchi or rales.   Chest:      Chest wall: No tenderness.   Abdominal:      General: Abdomen is flat. Bowel sounds are normal. There is no distension.      Palpations: There is no mass.      Tenderness: There is no abdominal tenderness. There is no guarding or rebound.      Hernia: No hernia is present.   Musculoskeletal:      Comments: His low back is mildly tender on palpation.   Skin:     General: Skin is warm.   Neurological:      Mental Status: He is alert and oriented to person, place, and time.      Cranial Nerves: No cranial nerve deficit.      Sensory: No sensory deficit.      Comments: Romberg is having some dizziness. He is able to go through the Station and gait with some balance issues.           Visit Vitals  /64   Pulse 83   Ht 172.7 cm (68\")   Wt 75.8 kg (167 lb 2 oz)   SpO2 98%   BMI 25.41 kg/m²     Body mass index is 25.41 kg/m².      Assessment/Plan   Diagnoses and all orders for this visit:    1. Epigastric pain (Primary)    2. Anxiety  -     ALPRAZolam (XANAX) 0.5 MG tablet; Take 1 tablet by mouth 3 (Three) Times a Day.  Dispense: 270 tablet; Refill: 1  -     Ambulatory Referral to Neurology    3. Low back pain, unspecified back pain laterality, unspecified chronicity, unspecified whether sciatica present  -     XR Spine Lumbar 4+ View; Future    Other orders  -     finasteride (PROPECIA) 1 MG tablet; Take 1 tablet by mouth Daily.  Dispense: 90 tablet; Refill: 3  -     Tiotropium Bromide " Monohydrate (SPIRIVA RESPIMAT) 1.25 MCG/ACT aerosol solution inhaler; Inhale 2 puffs Daily.  Dispense: 12 g; Refill: 6  -     amoxicillin (AMOXIL) 500 MG capsule; Take 1 capsule by mouth 3 (Three) Times a Day.  Dispense: 30 capsule; Refill: 0    Will make further decisions after we get the x-ray back of his low back.  He will call Dr. Rich to set up another appointment.  Return to the clinic in 3 month/s.  Will contact with results as needed.

## 2020-12-18 DIAGNOSIS — F41.9 ANXIETY: ICD-10-CM

## 2020-12-18 RX ORDER — AMOXICILLIN 500 MG/1
500 CAPSULE ORAL 3 TIMES DAILY
Qty: 30 CAPSULE | Refills: 0 | Status: SHIPPED | OUTPATIENT
Start: 2020-12-18 | End: 2021-02-08

## 2020-12-18 RX ORDER — FINASTERIDE 1 MG/1
1 TABLET, FILM COATED ORAL DAILY
Qty: 90 TABLET | Refills: 3 | Status: SHIPPED | OUTPATIENT
Start: 2020-12-18 | End: 2020-12-30

## 2020-12-18 NOTE — TELEPHONE ENCOUNTER
PT CALLED REGARDING HIS PRESCRIPTIONS FROM YESTERDAY'S VISIT TO DR. JEFFERS, SCRIPTS WERE SENT TO [facility] BUT NEED TO GO TO [facility] IN [place]    amoxicillin (AMOXIL) 500 MG capsule  finasteride (PROPECIA) 1 MG tablet    [facility] [address] [place] [address] [employer] [facility] [phone] [facility] [phone]

## 2020-12-22 NOTE — TELEPHONE ENCOUNTER
Caller: Souleymane Stapleton    Relationship: Self    Best call back number:[phone]    Medication needed:   Requested Prescriptions     Pending Prescriptions Disp Refills   • ALPRAZolam (XANAX) 0.5 MG tablet 90 tablet 0     Sig: Take 1 tablet by mouth 3 (Three) Times a Day.   ADVISED PHARMACY DIDN'T RECIEVE  What is the patient's preferred pharmacy: [facility] - [facility] [address] - [phone] [facility] [phone] FX       
Message states Pharmacy did not receive the script sent 12/03/2020    E-Prescribing Status: Receipt confirmed by pharmacy (12/3/2020  5:18 PM CST)      
details…

## 2020-12-23 ENCOUNTER — TRANSCRIBE ORDERS (OUTPATIENT)
Dept: LAB | Facility: HOSPITAL | Age: 75
End: 2020-12-23

## 2020-12-23 DIAGNOSIS — C61 PROSTATE CANCER (HCC): Primary | ICD-10-CM

## 2020-12-30 RX ORDER — FINASTERIDE 1 MG/1
TABLET, FILM COATED ORAL
Qty: 90 TABLET | Refills: 3 | Status: SHIPPED | OUTPATIENT
Start: 2020-12-30 | End: 2021-10-13 | Stop reason: ALTCHOICE

## 2021-01-14 RX ORDER — OMEPRAZOLE 40 MG/1
CAPSULE, DELAYED RELEASE ORAL
Qty: 90 CAPSULE | Refills: 1 | Status: SHIPPED | OUTPATIENT
Start: 2021-01-14 | End: 2021-02-08

## 2021-01-14 RX ORDER — CLOPIDOGREL BISULFATE 75 MG/1
TABLET ORAL
Qty: 90 TABLET | Refills: 1 | Status: SHIPPED | OUTPATIENT
Start: 2021-01-14 | End: 2021-07-14 | Stop reason: SDUPTHER

## 2021-01-14 RX ORDER — TRAZODONE HYDROCHLORIDE 50 MG/1
TABLET ORAL
Qty: 90 TABLET | Refills: 1 | Status: SHIPPED | OUTPATIENT
Start: 2021-01-14 | End: 2021-07-08

## 2021-02-08 ENCOUNTER — LAB (OUTPATIENT)
Dept: LAB | Facility: HOSPITAL | Age: 76
End: 2021-02-08

## 2021-02-08 ENCOUNTER — OFFICE VISIT (OUTPATIENT)
Dept: FAMILY MEDICINE CLINIC | Facility: CLINIC | Age: 76
End: 2021-02-08

## 2021-02-08 VITALS
HEIGHT: 68 IN | SYSTOLIC BLOOD PRESSURE: 138 MMHG | DIASTOLIC BLOOD PRESSURE: 70 MMHG | BODY MASS INDEX: 25.09 KG/M2 | OXYGEN SATURATION: 97 % | HEART RATE: 68 BPM | WEIGHT: 165.56 LBS

## 2021-02-08 DIAGNOSIS — R10.9 ABDOMINAL CRAMPING: ICD-10-CM

## 2021-02-08 DIAGNOSIS — R53.81 MALAISE AND FATIGUE: ICD-10-CM

## 2021-02-08 DIAGNOSIS — R10.9 ABDOMINAL CRAMPING: Primary | ICD-10-CM

## 2021-02-08 DIAGNOSIS — R53.83 MALAISE AND FATIGUE: ICD-10-CM

## 2021-02-08 DIAGNOSIS — R41.82 MENTAL STATUS, DECREASED: ICD-10-CM

## 2021-02-08 LAB
ALBUMIN SERPL-MCNC: 4.2 G/DL (ref 3.5–5.2)
ALBUMIN/GLOB SERPL: 1.6 G/DL
ALP SERPL-CCNC: 55 U/L (ref 39–117)
ALT SERPL W P-5'-P-CCNC: 19 U/L (ref 1–41)
ANION GAP SERPL CALCULATED.3IONS-SCNC: 8.5 MMOL/L (ref 5–15)
AST SERPL-CCNC: 21 U/L (ref 1–40)
BASOPHILS # BLD AUTO: 0.11 10*3/MM3 (ref 0–0.2)
BASOPHILS NFR BLD AUTO: 1.5 % (ref 0–1.5)
BILIRUB SERPL-MCNC: 0.8 MG/DL (ref 0–1.2)
BUN SERPL-MCNC: 15 MG/DL (ref 8–23)
BUN/CREAT SERPL: 13.2 (ref 7–25)
CALCIUM SPEC-SCNC: 9.3 MG/DL (ref 8.6–10.5)
CHLORIDE SERPL-SCNC: 101 MMOL/L (ref 98–107)
CO2 SERPL-SCNC: 27.5 MMOL/L (ref 22–29)
CREAT SERPL-MCNC: 1.14 MG/DL (ref 0.76–1.27)
DEPRECATED RDW RBC AUTO: 42.1 FL (ref 37–54)
EOSINOPHIL # BLD AUTO: 0.05 10*3/MM3 (ref 0–0.4)
EOSINOPHIL NFR BLD AUTO: 0.7 % (ref 0.3–6.2)
ERYTHROCYTE [DISTWIDTH] IN BLOOD BY AUTOMATED COUNT: 14 % (ref 12.3–15.4)
GFR SERPL CREATININE-BSD FRML MDRD: 62 ML/MIN/1.73
GLOBULIN UR ELPH-MCNC: 2.6 GM/DL
GLUCOSE SERPL-MCNC: 107 MG/DL (ref 65–99)
HCT VFR BLD AUTO: 43.7 % (ref 37.5–51)
HGB BLD-MCNC: 15.3 G/DL (ref 13–17.7)
IMM GRANULOCYTES # BLD AUTO: 0.02 10*3/MM3 (ref 0–0.05)
IMM GRANULOCYTES NFR BLD AUTO: 0.3 % (ref 0–0.5)
LYMPHOCYTES # BLD AUTO: 2.46 10*3/MM3 (ref 0.7–3.1)
LYMPHOCYTES NFR BLD AUTO: 32.5 % (ref 19.6–45.3)
MCH RBC QN AUTO: 29.6 PG (ref 26.6–33)
MCHC RBC AUTO-ENTMCNC: 35 G/DL (ref 31.5–35.7)
MCV RBC AUTO: 84.5 FL (ref 79–97)
MONOCYTES # BLD AUTO: 0.61 10*3/MM3 (ref 0.1–0.9)
MONOCYTES NFR BLD AUTO: 8 % (ref 5–12)
NEUTROPHILS NFR BLD AUTO: 4.33 10*3/MM3 (ref 1.7–7)
NEUTROPHILS NFR BLD AUTO: 57 % (ref 42.7–76)
NRBC BLD AUTO-RTO: 0 /100 WBC (ref 0–0.2)
PLATELET # BLD AUTO: 336 10*3/MM3 (ref 140–450)
PMV BLD AUTO: 10.4 FL (ref 6–12)
POTASSIUM SERPL-SCNC: 4.5 MMOL/L (ref 3.5–5.2)
PROT SERPL-MCNC: 6.8 G/DL (ref 6–8.5)
RBC # BLD AUTO: 5.17 10*6/MM3 (ref 4.14–5.8)
SODIUM SERPL-SCNC: 137 MMOL/L (ref 136–145)
WBC # BLD AUTO: 7.58 10*3/MM3 (ref 3.4–10.8)

## 2021-02-08 PROCEDURE — 99214 OFFICE O/P EST MOD 30 MIN: CPT | Performed by: FAMILY MEDICINE

## 2021-02-08 PROCEDURE — 80053 COMPREHEN METABOLIC PANEL: CPT

## 2021-02-08 PROCEDURE — 85025 COMPLETE CBC W/AUTO DIFF WBC: CPT

## 2021-02-08 PROCEDURE — 36415 COLL VENOUS BLD VENIPUNCTURE: CPT

## 2021-02-08 RX ORDER — PANTOPRAZOLE SODIUM 40 MG/1
TABLET, DELAYED RELEASE ORAL
COMMUNITY
Start: 2021-01-28 | End: 2021-04-13

## 2021-02-08 RX ORDER — METOCLOPRAMIDE 5 MG/1
5 TABLET ORAL
Qty: 56 TABLET | Refills: 0 | Status: SHIPPED | OUTPATIENT
Start: 2021-02-08 | End: 2021-02-22 | Stop reason: SDUPTHER

## 2021-02-08 NOTE — PROGRESS NOTES
"Subjective   Souleymane Stapleton is a 75 y.o. male.    cc:feels bad  HPI  The patient has not been feeling well. He is having abdominal cramping and bloating when he eats. He feels as if he is in a mental fog. He feels lousy all over and is fatigued . He says that it started about 4 days ago.    The following portions of the patient's history were reviewed and updated as appropriate: allergies, current medications, past family history, past medical history, past social history, past surgical history and problem list.    Review of Systems    Objective   Physical Exam  Vitals signs reviewed.   Constitutional:       Appearance: Normal appearance.   HENT:      Head: Normocephalic and atraumatic.      Right Ear: Tympanic membrane, ear canal and external ear normal.      Left Ear: Tympanic membrane, ear canal and external ear normal.      Nose: Nose normal.      Mouth/Throat:      Mouth: Mucous membranes are moist.      Pharynx: Oropharynx is clear.   Eyes:      Pupils: Pupils are equal, round, and reactive to light.   Neck:      Musculoskeletal: Normal range of motion.   Cardiovascular:      Rate and Rhythm: Normal rate and regular rhythm.      Heart sounds: Normal heart sounds. No murmur. No friction rub. No gallop.    Pulmonary:      Effort: Pulmonary effort is normal. No respiratory distress.      Breath sounds: Normal breath sounds. No stridor. No wheezing, rhonchi or rales.   Chest:      Chest wall: No tenderness.   Abdominal:      General: Abdomen is flat. Bowel sounds are normal.      Palpations: Abdomen is soft.      Tenderness: There is no abdominal tenderness.   Skin:     General: Skin is warm and dry.   Neurological:      Mental Status: He is alert.           Visit Vitals  /70   Pulse 68   Ht 172.7 cm (68\")   Wt 75.1 kg (165 lb 9 oz)   SpO2 97%   BMI 25.17 kg/m²     Body mass index is 25.17 kg/m².      Assessment/Plan   Diagnoses and all orders for this visit:    1. Abdominal cramping (Primary)  -     " Comprehensive metabolic panel; Future  -     CBC w AUTO Differential; Future  -     XR Abdomen Flat & Upright (In Office)  -     US Gallbladder; Future    2. Mental status, decreased  -     Comprehensive metabolic panel; Future  -     CBC w AUTO Differential; Future    3. Malaise and fatigue  -     Comprehensive metabolic panel; Future  -     CBC w AUTO Differential; Future    Other orders  -     metoclopramide (Reglan) 5 MG tablet; Take 1 tablet by mouth 4 (Four) Times a Day Before Meals & at Bedtime.  Dispense: 56 tablet; Refill: 0    Gator Nubia, 7-Up Ginger Ale Coke Pepsi one ounce an hour slowly.    Bananas ,Rice,Apple Sauce , Toast ,Clear Broth, Boiled Egg Baked Potato in small amounts. Avoid Dairy Products.  Return to the clinic in 3 day/s.  Will contact with results as needed.

## 2021-02-09 ENCOUNTER — HOSPITAL ENCOUNTER (OUTPATIENT)
Dept: ULTRASOUND IMAGING | Facility: HOSPITAL | Age: 76
Discharge: HOME | End: 2021-02-09
Admitting: FAMILY MEDICINE

## 2021-02-09 DIAGNOSIS — R10.9 ABDOMINAL CRAMPING: ICD-10-CM

## 2021-02-09 PROCEDURE — 76705 ECHO EXAM OF ABDOMEN: CPT

## 2021-02-10 ENCOUNTER — HOSPITAL ENCOUNTER (OUTPATIENT)
Dept: MRI IMAGING | Facility: HOSPITAL | Age: 76
Discharge: HOME | End: 2021-02-10
Admitting: PSYCHIATRY & NEUROLOGY

## 2021-02-10 ENCOUNTER — OFFICE VISIT (OUTPATIENT)
Dept: OTOLARYNGOLOGY | Facility: CLINIC | Age: 76
End: 2021-02-10

## 2021-02-10 VITALS
TEMPERATURE: 97.9 F | WEIGHT: 164.6 LBS | HEIGHT: 69 IN | BODY MASS INDEX: 24.38 KG/M2 | SYSTOLIC BLOOD PRESSURE: 159 MMHG | OXYGEN SATURATION: 95 % | DIASTOLIC BLOOD PRESSURE: 67 MMHG

## 2021-02-10 DIAGNOSIS — R04.0 NASAL BLEEDING: Primary | ICD-10-CM

## 2021-02-10 DIAGNOSIS — Z79.02 ANTIPLATELET OR ANTITHROMBOTIC LONG-TERM USE: ICD-10-CM

## 2021-02-10 DIAGNOSIS — J34.2 NASAL SEPTAL DEFORMITY: ICD-10-CM

## 2021-02-10 DIAGNOSIS — M54.16 LUMBAR RADICULOPATHY: ICD-10-CM

## 2021-02-10 PROCEDURE — 72148 MRI LUMBAR SPINE W/O DYE: CPT

## 2021-02-10 PROCEDURE — 99213 OFFICE O/P EST LOW 20 MIN: CPT | Performed by: OTOLARYNGOLOGY

## 2021-02-10 PROCEDURE — 30901 CONTROL OF NOSEBLEED: CPT | Performed by: OTOLARYNGOLOGY

## 2021-02-11 NOTE — PROGRESS NOTES
Subjective   Souleymane Stapleton is a 75 y.o. male.       History of Present Illness   Patient who is chronically on Plavix and has a history of previous nasal bleeding.  I last saw the patient in November 2018.  Reports that he has been having epistaxis from the right nostril since Saturday.  Bled on Saturday again on Monday and Tuesday and a little bit this morning.  Nothing in particular brought these on.      The following portions of the patient's history were reviewed and updated as appropriate: allergies, current medications, past family history, past medical history, past social history, past surgical history and problem list.     reports that he quit smoking about 24 years ago. His smoking use included cigarettes. He has never used smokeless tobacco. He reports current alcohol use of about 1.0 standard drinks of alcohol per week. He reports that he does not use drugs.   Patient is not a tobacco user and has not been counseled for use of tobacco products      Review of Systems        Objective   Physical Exam  Nose: Septal deformity to the right with some crusting.  Mayank-Synephrine and Xylocaine are placed in the nares on cotton and allowed to remain for approximately 5 minutes.  Upon removal there are 2 possible bleeding sites 1 on the septum and one on the head of the inferior turbinate on the right.  These are cauterized with silver nitrate.      Assessment/Plan   Diagnoses and all orders for this visit:    1. Nasal bleeding (Primary)    2. Nasal septal deformity    3. Antiplatelet or antithrombotic long-term use        Plan: Silver nitrate cautery as described above.  Advised frequent use of nasal saline spray, at least 3-4 times a day.  Avoid digital trauma or other manipulation.  I asked him to hold his Plavix for 3 days.  Return for recurrent bleeding otherwise see me as needed.

## 2021-02-12 ENCOUNTER — OFFICE VISIT (OUTPATIENT)
Dept: FAMILY MEDICINE CLINIC | Facility: CLINIC | Age: 76
End: 2021-02-12

## 2021-02-12 VITALS
WEIGHT: 164.44 LBS | DIASTOLIC BLOOD PRESSURE: 70 MMHG | BODY MASS INDEX: 24.36 KG/M2 | HEIGHT: 69 IN | SYSTOLIC BLOOD PRESSURE: 122 MMHG | HEART RATE: 72 BPM | OXYGEN SATURATION: 98 %

## 2021-02-12 DIAGNOSIS — R53.83 MALAISE AND FATIGUE: ICD-10-CM

## 2021-02-12 DIAGNOSIS — R10.9 ABDOMINAL CRAMPING: Primary | ICD-10-CM

## 2021-02-12 DIAGNOSIS — R53.81 MALAISE AND FATIGUE: ICD-10-CM

## 2021-02-12 PROCEDURE — 99214 OFFICE O/P EST MOD 30 MIN: CPT | Performed by: FAMILY MEDICINE

## 2021-02-12 NOTE — PROGRESS NOTES
"Subjective   Souleymane Stapleton is a 75 y.o. male.   Cc: follow up of chronic medical issues    HPI  The patient states that the medication has helped with his abdominal cramping. He is not as bad with the overall fatigue.  The following portions of the patient's history were reviewed and updated as appropriate: allergies, current medications, past family history, past medical history, past social history, past surgical history and problem list.    Review of Systems   Constitutional: Negative for fatigue and fever.   Respiratory: Negative for cough, chest tightness and stridor.    Cardiovascular: Negative for chest pain, palpitations and leg swelling.       Objective   Physical Exam  Vitals signs reviewed.   Constitutional:       Appearance: Normal appearance.   HENT:      Head: Normocephalic and atraumatic.      Right Ear: External ear normal.      Left Ear: External ear normal.      Nose: Nose normal.      Mouth/Throat:      Mouth: Mucous membranes are moist.      Pharynx: Oropharynx is clear.   Eyes:      Extraocular Movements: Extraocular movements intact.      Pupils: Pupils are equal, round, and reactive to light.   Neck:      Musculoskeletal: Normal range of motion and neck supple.   Cardiovascular:      Rate and Rhythm: Normal rate and regular rhythm.      Heart sounds: Normal heart sounds. No murmur. No friction rub. No gallop.    Pulmonary:      Effort: Pulmonary effort is normal. No respiratory distress.      Breath sounds: No stridor. No wheezing, rhonchi or rales.   Abdominal:      General: Abdomen is flat. Bowel sounds are normal. There is no distension.      Palpations: Abdomen is soft.      Tenderness: There is no abdominal tenderness. There is no guarding.   Skin:     General: Skin is warm and dry.   Neurological:      Mental Status: He is alert.           Visit Vitals  /70   Pulse 72   Ht 174 cm (68.5\")   Wt 74.6 kg (164 lb 7 oz)   SpO2 98%   BMI 24.64 kg/m²     Body mass index is 24.64 " kg/m².      Assessment/Plan   Diagnoses and all orders for this visit:    1. Abdominal cramping (Primary)    2. Malaise and fatigue    Reviewed CBC and CMP with the patient.   Return to the clinic in 3 month/s.  Will contact with results as needed.  Continue on the Reglan.

## 2021-02-13 ENCOUNTER — LAB (OUTPATIENT)
Dept: LAB | Facility: HOSPITAL | Age: 76
End: 2021-02-13

## 2021-02-13 DIAGNOSIS — Z01.818 PREOP TESTING: Primary | ICD-10-CM

## 2021-02-13 PROCEDURE — C9803 HOPD COVID-19 SPEC COLLECT: HCPCS

## 2021-02-13 PROCEDURE — U0004 COV-19 TEST NON-CDC HGH THRU: HCPCS

## 2021-02-14 LAB — SARS-COV-2 ORF1AB RESP QL NAA+PROBE: NOT DETECTED

## 2021-02-16 ENCOUNTER — HOSPITAL ENCOUNTER (OUTPATIENT)
Facility: HOSPITAL | Age: 76
Setting detail: HOSPITAL OUTPATIENT SURGERY
Discharge: HOME | End: 2021-02-16
Attending: INTERNAL MEDICINE | Admitting: INTERNAL MEDICINE

## 2021-02-16 ENCOUNTER — ANESTHESIA (OUTPATIENT)
Dept: GASTROENTEROLOGY | Facility: HOSPITAL | Age: 76
End: 2021-02-16

## 2021-02-16 ENCOUNTER — ANESTHESIA EVENT (OUTPATIENT)
Dept: GASTROENTEROLOGY | Facility: HOSPITAL | Age: 76
End: 2021-02-16

## 2021-02-16 VITALS
SYSTOLIC BLOOD PRESSURE: 128 MMHG | OXYGEN SATURATION: 99 % | HEIGHT: 68 IN | HEART RATE: 73 BPM | DIASTOLIC BLOOD PRESSURE: 73 MMHG | WEIGHT: 165 LBS | RESPIRATION RATE: 18 BRPM | BODY MASS INDEX: 25.01 KG/M2 | TEMPERATURE: 97.3 F

## 2021-02-16 DIAGNOSIS — K21.9 GASTRIC REFLUX: ICD-10-CM

## 2021-02-16 DIAGNOSIS — Z12.11 ENCOUNTER FOR SCREENING COLONOSCOPY: ICD-10-CM

## 2021-02-16 DIAGNOSIS — R10.31 RIGHT GROIN PAIN: ICD-10-CM

## 2021-02-16 PROCEDURE — 88305 TISSUE EXAM BY PATHOLOGIST: CPT

## 2021-02-16 PROCEDURE — 25010000002 CEFTRIAXONE: Performed by: INTERNAL MEDICINE

## 2021-02-16 PROCEDURE — 87071 CULTURE AEROBIC QUANT OTHER: CPT | Performed by: INTERNAL MEDICINE

## 2021-02-16 PROCEDURE — 25010000002 PROPOFOL 10 MG/ML EMULSION: Performed by: NURSE ANESTHETIST, CERTIFIED REGISTERED

## 2021-02-16 RX ORDER — LIDOCAINE HYDROCHLORIDE 20 MG/ML
INJECTION, SOLUTION INTRAVENOUS AS NEEDED
Status: DISCONTINUED | OUTPATIENT
Start: 2021-02-16 | End: 2021-02-16 | Stop reason: SURG

## 2021-02-16 RX ORDER — PROPOFOL 10 MG/ML
VIAL (ML) INTRAVENOUS AS NEEDED
Status: DISCONTINUED | OUTPATIENT
Start: 2021-02-16 | End: 2021-02-16 | Stop reason: SURG

## 2021-02-16 RX ORDER — DEXTROSE AND SODIUM CHLORIDE 5; .45 G/100ML; G/100ML
30 INJECTION, SOLUTION INTRAVENOUS CONTINUOUS PRN
Status: DISCONTINUED | OUTPATIENT
Start: 2021-02-16 | End: 2021-02-16 | Stop reason: HOSPADM

## 2021-02-16 RX ORDER — LIDOCAINE HYDROCHLORIDE 10 MG/ML
0.1 INJECTION, SOLUTION EPIDURAL; INFILTRATION; INTRACAUDAL; PERINEURAL ONCE AS NEEDED
Status: DISCONTINUED | OUTPATIENT
Start: 2021-02-16 | End: 2021-02-16 | Stop reason: HOSPADM

## 2021-02-16 RX ORDER — SODIUM CHLORIDE 0.9 % (FLUSH) 0.9 %
10 SYRINGE (ML) INJECTION AS NEEDED
Status: DISCONTINUED | OUTPATIENT
Start: 2021-02-16 | End: 2021-02-16 | Stop reason: HOSPADM

## 2021-02-16 RX ORDER — SODIUM CHLORIDE 0.9 % (FLUSH) 0.9 %
3 SYRINGE (ML) INJECTION EVERY 12 HOURS SCHEDULED
Status: DISCONTINUED | OUTPATIENT
Start: 2021-02-16 | End: 2021-02-16 | Stop reason: HOSPADM

## 2021-02-16 RX ADMIN — PROPOFOL 20 MG: 10 INJECTION, EMULSION INTRAVENOUS at 11:21

## 2021-02-16 RX ADMIN — LIDOCAINE HYDROCHLORIDE 100 MG: 20 INJECTION, SOLUTION INTRAVENOUS at 11:14

## 2021-02-16 RX ADMIN — CEFTRIAXONE 1 G: 1 INJECTION, POWDER, FOR SOLUTION INTRAMUSCULAR; INTRAVENOUS at 11:12

## 2021-02-16 RX ADMIN — PROPOFOL 20 MG: 10 INJECTION, EMULSION INTRAVENOUS at 11:26

## 2021-02-16 RX ADMIN — PROPOFOL 20 MG: 10 INJECTION, EMULSION INTRAVENOUS at 11:29

## 2021-02-16 RX ADMIN — PROPOFOL 20 MG: 10 INJECTION, EMULSION INTRAVENOUS at 11:23

## 2021-02-16 RX ADMIN — PROPOFOL 80 MG: 10 INJECTION, EMULSION INTRAVENOUS at 11:14

## 2021-02-16 RX ADMIN — PROPOFOL 20 MG: 10 INJECTION, EMULSION INTRAVENOUS at 11:16

## 2021-02-16 RX ADMIN — PROPOFOL 20 MG: 10 INJECTION, EMULSION INTRAVENOUS at 11:19

## 2021-02-16 RX ADMIN — DEXTROSE AND SODIUM CHLORIDE 30 ML/HR: 5; 450 INJECTION, SOLUTION INTRAVENOUS at 10:42

## 2021-02-16 NOTE — ANESTHESIA POSTPROCEDURE EVALUATION
Patient: Souleymane Stapleton    Procedure Summary     Date: 02/16/21 Room / Location: [facility] ENDOSCOPY 2 / [facility] ENDOSCOPY    Anesthesia Start: 1102 Anesthesia Stop: 1132    Procedures:       ESOPHAGOGASTRODUODENOSCOPY (N/A )      COLONOSCOPY (N/A ) Diagnosis:       Gastric reflux      Encounter for screening colonoscopy      (Gastric reflux [K21.9])      (Encounter for screening colonoscopy [Z12.11])    Surgeon: Chuck Rich DO Provider: Peter Kwong CRNA    Anesthesia Type: MAC ASA Status: 4          Anesthesia Type: MAC    Vitals  No vitals data found for the desired time range.          Post Anesthesia Care and Evaluation    Patient location during evaluation: bedside  Patient participation: waiting for patient participation  Level of consciousness: responsive to verbal stimuli  Pain management: adequate  Airway patency: patent  Anesthetic complications: No anesthetic complications  PONV Status: none  Cardiovascular status: acceptable  Respiratory status: acceptable  Hydration status: acceptable    Comments: ---------------------------               02/16/21                  1132         ---------------------------   BP:          149/89         Pulse:         71           Resp:          20           Temp:   97.9 °F (36.6 °C)   SpO2:          97%         ---------------------------

## 2021-02-16 NOTE — H&P
Chuck Rich DO,[facility]  Gastroenterology  Hepatology  Endoscopy  Board Certified in Internal Medicine and gastroenterology  [address], [address]  [place]. [address]  - [phone]   F - [phone]     GASTROENTEROLOGY HISTORY AND PHYSICAL  NOTE   CHUCK RICH DO.         SUBJECTIVE:   2/16/2021    Name: Souleymane Stapleton  DOD: 1945        Chief Complaint:       Subjective : Worsening acid reflux.  Dyspepsia.  Colon cancer screening    Patient is 75 y.o. male presents with desire for elective EGD with biopsy and culture as well as colonoscopy.      ROS/HISTORY/ CURRENT MEDICATIONS/OBJECTIVE/VS/PE:   Review of Systems:  All systems unremarkable unless specified below.  Constitutional   HENT  Eyes   Respiratory    Cardiovascular  Gastrointestinal   Endocrine  Genitourinary    Musculoskeletal   Skin  Allergic/Immunologic    Neurological    Hematological  Psychiatric/Behavioral    History:     Past Medical History:   Diagnosis Date   • Abdominal pain    • Abnormal weight loss    • Acid reflux    • Acute gastritis    • Anxiety state    • Cancer (CMS/HCC)     Prostate   • Coronary artery disease     CABG 2018.   is followed by Dr Samano   • History of cerebrovascular accident    • History of colon polyps    • Hyperlipidemia    • Hypertension    • Nausea    • Nuclear senile cataract    • Presbyopia    • Stroke (CMS/HCC) 2005    TIA   • Tobacco use    • Transient cerebral ischemia    • Vitreous detachment of left eye      Past Surgical History:   Procedure Laterality Date   • BACK SURGERY     • CARDIAC CATHETERIZATION N/A 6/19/2018    Procedure: Coronary angiography;  Surgeon: Delroy Mcconnell MD;  Location: [facility] INVASIVE LOCATION;  Service: Cardiovascular   • CATARACT EXTRACTION W/ INTRAOCULAR LENS IMPLANT Left 10/23/2020    Procedure: REMOVE CATARACT AND IMPLANT  INTRAOCULAR LENS;  Surgeon: Van Funes MD;  Location: [facility] OR;  Service: Ophthalmology;   Laterality: Left;   • CATARACT EXTRACTION W/ INTRAOCULAR LENS IMPLANT Right 2020    Procedure: REMOVE CATARACT AND IMPLANT INTRAOCULAR LENS;  Surgeon: Van Funes MD;  Location: [facility];  Service: Ophthalmology;  Laterality: Right;   • COLONOSCOPY  2015    Diverticulosis found in the sigmoid colon. Hemorrhoids found in the anus.   • CORONARY ARTERY BYPASS GRAFT N/A 2018    Procedure: PARAS STERNOTOMY CORONARY ARTERY BYPASS GRAFT TIMES 5 USING RIGHT AND LEFT INTERNAL MAMMARY ARTERIES AND LEFT GREATER SAPHENOUS VEIN GRAFT PER ENDOSCOPIC VEIN HARVESTING AND PRP;  Surgeon: Edgar Pérez MD;  Location: [facility] OR;  Service: Cardiothoracic   • CRYOTHERAPY  2012    Of Acne   • ENDOSCOPY  2015    EGD w/ biopsy -Multiple polyps, one removed.   • ENDOSCOPY N/A 2019    Procedure: ESOPHAGOGASTRODUODENOSCOPY;  Surgeon: Chuck Rich DO;  Location: [facility] ENDOSCOPY;  Service: Gastroenterology   • HEMORRHOIDECTOMY N/A 3/20/2017    Procedure: Removal of Anal Papilla;  Surgeon: Juan Diego Ken MD;  Location: [facility] OR;  Service:    • LUMBAR LAMINECTOMY  2010   • OTHER SURGICAL HISTORY  2010    Biopsy, Each Additional Lesion    • SKIN BIOPSY  2010   • TOTAL SHOULDER ARTHROPLASTY W/ DISTAL CLAVICLE EXCISION Right 2020    Procedure: right reverse total shoulder arthroplasty.;  Surgeon: Juan Diego Mendoza MD;  Location: [facility];  Service: Orthopedics;  Laterality: Right;     Family History   Problem Relation Age of Onset   • Dementia Other    • Hypertension Other    • Stroke Other    • Hypertension Mother    • Hypertension Father    • Heart disease Brother      Social History     Tobacco Use   • Smoking status: Former Smoker     Types: Cigarettes     Quit date:      Years since quittin.1   • Smokeless tobacco: Never Used   Substance Use Topics   • Alcohol use: Yes     Alcohol/week: 1.0 standard drinks     Types: 1 Glasses of wine  per week     Comment: Social   • Drug use: No     Prior to Admission medications    Medication Sig Start Date End Date Taking? Authorizing Provider   acetaminophen (TYLENOL) 500 MG tablet Take 500 mg by mouth 3 (Three) Times a Day.    Dmitriy Oconnor MD   ALPRAZolam (XANAX) 0.5 MG tablet Take 1 tablet by mouth 3 (Three) Times a Day. 12/17/20   Souleymane Murrieta MD   clopidogrel (PLAVIX) 75 MG tablet TAKE 1 TABLET EVERY DAY 1/14/21   Souleymane Murrieta MD   dutasteride (AVODART) 0.5 MG capsule Take 1 capsule by mouth Every Night. 4/13/18   Souleymane Murrieta MD   finasteride (PROPECIA) 1 MG tablet TAKE 1 TABLET EVERY DAY 12/30/20   Souleymane Murrieta MD   fluticasone (FLONASE) 50 MCG/ACT nasal spray 2 sprays into the nostril(s) as directed by provider Daily As Needed for Rhinitis.    ProviderDmitriy MD   irbesartan-hydrochlorothiazide (Avalide) 150-12.5 MG tablet Take 1 tablet by mouth Daily.    ProviderDmitriy MD   Magnesium 250 MG tablet Take 1 tablet by mouth Daily.    Dmitriy Oconnor MD   metoclopramide (Reglan) 5 MG tablet Take 1 tablet by mouth 4 (Four) Times a Day Before Meals & at Bedtime. 2/8/21   Souleymane Murrieta MD   metoprolol succinate XL (TOPROL-XL) 25 MG 24 hr tablet Take 1 tablet by mouth Daily With Dinner. 10/14/20   Roberto Samano MD   pantoprazole (PROTONIX) 40 MG EC tablet  1/28/21   Chuck Rich DO   sildenafil (VIAGRA) 100 MG tablet Take 1 tablet by mouth once daily as needed for Erectile dysfunction 5/1/20   Souleymane Murrieta MD   simethicone (GAS-X) 80 MG chewable tablet Chew 1 tablet Every 6 (Six) Hours As Needed for Flatulence. 7/27/18   Souleymane Murrieta MD   Tiotropium Bromide Monohydrate (SPIRIVA RESPIMAT) 1.25 MCG/ACT aerosol solution inhaler Inhale 2 puffs Daily. 12/17/20   Souleymane Murrieta MD   traZODone (DESYREL) 50 MG tablet TAKE 1 TABLET EVERY NIGHT 1/14/21   Souleymane Murrieta MD   vitamin B-12 (CYANOCOBALAMIN) 500  "MCG tablet Take 500 mcg by mouth Daily.    Provider, MD Dmitriy     Allergies:  Statins and Tricor [fenofibrate]    I have reviewed the patients medical history, surgical history and family history in the available medical record system.     Current Medications:     No current facility-administered medications for this encounter.        Objective     Physical Exam:      /73 (BP Location: Right arm, Patient Position: Lying)   Pulse 73   Temp 97.3 °F (36.3 °C)   Resp 18   Ht 172.7 cm (68\")   Wt 74.8 kg (165 lb)   SpO2 99%   BMI 25.09 kg/m²   Physical Exam:  General Appearance:    Alert, cooperative, in no acute distress   Head:    Normocephalic, without obvious abnormality, atraumatic   Eyes:            Lids and lashes normal, conjunctivae and sclerae normal, no   icterus, no pallor, corneas clear, PERRLA   Ears:    Ears appear intact with no abnormalities noted   Throat:   No oral lesions, no thrush, oral mucosa moist   Neck:   No adenopathy, supple, trachea midline, no thyromegaly, no     carotid bruit, no JVD   Back:     No kyphosis present, no scoliosis present, no skin lesions,       erythema or scars, no tenderness to percussion or                   palpation,   range of motion normal   Lungs:     Clear to auscultation,respirations regular, even and                   unlabored    Heart:    Regular rhythm and normal rate, normal S1 and S2, no            murmur, no gallop, no rub, no click   Breast Exam:    Deferred   Abdomen:     Normal bowel sounds, no masses, no organomegaly, soft        non-tender, non-distended, no guarding, no rebound                 tenderness   Genitalia:    Deferred   Extremities:   Moves all extremities well, no edema, no cyanosis, no              redness   Pulses:   Pulses palpable and equal bilaterally   Skin:   No bleeding, bruising or rash   Lymph nodes:   No palpable adenopathy   Neurologic:   Cranial nerves 2 - 12 grossly intact, sensation intact, DTR        " present and equal bilaterally      Results Review:     Lab Results   Component Value Date    WBC 7.58 02/08/2021    WBC 8.38 09/28/2020    WBC 8.59 06/15/2020    HGB 15.3 02/08/2021    HGB 14.2 09/28/2020    HGB 13.0 06/15/2020    HCT 43.7 02/08/2021    HCT 41.9 09/28/2020    HCT 38.6 06/15/2020     02/08/2021     09/28/2020     06/15/2020             No results found for: LIPASE  Lab Results   Component Value Date    INR 1.05 11/04/2018    INR 1.41 (H) 06/23/2018    INR 1.50 (H) 06/22/2018     No components found for: CATHCX    Radiology Review:  Imaging Results (Last 72 Hours)     ** No results found for the last 72 hours. **           I reviewed the patient's new clinical results.  I reviewed the patient's new imaging results and agree with the interpretation.     ASSESSMENT/PLAN:   ASSESSMENT:  1.  Acid reflux, worsening  2.  Dyspepsia.  Rule out bacterial infection of the small bowel or H. pylori  3.  Colon cancer screening    PLAN:  1.  Esophagogastroduodenoscopy with biopsy and culture  2.  Colonoscopy    Risk and benefits associated with the procedure are reviewed with the patient.  The patient wished to proceed     Chuck Rich DO  02/16/21  10:03 CST

## 2021-02-16 NOTE — ANESTHESIA PREPROCEDURE EVALUATION
Anesthesia Evaluation     NPO Solid Status: > 8 hours  NPO Liquid Status: > 2 hours           Airway   Mallampati: II  TM distance: >3 FB  Neck ROM: full  no difficulty expected  Dental - normal exam     Pulmonary - normal exam   (+) shortness of breath,   Cardiovascular - normal exam    (+) hypertension, CAD, CABG, angina, hyperlipidemia,  carotid artery disease      Neuro/Psych  (+) TIA, CVA, psychiatric history,     GI/Hepatic/Renal/Endo    (+)  GERD,      Musculoskeletal     Abdominal    Substance History      OB/GYN          Other   arthritis,    history of cancer                  Anesthesia Plan    ASA 4     MAC     intravenous induction     Anesthetic plan, all risks, benefits, and alternatives have been provided, discussed and informed consent has been obtained with: patient.

## 2021-02-18 LAB — BACTERIA SPEC AEROBE CULT: NORMAL

## 2021-02-19 LAB
LAB AP CASE REPORT: NORMAL
PATH REPORT.FINAL DX SPEC: NORMAL

## 2021-02-22 RX ORDER — METOCLOPRAMIDE 5 MG/1
5 TABLET ORAL
Qty: 56 TABLET | Refills: 0 | Status: SHIPPED | OUTPATIENT
Start: 2021-02-22 | End: 2021-07-14

## 2021-02-22 NOTE — TELEPHONE ENCOUNTER
Caller: Souleymane Stapleton    Relationship: Self    Best call back number: [phone]    Medication needed:   Requested Prescriptions     Pending Prescriptions Disp Refills   • metoclopramide (Reglan) 5 MG tablet 56 tablet 0     Sig: Take 1 tablet by mouth 4 (Four) Times a Day Before Meals & at Bedtime.       What is the patient's preferred pharmacy: [facility] [address] [place] [address] [phone] [facility] [phone]

## 2021-02-25 ENCOUNTER — LAB (OUTPATIENT)
Dept: LAB | Facility: HOSPITAL | Age: 76
End: 2021-02-25

## 2021-02-25 ENCOUNTER — OFFICE VISIT (OUTPATIENT)
Dept: CARDIOLOGY | Facility: CLINIC | Age: 76
End: 2021-02-25

## 2021-02-25 VITALS
HEIGHT: 68 IN | HEART RATE: 92 BPM | WEIGHT: 167 LBS | BODY MASS INDEX: 25.31 KG/M2 | OXYGEN SATURATION: 96 % | DIASTOLIC BLOOD PRESSURE: 88 MMHG | SYSTOLIC BLOOD PRESSURE: 168 MMHG

## 2021-02-25 DIAGNOSIS — E78.00 PURE HYPERCHOLESTEROLEMIA: ICD-10-CM

## 2021-02-25 DIAGNOSIS — I10 ESSENTIAL HYPERTENSION: Primary | ICD-10-CM

## 2021-02-25 DIAGNOSIS — Z95.1 S/P CABG (CORONARY ARTERY BYPASS GRAFT): ICD-10-CM

## 2021-02-25 LAB
CHOLEST SERPL-MCNC: 241 MG/DL (ref 0–200)
HDLC SERPL-MCNC: 46 MG/DL (ref 40–60)
LDLC SERPL CALC-MCNC: 160 MG/DL (ref 0–100)
LDLC/HDLC SERPL: 3.42 {RATIO}
TRIGL SERPL-MCNC: 189 MG/DL (ref 0–150)
VLDLC SERPL-MCNC: 35 MG/DL (ref 5–40)

## 2021-02-25 PROCEDURE — 99214 OFFICE O/P EST MOD 30 MIN: CPT | Performed by: INTERNAL MEDICINE

## 2021-02-25 PROCEDURE — 36415 COLL VENOUS BLD VENIPUNCTURE: CPT

## 2021-02-25 PROCEDURE — 80061 LIPID PANEL: CPT

## 2021-02-25 NOTE — PROGRESS NOTES
Souleymane Stapleton  75 y.o. male    1. Essential hypertension    2. Pure hypercholesterolemia    3. S/P CABG (coronary artery bypass graft)        History of Present Illness  Souleymane Stapleton is a 75-year-old male with coronary artery disease status post coronary artery bypass surgery in June 2018 (CABG x 5 using bilateral mammary grafts), hypertension, hyperlipidemia, history of cerebrovascular event.    He has done reasonably well with no chest pain, shortness of breath or palpitation.  However he indicates that his blood pressure has not been under good control over the past few weeks and was noted to be elevated at 168/88 mmHg today.  Clinical exam was otherwise unremarkable with no bronchospasm or signs of congestive heart failure.      His LDL is known to be elevated and was 148 in September 2020.  The patient has been unable to tolerate any statins secondary to significant myalgia/muscle weakness and apparently developed flulike symptoms when Repatha was tried.  She has been unable to tolerate Zetia as well.  I had a discussion with him about optimization of LDL and he fully understands the importance.  He wishes to try Praluent and we will check approval with his insurance company.    SUBJECTIVE    Allergies   Allergen Reactions   • Statins Myalgia   • Tricor [Fenofibrate] Myalgia         Past Medical History:   Diagnosis Date   • Abdominal pain    • Abnormal weight loss    • Acid reflux    • Acute gastritis    • Anxiety state    • Cancer (CMS/HCC)     Prostate   • Coronary artery disease     CABG 2018.   is followed by Dr Samano   • History of cerebrovascular accident    • History of colon polyps    • Hyperlipidemia    • Hypertension    • Nausea    • Nuclear senile cataract    • Presbyopia    • Stroke (CMS/HCC) 2005    TIA   • Tobacco use    • Transient cerebral ischemia    • Vitreous detachment of left eye          Past Surgical History:   Procedure Laterality Date   • BACK SURGERY     • CARDIAC  CATHETERIZATION N/A 6/19/2018    Procedure: Coronary angiography;  Surgeon: Delroy Mcconnell MD;  Location: [facility] CATH INVASIVE LOCATION;  Service: Cardiovascular   • CATARACT EXTRACTION W/ INTRAOCULAR LENS IMPLANT Left 10/23/2020    Procedure: REMOVE CATARACT AND IMPLANT  INTRAOCULAR LENS;  Surgeon: Van Funes MD;  Location: [facility] OR;  Service: Ophthalmology;  Laterality: Left;   • CATARACT EXTRACTION W/ INTRAOCULAR LENS IMPLANT Right 11/6/2020    Procedure: REMOVE CATARACT AND IMPLANT INTRAOCULAR LENS;  Surgeon: Van Funes MD;  Location: [facility] OR;  Service: Ophthalmology;  Laterality: Right;   • COLONOSCOPY  06/09/2015    Diverticulosis found in the sigmoid colon. Hemorrhoids found in the anus.   • COLONOSCOPY N/A 2/16/2021    Procedure: COLONOSCOPY;  Surgeon: Chuck Rich DO;  Location: [facility] ENDOSCOPY;  Service: Gastroenterology;  Laterality: N/A;   • CORONARY ARTERY BYPASS GRAFT N/A 6/22/2018    Procedure: PARAS STERNOTOMY CORONARY ARTERY BYPASS GRAFT TIMES 5 USING RIGHT AND LEFT INTERNAL MAMMARY ARTERIES AND LEFT GREATER SAPHENOUS VEIN GRAFT PER ENDOSCOPIC VEIN HARVESTING AND PRP;  Surgeon: Edgar Pérez MD;  Location: [facility];  Service: Cardiothoracic   • CRYOTHERAPY  08/14/2012    Of Acne   • ENDOSCOPY  09/01/2015    EGD w/ biopsy -Multiple polyps, one removed.   • ENDOSCOPY N/A 8/22/2019    Procedure: ESOPHAGOGASTRODUODENOSCOPY;  Surgeon: Chuck Rich DO;  Location: [facility] ENDOSCOPY;  Service: Gastroenterology   • ENDOSCOPY N/A 2/16/2021    Procedure: ESOPHAGOGASTRODUODENOSCOPY;  Surgeon: Chuck Rich DO;  Location: [facility] ENDOSCOPY;  Service: Gastroenterology;  Laterality: N/A;   • HEMORRHOIDECTOMY N/A 3/20/2017    Procedure: Removal of Anal Papilla;  Surgeon: Juan Diego Ken MD;  Location: [facility] OR;  Service:    • JOINT REPLACEMENT     • LUMBAR LAMINECTOMY  09/29/2010   • OTHER SURGICAL HISTORY  08/19/2010    Biopsy, Each Additional  Lesion    • SKIN BIOPSY  2010   • TOTAL SHOULDER ARTHROPLASTY W/ DISTAL CLAVICLE EXCISION Right 2020    Procedure: right reverse total shoulder arthroplasty.;  Surgeon: Juan Diego Mendoza MD;  Location: [facility];  Service: Orthopedics;  Laterality: Right;         Family History   Problem Relation Age of Onset   • Dementia Other    • Hypertension Other    • Stroke Other    • Hypertension Mother    • Hypertension Father    • Heart disease Brother          Social History     Socioeconomic History   • Marital status:      Spouse name: Not on file   • Number of children: Not on file   • Years of education: Not on file   • Highest education level: Not on file   Tobacco Use   • Smoking status: Former Smoker     Types: Cigarettes     Quit date:      Years since quittin.1   • Smokeless tobacco: Never Used   Substance and Sexual Activity   • Alcohol use: Yes     Alcohol/week: 1.0 standard drinks     Types: 1 Glasses of wine per week     Comment: Social   • Drug use: No   • Sexual activity: Defer         Current Outpatient Medications   Medication Sig Dispense Refill   • acetaminophen (TYLENOL) 500 MG tablet Take 500 mg by mouth 3 (Three) Times a Day.     • ALPRAZolam (XANAX) 0.5 MG tablet Take 1 tablet by mouth 3 (Three) Times a Day. 270 tablet 1   • clopidogrel (PLAVIX) 75 MG tablet TAKE 1 TABLET EVERY DAY 90 tablet 1   • dutasteride (AVODART) 0.5 MG capsule Take 1 capsule by mouth Every Night. 3 capsule 0   • finasteride (PROPECIA) 1 MG tablet TAKE 1 TABLET EVERY DAY 90 tablet 3   • fluticasone (FLONASE) 50 MCG/ACT nasal spray 2 sprays into the nostril(s) as directed by provider Daily As Needed for Rhinitis.     • irbesartan-hydrochlorothiazide (Avalide) 150-12.5 MG tablet Take 1 tablet by mouth Daily.     • Magnesium 250 MG tablet Take 1 tablet by mouth Daily.     • metoclopramide (Reglan) 5 MG tablet Take 1 tablet by mouth 4 (Four) Times a Day Before Meals & at Bedtime. 56 tablet 0   •  "metoprolol succinate XL (TOPROL-XL) 25 MG 24 hr tablet Take 1 tablet by mouth Daily With Dinner. 90 tablet 3   • sildenafil (VIAGRA) 100 MG tablet Take 1 tablet by mouth once daily as needed for Erectile dysfunction 10 tablet 0   • simethicone (GAS-X) 80 MG chewable tablet Chew 1 tablet Every 6 (Six) Hours As Needed for Flatulence. 56 tablet 1   • Tiotropium Bromide Monohydrate (SPIRIVA RESPIMAT) 1.25 MCG/ACT aerosol solution inhaler Inhale 2 puffs Daily. 12 g 6   • traZODone (DESYREL) 50 MG tablet TAKE 1 TABLET EVERY NIGHT 90 tablet 1   • vitamin B-12 (CYANOCOBALAMIN) 500 MCG tablet Take 500 mcg by mouth Daily.     • pantoprazole (PROTONIX) 40 MG EC tablet        No current facility-administered medications for this visit.          OBJECTIVE    /88   Pulse 92   Ht 172.7 cm (68\")   Wt 75.8 kg (167 lb)   SpO2 96%   BMI 25.39 kg/m²         Review of Systems : The following systems were reviewed and no changes were noted    Constitutional:  Denies recent weight loss, weight gain, fever or chills, no change in exercise tolerance     HENT:  Denies any hearing loss, hoarseness, or difficulty speaking.  History of epistaxis recently for which she was evaluated by Dr. Milton.  Underwent cauterization.    Eyes: Wears eyeglasses or contact lenses     Respiratory: no cough, wheezing, or hemoptysis.     Cardiovascular: Negative for palpations, chest pain, dyspnea, orthopnea, PND, peripheral edema, syncope    Gastrointestinal:  Denies change in bowel habits, dyspepsia, ulcer disease, hematochezia, or melena.     Endocrine: Negative for cold intolerance, heat intolerance, polydipsia, polyphagia and polyuria.     Genitourinary: Negative.      Musculoskeletal: Pain in the right shoulder, s/p shoulder surgery by     Neurological:  Denies any history of recurrent headaches, strokes, TIA, or seizure disorder.     Hematological: Denies any food allergies, seasonal allergies, bleeding disorders, or lymphadenopathy. "     Psychiatric/Behavioral: Denies any history of depression, substance abuse, or change in cognitive function.     Physical Exam: The following systems were reassessed and no changes noted    Constitutional: Cooperative, alert and oriented, in no acute distress.     HENT:   Head: Normocephalic, normal hair patterns, no masses or tenderness.  Ears, Nose, and Throat: No gross abnormalities. No pallor or cyanosis.   Eyes: EOMS intact, PERRL, conjunctivae and lids unremarkable. Fundoscopic exam and visual fields not performed.   Neck: No palpable masses or adenopathy, no thyromegaly, no JVD, carotid pulses are full and equal bilaterally and without  Bruits.     Cardiovascular: Regular rhythm, S1 and S2 normal, no S3 or S4.  No murmurs, gallops, or rubs detected.     Pulmonary/Chest: Chest: normal symmetry, normal respiratory excursion, no intercostal retraction, no use of accessory muscles.            Pulmonary: Normal breath sounds. No rales or ronchi.    Abdominal: Abdomen soft, bowel sounds normoactive, no masses, no hepatosplenomegaly, non-tender, no bruits.     Musculoskeletal: No deformities, clubbing, cyanosis, erythema, or edema observed.    Neurological: No gross motor or sensory deficits noted, affect appropriate, oriented to time, person, place.      Psychiatric: He has a normal mood and affect. His behavior is normal. Judgment and thought content normal.         Procedures      Lab Results   Component Value Date    WBC 7.58 02/08/2021    HGB 15.3 02/08/2021    HCT 43.7 02/08/2021    MCV 84.5 02/08/2021     02/08/2021     Lab Results   Component Value Date    GLUCOSE 107 (H) 02/08/2021    BUN 15 02/08/2021    CREATININE 1.14 02/08/2021    EGFRIFNONA 62 02/08/2021    EGFRIFAFRI 60 05/02/2018    BCR 13.2 02/08/2021    CO2 27.5 02/08/2021    CALCIUM 9.3 02/08/2021    ALBUMIN 4.20 02/08/2021    AST 21 02/08/2021    ALT 19 02/08/2021     Lab Results   Component Value Date    CHOL 228 (H) 09/28/2020    CHOL  201 (H) 06/15/2020    CHOL 215 (H) 12/09/2019     Lab Results   Component Value Date    TRIG 201 (H) 09/28/2020    TRIG 102 06/15/2020    TRIG 153 (H) 12/09/2019     Lab Results   Component Value Date    HDL 40 09/28/2020    HDL 41 06/15/2020    HDL 41 12/09/2019     No components found for: LDLCALC  Lab Results   Component Value Date     (H) 09/28/2020     (H) 06/15/2020     (H) 12/09/2019     No results found for: HDLLDLRATIO  No components found for: CHOLHDL  No results found for: HGBA1C  Lab Results   Component Value Date    TSH 1.500 06/15/2020           ASSESSMENT AND PLAN  Souleymane Stapleton has multiple medical issues but is stable from a cardiac standpoint with no clinical evidence of progression of CAD.  No arrhythmia or congestive heart failure noted.  For optimization of blood pressure I have advised him to take irbesartan/HCTZ 150/12.5 mg daily on a regular basis.  He has been only taking half a tablet daily.  Metoprolol succinate 25 mg daily has been continued.    As described above, I believe that he would benefit from PCSK9 inhibitors.  Optimization of LDL will be important, given his history of documented CAD.  A lipid profile is being checked.      Diagnoses and all orders for this visit:    1. Essential hypertension (Primary)    2. Pure hypercholesterolemia  -     Lipid Panel; Future    3. S/P CABG (coronary artery bypass graft)  -     Lipid Panel; Future        Patient's Body mass index is 25.39 kg/m². BMI is within normal parameters. No follow-up required..  Patient is a non-smoker    Souleymane Stapleton  reports that he quit smoking about 24 years ago. His smoking use included cigarettes. He has never used smokeless tobacco..      Roberto Samano MD  2/25/2021  19:54 CST

## 2021-03-03 ENCOUNTER — LAB (OUTPATIENT)
Dept: LAB | Facility: HOSPITAL | Age: 76
End: 2021-03-03

## 2021-03-03 ENCOUNTER — OFFICE VISIT (OUTPATIENT)
Dept: FAMILY MEDICINE CLINIC | Facility: CLINIC | Age: 76
End: 2021-03-03

## 2021-03-03 VITALS
DIASTOLIC BLOOD PRESSURE: 84 MMHG | BODY MASS INDEX: 23.64 KG/M2 | WEIGHT: 156 LBS | OXYGEN SATURATION: 95 % | HEIGHT: 68 IN | SYSTOLIC BLOOD PRESSURE: 132 MMHG | HEART RATE: 114 BPM

## 2021-03-03 DIAGNOSIS — R63.4 WEIGHT LOSS: ICD-10-CM

## 2021-03-03 DIAGNOSIS — R63.4 WEIGHT LOSS: Primary | ICD-10-CM

## 2021-03-03 DIAGNOSIS — R41.0 MENTAL CONFUSION: ICD-10-CM

## 2021-03-03 PROCEDURE — 85025 COMPLETE CBC W/AUTO DIFF WBC: CPT

## 2021-03-03 PROCEDURE — 99214 OFFICE O/P EST MOD 30 MIN: CPT | Performed by: FAMILY MEDICINE

## 2021-03-03 PROCEDURE — 80053 COMPREHEN METABOLIC PANEL: CPT

## 2021-03-03 PROCEDURE — 84153 ASSAY OF PSA TOTAL: CPT | Performed by: UROLOGY

## 2021-03-03 PROCEDURE — 36415 COLL VENOUS BLD VENIPUNCTURE: CPT

## 2021-03-03 NOTE — PROGRESS NOTES
Subjective   Souleymane Stapleton is a 75 y.o. male.   Cc: follow up of chronic medical issues    HPI The patient has continued to have weight loss.He is down about 11 pounds.He also has continued to have a mental fog.He is continuing to have thick mucous causing difficulties breathing.    The following portions of the patient's history were reviewed and updated as appropriate: allergies, current medications, past family history, past medical history, past social history, past surgical history and problem list.    Review of Systems   Constitutional: Positive for fatigue.   Respiratory: Negative for cough, chest tightness and stridor.    Cardiovascular: Negative for chest pain, palpitations and leg swelling.   Psychiatric/Behavioral: Positive for confusion.       Objective   Physical Exam  Vitals signs reviewed.   Constitutional:       Appearance: Normal appearance.   HENT:      Head: Normocephalic and atraumatic.      Right Ear: Tympanic membrane, ear canal and external ear normal.      Left Ear: Tympanic membrane, ear canal and external ear normal.      Nose: Nose normal.      Mouth/Throat:      Mouth: Mucous membranes are moist.      Pharynx: Oropharynx is clear.   Eyes:      Conjunctiva/sclera: Conjunctivae normal.      Pupils: Pupils are equal, round, and reactive to light.   Neck:      Musculoskeletal: Normal range of motion and neck supple.   Cardiovascular:      Rate and Rhythm: Normal rate and regular rhythm.      Heart sounds: Normal heart sounds. No murmur. No friction rub. No gallop.    Pulmonary:      Effort: Pulmonary effort is normal. No respiratory distress.      Breath sounds: Normal breath sounds. No stridor. No wheezing, rhonchi or rales.   Chest:      Chest wall: No tenderness.   Abdominal:      General: Abdomen is flat. Bowel sounds are normal. There is no distension.      Palpations: Abdomen is soft.      Tenderness: There is no abdominal tenderness.   Skin:     General: Skin is warm and dry.  "  Neurological:      Mental Status: He is alert.           Visit Vitals  /84   Pulse 114   Ht 172.7 cm (68\")   Wt 70.8 kg (156 lb)   SpO2 95%   BMI 23.72 kg/m²     Body mass index is 23.72 kg/m².      Assessment/Plan   Diagnoses and all orders for this visit:    1. Weight loss (Primary)  -     Comprehensive metabolic panel; Future  -     CBC w AUTO Differential; Future    2. Mental confusion  -     MRI Brain With & Without Contrast; Future  -     Comprehensive metabolic panel; Future  -     CBC w AUTO Differential; Future    He is  To go back to his regular diet.  Return to the clinic in 1 month/s.  Will contact with results as needed. w    "

## 2021-03-04 LAB
ALBUMIN SERPL-MCNC: 4.5 G/DL (ref 3.5–5.2)
ALBUMIN/GLOB SERPL: 1.4 G/DL
ALP SERPL-CCNC: 62 U/L (ref 39–117)
ALT SERPL W P-5'-P-CCNC: 15 U/L (ref 1–41)
ANION GAP SERPL CALCULATED.3IONS-SCNC: 13.3 MMOL/L (ref 5–15)
AST SERPL-CCNC: 19 U/L (ref 1–40)
BASOPHILS # BLD AUTO: 0.1 10*3/MM3 (ref 0–0.2)
BASOPHILS NFR BLD AUTO: 0.7 % (ref 0–1.5)
BILIRUB SERPL-MCNC: 0.5 MG/DL (ref 0–1.2)
BUN SERPL-MCNC: 38 MG/DL (ref 8–23)
BUN/CREAT SERPL: 32.2 (ref 7–25)
CALCIUM SPEC-SCNC: 9.6 MG/DL (ref 8.6–10.5)
CHLORIDE SERPL-SCNC: 97 MMOL/L (ref 98–107)
CO2 SERPL-SCNC: 24.7 MMOL/L (ref 22–29)
CREAT SERPL-MCNC: 1.18 MG/DL (ref 0.76–1.27)
DEPRECATED RDW RBC AUTO: 46.5 FL (ref 37–54)
EOSINOPHIL # BLD AUTO: 0.04 10*3/MM3 (ref 0–0.4)
EOSINOPHIL NFR BLD AUTO: 0.3 % (ref 0.3–6.2)
ERYTHROCYTE [DISTWIDTH] IN BLOOD BY AUTOMATED COUNT: 15 % (ref 12.3–15.4)
GFR SERPL CREATININE-BSD FRML MDRD: 60 ML/MIN/1.73
GLOBULIN UR ELPH-MCNC: 3.2 GM/DL
GLUCOSE SERPL-MCNC: 86 MG/DL (ref 65–99)
HCT VFR BLD AUTO: 49.7 % (ref 37.5–51)
HGB BLD-MCNC: 16.9 G/DL (ref 13–17.7)
IMM GRANULOCYTES # BLD AUTO: 0.08 10*3/MM3 (ref 0–0.05)
IMM GRANULOCYTES NFR BLD AUTO: 0.5 % (ref 0–0.5)
LYMPHOCYTES # BLD AUTO: 2.52 10*3/MM3 (ref 0.7–3.1)
LYMPHOCYTES NFR BLD AUTO: 17.2 % (ref 19.6–45.3)
MCH RBC QN AUTO: 29.3 PG (ref 26.6–33)
MCHC RBC AUTO-ENTMCNC: 34 G/DL (ref 31.5–35.7)
MCV RBC AUTO: 86.1 FL (ref 79–97)
MONOCYTES # BLD AUTO: 1.54 10*3/MM3 (ref 0.1–0.9)
MONOCYTES NFR BLD AUTO: 10.5 % (ref 5–12)
NEUTROPHILS NFR BLD AUTO: 10.33 10*3/MM3 (ref 1.7–7)
NEUTROPHILS NFR BLD AUTO: 70.8 % (ref 42.7–76)
NRBC BLD AUTO-RTO: 0 /100 WBC (ref 0–0.2)
PLATELET # BLD AUTO: 459 10*3/MM3 (ref 140–450)
PMV BLD AUTO: 10.4 FL (ref 6–12)
POTASSIUM SERPL-SCNC: 4.3 MMOL/L (ref 3.5–5.2)
PROT SERPL-MCNC: 7.7 G/DL (ref 6–8.5)
PSA SERPL-MCNC: 20.2 NG/ML (ref 0–4)
RBC # BLD AUTO: 5.77 10*6/MM3 (ref 4.14–5.8)
SODIUM SERPL-SCNC: 135 MMOL/L (ref 136–145)
WBC # BLD AUTO: 14.61 10*3/MM3 (ref 3.4–10.8)

## 2021-03-11 ENCOUNTER — HOSPITAL ENCOUNTER (OUTPATIENT)
Dept: MRI IMAGING | Facility: HOSPITAL | Age: 76
Discharge: HOME | End: 2021-03-11
Admitting: FAMILY MEDICINE

## 2021-03-11 DIAGNOSIS — R41.0 MENTAL CONFUSION: ICD-10-CM

## 2021-03-11 PROCEDURE — 70553 MRI BRAIN STEM W/O & W/DYE: CPT

## 2021-03-11 PROCEDURE — 25010000002 GADOTERIDOL PER 1 ML: Performed by: FAMILY MEDICINE

## 2021-03-11 PROCEDURE — A9579 GAD-BASE MR CONTRAST NOS,1ML: HCPCS | Performed by: FAMILY MEDICINE

## 2021-03-11 RX ADMIN — GADOTERIDOL 15 ML: 279.3 INJECTION, SOLUTION INTRAVENOUS at 07:11

## 2021-03-12 ENCOUNTER — OFFICE VISIT (OUTPATIENT)
Dept: FAMILY MEDICINE CLINIC | Facility: CLINIC | Age: 76
End: 2021-03-12

## 2021-03-12 VITALS
DIASTOLIC BLOOD PRESSURE: 68 MMHG | WEIGHT: 163 LBS | OXYGEN SATURATION: 98 % | HEIGHT: 69 IN | SYSTOLIC BLOOD PRESSURE: 124 MMHG | HEART RATE: 101 BPM | BODY MASS INDEX: 24.14 KG/M2

## 2021-03-12 DIAGNOSIS — R10.9 ABDOMINAL PAIN, UNSPECIFIED ABDOMINAL LOCATION: Primary | ICD-10-CM

## 2021-03-12 DIAGNOSIS — R41.0 CONFUSION: ICD-10-CM

## 2021-03-12 PROBLEM — R10.13 EPIGASTRIC PAIN: Status: ACTIVE | Noted: 2021-03-12

## 2021-03-12 PROCEDURE — 99214 OFFICE O/P EST MOD 30 MIN: CPT | Performed by: FAMILY MEDICINE

## 2021-03-12 RX ORDER — DILTIAZEM HYDROCHLORIDE 300 MG/1
300 CAPSULE, EXTENDED RELEASE ORAL
COMMUNITY
End: 2021-04-13

## 2021-03-12 NOTE — PROGRESS NOTES
Subjective   Souleymane Stapleton is a 75 y.o. male.   Cc: follow up of chronic medical issues    Abdominal Pain  This is a new problem. The current episode started 1 to 4 weeks ago. The pain is located in the epigastric region. The pain is at a severity of 10/10. The quality of the pain is burning. Associated symptoms include belching and flatus. Pertinent negatives include no anorexia, arthralgias, constipation, diarrhea, fever, hematochezia, hematuria, melena, myalgias, nausea or vomiting.   Dizziness  Associated symptoms include abdominal pain. Pertinent negatives include no anorexia, arthralgias, fever, myalgias, nausea or vomiting.   His weight is up over the last few days He has been eating more high caloric foods in spite of the abdominal pain.. He is still burning in the epigastric area.This has worsened since changing the PPI to Protonix.   His left testical is tender.He had no fever.  The following portions of the patient's history were reviewed and updated as appropriate: allergies, current medications, past family history, past medical history, past social history, past surgical history and problem list.    Review of Systems   Constitutional: Negative for fever.   Gastrointestinal: Positive for abdominal pain and flatus. Negative for abdominal distention, anorexia, constipation, diarrhea, hematochezia, melena, nausea and vomiting.   Genitourinary: Negative for hematuria.   Musculoskeletal: Negative for arthralgias and myalgias.   Neurological: Positive for dizziness.       Objective   Physical Exam  Vitals reviewed.   Constitutional:       Appearance: Normal appearance.   HENT:      Head: Normocephalic and atraumatic.      Right Ear: Tympanic membrane, ear canal and external ear normal.      Left Ear: Tympanic membrane, ear canal and external ear normal.      Nose: Nose normal.      Mouth/Throat:      Mouth: Mucous membranes are moist.      Pharynx: Oropharynx is clear.   Eyes:      Extraocular  "Movements: Extraocular movements intact.      Pupils: Pupils are equal, round, and reactive to light.   Cardiovascular:      Rate and Rhythm: Normal rate and regular rhythm.      Heart sounds: Normal heart sounds.   Pulmonary:      Effort: Pulmonary effort is normal. No respiratory distress.      Breath sounds: Normal breath sounds. No stridor. No wheezing, rhonchi or rales.   Chest:      Chest wall: No tenderness.   Abdominal:      General: Abdomen is flat. Bowel sounds are normal. There is no distension.      Palpations: Abdomen is soft.      Tenderness: There is abdominal tenderness. There is no guarding.   Genitourinary:     Comments: The testicles are about the same size and consistency. Mildly tender on the left.  Musculoskeletal:      Cervical back: Normal range of motion and neck supple.   Skin:     General: Skin is warm and dry.   Neurological:      Mental Status: He is alert.           Visit Vitals  /68 (BP Location: Left arm, Patient Position: Sitting, Cuff Size: Adult)   Pulse 101   Ht 174 cm (68.5\")   Wt 73.9 kg (163 lb)   SpO2 98%   BMI 24.42 kg/m²     Body mass index is 24.42 kg/m².      Assessment/Plan   Diagnoses and all orders for this visit:    1. Abdominal pain, unspecified abdominal location (Primary)    2. Confusion    Reviewed the MRI results and the MRI of the brain was normal.  Switch back to Omeprazole. He is to get oin with Dr. Rich and to keep his appointment with the\ Urologist.  Follow up in a month.  I talked with Dr. Rich his GI doctor and he will contact him for further evaluation and treatment.  "

## 2021-03-15 ENCOUNTER — BULK ORDERING (OUTPATIENT)
Dept: CASE MANAGEMENT | Facility: OTHER | Age: 76
End: 2021-03-15

## 2021-03-15 DIAGNOSIS — Z23 IMMUNIZATION DUE: ICD-10-CM

## 2021-04-13 ENCOUNTER — LAB (OUTPATIENT)
Dept: LAB | Facility: HOSPITAL | Age: 76
End: 2021-04-13

## 2021-04-13 ENCOUNTER — OFFICE VISIT (OUTPATIENT)
Dept: FAMILY MEDICINE CLINIC | Facility: CLINIC | Age: 76
End: 2021-04-13

## 2021-04-13 VITALS
DIASTOLIC BLOOD PRESSURE: 62 MMHG | SYSTOLIC BLOOD PRESSURE: 118 MMHG | HEIGHT: 69 IN | HEART RATE: 75 BPM | BODY MASS INDEX: 24.04 KG/M2 | WEIGHT: 162.31 LBS | OXYGEN SATURATION: 96 %

## 2021-04-13 DIAGNOSIS — R41.0 CONFUSION: ICD-10-CM

## 2021-04-13 DIAGNOSIS — R10.84 GENERALIZED ABDOMINAL PAIN: Primary | ICD-10-CM

## 2021-04-13 DIAGNOSIS — R10.84 GENERALIZED ABDOMINAL PAIN: ICD-10-CM

## 2021-04-13 LAB
BASOPHILS # BLD AUTO: 0.09 10*3/MM3 (ref 0–0.2)
BASOPHILS NFR BLD AUTO: 1.1 % (ref 0–1.5)
DEPRECATED RDW RBC AUTO: 49 FL (ref 37–54)
EOSINOPHIL # BLD AUTO: 0.05 10*3/MM3 (ref 0–0.4)
EOSINOPHIL NFR BLD AUTO: 0.6 % (ref 0.3–6.2)
ERYTHROCYTE [DISTWIDTH] IN BLOOD BY AUTOMATED COUNT: 16 % (ref 12.3–15.4)
HCT VFR BLD AUTO: 43.6 % (ref 37.5–51)
HGB BLD-MCNC: 15.7 G/DL (ref 13–17.7)
IMM GRANULOCYTES # BLD AUTO: 0.04 10*3/MM3 (ref 0–0.05)
IMM GRANULOCYTES NFR BLD AUTO: 0.5 % (ref 0–0.5)
LYMPHOCYTES # BLD AUTO: 2.63 10*3/MM3 (ref 0.7–3.1)
LYMPHOCYTES NFR BLD AUTO: 32.6 % (ref 19.6–45.3)
MCH RBC QN AUTO: 30.8 PG (ref 26.6–33)
MCHC RBC AUTO-ENTMCNC: 36 G/DL (ref 31.5–35.7)
MCV RBC AUTO: 85.7 FL (ref 79–97)
MONOCYTES # BLD AUTO: 0.67 10*3/MM3 (ref 0.1–0.9)
MONOCYTES NFR BLD AUTO: 8.3 % (ref 5–12)
NEUTROPHILS NFR BLD AUTO: 4.59 10*3/MM3 (ref 1.7–7)
NEUTROPHILS NFR BLD AUTO: 56.9 % (ref 42.7–76)
NRBC BLD AUTO-RTO: 0 /100 WBC (ref 0–0.2)
PLATELET # BLD AUTO: 319 10*3/MM3 (ref 140–450)
PMV BLD AUTO: 10.4 FL (ref 6–12)
RBC # BLD AUTO: 5.09 10*6/MM3 (ref 4.14–5.8)
WBC # BLD AUTO: 8.07 10*3/MM3 (ref 3.4–10.8)

## 2021-04-13 PROCEDURE — 85025 COMPLETE CBC W/AUTO DIFF WBC: CPT

## 2021-04-13 PROCEDURE — 99214 OFFICE O/P EST MOD 30 MIN: CPT | Performed by: FAMILY MEDICINE

## 2021-04-13 PROCEDURE — 36415 COLL VENOUS BLD VENIPUNCTURE: CPT

## 2021-04-13 RX ORDER — OMEPRAZOLE 40 MG/1
1 CAPSULE, DELAYED RELEASE ORAL 2 TIMES DAILY
COMMUNITY
Start: 2021-04-12 | End: 2021-10-13 | Stop reason: ALTCHOICE

## 2021-04-13 NOTE — PROGRESS NOTES
Subjective   Souleymane Stapleton is a 75 y.o. male.   Cc: follow up of chronic medical issues    Abdominal Pain  This is a chronic problem. The current episode started more than 1 year ago. The onset quality is undetermined. The problem has been gradually improving. The pain is located in the epigastric region. The quality of the pain is aching and burning. Associated symptoms include belching and flatus. Pertinent negatives include no anorexia, arthralgias, constipation, diarrhea, dysuria, fever, frequency, headaches, hematochezia, hematuria, melena, myalgias, nausea, vomiting or weight loss.   His confusion has improved. He has started exercising and that has helped the overall confusion. He seems to be doing better.    The following portions of the patient's history were reviewed and updated as appropriate: allergies, current medications, past family history, past medical history, past social history, past surgical history and problem list.    Review of Systems   Constitutional: Negative for fever and weight loss.   Gastrointestinal: Positive for abdominal pain and flatus. Negative for anorexia, constipation, diarrhea, hematochezia, melena, nausea and vomiting.   Genitourinary: Negative for dysuria, frequency and hematuria.   Musculoskeletal: Negative for arthralgias and myalgias.   Neurological: Negative for headaches.       Objective   Physical Exam  Vitals reviewed.   Constitutional:       Appearance: Normal appearance.   HENT:      Head: Normocephalic and atraumatic.      Right Ear: Tympanic membrane, ear canal and external ear normal.      Left Ear: Tympanic membrane, ear canal and external ear normal.      Nose: Nose normal.      Mouth/Throat:      Mouth: Mucous membranes are moist.      Pharynx: Oropharynx is clear.   Cardiovascular:      Rate and Rhythm: Normal rate.      Heart sounds: Normal heart sounds. No murmur heard.   No gallop.    Pulmonary:      Effort: Pulmonary effort is normal. No respiratory  "distress.      Breath sounds: Normal breath sounds. No stridor. No wheezing, rhonchi or rales.   Chest:      Chest wall: No tenderness.   Abdominal:      General: Abdomen is flat. Bowel sounds are normal. There is no distension.      Palpations: Abdomen is soft.   Musculoskeletal:      Cervical back: Normal range of motion.   Skin:     General: Skin is warm and dry.   Neurological:      Mental Status: He is alert.           Visit Vitals  /62   Pulse 75   Ht 174 cm (68.5\")   Wt 73.6 kg (162 lb 5 oz)   SpO2 96%   BMI 24.32 kg/m²     Body mass index is 24.32 kg/m².      Assessment/Plan   Diagnoses and all orders for this visit:    1. Generalized abdominal pain (Primary)    2. Confusion    Return to the clinic in 3 month/s.  Will contact with results as needed.  Have reviewed the PSA,CBC and CMP with the patient.  "

## 2021-04-26 RX ORDER — IRBESARTAN AND HYDROCHLOROTHIAZIDE 150; 12.5 MG/1; MG/1
1 TABLET, FILM COATED ORAL DAILY
Qty: 90 TABLET | Refills: 3 | Status: SHIPPED | OUTPATIENT
Start: 2021-04-26 | End: 2022-03-28

## 2021-04-26 RX ORDER — IRBESARTAN AND HYDROCHLOROTHIAZIDE 150; 12.5 MG/1; MG/1
1 TABLET, FILM COATED ORAL DAILY
Qty: 30 TABLET | Refills: 0 | Status: SHIPPED | OUTPATIENT
Start: 2021-04-26 | End: 2021-04-26 | Stop reason: SDUPTHER

## 2021-05-24 ENCOUNTER — OFFICE VISIT (OUTPATIENT)
Dept: FAMILY MEDICINE CLINIC | Facility: CLINIC | Age: 76
End: 2021-05-24

## 2021-05-24 VITALS
WEIGHT: 163 LBS | OXYGEN SATURATION: 97 % | HEIGHT: 69 IN | DIASTOLIC BLOOD PRESSURE: 60 MMHG | SYSTOLIC BLOOD PRESSURE: 120 MMHG | BODY MASS INDEX: 24.14 KG/M2 | HEART RATE: 88 BPM

## 2021-05-24 DIAGNOSIS — K21.9 GASTROESOPHAGEAL REFLUX DISEASE, UNSPECIFIED WHETHER ESOPHAGITIS PRESENT: Primary | ICD-10-CM

## 2021-05-24 DIAGNOSIS — M25.551 RIGHT HIP PAIN: ICD-10-CM

## 2021-05-24 DIAGNOSIS — R42 DIZZINESS: ICD-10-CM

## 2021-05-24 PROCEDURE — 99214 OFFICE O/P EST MOD 30 MIN: CPT | Performed by: FAMILY MEDICINE

## 2021-05-24 NOTE — PROGRESS NOTES
Subjective   Souleymane Stapleton is a 75 y.o. male.   Cc: follow up of chronic medical issues    Dizziness  This is a chronic problem. The current episode started more than 1 year ago. The problem occurs daily. Associated symptoms include coughing, fatigue, myalgias, neck pain, numbness, a visual change and weakness. Pertinent negatives include no abdominal pain, anorexia, arthralgias, change in bowel habit, chest pain, chills, congestion, fever, headaches, joint swelling, nausea, rash, sore throat, swollen glands, urinary symptoms, vertigo or vomiting.   Heartburn  He complains of belching, choking, coughing, dysphagia, heartburn and a hoarse voice. He reports no abdominal pain, no chest pain, no early satiety, no nausea, no sore throat, no water brash or no wheezing. Associated symptoms include fatigue.       The following portions of the patient's history were reviewed and updated as appropriate: allergies, current medications, past family history, past medical history, past social history, past surgical history and problem list.    Review of Systems   Constitutional: Positive for fatigue. Negative for chills and fever.   HENT: Positive for hoarse voice. Negative for congestion and sore throat.    Respiratory: Positive for cough and choking. Negative for wheezing.    Cardiovascular: Negative for chest pain.   Gastrointestinal: Positive for dysphagia and heartburn. Negative for abdominal pain, anorexia, change in bowel habit, nausea and vomiting.   Musculoskeletal: Positive for myalgias and neck pain. Negative for arthralgias and joint swelling.   Skin: Negative for rash.   Neurological: Positive for dizziness, weakness and numbness. Negative for vertigo and headaches.       Objective   Physical Exam  Vitals reviewed.   Constitutional:       Appearance: Normal appearance.   HENT:      Head: Normocephalic and atraumatic.      Right Ear: Tympanic membrane, ear canal and external ear normal.      Left Ear: Tympanic  "membrane, ear canal and external ear normal.      Nose: Nose normal.      Mouth/Throat:      Mouth: Mucous membranes are moist.      Pharynx: Oropharynx is clear.   Cardiovascular:      Rate and Rhythm: Normal rate and regular rhythm.      Heart sounds: Normal heart sounds. No murmur heard.   No friction rub. No gallop.    Pulmonary:      Effort: Pulmonary effort is normal. No respiratory distress.      Breath sounds: Normal breath sounds. No stridor. No wheezing, rhonchi or rales.   Chest:      Chest wall: No tenderness.   Abdominal:      General: Abdomen is flat. Bowel sounds are normal. There is no distension.      Palpations: Abdomen is soft. There is no mass.      Tenderness: There is no abdominal tenderness. There is no guarding or rebound.      Hernia: No hernia is present.   Musculoskeletal:      Cervical back: Normal range of motion.      Comments: Tender on palpation of the right hip.   Skin:     General: Skin is warm and dry.   Neurological:      Mental Status: He is alert.           Visit Vitals  /60   Pulse 88   Ht 174 cm (68.5\")   Wt 73.9 kg (163 lb)   SpO2 97%   BMI 24.42 kg/m²     Body mass index is 24.42 kg/m².      Assessment/Plan   Diagnoses and all orders for this visit:    1. Gastroesophageal reflux disease, unspecified whether esophagitis present (Primary)  -     CBC w AUTO Differential; Future    2. Dizziness  -     Comprehensive metabolic panel; Future  -     CBC w AUTO Differential; Future    3. Right hip pain  -     XR Hip With or Without Pelvis 2 - 3 View Right; Future    Return to the clinic in 1 month/s.  Will contact with results as needed.  Continue on medications for his reflux.  "

## 2021-05-25 ENCOUNTER — LAB (OUTPATIENT)
Dept: LAB | Facility: HOSPITAL | Age: 76
End: 2021-05-25

## 2021-05-25 DIAGNOSIS — K21.9 GASTROESOPHAGEAL REFLUX DISEASE, UNSPECIFIED WHETHER ESOPHAGITIS PRESENT: ICD-10-CM

## 2021-05-25 DIAGNOSIS — R42 DIZZINESS: ICD-10-CM

## 2021-05-25 LAB
ALBUMIN SERPL-MCNC: 4.3 G/DL (ref 3.5–5.2)
ALBUMIN/GLOB SERPL: 1.7 G/DL
ALP SERPL-CCNC: 54 U/L (ref 39–117)
ALT SERPL W P-5'-P-CCNC: 23 U/L (ref 1–41)
ANION GAP SERPL CALCULATED.3IONS-SCNC: 9.4 MMOL/L (ref 5–15)
AST SERPL-CCNC: 19 U/L (ref 1–40)
BASOPHILS # BLD AUTO: 0.14 10*3/MM3 (ref 0–0.2)
BASOPHILS NFR BLD AUTO: 1.6 % (ref 0–1.5)
BILIRUB SERPL-MCNC: 0.6 MG/DL (ref 0–1.2)
BUN SERPL-MCNC: 16 MG/DL (ref 8–23)
BUN/CREAT SERPL: 13.4 (ref 7–25)
CALCIUM SPEC-SCNC: 9.6 MG/DL (ref 8.6–10.5)
CHLORIDE SERPL-SCNC: 100 MMOL/L (ref 98–107)
CO2 SERPL-SCNC: 25.6 MMOL/L (ref 22–29)
CREAT SERPL-MCNC: 1.19 MG/DL (ref 0.76–1.27)
DEPRECATED RDW RBC AUTO: 48.8 FL (ref 37–54)
EOSINOPHIL # BLD AUTO: 0.1 10*3/MM3 (ref 0–0.4)
EOSINOPHIL NFR BLD AUTO: 1.1 % (ref 0.3–6.2)
ERYTHROCYTE [DISTWIDTH] IN BLOOD BY AUTOMATED COUNT: 15.3 % (ref 12.3–15.4)
GFR SERPL CREATININE-BSD FRML MDRD: 59 ML/MIN/1.73
GLOBULIN UR ELPH-MCNC: 2.6 GM/DL
GLUCOSE SERPL-MCNC: 95 MG/DL (ref 65–99)
HCT VFR BLD AUTO: 43.3 % (ref 37.5–51)
HGB BLD-MCNC: 15.5 G/DL (ref 13–17.7)
IMM GRANULOCYTES # BLD AUTO: 0.05 10*3/MM3 (ref 0–0.05)
IMM GRANULOCYTES NFR BLD AUTO: 0.6 % (ref 0–0.5)
LYMPHOCYTES # BLD AUTO: 2.46 10*3/MM3 (ref 0.7–3.1)
LYMPHOCYTES NFR BLD AUTO: 27.4 % (ref 19.6–45.3)
MCH RBC QN AUTO: 31.5 PG (ref 26.6–33)
MCHC RBC AUTO-ENTMCNC: 35.8 G/DL (ref 31.5–35.7)
MCV RBC AUTO: 88 FL (ref 79–97)
MONOCYTES # BLD AUTO: 0.85 10*3/MM3 (ref 0.1–0.9)
MONOCYTES NFR BLD AUTO: 9.5 % (ref 5–12)
NEUTROPHILS NFR BLD AUTO: 5.38 10*3/MM3 (ref 1.7–7)
NEUTROPHILS NFR BLD AUTO: 59.8 % (ref 42.7–76)
NRBC BLD AUTO-RTO: 0 /100 WBC (ref 0–0.2)
PLATELET # BLD AUTO: 373 10*3/MM3 (ref 140–450)
PMV BLD AUTO: 10.4 FL (ref 6–12)
POTASSIUM SERPL-SCNC: 4.3 MMOL/L (ref 3.5–5.2)
PROT SERPL-MCNC: 6.9 G/DL (ref 6–8.5)
RBC # BLD AUTO: 4.92 10*6/MM3 (ref 4.14–5.8)
SODIUM SERPL-SCNC: 135 MMOL/L (ref 136–145)
WBC # BLD AUTO: 8.98 10*3/MM3 (ref 3.4–10.8)

## 2021-05-25 PROCEDURE — 36415 COLL VENOUS BLD VENIPUNCTURE: CPT

## 2021-05-25 PROCEDURE — 80053 COMPREHEN METABOLIC PANEL: CPT

## 2021-05-25 PROCEDURE — 85025 COMPLETE CBC W/AUTO DIFF WBC: CPT

## 2021-06-16 ENCOUNTER — LAB (OUTPATIENT)
Dept: LAB | Facility: HOSPITAL | Age: 76
End: 2021-06-16

## 2021-06-16 DIAGNOSIS — R42 DIZZINESS: ICD-10-CM

## 2021-06-16 DIAGNOSIS — R42 DIZZINESS: Primary | ICD-10-CM

## 2021-06-16 DIAGNOSIS — R79.9 ABNORMAL FINDING OF BLOOD CHEMISTRY, UNSPECIFIED: ICD-10-CM

## 2021-06-16 DIAGNOSIS — Z13.1 SCREENING FOR DIABETES MELLITUS (DM): ICD-10-CM

## 2021-06-16 DIAGNOSIS — R63.4 WEIGHT LOSS: ICD-10-CM

## 2021-06-16 PROCEDURE — 85025 COMPLETE CBC W/AUTO DIFF WBC: CPT

## 2021-06-16 PROCEDURE — 83036 HEMOGLOBIN GLYCOSYLATED A1C: CPT

## 2021-06-16 PROCEDURE — 80053 COMPREHEN METABOLIC PANEL: CPT

## 2021-06-16 PROCEDURE — 36415 COLL VENOUS BLD VENIPUNCTURE: CPT

## 2021-06-17 LAB
ALBUMIN SERPL-MCNC: 4.4 G/DL (ref 3.5–5.2)
ALBUMIN/GLOB SERPL: 1.9 G/DL
ALP SERPL-CCNC: 53 U/L (ref 39–117)
ALT SERPL W P-5'-P-CCNC: 21 U/L (ref 1–41)
ANION GAP SERPL CALCULATED.3IONS-SCNC: 13.1 MMOL/L (ref 5–15)
AST SERPL-CCNC: 20 U/L (ref 1–40)
BASOPHILS # BLD AUTO: 0.13 10*3/MM3 (ref 0–0.2)
BASOPHILS NFR BLD AUTO: 1.4 % (ref 0–1.5)
BILIRUB SERPL-MCNC: 0.4 MG/DL (ref 0–1.2)
BUN SERPL-MCNC: 19 MG/DL (ref 8–23)
BUN/CREAT SERPL: 16.2 (ref 7–25)
CALCIUM SPEC-SCNC: 9 MG/DL (ref 8.6–10.5)
CHLORIDE SERPL-SCNC: 100 MMOL/L (ref 98–107)
CO2 SERPL-SCNC: 21.9 MMOL/L (ref 22–29)
CREAT SERPL-MCNC: 1.17 MG/DL (ref 0.76–1.27)
DEPRECATED RDW RBC AUTO: 45.5 FL (ref 37–54)
EOSINOPHIL # BLD AUTO: 0.17 10*3/MM3 (ref 0–0.4)
EOSINOPHIL NFR BLD AUTO: 1.9 % (ref 0.3–6.2)
ERYTHROCYTE [DISTWIDTH] IN BLOOD BY AUTOMATED COUNT: 14 % (ref 12.3–15.4)
GFR SERPL CREATININE-BSD FRML MDRD: 60 ML/MIN/1.73
GLOBULIN UR ELPH-MCNC: 2.3 GM/DL
GLUCOSE SERPL-MCNC: 91 MG/DL (ref 65–99)
HBA1C MFR BLD: 5.7 % (ref 4.8–5.6)
HCT VFR BLD AUTO: 42.3 % (ref 37.5–51)
HGB BLD-MCNC: 14.9 G/DL (ref 13–17.7)
IMM GRANULOCYTES # BLD AUTO: 0.05 10*3/MM3 (ref 0–0.05)
IMM GRANULOCYTES NFR BLD AUTO: 0.6 % (ref 0–0.5)
LYMPHOCYTES # BLD AUTO: 3 10*3/MM3 (ref 0.7–3.1)
LYMPHOCYTES NFR BLD AUTO: 33.4 % (ref 19.6–45.3)
MCH RBC QN AUTO: 31.5 PG (ref 26.6–33)
MCHC RBC AUTO-ENTMCNC: 35.2 G/DL (ref 31.5–35.7)
MCV RBC AUTO: 89.4 FL (ref 79–97)
MONOCYTES # BLD AUTO: 0.79 10*3/MM3 (ref 0.1–0.9)
MONOCYTES NFR BLD AUTO: 8.8 % (ref 5–12)
NEUTROPHILS NFR BLD AUTO: 4.85 10*3/MM3 (ref 1.7–7)
NEUTROPHILS NFR BLD AUTO: 53.9 % (ref 42.7–76)
NRBC BLD AUTO-RTO: 0.1 /100 WBC (ref 0–0.2)
PLATELET # BLD AUTO: 363 10*3/MM3 (ref 140–450)
PMV BLD AUTO: 10.5 FL (ref 6–12)
POTASSIUM SERPL-SCNC: 3.8 MMOL/L (ref 3.5–5.2)
PROT SERPL-MCNC: 6.7 G/DL (ref 6–8.5)
RBC # BLD AUTO: 4.73 10*6/MM3 (ref 4.14–5.8)
SODIUM SERPL-SCNC: 135 MMOL/L (ref 136–145)
WBC # BLD AUTO: 8.99 10*3/MM3 (ref 3.4–10.8)

## 2021-06-23 ENCOUNTER — OFFICE VISIT (OUTPATIENT)
Dept: FAMILY MEDICINE CLINIC | Facility: CLINIC | Age: 76
End: 2021-06-23

## 2021-06-23 VITALS
WEIGHT: 166.5 LBS | HEART RATE: 78 BPM | TEMPERATURE: 97.1 F | HEIGHT: 69 IN | OXYGEN SATURATION: 96 % | DIASTOLIC BLOOD PRESSURE: 82 MMHG | BODY MASS INDEX: 24.66 KG/M2 | SYSTOLIC BLOOD PRESSURE: 140 MMHG

## 2021-06-23 DIAGNOSIS — R42 DIZZINESSES: ICD-10-CM

## 2021-06-23 DIAGNOSIS — E16.2 HYPOGLYCEMIA: Primary | ICD-10-CM

## 2021-06-23 PROCEDURE — 99213 OFFICE O/P EST LOW 20 MIN: CPT | Performed by: NURSE PRACTITIONER

## 2021-06-23 RX ORDER — BLOOD-GLUCOSE METER
1 KIT MISCELLANEOUS AS NEEDED
Qty: 1 EACH | Refills: 0 | Status: SHIPPED | OUTPATIENT
Start: 2021-06-23 | End: 2022-03-16

## 2021-06-23 RX ORDER — RABEPRAZOLE SODIUM 20 MG/1
TABLET, DELAYED RELEASE ORAL
COMMUNITY
Start: 2021-05-24 | End: 2022-03-16

## 2021-06-23 RX ORDER — BLOOD-GLUCOSE METER
EACH MISCELLANEOUS
Qty: 100 EACH | Refills: 11 | Status: SHIPPED | OUTPATIENT
Start: 2021-06-23 | End: 2022-03-16

## 2021-06-23 NOTE — PROGRESS NOTES
"Chief Complaint  office visit (lightheadedness with cooridination issues )      Subjective  Has been having issues with lightheadedness, patient states it seems to get better once he eats. Has started back on Meclizine. Patient states when he feels this way, he takes his blood pressure and it is fine.        Souleymane Stapleton presents to [facility]  Dizziness  This is a recurrent problem. The current episode started more than 1 year ago. The problem occurs daily. The problem has been waxing and waning. Associated symptoms include vertigo and weakness. Pertinent negatives include no arthralgias, congestion, coughing, diaphoresis, fatigue, myalgias or nausea. Nothing aggravates the symptoms. He has tried eating for the symptoms. The treatment provided moderate relief.       Review of Systems   Constitutional: Negative for activity change, appetite change, diaphoresis, fatigue and unexpected weight change.   HENT: Negative for congestion and ear pain.    Eyes: Negative for discharge and visual disturbance.   Respiratory: Negative for apnea, cough, choking, shortness of breath, wheezing and stridor.    Gastrointestinal: Negative for abdominal distention, constipation, diarrhea and nausea.   Endocrine: Negative for cold intolerance, heat intolerance, polyphagia and polyuria.   Genitourinary: Negative.  Negative for frequency.   Musculoskeletal: Negative for arthralgias, back pain and myalgias.   Skin: Negative for color change and wound.   Allergic/Immunologic: Negative.    Neurological: Positive for dizziness, vertigo and weakness. Negative for syncope and light-headedness.   Psychiatric/Behavioral: Negative for agitation and confusion. The patient is not nervous/anxious.          Objective   Vital Signs:   /82   Pulse 78   Temp 97.1 °F (36.2 °C)   Ht 174 cm (68.5\")   Wt 75.5 kg (166 lb 8 oz)   SpO2 96%   BMI 24.95 kg/m²     Physical Exam  Vitals and nursing note reviewed. "   Constitutional:       Appearance: Normal appearance. He is well-developed.   HENT:      Head: Normocephalic.      Right Ear: Tympanic membrane and ear canal normal.      Left Ear: Tympanic membrane and ear canal normal.      Nose: Nose normal.   Eyes:      Conjunctiva/sclera: Conjunctivae normal.      Pupils: Pupils are equal, round, and reactive to light.   Neck:      Thyroid: No thyroid mass or thyromegaly.      Trachea: Trachea normal. No tracheal tenderness or tracheal deviation.   Cardiovascular:      Rate and Rhythm: Normal rate.      Heart sounds: Normal heart sounds.   Pulmonary:      Effort: Pulmonary effort is normal. No respiratory distress.      Breath sounds: Normal breath sounds. No stridor. No wheezing or rales.   Abdominal:      Palpations: Abdomen is soft.   Musculoskeletal:         General: Normal range of motion.      Cervical back: Normal range of motion.   Lymphadenopathy:      Head:      Right side of head: No submental, submandibular or tonsillar adenopathy.      Left side of head: No submental, submandibular or tonsillar adenopathy.      Cervical: No cervical adenopathy.   Skin:     General: Skin is warm and dry.   Neurological:      Mental Status: He is alert and oriented to person, place, and time.   Psychiatric:         Mood and Affect: Mood is not anxious. Affect is not inappropriate.         Speech: Speech normal.         Behavior: Behavior normal. Behavior is not agitated or hyperactive.         Thought Content: Thought content normal.         Judgment: Judgment normal.        Result Review :                 Assessment and Plan    Diagnoses and all orders for this visit:    1. Hypoglycemia (Primary)  -     glucose monitor monitoring kit; 1 each As Needed (Hypoglycemia).  Dispense: 1 each; Refill: 0  -     glucose blood test strip; Use as instructed  Dispense: 50 each; Refill: 11  -     LIFESCAN FINEPOINT LANCETS misc; Use once daily and PRN  Dispense: 100 each; Refill: 11  -      Glucose, Fasting; Future    2. Dizzinesses  -     glucose monitor monitoring kit; 1 each As Needed (Hypoglycemia).  Dispense: 1 each; Refill: 0  -     glucose blood test strip; Use as instructed  Dispense: 50 each; Refill: 11  -     LIFESCAN FINEPOINT LANCETS misc; Use once daily and PRN  Dispense: 100 each; Refill: 11  -     Urinalysis With Culture If Indicated -; Future      1. Hypoglycemia   Blood glucose monitoring kit ordered  Please take your blood sugar once a day and when you start to feel lightheaded   Instructed to eat between meals     2. Dizziness  Keep diary of foods/blood sugar for approx one week before you return   Continue meclizine for now  I will order a urinalysis to rule out possible UTI     I spent 33 minutes caring for Souleymane on this date of service. This time includes time spent by me in the following activities:preparing for the visit, performing a medically appropriate examination and/or evaluation , counseling and educating the patient/family/caregiver, referring and communicating with other health care professionals  and documenting information in the medical record  Follow Up   Return if symptoms worsen or fail to improve, for Next scheduled follow up.  Patient was given instructions and counseling regarding his condition or for health maintenance advice. Please see specific information pulled into the AVS if appropriate.           This document has been electronically signed by CARLOS Wright on June 23, 2021 15:20 CDT

## 2021-06-25 ENCOUNTER — LAB (OUTPATIENT)
Dept: LAB | Facility: HOSPITAL | Age: 76
End: 2021-06-25

## 2021-06-25 DIAGNOSIS — R42 DIZZINESSES: ICD-10-CM

## 2021-06-25 DIAGNOSIS — E16.2 HYPOGLYCEMIA: ICD-10-CM

## 2021-06-25 LAB — GLUCOSE P FAST SERPL-MCNC: 104 MG/DL (ref 74–106)

## 2021-06-25 PROCEDURE — 36415 COLL VENOUS BLD VENIPUNCTURE: CPT

## 2021-06-25 PROCEDURE — 81003 URINALYSIS AUTO W/O SCOPE: CPT

## 2021-06-25 PROCEDURE — 82947 ASSAY GLUCOSE BLOOD QUANT: CPT

## 2021-06-26 LAB
BILIRUB UR QL STRIP: NEGATIVE
CLARITY UR: CLEAR
COLOR UR: YELLOW
GLUCOSE UR STRIP-MCNC: NEGATIVE MG/DL
HGB UR QL STRIP.AUTO: NEGATIVE
KETONES UR QL STRIP: NEGATIVE
LEUKOCYTE ESTERASE UR QL STRIP.AUTO: NEGATIVE
NITRITE UR QL STRIP: NEGATIVE
PH UR STRIP.AUTO: 6.5 [PH] (ref 5–8)
PROT UR QL STRIP: NEGATIVE
SP GR UR STRIP: 1.01 (ref 1–1.03)
UROBILINOGEN UR QL STRIP: NORMAL

## 2021-06-29 DIAGNOSIS — K40.90 RIGHT INGUINAL HERNIA: Primary | ICD-10-CM

## 2021-07-01 ENCOUNTER — OFFICE VISIT (OUTPATIENT)
Dept: SURGERY | Facility: CLINIC | Age: 76
End: 2021-07-01

## 2021-07-01 VITALS
TEMPERATURE: 97.4 F | WEIGHT: 171.4 LBS | BODY MASS INDEX: 25.39 KG/M2 | SYSTOLIC BLOOD PRESSURE: 130 MMHG | HEART RATE: 80 BPM | OXYGEN SATURATION: 96 % | DIASTOLIC BLOOD PRESSURE: 80 MMHG | HEIGHT: 69 IN

## 2021-07-01 DIAGNOSIS — R10.31 RIGHT GROIN PAIN: Primary | ICD-10-CM

## 2021-07-01 PROCEDURE — 99213 OFFICE O/P EST LOW 20 MIN: CPT | Performed by: SURGERY

## 2021-07-01 RX ORDER — CETIRIZINE HYDROCHLORIDE 10 MG/1
10 TABLET ORAL DAILY
COMMUNITY

## 2021-07-01 NOTE — PROGRESS NOTES
CHIEF COMPLAINT:    Right groin pain, testicular discomfort, possible hernia    HISTORY OF PRESENT ILLNESS:    Souleymane Stapleton is a 75 y.o. male who was seen previously in November 2020 for possible right groin hernia based on a prior ultrasound.  At that time clinically the patient had no evidence of hernia.  The patient had forgotten about that appointment.  He returns today with complaints of continued right groin discomfort with pain radiating down into his scrotum and testicular region.  This pain is essentially constant.  His initial ultrasound to evaluate this discomfort was in July of last year and ordered by his urologist Dr. Douglas who follows the patient for prostate cancer.  That ultrasound suggested that there was bowel in the right hemiscrotum.  Again, clinically there was no evidence of hernia.  Since that time the patient has also had a CT scan in December 2020 that showed no evidence of inguinal hernia.  The patient also notes no bulge in the region.    Past Medical History:   Diagnosis Date   • Abdominal pain    • Abnormal weight loss    • Acid reflux    • Acute gastritis    • Anxiety state    • Cancer (CMS/HCC)     Prostate   • Coronary artery disease     CABG 2018.   is followed by Dr Samano   • Hernia of abdominal wall    • History of cerebrovascular accident    • History of colon polyps    • Hyperlipidemia    • Hypertension    • Nausea    • Nuclear senile cataract    • Presbyopia    • Stroke (CMS/HCC) 2005    TIA   • Tobacco use    • Transient cerebral ischemia    • Vitreous detachment of left eye        Past Surgical History:   Procedure Laterality Date   • BACK SURGERY     • CARDIAC CATHETERIZATION N/A 6/19/2018    Procedure: Coronary angiography;  Surgeon: Delroy Mcconnell MD;  Location: [facility] INVASIVE LOCATION;  Service: Cardiovascular   • CATARACT EXTRACTION W/ INTRAOCULAR LENS IMPLANT Left 10/23/2020    Procedure: REMOVE CATARACT AND IMPLANT  INTRAOCULAR LENS;  Surgeon:  Van Funes MD;  Location: [facility] OR;  Service: Ophthalmology;  Laterality: Left;   • CATARACT EXTRACTION W/ INTRAOCULAR LENS IMPLANT Right 11/6/2020    Procedure: REMOVE CATARACT AND IMPLANT INTRAOCULAR LENS;  Surgeon: Van Funes MD;  Location: [facility] OR;  Service: Ophthalmology;  Laterality: Right;   • COLONOSCOPY  06/09/2015    Diverticulosis found in the sigmoid colon. Hemorrhoids found in the anus.   • COLONOSCOPY N/A 2/16/2021    Procedure: COLONOSCOPY;  Surgeon: Chuck Rich DO;  Location: [facility] ENDOSCOPY;  Service: Gastroenterology;  Laterality: N/A;   • CORONARY ARTERY BYPASS GRAFT N/A 6/22/2018    Procedure: PARAS STERNOTOMY CORONARY ARTERY BYPASS GRAFT TIMES 5 USING RIGHT AND LEFT INTERNAL MAMMARY ARTERIES AND LEFT GREATER SAPHENOUS VEIN GRAFT PER ENDOSCOPIC VEIN HARVESTING AND PRP;  Surgeon: Edgar Pérez MD;  Location: [facility];  Service: Cardiothoracic   • CRYOTHERAPY  08/14/2012    Of Acne   • ENDOSCOPY  09/01/2015    EGD w/ biopsy -Multiple polyps, one removed.   • ENDOSCOPY N/A 8/22/2019    Procedure: ESOPHAGOGASTRODUODENOSCOPY;  Surgeon: Chuck Rich DO;  Location: [facility] ENDOSCOPY;  Service: Gastroenterology   • ENDOSCOPY N/A 2/16/2021    Procedure: ESOPHAGOGASTRODUODENOSCOPY;  Surgeon: Chuck iRch DO;  Location: [facility] ENDOSCOPY;  Service: Gastroenterology;  Laterality: N/A;   • HEMORRHOIDECTOMY N/A 3/20/2017    Procedure: Removal of Anal Papilla;  Surgeon: Juan Diego Ken MD;  Location: [facility];  Service:    • JOINT REPLACEMENT     • LUMBAR LAMINECTOMY  09/29/2010   • OTHER SURGICAL HISTORY  08/19/2010    Biopsy, Each Additional Lesion    • SKIN BIOPSY  08/19/2010   • TOTAL SHOULDER ARTHROPLASTY W/ DISTAL CLAVICLE EXCISION Right 2/17/2020    Procedure: right reverse total shoulder arthroplasty.;  Surgeon: Juan Diego Mendoza MD;  Location: [facility];  Service: Orthopedics;  Laterality: Right;       Prior to Admission medications     Medication Sig Start Date End Date Taking? Authorizing Provider   acetaminophen (TYLENOL) 500 MG tablet Take 500 mg by mouth 3 (Three) Times a Day.   Yes Dmitriy Oconnor MD   ALPRAZolam (XANAX) 0.5 MG tablet Take 1 tablet by mouth 3 (Three) Times a Day. 12/17/20  Yes Souleymane Murrieta MD   cetirizine (zyrTEC) 10 MG tablet Take 10 mg by mouth Daily.   Yes Dmitriy Oconnor MD   clopidogrel (PLAVIX) 75 MG tablet TAKE 1 TABLET EVERY DAY 1/14/21  Yes Souleymane Murrieta MD   dutasteride (AVODART) 0.5 MG capsule Take 1 capsule by mouth Every Night. 4/13/18  Yes Souleymane Murrieta MD   finasteride (PROPECIA) 1 MG tablet TAKE 1 TABLET EVERY DAY 12/30/20  Yes Souleymane Murrieta MD   fluticasone (FLONASE) 50 MCG/ACT nasal spray 2 sprays into the nostril(s) as directed by provider Daily As Needed for Rhinitis.   Yes Dmitriy Oconnor MD   glucose blood test strip Use as instructed 6/23/21  Yes Aurea Blankenship APRN   glucose monitor monitoring kit 1 each As Needed (Hypoglycemia). 6/23/21  Yes Aurea Blankenship APRN   irbesartan-hydrochlorothiazide (Avalide) 150-12.5 MG tablet Take 1 tablet by mouth Daily. 4/26/21  Yes Roberto Samano MD   LIFESCAN FINEPOINT LANCETS misc Use once daily and PRN 6/23/21  Yes Aurea Blankenship APRN   Magnesium 250 MG tablet Take 1 tablet by mouth Daily.   Yes Dmitriy Oconnor MD   metoclopramide (Reglan) 5 MG tablet Take 1 tablet by mouth 4 (Four) Times a Day Before Meals & at Bedtime. 2/22/21  Yes Souleymane Murrieta MD   metoprolol succinate XL (TOPROL-XL) 25 MG 24 hr tablet Take 1 tablet by mouth Daily With Dinner. 10/14/20  Yes Roberto Samano MD   omeprazole (priLOSEC) 40 MG capsule Take 1 capsule by mouth 2 (two) times a day. 4/12/21  Yes Chuck Rich DO   RABEprazole (Aciphex) 20 MG EC tablet take 1 tablet by oral route  every day 5/24/21  Yes Provider, MD Dmitriy   sildenafil (VIAGRA) 100 MG tablet Take 1 tablet by mouth once daily as  needed for Erectile dysfunction 20  Yes Souleymane Murrieta MD   simethicone (GAS-X) 80 MG chewable tablet Chew 1 tablet Every 6 (Six) Hours As Needed for Flatulence. 18  Yes Souleymane Murrieta MD   Tiotropium Bromide Monohydrate (SPIRIVA RESPIMAT) 1.25 MCG/ACT aerosol solution inhaler Inhale 2 puffs Daily. 20  Yes Souleymane Murrieta MD   traZODone (DESYREL) 50 MG tablet TAKE 1 TABLET EVERY NIGHT 21  Yes Souleymane Murrieta MD   vitamin B-12 (CYANOCOBALAMIN) 500 MCG tablet Take 500 mcg by mouth Daily.   Yes Provider, MD Dmitriy       Allergies   Allergen Reactions   • Statins Myalgia   • Tricor [Fenofibrate] Myalgia       Family History   Problem Relation Age of Onset   • Dementia Other    • Hypertension Other    • Stroke Other    • Hypertension Mother    • Parkinsonism Mother    • Cancer Mother    • Dementia Mother    • Hypertension Father    • Alcohol abuse Father    • Heart disease Brother    • Hypertension Brother    • Glaucoma Brother    • No Known Problems Sister    • No Known Problems Son    • Heart attack Paternal Grandfather    • Hypertension Brother        Social History     Socioeconomic History   • Marital status:      Spouse name: Not on file   • Number of children: Not on file   • Years of education: Not on file   • Highest education level: Not on file   Tobacco Use   • Smoking status: Former Smoker     Types: Cigarettes     Quit date:      Years since quittin.5   • Smokeless tobacco: Never Used   Vaping Use   • Vaping Use: Never used   Substance and Sexual Activity   • Alcohol use: Yes     Alcohol/week: 1.0 standard drinks     Types: 1 Glasses of wine per week     Comment: Social   • Drug use: Never   • Sexual activity: Defer       Review of Systems   Gastrointestinal: Negative for blood in stool, constipation, diarrhea, nausea and vomiting.       Objective     /80 (BP Location: Left arm, Patient Position: Sitting, Cuff Size: Adult)   Pulse 80    "Temp 97.4 °F (36.3 °C) (Temporal)   Ht 174 cm (68.5\")   Wt 77.7 kg (171 lb 6.4 oz)   SpO2 96%   BMI 25.68 kg/m²     Physical Exam  Constitutional:       General: He is not in acute distress.     Appearance: Normal appearance. He is not ill-appearing, toxic-appearing or diaphoretic.   HENT:      Head: Normocephalic and atraumatic.   Eyes:      General: No scleral icterus.        Right eye: No discharge.         Left eye: No discharge.      Extraocular Movements: Extraocular movements intact.      Conjunctiva/sclera: Conjunctivae normal.   Pulmonary:      Effort: Respiratory distress present.   Abdominal:      Palpations: Abdomen is soft.      Hernia: No hernia is present.       Skin:     General: Skin is warm.   Neurological:      General: No focal deficit present.      Mental Status: He is alert and oriented to person, place, and time.   Psychiatric:         Mood and Affect: Mood normal.         Behavior: Behavior normal.         Thought Content: Thought content normal.         Judgment: Judgment normal.         DIAGNOSTIC DATA:    Prior ultrasound and CT scans from last year were reviewed as described in the HPI.    ASSESSMENT:    Right groin pain    PLAN:    1 exam there is no evidence of hernia.  On CT scan in December last year there is no evidence of hernia.  This was discussed with the patient today.  It is unclear to me why he has right groin and testicular pain.  Advised that he should see his urologist to discuss further.  He does see Dr. Douglas in approximately 2 weeks.  Currently there is no indication for surgery.  He can see me as needed.          This document has been electronically signed by Juan Diego Ken MD on July 1, 2021 11:29 CDT      "

## 2021-07-08 RX ORDER — TRAZODONE HYDROCHLORIDE 50 MG/1
TABLET ORAL
Qty: 90 TABLET | Refills: 1 | Status: SHIPPED | OUTPATIENT
Start: 2021-07-08 | End: 2021-11-15

## 2021-07-12 ENCOUNTER — LAB (OUTPATIENT)
Dept: LAB | Facility: HOSPITAL | Age: 76
End: 2021-07-12

## 2021-07-12 ENCOUNTER — TRANSCRIBE ORDERS (OUTPATIENT)
Dept: LAB | Facility: HOSPITAL | Age: 76
End: 2021-07-12

## 2021-07-12 DIAGNOSIS — N40.0 BENIGN PROSTATIC HYPERPLASIA, UNSPECIFIED WHETHER LOWER URINARY TRACT SYMPTOMS PRESENT: Primary | ICD-10-CM

## 2021-07-12 DIAGNOSIS — N40.0 BENIGN PROSTATIC HYPERPLASIA, UNSPECIFIED WHETHER LOWER URINARY TRACT SYMPTOMS PRESENT: ICD-10-CM

## 2021-07-12 PROCEDURE — 84153 ASSAY OF PSA TOTAL: CPT

## 2021-07-12 PROCEDURE — 36415 COLL VENOUS BLD VENIPUNCTURE: CPT

## 2021-07-13 LAB — PSA SERPL-MCNC: 30.4 NG/ML (ref 0–4)

## 2021-07-14 ENCOUNTER — OFFICE VISIT (OUTPATIENT)
Dept: FAMILY MEDICINE CLINIC | Facility: CLINIC | Age: 76
End: 2021-07-14

## 2021-07-14 VITALS
SYSTOLIC BLOOD PRESSURE: 128 MMHG | OXYGEN SATURATION: 95 % | WEIGHT: 166.19 LBS | HEART RATE: 75 BPM | BODY MASS INDEX: 24.61 KG/M2 | HEIGHT: 69 IN | DIASTOLIC BLOOD PRESSURE: 78 MMHG

## 2021-07-14 DIAGNOSIS — R42 DIZZINESS: ICD-10-CM

## 2021-07-14 DIAGNOSIS — R06.00 DYSPNEA, UNSPECIFIED TYPE: ICD-10-CM

## 2021-07-14 DIAGNOSIS — C61 MALIGNANT NEOPLASM OF PROSTATE (HCC): Primary | ICD-10-CM

## 2021-07-14 DIAGNOSIS — F41.9 ANXIETY: Primary | ICD-10-CM

## 2021-07-14 PROCEDURE — 99214 OFFICE O/P EST MOD 30 MIN: CPT | Performed by: FAMILY MEDICINE

## 2021-07-14 RX ORDER — CLOPIDOGREL BISULFATE 75 MG/1
75 TABLET ORAL DAILY
Qty: 90 TABLET | Refills: 1 | Status: SHIPPED | OUTPATIENT
Start: 2021-07-14 | End: 2022-03-28

## 2021-07-14 RX ORDER — ALPRAZOLAM 0.5 MG/1
0.5 TABLET ORAL 3 TIMES DAILY
Qty: 270 TABLET | Refills: 1 | Status: SHIPPED | OUTPATIENT
Start: 2021-07-14 | End: 2021-10-14 | Stop reason: SDUPTHER

## 2021-07-14 NOTE — PROGRESS NOTES
Subjective   Souleymane Stapleton is a 75 y.o. male.   Cc: follow up of chronic medical issues    Dizziness  This is a chronic problem. The current episode started more than 1 year ago. The problem occurs daily. The problem has been gradually improving. Associated symptoms include coughing and fatigue. Pertinent negatives include no abdominal pain, anorexia, arthralgias, change in bowel habit, chest pain, chills, congestion, diaphoresis, fever, headaches, joint swelling, myalgias, nausea, neck pain, numbness, rash, sore throat, swollen glands, urinary symptoms, vertigo, visual change, vomiting or weakness.   Anxiety  Presents for follow-up visit. Symptoms include decreased concentration, dizziness, dry mouth, muscle tension, nervous/anxious behavior and shortness of breath. Patient reports no chest pain, compulsions, confusion, depressed mood, excessive worry, feeling of choking, hyperventilation, insomnia, irritability, malaise, nausea, obsessions, palpitations, panic, restlessness or suicidal ideas.       He still will have episodes of dizziness. It will occur on stamding and can occur just lying in bed. He has checked and his blood sugars can b e low. He has increased his eating between meals and this has helped some.    The following portions of the patient's history were reviewed and updated as appropriate: allergies, current medications, past family history, past medical history, past social history, past surgical history and problem list.    Review of Systems   Constitutional: Positive for fatigue. Negative for chills, diaphoresis, fever and irritability.   HENT: Negative for congestion and sore throat.    Respiratory: Positive for cough and shortness of breath.    Cardiovascular: Negative for chest pain and palpitations.   Gastrointestinal: Negative for abdominal pain, anorexia, change in bowel habit, nausea and vomiting.   Musculoskeletal: Negative for arthralgias, joint swelling, myalgias and neck pain.  "  Skin: Negative for rash.   Neurological: Positive for dizziness. Negative for vertigo, weakness, numbness and headaches.   Psychiatric/Behavioral: Positive for decreased concentration. Negative for confusion and suicidal ideas. The patient is nervous/anxious. The patient does not have insomnia.        Objective   Physical Exam  Vitals reviewed.   Constitutional:       Appearance: Normal appearance.   HENT:      Head: Normocephalic and atraumatic.      Right Ear: Tympanic membrane, ear canal and external ear normal.      Left Ear: Tympanic membrane, ear canal and external ear normal.      Nose: Nose normal.      Mouth/Throat:      Mouth: Mucous membranes are moist.      Pharynx: Oropharynx is clear.   Cardiovascular:      Rate and Rhythm: Normal rate and regular rhythm.      Heart sounds: Normal heart sounds. No murmur heard.   No friction rub. No gallop.    Pulmonary:      Effort: Pulmonary effort is normal. No respiratory distress.      Breath sounds: Normal breath sounds. No stridor. No wheezing, rhonchi or rales.   Chest:      Chest wall: No tenderness.   Abdominal:      General: Abdomen is flat. Bowel sounds are normal. There is no distension.      Palpations: Abdomen is soft. There is no mass.      Tenderness: There is no abdominal tenderness. There is no guarding or rebound.      Hernia: No hernia is present.   Musculoskeletal:      Cervical back: Normal range of motion.      Comments: Back is non tender.   Skin:     General: Skin is warm and dry.   Neurological:      Mental Status: He is alert.           Visit Vitals  /78   Pulse 75   Ht 174 cm (68.5\")   Wt 75.4 kg (166 lb 3 oz)   SpO2 95%   BMI 24.90 kg/m²     Body mass index is 24.9 kg/m².      Assessment/Plan   Diagnoses and all orders for this visit:    1. Anxiety (Primary)  -     ALPRAZolam (XANAX) 0.5 MG tablet; Take 1 tablet by mouth 3 (Three) Times a Day.  Dispense: 270 tablet; Refill: 1    2. Dizziness    3. Dyspnea, unspecified type  -     " Adult Transthoracic Echo Complete W/ Cont if Necessary Per Protocol; Future    Other orders  -     clopidogrel (PLAVIX) 75 MG tablet; Take 1 tablet by mouth Daily.  Dispense: 90 tablet; Refill: 1  -     Tiotropium Bromide Monohydrate (SPIRIVA RESPIMAT) 1.25 MCG/ACT aerosol solution inhaler; Inhale 2 puffs Daily.  Dispense: 12 g; Refill: 6    RICK is appropriate.  Return to the clinic in 3 month/s.  Will contact with results as needed.  I reviewed his CBC,CMP and Hgb A1-c with the patient.

## 2021-07-19 ENCOUNTER — HOSPITAL ENCOUNTER (OUTPATIENT)
Dept: NUCLEAR MEDICINE | Facility: HOSPITAL | Age: 76
Discharge: HOME | End: 2021-07-19

## 2021-07-19 DIAGNOSIS — C61 MALIGNANT NEOPLASM OF PROSTATE (HCC): ICD-10-CM

## 2021-07-19 PROCEDURE — A9503 TC99M MEDRONATE: HCPCS | Performed by: UROLOGY

## 2021-07-19 PROCEDURE — 0 TECHNETIUM MEDRONATE KIT: Performed by: UROLOGY

## 2021-07-19 PROCEDURE — 78306 BONE IMAGING WHOLE BODY: CPT

## 2021-07-19 RX ORDER — TC 99M MEDRONATE 20 MG/10ML
26.9 INJECTION, POWDER, LYOPHILIZED, FOR SOLUTION INTRAVENOUS
Status: COMPLETED | OUTPATIENT
Start: 2021-07-19 | End: 2021-07-19

## 2021-07-19 RX ADMIN — Medication 26.9 MILLICURIE: at 09:45

## 2021-07-29 DIAGNOSIS — R06.00 DYSPNEA, UNSPECIFIED TYPE: Primary | ICD-10-CM

## 2021-07-30 ENCOUNTER — TELEPHONE (OUTPATIENT)
Dept: CARDIOLOGY | Facility: CLINIC | Age: 76
End: 2021-07-30

## 2021-07-30 DIAGNOSIS — R06.00 DYSPNEA, UNSPECIFIED TYPE: Primary | ICD-10-CM

## 2021-08-09 ENCOUNTER — HOSPITAL ENCOUNTER (OUTPATIENT)
Dept: NUCLEAR MEDICINE | Facility: HOSPITAL | Age: 76
Discharge: HOME | End: 2021-08-09

## 2021-08-09 ENCOUNTER — HOSPITAL ENCOUNTER (OUTPATIENT)
Dept: CARDIOLOGY | Facility: HOSPITAL | Age: 76
Discharge: HOME | End: 2021-08-09

## 2021-08-09 DIAGNOSIS — R06.00 DYSPNEA, UNSPECIFIED TYPE: ICD-10-CM

## 2021-08-09 LAB
BH CV REST NUCLEAR ISOTOPE DOSE: 10.4 MCI
BH CV STRESS BP STAGE 1: NORMAL
BH CV STRESS COMMENTS STAGE 1: NORMAL
BH CV STRESS DOSE REGADENOSON STAGE 1: 0.4
BH CV STRESS DURATION MIN STAGE 1: 0
BH CV STRESS DURATION SEC STAGE 1: 10
BH CV STRESS HR STAGE 1: 96
BH CV STRESS NUCLEAR ISOTOPE DOSE: 37.4 MCI
BH CV STRESS PROTOCOL 1: NORMAL
BH CV STRESS RECOVERY BP: NORMAL MMHG
BH CV STRESS RECOVERY HR: 85 BPM
BH CV STRESS STAGE 1: 1
LV EF NUC BP: 70 %
MAXIMAL PREDICTED HEART RATE: 143 BPM
PERCENT MAX PREDICTED HR: 68.53 %
STRESS BASELINE BP: NORMAL MMHG
STRESS BASELINE HR: 65 BPM
STRESS PERCENT HR: 81 %
STRESS POST ESTIMATED WORKLOAD: 1 METS
STRESS POST PEAK BP: NORMAL MMHG
STRESS POST PEAK HR: 98 BPM
STRESS TARGET HR: 122 BPM

## 2021-08-09 PROCEDURE — 0 TECHNETIUM SESTAMIBI: Performed by: INTERNAL MEDICINE

## 2021-08-09 PROCEDURE — A9500 TC99M SESTAMIBI: HCPCS | Performed by: INTERNAL MEDICINE

## 2021-08-09 PROCEDURE — 25010000002 REGADENOSON 0.4 MG/5ML SOLUTION: Performed by: INTERNAL MEDICINE

## 2021-08-09 PROCEDURE — 93018 CV STRESS TEST I&R ONLY: CPT | Performed by: INTERNAL MEDICINE

## 2021-08-09 PROCEDURE — 93017 CV STRESS TEST TRACING ONLY: CPT

## 2021-08-09 PROCEDURE — 78452 HT MUSCLE IMAGE SPECT MULT: CPT | Performed by: INTERNAL MEDICINE

## 2021-08-09 PROCEDURE — 78452 HT MUSCLE IMAGE SPECT MULT: CPT

## 2021-08-09 RX ORDER — SODIUM CHLORIDE 0.9 % (FLUSH) 0.9 %
10 SYRINGE (ML) INJECTION ONCE
Status: COMPLETED | OUTPATIENT
Start: 2021-08-09 | End: 2021-08-09

## 2021-08-09 RX ADMIN — REGADENOSON 0.4 MG: 0.08 INJECTION, SOLUTION INTRAVENOUS at 09:21

## 2021-08-09 RX ADMIN — SODIUM CHLORIDE, PRESERVATIVE FREE 10 ML: 5 INJECTION INTRAVENOUS at 09:21

## 2021-08-09 RX ADMIN — TECHNETIUM TC 99M SESTAMIBI 1 DOSE: 1 INJECTION INTRAVENOUS at 09:22

## 2021-08-09 RX ADMIN — TECHNETIUM TC 99M SESTAMIBI 1 DOSE: 1 INJECTION INTRAVENOUS at 08:18

## 2021-08-10 ENCOUNTER — TELEPHONE (OUTPATIENT)
Dept: CARDIOLOGY | Facility: CLINIC | Age: 76
End: 2021-08-10

## 2021-09-01 RX ORDER — MECLIZINE HCL 12.5 MG/1
12.5 TABLET ORAL 3 TIMES DAILY PRN
Qty: 90 TABLET | Refills: 2 | Status: SHIPPED | OUTPATIENT
Start: 2021-09-01 | End: 2021-10-13 | Stop reason: ALTCHOICE

## 2021-09-03 ENCOUNTER — OFFICE VISIT (OUTPATIENT)
Dept: FAMILY MEDICINE CLINIC | Facility: CLINIC | Age: 76
End: 2021-09-03

## 2021-09-03 ENCOUNTER — LAB (OUTPATIENT)
Dept: LAB | Facility: HOSPITAL | Age: 76
End: 2021-09-03

## 2021-09-03 VITALS
HEART RATE: 89 BPM | SYSTOLIC BLOOD PRESSURE: 128 MMHG | HEIGHT: 69 IN | DIASTOLIC BLOOD PRESSURE: 70 MMHG | OXYGEN SATURATION: 98 % | BODY MASS INDEX: 25.07 KG/M2 | WEIGHT: 169.25 LBS

## 2021-09-03 DIAGNOSIS — I10 ESSENTIAL HYPERTENSION: ICD-10-CM

## 2021-09-03 DIAGNOSIS — E78.00 PURE HYPERCHOLESTEROLEMIA: ICD-10-CM

## 2021-09-03 DIAGNOSIS — F41.9 ANXIETY: ICD-10-CM

## 2021-09-03 DIAGNOSIS — R05.3 CHRONIC COUGH: Primary | ICD-10-CM

## 2021-09-03 DIAGNOSIS — M54.2 NECK PAIN: ICD-10-CM

## 2021-09-03 PROCEDURE — 80061 LIPID PANEL: CPT

## 2021-09-03 PROCEDURE — 36415 COLL VENOUS BLD VENIPUNCTURE: CPT

## 2021-09-03 PROCEDURE — 80053 COMPREHEN METABOLIC PANEL: CPT

## 2021-09-03 PROCEDURE — 99214 OFFICE O/P EST MOD 30 MIN: CPT | Performed by: FAMILY MEDICINE

## 2021-09-03 PROCEDURE — 85027 COMPLETE CBC AUTOMATED: CPT

## 2021-09-03 RX ORDER — BLOOD-GLUCOSE METER
EACH MISCELLANEOUS
COMMUNITY
Start: 2021-06-23 | End: 2022-03-16

## 2021-09-03 RX ORDER — PREDNISONE 10 MG/1
TABLET ORAL
Qty: 30 TABLET | Refills: 0 | Status: SHIPPED | OUTPATIENT
Start: 2021-09-03 | End: 2021-10-13

## 2021-09-03 RX ORDER — BUSPIRONE HYDROCHLORIDE 5 MG/1
5 TABLET ORAL 3 TIMES DAILY
Qty: 90 TABLET | Refills: 2 | Status: SHIPPED | OUTPATIENT
Start: 2021-09-03 | End: 2021-10-14 | Stop reason: SDUPTHER

## 2021-09-03 NOTE — PROGRESS NOTES
Subjective   Souleymane Stapleton is a 75 y.o. male.   Cc: follow up of chronic medical issues    Cough  This is a chronic problem. The current episode started more than 1 month ago. The problem has been unchanged. Associated symptoms include ear congestion, myalgias, postnasal drip and shortness of breath. Pertinent negatives include no chest pain, chills, ear pain, fever, headaches, heartburn, hemoptysis, nasal congestion, rash, rhinorrhea, sore throat, sweats, weight loss or wheezing.   Hypertension  This is a chronic problem. The current episode started more than 1 year ago. The problem has been gradually improving since onset. Associated symptoms include anxiety, malaise/fatigue, neck pain, orthopnea and shortness of breath. Pertinent negatives include no blurred vision, chest pain, headaches, palpitations, peripheral edema, PND or sweats.   Hyperlipidemia  This is a chronic problem. The current episode started more than 1 year ago. The problem is resistant. Associated symptoms include myalgias and shortness of breath. Pertinent negatives include no chest pain. Current antihyperlipidemic treatment includes diet change.   Anxiety  Presents for follow-up visit. Symptoms include compulsions, decreased concentration, depressed mood, dizziness, dry mouth, excessive worry, nervous/anxious behavior, restlessness and shortness of breath. Patient reports no chest pain, confusion, feeling of choking, hyperventilation, insomnia, irritability, malaise, muscle tension, nausea, obsessions, palpitations, panic or suicidal ideas.       This is a chronic cough that has been going on for about nine months.He also has a soreness in the chest.He says that some foods almost get stuck when he eats.He has had a recent upper endoscopy in the last 6 months.  The following portions of the patient's history were reviewed and updated as appropriate: allergies, current medications, past family history, past medical history, past social  history, past surgical history and problem list.    Review of Systems   Constitutional: Positive for malaise/fatigue. Negative for chills, fever, irritability and weight loss.   HENT: Positive for postnasal drip. Negative for ear pain, rhinorrhea and sore throat.    Eyes: Negative for blurred vision.   Respiratory: Positive for cough and shortness of breath. Negative for hemoptysis and wheezing.    Cardiovascular: Positive for orthopnea. Negative for chest pain, palpitations and PND.   Gastrointestinal: Negative for heartburn and nausea.   Musculoskeletal: Positive for myalgias and neck pain.   Skin: Negative for rash.   Neurological: Positive for dizziness. Negative for headaches.   Psychiatric/Behavioral: Positive for decreased concentration. Negative for confusion and suicidal ideas. The patient is nervous/anxious. The patient does not have insomnia.        Objective   Physical Exam  Vitals reviewed.   Constitutional:       Appearance: Normal appearance.   HENT:      Head: Normocephalic and atraumatic.      Right Ear: Tympanic membrane, ear canal and external ear normal.      Left Ear: Tympanic membrane, ear canal and external ear normal.      Nose: Nose normal.      Mouth/Throat:      Mouth: Mucous membranes are moist.      Pharynx: Oropharynx is clear.   Neck:      Comments: There is tenderness on palpation of the lateral aspect of the neck.  Cardiovascular:      Rate and Rhythm: Normal rate and regular rhythm.      Heart sounds: Normal heart sounds. No murmur heard.   No friction rub. No gallop.    Pulmonary:      Effort: Pulmonary effort is normal. No respiratory distress.      Breath sounds: Normal breath sounds. No stridor. No wheezing, rhonchi or rales.   Chest:      Chest wall: No tenderness.   Abdominal:      General: Abdomen is flat. Bowel sounds are normal. There is no distension.      Palpations: Abdomen is soft. There is no mass.      Tenderness: There is no abdominal tenderness. There is no guarding  "or rebound.      Hernia: No hernia is present.   Musculoskeletal:      Cervical back: Normal range of motion.   Skin:     General: Skin is warm and dry.   Neurological:      Mental Status: He is alert.           Visit Vitals  /70   Pulse 89   Ht 174 cm (68.5\")   Wt 76.8 kg (169 lb 4 oz)   SpO2 98%   BMI 25.36 kg/m²     Body mass index is 25.36 kg/m².      Assessment/Plan   Diagnoses and all orders for this visit:    1. Chronic cough (Primary)  -     XR Chest 2 View; Future  -     Ambulatory Referral to Pulmonology    2. Essential hypertension  -     Comprehensive metabolic panel; Future  -     CBC No Differential; Future    3. Pure hypercholesterolemia  -     Lipid panel; Future    4. Anxiety    5. Neck pain  -     XR Spine Cervical Complete 4 or 5 View; Future    Other orders  -     busPIRone (BUSPAR) 5 MG tablet; Take 1 tablet by mouth 3 (Three) Times a Day.  Dispense: 90 tablet; Refill: 2  -     predniSONE (DELTASONE) 10 MG tablet; 4 daily times three days, 3 daily times three days, 2 daily times three days,1 daily times three days  Dispense: 30 tablet; Refill: 0    Return to the clinic in 1 month/s.  Will contact with results as needed.    "

## 2021-09-04 LAB
ALBUMIN SERPL-MCNC: 4.4 G/DL (ref 3.5–5.2)
ALBUMIN/GLOB SERPL: 1.6 G/DL
ALP SERPL-CCNC: 56 U/L (ref 39–117)
ALT SERPL W P-5'-P-CCNC: 43 U/L (ref 1–41)
ANION GAP SERPL CALCULATED.3IONS-SCNC: 11.6 MMOL/L (ref 5–15)
AST SERPL-CCNC: 33 U/L (ref 1–40)
BILIRUB SERPL-MCNC: 0.4 MG/DL (ref 0–1.2)
BUN SERPL-MCNC: 25 MG/DL (ref 8–23)
BUN/CREAT SERPL: 23.6 (ref 7–25)
CALCIUM SPEC-SCNC: 9.6 MG/DL (ref 8.6–10.5)
CHLORIDE SERPL-SCNC: 101 MMOL/L (ref 98–107)
CHOLEST SERPL-MCNC: 265 MG/DL (ref 0–200)
CO2 SERPL-SCNC: 24.4 MMOL/L (ref 22–29)
CREAT SERPL-MCNC: 1.06 MG/DL (ref 0.76–1.27)
DEPRECATED RDW RBC AUTO: 41.1 FL (ref 37–54)
ERYTHROCYTE [DISTWIDTH] IN BLOOD BY AUTOMATED COUNT: 12.9 % (ref 12.3–15.4)
GFR SERPL CREATININE-BSD FRML MDRD: 68 ML/MIN/1.73
GLOBULIN UR ELPH-MCNC: 2.7 GM/DL
GLUCOSE SERPL-MCNC: 96 MG/DL (ref 65–99)
HCT VFR BLD AUTO: 44.8 % (ref 37.5–51)
HDLC SERPL-MCNC: 35 MG/DL (ref 40–60)
HGB BLD-MCNC: 15.4 G/DL (ref 13–17.7)
LDLC SERPL CALC-MCNC: 171 MG/DL (ref 0–100)
LDLC/HDLC SERPL: 4.83 {RATIO}
MCH RBC QN AUTO: 30.1 PG (ref 26.6–33)
MCHC RBC AUTO-ENTMCNC: 34.4 G/DL (ref 31.5–35.7)
MCV RBC AUTO: 87.5 FL (ref 79–97)
PLATELET # BLD AUTO: 345 10*3/MM3 (ref 140–450)
PMV BLD AUTO: 10.8 FL (ref 6–12)
POTASSIUM SERPL-SCNC: 4.3 MMOL/L (ref 3.5–5.2)
PROT SERPL-MCNC: 7.1 G/DL (ref 6–8.5)
RBC # BLD AUTO: 5.12 10*6/MM3 (ref 4.14–5.8)
SODIUM SERPL-SCNC: 137 MMOL/L (ref 136–145)
TRIGL SERPL-MCNC: 305 MG/DL (ref 0–150)
VLDLC SERPL-MCNC: 59 MG/DL (ref 5–40)
WBC # BLD AUTO: 11.44 10*3/MM3 (ref 3.4–10.8)

## 2021-09-13 ENCOUNTER — OFFICE VISIT (OUTPATIENT)
Dept: CARDIOLOGY | Facility: CLINIC | Age: 76
End: 2021-09-13

## 2021-09-13 VITALS
WEIGHT: 168 LBS | DIASTOLIC BLOOD PRESSURE: 72 MMHG | HEIGHT: 69 IN | BODY MASS INDEX: 24.88 KG/M2 | SYSTOLIC BLOOD PRESSURE: 124 MMHG | HEART RATE: 71 BPM | OXYGEN SATURATION: 99 % | TEMPERATURE: 97.8 F

## 2021-09-13 DIAGNOSIS — E78.2 MIXED HYPERLIPIDEMIA: ICD-10-CM

## 2021-09-13 DIAGNOSIS — R42 DIZZINESS: ICD-10-CM

## 2021-09-13 DIAGNOSIS — I25.810 CORONARY ARTERY DISEASE INVOLVING CORONARY BYPASS GRAFT OF NATIVE HEART WITHOUT ANGINA PECTORIS: ICD-10-CM

## 2021-09-13 DIAGNOSIS — I10 ESSENTIAL HYPERTENSION: Primary | ICD-10-CM

## 2021-09-13 PROCEDURE — 93000 ELECTROCARDIOGRAM COMPLETE: CPT | Performed by: INTERNAL MEDICINE

## 2021-09-13 PROCEDURE — 99214 OFFICE O/P EST MOD 30 MIN: CPT | Performed by: INTERNAL MEDICINE

## 2021-09-13 NOTE — PROGRESS NOTES
Souleymane Stapleton  75 y.o. male    1. Essential hypertension    2. Coronary artery disease involving coronary bypass graft of native heart without angina pectoris    3. Dizziness    4. Mixed hyperlipidemia        History of Present Illness  Souleymane Stapleton is a 75-year-old male with coronary artery disease status post coronary artery bypass surgery in June 2018 (CABG x 5 using bilateral mammary grafts), hypertension, hyperlipidemia, history of cerebrovascular event.    The patient denied any chest pain but did have NYHA class II dyspnea on exertion with no PND or orthopnea.  He has occasional dizziness with no syncopal episode.    He underwent work-up earlier this year and an echocardiogram and a Lexiscan Cardiolite stress test was performed.  Echocardiogram in July 2021 showed:  · Left ventricular wall thickness is consistent with mild to moderate concentric hypertrophy.  · Estimated left ventricular EF = 57% Left ventricular ejection fraction appears to be 56 - 60%. Left ventricular systolic function is normal.  · Left ventricular diastolic function is consistent with (grade I) impaired relaxation.  · The right ventricular cavity is mild to moderately dilated. Normal systolic function  · Estimated right ventricular systolic pressure from tricuspid regurgitation is normal (<35 mmHg).  · Mild dilation of the aortic root is present (4.4 cms)    Lexiscan Cardiolite stress test in August 2021 showed:  · Findings consistent with an equivocal ECG stress test.  · Left ventricular ejection fraction is normal. (Calculated EF = 70%).  · Myocardial perfusion imaging indicates a normal myocardial perfusion study with no evidence of ischemia.  · Impressions are consistent with a low risk study.     EKG today showed sinus rhythm with heart rate of 71 bpm.  Small Q waves in the inferior leads.  QTc interval 423 ms.  No arrhythmia noted.    His LDL is known to be elevated and was 171 and total cholesterol 265 when checked earlier  this month.  He has had significant intolerance to statins secondary to myalgia/muscle weakness and developed flulike symptoms with Repatha.  He has tried Zetia as well and did not tolerate it.      Allergies   Allergen Reactions   • Statins Myalgia   • Tricor [Fenofibrate] Myalgia         Past Medical History:   Diagnosis Date   • Abdominal pain    • Abnormal weight loss    • Acid reflux    • Acute gastritis    • Anxiety state    • Cancer (CMS/HCC)     Prostate   • Coronary artery disease     CABG 2018.   is followed by Dr Samano   • Hernia of abdominal wall    • History of cerebrovascular accident    • History of colon polyps    • Hyperlipidemia    • Hypertension    • Nausea    • Nuclear senile cataract    • Presbyopia    • Stroke (CMS/HCC) 2005    TIA   • Tobacco use    • Transient cerebral ischemia    • Vitreous detachment of left eye          Past Surgical History:   Procedure Laterality Date   • BACK SURGERY     • CARDIAC CATHETERIZATION N/A 6/19/2018    Procedure: Coronary angiography;  Surgeon: Delroy Mcconnell MD;  Location: [facility] CATH INVASIVE LOCATION;  Service: Cardiovascular   • CATARACT EXTRACTION W/ INTRAOCULAR LENS IMPLANT Left 10/23/2020    Procedure: REMOVE CATARACT AND IMPLANT  INTRAOCULAR LENS;  Surgeon: Van Funes MD;  Location: [facility] OR;  Service: Ophthalmology;  Laterality: Left;   • CATARACT EXTRACTION W/ INTRAOCULAR LENS IMPLANT Right 11/6/2020    Procedure: REMOVE CATARACT AND IMPLANT INTRAOCULAR LENS;  Surgeon: Van Funes MD;  Location: [facility] OR;  Service: Ophthalmology;  Laterality: Right;   • COLONOSCOPY  06/09/2015    Diverticulosis found in the sigmoid colon. Hemorrhoids found in the anus.   • COLONOSCOPY N/A 2/16/2021    Procedure: COLONOSCOPY;  Surgeon: Chuck Rich DO;  Location: [facility] ENDOSCOPY;  Service: Gastroenterology;  Laterality: N/A;   • CORONARY ARTERY BYPASS GRAFT N/A 6/22/2018    Procedure: PARAS STERNOTOMY CORONARY ARTERY BYPASS  GRAFT TIMES 5 USING RIGHT AND LEFT INTERNAL MAMMARY ARTERIES AND LEFT GREATER SAPHENOUS VEIN GRAFT PER ENDOSCOPIC VEIN HARVESTING AND PRP;  Surgeon: Edgar Pérez MD;  Location: [facility] OR;  Service: Cardiothoracic   • CRYOTHERAPY  08/14/2012    Of Acne   • ENDOSCOPY  09/01/2015    EGD w/ biopsy -Multiple polyps, one removed.   • ENDOSCOPY N/A 8/22/2019    Procedure: ESOPHAGOGASTRODUODENOSCOPY;  Surgeon: Chuck Rich DO;  Location: [facility] ENDOSCOPY;  Service: Gastroenterology   • ENDOSCOPY N/A 2/16/2021    Procedure: ESOPHAGOGASTRODUODENOSCOPY;  Surgeon: Chuck Rich DO;  Location: [facility] ENDOSCOPY;  Service: Gastroenterology;  Laterality: N/A;   • HEMORRHOIDECTOMY N/A 3/20/2017    Procedure: Removal of Anal Papilla;  Surgeon: Juan Diego Ken MD;  Location: [facility] OR;  Service:    • JOINT REPLACEMENT     • LUMBAR LAMINECTOMY  09/29/2010   • OTHER SURGICAL HISTORY  08/19/2010    Biopsy, Each Additional Lesion    • SKIN BIOPSY  08/19/2010   • TOTAL SHOULDER ARTHROPLASTY W/ DISTAL CLAVICLE EXCISION Right 2/17/2020    Procedure: right reverse total shoulder arthroplasty.;  Surgeon: Juan Diego Mendoza MD;  Location: [facility];  Service: Orthopedics;  Laterality: Right;         Family History   Problem Relation Age of Onset   • Dementia Other    • Hypertension Other    • Stroke Other    • Hypertension Mother    • Parkinsonism Mother    • Cancer Mother    • Dementia Mother    • Hypertension Father    • Alcohol abuse Father    • Heart disease Brother    • Hypertension Brother    • Glaucoma Brother    • No Known Problems Sister    • No Known Problems Son    • Heart attack Paternal Grandfather    • Hypertension Brother          Social History     Socioeconomic History   • Marital status:      Spouse name: Not on file   • Number of children: Not on file   • Years of education: Not on file   • Highest education level: Not on file   Tobacco Use   • Smoking status: Former Smoker      Types: Cigarettes     Quit date:      Years since quittin.7   • Smokeless tobacco: Never Used   Vaping Use   • Vaping Use: Never used   Substance and Sexual Activity   • Alcohol use: Yes     Alcohol/week: 1.0 standard drinks     Types: 1 Glasses of wine per week     Comment: Social   • Drug use: Never   • Sexual activity: Defer         Current Outpatient Medications   Medication Sig Dispense Refill   • acetaminophen (TYLENOL) 500 MG tablet Take 500 mg by mouth 3 (Three) Times a Day.     • ALPRAZolam (XANAX) 0.5 MG tablet Take 1 tablet by mouth 3 (Three) Times a Day. 270 tablet 1   • Blood Glucose Monitoring Suppl (Accu-Chek Guide Me) w/Device kit USE AS DIRECTED FOR HYPOGLYCEMIA     • busPIRone (BUSPAR) 5 MG tablet Take 1 tablet by mouth 3 (Three) Times a Day. 90 tablet 2   • cetirizine (zyrTEC) 10 MG tablet Take 10 mg by mouth Daily.     • clopidogrel (PLAVIX) 75 MG tablet Take 1 tablet by mouth Daily. 90 tablet 1   • dutasteride (AVODART) 0.5 MG capsule Take 1 capsule by mouth Every Night. 3 capsule 0   • finasteride (PROPECIA) 1 MG tablet TAKE 1 TABLET EVERY DAY 90 tablet 3   • fluticasone (FLONASE) 50 MCG/ACT nasal spray 2 sprays into the nostril(s) as directed by provider Daily As Needed for Rhinitis.     • irbesartan-hydrochlorothiazide (Avalide) 150-12.5 MG tablet Take 1 tablet by mouth Daily. 90 tablet 3   • LIFESCAN FINEPOINT LANCETS misc Use once daily and  each 11   • Magnesium 250 MG tablet Take 1 tablet by mouth Daily.     • meclizine (ANTIVERT) 12.5 MG tablet Take 1 tablet by mouth 3 (Three) Times a Day As Needed for Dizziness. 90 tablet 2   • metoprolol succinate XL (TOPROL-XL) 25 MG 24 hr tablet Take 1 tablet by mouth Daily With Dinner. 90 tablet 3   • omeprazole (priLOSEC) 40 MG capsule Take 1 capsule by mouth 2 (two) times a day.     • predniSONE (DELTASONE) 10 MG tablet 4 daily times three days, 3 daily times three days, 2 daily times three days,1 daily times three days 30 tablet 0  "  • RABEprazole (Aciphex) 20 MG EC tablet take 1 tablet by oral route  every day     • sildenafil (VIAGRA) 100 MG tablet Take 1 tablet by mouth once daily as needed for Erectile dysfunction 10 tablet 0   • simethicone (GAS-X) 80 MG chewable tablet Chew 1 tablet Every 6 (Six) Hours As Needed for Flatulence. 56 tablet 1   • Tiotropium Bromide Monohydrate (SPIRIVA RESPIMAT) 1.25 MCG/ACT aerosol solution inhaler Inhale 2 puffs Daily. 12 g 6   • traZODone (DESYREL) 50 MG tablet TAKE 1 TABLET EVERY NIGHT 90 tablet 1   • vitamin B-12 (CYANOCOBALAMIN) 500 MCG tablet Take 500 mcg by mouth Daily.     • glucose blood test strip Use as instructed 50 each 11   • glucose monitor monitoring kit 1 each As Needed (Hypoglycemia). 1 each 0     No current facility-administered medications for this visit.         OBJECTIVE    /72 (BP Location: Left arm, Patient Position: Sitting, Cuff Size: Adult)   Pulse 71   Temp 97.8 °F (36.6 °C)   Ht 174 cm (68.5\")   Wt 76.2 kg (168 lb)   SpO2 99%   BMI 25.17 kg/m²         Review of Systems : The following systems were reviewed and changes noted as indicated in the history of present illness and below    Constitutional:  Denies recent weight loss, weight gain, fever or chills, no change in exercise tolerance     HENT:  Denies any hearing loss, hoarseness, or difficulty speaking.  History of epistaxis recently for which she was evaluated by Dr. Milton.  Underwent cauterization.    Eyes: Wears eyeglasses or contact lenses     Respiratory: no cough, wheezing.  Has dyspnea on exertion    Cardiovascular: Negative for palpations, chest pain.  Occasional dizziness.  No syncope    Gastrointestinal:  Denies change in bowel habits, dyspepsia, ulcer disease, hematochezia, or melena.     Endocrine: Negative for cold intolerance, heat intolerance, polydipsia, polyphagia and polyuria.     Genitourinary: Negative.      Musculoskeletal: Pain in the right shoulder, s/p shoulder surgery by "     Neurological:  Denies any history of recurrent headaches, strokes, TIA, or seizure disorder.     Hematological: Denies any food allergies, seasonal allergies, bleeding disorders, or lymphadenopathy.     Psychiatric/Behavioral: Denies any history of depression, substance abuse, or change in cognitive function.     Physical Exam: The following systems were reassessed and no changes noted    Constitutional: Cooperative, alert and oriented, in no acute distress.     HENT:   Head: Normocephalic, normal hair patterns, no masses or tenderness.  Ears, Nose, and Throat: No gross abnormalities. No pallor or cyanosis.   Eyes: EOMS intact, PERRL, conjunctivae and lids unremarkable. Fundoscopic exam and visual fields not performed.   Neck: No palpable masses or adenopathy, no thyromegaly, no JVD, carotid pulses are full and equal bilaterally and without  Bruits.     Cardiovascular: Regular rhythm, S1 and S2 normal, no S3 or S4.  No murmurs, gallops, or rubs detected.     Pulmonary/Chest: Chest: normal symmetry, normal respiratory excursion, no intercostal retraction, no use of accessory muscles.            Pulmonary: Normal breath sounds. No rales or ronchi.    Abdominal: Abdomen soft, bowel sounds normoactive, no masses, no hepatosplenomegaly, non-tender, no bruits.     Musculoskeletal: No deformities, clubbing, cyanosis, erythema, or edema observed.    Neurological: No gross motor or sensory deficits noted, affect appropriate, oriented to time, person, place.      Psychiatric: He has a normal mood and affect. His behavior is normal. Judgment and thought content normal.         Procedures      Lab Results   Component Value Date    WBC 11.44 (H) 09/03/2021    HGB 15.4 09/03/2021    HCT 44.8 09/03/2021    MCV 87.5 09/03/2021     09/03/2021     Lab Results   Component Value Date    GLUCOSE 96 09/03/2021    BUN 25 (H) 09/03/2021    CREATININE 1.06 09/03/2021    EGFRIFNONA 68 09/03/2021    EGFRIFAFRI 60 05/02/2018     BCR 23.6 09/03/2021    CO2 24.4 09/03/2021    CALCIUM 9.6 09/03/2021    ALBUMIN 4.40 09/03/2021    AST 33 09/03/2021    ALT 43 (H) 09/03/2021     Lab Results   Component Value Date    CHOL 265 (H) 09/03/2021    CHOL 241 (H) 02/25/2021    CHOL 228 (H) 09/28/2020     Lab Results   Component Value Date    TRIG 305 (H) 09/03/2021    TRIG 189 (H) 02/25/2021    TRIG 201 (H) 09/28/2020     Lab Results   Component Value Date    HDL 35 (L) 09/03/2021    HDL 46 02/25/2021    HDL 40 09/28/2020     No components found for: LDLCALC  Lab Results   Component Value Date     (H) 09/03/2021     (H) 02/25/2021     (H) 09/28/2020     No results found for: HDLLDLRATIO  No components found for: CHOLHDL  Lab Results   Component Value Date    HGBA1C 5.70 (H) 06/16/2021     Lab Results   Component Value Date    TSH 1.500 06/15/2020           ASSESSMENT AND PLAN  Souleymane Stapleton has multiple medical issues as discussed in the history of present illness.he has no clinical evidence of progression of coronary artery disease.  No arrhythmia or congestive heart failure noted.  The exact etiology for his dizziness is unclear and to make sure that there is no carotid or vertebral stenosis, a Doppler study is being arranged.      I believe that he would benefit from PCSK9 inhibitors.  I have suggested Praluent.  Antiplatelet therapy with Plavix, antihypertensive therapy with irbesartan/HCTZ, metoprolol succinate have been continued.    Diagnoses and all orders for this visit:    1. Essential hypertension (Primary)  -     ECG 12 Lead  -     Duplex Carotid Ultrasound CAR; Future    2. Coronary artery disease involving coronary bypass graft of native heart without angina pectoris  -     Duplex Carotid Ultrasound CAR; Future    3. Dizziness  -     Duplex Carotid Ultrasound CAR; Future    4. Mixed hyperlipidemia        Patient's Body mass index is 25.17 kg/m². BMI is within normal parameters. No follow-up required..  Patient is a  non-smoker    Souleymane Stapleton  reports that he quit smoking about 24 years ago. His smoking use included cigarettes. He has never used smokeless tobacco..      Roberto Samano MD  9/13/2021  17:02 CDT

## 2021-09-14 RX ORDER — ALIROCUMAB 150 MG/ML
150 INJECTION, SOLUTION SUBCUTANEOUS
Qty: 1 ML | Refills: 12 | Status: SHIPPED | OUTPATIENT
Start: 2021-09-14 | End: 2021-09-14

## 2021-09-14 RX ORDER — ALIROCUMAB 150 MG/ML
150 INJECTION, SOLUTION SUBCUTANEOUS
Qty: 1 ML | Refills: 12 | Status: SHIPPED | OUTPATIENT
Start: 2021-09-14 | End: 2021-10-13 | Stop reason: ALTCHOICE

## 2021-09-17 LAB
QT INTERVAL: 390 MS
QTC INTERVAL: 423 MS

## 2021-09-21 ENCOUNTER — TRANSCRIBE ORDERS (OUTPATIENT)
Dept: LAB | Facility: HOSPITAL | Age: 76
End: 2021-09-21

## 2021-09-21 DIAGNOSIS — C61 PROSTATE CANCER (HCC): Primary | ICD-10-CM

## 2021-09-29 ENCOUNTER — TELEPHONE (OUTPATIENT)
Dept: CARDIOLOGY | Facility: CLINIC | Age: 76
End: 2021-09-29

## 2021-10-01 ENCOUNTER — LAB (OUTPATIENT)
Dept: LAB | Facility: HOSPITAL | Age: 76
End: 2021-10-01

## 2021-10-01 DIAGNOSIS — C61 PROSTATE CANCER (HCC): ICD-10-CM

## 2021-10-01 PROCEDURE — 36415 COLL VENOUS BLD VENIPUNCTURE: CPT

## 2021-10-01 PROCEDURE — 84153 ASSAY OF PSA TOTAL: CPT

## 2021-10-02 LAB — PSA SERPL-MCNC: 33.5 NG/ML (ref 0–4)

## 2021-10-08 RX ORDER — METOPROLOL SUCCINATE 25 MG/1
25 TABLET, EXTENDED RELEASE ORAL
Qty: 90 TABLET | Refills: 3 | Status: SHIPPED | OUTPATIENT
Start: 2021-10-08 | End: 2022-07-28

## 2021-10-13 ENCOUNTER — OFFICE VISIT (OUTPATIENT)
Dept: PULMONOLOGY | Facility: CLINIC | Age: 76
End: 2021-10-13

## 2021-10-13 VITALS
OXYGEN SATURATION: 98 % | HEIGHT: 69 IN | DIASTOLIC BLOOD PRESSURE: 70 MMHG | BODY MASS INDEX: 25.18 KG/M2 | WEIGHT: 170 LBS | TEMPERATURE: 96.4 F | HEART RATE: 72 BPM | SYSTOLIC BLOOD PRESSURE: 132 MMHG

## 2021-10-13 DIAGNOSIS — R05.3 CHRONIC COUGH: Primary | ICD-10-CM

## 2021-10-13 PROCEDURE — 99204 OFFICE O/P NEW MOD 45 MIN: CPT | Performed by: INTERNAL MEDICINE

## 2021-10-14 ENCOUNTER — OFFICE VISIT (OUTPATIENT)
Dept: FAMILY MEDICINE CLINIC | Facility: CLINIC | Age: 76
End: 2021-10-14

## 2021-10-14 VITALS
HEIGHT: 69 IN | DIASTOLIC BLOOD PRESSURE: 78 MMHG | OXYGEN SATURATION: 98 % | BODY MASS INDEX: 25.41 KG/M2 | WEIGHT: 171.56 LBS | SYSTOLIC BLOOD PRESSURE: 134 MMHG | HEART RATE: 87 BPM

## 2021-10-14 DIAGNOSIS — F41.9 ANXIETY: Primary | ICD-10-CM

## 2021-10-14 DIAGNOSIS — R41.89 ALTERED THOUGHT PROCESSES: ICD-10-CM

## 2021-10-14 PROCEDURE — 99214 OFFICE O/P EST MOD 30 MIN: CPT | Performed by: FAMILY MEDICINE

## 2021-10-14 RX ORDER — ALPRAZOLAM 0.5 MG/1
0.5 TABLET ORAL 3 TIMES DAILY
Qty: 270 TABLET | Refills: 1 | Status: SHIPPED | OUTPATIENT
Start: 2021-10-14 | End: 2022-05-02

## 2021-10-14 RX ORDER — BUSPIRONE HYDROCHLORIDE 5 MG/1
5 TABLET ORAL 3 TIMES DAILY
Qty: 90 TABLET | Refills: 2 | Status: SHIPPED | OUTPATIENT
Start: 2021-10-14 | End: 2021-12-28

## 2021-10-14 NOTE — PROGRESS NOTES
Subjective   Souleymane Stapleton is a 76 y.o. male.   Cc: follow up of chronic medical issues    Anxiety  Presents for follow-up visit. Symptoms include depressed mood, excessive worry, muscle tension and nervous/anxious behavior. Patient reports no chest pain, compulsions, confusion, decreased concentration, feeling of choking, insomnia, irritability, obsessions or shortness of breath.       He is having a feeling that he is in a mental fog. It may get worse on standing.It can occur when he is sitting but is not as bad.    The following portions of the patient's history were reviewed and updated as appropriate: allergies, current medications, past family history, past medical history, past social history, past surgical history and problem list.    Review of Systems   Constitutional: Positive for fatigue. Negative for fever and irritability.   Respiratory: Negative for cough, shortness of breath and wheezing.    Cardiovascular: Negative for chest pain and leg swelling.   Gastrointestinal: Negative for abdominal pain and constipation.   Psychiatric/Behavioral: Negative for confusion and decreased concentration. The patient is nervous/anxious. The patient does not have insomnia.        Objective   Physical Exam  Vitals reviewed.   Constitutional:       Appearance: Normal appearance.   HENT:      Head: Normocephalic and atraumatic.      Right Ear: Tympanic membrane, ear canal and external ear normal.      Left Ear: Tympanic membrane, ear canal and external ear normal.      Nose: Nose normal.      Mouth/Throat:      Mouth: Mucous membranes are moist.      Pharynx: Oropharynx is clear.   Neck:      Comments: Tenderness in the soft tissue of the neck. No adenopathy. It is in the area near the Hyoid bone.  Cardiovascular:      Rate and Rhythm: Normal rate and regular rhythm.      Heart sounds: Normal heart sounds. No murmur heard.  No friction rub. No gallop.    Pulmonary:      Effort: Pulmonary effort is normal. No  "respiratory distress.      Breath sounds: Normal breath sounds. No stridor. No wheezing, rhonchi or rales.   Chest:      Chest wall: No tenderness.   Abdominal:      General: Abdomen is flat. Bowel sounds are normal. There is no distension.      Palpations: Abdomen is soft. There is no mass.      Tenderness: There is no abdominal tenderness. There is no guarding or rebound.      Hernia: No hernia is present.   Skin:     General: Skin is warm and dry.   Neurological:      Mental Status: He is alert.           Visit Vitals  /78   Pulse 87   Ht 174 cm (68.5\")   Wt 77.8 kg (171 lb 9 oz)   SpO2 98%   BMI 25.71 kg/m²     Body mass index is 25.71 kg/m².      Assessment/Plan   Diagnoses and all orders for this visit:    1. Anxiety (Primary)    2. Altered thought processes    I reviewed the CBC,CMP,and Lipid Profile with the  Patient..  He is to try support stockings.  Return to the clinic in 1 month/s.  Will contact with results as needed.  RICK is appropriate.  "

## 2021-10-20 ENCOUNTER — TELEPHONE (OUTPATIENT)
Dept: FAMILY MEDICINE CLINIC | Facility: CLINIC | Age: 76
End: 2021-10-20

## 2021-10-20 NOTE — TELEPHONE ENCOUNTER
Mr. Stapleton would like to have Dr Murrieta return his call today after he finishes office. He states he is having some new health issues that he did not have when he last saw Dr Murrieta. Please call back @ [phone]

## 2021-10-21 ENCOUNTER — LAB (OUTPATIENT)
Dept: LAB | Facility: HOSPITAL | Age: 76
End: 2021-10-21

## 2021-10-21 DIAGNOSIS — Z85.46 HISTORY OF PROSTATE CANCER: ICD-10-CM

## 2021-10-21 DIAGNOSIS — R42 DIZZINESS: ICD-10-CM

## 2021-10-21 DIAGNOSIS — R41.89 BRAIN FOG: Primary | ICD-10-CM

## 2021-10-21 DIAGNOSIS — R41.89 BRAIN FOG: ICD-10-CM

## 2021-10-21 DIAGNOSIS — R42 DIZZINESS: Primary | ICD-10-CM

## 2021-10-21 PROCEDURE — 36415 COLL VENOUS BLD VENIPUNCTURE: CPT

## 2021-10-21 PROCEDURE — 80048 BASIC METABOLIC PNL TOTAL CA: CPT

## 2021-10-22 LAB
ANION GAP SERPL CALCULATED.3IONS-SCNC: 9.5 MMOL/L (ref 5–15)
BUN SERPL-MCNC: 18 MG/DL (ref 8–23)
BUN/CREAT SERPL: 14.3 (ref 7–25)
CALCIUM SPEC-SCNC: 9 MG/DL (ref 8.6–10.5)
CHLORIDE SERPL-SCNC: 101 MMOL/L (ref 98–107)
CO2 SERPL-SCNC: 27.5 MMOL/L (ref 22–29)
CREAT SERPL-MCNC: 1.26 MG/DL (ref 0.76–1.27)
GFR SERPL CREATININE-BSD FRML MDRD: 55 ML/MIN/1.73
GLUCOSE SERPL-MCNC: 81 MG/DL (ref 65–99)
POTASSIUM SERPL-SCNC: 4.1 MMOL/L (ref 3.5–5.2)
SODIUM SERPL-SCNC: 138 MMOL/L (ref 136–145)

## 2021-10-27 ENCOUNTER — CLINICAL SUPPORT (OUTPATIENT)
Dept: FAMILY MEDICINE CLINIC | Facility: CLINIC | Age: 76
End: 2021-10-27

## 2021-10-27 ENCOUNTER — HOSPITAL ENCOUNTER (OUTPATIENT)
Dept: MRI IMAGING | Facility: HOSPITAL | Age: 76
Discharge: HOME | End: 2021-10-27
Admitting: FAMILY MEDICINE

## 2021-10-27 DIAGNOSIS — Z23 NEED FOR INFLUENZA VACCINATION: Primary | ICD-10-CM

## 2021-10-27 DIAGNOSIS — R42 DIZZINESS: ICD-10-CM

## 2021-10-27 DIAGNOSIS — R41.89 BRAIN FOG: ICD-10-CM

## 2021-10-27 PROCEDURE — 90662 IIV NO PRSV INCREASED AG IM: CPT | Performed by: FAMILY MEDICINE

## 2021-10-27 PROCEDURE — G0008 ADMIN INFLUENZA VIRUS VAC: HCPCS | Performed by: FAMILY MEDICINE

## 2021-10-27 PROCEDURE — 70551 MRI BRAIN STEM W/O DYE: CPT

## 2021-11-04 ENCOUNTER — OFFICE VISIT (OUTPATIENT)
Dept: FAMILY MEDICINE CLINIC | Facility: CLINIC | Age: 76
End: 2021-11-04

## 2021-11-04 VITALS
HEIGHT: 69 IN | SYSTOLIC BLOOD PRESSURE: 128 MMHG | BODY MASS INDEX: 25.22 KG/M2 | OXYGEN SATURATION: 97 % | HEART RATE: 64 BPM | WEIGHT: 170.31 LBS | DIASTOLIC BLOOD PRESSURE: 66 MMHG

## 2021-11-04 DIAGNOSIS — R42 LIGHT HEADEDNESS: ICD-10-CM

## 2021-11-04 DIAGNOSIS — M54.50 LOW BACK PAIN, UNSPECIFIED BACK PAIN LATERALITY, UNSPECIFIED CHRONICITY, UNSPECIFIED WHETHER SCIATICA PRESENT: ICD-10-CM

## 2021-11-04 DIAGNOSIS — R42 DIZZINESS: Primary | ICD-10-CM

## 2021-11-04 PROCEDURE — 99214 OFFICE O/P EST MOD 30 MIN: CPT | Performed by: FAMILY MEDICINE

## 2021-11-04 NOTE — PROGRESS NOTES
Subjective   Souleymane Stapleton is a 76 y.o. male.    cc:dizziness and light headedness  Back Pain  This is a chronic problem. The current episode started more than 1 year ago. The problem occurs daily. The pain is present in the lumbar spine. The quality of the pain is described as aching. The pain is at a severity of 6/10. The pain is moderate. The pain is worse during the day. The symptoms are aggravated by standing, twisting and bending. Associated symptoms include pelvic pain. Pertinent negatives include no abdominal pain, bladder incontinence, bowel incontinence, chest pain, dysuria, fever, headaches, leg pain, numbness, tingling, weakness or weight loss.   Dizziness  This is a chronic problem. The current episode started more than 1 year ago. The problem has been gradually worsening. Associated symptoms include arthralgias, coughing, fatigue and a visual change. Pertinent negatives include no abdominal pain, anorexia, change in bowel habit, chest pain, chills, congestion, diaphoresis, fever, headaches, joint swelling, myalgias, nausea, neck pain, numbness, rash, sore throat, swollen glands, urinary symptoms, vertigo, vomiting or weakness.   He can get light headed with moving his eyes across a page while reading or watchimg a train go past him.It seems to be worsening.    The following portions of the patient's history were reviewed and updated as appropriate: allergies, current medications, past family history, past medical history, past social history, past surgical history and problem list.    Review of Systems   Constitutional: Positive for fatigue. Negative for chills, diaphoresis, fever and weight loss.   HENT: Negative for congestion and sore throat.    Respiratory: Positive for cough.    Cardiovascular: Negative for chest pain.   Gastrointestinal: Negative for abdominal pain, anorexia, bowel incontinence, change in bowel habit, nausea and vomiting.   Genitourinary: Positive for pelvic pain. Negative  "for bladder incontinence and dysuria.   Musculoskeletal: Positive for arthralgias and back pain. Negative for joint swelling, myalgias and neck pain.   Skin: Negative for rash.   Neurological: Positive for dizziness. Negative for vertigo, tingling, weakness, numbness and headaches.       Objective   Physical Exam  Vitals reviewed.   Constitutional:       Appearance: Normal appearance.   HENT:      Head: Normocephalic and atraumatic.      Right Ear: Tympanic membrane, ear canal and external ear normal.      Left Ear: Tympanic membrane, ear canal and external ear normal.      Nose: Nose normal.      Mouth/Throat:      Mouth: Mucous membranes are dry.   Eyes:      Extraocular Movements: Extraocular movements intact.      Pupils: Pupils are equal, round, and reactive to light.      Comments: There was some nystagmus with going through the EOM.   Cardiovascular:      Rate and Rhythm: Normal rate and regular rhythm.      Heart sounds: Normal heart sounds. No murmur heard.  No friction rub. No gallop.    Pulmonary:      Effort: Pulmonary effort is normal. No respiratory distress.      Breath sounds: Normal breath sounds. No stridor. No wheezing, rhonchi or rales.   Chest:      Chest wall: No tenderness.   Abdominal:      General: Abdomen is flat. Bowel sounds are normal. There is no distension.      Palpations: Abdomen is soft. There is no mass.      Tenderness: There is no abdominal tenderness. There is no guarding or rebound.      Hernia: No hernia is present.   Musculoskeletal:      Cervical back: Normal range of motion.   Skin:     General: Skin is warm and dry.   Neurological:      Mental Status: He is alert and oriented to person, place, and time.      Cranial Nerves: No cranial nerve deficit.      Comments: Romberg is negative except mild dizziness. Tandem walking had some issues with his balance.           Visit Vitals  /66   Pulse 64   Ht 174 cm (68.5\")   Wt 77.3 kg (170 lb 5 oz)   SpO2 97%   BMI 25.52 kg/m² "     Body mass index is 25.52 kg/m².      Assessment/Plan   Diagnoses and all orders for this visit:    1. Dizziness (Primary)  -     Ambulatory Referral to Neurology    2. Light headedness  -     Ambulatory Referral to Neurology    3. Low back pain, unspecified back pain laterality, unspecified chronicity, unspecified whether sciatica present    Have made referral to Neurology in [place].  Return to the clinic  At next appointment  Will contact with results as needed.  Recent MRI was negative.  Will discontinue the Trazodone and see if the Symptoms improve.

## 2021-11-10 ENCOUNTER — PROCEDURE VISIT (OUTPATIENT)
Dept: PULMONOLOGY | Facility: CLINIC | Age: 76
End: 2021-11-10

## 2021-11-10 ENCOUNTER — OFFICE VISIT (OUTPATIENT)
Dept: PULMONOLOGY | Facility: CLINIC | Age: 76
End: 2021-11-10

## 2021-11-10 VITALS
OXYGEN SATURATION: 94 % | HEART RATE: 82 BPM | DIASTOLIC BLOOD PRESSURE: 72 MMHG | TEMPERATURE: 93.5 F | WEIGHT: 170 LBS | HEIGHT: 69 IN | SYSTOLIC BLOOD PRESSURE: 130 MMHG | BODY MASS INDEX: 25.18 KG/M2

## 2021-11-10 DIAGNOSIS — R05.3 CHRONIC COUGH: Primary | ICD-10-CM

## 2021-11-10 DIAGNOSIS — R05.3 CHRONIC COUGH: ICD-10-CM

## 2021-11-10 PROCEDURE — 94010 BREATHING CAPACITY TEST: CPT | Performed by: INTERNAL MEDICINE

## 2021-11-10 PROCEDURE — 99213 OFFICE O/P EST LOW 20 MIN: CPT | Performed by: INTERNAL MEDICINE

## 2021-11-10 PROCEDURE — 94729 DIFFUSING CAPACITY: CPT | Performed by: INTERNAL MEDICINE

## 2021-11-10 RX ORDER — ALBUTEROL SULFATE 90 UG/1
2 AEROSOL, METERED RESPIRATORY (INHALATION) EVERY 4 HOURS PRN
Qty: 18 G | Refills: 2 | Status: SHIPPED | OUTPATIENT
Start: 2021-11-10 | End: 2022-02-14 | Stop reason: SDUPTHER

## 2021-11-10 RX ORDER — OMEPRAZOLE 40 MG/1
40 CAPSULE, DELAYED RELEASE ORAL DAILY
COMMUNITY
End: 2022-01-26

## 2021-11-10 NOTE — PROGRESS NOTES
Full PFT without bronchodilator performed.     Good patient effort and cooperation.     Ordered by Dr. Vila, read by Dr. Vila.     6 second exhalation time on spirometry.

## 2021-11-10 NOTE — PROGRESS NOTES
Pulmonary Consultation    No ref. provider found,    Thank you for asking me to see Souleymane Stapleton for   Chief Complaint   Patient presents with   • Cough   .      History of Present Illness  Souleymane Stapleton is a 76 y.o. male     11/8/21  Patient here for ofllow up. Since last visit, he stopped his Trazodone and this helped with his dizziness. He's still having the cough but thinks it's better. He is doing his sinus rinses more often which is helping. Did PFTs today which showed normal timbo and mod diffusion impairment      10/13/21  Patient referred form PCM for chronic cough and shortness of breath  Patient repots that he's had a chornic dry cough for about 2 years. It's daily, not really worse at any time of day. Is worse with spicey foods.  He does have some exeritonal shortness of breath, but is able to walk over a mile without stopping. He does get short of breaht bending over or squatting    He does have allergies and is on flonase and Zyrtec. He does have reflux and is on treatment for that    He has a history of 5V CABG in 2018 and had a recent cardiac workup which was pretty normal      Tobacco use history:  Type: cigarettes  Amount: 1.5  ppd  Duration: 20 years  Cessation: 30 years ago         Review of Systems: History obtained from chart review and the patient.  Review of Systems   HENT: Positive for postnasal drip.    Respiratory: Positive for cough and shortness of breath. Negative for chest tightness and wheezing.    Gastrointestinal:        Reflux   Neurological: Positive for light-headedness.     As described in the HPI. Otherwise, remainder of ROS (14 systems) were negative.    Patient Active Problem List   Diagnosis   • Astigmatism   • Anxiety state   • History of cerebrovascular accident   • Nuclear senile cataract   • Hypertension   • Unspecified hemorrhoids   • Palpitations   • Pure hypercholesterolemia   • Prostate cancer (HCC)   • Chronic right shoulder pain   • Rotator cuff  syndrome, left   • Chronic left shoulder pain   • Impingement syndrome, shoulder, left   • SOB (shortness of breath)   • Angina of effort (HCC)   • Coronary artery disease involving coronary bypass graft of native heart without angina pectoris   • Cervical spinal stenosis   • Displacement of cervical intervertebral disc   • Displacement of lumbar intervertebral disc without myelopathy   • Follow-up examination after neurological surgery   • Internal carotid artery dissection (HCC)   • TIA (transient ischemic attack)   • Bacterial intestinal infection, unspecified   • Body mass index (bmi) 25.0-25.9, adult   • Diverticulosis of large intestine without perforation or abscess without bleeding   • Functional dyspepsia   • History of colonic polyps   • Polyp of stomach and duodenum   • Bilateral shoulder pain   • Rotator cuff arthropathy, right   • Status post reverse arthroplasty of right shoulder   • Vitreous detachment of left eye   • Transient cerebral ischemia   • Tobacco use   • Stroke (HCC)   • Presbyopia   • Nausea   • History of colon polyps   • Cancer (HCC)   • Arthritis   • Coronary artery disease   • Acute gastritis   • Acid reflux   • Abnormal weight loss   • Abdominal pain   • Gastric polyposis   • Diverticulosis of colon without diverticulitis   • Small intestinal bacterial overgrowth   • S/P CABG (coronary artery bypass graft)   • Right groin pain   • Epigastric pain   • Dizziness   • Mixed hyperlipidemia   • Need for influenza vaccination         Current Outpatient Medications:   •  acetaminophen (TYLENOL) 500 MG tablet, Take 500 mg by mouth 3 (Three) Times a Day., Disp: , Rfl:   •  ALPRAZolam (XANAX) 0.5 MG tablet, Take 1 tablet by mouth 3 (Three) Times a Day., Disp: 270 tablet, Rfl: 1  •  Blood Glucose Monitoring Suppl (Accu-Chek Guide Me) w/Device kit, USE AS DIRECTED FOR HYPOGLYCEMIA, Disp: , Rfl:   •  busPIRone (BUSPAR) 5 MG tablet, Take 1 tablet by mouth 3 (Three) Times a Day., Disp: 90 tablet,  Rfl: 2  •  cetirizine (zyrTEC) 10 MG tablet, Take 10 mg by mouth Daily., Disp: , Rfl:   •  clopidogrel (PLAVIX) 75 MG tablet, Take 1 tablet by mouth Daily., Disp: 90 tablet, Rfl: 1  •  dutasteride (AVODART) 0.5 MG capsule, Take 1 capsule by mouth Every Night., Disp: 3 capsule, Rfl: 0  •  fluticasone (FLONASE) 50 MCG/ACT nasal spray, 2 sprays into the nostril(s) as directed by provider Daily As Needed for Rhinitis., Disp: , Rfl:   •  glucose blood test strip, Use as instructed, Disp: 50 each, Rfl: 11  •  glucose monitor monitoring kit, 1 each As Needed (Hypoglycemia)., Disp: 1 each, Rfl: 0  •  irbesartan-hydrochlorothiazide (Avalide) 150-12.5 MG tablet, Take 1 tablet by mouth Daily., Disp: 90 tablet, Rfl: 3  •  [employer] FINEPOINT LANCETS misc, Use once daily and PRN, Disp: 100 each, Rfl: 11  •  Magnesium 250 MG tablet, Take 1 tablet by mouth Daily., Disp: , Rfl:   •  metoprolol succinate XL (TOPROL-XL) 25 MG 24 hr tablet, Take 1 tablet by mouth Daily With Dinner., Disp: 90 tablet, Rfl: 3  •  omeprazole (priLOSEC) 40 MG capsule, Take 40 mg by mouth Daily., Disp: , Rfl:   •  sildenafil (VIAGRA) 100 MG tablet, Take 1 tablet by mouth once daily as needed for Erectile dysfunction, Disp: 10 tablet, Rfl: 0  •  simethicone (GAS-X) 80 MG chewable tablet, Chew 1 tablet Every 6 (Six) Hours As Needed for Flatulence., Disp: 56 tablet, Rfl: 1  •  Tiotropium Bromide Monohydrate (SPIRIVA RESPIMAT) 1.25 MCG/ACT aerosol solution inhaler, Inhale 2 puffs Daily., Disp: 12 g, Rfl: 6  •  vitamin B-12 (CYANOCOBALAMIN) 500 MCG tablet, Take 500 mcg by mouth Daily., Disp: , Rfl:   •  albuterol sulfate HFA (Ventolin HFA) 108 (90 Base) MCG/ACT inhaler, Inhale 2 puffs Every 4 (Four) Hours As Needed for Wheezing or Shortness of Air., Disp: 18 g, Rfl: 2  •  RABEprazole (Aciphex) 20 MG EC tablet, take 1 tablet by oral route  every day, Disp: , Rfl:   •  traZODone (DESYREL) 50 MG tablet, TAKE 1 TABLET EVERY NIGHT, Disp: 90 tablet, Rfl: 1    Allergies    Allergen Reactions   • Statins Myalgia   • Tricor [Fenofibrate] Myalgia       Past Medical History:   Diagnosis Date   • Abdominal pain    • Abnormal weight loss    • Acid reflux    • Acute gastritis    • Anxiety state    • Cancer (HCC)     Prostate   • Coronary artery disease     CABG 2018.   is followed by Dr Samano   • Hernia of abdominal wall    • History of cerebrovascular accident    • History of colon polyps    • Hyperlipidemia    • Hypertension    • Nausea    • Nuclear senile cataract    • Presbyopia    • Stroke (HCC) 2005    TIA   • Tobacco use    • Transient cerebral ischemia    • Vitreous detachment of left eye      Past Surgical History:   Procedure Laterality Date   • BACK SURGERY     • CARDIAC CATHETERIZATION N/A 6/19/2018    Procedure: Coronary angiography;  Surgeon: Delroy Mcconnell MD;  Location: [facility] CATH INVASIVE LOCATION;  Service: Cardiovascular   • CATARACT EXTRACTION W/ INTRAOCULAR LENS IMPLANT Left 10/23/2020    Procedure: REMOVE CATARACT AND IMPLANT  INTRAOCULAR LENS;  Surgeon: Van Funes MD;  Location: [facility] OR;  Service: Ophthalmology;  Laterality: Left;   • CATARACT EXTRACTION W/ INTRAOCULAR LENS IMPLANT Right 11/6/2020    Procedure: REMOVE CATARACT AND IMPLANT INTRAOCULAR LENS;  Surgeon: Van Funes MD;  Location: [facility] OR;  Service: Ophthalmology;  Laterality: Right;   • COLONOSCOPY  06/09/2015    Diverticulosis found in the sigmoid colon. Hemorrhoids found in the anus.   • COLONOSCOPY N/A 2/16/2021    Procedure: COLONOSCOPY;  Surgeon: Chuck Rich DO;  Location: [facility] ENDOSCOPY;  Service: Gastroenterology;  Laterality: N/A;   • CORONARY ARTERY BYPASS GRAFT N/A 6/22/2018    Procedure: PARAS STERNOTOMY CORONARY ARTERY BYPASS GRAFT TIMES 5 USING RIGHT AND LEFT INTERNAL MAMMARY ARTERIES AND LEFT GREATER SAPHENOUS VEIN GRAFT PER ENDOSCOPIC VEIN HARVESTING AND PRP;  Surgeon: Edgar Pérez MD;  Location: [facility] OR;  Service: Cardiothoracic    • CRYOTHERAPY  2012    Of Acne   • ENDOSCOPY  2015    EGD w/ biopsy -Multiple polyps, one removed.   • ENDOSCOPY N/A 2019    Procedure: ESOPHAGOGASTRODUODENOSCOPY;  Surgeon: Chuck Rich DO;  Location: [facility] ENDOSCOPY;  Service: Gastroenterology   • ENDOSCOPY N/A 2021    Procedure: ESOPHAGOGASTRODUODENOSCOPY;  Surgeon: Chuck Rich DO;  Location: [facility] ENDOSCOPY;  Service: Gastroenterology;  Laterality: N/A;   • HEMORRHOIDECTOMY N/A 3/20/2017    Procedure: Removal of Anal Papilla;  Surgeon: Juan Diego Ken MD;  Location: [facility] OR;  Service:    • JOINT REPLACEMENT     • LUMBAR LAMINECTOMY  2010   • OTHER SURGICAL HISTORY  2010    Biopsy, Each Additional Lesion    • SKIN BIOPSY  2010   • TOTAL SHOULDER ARTHROPLASTY W/ DISTAL CLAVICLE EXCISION Right 2020    Procedure: right reverse total shoulder arthroplasty.;  Surgeon: Juan Diego Mendoza MD;  Location: [facility] OR;  Service: Orthopedics;  Laterality: Right;     Social History     Socioeconomic History   • Marital status:    Tobacco Use   • Smoking status: Former Smoker     Types: Cigarettes     Quit date:      Years since quittin.8   • Smokeless tobacco: Never Used   Vaping Use   • Vaping Use: Never used   Substance and Sexual Activity   • Alcohol use: Yes     Alcohol/week: 1.0 standard drink     Types: 1 Glasses of wine per week     Comment: Social   • Drug use: Never   • Sexual activity: Defer     Family History   Problem Relation Age of Onset   • Dementia Other    • Hypertension Other    • Stroke Other    • Hypertension Mother    • Parkinsonism Mother    • Cancer Mother    • Dementia Mother    • Hypertension Father    • Alcohol abuse Father    • Heart disease Brother    • Hypertension Brother    • Glaucoma Brother    • No Known Problems Sister    • No Known Problems Son    • Heart attack Paternal Grandfather    • Hypertension Brother           Objective     Blood pressure  "130/72, pulse 82, temperature 93.5 °F (34.2 °C), height 174 cm (68.5\"), weight 77.1 kg (170 lb), SpO2 94 %.  Physical Exam    PFTs:  (independently reviewed and interpreted by me)  6/12/18 @ [facility]  FVC 3.32L, 84%  FEV1 2.66L, 94%  Ratio 80%  Normal spirometry    11/10/21  FVC 3.17L, 84%  FEV1 2.55L, 90%  Ratio 80%  DLCO 56%    Radiology (independently reviewed and interpreted by me):   CXR 9/3/21- linear atelectasis, no obvious pulmonary abnormalities       Assessment/Plan     Diagnoses and all orders for this visit:    1. Chronic cough (Primary)    Other orders  -     albuterol sulfate HFA (Ventolin HFA) 108 (90 Base) MCG/ACT inhaler; Inhale 2 puffs Every 4 (Four) Hours As Needed for Wheezing or Shortness of Air.  Dispense: 18 g; Refill: 2         Discussion/ Recommendations:   Patient with normal timbo, slight difusion impairment. We discussed that inhalers may or may not help with this. He wouldlike to hold off for now, and just have the albuterol for prn use which I think is fine. Can return if symptoms worsen    -albuterol as needed             Return if symptoms worsen or fail to improve.      Thank you for allowing me to participate in the care of Souleymane Stapleton. Please do not hesitate to contact me with any questions.         This document has been electronically signed by Georgie Vila DO on November 10, 2021 14:18 CST    "

## 2021-11-12 ENCOUNTER — PREP FOR SURGERY (OUTPATIENT)
Dept: OTHER | Facility: HOSPITAL | Age: 76
End: 2021-11-12

## 2021-11-12 DIAGNOSIS — C61 MALIGNANT NEOPLASM OF PROSTATE (HCC): Primary | ICD-10-CM

## 2021-11-15 ENCOUNTER — OFFICE VISIT (OUTPATIENT)
Dept: FAMILY MEDICINE CLINIC | Facility: CLINIC | Age: 76
End: 2021-11-15

## 2021-11-15 VITALS
OXYGEN SATURATION: 98 % | HEART RATE: 65 BPM | DIASTOLIC BLOOD PRESSURE: 76 MMHG | WEIGHT: 172.38 LBS | HEIGHT: 69 IN | SYSTOLIC BLOOD PRESSURE: 138 MMHG | BODY MASS INDEX: 25.53 KG/M2

## 2021-11-15 DIAGNOSIS — Z00.00 MEDICARE ANNUAL WELLNESS VISIT, SUBSEQUENT: Primary | ICD-10-CM

## 2021-11-15 PROCEDURE — G0439 PPPS, SUBSEQ VISIT: HCPCS | Performed by: FAMILY MEDICINE

## 2021-11-15 PROCEDURE — 96160 PT-FOCUSED HLTH RISK ASSMT: CPT | Performed by: FAMILY MEDICINE

## 2021-11-15 NOTE — PROGRESS NOTES
The ABCs of the Annual Wellness Visit  Subsequent Medicare Wellness Visit  Cc: follow up of chronic medical issues    Chief Complaint   Patient presents with   • Medicare Wellness-subsequent      Subjective    History of Present Illness:  Souleymane Stapleton is a 76 y.o. male who presents for a Subsequent Medicare Wellness Visit.  The patient comes in for check of a Medicare Wellness exam. He is doing well.  The following portions of the patient's history were reviewed and   updated as appropriate: allergies, current medications, past family history, past medical history, past social history, past surgical history and problem list.    Compared to one year ago, the patient feels his physical   health is better.    Compared to one year ago, the patient feels his mental   health is better.    Recent Hospitalizations:  He was not admitted to the hospital during the last year.       Current Medical Providers:  Patient Care Team:  Souleymane Murrieta MD as PCP - General    Outpatient Medications Prior to Visit   Medication Sig Dispense Refill   • acetaminophen (TYLENOL) 500 MG tablet Take 500 mg by mouth 3 (Three) Times a Day.     • albuterol sulfate HFA (Ventolin HFA) 108 (90 Base) MCG/ACT inhaler Inhale 2 puffs Every 4 (Four) Hours As Needed for Wheezing or Shortness of Air. 18 g 2   • ALPRAZolam (XANAX) 0.5 MG tablet Take 1 tablet by mouth 3 (Three) Times a Day. 270 tablet 1   • Blood Glucose Monitoring Suppl (Accu-Chek Guide Me) w/Device kit USE AS DIRECTED FOR HYPOGLYCEMIA     • busPIRone (BUSPAR) 5 MG tablet Take 1 tablet by mouth 3 (Three) Times a Day. 90 tablet 2   • cetirizine (zyrTEC) 10 MG tablet Take 10 mg by mouth Daily.     • clopidogrel (PLAVIX) 75 MG tablet Take 1 tablet by mouth Daily. 90 tablet 1   • dutasteride (AVODART) 0.5 MG capsule Take 1 capsule by mouth Every Night. 3 capsule 0   • fluticasone (FLONASE) 50 MCG/ACT nasal spray 2 sprays into the nostril(s) as directed by provider Daily As Needed  for Rhinitis.     • glucose blood test strip Use as instructed 50 each 11   • glucose monitor monitoring kit 1 each As Needed (Hypoglycemia). 1 each 0   • irbesartan-hydrochlorothiazide (Avalide) 150-12.5 MG tablet Take 1 tablet by mouth Daily. 90 tablet 3   • xMattersCAN FINEPOINT LANCETS misc Use once daily and  each 11   • Magnesium 250 MG tablet Take 1 tablet by mouth Daily.     • metoprolol succinate XL (TOPROL-XL) 25 MG 24 hr tablet Take 1 tablet by mouth Daily With Dinner. 90 tablet 3   • omeprazole (priLOSEC) 40 MG capsule Take 40 mg by mouth Daily.     • RABEprazole (Aciphex) 20 MG EC tablet take 1 tablet by oral route  every day     • sildenafil (VIAGRA) 100 MG tablet Take 1 tablet by mouth once daily as needed for Erectile dysfunction 10 tablet 0   • simethicone (GAS-X) 80 MG chewable tablet Chew 1 tablet Every 6 (Six) Hours As Needed for Flatulence. 56 tablet 1   • Tiotropium Bromide Monohydrate (SPIRIVA RESPIMAT) 1.25 MCG/ACT aerosol solution inhaler Inhale 2 puffs Daily. 12 g 6   • vitamin B-12 (CYANOCOBALAMIN) 500 MCG tablet Take 500 mcg by mouth Daily.     • traZODone (DESYREL) 50 MG tablet TAKE 1 TABLET EVERY NIGHT 90 tablet 1     No facility-administered medications prior to visit.       No opioid medication identified on active medication list. I have reviewed chart for other potential  high risk medication/s and harmful drug interactions in the elderly.          Aspirin is not on active medication list.  Aspirin use is not indicated based on review of current medical condition/s. Risk of harm outweighs potential benefits.  .    Patient Active Problem List   Diagnosis   • Astigmatism   • Anxiety state   • History of cerebrovascular accident   • Nuclear senile cataract   • Hypertension   • Unspecified hemorrhoids   • Palpitations   • Pure hypercholesterolemia   • Prostate cancer (HCC)   • Chronic right shoulder pain   • Rotator cuff syndrome, left   • Chronic left shoulder pain   • Impingement  "syndrome, shoulder, left   • SOB (shortness of breath)   • Angina of effort (HCC)   • Coronary artery disease involving coronary bypass graft of native heart without angina pectoris   • Cervical spinal stenosis   • Displacement of cervical intervertebral disc   • Displacement of lumbar intervertebral disc without myelopathy   • Follow-up examination after neurological surgery   • Internal carotid artery dissection (HCC)   • TIA (transient ischemic attack)   • Bacterial intestinal infection, unspecified   • Body mass index (bmi) 25.0-25.9, adult   • Diverticulosis of large intestine without perforation or abscess without bleeding   • Functional dyspepsia   • History of colonic polyps   • Polyp of stomach and duodenum   • Bilateral shoulder pain   • Rotator cuff arthropathy, right   • Status post reverse arthroplasty of right shoulder   • Vitreous detachment of left eye   • Transient cerebral ischemia   • Tobacco use   • Stroke (HCC)   • Presbyopia   • Nausea   • History of colon polyps   • Cancer ([facility])   • Arthritis   • Coronary artery disease   • Acute gastritis   • Acid reflux   • Abnormal weight loss   • Abdominal pain   • Gastric polyposis   • Diverticulosis of colon without diverticulitis   • Small intestinal bacterial overgrowth   • S/P CABG (coronary artery bypass graft)   • Right groin pain   • Epigastric pain   • Dizziness   • Mixed hyperlipidemia   • Need for influenza vaccination   • Medicare annual wellness visit, subsequent     Advance Care Planning  Advance Directive is not on file.  ACP discussion was held with the patient during this visit. Patient has an advance directive (not in EMR), copy requested.          Objective    Vitals:    11/15/21 1311   BP: 138/76   Pulse: 65   SpO2: 98%   Weight: 78.2 kg (172 lb 6 oz)   Height: 174 cm (68.5\")     BMI Readings from Last 1 Encounters:   11/15/21 25.83 kg/m²   BMI is above normal parameters. Recommendations include: Already addressed    Does the patient " have evidence of cognitive impairment? No    Physical Exam  Lab Results   Component Value Date    TRIG 305 (H) 2021    HDL 35 (L) 2021     (H) 2021    VLDL 59 (H) 2021            HEALTH RISK ASSESSMENT    Smoking Status:  Social History     Tobacco Use   Smoking Status Former Smoker   • Types: Cigarettes   • Quit date:    • Years since quittin.8   Smokeless Tobacco Never Used     Alcohol Consumption:  Social History     Substance and Sexual Activity   Alcohol Use Yes   • Alcohol/week: 1.0 standard drink   • Types: 1 Glasses of wine per week    Comment: Social     Fall Risk Screen:    CARIEADI Fall Risk Assessment was completed, and patient is at LOW risk for falls.Assessment completed on:11/15/2021    Depression Screening:  PHQ-2/PHQ-9 Depression Screening 11/15/2021   Little interest or pleasure in doing things 0   Feeling down, depressed, or hopeless 1   Trouble falling or staying asleep, or sleeping too much -   Feeling tired or having little energy -   Poor appetite or overeating -   Feeling bad about yourself - or that you are a failure or have let yourself or your family down -   Trouble concentrating on things, such as reading the newspaper or watching television -   Moving or speaking so slowly that other people could have noticed. Or the opposite - being so fidgety or restless that you have been moving around a lot more than usual -   Thoughts that you would be better off dead, or of hurting yourself in some way -   Total Score 1   If you checked off any problems, how difficult have these problems made it for you to do your work, take care of things at home, or get along with other people? -       Health Habits and Functional and Cognitive Screening:  Functional & Cognitive Status 11/15/2021   Do you have difficulty preparing food and eating? No   Do you have difficulty bathing yourself, getting dressed or grooming yourself? No   Do you have difficulty using the toilet?  No   Do you have difficulty moving around from place to place? No   Do you have trouble with steps or getting out of a bed or a chair? No   Current Diet Low Fat Diet   Dental Exam Up to date   Eye Exam Up to date   Exercise (times per week) 4 times per week   Current Exercises Include Yard Work;Walking;Cardiovascular Workout;Light Weights   Current Exercise Activities Include -   Do you need help using the phone?  No   Are you deaf or do you have serious difficulty hearing?  Yes   Do you need help with transportation? No   Do you need help shopping? No   Do you need help preparing meals?  No   Do you need help with housework?  No   Do you need help with laundry? No   Do you need help taking your medications? No   Do you need help managing money? No   Do you ever drive or ride in a car without wearing a seat belt? No   Have you felt unusual stress, anger or loneliness in the last month? No   Who do you live with? Alone   If you need help, do you have trouble finding someone available to you? No   Have you been bothered in the last four weeks by sexual problems? No   Do you have difficulty concentrating, remembering or making decisions? No       Age-appropriate Screening Schedule:  Refer to the list below for future screening recommendations based on patient's age, sex and/or medical conditions. Orders for these recommended tests are listed in the plan section. The patient has been provided with a written plan.    Health Maintenance   Topic Date Due   • TDAP/TD VACCINES (1 - Tdap) Never done   • ZOSTER VACCINE (1 of 2) Never done   • LIPID PANEL  09/03/2022   • INFLUENZA VACCINE  Completed              Assessment/Plan   CMS Preventative Services Quick Reference  Risk Factors Identified During Encounter  Dementia/Memory   Depression/Dysphoria  Fall Risk-High or Moderate  Hearing Problem  The above risks/problems have been discussed with the patient.  Follow up actions/plans if indicated are seen below in the  Assessment/Plan Section.  Pertinent information has been shared with the patient in the After Visit Summary.    Diagnoses and all orders for this visit:    1. Medicare annual wellness visit, subsequent (Primary)        Follow Up:   No follow-ups on file.     An After Visit Summary and PPPS were made available to the patient.        Return to the clinic in 3 month/s.  Will contact with results as needed.

## 2021-12-01 ENCOUNTER — HOSPITAL ENCOUNTER (OUTPATIENT)
Dept: ULTRASOUND IMAGING | Facility: HOSPITAL | Age: 76
Discharge: HOME | End: 2021-12-01
Admitting: UROLOGY

## 2021-12-01 VITALS
SYSTOLIC BLOOD PRESSURE: 149 MMHG | DIASTOLIC BLOOD PRESSURE: 80 MMHG | RESPIRATION RATE: 18 BRPM | HEART RATE: 76 BPM | OXYGEN SATURATION: 95 % | TEMPERATURE: 97.3 F

## 2021-12-01 DIAGNOSIS — C61 MALIGNANT NEOPLASM OF PROSTATE (HCC): ICD-10-CM

## 2021-12-01 PROCEDURE — 88305 TISSUE EXAM BY PATHOLOGIST: CPT

## 2021-12-01 PROCEDURE — 76942 ECHO GUIDE FOR BIOPSY: CPT

## 2021-12-03 LAB
LAB AP CASE REPORT: NORMAL
PATH REPORT.FINAL DX SPEC: NORMAL

## 2021-12-18 NOTE — PROCEDURES
Date of Procedure: 12/1/2021    Preoperative Diagnoses: Elevated prostate-specific antigen, prostate nodule.    Postoperative Diagnoses: Elevated prostate-specific antigen, prostate nodule.    Operative Procedure: Prostate ultrasound and biopsy    Surgeon: Dr. Juan Diego Douglas M.D.    Anesthesia: Local    Indications: History of prostate cancer elevated PSA    Operative Procedure: The patient was brought to the ultrasound room. Following local, the patient was placed in the right flank-up, knee/chest position. Ultrasound showed approximately a 30 gm prostate.  We did an ultrasound on the right and left.  2 hypoechoic areas were noted.  We did 3 biopsies on the right and 3 on the left with ultrasound guidance. Once this was accomplished, the probe was removed.  The patient tolerated the procedure well and was discharged home.

## 2021-12-27 ENCOUNTER — OFFICE VISIT (OUTPATIENT)
Dept: OTOLARYNGOLOGY | Facility: CLINIC | Age: 76
End: 2021-12-27

## 2021-12-27 VITALS
DIASTOLIC BLOOD PRESSURE: 78 MMHG | HEART RATE: 84 BPM | WEIGHT: 171 LBS | BODY MASS INDEX: 25.33 KG/M2 | HEIGHT: 69 IN | SYSTOLIC BLOOD PRESSURE: 134 MMHG

## 2021-12-27 DIAGNOSIS — R04.0 NASAL BLEEDING: Primary | ICD-10-CM

## 2021-12-27 DIAGNOSIS — Z79.02 ANTIPLATELET OR ANTITHROMBOTIC LONG-TERM USE: ICD-10-CM

## 2021-12-27 DIAGNOSIS — J34.2 NASAL SEPTAL DEFORMITY: ICD-10-CM

## 2021-12-27 PROCEDURE — 99213 OFFICE O/P EST LOW 20 MIN: CPT | Performed by: OTOLARYNGOLOGY

## 2021-12-27 PROCEDURE — 30901 CONTROL OF NOSEBLEED: CPT | Performed by: OTOLARYNGOLOGY

## 2021-12-28 RX ORDER — BUSPIRONE HYDROCHLORIDE 5 MG/1
TABLET ORAL
Qty: 90 TABLET | Refills: 0 | Status: SHIPPED | OUTPATIENT
Start: 2021-12-28 | End: 2022-01-26

## 2021-12-30 NOTE — PROGRESS NOTES
Subjective   Souleymane Stapleton is a 76 y.o. male.       History of Present Illness   Patient who is chronically on Plavix.  I saw him in February of this year with nosebleed.  Was treated with silver nitrate cautery.  Reports he is now had 4 right-sided nosebleeds in the last 6 days, most recently yesterday.  Has been using saline as directed.    The following portions of the patient's history were reviewed and updated as appropriate: allergies, current medications, past family history, past medical history, past social history, past surgical history and problem list.     reports that he quit smoking about 25 years ago. His smoking use included cigarettes. He has never used smokeless tobacco. He reports current alcohol use of about 1.0 standard drink of alcohol per week. He reports that he does not use drugs.   Patient is not a tobacco user and has not been counseled for use of tobacco products      Review of Systems        Objective   Physical Exam  Nares: Septal deformity to the right.  Mayank-Synephrine and Xylocaine were placed in the nares on cotton for approximately 5 minutes and then removed.  There is a bleeding site on the mid septum that is cauterized with silver nitrate.  A second suspicious area on that anterior aspect of the inferior turbinate is also cauterized with silver nitrate.      Assessment/Plan   Diagnoses and all orders for this visit:    1. Nasal bleeding (Primary)    2. Antiplatelet or antithrombotic long-term use    3. Nasal septal deformity        Plan: Silver nitrate cautery as described above.  Advised continued use of nasal saline spray and avoidance of manipulation.  Follow-up with me as needed for recurrent bleeding.

## 2022-01-21 RX ORDER — FLUTICASONE PROPIONATE 50 MCG
SPRAY, SUSPENSION (ML) NASAL
Qty: 48 G | Refills: 1 | Status: SHIPPED | OUTPATIENT
Start: 2022-01-21 | End: 2022-09-12

## 2022-01-26 RX ORDER — BUSPIRONE HYDROCHLORIDE 5 MG/1
TABLET ORAL
Qty: 90 TABLET | Refills: 0 | Status: SHIPPED | OUTPATIENT
Start: 2022-01-26 | End: 2022-02-14 | Stop reason: SDUPTHER

## 2022-01-26 RX ORDER — OMEPRAZOLE 40 MG/1
CAPSULE, DELAYED RELEASE ORAL
Qty: 90 CAPSULE | Refills: 1 | Status: SHIPPED | OUTPATIENT
Start: 2022-01-26 | End: 2022-07-18

## 2022-02-07 ENCOUNTER — OFFICE VISIT (OUTPATIENT)
Dept: ORTHOPEDIC SURGERY | Facility: CLINIC | Age: 77
End: 2022-02-07

## 2022-02-07 VITALS — HEIGHT: 69 IN | BODY MASS INDEX: 25.77 KG/M2 | WEIGHT: 174 LBS

## 2022-02-07 DIAGNOSIS — M25.512 CHRONIC LEFT SHOULDER PAIN: Primary | ICD-10-CM

## 2022-02-07 DIAGNOSIS — M25.512 ACUTE PAIN OF LEFT SHOULDER: Primary | ICD-10-CM

## 2022-02-07 DIAGNOSIS — G89.29 CHRONIC LEFT SHOULDER PAIN: Primary | ICD-10-CM

## 2022-02-07 DIAGNOSIS — M75.82 ROTATOR CUFF TENDINITIS, LEFT: ICD-10-CM

## 2022-02-07 PROCEDURE — 99214 OFFICE O/P EST MOD 30 MIN: CPT | Performed by: NURSE PRACTITIONER

## 2022-02-07 PROCEDURE — 20610 DRAIN/INJ JOINT/BURSA W/O US: CPT | Performed by: NURSE PRACTITIONER

## 2022-02-07 RX ADMIN — TRIAMCINOLONE ACETONIDE 40 MG: 40 INJECTION, SUSPENSION INTRA-ARTICULAR; INTRAMUSCULAR at 14:31

## 2022-02-07 RX ADMIN — LIDOCAINE HYDROCHLORIDE 2 ML: 10 INJECTION, SOLUTION INFILTRATION; PERINEURAL at 14:31

## 2022-02-07 NOTE — PROGRESS NOTES
"Souleymane Stapleton is a 76 y.o. male      Chief Complaint   Patient presents with   • Left Shoulder - Pain       HISTORY OF PRESENT ILLNESS:    Patient is a 76-year-old male who presents today with complaints of left shoulder pain.  Patient reports chronic bilateral shoulder pain, however left shoulder pain became considerably worse 2 weeks ago.  He denies known precipitating factor and injury/trauma.  He has increased pain with overhead activity and attempting to reach behind him.  He has tried rice therapy and Tylenol.  He does not take NSAIDs due to blood thinner use.  He has no complaints of burning, tingling, numbness.  He has no complaints of fever, nausea, vomiting.  He is sent for new x-rays today.     CONCURRENT MEDICAL HISTORY:    The following portions of the patient's history were reviewed and updated as appropriate: allergies, current medications, past family history, past medical history, past social history, past surgical history and problem list.     ROS  No fevers or chills.  No chest pain or shortness of air.  No GI or  disturbances.  Left shoulder pain.    PHYSICAL EXAMINATION:       Ht 175.3 cm (69\")   Wt 78.9 kg (174 lb)   BMI 25.70 kg/m²     Physical Exam  Vitals and nursing note reviewed.   Constitutional:       General: He is not in acute distress.     Appearance: He is well-developed. He is not toxic-appearing.   HENT:      Head: Normocephalic.   Pulmonary:      Effort: Pulmonary effort is normal. No respiratory distress.   Skin:     General: Skin is warm and dry.   Neurological:      Mental Status: He is alert and oriented to person, place, and time.   Psychiatric:         Behavior: Behavior normal.         Thought Content: Thought content normal.         Judgment: Judgment normal.         GAIT:     []  Normal  []  Antalgic    Assistive device: []  None  []  Walker     []  Crutches  []  Cane     []  Wheelchair  []  Stretcher    Left Shoulder Exam     Tenderness   The patient is " experiencing tenderness in the acromion.    Range of Motion   Active abduction: 140   Extension: 20   External rotation: 60   Forward flexion: 160   Internal rotation 90 degrees: 70     Tests   Cross arm: negative  Impingement: positive    Other   Erythema: absent  Sensation: normal  Pulse: present     Comments:  Positive liftoff test.  Positive full can test.  Positive empty can test              XR Shoulder 2+ View Left    Result Date: 2/7/2022  Narrative: XR LEFT SHOULDER 2 OR MORE VIEWS CLINICAL STATEMENT: pain, M25.512 Pain in left shoulder COMPARISON:  9/11/2020 FINDINGS: Bony alignment is anatomic. There is no fracture or dislocation. The soft tissues are unremarkable. Minimal left acromioclavicular and left glenohumeral degenerative changes.     Impression: No fracture. Minimal degenerative changes. Electronically signed by:  Fritz Jimenez MD  2/7/2022 4:30 PM CST Workstation: [id number]            ASSESSMENT:    Diagnoses and all orders for this visit:    Chronic left shoulder pain    Rotator cuff tendinitis, left    Other orders  -     Large Joint Arthrocentesis: L subacromial bursa    Large Joint Arthrocentesis: L subacromial bursa  Date/Time: 2/7/2022 2:31 PM  Consent given by: patient  Site marked: site marked  Timeout: Immediately prior to procedure a time out was called to verify the correct patient, procedure, equipment, support staff and site/side marked as required   Supporting Documentation  Indications: pain   Procedure Details  Location: shoulder - L subacromial bursa  Preparation: Patient was prepped and draped in the usual sterile fashion  Needle size: 22 G  Approach: posterior  Medications administered: 40 mg triamcinolone acetonide 40 MG/ML; 2 mL lidocaine 1 %  Patient tolerance: patient tolerated the procedure well with no immediate complications              PLAN    X-rays reviewed, no acute bony abnormality.  No significant arthritic change seen.  Based on testing today patient has  injury/irritation to rotator cuff.  Available conservative options including physical therapy, subacromial injection, activity modification, rice therapy discussed.  Surgical options and MRI also explained.  After considering options patient desires to proceed with conservative care at this time with subacromial injection.  Patient tolerated injection well.  Patient to return in 4 weeks for recheck, if not feeling significantly better plan to either send to physical therapy or have MRI performed to assess for possible surgical indications.    EMR Dragon/Transciption Disclaimer: Some of this note may be an electronic transcription/translation of spoken language to printed text.  The electronic translation of spoken language may permit erroneous, or at times, nonsensical words or phrases to be inadvertently transcribed. Although I have reviewed the note for such errors, some may still exist.     Time spent of a minimum of 30 minutes including the face to face evaluation, reviewing of medical history and prior medial records, reviewing of diagnostic studies, ordering additional tests, documentation, patient education and coordination of care.       Return in about 4 weeks (around 3/7/2022), or if symptoms worsen or fail to improve.      This document has been electronically signed by CARLOS Gomez on February 9, 2022 09:04 CST

## 2022-02-09 RX ORDER — TRIAMCINOLONE ACETONIDE 40 MG/ML
40 INJECTION, SUSPENSION INTRA-ARTICULAR; INTRAMUSCULAR
Status: COMPLETED | OUTPATIENT
Start: 2022-02-07 | End: 2022-02-07

## 2022-02-09 RX ORDER — LIDOCAINE HYDROCHLORIDE 10 MG/ML
2 INJECTION, SOLUTION INFILTRATION; PERINEURAL
Status: COMPLETED | OUTPATIENT
Start: 2022-02-07 | End: 2022-02-07

## 2022-02-14 ENCOUNTER — OFFICE VISIT (OUTPATIENT)
Dept: FAMILY MEDICINE CLINIC | Facility: CLINIC | Age: 77
End: 2022-02-14

## 2022-02-14 ENCOUNTER — LAB (OUTPATIENT)
Dept: LAB | Facility: HOSPITAL | Age: 77
End: 2022-02-14

## 2022-02-14 VITALS
BODY MASS INDEX: 25.35 KG/M2 | HEART RATE: 79 BPM | DIASTOLIC BLOOD PRESSURE: 62 MMHG | HEIGHT: 69 IN | SYSTOLIC BLOOD PRESSURE: 136 MMHG | OXYGEN SATURATION: 97 % | WEIGHT: 171.13 LBS

## 2022-02-14 DIAGNOSIS — I10 PRIMARY HYPERTENSION: ICD-10-CM

## 2022-02-14 DIAGNOSIS — Z85.46 HISTORY OF PROSTATE CANCER: ICD-10-CM

## 2022-02-14 DIAGNOSIS — E78.2 MIXED HYPERLIPIDEMIA: ICD-10-CM

## 2022-02-14 DIAGNOSIS — I10 PRIMARY HYPERTENSION: Primary | ICD-10-CM

## 2022-02-14 DIAGNOSIS — F41.9 ANXIETY: ICD-10-CM

## 2022-02-14 PROCEDURE — 80053 COMPREHEN METABOLIC PANEL: CPT

## 2022-02-14 PROCEDURE — 80061 LIPID PANEL: CPT

## 2022-02-14 PROCEDURE — 85025 COMPLETE CBC W/AUTO DIFF WBC: CPT

## 2022-02-14 PROCEDURE — 99214 OFFICE O/P EST MOD 30 MIN: CPT | Performed by: FAMILY MEDICINE

## 2022-02-14 PROCEDURE — 84153 ASSAY OF PSA TOTAL: CPT

## 2022-02-14 PROCEDURE — 36415 COLL VENOUS BLD VENIPUNCTURE: CPT

## 2022-02-14 RX ORDER — ALBUTEROL SULFATE 90 UG/1
AEROSOL, METERED RESPIRATORY (INHALATION)
COMMUNITY
Start: 2021-11-10 | End: 2022-03-16

## 2022-02-14 RX ORDER — BUSPIRONE HYDROCHLORIDE 5 MG/1
5 TABLET ORAL 3 TIMES DAILY
Qty: 90 TABLET | Refills: 2 | Status: SHIPPED | OUTPATIENT
Start: 2022-02-14 | End: 2022-05-23

## 2022-02-14 NOTE — PROGRESS NOTES
Subjective   Souleymane Stapleton is a 76 y.o. male.   Cc: follow up of chronic medical issues    Anxiety  Presents for follow-up visit. Symptoms include dry mouth, malaise, nervous/anxious behavior and restlessness. Patient reports no chest pain, compulsions, confusion, decreased concentration, depressed mood, excessive worry, feeling of choking, hyperventilation, insomnia, irritability, muscle tension, obsessions, palpitations, panic, shortness of breath or suicidal ideas.       Hypertension  This is a chronic problem. The current episode started more than 1 year ago. The problem has been gradually improving since onset. Associated symptoms include anxiety, malaise/fatigue and neck pain. Pertinent negatives include no blurred vision, chest pain, headaches, orthopnea, palpitations, peripheral edema, PND, shortness of breath or sweats.   Hyperlipidemia  This is a chronic problem. The current episode started more than 1 year ago. The problem is uncontrolled. Pertinent negatives include no chest pain or shortness of breath. Current antihyperlipidemic treatment includes diet change.       The following portions of the patient's history were reviewed and updated as appropriate: allergies, current medications, past family history, past medical history, past social history, past surgical history and problem list.    Review of Systems   Constitutional: Positive for malaise/fatigue. Negative for irritability.   Eyes: Negative for blurred vision.   Respiratory: Negative for shortness of breath.    Cardiovascular: Negative for chest pain, palpitations, orthopnea and PND.   Musculoskeletal: Positive for neck pain.   Neurological: Negative for headaches.   Psychiatric/Behavioral: Negative for confusion, decreased concentration and suicidal ideas. The patient is nervous/anxious. The patient does not have insomnia.        Objective   Physical Exam  Vitals reviewed.   Constitutional:       Appearance: Normal appearance.   HENT:      " Head: Normocephalic and atraumatic.      Right Ear: Tympanic membrane, ear canal and external ear normal.      Left Ear: Tympanic membrane, ear canal and external ear normal.      Mouth/Throat:      Mouth: Mucous membranes are moist.      Pharynx: Oropharynx is clear.   Cardiovascular:      Rate and Rhythm: Normal rate and regular rhythm.      Heart sounds: Normal heart sounds. No murmur heard.  No friction rub. No gallop.    Pulmonary:      Effort: Pulmonary effort is normal. No respiratory distress.      Breath sounds: Normal breath sounds. No stridor. No wheezing, rhonchi or rales.   Chest:      Chest wall: No tenderness.   Abdominal:      General: Abdomen is flat. Bowel sounds are normal. There is no distension.      Palpations: Abdomen is soft. There is no mass.      Tenderness: There is no abdominal tenderness. There is no guarding.      Hernia: No hernia is present.   Musculoskeletal:      Cervical back: Normal range of motion and neck supple.   Skin:     General: Skin is warm and dry.   Neurological:      Mental Status: He is alert.           Visit Vitals  /62   Pulse 79   Ht 175.3 cm (69\")   Wt 77.6 kg (171 lb 2 oz)   SpO2 97%   BMI 25.27 kg/m²     Body mass index is 25.27 kg/m².      Assessment/Plan   Diagnoses and all orders for this visit:    1. Primary hypertension (Primary)  -     Comprehensive metabolic panel; Future  -     CBC w AUTO Differential; Future    2. Mixed hyperlipidemia  -     Lipid panel; Future    3. Anxiety  -     busPIRone (BUSPAR) 5 MG tablet; Take 1 tablet by mouth 3 (Three) Times a Day.  Dispense: 90 tablet; Refill: 2    4. History of prostate cancer  -     PSA DIAGNOSTIC ONLY; Future    I reviewed the PSA,CBC,CMP,and Lipid Profile mwith the patient.  Return to the clinic in 3 month/s.  Will contact with results as needed.      "

## 2022-02-15 LAB
ALBUMIN SERPL-MCNC: 4.5 G/DL (ref 3.5–5.2)
ALBUMIN/GLOB SERPL: 2 G/DL
ALP SERPL-CCNC: 56 U/L (ref 39–117)
ALT SERPL W P-5'-P-CCNC: 20 U/L (ref 1–41)
ANION GAP SERPL CALCULATED.3IONS-SCNC: 10.5 MMOL/L (ref 5–15)
AST SERPL-CCNC: 17 U/L (ref 1–40)
BASOPHILS # BLD AUTO: 0.09 10*3/MM3 (ref 0–0.2)
BASOPHILS NFR BLD AUTO: 0.9 % (ref 0–1.5)
BILIRUB SERPL-MCNC: 0.4 MG/DL (ref 0–1.2)
BUN SERPL-MCNC: 26 MG/DL (ref 8–23)
BUN/CREAT SERPL: 20 (ref 7–25)
CALCIUM SPEC-SCNC: 9.4 MG/DL (ref 8.6–10.5)
CHLORIDE SERPL-SCNC: 99 MMOL/L (ref 98–107)
CHOLEST SERPL-MCNC: 262 MG/DL (ref 0–200)
CO2 SERPL-SCNC: 25.5 MMOL/L (ref 22–29)
CREAT SERPL-MCNC: 1.3 MG/DL (ref 0.76–1.27)
DEPRECATED RDW RBC AUTO: 42.8 FL (ref 37–54)
EOSINOPHIL # BLD AUTO: 0.07 10*3/MM3 (ref 0–0.4)
EOSINOPHIL NFR BLD AUTO: 0.7 % (ref 0.3–6.2)
ERYTHROCYTE [DISTWIDTH] IN BLOOD BY AUTOMATED COUNT: 13.1 % (ref 12.3–15.4)
GFR SERPL CREATININE-BSD FRML MDRD: 53 ML/MIN/1.73
GLOBULIN UR ELPH-MCNC: 2.3 GM/DL
GLUCOSE SERPL-MCNC: 99 MG/DL (ref 65–99)
HCT VFR BLD AUTO: 48.4 % (ref 37.5–51)
HDLC SERPL-MCNC: 44 MG/DL (ref 40–60)
HGB BLD-MCNC: 16 G/DL (ref 13–17.7)
IMM GRANULOCYTES # BLD AUTO: 0.05 10*3/MM3 (ref 0–0.05)
IMM GRANULOCYTES NFR BLD AUTO: 0.5 % (ref 0–0.5)
LDLC SERPL CALC-MCNC: 186 MG/DL (ref 0–100)
LDLC/HDLC SERPL: 4.18 {RATIO}
LYMPHOCYTES # BLD AUTO: 2.55 10*3/MM3 (ref 0.7–3.1)
LYMPHOCYTES NFR BLD AUTO: 24.3 % (ref 19.6–45.3)
MCH RBC QN AUTO: 29.9 PG (ref 26.6–33)
MCHC RBC AUTO-ENTMCNC: 33.1 G/DL (ref 31.5–35.7)
MCV RBC AUTO: 90.3 FL (ref 79–97)
MONOCYTES # BLD AUTO: 0.98 10*3/MM3 (ref 0.1–0.9)
MONOCYTES NFR BLD AUTO: 9.3 % (ref 5–12)
NEUTROPHILS NFR BLD AUTO: 6.75 10*3/MM3 (ref 1.7–7)
NEUTROPHILS NFR BLD AUTO: 64.3 % (ref 42.7–76)
NRBC BLD AUTO-RTO: 0 /100 WBC (ref 0–0.2)
PLATELET # BLD AUTO: 397 10*3/MM3 (ref 140–450)
PMV BLD AUTO: 10.8 FL (ref 6–12)
POTASSIUM SERPL-SCNC: 4.6 MMOL/L (ref 3.5–5.2)
PROT SERPL-MCNC: 6.8 G/DL (ref 6–8.5)
PSA SERPL-MCNC: 26.6 NG/ML (ref 0–4)
RBC # BLD AUTO: 5.36 10*6/MM3 (ref 4.14–5.8)
SODIUM SERPL-SCNC: 135 MMOL/L (ref 136–145)
TRIGL SERPL-MCNC: 170 MG/DL (ref 0–150)
VLDLC SERPL-MCNC: 32 MG/DL (ref 5–40)
WBC NRBC COR # BLD: 10.49 10*3/MM3 (ref 3.4–10.8)

## 2022-02-28 ENCOUNTER — OFFICE VISIT (OUTPATIENT)
Dept: FAMILY MEDICINE CLINIC | Facility: CLINIC | Age: 77
End: 2022-02-28

## 2022-02-28 VITALS
BODY MASS INDEX: 25.21 KG/M2 | DIASTOLIC BLOOD PRESSURE: 80 MMHG | OXYGEN SATURATION: 98 % | WEIGHT: 170.25 LBS | HEART RATE: 107 BPM | SYSTOLIC BLOOD PRESSURE: 142 MMHG | HEIGHT: 69 IN

## 2022-02-28 DIAGNOSIS — R09.82 POST-NASAL DRAINAGE: Primary | ICD-10-CM

## 2022-02-28 DIAGNOSIS — R19.8 ABDOMINAL FULLNESS IN RIGHT UPPER QUADRANT: ICD-10-CM

## 2022-02-28 DIAGNOSIS — R20.9 COLD EXTREMITIES: ICD-10-CM

## 2022-02-28 DIAGNOSIS — I86.1 VARICOCELE: ICD-10-CM

## 2022-02-28 PROCEDURE — 99214 OFFICE O/P EST MOD 30 MIN: CPT | Performed by: FAMILY MEDICINE

## 2022-02-28 RX ORDER — CIPROFLOXACIN 500 MG/1
TABLET, FILM COATED ORAL
COMMUNITY
Start: 2022-02-15 | End: 2022-03-16

## 2022-02-28 NOTE — PROGRESS NOTES
Subjective   Souleymane Stapleton is a 76 y.o. male.   Cc: follow up of chronic medical issues    Abdominal Pain  This is a chronic problem. The current episode started more than 1 year ago. The onset quality is sudden. The problem occurs daily. The pain is located in the RUQ. The pain is at a severity of 5/10. The pain is moderate. The quality of the pain is sharp. The abdominal pain does not radiate. Associated symptoms include belching and frequency. Pertinent negatives include no anorexia, arthralgias, constipation, diarrhea, dysuria, fever, flatus, headaches, hematochezia, hematuria, melena, myalgias, nausea, vomiting or weight loss.   Difficulty Swallowing  This is a chronic problem. The current episode started more than 1 year ago. The problem occurs intermittently. Associated symptoms include abdominal pain, congestion and coughing. Pertinent negatives include no anorexia, arthralgias, change in bowel habit, chest pain, chills, diaphoresis, fatigue, fever, headaches, joint swelling, myalgias, nausea, neck pain, numbness, rash, sore throat, swollen glands, urinary symptoms, vertigo, visual change or vomiting.   He has a tenderness in the right scrotal figueroa up into the groin.and outer pelvic area. It is relieved with lying down.  He has noticed a coldness in his hands and feet. He also feels it of the nose.this has been occurring over the last six months. It began in the Fall of the year. It is intermittent and he can't associate anything with it.  The following portions of the patient's history were reviewed and updated as appropriate: allergies, current medications, past family history, past medical history, past social history, past surgical history and problem list.    Review of Systems   Constitutional: Negative for chills, diaphoresis, fatigue, fever and weight loss.   HENT: Positive for congestion. Negative for sore throat.    Respiratory: Positive for cough.    Cardiovascular: Negative for chest pain.    Gastrointestinal: Positive for abdominal pain. Negative for anorexia, change in bowel habit, constipation, diarrhea, flatus, hematochezia, melena, nausea and vomiting.   Genitourinary: Positive for frequency. Negative for dysuria and hematuria.   Musculoskeletal: Negative for arthralgias, joint swelling, myalgias and neck pain.   Skin: Negative for rash.   Neurological: Negative for vertigo, numbness and headaches.       Objective   Physical Exam  Vitals reviewed.   Constitutional:       Appearance: Normal appearance.   HENT:      Head: Normocephalic and atraumatic.      Right Ear: Tympanic membrane, ear canal and external ear normal.      Left Ear: Tympanic membrane, ear canal and external ear normal.      Nose: Nose normal.      Comments: Nose is not cold surrently.     Mouth/Throat:      Mouth: Mucous membranes are moist.      Pharynx: Oropharynx is clear.   Eyes:      Pupils: Pupils are equal, round, and reactive to light.   Cardiovascular:      Rate and Rhythm: Normal rate and regular rhythm.      Heart sounds: Normal heart sounds. No murmur heard.  No friction rub. No gallop.       Comments: Good pulses at the Dorsalis Pedis bilaterally and the Posterior Tibialis bilaterally. Good pulses at the wrists.  Pulmonary:      Effort: Pulmonary effort is normal. No respiratory distress.      Breath sounds: Normal breath sounds. No stridor. No wheezing, rhonchi or rales.   Chest:      Chest wall: No tenderness.   Abdominal:      Palpations: Abdomen is soft.      Comments: There is a slight bulging around the right  Upper quadrant . It is near the scars of the chest tubes and open heart surgery scars. It bulges if he leans forward and is tender on palpation.   Genitourinary:     Comments: The penis and testicles seem normal for age. There seems to be a varicocele present on the right.  Musculoskeletal:      Cervical back: Normal range of motion.   Skin:     General: Skin is warm and dry.   Neurological:      Mental  "Status: He is alert.           Visit Vitals  /80   Pulse 107   Ht 175.3 cm (69\")   Wt 77.2 kg (170 lb 4 oz)   SpO2 98%   BMI 25.14 kg/m²     Body mass index is 25.14 kg/m².      Assessment/Plan   Diagnoses and all orders for this visit:    1. Post-nasal drainage (Primary)  -     Ambulatory Referral to ENT (Otolaryngology)    2. Abdominal fullness in right upper quadrant  -     US Abdomen Complete; Future    3. Varicocele    4. Cold extremities  -     TSH; Future  -     T4, free; Future    .I reviewed the CBC,CMP,and Lipid Profile with the patient.  I offered General Surgical Consut and it was declined at this time.  Referral is made to doctor alarcon to evaluate a post nasal drainage that has gone on for a long time.  It is very thick and has caused some choking. It doesn't respond to Mucinex and antihistamines.  "

## 2022-03-01 ENCOUNTER — LAB (OUTPATIENT)
Dept: LAB | Facility: HOSPITAL | Age: 77
End: 2022-03-01

## 2022-03-01 DIAGNOSIS — R20.9 COLD EXTREMITIES: ICD-10-CM

## 2022-03-01 LAB
T4 FREE SERPL-MCNC: 1.41 NG/DL (ref 0.93–1.7)
TSH SERPL DL<=0.05 MIU/L-ACNC: 0.96 UIU/ML (ref 0.27–4.2)

## 2022-03-01 PROCEDURE — 84439 ASSAY OF FREE THYROXINE: CPT

## 2022-03-01 PROCEDURE — 36415 COLL VENOUS BLD VENIPUNCTURE: CPT

## 2022-03-01 PROCEDURE — 84443 ASSAY THYROID STIM HORMONE: CPT

## 2022-03-14 ENCOUNTER — HOSPITAL ENCOUNTER (OUTPATIENT)
Dept: ULTRASOUND IMAGING | Facility: HOSPITAL | Age: 77
Discharge: HOME | End: 2022-03-14
Admitting: FAMILY MEDICINE

## 2022-03-14 DIAGNOSIS — R19.8 ABDOMINAL FULLNESS IN RIGHT UPPER QUADRANT: ICD-10-CM

## 2022-03-14 DIAGNOSIS — R52 PAIN: Primary | ICD-10-CM

## 2022-03-14 PROCEDURE — 76705 ECHO EXAM OF ABDOMEN: CPT

## 2022-03-14 RX ORDER — HYDROCODONE BITARTRATE AND ACETAMINOPHEN 5; 325 MG/1; MG/1
1 TABLET ORAL NIGHTLY PRN
Qty: 30 TABLET | Refills: 0 | Status: SHIPPED | OUTPATIENT
Start: 2022-03-14 | End: 2022-03-15 | Stop reason: SDUPTHER

## 2022-03-15 DIAGNOSIS — M48.02 CERVICAL SPINAL STENOSIS: ICD-10-CM

## 2022-03-15 DIAGNOSIS — M75.102 ROTATOR CUFF SYNDROME, LEFT: ICD-10-CM

## 2022-03-15 DIAGNOSIS — C61 PROSTATE CANCER: Primary | ICD-10-CM

## 2022-03-15 RX ORDER — HYDROCODONE BITARTRATE AND ACETAMINOPHEN 5; 325 MG/1; MG/1
1 TABLET ORAL NIGHTLY PRN
Qty: 23 TABLET | Refills: 0 | Status: SHIPPED | OUTPATIENT
Start: 2022-03-15 | End: 2022-08-15

## 2022-03-15 NOTE — TELEPHONE ENCOUNTER
[facility] called on Mr. Pieter Cohen 5 prescription. His insurance will only cover a 7 day supply. They also need a diagnosis code for this prescription. The pharmacy stated that Dr Murrieta can call in a new prescription for a 23 day supply. Please call the pharmacy @ [phone] with any questions.

## 2022-03-16 ENCOUNTER — OFFICE VISIT (OUTPATIENT)
Dept: SURGERY | Facility: CLINIC | Age: 77
End: 2022-03-16

## 2022-03-16 ENCOUNTER — OFFICE VISIT (OUTPATIENT)
Dept: CARDIOLOGY | Facility: CLINIC | Age: 77
End: 2022-03-16

## 2022-03-16 VITALS
WEIGHT: 174.8 LBS | BODY MASS INDEX: 25.89 KG/M2 | HEIGHT: 69 IN | DIASTOLIC BLOOD PRESSURE: 80 MMHG | OXYGEN SATURATION: 94 % | HEART RATE: 92 BPM | SYSTOLIC BLOOD PRESSURE: 134 MMHG

## 2022-03-16 VITALS
DIASTOLIC BLOOD PRESSURE: 80 MMHG | BODY MASS INDEX: 25.51 KG/M2 | SYSTOLIC BLOOD PRESSURE: 120 MMHG | TEMPERATURE: 96.7 F | WEIGHT: 172.2 LBS | HEIGHT: 69 IN | HEART RATE: 79 BPM | OXYGEN SATURATION: 94 %

## 2022-03-16 DIAGNOSIS — Z95.1 S/P CABG (CORONARY ARTERY BYPASS GRAFT): Primary | ICD-10-CM

## 2022-03-16 DIAGNOSIS — I20.8 ANGINA OF EFFORT: ICD-10-CM

## 2022-03-16 DIAGNOSIS — C80.1 CANCER: ICD-10-CM

## 2022-03-16 DIAGNOSIS — I10 PRIMARY HYPERTENSION: ICD-10-CM

## 2022-03-16 DIAGNOSIS — I25.810 CORONARY ARTERY DISEASE INVOLVING CORONARY BYPASS GRAFT OF NATIVE HEART WITHOUT ANGINA PECTORIS: ICD-10-CM

## 2022-03-16 DIAGNOSIS — G83.12: ICD-10-CM

## 2022-03-16 DIAGNOSIS — I77.71 INTERNAL CAROTID ARTERY DISSECTION: ICD-10-CM

## 2022-03-16 DIAGNOSIS — E78.00 PURE HYPERCHOLESTEROLEMIA: ICD-10-CM

## 2022-03-16 PROCEDURE — 99214 OFFICE O/P EST MOD 30 MIN: CPT | Performed by: INTERNAL MEDICINE

## 2022-03-16 PROCEDURE — 99214 OFFICE O/P EST MOD 30 MIN: CPT | Performed by: SURGERY

## 2022-03-16 NOTE — PROGRESS NOTES
Souleymane Stapleton  76 y.o. male    1. S/P CABG (coronary artery bypass graft)    2. Pure hypercholesterolemia    3. Primary hypertension    4. Coronary artery disease involving coronary bypass graft of native heart without angina pectoris        History of Present Illness  Souleymane Stapleton is a 76-year-old male with coronary artery disease status post coronary artery bypass surgery in June 2018 (CABG x 5 using bilateral mammary grafts), hypertension, hyperlipidemia, history of cerebrovascular event.    He has progressed reasonably well since I last saw him and denied any cardiac symptoms at the present time.  No chest pain, shortness of breath or palpitation was reported.    Echocardiogram in July 2021 showed normal LV systolic function with an EF of 57% and grade 1 diastolic dysfunction.  Right ventricle was mild to moderately dilated with normal systolic function.  RVSP was less than 35 mmHg.  Aortic root was mildly dilated at 4.4 cm.    Lexiscan Cardiolite stress test in August 2021 showed:  · Findings consistent with an equivocal ECG stress test.  · Left ventricular ejection fraction is normal. (Calculated EF = 70%).  · Myocardial perfusion imaging indicates a normal myocardial perfusion study with no evidence of ischemia.  · Impressions are consistent with a low risk study.     He is noted to have markedly elevated and LDL was 186 in February 2022.  He has not been able to tolerate statins or Repatha and could not afford Praluent secondary to the high cost.      Allergies   Allergen Reactions   • Statins Myalgia   • Tricor [Fenofibrate] Myalgia         Past Medical History:   Diagnosis Date   • Abdominal pain    • Abnormal weight loss    • Acid reflux    • Acute gastritis    • Anxiety state    • Cancer (HCC)     Prostate   • Coronary artery disease     CABG 2018.   is followed by Dr Samano   • Hernia of abdominal wall    • History of cerebrovascular accident    • History of colon polyps    • Hyperlipidemia     • Hypertension    • Nausea    • Nuclear senile cataract    • Presbyopia    • Stroke (HCC) 2005    TIA   • Tobacco use    • Transient cerebral ischemia    • Vitreous detachment of left eye          Past Surgical History:   Procedure Laterality Date   • BACK SURGERY     • CARDIAC CATHETERIZATION N/A 6/19/2018    Procedure: Coronary angiography;  Surgeon: Delroy Mcconnell MD;  Location: [facility] CATH INVASIVE LOCATION;  Service: Cardiovascular   • CATARACT EXTRACTION W/ INTRAOCULAR LENS IMPLANT Left 10/23/2020    Procedure: REMOVE CATARACT AND IMPLANT  INTRAOCULAR LENS;  Surgeon: Van Funes MD;  Location: [facility] OR;  Service: Ophthalmology;  Laterality: Left;   • CATARACT EXTRACTION W/ INTRAOCULAR LENS IMPLANT Right 11/6/2020    Procedure: REMOVE CATARACT AND IMPLANT INTRAOCULAR LENS;  Surgeon: Van Funes MD;  Location: [facility] OR;  Service: Ophthalmology;  Laterality: Right;   • COLONOSCOPY  06/09/2015    Diverticulosis found in the sigmoid colon. Hemorrhoids found in the anus.   • COLONOSCOPY N/A 2/16/2021    Procedure: COLONOSCOPY;  Surgeon: Chuck Rich DO;  Location: [facility] ENDOSCOPY;  Service: Gastroenterology;  Laterality: N/A;   • CORONARY ARTERY BYPASS GRAFT N/A 6/22/2018    Procedure: PARAS STERNOTOMY CORONARY ARTERY BYPASS GRAFT TIMES 5 USING RIGHT AND LEFT INTERNAL MAMMARY ARTERIES AND LEFT GREATER SAPHENOUS VEIN GRAFT PER ENDOSCOPIC VEIN HARVESTING AND PRP;  Surgeon: Edgar Pérez MD;  Location: [facility];  Service: Cardiothoracic   • CRYOTHERAPY  08/14/2012    Of Acne   • ENDOSCOPY  09/01/2015    EGD w/ biopsy -Multiple polyps, one removed.   • ENDOSCOPY N/A 8/22/2019    Procedure: ESOPHAGOGASTRODUODENOSCOPY;  Surgeon: Chuck Rich DO;  Location: [facility] ENDOSCOPY;  Service: Gastroenterology   • ENDOSCOPY N/A 2/16/2021    Procedure: ESOPHAGOGASTRODUODENOSCOPY;  Surgeon: Chuck Rich DO;  Location: [facility] ENDOSCOPY;  Service: Gastroenterology;   Laterality: N/A;   • HEMORRHOIDECTOMY N/A 3/20/2017    Procedure: Removal of Anal Papilla;  Surgeon: Juan Diego Ken MD;  Location: [facility];  Service:    • JOINT REPLACEMENT     • LUMBAR LAMINECTOMY  2010   • OTHER SURGICAL HISTORY  2010    Biopsy, Each Additional Lesion    • SKIN BIOPSY  2010   • TOTAL SHOULDER ARTHROPLASTY W/ DISTAL CLAVICLE EXCISION Right 2020    Procedure: right reverse total shoulder arthroplasty.;  Surgeon: Juan Diego Mendoza MD;  Location: [facility];  Service: Orthopedics;  Laterality: Right;         Family History   Problem Relation Age of Onset   • Dementia Other    • Hypertension Other    • Stroke Other    • Hypertension Mother    • Parkinsonism Mother    • Cancer Mother    • Dementia Mother    • Hypertension Father    • Alcohol abuse Father    • Heart disease Brother    • Hypertension Brother    • Glaucoma Brother    • No Known Problems Sister    • No Known Problems Son    • Heart attack Paternal Grandfather    • Hypertension Brother          Social History     Socioeconomic History   • Marital status:    Tobacco Use   • Smoking status: Former Smoker     Types: Cigarettes     Quit date:      Years since quittin.2   • Smokeless tobacco: Never Used   Vaping Use   • Vaping Use: Never used   Substance and Sexual Activity   • Alcohol use: Yes     Alcohol/week: 1.0 standard drink     Types: 1 Glasses of wine per week     Comment: Social   • Drug use: Never   • Sexual activity: Defer         Current Outpatient Medications   Medication Sig Dispense Refill   • acetaminophen (TYLENOL) 500 MG tablet Take 500 mg by mouth 3 (Three) Times a Day.     • ALPRAZolam (XANAX) 0.5 MG tablet Take 1 tablet by mouth 3 (Three) Times a Day. 270 tablet 1   • busPIRone (BUSPAR) 5 MG tablet Take 1 tablet by mouth 3 (Three) Times a Day. 90 tablet 2   • cetirizine (zyrTEC) 10 MG tablet Take 10 mg by mouth Daily.     • clopidogrel (PLAVIX) 75 MG tablet Take 1 tablet  "by mouth Daily. 90 tablet 1   • dutasteride (AVODART) 0.5 MG capsule Take 1 capsule by mouth Every Night. 3 capsule 0   • fluticasone (FLONASE) 50 MCG/ACT nasal spray USE 2 SPRAYS IN EACH NOSTRIL EVERY DAY AS DIRECTED BY PROVIDER 48 g 1   • HYDROcodone-acetaminophen (Norco) 5-325 MG per tablet Take 1 tablet by mouth At Night As Needed (pain). 23 tablet 0   • irbesartan-hydrochlorothiazide (Avalide) 150-12.5 MG tablet Take 1 tablet by mouth Daily. 90 tablet 3   • Magnesium 250 MG tablet Take 1 tablet by mouth Daily.     • metoprolol succinate XL (TOPROL-XL) 25 MG 24 hr tablet Take 1 tablet by mouth Daily With Dinner. 90 tablet 3   • omeprazole (priLOSEC) 40 MG capsule TAKE 1 CAPSULE EVERY DAY 90 capsule 1   • sildenafil (VIAGRA) 100 MG tablet Take 1 tablet by mouth once daily as needed for Erectile dysfunction 10 tablet 0   • simethicone (GAS-X) 80 MG chewable tablet Chew 1 tablet Every 6 (Six) Hours As Needed for Flatulence. 56 tablet 1   • Tiotropium Bromide Monohydrate (SPIRIVA RESPIMAT) 1.25 MCG/ACT aerosol solution inhaler Inhale 2 puffs Daily. 12 g 6   • vitamin B-12 (CYANOCOBALAMIN) 500 MCG tablet Take 500 mcg by mouth Daily.       No current facility-administered medications for this visit.         OBJECTIVE    /80 (BP Location: Left arm, Patient Position: Sitting, Cuff Size: Adult)   Pulse 92   Ht 175.3 cm (69\")   Wt 79.3 kg (174 lb 12.8 oz)   SpO2 94%   BMI 25.81 kg/m²         Review of Systems : The following systems were reviewed and changes noted as indicated in the history of present illness and below    Constitutional:  Denies recent weight loss, weight gain, fever or chills, no change in exercise tolerance     HENT:  Denies any hearing loss, hoarseness, or difficulty speaking.  History of epistaxis recently for which she was evaluated by Dr. Milton.  Underwent cauterization.    Eyes: Wears eyeglasses or contact lenses     Respiratory: no cough, wheezing.  Has dyspnea on " exertion    Cardiovascular: Negative for palpations, chest pain. No syncope    Gastrointestinal:  Denies change in bowel habits, dyspepsia, ulcer disease, hematochezia, or melena.     Endocrine: Negative for cold intolerance, heat intolerance, polydipsia, polyphagia and polyuria.     Genitourinary: Negative.      Musculoskeletal: Pain in the right shoulder, s/p shoulder surgery by     Neurological:  Denies any history of recurrent headaches, strokes, TIA, or seizure disorder.     Hematological: Denies any food allergies, seasonal allergies, bleeding disorders, or lymphadenopathy.     Psychiatric/Behavioral: Denies any history of depression, substance abuse, or change in cognitive function.     Physical Exam: The following systems were reassessed and no changes noted    Constitutional: Cooperative, alert and oriented, in no acute distress.     HENT:   Head: Normocephalic, normal hair patterns, no masses or tenderness.  Ears, Nose, and Throat: No gross abnormalities. No pallor or cyanosis.   Eyes: EOMS intact, PERRL, conjunctivae and lids unremarkable. Fundoscopic exam and visual fields not performed.   Neck: No palpable masses or adenopathy, no thyromegaly, no JVD, carotid pulses are full and equal bilaterally and without  Bruits.     Cardiovascular: Regular rhythm, S1 and S2 normal, no S3 or S4.  No murmurs, gallops, or rubs detected.     Pulmonary/Chest: Chest: normal symmetry, normal respiratory excursion, no intercostal retraction, no use of accessory muscles.            Pulmonary: Normal breath sounds. No rales or ronchi.    Abdominal: Abdomen soft, bowel sounds normoactive, no masses, no hepatosplenomegaly, non-tender, no bruits.     Musculoskeletal: No deformities, clubbing, cyanosis, erythema, or edema observed.    Neurological: No gross motor or sensory deficits noted, affect appropriate, oriented to time, person, place.      Psychiatric: He has a normal mood and affect. His behavior is normal.  Judgment and thought content normal.         Procedures      Lab Results   Component Value Date    WBC 10.49 02/14/2022    HGB 16.0 02/14/2022    HCT 48.4 02/14/2022    MCV 90.3 02/14/2022     02/14/2022     Lab Results   Component Value Date    GLUCOSE 99 02/14/2022    BUN 26 (H) 02/14/2022    CREATININE 1.30 (H) 02/14/2022    EGFRIFNONA 53 (L) 02/14/2022    EGFRIFAFRI 60 05/02/2018    BCR 20.0 02/14/2022    CO2 25.5 02/14/2022    CALCIUM 9.4 02/14/2022    ALBUMIN 4.50 02/14/2022    AST 17 02/14/2022    ALT 20 02/14/2022     Lab Results   Component Value Date    CHOL 262 (H) 02/14/2022    CHOL 265 (H) 09/03/2021    CHOL 241 (H) 02/25/2021     Lab Results   Component Value Date    TRIG 170 (H) 02/14/2022    TRIG 305 (H) 09/03/2021    TRIG 189 (H) 02/25/2021     Lab Results   Component Value Date    HDL 44 02/14/2022    HDL 35 (L) 09/03/2021    HDL 46 02/25/2021     No components found for: LDLCALC  Lab Results   Component Value Date     (H) 02/14/2022     (H) 09/03/2021     (H) 02/25/2021     No results found for: HDLLDLRATIO  No components found for: CHOLHDL  Lab Results   Component Value Date    HGBA1C 5.70 (H) 06/16/2021     Lab Results   Component Value Date    TSH 0.962 03/01/2022           ASSESSMENT AND PLAN  Souleymane Stapleton has multiple medical issues but stable from a cardiac standpoint with no clinical evidence of angina, arrhythmia or congestive heart failure.  He has no documented carotid artery disease.  Antiplatelet therapy with Plavix, antihypertensive therapy with irbesartan/HCTZ, metoprolol succinate have been continued.  As mentioned above, the patient has been unable to tolerate statins or PCSK9 inhibitors.  I have informed him that we will check with his insurance company to see if the cost can be decreased for Praluent.  Inclisiran (Leqvio) has also been suggested.  We will look into the cost.    Diagnoses and all orders for this visit:    1. S/P CABG (coronary artery  bypass graft) (Primary)    2. Pure hypercholesterolemia    3. Primary hypertension    4. Coronary artery disease involving coronary bypass graft of native heart without angina pectoris        Patient's Body mass index is 25.81 kg/m². BMI is within normal parameters. No follow-up required..  Patient is a non-smoker    Souleymane Carmelo Stapleton  reports that he quit smoking about 25 years ago. His smoking use included cigarettes. He has never used smokeless tobacco..      Roberto Samano MD  3/16/2022  16:30 CDT

## 2022-03-18 NOTE — PROGRESS NOTES
Chief Complaint   Patient presents with   • Consult     Ventral hernia, ref: Lit NUÑEZ  This gentleman is 76 years old and is seen at the request of Dr. Murrieta for a questionable carlos manuel wall hernia.  He has had no history of incarceration or intestinal obstruction.  He is having minimal pain and discomfort in the area although it does hurt a bit when he bumps into something.  Currently, he has also been considered for orthopedic surgery.  Past Medical History:   Diagnosis Date   • Abdominal pain    • Abnormal weight loss    • Acid reflux    • Acute gastritis    • Anxiety state    • Cancer (HCC)     Prostate   • Coronary artery disease     CABG 2018.   is followed by Dr Samano   • Hernia of abdominal wall    • History of cerebrovascular accident    • History of colon polyps    • Hyperlipidemia    • Hypertension    • Nausea    • Nuclear senile cataract    • Presbyopia    • Stroke (HCC) 2005    TIA   • Tobacco use    • Transient cerebral ischemia    • Vitreous detachment of left eye        Past Surgical History:   Procedure Laterality Date   • BACK SURGERY     • CARDIAC CATHETERIZATION N/A 6/19/2018    Procedure: Coronary angiography;  Surgeon: Delroy Mcconnell MD;  Location: [facility] CATH INVASIVE LOCATION;  Service: Cardiovascular   • CATARACT EXTRACTION W/ INTRAOCULAR LENS IMPLANT Left 10/23/2020    Procedure: REMOVE CATARACT AND IMPLANT  INTRAOCULAR LENS;  Surgeon: Van Funes MD;  Location: [facility] OR;  Service: Ophthalmology;  Laterality: Left;   • CATARACT EXTRACTION W/ INTRAOCULAR LENS IMPLANT Right 11/6/2020    Procedure: REMOVE CATARACT AND IMPLANT INTRAOCULAR LENS;  Surgeon: Van Funes MD;  Location: [facility] OR;  Service: Ophthalmology;  Laterality: Right;   • COLONOSCOPY  06/09/2015    Diverticulosis found in the sigmoid colon. Hemorrhoids found in the anus.   • COLONOSCOPY N/A 2/16/2021    Procedure: COLONOSCOPY;  Surgeon: Chuck Rich DO;  Location: [facility] ENDOSCOPY;   Service: Gastroenterology;  Laterality: N/A;   • CORONARY ARTERY BYPASS GRAFT N/A 6/22/2018    Procedure: PARAS STERNOTOMY CORONARY ARTERY BYPASS GRAFT TIMES 5 USING RIGHT AND LEFT INTERNAL MAMMARY ARTERIES AND LEFT GREATER SAPHENOUS VEIN GRAFT PER ENDOSCOPIC VEIN HARVESTING AND PRP;  Surgeon: Edgar Pérez MD;  Location: [facility];  Service: Cardiothoracic   • CRYOTHERAPY  08/14/2012    Of Acne   • ENDOSCOPY  09/01/2015    EGD w/ biopsy -Multiple polyps, one removed.   • ENDOSCOPY N/A 8/22/2019    Procedure: ESOPHAGOGASTRODUODENOSCOPY;  Surgeon: Chuck Rich DO;  Location: [facility] ENDOSCOPY;  Service: Gastroenterology   • ENDOSCOPY N/A 2/16/2021    Procedure: ESOPHAGOGASTRODUODENOSCOPY;  Surgeon: Chuck Rich DO;  Location: [facility] ENDOSCOPY;  Service: Gastroenterology;  Laterality: N/A;   • HEMORRHOIDECTOMY N/A 3/20/2017    Procedure: Removal of Anal Papilla;  Surgeon: Juan Diego Ken MD;  Location: [facility];  Service:    • JOINT REPLACEMENT     • LUMBAR LAMINECTOMY  09/29/2010   • OTHER SURGICAL HISTORY  08/19/2010    Biopsy, Each Additional Lesion    • SKIN BIOPSY  08/19/2010   • TOTAL SHOULDER ARTHROPLASTY W/ DISTAL CLAVICLE EXCISION Right 2/17/2020    Procedure: right reverse total shoulder arthroplasty.;  Surgeon: Juan Diego Mendoza MD;  Location: [facility];  Service: Orthopedics;  Laterality: Right;         Current Outpatient Medications:   •  acetaminophen (TYLENOL) 500 MG tablet, Take 500 mg by mouth 3 (Three) Times a Day., Disp: , Rfl:   •  ALPRAZolam (XANAX) 0.5 MG tablet, Take 1 tablet by mouth 3 (Three) Times a Day., Disp: 270 tablet, Rfl: 1  •  busPIRone (BUSPAR) 5 MG tablet, Take 1 tablet by mouth 3 (Three) Times a Day., Disp: 90 tablet, Rfl: 2  •  cetirizine (zyrTEC) 10 MG tablet, Take 10 mg by mouth Daily., Disp: , Rfl:   •  clopidogrel (PLAVIX) 75 MG tablet, Take 1 tablet by mouth Daily., Disp: 90 tablet, Rfl: 1  •  dutasteride (AVODART) 0.5 MG capsule, Take 1  capsule by mouth Every Night., Disp: 3 capsule, Rfl: 0  •  fluticasone (FLONASE) 50 MCG/ACT nasal spray, USE 2 SPRAYS IN EACH NOSTRIL EVERY DAY AS DIRECTED BY PROVIDER, Disp: 48 g, Rfl: 1  •  HYDROcodone-acetaminophen (Norco) 5-325 MG per tablet, Take 1 tablet by mouth At Night As Needed (pain)., Disp: 23 tablet, Rfl: 0  •  irbesartan-hydrochlorothiazide (Avalide) 150-12.5 MG tablet, Take 1 tablet by mouth Daily., Disp: 90 tablet, Rfl: 3  •  Magnesium 250 MG tablet, Take 1 tablet by mouth Daily., Disp: , Rfl:   •  metoprolol succinate XL (TOPROL-XL) 25 MG 24 hr tablet, Take 1 tablet by mouth Daily With Dinner., Disp: 90 tablet, Rfl: 3  •  omeprazole (priLOSEC) 40 MG capsule, TAKE 1 CAPSULE EVERY DAY, Disp: 90 capsule, Rfl: 1  •  sildenafil (VIAGRA) 100 MG tablet, Take 1 tablet by mouth once daily as needed for Erectile dysfunction, Disp: 10 tablet, Rfl: 0  •  simethicone (GAS-X) 80 MG chewable tablet, Chew 1 tablet Every 6 (Six) Hours As Needed for Flatulence., Disp: 56 tablet, Rfl: 1  •  Tiotropium Bromide Monohydrate (SPIRIVA RESPIMAT) 1.25 MCG/ACT aerosol solution inhaler, Inhale 2 puffs Daily., Disp: 12 g, Rfl: 6  •  vitamin B-12 (CYANOCOBALAMIN) 500 MCG tablet, Take 500 mcg by mouth Daily., Disp: , Rfl:     Allergies   Allergen Reactions   • Statins Myalgia   • Tricor [Fenofibrate] Myalgia       Family History   Problem Relation Age of Onset   • Dementia Other    • Hypertension Other    • Stroke Other    • Hypertension Mother    • Parkinsonism Mother    • Cancer Mother    • Dementia Mother    • Hypertension Father    • Alcohol abuse Father    • Heart disease Brother    • Hypertension Brother    • Glaucoma Brother    • No Known Problems Sister    • No Known Problems Son    • Heart attack Paternal Grandfather    • Hypertension Brother        Social History     Socioeconomic History   • Marital status:    Tobacco Use   • Smoking status: Former Smoker     Types: Cigarettes     Quit date: 1997     Years  since quittin.2   • Smokeless tobacco: Never Used   Vaping Use   • Vaping Use: Never used   Substance and Sexual Activity   • Alcohol use: Yes     Alcohol/week: 1.0 standard drink     Types: 1 Glasses of wine per week     Comment: Social   • Drug use: Never   • Sexual activity: Defer       Review of Systems   Constitutional: Negative for activity change, appetite change, chills and fever.   HENT: Negative for hearing loss, nosebleeds and trouble swallowing.    Cardiovascular: Negative for chest pain, palpitations and leg swelling.   Gastrointestinal: Negative for abdominal distention, abdominal pain, anal bleeding, blood in stool, constipation, diarrhea, nausea, rectal pain and vomiting.   Endocrine: Negative for cold intolerance, heat intolerance, polydipsia and polyuria.   Genitourinary: Negative for decreased urine volume, difficulty urinating, dysuria, enuresis, frequency, hematuria and urgency.   Musculoskeletal: Negative for arthralgias, back pain, gait problem, myalgias and neck pain.   Skin: Negative for pallor, rash and wound.   Allergic/Immunologic: Negative for immunocompromised state.   Neurological: Negative for dizziness, seizures, weakness, light-headedness, numbness and headaches.   Psychiatric/Behavioral: Negative for agitation and behavioral problems. The patient is not nervous/anxious.        Physical Exam  Vitals reviewed.   Constitutional:       Appearance: Normal appearance. He is well-developed.   HENT:      Head: Normocephalic and atraumatic.      Nose: Nose normal.   Eyes:      Conjunctiva/sclera: Conjunctivae normal.   Neck:      Thyroid: No thyromegaly.      Vascular: No JVD.      Trachea: Trachea and phonation normal. No tracheal deviation.   Cardiovascular:      Rate and Rhythm: Normal rate and regular rhythm.      Heart sounds: No murmur heard.    No friction rub. No gallop.   Pulmonary:      Effort: Pulmonary effort is normal. No accessory muscle usage or respiratory distress.       Breath sounds: No decreased breath sounds.   Chest:   Breasts:      Left: No supraclavicular adenopathy.       Abdominal:      General: There is no distension.      Palpations: Abdomen is soft. There is no fluid wave, mass or pulsatile mass.      Tenderness: There is no abdominal tenderness. There is no guarding or rebound.      Hernia: A hernia ( He has diastases of the upper abdomen but he does have what appears to be a small hernia in the supraumbilical area.) is present.   Musculoskeletal:         General: No tenderness or deformity. Normal range of motion.      Cervical back: Normal range of motion and neck supple. No edema.   Lymphadenopathy:      Upper Body:      Left upper body: No supraclavicular adenopathy.   Skin:     General: Skin is warm and dry.   Neurological:      General: No focal deficit present.      Mental Status: He is alert and oriented to person, place, and time.   Psychiatric:         Mood and Affect: Mood normal.         Speech: Speech normal.         Behavior: Behavior normal.         Thought Content: Thought content normal.         Judgment: Judgment normal.           ASSESSMENT    Diagnoses and all orders for this visit:    1. Internal carotid artery dissection (HCC)    2. 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 Cancer (HCC)    4. Angina of effort (HCC)        PLAN    1.  I believe that the hernia at this point does not represent an acute problem that has to be immediately managed.  I feel that he can wait until other medical issues are addressed first.              This document has been electronically signed by Brice Domingo MD on March 18, 2022 16:24 CDT

## 2022-03-22 ENCOUNTER — OFFICE VISIT (OUTPATIENT)
Dept: OTOLARYNGOLOGY | Facility: CLINIC | Age: 77
End: 2022-03-22

## 2022-03-22 VITALS — BODY MASS INDEX: 26.07 KG/M2 | HEIGHT: 68 IN | OXYGEN SATURATION: 95 % | WEIGHT: 172 LBS

## 2022-03-22 DIAGNOSIS — R13.14 PHARYNGOESOPHAGEAL DYSPHAGIA: Primary | ICD-10-CM

## 2022-03-22 DIAGNOSIS — J37.0 CHRONIC LARYNGITIS: ICD-10-CM

## 2022-03-22 PROCEDURE — 99213 OFFICE O/P EST LOW 20 MIN: CPT | Performed by: OTOLARYNGOLOGY

## 2022-03-22 PROCEDURE — 31575 DIAGNOSTIC LARYNGOSCOPY: CPT | Performed by: OTOLARYNGOLOGY

## 2022-03-22 NOTE — PROGRESS NOTES
Subjective   Souleymane Stapleton is a 76 y.o. male.       History of Present Illness   Patient that I have seen previously with nosebleeds returns today for evaluation of his throat.  Says he is having trouble feeling like he has a lot of phlegm in his throat and occasionally feels like he gets choked either on food or mucus.  Also has a nonproductive cough that has been going on for the last 1-1/2 to 2 years but seems to be getting a little worse.  Does have significant trouble with acid reflux.  Takes omeprazole.  This generally controls his symptoms of heartburn.  Is followed by Dr. Rich.      The following portions of the patient's history were reviewed and updated as appropriate: allergies, current medications, past family history, past medical history, past social history, past surgical history and problem list.     reports that he quit smoking about 25 years ago. His smoking use included cigarettes, pipe, and cigars. He has a 22.50 pack-year smoking history. He has never used smokeless tobacco. He reports current alcohol use of about 5.0 standard drinks of alcohol per week. He reports that he does not use drugs.   Patient is not a tobacco user and has not been counseled for use of tobacco products      Review of Systems        Objective   Physical Exam  Ears: External ears no deformity, canals no discharge, tympanic membranes intact clear and mobile bilaterally.  Nares: No fresh or old blood or prominent vessel  Oral cavity: Lips and gums without lesions.  Tongue and floor of mouth without lesions.  Parotid and submandibular ducts unobstructed.  No mucosal lesions on the buccal mucosa or vestibule of the mouth.  Pharynx: No erythema exudate or mass  Neck: No lymphadenopathy.  No thyromegaly.  Trachea and larynx midline.  No masses in the parotid or submandibular glands.  Procedure Note    Pre-operative Diagnosis:   Chief Complaint   Patient presents with   • Difficulty Swallowing       Post-operative  Diagnosis: Chronic laryngitis    Anesthesia: topical with xylocaine and neosynephrine    Endoscopy Type:  Flexible Laryngoscopy    Procedure Details:    The patient was placed in the sitting position.  After topical anesthesia and decongestion, the 4 mm laryngoscope was passed.  The nasal cavities, nasopharynx, oropharynx, hypopharynx, and larynx were all examined.  Vocal cords were examined during respiration and phonation.  The following findings were noted:    Findings: No masses in the nose or nasopharynx.  3 mm cyst just to the left of midline in the vallecula that does not appear to be obstructing and is clinically benign.  Endolarynx shows some mild chronic appearing edema and erythema of the arytenoids consistent with reflux associated chronic laryngitis    Condition:  Stable.  Patient tolerated procedure well.    Complications:  None      Assessment/Plan   Diagnoses and all orders for this visit:    1. Pharyngoesophageal dysphagia (Primary)    2. Chronic laryngitis        Plan: Reassured the patient that I saw no worrisome findings.  Discussed possibly adding Carafate to his medical regimen but he is not ready to do that at this point as he says he was mainly just checking to make sure that he did not have any serious pathology.  He is about to start treatment for prostate cancer.  He may follow-up with me as needed for worsening symptoms.  Also told him to be sure and keep any scheduled follow-up with Dr. Rich regarding his reflux.

## 2022-03-24 ENCOUNTER — TELEPHONE (OUTPATIENT)
Dept: FAMILY MEDICINE CLINIC | Facility: CLINIC | Age: 77
End: 2022-03-24

## 2022-03-24 NOTE — TELEPHONE ENCOUNTER
Patient called stating he missed a call and wanted to check on the appointment with the neurologist that Dr. Murrieta was going to send a referral to. Patient asked that he get a return call @ [phone] or a message on my chart letting him know something please.

## 2022-03-24 NOTE — TELEPHONE ENCOUNTER
3/28/2022 12:30 PM   Jose Hassan MD Neurology     [address] [facility] [place] [address]       Phone: [phone]    Spoke with Ash about appointment

## 2022-03-28 RX ORDER — CLOPIDOGREL BISULFATE 75 MG/1
TABLET ORAL
Qty: 90 TABLET | Refills: 1 | Status: SHIPPED | OUTPATIENT
Start: 2022-03-28 | End: 2022-10-25

## 2022-03-28 RX ORDER — IRBESARTAN AND HYDROCHLOROTHIAZIDE 150; 12.5 MG/1; MG/1
TABLET, FILM COATED ORAL
Qty: 90 TABLET | Refills: 3 | Status: SHIPPED | OUTPATIENT
Start: 2022-03-28 | End: 2023-01-11

## 2022-04-09 NOTE — PATIENT INSTRUCTIONS
Gator Nubia, 7-Up Ginger Ale Coke Pepsi one ounce an hour slowly.    Bananas ,Rice,Apple Sauce , Toast ,Clear Broth, Boiled Egg Baked Potato in small amounts. Avoid Dairy Products.  Return to the clinic in 3 day/s.  Will contact with results as needed.     08-Apr-2022 22:57

## 2022-04-25 ENCOUNTER — OFFICE VISIT (OUTPATIENT)
Dept: FAMILY MEDICINE CLINIC | Facility: CLINIC | Age: 77
End: 2022-04-25

## 2022-04-25 VITALS
HEIGHT: 68 IN | WEIGHT: 178.2 LBS | SYSTOLIC BLOOD PRESSURE: 114 MMHG | DIASTOLIC BLOOD PRESSURE: 66 MMHG | BODY MASS INDEX: 27.01 KG/M2 | HEART RATE: 83 BPM | OXYGEN SATURATION: 97 %

## 2022-04-25 DIAGNOSIS — J02.9 SORE THROAT: ICD-10-CM

## 2022-04-25 DIAGNOSIS — J40 BRONCHITIS: Primary | ICD-10-CM

## 2022-04-25 PROBLEM — H25.10 NUCLEAR SENILE CATARACT: Status: ACTIVE | Noted: 2022-03-31

## 2022-04-25 PROBLEM — H43.812 VITREOUS DETACHMENT OF LEFT EYE: Status: ACTIVE | Noted: 2022-03-31

## 2022-04-25 PROBLEM — M19.90 ARTHRITIS: Status: ACTIVE | Noted: 2022-03-31

## 2022-04-25 PROBLEM — R05.9 COUGH: Status: ACTIVE | Noted: 2022-04-25

## 2022-04-25 PROBLEM — H52.4 PRESBYOPIA: Status: ACTIVE | Noted: 2022-03-31

## 2022-04-25 PROBLEM — K21.9 ACID REFLUX: Status: ACTIVE | Noted: 2021-03-19

## 2022-04-25 PROBLEM — C80.1 MALIGNANT NEOPLASM: Status: ACTIVE | Noted: 2022-03-31

## 2022-04-25 PROBLEM — I10 HYPERTENSION: Status: ACTIVE | Noted: 2022-03-31

## 2022-04-25 PROBLEM — R11.0 NAUSEA: Status: ACTIVE | Noted: 2022-03-31

## 2022-04-25 PROBLEM — F41.1 ANXIETY STATE: Status: ACTIVE | Noted: 2022-03-31

## 2022-04-25 PROBLEM — Z86.73 HISTORY OF CEREBROVASCULAR ACCIDENT: Status: ACTIVE | Noted: 2022-03-31

## 2022-04-25 PROCEDURE — 99214 OFFICE O/P EST MOD 30 MIN: CPT | Performed by: FAMILY MEDICINE

## 2022-04-25 RX ORDER — DIVALPROEX SODIUM 500 MG/1
500 TABLET, DELAYED RELEASE ORAL DAILY
COMMUNITY
End: 2022-05-16 | Stop reason: SDUPTHER

## 2022-04-25 RX ORDER — SILDENAFIL 25 MG/1
25 TABLET, FILM COATED ORAL
COMMUNITY
End: 2022-09-02 | Stop reason: SDUPTHER

## 2022-04-25 RX ORDER — PREDNISONE 10 MG/1
TABLET ORAL
Qty: 30 TABLET | Refills: 0 | Status: SHIPPED | OUTPATIENT
Start: 2022-04-25 | End: 2022-05-16

## 2022-04-25 NOTE — PROGRESS NOTES
Subjective   Souleymane Stapleton is a 76 y.o. male.    cc:cough  Sore Throat   This is a new problem. The current episode started in the past 7 days. There has been no fever. Associated symptoms include coughing, a hoarse voice, a plugged ear sensation and shortness of breath. Pertinent negatives include no abdominal pain, congestion, diarrhea, drooling, ear discharge, ear pain, headaches, neck pain, stridor, swollen glands, trouble swallowing or vomiting.   Cough  This is a chronic problem. The current episode started more than 1 year ago. The problem has been gradually improving. The problem occurs every few minutes. The cough is productive of sputum. Associated symptoms include myalgias, nasal congestion, rhinorrhea, a sore throat and shortness of breath. Pertinent negatives include no chest pain, chills, ear congestion, ear pain, fever, headaches, heartburn, hemoptysis, postnasal drip, rash, sweats, weight loss or wheezing.       The following portions of the patient's history were reviewed and updated as appropriate: allergies, current medications, past family history, past medical history, past social history, past surgical history and problem list.    Review of Systems   Constitutional: Negative for chills, fever and weight loss.   HENT: Positive for hoarse voice, rhinorrhea and sore throat. Negative for congestion, drooling, ear discharge, ear pain, postnasal drip and trouble swallowing.    Respiratory: Positive for cough and shortness of breath. Negative for hemoptysis, wheezing and stridor.    Cardiovascular: Negative for chest pain.   Gastrointestinal: Negative for abdominal pain, diarrhea, heartburn and vomiting.   Musculoskeletal: Positive for myalgias. Negative for neck pain.   Skin: Negative for rash.   Neurological: Negative for headaches.       Objective   Physical Exam  Vitals reviewed.   Constitutional:       Appearance: Normal appearance.   HENT:      Head: Normocephalic and atraumatic.       "Right Ear: Tympanic membrane, ear canal and external ear normal.      Left Ear: Tympanic membrane, ear canal and external ear normal.      Nose: Nose normal.      Mouth/Throat:      Mouth: Mucous membranes are moist.      Pharynx: Oropharynx is clear.   Cardiovascular:      Rate and Rhythm: Normal rate and regular rhythm.      Heart sounds: Normal heart sounds. No murmur heard.    No friction rub. No gallop.   Pulmonary:      Effort: Pulmonary effort is normal. No respiratory distress.      Breath sounds: Normal breath sounds. No stridor. No wheezing, rhonchi or rales.   Chest:      Chest wall: No tenderness.   Abdominal:      General: Bowel sounds are normal. There is no distension.      Palpations: Abdomen is soft. There is no mass.      Tenderness: There is no abdominal tenderness. There is no guarding or rebound.      Hernia: No hernia is present.   Musculoskeletal:      Cervical back: Normal range of motion.   Skin:     General: Skin is warm and dry.   Neurological:      Mental Status: He is alert.           Visit Vitals  /66   Pulse 83   Ht 172.7 cm (68\")   Wt 80.8 kg (178 lb 3.2 oz)   SpO2 97%   BMI 27.10 kg/m²     Body mass index is 27.1 kg/m².      Assessment/Plan   Diagnoses and all orders for this visit:    1. Bronchitis (Primary)    2. Sore throat    Other orders  -     predniSONE (DELTASONE) 10 MG tablet; 4 daily times three days, 3 daily times three days, 2 daily times three days,1 daily times three days  Dispense: 30 tablet; Refill: 0    Continue with his Spiriva.  Prednisone as tolerated.  Return to the clinic in 3 month/s.  Will contact with results as needed.    "

## 2022-04-27 ENCOUNTER — TELEPHONE (OUTPATIENT)
Dept: FAMILY MEDICINE CLINIC | Facility: CLINIC | Age: 77
End: 2022-04-27

## 2022-04-27 ENCOUNTER — TRANSCRIBE ORDERS (OUTPATIENT)
Dept: LAB | Facility: HOSPITAL | Age: 77
End: 2022-04-27

## 2022-04-27 DIAGNOSIS — R05.9 COUGH: Primary | ICD-10-CM

## 2022-04-27 DIAGNOSIS — E29.1 MALE HYPOGONADISM: Primary | ICD-10-CM

## 2022-04-27 NOTE — TELEPHONE ENCOUNTER
Dr Murrieta said he would call in this for Pt cough and wasn't called in and needs     Hydpol-ctm 10 - 8/5 ml     called into   [facility]     OV 4/25/2022

## 2022-04-28 ENCOUNTER — LAB (OUTPATIENT)
Dept: LAB | Facility: HOSPITAL | Age: 77
End: 2022-04-28

## 2022-04-28 DIAGNOSIS — E29.1 MALE HYPOGONADISM: ICD-10-CM

## 2022-04-28 PROCEDURE — 84403 ASSAY OF TOTAL TESTOSTERONE: CPT

## 2022-04-28 PROCEDURE — 36415 COLL VENOUS BLD VENIPUNCTURE: CPT

## 2022-04-29 LAB — TESTOST SERPL-MCNC: 353 NG/DL (ref 193–740)

## 2022-05-02 DIAGNOSIS — F41.9 ANXIETY: ICD-10-CM

## 2022-05-02 RX ORDER — ALPRAZOLAM 0.5 MG/1
TABLET ORAL
Qty: 90 TABLET | Refills: 0 | Status: SHIPPED | OUTPATIENT
Start: 2022-05-02 | End: 2022-09-12

## 2022-05-02 NOTE — TELEPHONE ENCOUNTER
Last Script 10/14/2021  #[id number] with 1 refills    Next Appt  With Family Medicine (Souleymane Murrieta MD)  05/16/2022 at 2:30 PM    Last OV 04/25/2022

## 2022-05-16 ENCOUNTER — OFFICE VISIT (OUTPATIENT)
Dept: FAMILY MEDICINE CLINIC | Facility: CLINIC | Age: 77
End: 2022-05-16

## 2022-05-16 VITALS
WEIGHT: 176.38 LBS | SYSTOLIC BLOOD PRESSURE: 138 MMHG | DIASTOLIC BLOOD PRESSURE: 80 MMHG | HEART RATE: 81 BPM | HEIGHT: 68 IN | BODY MASS INDEX: 26.73 KG/M2 | OXYGEN SATURATION: 98 %

## 2022-05-16 DIAGNOSIS — F41.9 ANXIETY: ICD-10-CM

## 2022-05-16 DIAGNOSIS — J40 BRONCHITIS: ICD-10-CM

## 2022-05-16 DIAGNOSIS — I10 HYPERTENSION, UNSPECIFIED TYPE: ICD-10-CM

## 2022-05-16 DIAGNOSIS — R05.9 COUGH: Primary | ICD-10-CM

## 2022-05-16 PROCEDURE — 99214 OFFICE O/P EST MOD 30 MIN: CPT | Performed by: FAMILY MEDICINE

## 2022-05-16 RX ORDER — BUSPIRONE HYDROCHLORIDE 5 MG/1
5 TABLET ORAL 3 TIMES DAILY
Qty: 90 TABLET | Refills: 2 | Status: CANCELLED | OUTPATIENT
Start: 2022-05-16

## 2022-05-16 RX ORDER — ALPRAZOLAM 0.5 MG/1
0.5 TABLET ORAL 3 TIMES DAILY
Qty: 90 TABLET | Refills: 2 | Status: CANCELLED | OUTPATIENT
Start: 2022-05-16

## 2022-05-16 RX ORDER — DIVALPROEX SODIUM 500 MG/1
TABLET, EXTENDED RELEASE ORAL
COMMUNITY
Start: 2022-04-23

## 2022-05-16 RX ORDER — CEFUROXIME AXETIL 500 MG/1
500 TABLET ORAL 2 TIMES DAILY
Qty: 20 TABLET | Refills: 0 | Status: SHIPPED | OUTPATIENT
Start: 2022-05-16 | End: 2022-08-15

## 2022-05-16 NOTE — PROGRESS NOTES
Subjective   Souleymane Stapleton is a 76 y.o. male.   Cc: follow up of chronic medical issues    Cough  This is a chronic problem. The current episode started more than 1 year ago. The problem has been gradually improving. The cough is productive of purulent sputum. Associated symptoms include postnasal drip, rhinorrhea and shortness of breath. Pertinent negatives include no chest pain, chills, ear congestion, ear pain, fever, headaches, heartburn, rash or sweats.   Hypertension  This is a chronic problem. The current episode started more than 1 year ago. The problem has been gradually improving since onset. Associated symptoms include malaise/fatigue and shortness of breath. Pertinent negatives include no anxiety, blurred vision, chest pain, headaches, orthopnea, palpitations, peripheral edema or sweats. Current antihypertension treatment includes angiotensin blockers, beta blockers and diuretics.   Anxiety  Presents for follow-up visit. Symptoms include shortness of breath. Patient reports no chest pain, compulsions, confusion, decreased concentration, depressed mood, excessive worry, feeling of choking, insomnia, nervous/anxious behavior, obsessions, palpitations, panic, restlessness or suicidal ideas.       He has been coughing about three months ago. It is now associated with sputum production. The sputum is green.    The following portions of the patient's history were reviewed and updated as appropriate: allergies, current medications, past family history, past medical history, past social history, past surgical history and problem list.    Review of Systems   Constitutional: Positive for malaise/fatigue. Negative for chills and fever.   HENT: Positive for postnasal drip and rhinorrhea. Negative for ear pain.    Eyes: Negative for blurred vision.   Respiratory: Positive for cough and shortness of breath.    Cardiovascular: Negative for chest pain, palpitations and orthopnea.   Gastrointestinal: Negative for  "heartburn.   Skin: Negative for rash.   Neurological: Negative for headaches.   Psychiatric/Behavioral: Negative for confusion, decreased concentration and suicidal ideas. The patient is not nervous/anxious and does not have insomnia.        Objective   Physical Exam  Vitals reviewed.   Constitutional:       Appearance: Normal appearance.   HENT:      Head: Normocephalic and atraumatic.      Right Ear: Tympanic membrane, ear canal and external ear normal.      Left Ear: Tympanic membrane, ear canal and external ear normal.      Nose: Nose normal.      Mouth/Throat:      Mouth: Mucous membranes are moist.      Pharynx: Oropharynx is clear.   Cardiovascular:      Rate and Rhythm: Normal rate and regular rhythm.      Heart sounds: Normal heart sounds. No murmur heard.    No friction rub. No gallop.   Pulmonary:      Comments:  Coarse breath sounds.  Abdominal:      General: Bowel sounds are normal. There is no distension.      Palpations: Abdomen is soft. There is no mass.      Tenderness: There is no abdominal tenderness. There is no guarding.      Hernia: No hernia is present.   Musculoskeletal:      Cervical back: Normal range of motion.   Skin:     General: Skin is warm and dry.   Neurological:      Mental Status: He is alert.           Visit Vitals  /80   Pulse 81   Ht 172.7 cm (68\")   Wt 80 kg (176 lb 6 oz)   SpO2 98%   BMI 26.82 kg/m²     Body mass index is 26.82 kg/m².      Assessment/Plan   Diagnoses and all orders for this visit:    1. Cough (Primary)    2. Bronchitis    3. Hypertension, unspecified type    4. Anxiety    Other orders  -     cefuroxime (CEFTIN) 500 MG tablet; Take 1 tablet by mouth 2 (Two) Times a Day.  Dispense: 20 tablet; Refill: 0    I reviewed the CBC,CMP,and Lipid Profile with the patient.  Return to the clinic in 3 month/s.  Will contact with results as needed.    "

## 2022-05-23 DIAGNOSIS — F41.9 ANXIETY: ICD-10-CM

## 2022-05-23 RX ORDER — BUSPIRONE HYDROCHLORIDE 5 MG/1
TABLET ORAL
Qty: 90 TABLET | Refills: 2 | Status: SHIPPED | OUTPATIENT
Start: 2022-05-23 | End: 2022-08-15 | Stop reason: SDUPTHER

## 2022-06-03 ENCOUNTER — TRANSCRIBE ORDERS (OUTPATIENT)
Dept: LAB | Facility: HOSPITAL | Age: 77
End: 2022-06-03

## 2022-06-03 ENCOUNTER — LAB (OUTPATIENT)
Dept: LAB | Facility: HOSPITAL | Age: 77
End: 2022-06-03

## 2022-06-03 DIAGNOSIS — C61 PROSTATE CANCER: ICD-10-CM

## 2022-06-03 DIAGNOSIS — C61 PROSTATE CANCER: Primary | ICD-10-CM

## 2022-06-03 LAB — PSA SERPL-MCNC: 2.67 NG/ML (ref 0–4)

## 2022-06-03 PROCEDURE — 36415 COLL VENOUS BLD VENIPUNCTURE: CPT

## 2022-06-03 PROCEDURE — 84153 ASSAY OF PSA TOTAL: CPT

## 2022-06-21 NOTE — PROGRESS NOTES
Pulmonary Consultation    Souleymane Murrieta MD,    Thank you for asking me to see Souleymane Stapleton for   Chief Complaint   Patient presents with   • Cough   .      History of Present Illness  Souleymane Stapleton is a 76 y.o. male   Patient referred form PCM for chronic cough and shortness of breath  Patient repots that he's had a chornic dry cough for about 2 years. It's daily, not really worse at any time of day. Is worse with spicey foods.  He does have some exeritonal shortness of breath, but is able to walk over a mile without stopping. He does get short of breaht bending over or squatting    He does have allergies and is on flonase and Zyrtec. He does have reflux and is on treatment for that    He has a history of 5V CABG in 2018 and had a recent cardiac workup which was pretty normal      Tobacco use history:  Type: cigarettes  Amount: 1.5  ppd  Duration: 20 years  Cessation: 30 years ago         Review of Systems: History obtained from chart review and the patient.  Review of Systems   HENT: Positive for postnasal drip.    Respiratory: Positive for cough and shortness of breath. Negative for chest tightness and wheezing.    Gastrointestinal:        Reflux   Neurological: Positive for light-headedness.     As described in the HPI. Otherwise, remainder of ROS (14 systems) were negative.    Patient Active Problem List   Diagnosis   • Astigmatism   • Anxiety state   • History of cerebrovascular accident   • Nuclear senile cataract   • Hypertension   • Unspecified hemorrhoids   • Palpitations   • Pure hypercholesterolemia   • Prostate cancer (HCC)   • Chronic right shoulder pain   • Rotator cuff syndrome, left   • Chronic left shoulder pain   • Impingement syndrome, shoulder, left   • SOB (shortness of breath)   • Angina of effort (HCC)   • Coronary artery disease involving coronary bypass graft of native heart without angina pectoris   • Cervical spinal stenosis   • Displacement of cervical  intervertebral disc   • Displacement of lumbar intervertebral disc without myelopathy   • Follow-up examination after neurological surgery   • Internal carotid artery dissection (HCC)   • TIA (transient ischemic attack)   • Bacterial intestinal infection, unspecified   • Body mass index (bmi) 25.0-25.9, adult   • Diverticulosis of large intestine without perforation or abscess without bleeding   • Functional dyspepsia   • History of colonic polyps   • Polyp of stomach and duodenum   • Bilateral shoulder pain   • Rotator cuff arthropathy, right   • Status post reverse arthroplasty of right shoulder   • Vitreous detachment of left eye   • Transient cerebral ischemia   • Tobacco use   • Stroke (HCC)   • Presbyopia   • Nausea   • History of colon polyps   • Cancer (HCC)   • Arthritis   • Coronary artery disease   • Acute gastritis   • Acid reflux   • Abnormal weight loss   • Abdominal pain   • Gastric polyposis   • Diverticulosis of colon without diverticulitis   • Small intestinal bacterial overgrowth   • S/P CABG (coronary artery bypass graft)   • Right groin pain   • Epigastric pain   • Dizziness   • Mixed hyperlipidemia         Current Outpatient Medications:   •  acetaminophen (TYLENOL) 500 MG tablet, Take 500 mg by mouth 3 (Three) Times a Day., Disp: , Rfl:   •  ALPRAZolam (XANAX) 0.5 MG tablet, Take 1 tablet by mouth 3 (Three) Times a Day., Disp: 270 tablet, Rfl: 1  •  Blood Glucose Monitoring Suppl (Accu-Chek Guide Me) w/Device kit, USE AS DIRECTED FOR HYPOGLYCEMIA, Disp: , Rfl:   •  busPIRone (BUSPAR) 5 MG tablet, Take 1 tablet by mouth 3 (Three) Times a Day., Disp: 90 tablet, Rfl: 2  •  cetirizine (zyrTEC) 10 MG tablet, Take 10 mg by mouth Daily., Disp: , Rfl:   •  clopidogrel (PLAVIX) 75 MG tablet, Take 1 tablet by mouth Daily., Disp: 90 tablet, Rfl: 1  •  dutasteride (AVODART) 0.5 MG capsule, Take 1 capsule by mouth Every Night., Disp: 3 capsule, Rfl: 0  •  fluticasone (FLONASE) 50 MCG/ACT nasal spray, 2  sprays into the nostril(s) as directed by provider Daily As Needed for Rhinitis., Disp: , Rfl:   •  glucose blood test strip, Use as instructed, Disp: 50 each, Rfl: 11  •  glucose monitor monitoring kit, 1 each As Needed (Hypoglycemia)., Disp: 1 each, Rfl: 0  •  irbesartan-hydrochlorothiazide (Avalide) 150-12.5 MG tablet, Take 1 tablet by mouth Daily., Disp: 90 tablet, Rfl: 3  •  Invoca FINEPOINT LANCETS misc, Use once daily and PRN, Disp: 100 each, Rfl: 11  •  Magnesium 250 MG tablet, Take 1 tablet by mouth Daily., Disp: , Rfl:   •  metoprolol succinate XL (TOPROL-XL) 25 MG 24 hr tablet, Take 1 tablet by mouth Daily With Dinner., Disp: 90 tablet, Rfl: 3  •  RABEprazole (Aciphex) 20 MG EC tablet, take 1 tablet by oral route  every day, Disp: , Rfl:   •  sildenafil (VIAGRA) 100 MG tablet, Take 1 tablet by mouth once daily as needed for Erectile dysfunction, Disp: 10 tablet, Rfl: 0  •  simethicone (GAS-X) 80 MG chewable tablet, Chew 1 tablet Every 6 (Six) Hours As Needed for Flatulence., Disp: 56 tablet, Rfl: 1  •  Tiotropium Bromide Monohydrate (SPIRIVA RESPIMAT) 1.25 MCG/ACT aerosol solution inhaler, Inhale 2 puffs Daily., Disp: 12 g, Rfl: 6  •  traZODone (DESYREL) 50 MG tablet, TAKE 1 TABLET EVERY NIGHT, Disp: 90 tablet, Rfl: 1  •  vitamin B-12 (CYANOCOBALAMIN) 500 MCG tablet, Take 500 mcg by mouth Daily., Disp: , Rfl:   •  Alirocumab (Praluent) 150 MG/ML injection pen, Inject 1 mL under the skin into the appropriate area as directed Every 14 (Fourteen) Days., Disp: 1 mL, Rfl: 12  •  finasteride (PROPECIA) 1 MG tablet, TAKE 1 TABLET EVERY DAY, Disp: 90 tablet, Rfl: 3  •  meclizine (ANTIVERT) 12.5 MG tablet, Take 1 tablet by mouth 3 (Three) Times a Day As Needed for Dizziness., Disp: 90 tablet, Rfl: 2  •  omeprazole (priLOSEC) 40 MG capsule, Take 1 capsule by mouth 2 (two) times a day., Disp: , Rfl:   •  predniSONE (DELTASONE) 10 MG tablet, 4 daily times three days, 3 daily times three days, 2 daily times three  days,1 daily times three days, Disp: 30 tablet, Rfl: 0    Allergies   Allergen Reactions   • Statins Myalgia   • Tricor [Fenofibrate] Myalgia       Past Medical History:   Diagnosis Date   • Abdominal pain    • Abnormal weight loss    • Acid reflux    • Acute gastritis    • Anxiety state    • Cancer (HCC)     Prostate   • Coronary artery disease     CABG 2018.   is followed by Dr Samano   • Hernia of abdominal wall    • History of cerebrovascular accident    • History of colon polyps    • Hyperlipidemia    • Hypertension    • Nausea    • Nuclear senile cataract    • Presbyopia    • Stroke (HCC) 2005    TIA   • Tobacco use    • Transient cerebral ischemia    • Vitreous detachment of left eye      Past Surgical History:   Procedure Laterality Date   • BACK SURGERY     • CARDIAC CATHETERIZATION N/A 6/19/2018    Procedure: Coronary angiography;  Surgeon: Delroy Mcconnell MD;  Location: [facility] CATH INVASIVE LOCATION;  Service: Cardiovascular   • CATARACT EXTRACTION W/ INTRAOCULAR LENS IMPLANT Left 10/23/2020    Procedure: REMOVE CATARACT AND IMPLANT  INTRAOCULAR LENS;  Surgeon: Van Funes MD;  Location: [facility] OR;  Service: Ophthalmology;  Laterality: Left;   • CATARACT EXTRACTION W/ INTRAOCULAR LENS IMPLANT Right 11/6/2020    Procedure: REMOVE CATARACT AND IMPLANT INTRAOCULAR LENS;  Surgeon: Van Funes MD;  Location: [facility] OR;  Service: Ophthalmology;  Laterality: Right;   • COLONOSCOPY  06/09/2015    Diverticulosis found in the sigmoid colon. Hemorrhoids found in the anus.   • COLONOSCOPY N/A 2/16/2021    Procedure: COLONOSCOPY;  Surgeon: Chuck Rich DO;  Location: [facility] ENDOSCOPY;  Service: Gastroenterology;  Laterality: N/A;   • CORONARY ARTERY BYPASS GRAFT N/A 6/22/2018    Procedure: PARAS STERNOTOMY CORONARY ARTERY BYPASS GRAFT TIMES 5 USING RIGHT AND LEFT INTERNAL MAMMARY ARTERIES AND LEFT GREATER SAPHENOUS VEIN GRAFT PER ENDOSCOPIC VEIN HARVESTING AND PRP;  Surgeon: Edgar  MARA Pérez MD;  Location: [facility] OR;  Service: Cardiothoracic   • CRYOTHERAPY  2012    Of Acne   • ENDOSCOPY  2015    EGD w/ biopsy -Multiple polyps, one removed.   • ENDOSCOPY N/A 2019    Procedure: ESOPHAGOGASTRODUODENOSCOPY;  Surgeon: Chuck Rich DO;  Location: [facility] ENDOSCOPY;  Service: Gastroenterology   • ENDOSCOPY N/A 2021    Procedure: ESOPHAGOGASTRODUODENOSCOPY;  Surgeon: Chuck Rich DO;  Location: [facility] ENDOSCOPY;  Service: Gastroenterology;  Laterality: N/A;   • HEMORRHOIDECTOMY N/A 3/20/2017    Procedure: Removal of Anal Papilla;  Surgeon: Juan Diego Ken MD;  Location: [facility] OR;  Service:    • JOINT REPLACEMENT     • LUMBAR LAMINECTOMY  2010   • OTHER SURGICAL HISTORY  2010    Biopsy, Each Additional Lesion    • SKIN BIOPSY  2010   • TOTAL SHOULDER ARTHROPLASTY W/ DISTAL CLAVICLE EXCISION Right 2020    Procedure: right reverse total shoulder arthroplasty.;  Surgeon: Juan Diego Mendoza MD;  Location: [facility];  Service: Orthopedics;  Laterality: Right;     Social History     Socioeconomic History   • Marital status:    Tobacco Use   • Smoking status: Former Smoker     Types: Cigarettes     Quit date:      Years since quittin.7   • Smokeless tobacco: Never Used   Vaping Use   • Vaping Use: Never used   Substance and Sexual Activity   • Alcohol use: Yes     Alcohol/week: 1.0 standard drink     Types: 1 Glasses of wine per week     Comment: Social   • Drug use: Never   • Sexual activity: Defer     Family History   Problem Relation Age of Onset   • Dementia Other    • Hypertension Other    • Stroke Other    • Hypertension Mother    • Parkinsonism Mother    • Cancer Mother    • Dementia Mother    • Hypertension Father    • Alcohol abuse Father    • Heart disease Brother    • Hypertension Brother    • Glaucoma Brother    • No Known Problems Sister    • No Known Problems Son    • Heart attack Paternal Grandfather   "  • Hypertension Brother           Objective     Blood pressure 132/70, pulse 72, temperature 96.4 °F (35.8 °C), height 174 cm (68.5\"), weight 77.1 kg (170 lb), SpO2 98 %.  Physical Exam    PFTs:  (independently reviewed and interpreted by me)  6/12/18 @ [place]  FVC 3.32L, 84%  FEV1 2.66L, 94%  Ratio 80%  Normal spirometry    Radiology (independently reviewed and interpreted by me):   CXR 9/3/21- linear atelectasis, no obvious pulmonary abnormalities       Assessment/Plan     Diagnoses and all orders for this visit:    1. Chronic cough (Primary)  -     Full Pulmonary Function Test With Bronchodilator; Future         Discussion/ Recommendations:   Patient with chronic cough for last two years. We discussed the common causes of this including asthma, UACS, and GERD. He's already on treatment for the last 2. Will repeat PFTs and depending on the findings will discussing starting an ICS for him. He's also going to start doing his sinus rinses daily and see if that helps as well    I don't think his dyspnea is pathologic given his description. However will await PFT results             Return in about 4 weeks (around 11/10/2021).      Thank you for allowing me to participate in the care of Souleymane Stapleton. Please do not hesitate to contact me with any questions.         This document has been electronically signed by Georgie Vila DO on October 13, 2021 10:40 CDT    " Yes

## 2022-07-07 ENCOUNTER — TRANSCRIBE ORDERS (OUTPATIENT)
Dept: LAB | Facility: HOSPITAL | Age: 77
End: 2022-07-07

## 2022-07-07 ENCOUNTER — LAB (OUTPATIENT)
Dept: LAB | Facility: HOSPITAL | Age: 77
End: 2022-07-07

## 2022-07-07 DIAGNOSIS — C61 PROSTATE CANCER: Primary | ICD-10-CM

## 2022-07-07 DIAGNOSIS — C61 PROSTATE CANCER: ICD-10-CM

## 2022-07-07 LAB — PSA SERPL-MCNC: 0.11 NG/ML (ref 0–4)

## 2022-07-07 PROCEDURE — 84153 ASSAY OF PSA TOTAL: CPT

## 2022-07-07 PROCEDURE — 36415 COLL VENOUS BLD VENIPUNCTURE: CPT

## 2022-07-18 RX ORDER — OMEPRAZOLE 40 MG/1
CAPSULE, DELAYED RELEASE ORAL
Qty: 90 CAPSULE | Refills: 1 | Status: SHIPPED | OUTPATIENT
Start: 2022-07-18

## 2022-07-28 RX ORDER — METOPROLOL SUCCINATE 25 MG/1
25 TABLET, EXTENDED RELEASE ORAL
Qty: 90 TABLET | Refills: 3 | Status: SHIPPED | OUTPATIENT
Start: 2022-07-28

## 2022-08-12 ENCOUNTER — TRANSCRIBE ORDERS (OUTPATIENT)
Dept: LAB | Facility: HOSPITAL | Age: 77
End: 2022-08-12

## 2022-08-12 ENCOUNTER — LAB (OUTPATIENT)
Dept: LAB | Facility: HOSPITAL | Age: 77
End: 2022-08-12

## 2022-08-12 DIAGNOSIS — C61 PROSTATE CANCER: ICD-10-CM

## 2022-08-12 DIAGNOSIS — C61 PROSTATE CANCER: Primary | ICD-10-CM

## 2022-08-12 LAB — PSA SERPL-MCNC: 0.03 NG/ML (ref 0–4)

## 2022-08-12 PROCEDURE — 84153 ASSAY OF PSA TOTAL: CPT

## 2022-08-12 PROCEDURE — 36415 COLL VENOUS BLD VENIPUNCTURE: CPT

## 2022-08-15 ENCOUNTER — OFFICE VISIT (OUTPATIENT)
Dept: FAMILY MEDICINE CLINIC | Facility: CLINIC | Age: 77
End: 2022-08-15

## 2022-08-15 VITALS
DIASTOLIC BLOOD PRESSURE: 80 MMHG | OXYGEN SATURATION: 98 % | HEIGHT: 68 IN | HEART RATE: 69 BPM | WEIGHT: 169.56 LBS | BODY MASS INDEX: 25.7 KG/M2 | SYSTOLIC BLOOD PRESSURE: 124 MMHG

## 2022-08-15 DIAGNOSIS — L98.9 SKIN LESION: ICD-10-CM

## 2022-08-15 DIAGNOSIS — F41.9 ANXIETY: ICD-10-CM

## 2022-08-15 DIAGNOSIS — I10 HYPERTENSION, UNSPECIFIED TYPE: Primary | ICD-10-CM

## 2022-08-15 PROCEDURE — 99214 OFFICE O/P EST MOD 30 MIN: CPT | Performed by: FAMILY MEDICINE

## 2022-08-15 RX ORDER — BUSPIRONE HYDROCHLORIDE 5 MG/1
5 TABLET ORAL 3 TIMES DAILY
Qty: 90 TABLET | Refills: 2 | Status: CANCELLED | OUTPATIENT
Start: 2022-08-15

## 2022-08-15 RX ORDER — BUSPIRONE HYDROCHLORIDE 5 MG/1
5 TABLET ORAL 3 TIMES DAILY
Qty: 270 TABLET | Refills: 1 | Status: SHIPPED | OUTPATIENT
Start: 2022-08-15 | End: 2023-01-19

## 2022-08-15 RX ORDER — CLOPIDOGREL BISULFATE 75 MG/1
75 TABLET ORAL DAILY
Qty: 90 TABLET | Refills: 1 | Status: CANCELLED | OUTPATIENT
Start: 2022-08-15

## 2022-08-15 RX ORDER — ALPRAZOLAM 0.5 MG/1
0.5 TABLET ORAL 3 TIMES DAILY
Qty: 90 TABLET | Refills: 0 | Status: CANCELLED | OUTPATIENT
Start: 2022-08-15

## 2022-08-15 NOTE — PROGRESS NOTES
Subjective   Souleymane Stapleton is a 76 y.o. male.   Cc: follow up of chronic medical issues    Anxiety  Presents for follow-up visit. Symptoms include dry mouth. Patient reports no chest pain, compulsions, confusion, decreased concentration, depressed mood, dizziness, excessive worry, feeling of choking, hyperventilation, insomnia, irritability, malaise, muscle tension, nausea, nervous/anxious behavior, obsessions, palpitations, panic, restlessness, shortness of breath or suicidal ideas.       Hypertension  This is a chronic problem. The current episode started more than 1 year ago. The problem has been gradually improving since onset. Associated symptoms include anxiety. Pertinent negatives include no blurred vision, chest pain, headaches, malaise/fatigue, neck pain, orthopnea, palpitations, peripheral edema, PND, shortness of breath or sweats.       The following portions of the patient's history were reviewed and updated as appropriate: allergies, current medications, past family history, past medical history, past social history, past surgical history and problem list.    Review of Systems   Constitutional: Negative for irritability and malaise/fatigue.   Eyes: Negative for blurred vision.   Respiratory: Negative for shortness of breath.    Cardiovascular: Negative for chest pain, palpitations, orthopnea and PND.   Gastrointestinal: Negative for nausea.   Musculoskeletal: Negative for neck pain.   Neurological: Negative for dizziness and headaches.   Psychiatric/Behavioral: Negative for confusion, decreased concentration and suicidal ideas. The patient is not nervous/anxious and does not have insomnia.        Objective   Physical Exam  Vitals reviewed.   Constitutional:       Appearance: Normal appearance.   HENT:      Head: Normocephalic and atraumatic.      Right Ear: Tympanic membrane, ear canal and external ear normal.      Left Ear: Tympanic membrane, ear canal and external ear normal.      Nose: Nose  "normal.      Mouth/Throat:      Mouth: Mucous membranes are moist.      Pharynx: Oropharynx is clear.   Cardiovascular:      Rate and Rhythm: Normal rate and regular rhythm.      Heart sounds: Normal heart sounds. No murmur heard.    No friction rub. No gallop.   Pulmonary:      Effort: Pulmonary effort is normal. No respiratory distress.      Breath sounds: Normal breath sounds. No stridor. No wheezing, rhonchi or rales.   Abdominal:      General: Bowel sounds are normal. There is no distension.      Palpations: Abdomen is soft. There is no mass.      Tenderness: There is no abdominal tenderness. There is no guarding or rebound.      Hernia: No hernia is present.   Musculoskeletal:      Cervical back: Normal range of motion.   Skin:     General: Skin is warm and dry.      Comments: He has a scaly lesion on both ears and one on his right leg.   Neurological:      Mental Status: He is alert.           Visit Vitals  /80   Pulse 69   Ht 172.7 cm (68\")   Wt 76.9 kg (169 lb 9 oz)   SpO2 98%   BMI 25.78 kg/m²     Body mass index is 25.78 kg/m².      Assessment/Plan   Diagnoses and all orders for this visit:    1. Hypertension, unspecified type (Primary)  -     Comprehensive metabolic panel; Future  -     CBC w AUTO Differential; Future    2. Anxiety  -     busPIRone (BUSPAR) 5 MG tablet; Take 1 tablet by mouth 3 (Three) Times a Day.  Dispense: 270 tablet; Refill: 1    3. Skin lesion  -     Ambulatory Referral to Dermatology    I reviewed the CBC and CMP with the patient.  Return to the clinic in 3 month/s.  Will contact with results as needed.    "

## 2022-09-02 RX ORDER — SILDENAFIL 25 MG/1
25 TABLET, FILM COATED ORAL AS NEEDED
Qty: 10 TABLET | Refills: 0 | Status: SHIPPED | OUTPATIENT
Start: 2022-09-02 | End: 2022-09-02

## 2022-09-02 RX ORDER — SILDENAFIL 25 MG/1
25 TABLET, FILM COATED ORAL AS NEEDED
Qty: 10 TABLET | Refills: 3 | Status: SHIPPED | OUTPATIENT
Start: 2022-09-02

## 2022-09-02 NOTE — TELEPHONE ENCOUNTER
Next Appt  With Cardiology  09/20/2022 at 10:30 AM    Requesting 90 day supply to [facility]  Please Advise

## 2022-09-02 NOTE — TELEPHONE ENCOUNTER
Incoming Refill Request      Medication requested (name and dose):     SILDENAFIL 50 MG    Pharmacy where request should be sent:     [facility]    Additional details provided by pT:    PT REQUESTING THE 50MG TABLETS    Best call back number:     [phone]    Does the patient have less than a 3 day supply:  [] Yes  [] No    Sarah Stewart, Aleisha Rep  09/02/22, 09:32 CDT

## 2022-09-11 DIAGNOSIS — F41.9 ANXIETY: ICD-10-CM

## 2022-09-12 RX ORDER — FLUTICASONE PROPIONATE 50 MCG
SPRAY, SUSPENSION (ML) NASAL
Qty: 48 G | Refills: 1 | Status: SHIPPED | OUTPATIENT
Start: 2022-09-12

## 2022-09-12 RX ORDER — ALPRAZOLAM 0.5 MG/1
TABLET ORAL
Qty: 90 TABLET | Refills: 2 | Status: SHIPPED | OUTPATIENT
Start: 2022-09-12 | End: 2023-03-08

## 2022-09-23 ENCOUNTER — TRANSCRIBE ORDERS (OUTPATIENT)
Dept: LAB | Facility: HOSPITAL | Age: 77
End: 2022-09-23

## 2022-09-23 ENCOUNTER — LAB (OUTPATIENT)
Dept: LAB | Facility: HOSPITAL | Age: 77
End: 2022-09-23

## 2022-09-23 DIAGNOSIS — C61 PROSTATE CANCER: ICD-10-CM

## 2022-09-23 DIAGNOSIS — C61 PROSTATE CANCER: Primary | ICD-10-CM

## 2022-09-23 LAB — PSA SERPL-MCNC: <0.014 NG/ML (ref 0–4)

## 2022-09-23 PROCEDURE — 84153 ASSAY OF PSA TOTAL: CPT

## 2022-09-23 PROCEDURE — 36415 COLL VENOUS BLD VENIPUNCTURE: CPT

## 2022-10-10 ENCOUNTER — OFFICE VISIT (OUTPATIENT)
Dept: CARDIOLOGY | Facility: CLINIC | Age: 77
End: 2022-10-10

## 2022-10-10 ENCOUNTER — TELEPHONE (OUTPATIENT)
Dept: FAMILY MEDICINE CLINIC | Facility: CLINIC | Age: 77
End: 2022-10-10

## 2022-10-10 VITALS
DIASTOLIC BLOOD PRESSURE: 88 MMHG | HEART RATE: 72 BPM | OXYGEN SATURATION: 98 % | SYSTOLIC BLOOD PRESSURE: 136 MMHG | WEIGHT: 175 LBS | HEIGHT: 68 IN | BODY MASS INDEX: 26.52 KG/M2

## 2022-10-10 DIAGNOSIS — I25.810 CORONARY ARTERY DISEASE INVOLVING CORONARY BYPASS GRAFT OF NATIVE HEART WITHOUT ANGINA PECTORIS: ICD-10-CM

## 2022-10-10 DIAGNOSIS — E78.2 MIXED HYPERLIPIDEMIA: ICD-10-CM

## 2022-10-10 DIAGNOSIS — E78.00 PURE HYPERCHOLESTEROLEMIA: ICD-10-CM

## 2022-10-10 DIAGNOSIS — Z95.1 S/P CABG (CORONARY ARTERY BYPASS GRAFT): Primary | ICD-10-CM

## 2022-10-10 LAB
QT INTERVAL: 396 MS
QTC INTERVAL: 433 MS

## 2022-10-10 PROCEDURE — 99214 OFFICE O/P EST MOD 30 MIN: CPT | Performed by: INTERNAL MEDICINE

## 2022-10-10 PROCEDURE — 93000 ELECTROCARDIOGRAM COMPLETE: CPT | Performed by: INTERNAL MEDICINE

## 2022-10-10 RX ORDER — DIVALPROEX SODIUM 500 MG/1
1 TABLET, EXTENDED RELEASE ORAL DAILY
Qty: 30 TABLET | Refills: 11 | COMMUNITY
Start: 2022-09-23 | End: 2022-10-10

## 2022-10-10 NOTE — TELEPHONE ENCOUNTER
I contacted the patient and reviewed how to taper off of his Divalproex. He will go to 1/2 tab daily for two weeks, then 1/2 tab every other day for two weeks and then stop.

## 2022-10-10 NOTE — TELEPHONE ENCOUNTER
Pt called to ask if Dr. Murrieta had looked at a [employer] message from last week about getting off a medicine. Thanks!

## 2022-10-10 NOTE — PROGRESS NOTES
Souleymane Stapleton  77 y.o. male    1. S/P CABG (coronary artery bypass graft)    2. Pure hypercholesterolemia    3. Mixed hyperlipidemia    4. Coronary artery disease involving coronary bypass graft of native heart without angina pectoris        History of Present Illness  Souleymane Stapleton is a 77-year-old male with coronary artery disease status post coronary artery bypass surgery in June 2018 (CABG x 5 using bilateral mammary grafts), hypertension, hyperlipidemia, history of cerebrovascular event.    He denies any cardiac symptoms no chest pain, shortness of breath or palpitation.  He has been compliant with his medications.    Echocardiogram in July 2021 showed normal LV systolic function with an EF of 57% and grade 1 diastolic dysfunction.  Right ventricle was mild to moderately dilated with normal systolic function.  RVSP was less than 35 mmHg.  Aortic root was mildly dilated at 4.4 cm.    Lexiscan Cardiolite stress test in August 2021 showed:  · Findings consistent with an equivocal ECG stress test.  · Left ventricular ejection fraction is normal. (Calculated EF = 70%).  · Myocardial perfusion imaging indicates a normal myocardial perfusion study with no evidence of ischemia.  · Impressions are consistent with a low risk study.     He is noted to have markedly elevated and LDL was 186 in February 2022.  He has not been able to tolerate statins or Repatha and could not afford Praluent secondary to the high cost.    EKG today showed sinus rhythm with heart rate of 72 bpm.  No ST-T wave changes diagnostic of ischemia.  QTc interval 433 ms.      Allergies   Allergen Reactions   • Fenofibrate Myalgia   • Statins Myalgia         Past Medical History:   Diagnosis Date   • Abdominal pain    • Abnormal weight loss    • Acid reflux    • Acute gastritis    • Anxiety state    • Cancer (HCC)     Prostate   • Coronary artery disease     CABG 2018.   is followed by Dr Samano   • Hernia of abdominal wall    • History of  cerebrovascular accident    • History of colon polyps    • HL (hearing loss) 2010   • Hyperlipidemia    • Hypertension    • Nausea    • Nosebleed Occasionally in 2021/2022   • Nuclear senile cataract    • Presbyopia    • Stroke (HCC) 2005    TIA   • Tinnitus 1990   • Tobacco use    • Transient cerebral ischemia    • Vitreous detachment of left eye          Past Surgical History:   Procedure Laterality Date   • BACK SURGERY     • CARDIAC CATHETERIZATION N/A 06/19/2018    Procedure: Coronary angiography;  Surgeon: Delroy Mcconnell MD;  Location: [facility] CATH INVASIVE LOCATION;  Service: Cardiovascular   • CATARACT EXTRACTION W/ INTRAOCULAR LENS IMPLANT Left 10/23/2020    Procedure: REMOVE CATARACT AND IMPLANT  INTRAOCULAR LENS;  Surgeon: Van Funes MD;  Location: [facility] OR;  Service: Ophthalmology;  Laterality: Left;   • CATARACT EXTRACTION W/ INTRAOCULAR LENS IMPLANT Right 11/06/2020    Procedure: REMOVE CATARACT AND IMPLANT INTRAOCULAR LENS;  Surgeon: Van Funes MD;  Location: [facility] OR;  Service: Ophthalmology;  Laterality: Right;   • COLONOSCOPY  06/09/2015    Diverticulosis found in the sigmoid colon. Hemorrhoids found in the anus.   • COLONOSCOPY N/A 02/16/2021    Procedure: COLONOSCOPY;  Surgeon: Chuck Rich DO;  Location: [facility] ENDOSCOPY;  Service: Gastroenterology;  Laterality: N/A;   • CORONARY ARTERY BYPASS GRAFT N/A 06/22/2018    Procedure: PARAS STERNOTOMY CORONARY ARTERY BYPASS GRAFT TIMES 5 USING RIGHT AND LEFT INTERNAL MAMMARY ARTERIES AND LEFT GREATER SAPHENOUS VEIN GRAFT PER ENDOSCOPIC VEIN HARVESTING AND PRP;  Surgeon: Edgar Pérez MD;  Location: [facility] OR;  Service: Cardiothoracic   • CRYOTHERAPY  08/14/2012    Of Acne   • ENDOSCOPY  09/01/2015    EGD w/ biopsy -Multiple polyps, one removed.   • ENDOSCOPY N/A 08/22/2019    Procedure: ESOPHAGOGASTRODUODENOSCOPY;  Surgeon: Chuck Rich DO;  Location: [facility] ENDOSCOPY;  Service: Gastroenterology   •  ENDOSCOPY N/A 2021    Procedure: ESOPHAGOGASTRODUODENOSCOPY;  Surgeon: Chuck Rich DO;  Location: [facility] ENDOSCOPY;  Service: Gastroenterology;  Laterality: N/A;   • EYE SURGERY     • HEMORRHOIDECTOMY N/A 2017    Procedure: Removal of Anal Papilla;  Surgeon: Juan Diego Ken MD;  Location: [facility] OR;  Service:    • JOINT REPLACEMENT     • LUMBAR LAMINECTOMY  2010   • OTHER SURGICAL HISTORY  2010    Biopsy, Each Additional Lesion    • SKIN BIOPSY  2010   • TOTAL SHOULDER ARTHROPLASTY W/ DISTAL CLAVICLE EXCISION Right 2020    Procedure: right reverse total shoulder arthroplasty.;  Surgeon: Juan Diego Mendoza MD;  Location: [facility] OR;  Service: Orthopedics;  Laterality: Right;         Family History   Problem Relation Age of Onset   • Dementia Other    • Hypertension Other    • Stroke Other    • Hypertension Mother    • Parkinsonism Mother    • Cancer Mother    • Dementia Mother    • Hypertension Father    • Alcohol abuse Father    • Heart disease Brother    • Hypertension Brother    • Glaucoma Brother    • No Known Problems Sister    • No Known Problems Son    • Heart attack Paternal Grandfather    • Hypertension Brother          Social History     Socioeconomic History   • Marital status:    Tobacco Use   • Smoking status: Former     Packs/day: 1.50     Years: 15.00     Pack years: 22.50     Types: Cigarettes, Pipe, Cigars     Quit date: 1997     Years since quittin.7   • Smokeless tobacco: Never   Vaping Use   • Vaping Use: Never used   Substance and Sexual Activity   • Alcohol use: Yes     Alcohol/week: 5.0 standard drinks     Types: 5 Glasses of wine per week     Comment: Social   • Drug use: Never   • Sexual activity: Not Currently     Partners: Female         Current Outpatient Medications   Medication Sig Dispense Refill   • acetaminophen (TYLENOL) 500 MG tablet Take 500 mg by mouth 3 (Three) Times a Day.     • ALPRAZolam (XANAX) 0.5 MG  "tablet TAKE 1 TABLET THREE TIMES DAILY 90 tablet 2   • busPIRone (BUSPAR) 5 MG tablet Take 1 tablet by mouth 3 (Three) Times a Day. 270 tablet 1   • cetirizine (zyrTEC) 10 MG tablet Take 10 mg by mouth Daily.     • clopidogrel (PLAVIX) 75 MG tablet TAKE 1 TABLET EVERY DAY 90 tablet 1   • divalproex (DEPAKOTE ER) 500 MG 24 hr tablet      • fluticasone (FLONASE) 50 MCG/ACT nasal spray USE 2 SPRAYS IN EACH NOSTRIL EVERY DAY AS DIRECTED BY PROVIDER 48 g 1   • irbesartan-hydrochlorothiazide (AVALIDE) 150-12.5 MG tablet TAKE 1 TABLET EVERY DAY 90 tablet 3   • Magnesium 250 MG tablet Take 1 tablet by mouth Daily.     • metoprolol succinate XL (TOPROL-XL) 25 MG 24 hr tablet TAKE 1 TABLET BY MOUTH DAILY WITH DINNER. 90 tablet 3   • omeprazole (priLOSEC) 40 MG capsule TAKE 1 CAPSULE EVERY DAY 90 capsule 1   • sildenafil (VIAGRA) 25 MG tablet Take 1 tablet by mouth As Needed for Erectile Dysfunction. 10 tablet 3   • simethicone (GAS-X) 80 MG chewable tablet Chew 1 tablet Every 6 (Six) Hours As Needed for Flatulence. 56 tablet 1   • Tiotropium Bromide Monohydrate (SPIRIVA RESPIMAT) 1.25 MCG/ACT aerosol solution inhaler Inhale 2 puffs Daily. 12 g 6   • vitamin B-12 (CYANOCOBALAMIN) 500 MCG tablet Take 500 mcg by mouth Daily.       No current facility-administered medications for this visit.         OBJECTIVE    /88 (BP Location: Right arm, Patient Position: Sitting, Cuff Size: Adult)   Pulse 72   Ht 172.7 cm (68\")   Wt 79.4 kg (175 lb)   SpO2 98%   BMI 26.61 kg/m²         Review of Systems : The following systems were reviewed and changes noted as indicated below    Constitutional:  Denies recent weight loss, weight gain, fever or chills, no change in exercise tolerance     HENT:  hearing loss, hoarseness, or difficulty speaking.  No epistaxis.  Has hearing aids    Eyes: Wears eyeglasses or contact lenses     Respiratory: no cough, wheezing.  Has dyspnea on exertion    Cardiovascular: Negative for palpations, chest " pain. No syncope    Gastrointestinal:  Denies change in bowel habits, dyspepsia, ulcer disease, hematochezia, or melena.     Endocrine: Negative for cold intolerance, heat intolerance, polydipsia, polyphagia and polyuria.     Genitourinary: Negative.      Musculoskeletal: Pain in the right shoulder, s/p shoulder surgery by     Neurological:  Denies any history of recurrent headaches, strokes, TIA, or seizure disorder.     Hematological: Denies any food allergies, seasonal allergies, bleeding disorders, or lymphadenopathy.     Psychiatric/Behavioral: Denies any history of depression, substance abuse, or change in cognitive function.     Physical Exam: The following systems were reassessed and no changes noted    Constitutional: Cooperative, alert and oriented, in no acute distress.     HENT:   Head: Normocephalic, normal hair patterns, no masses or tenderness.  Ears, Nose, and Throat: No gross abnormalities. No pallor or cyanosis.   Eyes: EOMS intact, PERRL, conjunctivae and lids unremarkable. Fundoscopic exam and visual fields not performed.   Neck: No palpable masses or adenopathy, no thyromegaly, no JVD, carotid pulses are full and equal bilaterally and without  Bruits.     Cardiovascular: Regular rhythm, S1 and S2 normal, no S3 or S4.  No murmurs, gallops, or rubs detected.     Pulmonary/Chest: Chest: normal symmetry, normal respiratory excursion, no intercostal retraction, no use of accessory muscles.            Pulmonary: Normal breath sounds. No rales or ronchi.    Abdominal: Abdomen soft, bowel sounds normoactive, no masses, no hepatosplenomegaly, non-tender, no bruits.     Musculoskeletal: No deformities, clubbing, cyanosis, erythema, or edema observed.    Neurological: No gross motor or sensory deficits noted, affect appropriate, oriented to time, person, place.      Psychiatric: He has a normal mood and affect. His behavior is normal. Judgment and thought content normal.         Procedures      Lab  Results   Component Value Date    WBC 10.49 02/14/2022    HGB 16.0 02/14/2022    HCT 48.4 02/14/2022    MCV 90.3 02/14/2022     02/14/2022     Lab Results   Component Value Date    GLUCOSE 99 02/14/2022    BUN 26 (H) 02/14/2022    CREATININE 1.30 (H) 02/14/2022    EGFRIFNONA 53 (L) 02/14/2022    EGFRIFAFRI 60 05/02/2018    BCR 20.0 02/14/2022    CO2 25.5 02/14/2022    CALCIUM 9.4 02/14/2022    ALBUMIN 4.50 02/14/2022    AST 17 02/14/2022    ALT 20 02/14/2022     Lab Results   Component Value Date    CHOL 262 (H) 02/14/2022    CHOL 265 (H) 09/03/2021    CHOL 241 (H) 02/25/2021     Lab Results   Component Value Date    TRIG 170 (H) 02/14/2022    TRIG 305 (H) 09/03/2021    TRIG 189 (H) 02/25/2021     Lab Results   Component Value Date    HDL 44 02/14/2022    HDL 35 (L) 09/03/2021    HDL 46 02/25/2021     No components found for: LDLCALC  Lab Results   Component Value Date     (H) 02/14/2022     (H) 09/03/2021     (H) 02/25/2021     No results found for: HDLLDLRATIO  No components found for: CHOLHDL  Lab Results   Component Value Date    HGBA1C 5.70 (H) 06/16/2021     Lab Results   Component Value Date    TSH 0.962 03/01/2022           ASSESSMENT AND PLAN  Souleymane Stapleton has multiple medical issues but stable from a cardiac standpoint with no clinical evidence of angina, arrhythmia or congestive heart failure.  He has no documented carotid artery disease.  He is able to perform his activities of daily living.    Antiplatelet therapy with Plavix, antihypertensive therapy with irbesartan/HCTZ, metoprolol succinate have been continued.  As mentioned above, the patient has been unable to tolerate statins or PCSK9 inhibitors.  We will once again check the cost of Praluent and see if he will be able to tolerate this.    Diagnoses and all orders for this visit:    1. S/P CABG (coronary artery bypass graft) (Primary)  -     ECG 12 Lead    2. Pure hypercholesterolemia    3. Mixed hyperlipidemia    4.  Coronary artery disease involving coronary bypass graft of native heart without angina pectoris        Patient's Body mass index is 26.61 kg/m². BMI is within normal parameters. No follow-up required..  Patient is a non-smoker    Souleymane Stapleton  reports that he quit smoking about 25 years ago. His smoking use included cigarettes, pipe, and cigars. He has a 22.50 pack-year smoking history. He has never used smokeless tobacco..      Addendum: Mr. Stapleton is being considered for shoulder surgery on April 11, 2023.  Based on the information I have, he will be low to moderate risk for perioperative cardiac events.  He could go off antiplatelet therapy with Plavix for 5 to 7 days if required and medication may be restarted postsurgery when appropriate.      Roberto Samano MD  10/10/2022  10:55 CDT

## 2022-10-21 ENCOUNTER — CLINICAL SUPPORT (OUTPATIENT)
Dept: FAMILY MEDICINE CLINIC | Facility: CLINIC | Age: 77
End: 2022-10-21

## 2022-10-21 DIAGNOSIS — Z23 NEED FOR INFLUENZA VACCINATION: Primary | ICD-10-CM

## 2022-10-21 PROCEDURE — G0008 ADMIN INFLUENZA VIRUS VAC: HCPCS | Performed by: FAMILY MEDICINE

## 2022-10-21 PROCEDURE — 90662 IIV NO PRSV INCREASED AG IM: CPT | Performed by: FAMILY MEDICINE

## 2022-10-25 RX ORDER — CLOPIDOGREL BISULFATE 75 MG/1
TABLET ORAL
Qty: 90 TABLET | Refills: 1 | Status: SHIPPED | OUTPATIENT
Start: 2022-10-25

## 2022-10-28 ENCOUNTER — TRANSCRIBE ORDERS (OUTPATIENT)
Dept: LAB | Facility: HOSPITAL | Age: 77
End: 2022-10-28

## 2022-10-28 ENCOUNTER — LAB (OUTPATIENT)
Dept: LAB | Facility: HOSPITAL | Age: 77
End: 2022-10-28

## 2022-10-28 DIAGNOSIS — C61 PROSTATE CANCER: ICD-10-CM

## 2022-10-28 DIAGNOSIS — C61 PROSTATE CANCER: Primary | ICD-10-CM

## 2022-10-28 PROCEDURE — 36415 COLL VENOUS BLD VENIPUNCTURE: CPT

## 2022-10-28 PROCEDURE — G0103 PSA SCREENING: HCPCS

## 2022-10-29 LAB — PSA SERPL-MCNC: <0.014 NG/ML (ref 0–4)

## 2022-12-02 ENCOUNTER — LAB (OUTPATIENT)
Dept: LAB | Facility: HOSPITAL | Age: 77
End: 2022-12-02

## 2022-12-02 ENCOUNTER — TRANSCRIBE ORDERS (OUTPATIENT)
Dept: LAB | Facility: HOSPITAL | Age: 77
End: 2022-12-02

## 2022-12-02 DIAGNOSIS — C61 PROSTATE CANCER: Primary | ICD-10-CM

## 2022-12-02 DIAGNOSIS — C61 PROSTATE CANCER: ICD-10-CM

## 2022-12-02 DIAGNOSIS — I10 HYPERTENSION, UNSPECIFIED TYPE: ICD-10-CM

## 2022-12-02 LAB
ALBUMIN SERPL-MCNC: 4.3 G/DL (ref 3.5–5.2)
ALBUMIN/GLOB SERPL: 1.7 G/DL
ALP SERPL-CCNC: 67 U/L (ref 39–117)
ALT SERPL W P-5'-P-CCNC: 23 U/L (ref 1–41)
ANION GAP SERPL CALCULATED.3IONS-SCNC: 10.9 MMOL/L (ref 5–15)
AST SERPL-CCNC: 23 U/L (ref 1–40)
BASOPHILS # BLD AUTO: 0.08 10*3/MM3 (ref 0–0.2)
BASOPHILS NFR BLD AUTO: 1.4 % (ref 0–1.5)
BILIRUB SERPL-MCNC: 0.5 MG/DL (ref 0–1.2)
BUN SERPL-MCNC: 22 MG/DL (ref 8–23)
BUN/CREAT SERPL: 19.5 (ref 7–25)
CALCIUM SPEC-SCNC: 9.3 MG/DL (ref 8.6–10.5)
CHLORIDE SERPL-SCNC: 99 MMOL/L (ref 98–107)
CO2 SERPL-SCNC: 27.1 MMOL/L (ref 22–29)
CREAT SERPL-MCNC: 1.13 MG/DL (ref 0.76–1.27)
DEPRECATED RDW RBC AUTO: 43.2 FL (ref 37–54)
EGFRCR SERPLBLD CKD-EPI 2021: 66.1 ML/MIN/1.73
EOSINOPHIL # BLD AUTO: 0.06 10*3/MM3 (ref 0–0.4)
EOSINOPHIL NFR BLD AUTO: 1.1 % (ref 0.3–6.2)
ERYTHROCYTE [DISTWIDTH] IN BLOOD BY AUTOMATED COUNT: 12.6 % (ref 12.3–15.4)
GLOBULIN UR ELPH-MCNC: 2.5 GM/DL
GLUCOSE SERPL-MCNC: 109 MG/DL (ref 65–99)
HCT VFR BLD AUTO: 38.4 % (ref 37.5–51)
HGB BLD-MCNC: 13.1 G/DL (ref 13–17.7)
IMM GRANULOCYTES # BLD AUTO: 0.01 10*3/MM3 (ref 0–0.05)
IMM GRANULOCYTES NFR BLD AUTO: 0.2 % (ref 0–0.5)
LYMPHOCYTES # BLD AUTO: 1.06 10*3/MM3 (ref 0.7–3.1)
LYMPHOCYTES NFR BLD AUTO: 18.9 % (ref 19.6–45.3)
MCH RBC QN AUTO: 32.1 PG (ref 26.6–33)
MCHC RBC AUTO-ENTMCNC: 34.1 G/DL (ref 31.5–35.7)
MCV RBC AUTO: 94.1 FL (ref 79–97)
MONOCYTES # BLD AUTO: 0.55 10*3/MM3 (ref 0.1–0.9)
MONOCYTES NFR BLD AUTO: 9.8 % (ref 5–12)
NEUTROPHILS NFR BLD AUTO: 3.84 10*3/MM3 (ref 1.7–7)
NEUTROPHILS NFR BLD AUTO: 68.6 % (ref 42.7–76)
NRBC BLD AUTO-RTO: 0 /100 WBC (ref 0–0.2)
PLATELET # BLD AUTO: 298 10*3/MM3 (ref 140–450)
PMV BLD AUTO: 10.4 FL (ref 6–12)
POTASSIUM SERPL-SCNC: 4.5 MMOL/L (ref 3.5–5.2)
PROT SERPL-MCNC: 6.8 G/DL (ref 6–8.5)
PSA SERPL-MCNC: <0.014 NG/ML (ref 0–4)
RBC # BLD AUTO: 4.08 10*6/MM3 (ref 4.14–5.8)
SODIUM SERPL-SCNC: 137 MMOL/L (ref 136–145)
WBC NRBC COR # BLD: 5.6 10*3/MM3 (ref 3.4–10.8)

## 2022-12-02 PROCEDURE — 36415 COLL VENOUS BLD VENIPUNCTURE: CPT

## 2022-12-02 PROCEDURE — 85025 COMPLETE CBC W/AUTO DIFF WBC: CPT

## 2022-12-02 PROCEDURE — 84153 ASSAY OF PSA TOTAL: CPT

## 2022-12-02 PROCEDURE — 80053 COMPREHEN METABOLIC PANEL: CPT

## 2022-12-30 ENCOUNTER — TELEPHONE (OUTPATIENT)
Dept: FAMILY MEDICINE CLINIC | Facility: CLINIC | Age: 77
End: 2022-12-30
Payer: MEDICARE

## 2022-12-30 ENCOUNTER — TELEPHONE (OUTPATIENT)
Dept: CARDIOLOGY | Facility: CLINIC | Age: 77
End: 2022-12-30

## 2022-12-30 RX ORDER — DILTIAZEM HYDROCHLORIDE 120 MG/1
120 CAPSULE, EXTENDED RELEASE ORAL DAILY
Qty: 30 CAPSULE | Refills: 2 | Status: SHIPPED | OUTPATIENT
Start: 2022-12-30 | End: 2023-03-15

## 2022-12-30 NOTE — TELEPHONE ENCOUNTER
Mr. Stapleton called and left a voicemail stating he needs to speak with Dr. Murrieta today. He did not state the reason. Please call back @ [phone]

## 2022-12-30 NOTE — TELEPHONE ENCOUNTER
Called pt advised him that Dr Paulson was out of the office and want be back until 1/9. he said his b/p has been running 190/140 and he did just have covid about a month ago. I suggested him to call his PCP about it for now until Dr Paulson is back in the office----- Message from Aleisha Swift sent at 12/30/2022  8:41 AM CST -----  Souleymane Stapleton called stating his blood pressure is jumping all over the place and he is short of breath and nauseous. Wanted Dr. Paulson to look at his records and maybe get him in to be seen. Return number is [phone]. Thanks.

## 2023-01-03 ENCOUNTER — TELEPHONE (OUTPATIENT)
Dept: FAMILY MEDICINE CLINIC | Facility: CLINIC | Age: 78
End: 2023-01-03
Payer: MEDICARE

## 2023-01-06 ENCOUNTER — LAB (OUTPATIENT)
Dept: LAB | Facility: HOSPITAL | Age: 78
End: 2023-01-06
Payer: MEDICARE

## 2023-01-06 ENCOUNTER — TRANSCRIBE ORDERS (OUTPATIENT)
Dept: LAB | Facility: HOSPITAL | Age: 78
End: 2023-01-06
Payer: MEDICARE

## 2023-01-06 DIAGNOSIS — C61 PROSTATE CANCER: Primary | ICD-10-CM

## 2023-01-06 DIAGNOSIS — C61 PROSTATE CANCER: ICD-10-CM

## 2023-01-06 LAB — PSA SERPL-MCNC: <0.014 NG/ML (ref 0–4)

## 2023-01-06 PROCEDURE — 36415 COLL VENOUS BLD VENIPUNCTURE: CPT

## 2023-01-06 PROCEDURE — 84153 ASSAY OF PSA TOTAL: CPT

## 2023-01-11 RX ORDER — IRBESARTAN AND HYDROCHLOROTHIAZIDE 150; 12.5 MG/1; MG/1
TABLET, FILM COATED ORAL
Qty: 90 TABLET | Refills: 3 | Status: SHIPPED | OUTPATIENT
Start: 2023-01-11

## 2023-01-19 DIAGNOSIS — F41.9 ANXIETY: ICD-10-CM

## 2023-01-19 RX ORDER — BUSPIRONE HYDROCHLORIDE 5 MG/1
TABLET ORAL
Qty: 270 TABLET | Refills: 1 | Status: SHIPPED | OUTPATIENT
Start: 2023-01-19

## 2023-01-24 ENCOUNTER — OFFICE VISIT (OUTPATIENT)
Dept: FAMILY MEDICINE CLINIC | Facility: CLINIC | Age: 78
End: 2023-01-24
Payer: MEDICARE

## 2023-01-24 VITALS
BODY MASS INDEX: 24.89 KG/M2 | SYSTOLIC BLOOD PRESSURE: 120 MMHG | DIASTOLIC BLOOD PRESSURE: 66 MMHG | HEIGHT: 69 IN | HEART RATE: 67 BPM | WEIGHT: 168.06 LBS | OXYGEN SATURATION: 99 %

## 2023-01-24 DIAGNOSIS — I10 PRIMARY HYPERTENSION: Primary | ICD-10-CM

## 2023-01-24 DIAGNOSIS — G89.29 CHRONIC LEFT SHOULDER PAIN: ICD-10-CM

## 2023-01-24 DIAGNOSIS — K21.00 GASTROESOPHAGEAL REFLUX DISEASE WITH ESOPHAGITIS WITHOUT HEMORRHAGE: ICD-10-CM

## 2023-01-24 DIAGNOSIS — Z87.39 HISTORY OF ROTATOR CUFF TEAR: ICD-10-CM

## 2023-01-24 DIAGNOSIS — F41.9 ANXIETY: ICD-10-CM

## 2023-01-24 DIAGNOSIS — M25.512 CHRONIC LEFT SHOULDER PAIN: ICD-10-CM

## 2023-01-24 PROCEDURE — 99214 OFFICE O/P EST MOD 30 MIN: CPT | Performed by: FAMILY MEDICINE

## 2023-01-24 RX ORDER — ALPRAZOLAM 0.5 MG/1
0.5 TABLET ORAL 3 TIMES DAILY
Qty: 90 TABLET | Refills: 2 | Status: CANCELLED | OUTPATIENT
Start: 2023-01-24

## 2023-01-24 RX ORDER — OMEPRAZOLE 40 MG/1
40 CAPSULE, DELAYED RELEASE ORAL DAILY
Qty: 90 CAPSULE | Refills: 1 | Status: CANCELLED | OUTPATIENT
Start: 2023-01-24

## 2023-01-24 RX ORDER — CLOPIDOGREL BISULFATE 75 MG/1
75 TABLET ORAL DAILY
Qty: 90 TABLET | Refills: 1 | Status: CANCELLED | OUTPATIENT
Start: 2023-01-24

## 2023-01-24 RX ORDER — OXYBUTYNIN CHLORIDE 10 MG/1
10 TABLET, EXTENDED RELEASE ORAL DAILY
COMMUNITY
Start: 2022-12-02

## 2023-01-24 RX ORDER — FLUTICASONE PROPIONATE 50 MCG
2 SPRAY, SUSPENSION (ML) NASAL DAILY
Qty: 48 G | Refills: 1 | Status: CANCELLED | OUTPATIENT
Start: 2023-01-24

## 2023-01-24 RX ORDER — DILTIAZEM HYDROCHLORIDE 120 MG/1
120 CAPSULE, EXTENDED RELEASE ORAL DAILY
Qty: 30 CAPSULE | Refills: 3 | Status: CANCELLED | OUTPATIENT
Start: 2023-01-24

## 2023-01-24 RX ORDER — SILDENAFIL 25 MG/1
25 TABLET, FILM COATED ORAL AS NEEDED
Qty: 10 TABLET | Refills: 3 | Status: CANCELLED | OUTPATIENT
Start: 2023-01-24

## 2023-01-24 NOTE — PROGRESS NOTES
Subjective   Souleymane Stapleton is a 77 y.o. male.   Cc: follow up of chronic medical issues    Anxiety  Presents for follow-up visit. Symptoms include dry mouth and nervous/anxious behavior. Patient reports no chest pain, compulsions, confusion, decreased concentration, depressed mood, dizziness, excessive worry, feeling of choking, irritability, malaise, shortness of breath or suicidal ideas.       Hyperlipidemia  This is a chronic problem. The problem is resistant. Recent lipid tests were reviewed and are variable. Pertinent negatives include no chest pain or shortness of breath. Current antihyperlipidemic treatment includes diet change.   Hypertension  This is a chronic problem. The current episode started more than 1 year ago. The problem has been gradually improving since onset. Associated symptoms include anxiety. Pertinent negatives include no blurred vision, chest pain, headaches, neck pain, orthopnea, PND or shortness of breath. Current antihypertension treatment includes angiotensin blockers and calcium channel blockers.   Heartburn  He complains of heartburn. He reports no abdominal pain, no chest pain, no choking, no coughing, no early satiety, no sore throat, no water brash or no wheezing.   He has been having pain with this left shoulder. He has a history of a rotator cuff tear on the left.He still has pain on that side and it has locked up on him and was quite [id number].  The following portions of the patient's history were reviewed and updated as appropriate: allergies, current medications, past family history, past medical history, past social history, past surgical history and problem list.    Review of Systems   Constitutional: Negative for irritability.   HENT: Negative for sore throat.    Eyes: Negative for blurred vision.   Respiratory: Negative for cough, choking, shortness of breath and wheezing.    Cardiovascular: Negative for chest pain, orthopnea and PND.   Gastrointestinal: Positive for  "heartburn. Negative for abdominal pain.   Musculoskeletal: Negative for neck pain.   Neurological: Negative for dizziness and headaches.   Psychiatric/Behavioral: Negative for confusion, decreased concentration and suicidal ideas. The patient is nervous/anxious.        Objective   Physical Exam  Vitals reviewed.   Constitutional:       Appearance: Normal appearance.   HENT:      Head: Normocephalic and atraumatic.      Right Ear: Tympanic membrane, ear canal and external ear normal.      Left Ear: Tympanic membrane, ear canal and external ear normal.      Nose: Nose normal.      Mouth/Throat:      Mouth: Mucous membranes are moist.      Pharynx: Oropharynx is clear.   Cardiovascular:      Rate and Rhythm: Normal rate and regular rhythm.      Heart sounds: Normal heart sounds. No murmur heard.    No friction rub. No gallop.   Pulmonary:      Effort: Pulmonary effort is normal. No respiratory distress.      Breath sounds: Normal breath sounds. No stridor. No wheezing, rhonchi or rales.   Chest:      Chest wall: No tenderness.   Abdominal:      General: Bowel sounds are normal. There is no distension.      Palpations: Abdomen is soft. There is no mass.      Tenderness: There is no abdominal tenderness. There is no guarding or rebound.      Hernia: No hernia is present.   Musculoskeletal:      Cervical back: Normal range of motion and neck supple.   Skin:     General: Skin is warm and dry.   Neurological:      Mental Status: He is alert.           Visit Vitals  /66   Pulse 67   Ht 174 cm (68.5\")   Wt 76.2 kg (168 lb 1 oz)   SpO2 99%   BMI 25.18 kg/m²     Body mass index is 25.18 kg/m².      Assessment/Plan   Diagnoses and all orders for this visit:    1. Primary hypertension (Primary)    2. Anxiety    3. Gastroesophageal reflux disease with esophagitis without hemorrhage    4. Chronic left shoulder pain  -     Ambulatory Referral to Orthopedic Surgery    5. History of rotator cuff tear  -     Ambulatory Referral " to Orthopedic Surgery    I reviewed the CBC,CMP,and Lipid Profile with the patient.  Return to the clinic in 3 month/s.  Will contact with results as needed.          This document has been electronically signed by Souleymane Murrieta MD on January 24, 2023 10:51 CST

## 2023-03-08 DIAGNOSIS — F41.9 ANXIETY: ICD-10-CM

## 2023-03-08 RX ORDER — ALPRAZOLAM 0.5 MG/1
TABLET ORAL
Qty: 90 TABLET | Refills: 0 | Status: SHIPPED | OUTPATIENT
Start: 2023-03-08

## 2023-03-08 NOTE — TELEPHONE ENCOUNTER
Last RX 09/12/2022  #90, 2 refills    Next Appt  With Family Medicine (Souleymane Murrieta MD)  04/25/2023 at 9:45 AM    Last OV 01/24/2023    Rx is going to [facility] mail order

## 2023-03-15 RX ORDER — DILTIAZEM HYDROCHLORIDE 120 MG/1
120 CAPSULE, EXTENDED RELEASE ORAL DAILY
Qty: 90 CAPSULE | Refills: 1 | Status: SHIPPED | OUTPATIENT
Start: 2023-03-15

## 2023-03-15 NOTE — TELEPHONE ENCOUNTER
Incoming Refill Request      Medication requested (name and dose): dilTIAZem XR (DILACOR XR) 120 MG 24 hr capsule    Pharmacy where request should be sent: [facility]  Mail Order     Additional details provided by patient: 90 days supply needs to go to mail order since staying on long term    Best call back number: [phone]    Does the patient have less than a 3 day supply:  [] Yes  [x] No    Aleisha Em Rep  03/15/23, 13:33 CDT

## 2023-04-04 ENCOUNTER — TELEPHONE (OUTPATIENT)
Dept: CARDIOLOGY | Facility: CLINIC | Age: 78
End: 2023-04-04
Payer: MEDICARE

## 2023-04-04 NOTE — TELEPHONE ENCOUNTER
Faxed to [phone] Dr Ankit Alaniz office ----- Message from Roberto Samano MD sent at 4/3/2023  4:57 PM CDT -----  Regarding: FW: clearance  Contact: [phone]  I have updated his notes.  We can fax information to his surgeon.    ----- Message -----  From: Radha De Los Santos MA  Sent: 4/3/2023   9:19 AM CDT  To: Roberto Samano MD  Subject: RE: clearance                                      ----- Message -----  From: Silvia Hodge RegSched Rep  Sent: 3/28/2023  10:18 AM CDT  To: Radha De Los Santos MA  Subject: clearance                                        He's having shoulder surgery for his rotator cuff on 4/11 with Dr. Ankit Alaniz at [facility]. They are needing to know if he can stop his Plavix 4-5 days prior to surgery. He didn't have a fax number or anything but it looks like their phone number is [phone]. Thanks

## 2023-04-10 ENCOUNTER — TRANSCRIBE ORDERS (OUTPATIENT)
Dept: LAB | Facility: HOSPITAL | Age: 78
End: 2023-04-10
Payer: MEDICARE

## 2023-04-10 ENCOUNTER — LAB (OUTPATIENT)
Dept: LAB | Facility: HOSPITAL | Age: 78
End: 2023-04-10
Payer: MEDICARE

## 2023-04-10 DIAGNOSIS — C61 PROSTATE CANCER: ICD-10-CM

## 2023-04-10 DIAGNOSIS — C61 PROSTATE CANCER: Primary | ICD-10-CM

## 2023-04-10 LAB — PSA SERPL-MCNC: <0.014 NG/ML (ref 0–4)

## 2023-04-10 PROCEDURE — 84153 ASSAY OF PSA TOTAL: CPT

## 2023-04-10 PROCEDURE — 36415 COLL VENOUS BLD VENIPUNCTURE: CPT

## 2023-04-19 RX ORDER — OMEPRAZOLE 40 MG/1
CAPSULE, DELAYED RELEASE ORAL
Qty: 90 CAPSULE | Refills: 1 | Status: SHIPPED | OUTPATIENT
Start: 2023-04-19

## 2023-06-02 ENCOUNTER — LAB (OUTPATIENT)
Dept: LAB | Facility: HOSPITAL | Age: 78
End: 2023-06-02
Payer: MEDICARE

## 2023-06-02 ENCOUNTER — TRANSCRIBE ORDERS (OUTPATIENT)
Dept: LAB | Facility: HOSPITAL | Age: 78
End: 2023-06-02
Payer: MEDICARE

## 2023-06-02 DIAGNOSIS — C61 PROSTATE CANCER: ICD-10-CM

## 2023-06-02 DIAGNOSIS — C61 PROSTATE CANCER: Primary | ICD-10-CM

## 2023-06-02 PROCEDURE — 36415 COLL VENOUS BLD VENIPUNCTURE: CPT

## 2023-06-02 PROCEDURE — 84153 ASSAY OF PSA TOTAL: CPT

## 2023-06-03 LAB — PSA SERPL-MCNC: <0.014 NG/ML (ref 0–4)

## 2023-07-20 PROBLEM — M75.122 NONTRAUMATIC COMPLETE TEAR OF LEFT ROTATOR CUFF: Status: ACTIVE | Noted: 2023-02-06

## 2023-07-21 ENCOUNTER — LAB (OUTPATIENT)
Dept: LAB | Facility: HOSPITAL | Age: 78
End: 2023-07-21
Payer: MEDICARE

## 2023-07-21 DIAGNOSIS — I10 PRIMARY HYPERTENSION: ICD-10-CM

## 2023-07-21 LAB
ALBUMIN SERPL-MCNC: 4.1 G/DL (ref 3.5–5.2)
ALBUMIN/GLOB SERPL: 1.7 G/DL
ALP SERPL-CCNC: 83 U/L (ref 39–117)
ALT SERPL W P-5'-P-CCNC: 14 U/L (ref 1–41)
ANION GAP SERPL CALCULATED.3IONS-SCNC: 12.9 MMOL/L (ref 5–15)
AST SERPL-CCNC: 20 U/L (ref 1–40)
BASOPHILS # BLD AUTO: 0.08 10*3/MM3 (ref 0–0.2)
BASOPHILS NFR BLD AUTO: 1.7 % (ref 0–1.5)
BILIRUB SERPL-MCNC: 0.4 MG/DL (ref 0–1.2)
BUN SERPL-MCNC: 25 MG/DL (ref 8–23)
BUN/CREAT SERPL: 20.5 (ref 7–25)
CALCIUM SPEC-SCNC: 9.4 MG/DL (ref 8.6–10.5)
CHLORIDE SERPL-SCNC: 105 MMOL/L (ref 98–107)
CO2 SERPL-SCNC: 22.1 MMOL/L (ref 22–29)
CREAT SERPL-MCNC: 1.22 MG/DL (ref 0.76–1.27)
DEPRECATED RDW RBC AUTO: 43 FL (ref 37–54)
EGFRCR SERPLBLD CKD-EPI 2021: 60.3 ML/MIN/1.73
EOSINOPHIL # BLD AUTO: 0.11 10*3/MM3 (ref 0–0.4)
EOSINOPHIL NFR BLD AUTO: 2.3 % (ref 0.3–6.2)
ERYTHROCYTE [DISTWIDTH] IN BLOOD BY AUTOMATED COUNT: 13.4 % (ref 12.3–15.4)
GLOBULIN UR ELPH-MCNC: 2.4 GM/DL
GLUCOSE SERPL-MCNC: 109 MG/DL (ref 65–99)
HCT VFR BLD AUTO: 38.2 % (ref 37.5–51)
HGB BLD-MCNC: 13.2 G/DL (ref 13–17.7)
IMM GRANULOCYTES # BLD AUTO: 0.02 10*3/MM3 (ref 0–0.05)
IMM GRANULOCYTES NFR BLD AUTO: 0.4 % (ref 0–0.5)
LYMPHOCYTES # BLD AUTO: 1.06 10*3/MM3 (ref 0.7–3.1)
LYMPHOCYTES NFR BLD AUTO: 22.5 % (ref 19.6–45.3)
MCH RBC QN AUTO: 30.8 PG (ref 26.6–33)
MCHC RBC AUTO-ENTMCNC: 34.6 G/DL (ref 31.5–35.7)
MCV RBC AUTO: 89 FL (ref 79–97)
MONOCYTES # BLD AUTO: 0.5 10*3/MM3 (ref 0.1–0.9)
MONOCYTES NFR BLD AUTO: 10.6 % (ref 5–12)
NEUTROPHILS NFR BLD AUTO: 2.94 10*3/MM3 (ref 1.7–7)
NEUTROPHILS NFR BLD AUTO: 62.5 % (ref 42.7–76)
NRBC BLD AUTO-RTO: 0 /100 WBC (ref 0–0.2)
PLATELET # BLD AUTO: 331 10*3/MM3 (ref 140–450)
PMV BLD AUTO: 10.3 FL (ref 6–12)
POTASSIUM SERPL-SCNC: 4.2 MMOL/L (ref 3.5–5.2)
PROT SERPL-MCNC: 6.5 G/DL (ref 6–8.5)
RBC # BLD AUTO: 4.29 10*6/MM3 (ref 4.14–5.8)
SODIUM SERPL-SCNC: 140 MMOL/L (ref 136–145)
WBC NRBC COR # BLD: 4.71 10*3/MM3 (ref 3.4–10.8)

## 2023-07-21 PROCEDURE — 85025 COMPLETE CBC W/AUTO DIFF WBC: CPT

## 2023-07-21 PROCEDURE — 80053 COMPREHEN METABOLIC PANEL: CPT

## 2023-07-21 PROCEDURE — 36415 COLL VENOUS BLD VENIPUNCTURE: CPT

## 2023-07-24 DIAGNOSIS — R73.09 ELEVATED GLUCOSE LEVEL: Primary | ICD-10-CM

## 2023-07-25 ENCOUNTER — LAB (OUTPATIENT)
Dept: LAB | Facility: HOSPITAL | Age: 78
End: 2023-07-25
Payer: MEDICARE

## 2023-07-25 DIAGNOSIS — R73.09 ELEVATED GLUCOSE LEVEL: ICD-10-CM

## 2023-07-25 LAB — HBA1C MFR BLD: 5.9 % (ref 4.8–5.6)

## 2023-07-25 PROCEDURE — 83036 HEMOGLOBIN GLYCOSYLATED A1C: CPT

## 2023-07-25 PROCEDURE — 36415 COLL VENOUS BLD VENIPUNCTURE: CPT

## 2023-08-31 DIAGNOSIS — F41.9 ANXIETY: ICD-10-CM

## 2023-08-31 RX ORDER — DILTIAZEM HYDROCHLORIDE 120 MG/1
CAPSULE, EXTENDED RELEASE ORAL
Qty: 90 CAPSULE | Refills: 1 | Status: SHIPPED | OUTPATIENT
Start: 2023-08-31

## 2023-08-31 RX ORDER — BUSPIRONE HYDROCHLORIDE 5 MG/1
TABLET ORAL
Qty: 270 TABLET | Refills: 1 | Status: SHIPPED | OUTPATIENT
Start: 2023-08-31

## 2023-08-31 RX ORDER — METOPROLOL SUCCINATE 25 MG/1
TABLET, EXTENDED RELEASE ORAL
Qty: 90 TABLET | Refills: 3 | Status: SHIPPED | OUTPATIENT
Start: 2023-08-31

## 2023-09-25 ENCOUNTER — TRANSCRIBE ORDERS (OUTPATIENT)
Dept: LAB | Facility: HOSPITAL | Age: 78
End: 2023-09-25
Payer: MEDICARE

## 2023-09-25 DIAGNOSIS — C61 PROSTATE CANCER: Primary | ICD-10-CM

## 2023-09-27 ENCOUNTER — LAB (OUTPATIENT)
Dept: LAB | Facility: HOSPITAL | Age: 78
End: 2023-09-27
Payer: MEDICARE

## 2023-09-27 DIAGNOSIS — C61 PROSTATE CANCER: ICD-10-CM

## 2023-09-27 PROCEDURE — 84153 ASSAY OF PSA TOTAL: CPT

## 2023-09-27 PROCEDURE — 36415 COLL VENOUS BLD VENIPUNCTURE: CPT

## 2023-09-28 LAB — PSA SERPL-MCNC: <0.014 NG/ML (ref 0–4)

## 2023-09-29 RX ORDER — FLUTICASONE PROPIONATE 50 MCG
SPRAY, SUSPENSION (ML) NASAL
Qty: 48 G | Refills: 1 | Status: SHIPPED | OUTPATIENT
Start: 2023-09-29

## 2024-09-25 NOTE — THERAPY TREATMENT NOTE
Acute Care - Occupational Therapy Treatment Note  [facility]     Patient Name: Souleymane Stapleton  : 1945  MRN: [id number]  Today's Date: 2020  Onset of Illness/Injury or Date of Surgery: 20  Date of Referral to OT: 20  Referring Physician: Kelly Gomez MD    Admit Date: 2020       ICD-10-CM ICD-9-CM   1. Rotator cuff arthropathy, right M12.811 716.81   2. Bilateral shoulder pain, unspecified chronicity M25.511 719.41    M25.512    3. Rotator cuff syndrome, left M75.102 726.10   4. Prostate cancer (CMS/HCC) C61 185   5. Coronary artery disease involving coronary bypass graft of native heart without angina pectoris I25.810 414.05   6. History of cerebrovascular accident Z86.73 V12.54   7. Impaired mobility and activities of daily living Z74.09 799.89   8. Chronic right shoulder pain M25.511 719.41    G89.29 338.29   9. Impingement syndrome, shoulder, left M75.42 726.2     Patient Active Problem List   Diagnosis   • Astigmatism   • Anxiety state   • History of cerebrovascular accident   • Nuclear senile cataract   • Hypertension   • Unspecified hemorrhoids   • Palpitations   • Pure hypercholesterolemia   • Prostate cancer (CMS/HCC)   • Chronic right shoulder pain   • Rotator cuff syndrome, left   • Chronic left shoulder pain   • Impingement syndrome, shoulder, left   • SOB (shortness of breath)   • Angina of effort (CMS/HCC)   • Coronary artery disease involving coronary bypass graft of native heart without angina pectoris   • Cervical spinal stenosis   • Displacement of cervical intervertebral disc   • Displacement of lumbar intervertebral disc without myelopathy   • Follow-up examination after neurological surgery   • Internal carotid artery dissection (CMS/HCC)   • TIA (transient ischemic attack)   • Bacterial intestinal infection, unspecified   • Body mass index (bmi) 25.0-25.9, adult   • Diverticulosis of large intestine without perforation or abscess without bleeding   • Functional  dyspepsia   • History of colonic polyps   • Polyp of stomach and duodenum   • Bilateral shoulder pain   • Rotator cuff arthropathy, right     Past Medical History:   Diagnosis Date   • Abdominal pain    • Abnormal weight loss    • Acid reflux    • Acute gastritis    • Anxiety state    • Arthritis    • Cancer (CMS/HCC)     Prostate   • Coronary artery disease    • History of cerebrovascular accident    • History of colon polyps    • Hypertension    • Nausea    • Nuclear senile cataract    • Presbyopia    • Stroke (CMS/HCC) 2005    TIA   • Tobacco use    • Transient cerebral ischemia    • Vitreous detachment of left eye      Past Surgical History:   Procedure Laterality Date   • BACK SURGERY     • CARDIAC CATHETERIZATION N/A 6/19/2018    Procedure: Coronary angiography;  Surgeon: Delroy Mcconnell MD;  Location: [facility] CATH INVASIVE LOCATION;  Service: Cardiovascular   • COLONOSCOPY  06/09/2015    Diverticulosis found in the sigmoid colon. Hemorrhoids found in the anus.   • CORONARY ARTERY BYPASS GRAFT N/A 6/22/2018    Procedure: PARAS STERNOTOMY CORONARY ARTERY BYPASS GRAFT TIMES 5 USING RIGHT AND LEFT INTERNAL MAMMARY ARTERIES AND LEFT GREATER SAPHENOUS VEIN GRAFT PER ENDOSCOPIC VEIN HARVESTING AND PRP;  Surgeon: Edgar Pérez MD;  Location: [facility] OR;  Service: Cardiothoracic   • CRYOTHERAPY  08/14/2012    Of Acne   • ENDOSCOPY  09/01/2015    EGD w/ biopsy -Multiple polyps, one removed.   • ENDOSCOPY N/A 8/22/2019    Procedure: ESOPHAGOGASTRODUODENOSCOPY;  Surgeon: Chuck Rich DO;  Location: [facility] ENDOSCOPY;  Service: Gastroenterology   • HEMORRHOIDECTOMY N/A 3/20/2017    Procedure: Removal of Anal Papilla;  Surgeon: Juan Diego Ken MD;  Location: [facility] OR;  Service:    • INJECTION OF MEDICATION  09/14/2010    B12   • INJECTION OF MEDICATION  10/17/2011    Kenalog   • LUMBAR LAMINECTOMY  09/29/2010   • OTHER SURGICAL HISTORY  08/19/2010    Biopsy, Each Additional Lesion    • SKIN BIOPSY   08/19/2010   • TOTAL SHOULDER REVERSE ARTHROPLASTY Right 02/17/2020       Therapy Treatment    Rehabilitation Treatment Summary     Row Name 02/19/20 0845             Treatment Time/Intention    Discipline  occupational therapy assistant  -LM      Document Type  therapy note (daily note)  -LM      Subjective Information  no complaints  -LM      Mode of Treatment  individual therapy;occupational therapy  -LM      Existing Precautions/Restrictions  non-weight bearing  -LM      Equipment Issued to Patient  gait belt  -LM      Recorded by [LM] Maria T Jansen TANG/L 02/19/20 [facility]      Row Name 02/19/20 0845             Cognitive Assessment/Intervention- PT/OT    Orientation Status (Cognition)  oriented x 4  -LM      Recorded by [LM] Maria T Jansen TANG/L 02/19/20 1352      Row Name 02/19/20 0845             Mobility Assessment/Intervention    Extremity Weight-bearing Status  right upper extremity  -LM      Right Upper Extremity (Weight-bearing Status)  non weight-bearing (NWB)  -LM      Recorded by [LM] Maria T Jansen TANG/L 02/19/20 [facility]      Row Name 02/19/20 0845             Bed Mobility Assessment/Treatment    Bed Mobility Assessment/Treatment  --  -LM      Recorded by [LM] Maria T Jansen TANG/L 02/19/20 1352      Row Name 02/19/20 0845             Sit-Stand Transfer    Sit-Stand [place] (Transfers)  conditional independence  -LM      Recorded by [LM] Maria T Jansen TANG/L 02/19/20 1352      Row Name 02/19/20 0845             Stand-Sit Transfer    Stand-Sit [place] (Transfers)  conditional independence  -LM      Recorded by [LM] Maria T Jansen TANG/L 02/19/20 1352      Row Name 02/19/20 0845             Motor Skills Assessment/Interventions    Additional Documentation  Balance Interventions (Group)  -LM      Recorded by [LM] Maria T Jansen TANG/L 02/19/20 1352      Row Name 02/19/20 0845             Therapeutic Exercise    Position (Therapeutic Exercise)  standing  -LM       Comment (Therapeutic Exercise)  pt perf pend ex r ue x 2 sets   -LM      Recorded by [LM] Maria T Jansen COTA/L 02/19/20 1352      Row Name 02/19/20 0845             Positioning and Restraints    Pre-Treatment Position  in bed  -LM      Post Treatment Position  bed  -LM      In Bed  notified nsg  -LM      Recorded by [LM] Maria T Jansen COTA/L 02/19/20 1352      Row Name 02/19/20 0845             Pain Scale: Numbers Pre/Post-Treatment    Pain Scale: Numbers, Pretreatment  5/10  -LM      Pain Scale: Numbers, Post-Treatment  5/10  -LM      Pain Location - Side  Right  -LM      Pain Location  shoulder  -LM      Pain Intervention(s)  Medication (See MAR)  -LM      Recorded by [LM] Maria T Jansen COTA/L 02/19/20 1352      Row Name                Wound 02/17/20 1506 Right shoulder Incision    Wound - Properties Group Date first assessed: 02/17/20 [KW] Time first assessed: 1506 [KW] Present on Hospital Admission: N [KW] Side: Right [KW] Location: shoulder [KW] Primary Wound Type: Incision [KW] Recorded by:  [KW] Roxanne Damon RN 02/17/20 1506    Row Name 02/19/20 0845             Outcome Summary/Treatment Plan (OT)    Daily Summary of Progress (OT)  progress toward functional goals as expected  -LM      Recorded by [LM] Maria T Jansen COTA/L 02/19/20 1352        User Key  (r) = Recorded By, (t) = Taken By, (c) = Cosigned By    Initials Name Effective Dates Discipline    LM Maria T Jansen TANG/L 03/07/18 -  OT    KW Roxanne Damon RN 10/17/16 -  Nurse        Wound 02/17/20 1506 Right shoulder Incision (Active)   Dressing Appearance open to air 2/19/2020  8:42 AM   Closure Staples 2/19/2020  8:42 AM   Base dry;clean 2/19/2020  8:42 AM     Rehab Goal Summary     Row Name 02/19/20 1352             Occupational Therapy Goals    Bathing Goal Selection (OT)  bathing, OT goal 1  -LM      Dressing Goal Selection (OT)  dressing, OT goal 1  -LM      Toileting Goal Selection (OT)  toileting, OT goal 1   -LM      ROM Goal Selection (OT)  ROM, OT goal 1  -LM         Bathing Goal 1 (OT)    Activity/Assistive Device (Bathing Goal 1, OT)  bathing skills, all  -LM      Dixon Level/Cues Needed (Bathing Goal 1, OT)  contact guard assist  -LM      Time Frame (Bathing Goal 1, OT)  long term goal (LTG)  -LM      Progress/Outcomes (Bathing Goal 1, OT)  goal met  -LM         Dressing Goal 1 (OT)    Activity/Assistive Device (Dressing Goal 1, OT)  dressing skills, all  -LM      Dixon/Cues Needed (Dressing Goal 1, OT)  supervision required  -LM      Time Frame (Dressing Goal 1, OT)  long term goal (LTG)  -LM      Progress/Outcome (Dressing Goal 1, OT)  goal met  -LM         Toileting Goal 1 (OT)    Activity/Device (Toileting Goal 1, OT)  toileting skills, all  -LM      Dixon Level/Cues Needed (Toileting Goal 1, OT)  supervision required  -LM      Time Frame (Toileting Goal 1, OT)  long term goal (LTG)  -LM      Progress/Outcome (Toileting Goal 1, OT)  goal met  -LM         ROM Goal 1 (OT)    ROM Goal 1 (OT)  Independent with R pendulums and gentle forward flex as tolerated.  No internal/external rotation.    -LM      Time Frame (ROM Goal 1, OT)  long term goal (LTG)  -LM      Progress/Outcome (ROM Goal 1, OT)  goal met  -LM        User Key  (r) = Recorded By, (t) = Taken By, (c) = Cosigned By    Initials Name Provider Type Discipline    LM Maria T Jansen R, TANG/L Occupational Therapy Assistant OT            OT Recommendation and Plan  Outcome Summary/Treatment Plan (OT)  Daily Summary of Progress (OT): progress toward functional goals as expected  Daily Summary of Progress (OT): progress toward functional goals as expected  Plan of Care Review  Plan of Care Reviewed With: patient  Plan of Care Reviewed With: patient  Outcome Summary: pt perf well with OT  pt to dc home this date.  pt perf funct tf with sba-mod I and perf pend at bedside standing  Outcome Measures     Row Name 02/18/20 0900             How  much help from another is currently needed...    Putting on and taking off regular lower body clothing?  3  -RB      Bathing (including washing, rinsing, and drying)  2  -RB      Toileting (which includes using toilet bed pan or urinal)  3  -RB      Putting on and taking off regular upper body clothing  2  -RB      Taking care of personal grooming (such as brushing teeth)  3  -RB      Eating meals  4  -RB      AM-PAC 6 Clicks Score (OT)  17  -RB         Functional Assessment    Outcome Measure Options  AM-PAC 6 Clicks Daily Activity (OT)  -RB        User Key  (r) = Recorded By, (t) = Taken By, (c) = Cosigned By    Initials Name Provider Type    RB Kody Saleh OT Occupational Therapist           Time Calculation:   Time Calculation- OT     Row Name 02/19/20 1324             Time Calculation- OT    OT Start Time  0845  -LM      OT Stop Time  0924  -LM      OT Time Calculation (min)  39 min  -LM      Total Timed Code Minutes- OT  39 minute(s)  -LM      OT Received On  02/19/20  -        User Key  (r) = Recorded By, (t) = Taken By, (c) = Cosigned By    Initials Name Provider Type     Maria T Jansen COTA/L Occupational Therapy Assistant        Therapy Charges for Today     Code Description Service Date Service Provider Modifiers Qty    56431591803 HC OT THER PROC EA 15 MIN 2/19/2020 Maria T Jansen TANG/L GO 1    97611615445 HC OT THERAPEUTIC ACT EA 15 MIN 2/19/2020 Maria T Jansen COTA/L GO 1    19121072343 HC OT SELF CARE/MGMT/TRAIN EA 15 MIN 2/19/2020 Maria T Jansen COTA/L GO 1               CLYDE Holder/SHELBY  2/19/2020   Able to sit without assistance  +developmental delays Affect appropriate/Interactive/Verbalization clear and understandable for age/Sensation intact to touch

## (undated) DEVICE — Device

## (undated) DEVICE — 28 FR RIGHT ANGLE – SOFT PVC CATHETER: Brand: PVC THORACIC CATHETERS

## (undated) DEVICE — TP SXN YANKR BLB TIP W/TBG 10F LF STRL

## (undated) DEVICE — TRY IRR

## (undated) DEVICE — ST CVR PROB PULLUP ULTRASND 5X48IN

## (undated) DEVICE — PANT KNIT MATERN L/XL 2BG

## (undated) DEVICE — STERILE POLYISOPRENE POWDER-FREE SURGICAL GLOVES WITH EMOLLIENT COATING: Brand: PROTEXIS

## (undated) DEVICE — PK ATS CUST W CARDIOTOMY RESEVOIR

## (undated) DEVICE — GLV SURG SENSICARE MICRO PF LF 7.5 STRL

## (undated) DEVICE — LOU OPEN HEART DR POLLOCK: Brand: MEDLINE INDUSTRIES, INC.

## (undated) DEVICE — KT INTRO MINISTICK MAX W/GW NITNL/TUNG ECHO 4F 21G 7CM

## (undated) DEVICE — SINGLE-USE BIOPSY FORCEPS: Brand: RADIAL JAW 4

## (undated) DEVICE — SOL IRR H2O BTL 1000ML STRL

## (undated) DEVICE — GLV SURG SENSICARE GREEN W/ALOE PF LF 8 STRL

## (undated) DEVICE — SUT VIC 0 CT1 36IN J946H

## (undated) DEVICE — GLV SURG TRIUMPH PF LTX 7 STRL

## (undated) DEVICE — GOWN,PREVENTION PLUS,XLONG/XLARGE,STRL: Brand: MEDLINE

## (undated) DEVICE — TRAP,MUCUS SPECIMEN,40CC: Brand: MEDLINE

## (undated) DEVICE — SOL IRR NACL 0.9PCT BT 1000ML

## (undated) DEVICE — CAUTERY TIP POLISHER: Brand: DEVON

## (undated) DEVICE — CANN ART DLP STR/TIP 18F7IN

## (undated) DEVICE — TUBING EXTENSION SET: Brand: ARGYLE

## (undated) DEVICE — CANN SMPL SOFTECH BIFLO ETCO2 A/M 7FT

## (undated) DEVICE — LUBE JELLY SURGILUBE TB/FLIPCAP 4.5OZ

## (undated) DEVICE — STERILE POLYISOPRENE POWDER-FREE SURGICAL GLOVES: Brand: PROTEXIS

## (undated) DEVICE — CATH F6 ST JR 4 100CM: Brand: SUPERTORQUE

## (undated) DEVICE — TP SXN YANKR BULB STRL

## (undated) DEVICE — BIT DRL SCRW PERIPH 2.7MM

## (undated) DEVICE — NDL HYPO SFTY GLD 22G 1 1/2IN

## (undated) DEVICE — CORONARY ARTERY BYPASS GRAFT MARKERS, STAINLESS STEEL, DISTAL, WITHOUT HOLDER: Brand: ANASTOMARK CORONARY ARTERY BYPASS GRAFT MARKERS, STAINLESS STEEL, DISTAL

## (undated) DEVICE — GLV SURG TRIUMPH LT PF LTX 8 STRL

## (undated) DEVICE — PAD GRND REM POLYHESIVE A/ DISP

## (undated) DEVICE — SYS PERFUS SEP PLATLT W TIPS CUST

## (undated) DEVICE — PACK,UNIVERSAL,NO GOWNS: Brand: MEDLINE

## (undated) DEVICE — SUT PROLN MO.5 7/0 DBLARM BV175 6 2X30 BX/12

## (undated) DEVICE — BLOWER/MISTER AXIOUS OPCAB W/TBG

## (undated) DEVICE — PREP SOL POVIDONE/IODINE BT 4OZ

## (undated) DEVICE — GLV SURG DERMASSURE GRN LF PF 7.0

## (undated) DEVICE — GLV SURG SENSICARE POLYISPRN W/ALOE PF LF 6.5 GRN STRL

## (undated) DEVICE — GLV SURG TRIUMPH LT PF LTX 8.5 STRL

## (undated) DEVICE — COVER,MAYO STAND,STERILE: Brand: MEDLINE

## (undated) DEVICE — INSUFFLATION TUBING,LAPAROSCOPIC: Brand: DEROYAL

## (undated) DEVICE — PK MAJ PROC LF 60

## (undated) DEVICE — SPONGE,LAP,SUPER ABSORBENT,18"X18",5/PK: Brand: MEDLINE

## (undated) DEVICE — A2000 MULTI-USE SYRINGE KIT, P/N 701277-003KIT CONTENTS: 100ML CONTRAST RESERVOIR AND TUBING WITH CONTRAST SPIKE AND CLAMP: Brand: A2000 MULTI-USE SYRINGE KIT

## (undated) DEVICE — TOWEL,OR,DSP,ST,BLUE,DLX,8/PK,10PK/CS: Brand: MEDLINE

## (undated) DEVICE — GOWN,AURORA,NOREINF,RAGLAN,XL,STERILE: Brand: MEDLINE

## (undated) DEVICE — GAUZE,SPONGE,4"X4",16PLY,XRAY,STRL,LF: Brand: MEDLINE

## (undated) DEVICE — PK HEART OPN 40

## (undated) DEVICE — UNDRPD BREATH 23X36 BG/10

## (undated) DEVICE — SUT ETHIB 0 CT1 CR8 18IN CX21D

## (undated) DEVICE — DRSNG WND GEL FIBR OPTICELL AG PLS W/SLV LF 4X5IN  STRL

## (undated) DEVICE — 3M™ STERI-STRIP™ REINFORCED ADHESIVE SKIN CLOSURES, R1547, 1/2 IN X 4 IN (12 MM X 100 MM), 6 STRIPS/ENVELOPE: Brand: 3M™ STERI-STRIP™

## (undated) DEVICE — BIT DRL SCRW CENTRL 3.2MM

## (undated) DEVICE — CATH DIAG EXPO .052 PIG 6F 110CM

## (undated) DEVICE — DRSNG BRDR MEPILEX P/OP SIL 4X12IN

## (undated) DEVICE — BITEBLOCK ENDO W/STRAP 60F A/ LF DISP

## (undated) DEVICE — HOOD, PEEL-AWAY: Brand: FLYTE

## (undated) DEVICE — CATH F6 ST JL 4 100CM: Brand: SUPERTORQUE

## (undated) DEVICE — DRSNG GZ CURAD XEROFORM NONADHS 5X9IN STRL

## (undated) DEVICE — IMPLANTABLE DEVICE
Type: IMPLANTABLE DEVICE | Site: SHOULDER | Status: NON-FUNCTIONAL
Removed: 2020-02-17

## (undated) DEVICE — SOL ISO/ALC RUB 70PCT 4OZ

## (undated) DEVICE — INTRO SHEATH ART/FEM ENGAGE .038 6F12CM

## (undated) DEVICE — SOL HYDROGEN PEROX 3PCT 4OZ

## (undated) DEVICE — GLV SURG SENSICARE PI LF PF 7.5 GRN STRL

## (undated) DEVICE — GLV SURG BIOGEL LTX PF 6 1/2

## (undated) DEVICE — GLV SURG TRIUMPH LT PF LTX 6.5 STRL

## (undated) DEVICE — PK CATH LAB 60

## (undated) DEVICE — SPNG GZ WOVN 4X4IN 12PLY 10/BX STRL

## (undated) DEVICE — HEMOCONCENTRATOR PERFUS LPS06

## (undated) DEVICE — KT DRN EVAC WND PVC 7F3/32IN 400CC

## (undated) DEVICE — MODEL BT2000 P/N 700287-012KIT CONTENTS: MANIFOLD WITH SALINE AND CONTRAST PORTS, SALINE TUBING WITH SPIKE AND HAND SYRINGE, TRANSDUCER: Brand: BT2000 AUTOMATED MANIFOLD KIT

## (undated) DEVICE — GLV SURG SENSICARE ALOE LF PF SZ7.5 GRN

## (undated) DEVICE — NDL HYPO PRECISIONGLIDE/REG 18G 1IN PNK

## (undated) DEVICE — LIGHT HANDLE: Brand: DEVON

## (undated) DEVICE — DBD-PACK,SHOULDER III: Brand: MEDLINE

## (undated) DEVICE — SUT SILK 0 FSL 18IN 678H

## (undated) DEVICE — BLAKE SILICONE DRAINS CARDIO CONNECTOR 2:1: Brand: BLAKE

## (undated) DEVICE — DRSNG SURESITE WNDW 2.38X2.75

## (undated) DEVICE — GLV SURG BIOGEL M LTX PF 7 1/2

## (undated) DEVICE — BNDG ELAS CO-FLEX SLF ADHR 4IN5YD LF STRL

## (undated) DEVICE — GLV SURG SENSICARE GREEN W/ALOE PF LF 6.5 STRL

## (undated) DEVICE — PAD,ABDOMINAL,8"X10",ST,LF: Brand: MEDLINE

## (undated) DEVICE — PANTY KNIT MATERN L/XL

## (undated) DEVICE — SUT SILK 0 CT1 CR8 18IN C021D

## (undated) DEVICE — MODEL AT P65, P/N 701554-001KIT CONTENTS: HAND CONTROLLER, 3-WAY HIGH-PRESSURE STOPCOCK WITH ROTATING END AND PREMIUM HIGH-PRESSURE TUBING: Brand: ANGIOTOUCH® KIT

## (undated) DEVICE — SENSR CERBRL O2 PK/2

## (undated) DEVICE — DRSNG PAD ABD 8X10IN STRL

## (undated) DEVICE — PENCL E/S HNDSWCH PUSHBTN HOLSTR 10FT

## (undated) DEVICE — ADHS SKIN DERMABOND TOP ADVANCED

## (undated) DEVICE — PK PERFUS CUST W/CARDIOPLEGIA

## (undated) DEVICE — HI-TORQUE VERSACORE MODIFIED J GUIDE WIRE SYSTEM 145 CM: Brand: HI-TORQUE VERSACORE

## (undated) DEVICE — 3M™ IOBAN™ 2 ANTIMICROBIAL INCISE DRAPE 6651EZ: Brand: IOBAN™ 2

## (undated) DEVICE — GW PERIPH GUIDERIGHT STD/J/TP PTFE/PCOAT SS 0.038IN 5X150CM

## (undated) DEVICE — 12 FOOT DISPOSABLE EXTENSION CABLE WITH SAFE CONNECT / SCREW-DOWN

## (undated) DEVICE — STCKNT IMPERV 12IN STRL

## (undated) DEVICE — STRYKER PERFORMANCE SERIES SAGITTAL BLADE: Brand: STRYKER PERFORMANCE SERIES

## (undated) DEVICE — DRN WND CH RND FUL/FLUT NO/TROC 3/8IN 28F

## (undated) DEVICE — SUT GUT CHRM 2/0 SH 27IN G123H

## (undated) DEVICE — GLV SURG TRIUMPH LT PF LTX 7.5 STRL

## (undated) DEVICE — STPLR SKIN VISISTAT WD 35CT

## (undated) DEVICE — CLAMP INSERT: Brand: STEALTH® CLAMP INSERT

## (undated) DEVICE — SUT PROLN 2/0 SH 36IN 8523H

## (undated) DEVICE — VASOVIEW HEMOPRO: Brand: VASOVIEW HEMOPRO

## (undated) DEVICE — 1314 FOAM STRIP NEEDLE COUNTER: Brand: DEVON

## (undated) DEVICE — ANTIBACTERIAL UNDYED BRAIDED (POLYGLACTIN 910), SYNTHETIC ABSORBABLE SUTURE: Brand: COATED VICRYL

## (undated) DEVICE — MARKR SKIN W/RULR AND LBL

## (undated) DEVICE — GLV SURG SENSICARE PI MIC PF SZ7.5 LF STRL

## (undated) DEVICE — GLV SURG TRIUMPH PF LTX 6.5 STRL

## (undated) DEVICE — ELECTRD BLD EXT EDGE/INSUL 6IN

## (undated) DEVICE — BIOPATCH™ ANTIMICROBIAL DRESSING WITH CHLORHEXIDINE GLUCONATE IS A HYDROPHILLIC POLYURETHANE ABSORPTIVE FOAM WITH CHLORHEXIDINE GLUCONATE (CHG) WHICH INHIBITS BACTERIAL GROWTH UNDER THE DRESSING. THE DRESSING IS INTENDED TO BE USED TO ABSORB EXUDATE, COVER A WOUND CAUSED BY VASCULAR AND NONVASCULAR PERCUTANEOUS MEDICAL DEVICES DURING SURGERY, AS WELL AS REDUCE LOCAL INFECTION AND COLONIZATION OF MICROORGANISMS.: Brand: BIOPATCH

## (undated) DEVICE — GLV SURG BIOGEL LTX PF 7

## (undated) DEVICE — ROTATING SURGICAL PUNCHES, 1 PER POUCH: Brand: A&E MEDICAL / ROTATING SURGICAL PUNCHES

## (undated) DEVICE — ST. SORBAVIEW ULTIMATE IJ SYSTEM A,C: Brand: CENTURION

## (undated) DEVICE — TP ELAS ELASTIKON ADHS 4IN 2.5YD

## (undated) DEVICE — ELECTRODE,RT,STRESS,FOAM,50PK: Brand: MEDLINE

## (undated) DEVICE — DBD-PACK, LITHOTOMY VI: Brand: MEDLINE

## (undated) DEVICE — PIN STNMAN 3.2MM 9IN
Type: IMPLANTABLE DEVICE | Site: SHOULDER | Status: NON-FUNCTIONAL
Removed: 2020-02-17

## (undated) DEVICE — HARMONIC SYNERGY DISSECTING HOOK WITH TORQUE WRENCH. FOR USE WITH BLUE HAND PIECE ONLY: Brand: HARMONIC SYNERGY

## (undated) DEVICE — OASIS DRAIN, DUAL, IN-LINE, ATS COMPATIBLE: Brand: OASIS